# Patient Record
Sex: MALE | Race: WHITE | NOT HISPANIC OR LATINO | Employment: OTHER | URBAN - METROPOLITAN AREA
[De-identification: names, ages, dates, MRNs, and addresses within clinical notes are randomized per-mention and may not be internally consistent; named-entity substitution may affect disease eponyms.]

---

## 2017-05-08 DIAGNOSIS — M25.562 LEFT KNEE PAIN, UNSPECIFIED CHRONICITY: ICD-10-CM

## 2017-05-08 RX ORDER — GABAPENTIN 100 MG/1
100 CAPSULE ORAL DAILY PRN
Qty: 30 CAPSULE | Refills: 3 | Status: SHIPPED | OUTPATIENT
Start: 2017-05-08 | End: 2017-08-31

## 2017-05-22 DIAGNOSIS — E89.0 HYPOTHYROIDISM, POSTABLATIVE: ICD-10-CM

## 2017-05-23 RX ORDER — LEVOTHYROXINE SODIUM 75 UG/1
75 TABLET ORAL DAILY
Qty: 90 TABLET | Refills: 0 | Status: SHIPPED | OUTPATIENT
Start: 2017-05-23 | End: 2017-08-21

## 2017-05-30 DIAGNOSIS — N40.0 HYPERTROPHY OF PROSTATE WITHOUT URINARY OBSTRUCTION: ICD-10-CM

## 2017-05-30 RX ORDER — TERAZOSIN 10 MG/1
10 CAPSULE ORAL AT BEDTIME
Qty: 90 CAPSULE | Refills: 3 | Status: SHIPPED | OUTPATIENT
Start: 2017-05-30 | End: 2018-05-23

## 2017-06-13 DIAGNOSIS — I10 ESSENTIAL HYPERTENSION WITH GOAL BLOOD PRESSURE LESS THAN 140/90: ICD-10-CM

## 2017-06-13 RX ORDER — ATENOLOL 25 MG/1
25 TABLET ORAL DAILY
Qty: 90 TABLET | Refills: 3 | Status: SHIPPED | OUTPATIENT
Start: 2017-06-13 | End: 2018-06-15

## 2017-06-15 ENCOUNTER — OFFICE VISIT (OUTPATIENT)
Dept: FAMILY MEDICINE | Facility: CLINIC | Age: 82
End: 2017-06-15

## 2017-06-15 VITALS
SYSTOLIC BLOOD PRESSURE: 160 MMHG | HEIGHT: 68 IN | WEIGHT: 168 LBS | OXYGEN SATURATION: 97 % | TEMPERATURE: 97.4 F | RESPIRATION RATE: 16 BRPM | HEART RATE: 58 BPM | BODY MASS INDEX: 25.46 KG/M2 | DIASTOLIC BLOOD PRESSURE: 76 MMHG

## 2017-06-15 DIAGNOSIS — I10 ESSENTIAL HYPERTENSION: Primary | ICD-10-CM

## 2017-06-15 DIAGNOSIS — E89.0 HYPOTHYROIDISM, POSTABLATIVE: ICD-10-CM

## 2017-06-15 LAB
ANION GAP SERPL CALCULATED.3IONS-SCNC: 9 MMOL/L (ref 5–18)
BUN SERPL-MCNC: 21 MG/DL (ref 8–28)
CALCIUM SERPL-MCNC: 9.4 MG/DL (ref 8.5–10.5)
CHLORIDE SERPL-SCNC: 101 MMOL/L (ref 98–107)
CO2 SERPL-SCNC: 27 MMOL/L (ref 22–31)
CREAT SERPL-MCNC: 1.15 MG/DL (ref 0.7–1.3)
GLUCOSE SERPL-MCNC: 97 MG/DL (ref 70–125)
POTASSIUM SERPL-SCNC: 4.3 MMOL/L (ref 3.5–5)
SODIUM SERPL-SCNC: 137 MMOL/L (ref 136–145)
TSH SERPL DL<=0.05 MIU/L-ACNC: 4.63 UIU/ML (ref 0.3–5)

## 2017-06-15 NOTE — PROGRESS NOTES
HPI:       Dilip Long is a 82 year old  male with a significant past medical history of HTN, hypothyroidism, OA, BPH who presents for follow up concern of HTN, hypothyroid.  Walks a mile or two ever other day. This is a decrease which has helped his knee pain.   NO chest pain, or SOB with this.     About three years ago had lightheadedness, had infection. No other lightheaded issues.    Three grand kids trying to help get them to activities.   Retired .   Wife fairly healthy, Overweight. They take care of each other.    Still taking levothyroxine 100 mcg daily      BP at home was 130/70s this AM.  No palpitations.       No diarrhea, constipation. No dysuria.   Up at night about 5 times. Still seeing Dr Hernandez.    Not sleeping well. Gabapentin used to be more effective. Taking just 100 mg a night. Sleeps best in early morning hours.    Quit smoking more than 40 years ago.        PMHX:     Patient Active Problem List   Diagnosis     Health Care Home     Hypertrophy of prostate without urinary obstruction     Cerebral infarction due to occlusion or stenosis of carotid artery     Diverticulosis of large intestine     Grave's disease - post ablation     Hydrocele     Hyperlipidemia     Hypothyroidism, postablative     Pain in joint, pelvic region and thigh     Thoracic or lumbosacral neuritis or radiculitis, unspecified     Lumbosacral spondylosis without myelopathy     Elevated prostate specific antigen (PSA)     Essential hypertension     Primary osteoarthritis of left knee     Claudication of left lower extremity (H)     Urethral stricture       Current Outpatient Prescriptions   Medication Sig Dispense Refill     atenolol (TENORMIN) 25 MG tablet Take 1 tablet (25 mg) by mouth daily 90 tablet 3     terazosin (HYTRIN) 10 MG capsule Take 1 capsule (10 mg) by mouth At Bedtime Take 1 tablet (10 mg) daily 90 capsule 3     levothyroxine (SYNTHROID/LEVOTHROID) 75 MCG tablet Take 1 tablet (75 mcg) by  "mouth daily 90 tablet 0     gabapentin (NEURONTIN) 100 MG capsule Take 1 capsule (100 mg) by mouth daily as needed PRN nerve pain 30 capsule 3     simvastatin (ZOCOR) 10 MG tablet Take 1 tablet (10 mg) by mouth At Bedtime 90 tablet 3     order for DME Equipment being ordered: Home automatic blood pressure cuff. Check once daily for one week, and then 3 times weekly thereafter. 1 Device 0     aspirin 325 MG tablet Take 1 tablet by mouth daily.       Calcium Carbonate-Vitamin D (OSCAL 500/200 D-3 PO) Take 1 tablet by mouth daily.       Glucosamine-Chondroit-Vit C-Mn (GLUCOSAMINE 1500 COMPLEX) CAPS Take 1 capsule by mouth daily.            Allergies   Allergen Reactions     Nkda [No Known Drug Allergies]               Review of Systems:   As above          Physical Exam:     Vitals:    06/15/17 0847   BP: 160/76   Pulse: 58   Resp: 16   Temp: 97.4  F (36.3  C)   TempSrc: Oral   SpO2: 97%   Weight: 168 lb (76.2 kg)   Height: 5' 8\" (172.7 cm)     Body mass index is 25.54 kg/(m^2).    Vitals reviewed and normal except BP slightly elevated.  GENERAL APPEARANCE: healthy, alert and no distress  EYES: Eyes grossly normal to inspection, conjunctivae and sclerae normal  HENT:  oropharynx clear and oral mucous membranes moist  NECK: no adenopathy, no asymmetry, masses, or scars and thyroid normal to palpation  RESP: lungs clear to auscultation - no rales, rhonchi or wheezes  CV: regular rates and rhythm, normal S1 S2, no S3 or S4, no murmur, click or rub, no peripheral edema and peripheral pulses 2+  ABDOMEN: soft, nontender, no hepatosplenomegaly, no masses and bowel sounds normal  MS: no musculoskeletal defects are noted and gait is age appropriate without ataxia  SKIN: no suspicious lesions or rashes  NEURO: Normal strength and tone  PSYCH: mentation appears normal and affect normal/bright       Office Visit on 11/17/2016   Component Date Value Ref Range Status     TSH 11/17/2016 0.36  0.30 - 5.00 uIU/mL Final     Cholesterol " 11/17/2016 148.0  <200.0 mg/dL Final     Triglycerides 11/17/2016 130.0  <150.0 mg/dL Final     HDL Cholesterol 11/17/2016 64.0  >40.0 mg/dL Final    If diabetic or CVD then reference range <100     VLDL-Cholesterol 11/17/2016 26.0  7.0 - 32.0 mg/dL Final     LDL Cholesterol Direct 11/17/2016 58.0  0.0 - 99.0 mg/dL Final     Cholesterol/HDL Ratio 11/17/2016 2.3  <5.0 RATIO Final       Assessment and Plan     (I10) Essential hypertension  (primary encounter diagnosis)- Continues on atenolol, still prefers this agent to others, Not well controlled today in office but at goal at home.  Plan: Basic Metabolic Profile ([facility])  Continue atenolol    (E89.0) Hypothyroidism, postablative- did not change dose as previously recommended. Will recheck TSH today and adjust as needed.   Plan: [facility]  [facility])          Options for treatment and follow-up care were reviewed with the patient and/or guardian. Dilip Long and/or guardian engaged in the decision making process and verbalized understanding of the options discussed and agreed with the final plan.    Rochelle Combs MD      Precepted today with: Kya Garcia MD

## 2017-06-15 NOTE — MR AVS SNAPSHOT
"              After Visit Summary   6/15/2017    Dilip Long    MRN: [id number]           Patient Information     Date Of Birth          1935        Visit Information        Provider Department      6/15/2017 9:00 AM Rochelle Combs MD [facility]        Today's Diagnoses     Essential hypertension    -  1    Hypothyroidism, postablative           Follow-ups after your visit        Who to contact     Please call your clinic at [phone] to:    Ask questions about your health    Make or cancel appointments    Discuss your medicines    Learn about your test results    Speak to your doctor   If you have compliments or concerns about an experience at your clinic, or if you wish to file a complaint, please contact [facility] Patient Relations at [phone] or email us at [email or web address]         Additional Information About Your Visit        Care EveryWhere ID     This is your Care EveryWhere ID. This could be used by other organizations to access your [place] medical records  [id number]        Your Vitals Were     Pulse Temperature Respirations Height Pulse Oximetry BMI (Body Mass Index)    58 97.4  F (36.3  C) (Oral) 16 5' 8\" (172.7 cm) 97% 25.54 kg/m2       Blood Pressure from Last 3 Encounters:   06/15/17 160/76   12/08/16 135/74   11/17/16 124/64    Weight from Last 3 Encounters:   06/15/17 168 lb (76.2 kg)   12/08/16 169 lb (76.7 kg)   11/17/16 169 lb 6 oz (76.8 kg)              We Performed the Following     Basic Metabolic Profile ([facility])     TSH  Sensitive ([facility])        Primary Care Provider Office Phone # Fax #    Rochelle Combs [phone]       [facility] [address] [place] [address]        Equal Access to Services     ESSENCE LAM AH: Hadii robinson Ramos, waaxda luqadaha, qaybta kaalmada adeegyada, kvng sanderson. So wa " [phone].    ATENCIÓN: Si kamran will, tiene a leal disposición servicios gratuitos de asistencia lingüística. Jenny vela [phone].    We comply with applicable federal civil rights laws and [place] laws. We do not discriminate on the basis of race, color, national origin, age, disability sex, sexual orientation or gender identity.            Thank you!     Thank you for choosing [facility]  for your care. Our goal is always to provide you with excellent care. Hearing back from our patients is one way we can continue to improve our services. Please take a few minutes to complete the written survey that you may receive in the mail after your visit with us. Thank you!             Your Updated Medication List - Protect others around you: Learn how to safely use, store and throw away your medicines at www.disposemymeds.org.          This list is accurate as of: 6/15/17 11:59 PM.  Always use your most recent med list.                   Brand Name Dispense Instructions for use Diagnosis    aspirin 325 MG tablet      Take 1 tablet by mouth daily.        atenolol 25 MG tablet    TENORMIN    90 tablet    Take 1 tablet (25 mg) by mouth daily    Essential hypertension with goal blood pressure less than 140/90       gabapentin 100 MG capsule    NEURONTIN    30 capsule    Take 1 capsule (100 mg) by mouth daily as needed PRN nerve pain    Left knee pain, unspecified chronicity       GLUCOSAMINE 1500 COMPLEX Caps      Take 1 capsule by mouth daily.        levothyroxine 75 MCG tablet    SYNTHROID/LEVOTHROID    90 tablet    Take 1 tablet (75 mcg) by mouth daily    Hypothyroidism, postablative       order for DME     1 Device    Equipment being ordered: Home automatic blood pressure cuff. Check once daily for one week, and then 3 times weekly thereafter.    Essential hypertension       OSCAL 500/200 D-3 PO      Take 1 tablet by mouth daily.        simvastatin 10 MG tablet    ZOCOR    90 tablet    Take 1 tablet (10  mg) by mouth At Bedtime    Hyperlipidemia LDL goal <130       terazosin 10 MG capsule    HYTRIN    90 capsule    Take 1 capsule (10 mg) by mouth At Bedtime Take 1 tablet (10 mg) daily    Hypertrophy of prostate without urinary obstruction

## 2017-06-22 NOTE — PROGRESS NOTES
Preceptor Attestation:  Patient's case reviewed and discussed with Rochelle Combs MD resident and I evaluated the patient. I agree with written assessment and plan of care.  Supervising Physician:  Kya Garcia   [facility]

## 2017-08-21 DIAGNOSIS — E89.0 HYPOTHYROIDISM, POSTABLATIVE: ICD-10-CM

## 2017-08-22 RX ORDER — LEVOTHYROXINE SODIUM 75 UG/1
75 TABLET ORAL DAILY
Qty: 90 TABLET | Refills: 1 | Status: SHIPPED | OUTPATIENT
Start: 2017-08-22 | End: 2018-02-14

## 2017-08-31 DIAGNOSIS — M25.562 LEFT KNEE PAIN, UNSPECIFIED CHRONICITY: ICD-10-CM

## 2017-08-31 RX ORDER — GABAPENTIN 100 MG/1
100 CAPSULE ORAL DAILY PRN
Qty: 30 CAPSULE | Refills: 3 | Status: SHIPPED | OUTPATIENT
Start: 2017-08-31 | End: 2018-01-02

## 2017-09-12 ENCOUNTER — TRANSFERRED RECORDS (OUTPATIENT)
Dept: HEALTH INFORMATION MANAGEMENT | Facility: CLINIC | Age: 82
End: 2017-09-12

## 2017-10-30 DIAGNOSIS — E78.5 HYPERLIPIDEMIA LDL GOAL <130: ICD-10-CM

## 2017-10-30 RX ORDER — SIMVASTATIN 10 MG
10 TABLET ORAL AT BEDTIME
Qty: 90 TABLET | Refills: 3 | Status: SHIPPED | OUTPATIENT
Start: 2017-10-30 | End: 2018-10-22

## 2017-10-30 NOTE — TELEPHONE ENCOUNTER
[facility] phone call message- patient requesting a refill:    Full Medication Name: Simvastatin    Dose: 10mg     Pharmacy confirmed as   [id number] [[address] [address]  Phone: [phone] Fax: [phone]  : Yes    Additional Comments:       OK to leave a message on voice mail? Yes    Primary language: English      needed? No    Call taken on October 30, 2017 at 12:52 PM by Sharita Fu

## 2017-11-07 ENCOUNTER — TELEPHONE (OUTPATIENT)
Dept: FAMILY MEDICINE | Facility: CLINIC | Age: 82
End: 2017-11-07

## 2017-11-07 ENCOUNTER — OFFICE VISIT (OUTPATIENT)
Dept: FAMILY MEDICINE | Facility: CLINIC | Age: 82
End: 2017-11-07

## 2017-11-07 VITALS
HEIGHT: 68 IN | TEMPERATURE: 97.4 F | HEART RATE: 65 BPM | OXYGEN SATURATION: 97 % | BODY MASS INDEX: 25.82 KG/M2 | WEIGHT: 170.4 LBS | SYSTOLIC BLOOD PRESSURE: 162 MMHG | RESPIRATION RATE: 16 BRPM | DIASTOLIC BLOOD PRESSURE: 73 MMHG

## 2017-11-07 DIAGNOSIS — J06.9 VIRAL UPPER RESPIRATORY TRACT INFECTION: Primary | ICD-10-CM

## 2017-11-07 NOTE — MR AVS SNAPSHOT
"              After Visit Summary   11/7/2017    Dilip Long    MRN: [id number]           Patient Information     Date Of Birth          1935        Visit Information        Provider Department      11/7/2017 3:00 PM Lonnie Shearer MD [facility]        Today's Diagnoses     Gastroesophageal reflux disease with esophagitis    -  1      Care Instructions    - Take Robitussin before bed for help sleeping.  - Come back to the clinic if you develop a fever, or headache persists after cold symptoms resolve.  - Come back in if symptoms do not resolve in two weeks  - Stay well hydrated with lots of fluid, take tylenol or ibuprofen as needed for discomfort.          Follow-ups after your visit        Who to contact     Please call your clinic at [phone] to:    Ask questions about your health    Make or cancel appointments    Discuss your medicines    Learn about your test results    Speak to your doctor   If you have compliments or concerns about an experience at your clinic, or if you wish to file a complaint, please contact [facility] Patient Relations at [phone] or email us at [email or web address]         Additional Information About Your Visit        Care EveryWhere ID     This is your Care EveryWhere ID. This could be used by other organizations to access your [place] medical records  [id number]        Your Vitals Were     Pulse Temperature Respirations Height Pulse Oximetry BMI (Body Mass Index)    65 97.4  F (36.3  C) (Oral) 16 5' 8\" (172.7 cm) 97% 25.91 kg/m2       Blood Pressure from Last 3 Encounters:   11/07/17 162/73   06/15/17 160/76   12/08/16 135/74    Weight from Last 3 Encounters:   11/07/17 170 lb 6.4 oz (77.3 kg)   06/15/17 168 lb (76.2 kg)   12/08/16 169 lb (76.7 kg)              Today, you had the following     No orders found for display       Primary Care Provider Office Phone # Fax #    Leonora Vaenssa [phone] " [phone]       [facility] [address] [place] [address]        Equal Access to Services     ESSENCE LAM : Hadii aad ku hadagustinrenetta Thanhali, warobertoda luqadaha, qaybta kaalmada wilfredcarolina, kvng rui deliciafrederic hardinben kenbrianaany sanderson. So [facility] [phone].    ATENCIÓN: Si habla español, tiene a leal disposición servicios gratuitos de asistencia lingüística. Llame al [phone].    We comply with applicable federal civil rights laws and [place] laws. We do not discriminate on the basis of race, color, national origin, age, disability, sex, sexual orientation, or gender identity.            Thank you!     Thank you for choosing [facility]  for your care. Our goal is always to provide you with excellent care. Hearing back from our patients is one way we can continue to improve our services. Please take a few minutes to complete the written survey that you may receive in the mail after your visit with us. Thank you!             Your Updated Medication List - Protect others around you: Learn how to safely use, store and throw away your medicines at www.disposemymeds.org.          This list is accurate as of: 11/7/17  3:39 PM.  Always use your most recent med list.                   Brand Name Dispense Instructions for use Diagnosis    aspirin 325 MG tablet      Take 1 tablet by mouth daily.        atenolol 25 MG tablet    TENORMIN    90 tablet    Take 1 tablet (25 mg) by mouth daily    Essential hypertension with goal blood pressure less than 140/90       gabapentin 100 MG capsule    NEURONTIN    30 capsule    Take 1 capsule (100 mg) by mouth daily as needed PRN nerve pain    Left knee pain, unspecified chronicity       GLUCOSAMINE 1500 COMPLEX Caps      Take 1 capsule by mouth daily.        levothyroxine 75 MCG tablet    SYNTHROID/LEVOTHROID    90 tablet    Take 1 tablet (75 mcg) by mouth daily    Hypothyroidism, postablative       order for DME     1 Device    Equipment being ordered: Home  automatic blood pressure cuff. Check once daily for one week, and then 3 times weekly thereafter.    Essential hypertension       OSCAL 500/200 D-3 PO      Take 1 tablet by mouth daily.        simvastatin 10 MG tablet    ZOCOR    90 tablet    Take 1 tablet (10 mg) by mouth At Bedtime    Hyperlipidemia LDL goal <130       terazosin 10 MG capsule    HYTRIN    90 capsule    Take 1 capsule (10 mg) by mouth At Bedtime Take 1 tablet (10 mg) daily    Hypertrophy of prostate without urinary obstruction

## 2017-11-07 NOTE — PROGRESS NOTES
"       HPI:       Dilip Long is a 82 year old  male with a significant past medical history of hypothyroid, CVA with carotid artery involvment and hypertension who presents for the new concern(s) of    Sore throat, headache, and nonproductive cough  Mr. Long has had a cough, sore throat and headache for the past 5 days.  He has tried tylenol and \"cough medicine\" with no improvement of symptoms. Endorse intermittent rhinorrhea. His wife was sick about 3 weeks ago with similar symptoms that resolved after about a week. He notes no fevers, confusion, SOB, cyanosis, nausea, vomiting, stomach pain, dysuria, or urinary urgency, diarrhea, changes in vision or hearing. Has not had the influenza vaccine and does not want it despite discussion with his pharmacist.         PMHX:     Patient Active Problem List   Diagnosis     Health Care Home     Hypertrophy of prostate without urinary obstruction     Cerebral infarction due to occlusion or stenosis of carotid artery     Diverticulosis of large intestine     Grave's disease - post ablation     Hydrocele     Hyperlipidemia     Hypothyroidism, postablative     Pain in joint, pelvic region and thigh     Thoracic or lumbosacral neuritis or radiculitis, unspecified     Lumbosacral spondylosis without myelopathy     Elevated prostate specific antigen (PSA)     Essential hypertension     Primary osteoarthritis of left knee     Claudication of left lower extremity (H)     Urethral stricture       Current Outpatient Prescriptions   Medication Sig Dispense Refill     simvastatin (ZOCOR) 10 MG tablet Take 1 tablet (10 mg) by mouth At Bedtime 90 tablet 3     gabapentin (NEURONTIN) 100 MG capsule Take 1 capsule (100 mg) by mouth daily as needed PRN nerve pain 30 capsule 3     levothyroxine (SYNTHROID/LEVOTHROID) 75 MCG tablet Take 1 tablet (75 mcg) by mouth daily 90 tablet 1     atenolol (TENORMIN) 25 MG tablet Take 1 tablet (25 mg) by mouth daily 90 tablet 3     terazosin (HYTRIN) " "10 MG capsule Take 1 capsule (10 mg) by mouth At Bedtime Take 1 tablet (10 mg) daily 90 capsule 3     aspirin 325 MG tablet Take 1 tablet by mouth daily.       Glucosamine-Chondroit-Vit C-Mn (GLUCOSAMINE 1500 COMPLEX) CAPS Take 1 capsule by mouth daily.       Calcium Carbonate-Vitamin D (OSCAL 500/200 D-3 PO) Take 1 tablet by mouth daily.       order for DME Equipment being ordered: Home automatic blood pressure cuff. Check once daily for one week, and then 3 times weekly thereafter. 1 Device 0       Social History     Social History     Marital status:      Spouse name: N/A     Number of children: N/A     Years of education: N/A     Occupational History     Retired      Social History Main Topics     Smoking status: Former Smoker     Types: Cigarettes     Smokeless tobacco: Never Used      Comment: quit at age 51 years     Alcohol use No     Drug use: No     Sexual activity: Not on file     Other Topics Concern     Not on file     Social History Narrative          Allergies   Allergen Reactions     Nkda [No Known Drug Allergies]        No results found for this or any previous visit (from the past 24 hour(s)).         Review of Systems:   See HPI.          Physical Exam:     Vitals:    11/07/17 1450   BP: 162/73   BP Location: Right arm   Pulse: 65   Resp: 16   Temp: 97.4  F (36.3  C)   TempSrc: Oral   SpO2: 97%   Weight: 170 lb 6.4 oz (77.3 kg)   Height: 5' 8\" (172.7 cm)     BP at home: 140/90    Body mass index is 25.91 kg/(m^2).    GENERAL APPEARANCE: alert, no distress, cooperative and non-toxic appearing  EYES: Eyes grossly normal to inspection,  PERRL  HENT: ear canals and TM's normal and nose and mouth without ulcers or lesions, thick posterior pharynx mucus  NECK: no adenopathy, no asymmetry, masses, or scars and thyroid normal to palpation  RESP: lungs clear to auscultation - no rales, rhonchi or wheezes  CV: regular rate and rhythm,  and no murmur, click,  rub or gallop  ABDOMEN: soft, nontender, " without hepatosplenomegaly or masses  MS: extremities normal- no gross deformities noted  PSYCH: mood and affect normal/bright    Assessment and Plan     URI: Dilip's symptoms are consistent with a viral URI. Less likely pneumonia given no issues with deep breathing, no fevers or purulent sputum.  Less likely etiology with plans for symptomatic treatment which patient is agreeable to.  - Supportive treatment including good hydration, ibuprofen and Tylenol as needed for discomfort, over-the-counter Robitussin as needed  - Return to clinic if developing fevers, difficulties breathing, chest pain or new concerning symptoms,  This note was scribed by Eyad Tim, MS3, on behalf of Dr. Lonnie Shearer. The medical student acted as a scribe and the encounter documented above was performed completely by me and the documentation accurately reflects the work I have performed today.      Options for treatment and follow-up care were reviewed with the patient and/or guardian. Dilip Long and/or guardian engaged in the decision making process and verbalized understanding of the options discussed and agreed with the final plan.      Lonnie Shearer MD  [facility]  Pager: [phone]    Precepted today with: Dr. Kasie Parisi

## 2017-11-07 NOTE — PATIENT INSTRUCTIONS
- Take Robitussin before bed for help sleeping.  - Come back to the clinic if you develop a fever, or headache persists after cold symptoms resolve.  - Come back in if symptoms do not resolve in two weeks  - Stay well hydrated with lots of fluid, take tylenol or ibuprofen as needed for discomfort.

## 2017-11-07 NOTE — TELEPHONE ENCOUNTER
[facility] phone call message- medication clarification/question:    Full Medication Name: atenolol (TENORMIN) 25 MG tablet    Question: Patient called to let Dr. Parisi know that he is taking this prescription 25mg, x1 a day in the morning.     Pharmacy confirmed as [id number] [place] [address] [[address] No    OK to leave a message on voice mail? Yes    Primary language: English      needed? No    Call taken on November 7, 2017 at 4:06 PM by Genevieve Pickering

## 2017-11-08 NOTE — PROGRESS NOTES
Preceptor Attestation:  Patient's case reviewed and discussed with  Patient seen and discussed with the resident.  I agree with written assessment and plan of care.  Supervising Physician:  Kasie Parisi MD  [facility]

## 2017-11-13 ENCOUNTER — TELEPHONE (OUTPATIENT)
Dept: FAMILY MEDICINE | Facility: CLINIC | Age: 82
End: 2017-11-13

## 2017-11-13 NOTE — TELEPHONE ENCOUNTER
Patient was seen on 11-7-17 for a cough, headache and congestion, stopped to see me on his way out and was concerned that it may be more than a virus.  In the note of 11-7, Dr. Shearer did recommend to return if headaches did not resolve after the URI symptoms got better. Today I called patient for a phone update and he tells me things are much better, headache is improved and he will follow up if not totally resoved soon.  Raomna Ray

## 2018-01-02 DIAGNOSIS — M25.562 LEFT KNEE PAIN, UNSPECIFIED CHRONICITY: ICD-10-CM

## 2018-01-03 RX ORDER — GABAPENTIN 100 MG/1
100 CAPSULE ORAL DAILY PRN
Qty: 30 CAPSULE | Refills: 3 | Status: SHIPPED | OUTPATIENT
Start: 2018-01-03 | End: 2018-03-08

## 2018-02-14 DIAGNOSIS — E89.0 HYPOTHYROIDISM, POSTABLATIVE: ICD-10-CM

## 2018-02-14 RX ORDER — LEVOTHYROXINE SODIUM 75 UG/1
75 TABLET ORAL DAILY
Qty: 90 TABLET | Refills: 3 | Status: SHIPPED | OUTPATIENT
Start: 2018-02-14 | End: 2019-02-08

## 2018-03-01 ENCOUNTER — ALLIED HEALTH/NURSE VISIT (OUTPATIENT)
Dept: FAMILY MEDICINE | Facility: CLINIC | Age: 83
End: 2018-03-01
Payer: COMMERCIAL

## 2018-03-01 VITALS
TEMPERATURE: 97.5 F | WEIGHT: 166.5 LBS | OXYGEN SATURATION: 98 % | DIASTOLIC BLOOD PRESSURE: 62 MMHG | SYSTOLIC BLOOD PRESSURE: 135 MMHG | BODY MASS INDEX: 24.66 KG/M2 | HEIGHT: 69 IN | HEART RATE: 60 BPM

## 2018-03-01 DIAGNOSIS — I10 ESSENTIAL HYPERTENSION: Primary | ICD-10-CM

## 2018-03-01 NOTE — MR AVS SNAPSHOT
"              After Visit Summary   3/1/2018    Dilip Long    MRN: [id number]           Patient Information     Date Of Birth          1935        Visit Information        Provider Department      3/1/2018 10:00 AM Nurse, Shae Massey [facility]        Today's Diagnoses     Essential hypertension    -  1       Follow-ups after your visit        Who to contact     Please call your clinic at [phone] to:    Ask questions about your health    Make or cancel appointments    Discuss your medicines    Learn about your test results    Speak to your doctor            Additional Information About Your Visit        MyChart Information     [employer] is an electronic gateway that provides easy, online access to your medical records. With [employer], you can request a clinic appointment, read your test results, renew a prescription or communicate with your care team.     To sign up for [employer] visit the website at [email or web address]   You will be asked to enter the access code listed below, as well as some personal information. Please follow the directions to create your username and password.     Your access code is: [email or web address]  Expires: 2018 10:43 AM     Your access code will  in 90 days. If you need help or a new code, please contact your [facility] or call [phone] for assistance.        Care EveryWhere ID     This is your Care EveryWhere ID. This could be used by other organizations to access your [place] medical records  [id number]        Your Vitals Were     Pulse Temperature Height Pulse Oximetry BMI (Body Mass Index)       60 97.5  F (36.4  C) (Oral) 5' 8.5\" (174 cm) 98% 24.95 kg/m2        Blood Pressure from Last 3 Encounters:   18 135/62   17 162/73   06/15/17 160/76    Weight from Last 3 Encounters:   18 166 lb 8 oz (75.5 kg)   17 170 lb 6.4 oz (77.3 kg)   06/15/17 168 lb (76.2 kg)              Today, you had the " following     No orders found for display       Primary Care Provider Office Phone # Fax #    Leonora Azalia Vanessa [phone]       [facility] [address] [place] [address]        Equal Access to Services     ESSENCE LAM : Hadvika aviles arasho Soomaali, waaxda luqadaha, qaybta kaalmada adeegyada, kvng elijahin hayaan wilfredben griffith senait sanderson. So [facility] [phone].    ATENCIÓN: Si habla español, tiene a leal disposición servicios gratuitos de asistencia lingüística. Llame al [phone].    We comply with applicable federal civil rights laws and [place] laws. We do not discriminate on the basis of race, color, national origin, age, disability, sex, sexual orientation, or gender identity.            Thank you!     Thank you for choosing [facility]  for your care. Our goal is always to provide you with excellent care. Hearing back from our patients is one way we can continue to improve our services. Please take a few minutes to complete the written survey that you may receive in the mail after your visit with us. Thank you!             Your Updated Medication List - Protect others around you: Learn how to safely use, store and throw away your medicines at www.disposemymeds.org.          This list is accurate as of 3/1/18 10:43 AM.  Always use your most recent med list.                   Brand Name Dispense Instructions for use Diagnosis    aspirin 325 MG tablet      Take 1 tablet by mouth daily.        atenolol 25 MG tablet    TENORMIN    90 tablet    Take 1 tablet (25 mg) by mouth daily    Essential hypertension with goal blood pressure less than 140/90       gabapentin 100 MG capsule    NEURONTIN    30 capsule    Take 1 capsule (100 mg) by mouth daily as needed PRN nerve pain    Left knee pain, unspecified chronicity       GLUCOSAMINE 1500 COMPLEX Caps      Take 1 capsule by mouth daily.        levothyroxine 75 MCG tablet    SYNTHROID/LEVOTHROID    90 tablet    Take 1  tablet (75 mcg) by mouth daily    Hypothyroidism, postablative       order for DME     1 Device    Equipment being ordered: Home automatic blood pressure cuff. Check once daily for one week, and then 3 times weekly thereafter.    Essential hypertension       OSCAL 500/200 D-3 PO      Take 1 tablet by mouth daily.        simvastatin 10 MG tablet    ZOCOR    90 tablet    Take 1 tablet (10 mg) by mouth At Bedtime    Hyperlipidemia LDL goal <130       terazosin 10 MG capsule    HYTRIN    90 capsule    Take 1 capsule (10 mg) by mouth At Bedtime Take 1 tablet (10 mg) daily    Hypertrophy of prostate without urinary obstruction

## 2018-03-01 NOTE — NURSING NOTE
Blood pressure by Kofi Rodriguez MA    Name of preceptor on site during the time of service is DR Yeager

## 2018-03-08 ENCOUNTER — AMBULATORY - HEALTHEAST (OUTPATIENT)
Dept: ADMINISTRATIVE | Facility: REHABILITATION | Age: 83
End: 2018-03-08

## 2018-03-08 ENCOUNTER — OFFICE VISIT (OUTPATIENT)
Dept: FAMILY MEDICINE | Facility: CLINIC | Age: 83
End: 2018-03-08
Payer: COMMERCIAL

## 2018-03-08 VITALS
OXYGEN SATURATION: 99 % | RESPIRATION RATE: 18 BRPM | HEART RATE: 55 BPM | HEIGHT: 69 IN | SYSTOLIC BLOOD PRESSURE: 130 MMHG | TEMPERATURE: 97.5 F | BODY MASS INDEX: 24.85 KG/M2 | WEIGHT: 167.8 LBS | DIASTOLIC BLOOD PRESSURE: 72 MMHG

## 2018-03-08 DIAGNOSIS — M54.41 CHRONIC RIGHT-SIDED LOW BACK PAIN WITH RIGHT-SIDED SCIATICA: Primary | ICD-10-CM

## 2018-03-08 DIAGNOSIS — G89.29 CHRONIC RIGHT-SIDED LOW BACK PAIN WITH SCIATICA: ICD-10-CM

## 2018-03-08 DIAGNOSIS — G89.29 CHRONIC RIGHT-SIDED LOW BACK PAIN WITH RIGHT-SIDED SCIATICA: Primary | ICD-10-CM

## 2018-03-08 DIAGNOSIS — M54.40 CHRONIC RIGHT-SIDED LOW BACK PAIN WITH SCIATICA: ICD-10-CM

## 2018-03-08 RX ORDER — GABAPENTIN 100 MG/1
100 CAPSULE ORAL 3 TIMES DAILY
Qty: 30 CAPSULE | Refills: 3 | Status: SHIPPED | OUTPATIENT
Start: 2018-03-08 | End: 2018-03-09

## 2018-03-08 NOTE — PROGRESS NOTES
"       Subjective       Dilip Long is a 82 year old  male who presents for    1. Back Pain  - Chronic issue, but has been acutely worsening over 4 weeks  - No injury  - Back exercises BID and daily gabapentin (100mg)- no help  - Pain radiates down lateral right leg to knee, no leg weakness  - no numbness in groin  - no new bowel incontinence    Pertinent ROS: 5-Point Review of Systems Negative -- Except as noted above.         Objective     Vitals:    03/08/18 0922   BP: 130/72   Pulse: 55   Resp: 18   Temp: 97.5  F (36.4  C)   TempSrc: Oral   SpO2: 99%   Weight: 167 lb 12.8 oz (76.1 kg)   Height: 5' 8.75\" (174.6 cm)     Body mass index is 24.96 kg/(m^2).    GENERAL APPEARANCE: healthy, alert and no distress  RESP: lungs clear to auscultation - no rales, rhonchi or wheezes  CV: regular rates and rhythm, normal S1 S2, no S3 or S4, no murmur, click or rub, no peripheral edema and peripheral pulses strong  BACK: Tender, spastic paraspinal muscles, slightly tender gith PSIS and sciatic notch, back extension reproduces pain  LEGS: ORTIZ positive on right, negative on left, other SI joint tests negative (sacral thrust, distraction, compression)  GAIT: normal, not antalgic  NEURO: Normal strength and tone, sensory exam grossly normal, mentation intact and speech normal         Assessment/Plan       (M54.41,  G89.29) Chronic right-sided low back pain with right-sided sciatica  (primary encounter diagnosis)  Comment:   Plan: PHYSICAL THERAPY REFERRAL        Will increase gabapentin to 100mg TID. Patient already doing some back exercises twice a day at home. Gave him more to do. Highly motivated, agreeable to PT. RTC in 4-6 weeks for follow up. No red flag signs.    Options for treatment and follow-up care were reviewed with the patient and/or guardian. Dilip Long and/or guardian engaged in the decision making process and verbalized understanding of the options discussed and agreed with the final plan.    Aiden" Daquan Hennessy MD      Precepted today with: Dr. Paige Steel    This note was created using Dragon Dictation software. Any grammatical errors or word substitutions are unintentional, despite proofreading.

## 2018-03-08 NOTE — MR AVS SNAPSHOT
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  Yoly          Follow-ups after your visit        Additional Services     PHYSICAL THERAPY REFERRAL       PT/OT REFERRAL  Dilip Long  1935  Phone #: [phone] (home)    needed: No  Language: English    PT/OT  Facility:   [facility], P: [phone], F: [phone]    History: Chronic low back pain, currently worse, no specific injury. Some right leg radicular pain, treating with gabapentin    Precautions/Contraindications: None    Imaging Studies: none    Surgical Procedure/Test Results: None    Treatment Goals:   Increase: Strength  Decrease: Pain and Spasm    Prognosis: excellent    Visits: Up to 10    Evaluate: Evaluate and treat    Plan: Manual Therapy, Flexibility Exercise and Strength Exercise                  Follow-up notes from your care team     Return in about 4 weeks (around 4/5/2018).      Who to contact     Please call your clinic at [phone] to:    Ask questions about your health    Make or cancel appointments    Discuss your medicines    Learn about your test results    Speak to your doctor            Additional Information About Your Visit        MyChart Information     [employer] is an electronic gateway that provides easy, online access to your medical records. With [employer], you can request a clinic appointment, read your test results, renew a prescription or communicate with your care team.     To sign up for [employer] visit the website at  "[email or web address]   You will be asked to enter the access code listed below, as well as some personal information. Please follow the directions to create your username and password.     Your access code is: [email or web address]  Expires: 2018 11:43 AM     Your access code will  in 90 days. If you need help or a new code, please contact your [facility] or call [phone] for assistance.        Care EveryWhere ID     This is your Care EveryWhere ID. This could be used by other organizations to access your [place] medical records  [id number]        Your Vitals Were     Pulse Temperature Respirations Height Pulse Oximetry BMI (Body Mass Index)    55 97.5  F (36.4  C) (Oral) 18 5' 8.75\" (174.6 cm) 99% 24.96 kg/m2       Blood Pressure from Last 3 Encounters:   18 130/72   18 135/62   17 162/73    Weight from Last 3 Encounters:   18 167 lb 12.8 oz (76.1 kg)   18 166 lb 8 oz (75.5 kg)   17 170 lb 6.4 oz (77.3 kg)                 Today's Medication Changes          These changes are accurate as of 3/8/18 11:59 PM.  If you have any questions, ask your nurse or doctor.               These medicines have changed or have updated prescriptions.        Dose/Directions    gabapentin 100 MG capsule   Commonly known as:  NEURONTIN   This may have changed:    - when to take this  - reasons to take this   Changed by:  Aiden Hennessy MD        Dose:  100 mg   Take 1 capsule (100 mg) by mouth 3 times daily PRN nerve pain   Quantity:  30 capsule   Refills:  3            Where to get your medicines      These medications were sent to [id number] - [place] [[place], [place] - [address] [address]     Phone:  [phone]     gabapentin 100 MG capsule                Primary Care Provider Office Phone # Fax #    Leonora Vanessa [phone]       [facility] [address] [place] [address]        Equal Access to Services     ESSENCE LAM : Hadii aad ku hadagustinrenetta Ramos, waaxda esa, qaybta kvng herzog. So [facility] [phone].    ATENCIÓN: Si habla español, tiene a leal disposición servicios gratuitos de asistencia lingüística. Llame al [phone].    We comply with applicable federal civil rights laws and [place] laws. We do not discriminate on the basis of race, color, national origin, age, disability, sex, sexual orientation, or gender identity.            Thank you!     Thank you for choosing [facility]  for your care. Our goal is always to provide you with excellent care. Hearing back from our patients is one way we can continue to improve our services. Please take a few minutes to complete the written survey that you may receive in the mail after your visit with us. Thank you!             Your Updated Medication List - Protect others around you: Learn how to safely use, store and throw away your medicines at www.disposemymeds.org.          This list is accurate as of 3/8/18 11:59 PM.  Always use your most recent med list.                   Brand Name Dispense Instructions for use Diagnosis    aspirin 325 MG tablet      Take 1 tablet by mouth daily.        atenolol 25 MG tablet    TENORMIN    90 tablet    Take 1 tablet (25 mg) by mouth daily    Essential hypertension with goal blood pressure less than 140/90       gabapentin 100 MG capsule    NEURONTIN    30 capsule    Take 1 capsule (100 mg) by mouth 3 times daily PRN nerve pain        GLUCOSAMINE 1500 COMPLEX Caps      Take 1 capsule by mouth daily.        levothyroxine 75 MCG tablet    SYNTHROID/LEVOTHROID    90 tablet    Take 1 tablet (75 mcg) by mouth daily    Hypothyroidism, postablative       order for DME     1 Device    Equipment being ordered: Home automatic blood pressure cuff. Check once daily for one week, and then 3 times weekly  thereafter.    Essential hypertension       OSCAL 500/200 D-3 PO      Take 1 tablet by mouth daily.        simvastatin 10 MG tablet    ZOCOR    90 tablet    Take 1 tablet (10 mg) by mouth At Bedtime    Hyperlipidemia LDL goal <130       terazosin 10 MG capsule    HYTRIN    90 capsule    Take 1 capsule (10 mg) by mouth At Bedtime Take 1 tablet (10 mg) daily    Hypertrophy of prostate without urinary obstruction

## 2018-03-09 ENCOUNTER — OFFICE VISIT - HEALTHEAST (OUTPATIENT)
Dept: PHYSICAL THERAPY | Facility: REHABILITATION | Age: 83
End: 2018-03-09

## 2018-03-09 DIAGNOSIS — G89.29 CHRONIC RIGHT-SIDED LOW BACK PAIN WITH RIGHT-SIDED SCIATICA: Primary | ICD-10-CM

## 2018-03-09 DIAGNOSIS — M54.16 RIGHT LUMBAR RADICULOPATHY: ICD-10-CM

## 2018-03-09 DIAGNOSIS — M21.70 LEG LENGTH DISCREPANCY: ICD-10-CM

## 2018-03-09 DIAGNOSIS — M54.41 CHRONIC RIGHT-SIDED LOW BACK PAIN WITH RIGHT-SIDED SCIATICA: Primary | ICD-10-CM

## 2018-03-11 PROBLEM — M54.41 CHRONIC RIGHT-SIDED LOW BACK PAIN WITH RIGHT-SIDED SCIATICA: Status: ACTIVE | Noted: 2018-03-11

## 2018-03-11 PROBLEM — G89.29 CHRONIC RIGHT-SIDED LOW BACK PAIN WITH RIGHT-SIDED SCIATICA: Status: ACTIVE | Noted: 2018-03-11

## 2018-03-11 RX ORDER — GABAPENTIN 100 MG/1
100 CAPSULE ORAL 3 TIMES DAILY
Qty: 90 CAPSULE | Refills: 3 | Status: SHIPPED | OUTPATIENT
Start: 2018-03-11 | End: 2018-03-14

## 2018-03-14 ENCOUNTER — TELEPHONE (OUTPATIENT)
Dept: FAMILY MEDICINE | Facility: CLINIC | Age: 83
End: 2018-03-14

## 2018-03-14 ENCOUNTER — OFFICE VISIT - HEALTHEAST (OUTPATIENT)
Dept: PHYSICAL THERAPY | Facility: REHABILITATION | Age: 83
End: 2018-03-14

## 2018-03-14 DIAGNOSIS — M54.16 RIGHT LUMBAR RADICULOPATHY: ICD-10-CM

## 2018-03-14 DIAGNOSIS — M21.70 LEG LENGTH DISCREPANCY: ICD-10-CM

## 2018-03-14 DIAGNOSIS — G89.29 CHRONIC RIGHT-SIDED LOW BACK PAIN WITH RIGHT-SIDED SCIATICA: ICD-10-CM

## 2018-03-14 DIAGNOSIS — M54.41 CHRONIC RIGHT-SIDED LOW BACK PAIN WITH RIGHT-SIDED SCIATICA: ICD-10-CM

## 2018-03-14 RX ORDER — GABAPENTIN 100 MG/1
200 CAPSULE ORAL 3 TIMES DAILY
Qty: 90 CAPSULE | Refills: 3 | Status: SHIPPED | OUTPATIENT
Start: 2018-03-14 | End: 2019-02-11

## 2018-03-14 RX ORDER — CYCLOBENZAPRINE HCL 10 MG
5 TABLET ORAL 3 TIMES DAILY PRN
Qty: 42 TABLET | Refills: 0 | Status: SHIPPED | OUTPATIENT
Start: 2018-03-14 | End: 2019-02-11

## 2018-03-14 NOTE — TELEPHONE ENCOUNTER
I called Dilip at 5:21 PM 03/14/18. He states his low back spasms are continuing, and they are now keeping him up at night. He is taking tylenol and gabapentin (100mg TID). Went to therapy, continuing home exercises. Denies incontinence or saddle anesthesia.    We discussed a short course of flexeril, which Dilip agrees to. Also stated he can increase his gabapentin dose. He is going to start walking more.    Aiden Hennessy MD  Family Medicine Resident, R3

## 2018-03-16 ENCOUNTER — OFFICE VISIT - HEALTHEAST (OUTPATIENT)
Dept: PHYSICAL THERAPY | Facility: REHABILITATION | Age: 83
End: 2018-03-16

## 2018-03-16 DIAGNOSIS — M21.70 LEG LENGTH DISCREPANCY: ICD-10-CM

## 2018-03-16 DIAGNOSIS — M54.16 RIGHT LUMBAR RADICULOPATHY: ICD-10-CM

## 2018-03-23 ENCOUNTER — OFFICE VISIT - HEALTHEAST (OUTPATIENT)
Dept: PHYSICAL THERAPY | Facility: REHABILITATION | Age: 83
End: 2018-03-23

## 2018-03-23 DIAGNOSIS — M54.16 RIGHT LUMBAR RADICULOPATHY: ICD-10-CM

## 2018-03-23 DIAGNOSIS — M21.70 LEG LENGTH DISCREPANCY: ICD-10-CM

## 2018-03-30 ENCOUNTER — OFFICE VISIT - HEALTHEAST (OUTPATIENT)
Dept: PHYSICAL THERAPY | Facility: REHABILITATION | Age: 83
End: 2018-03-30

## 2018-03-30 DIAGNOSIS — M54.16 RIGHT LUMBAR RADICULOPATHY: ICD-10-CM

## 2018-03-30 DIAGNOSIS — M21.70 LEG LENGTH DISCREPANCY: ICD-10-CM

## 2018-04-16 ENCOUNTER — TELEPHONE (OUTPATIENT)
Dept: FAMILY MEDICINE | Facility: CLINIC | Age: 83
End: 2018-04-16

## 2018-04-16 NOTE — TELEPHONE ENCOUNTER
[facility] phone call message- general phone call:    Reason for call: Calling to update Dr. Hennessy that his unbearable pain went away first visit to the physical therapy. So he doesn't need to come see him for follow up with him til he needs to.     Return call needed: No    OK to leave a message on voice mail?     Primary language: English      needed? No    Call taken on April 16, 2018 at 8:35 AM by Tommie Saavedra

## 2018-05-23 DIAGNOSIS — N40.0 HYPERTROPHY OF PROSTATE WITHOUT URINARY OBSTRUCTION: ICD-10-CM

## 2018-05-23 RX ORDER — TERAZOSIN 10 MG/1
10 CAPSULE ORAL AT BEDTIME
Qty: 90 CAPSULE | Refills: 3 | Status: ON HOLD | OUTPATIENT
Start: 2018-05-23 | End: 2022-11-10

## 2018-05-23 NOTE — TELEPHONE ENCOUNTER
Date of last visit: 3/8/2018     Date of next visit if scheduled: Visit date not found       Last Basic Metabolic Panel:  Lab Results   Component Value Date     06/15/2017      Lab Results   Component Value Date    POTASSIUM 4.3 06/15/2017     Lab Results   Component Value Date    CHLORIDE 101 06/15/2017     Lab Results   Component Value Date    KRISTEN 9.4 06/15/2017     Lab Results   Component Value Date    CO2 32.0 02/05/2016     Lab Results   Component Value Date    BUN 21 06/15/2017     Lab Results   Component Value Date    CR 1.15 06/15/2017     Lab Results   Component Value Date    GLC 97 06/15/2017       BP Readings from Last 3 Encounters:   03/08/18 130/72   03/01/18 135/62   11/07/17 162/73       No results found for: A1C             Please complete refill and CLOSE ENCOUNTER.  Closing the encounter signifies the refill is complete.

## 2018-06-15 DIAGNOSIS — I10 ESSENTIAL HYPERTENSION WITH GOAL BLOOD PRESSURE LESS THAN 140/90: ICD-10-CM

## 2018-06-15 RX ORDER — ATENOLOL 25 MG/1
25 TABLET ORAL DAILY
Qty: 90 TABLET | Refills: 1 | Status: SHIPPED | OUTPATIENT
Start: 2018-06-15 | End: 2018-12-11

## 2018-07-27 ENCOUNTER — SURGERY - HEALTHEAST (OUTPATIENT)
Dept: SURGERY | Facility: HOSPITAL | Age: 83
End: 2018-07-27

## 2018-07-27 ENCOUNTER — TELEPHONE (OUTPATIENT)
Dept: FAMILY MEDICINE | Facility: CLINIC | Age: 83
End: 2018-07-27

## 2018-07-27 ENCOUNTER — OFFICE VISIT - HEALTHEAST (OUTPATIENT)
Dept: FAMILY MEDICINE | Facility: CLINIC | Age: 83
End: 2018-07-27

## 2018-07-27 ENCOUNTER — ANESTHESIA - HEALTHEAST (OUTPATIENT)
Dept: SURGERY | Facility: HOSPITAL | Age: 83
End: 2018-07-27

## 2018-07-27 DIAGNOSIS — R31.0 GROSS HEMATURIA: ICD-10-CM

## 2018-07-27 LAB
ANION GAP SERPL CALCULATED.3IONS-SCNC: 11 MMOL/L (ref 5–18)
BACTERIA #/AREA URNS HPF: ABNORMAL HPF
BASOPHILS # BLD AUTO: 0 THOU/UL (ref 0–0.2)
BASOPHILS NFR BLD AUTO: 1 % (ref 0–2)
BUN SERPL-MCNC: 18 MG/DL (ref 8–28)
CHLORIDE BLD-SCNC: 96 MMOL/L (ref 98–107)
CO2 SERPL-SCNC: 27 MMOL/L (ref 22–31)
CREAT SERPL-MCNC: 1.2 MG/DL (ref 0.8–1.5)
EOSINOPHIL # BLD AUTO: 0.1 THOU/UL (ref 0–0.4)
EOSINOPHIL NFR BLD AUTO: 2 % (ref 0–6)
ERYTHROCYTE [DISTWIDTH] IN BLOOD BY AUTOMATED COUNT: 9.9 % (ref 11–14.5)
GLUCOSE BLD-MCNC: 98 MG/DL (ref 70–125)
HCT VFR BLD AUTO: 31.1 % (ref 40–54)
HGB BLD-MCNC: 10.7 G/DL (ref 14–18)
LYMPHOCYTES # BLD AUTO: 0.9 THOU/UL (ref 0.8–4.4)
LYMPHOCYTES NFR BLD AUTO: 18 % (ref 20–40)
MCH RBC QN AUTO: 33.9 PG (ref 27–34)
MCHC RBC AUTO-ENTMCNC: 34.2 G/DL (ref 32–36)
MCV RBC AUTO: 99 FL (ref 80–100)
MONOCYTES # BLD AUTO: 0.5 THOU/UL (ref 0–0.9)
MONOCYTES NFR BLD AUTO: 11 % (ref 2–10)
NEUTROPHILS # BLD AUTO: 3.5 THOU/UL (ref 2–7.7)
NEUTROPHILS NFR BLD AUTO: 69 % (ref 50–70)
PLATELET # BLD AUTO: 324 THOU/UL (ref 140–440)
PMV BLD AUTO: 7 FL (ref 7–10)
POTASSIUM BLD-SCNC: 4.5 MMOL/L (ref 3.5–5.5)
RBC # BLD AUTO: 3.14 MILL/UL (ref 4.4–6.2)
RBC #/AREA URNS AUTO: >100 HPF
SODIUM SERPL-SCNC: 134 MMOL/L (ref 136–145)
SQUAMOUS #/AREA URNS AUTO: ABNORMAL LPF
WBC #/AREA URNS AUTO: ABNORMAL HPF
WBC CLUMPS #/AREA URNS HPF: PRESENT /[HPF]
WBC: 5.1 THOU/UL (ref 4–11)

## 2018-07-27 ASSESSMENT — MIFFLIN-ST. JEOR
SCORE: 1439.23
SCORE: 1466.9

## 2018-07-27 NOTE — TELEPHONE ENCOUNTER
[facility] phone call message- general phone call:    Reason for call: Per patient called to get an apt today but we dont have any more openings left for patients for the rest of the day unless there is a cancel. Patient is urinating blood for a week now. Patient was triaged to Lorie JIMENES.     Return call needed: Yes    OK to leave a message on voice mail? Yes    Primary language: English      needed? No    Call taken on July 27, 2018 at 10:34 AM by Genevieve Pickering

## 2018-07-27 NOTE — TELEPHONE ENCOUNTER
Spoke to Dilip, c/o hematuria x 1 week, now more bright red blood in urine and dysuria. Denies low abdominal discomfort or new back pain symptoms (history of back problems) or fever.  [facility], has left a message for provider. Advised Dilip will need to be seen, declines going into ED- too costly. Recommend Dilip go to Urgent Care for further assessment and evaluation. Agreed by Dilip, will plan on going to Urgent Care. Isabela JIMENES

## 2018-07-31 ENCOUNTER — TRANSFERRED RECORDS (OUTPATIENT)
Dept: HEALTH INFORMATION MANAGEMENT | Facility: CLINIC | Age: 83
End: 2018-07-31

## 2018-10-01 ENCOUNTER — TRANSFERRED RECORDS (OUTPATIENT)
Dept: HEALTH INFORMATION MANAGEMENT | Facility: CLINIC | Age: 83
End: 2018-10-01

## 2018-10-22 DIAGNOSIS — E78.5 HYPERLIPIDEMIA LDL GOAL <130: ICD-10-CM

## 2018-10-24 RX ORDER — SIMVASTATIN 10 MG
10 TABLET ORAL AT BEDTIME
Qty: 90 TABLET | Refills: 3 | Status: SHIPPED | OUTPATIENT
Start: 2018-10-24 | End: 2023-04-14

## 2018-12-11 DIAGNOSIS — I10 ESSENTIAL HYPERTENSION WITH GOAL BLOOD PRESSURE LESS THAN 140/90: ICD-10-CM

## 2018-12-11 RX ORDER — ATENOLOL 25 MG/1
25 TABLET ORAL DAILY
Qty: 90 TABLET | Refills: 1 | Status: SHIPPED | OUTPATIENT
Start: 2018-12-11 | End: 2019-02-26

## 2018-12-11 NOTE — TELEPHONE ENCOUNTER
Message to physician: n/a    Date of last visit: 3/8/2018     Date of next visit if scheduled: Visit date not found      Last Comprehensive Metabolic Panel:  Sodium   Date Value Ref Range Status   06/15/2017 137 136 - 145 mmol/L Final     Potassium   Date Value Ref Range Status   06/15/2017 4.3 3.5 - 5.0 mmol/L Final     Chloride   Date Value Ref Range Status   06/15/2017 101 98 - 107 mmol/L Final     Carbon Dioxide   Date Value Ref Range Status   02/05/2016 32.0 20.0 - 32.0 mmol/L Final     Glucose   Date Value Ref Range Status   06/15/2017 97 70 - 125 mg/dL Final     Urea Nitrogen   Date Value Ref Range Status   06/15/2017 21 8 - 28 mg/dL Final     Creatinine   Date Value Ref Range Status   06/15/2017 1.15 0.70 - 1.30 mg/dL Final     GFR Estimate   Date Value Ref Range Status   06/15/2017 >60 >60 mL/min/1.73m2 Final     Calcium   Date Value Ref Range Status   06/15/2017 9.4 8.5 - 10.5 mg/dL Final       BP Readings from Last 3 Encounters:   03/08/18 130/72   03/01/18 135/62   11/07/17 162/73       No results found for: A1C             Please complete refill and CLOSE ENCOUNTER.  Closing the encounter signifies the refill is complete.

## 2019-01-11 ENCOUNTER — TRANSFERRED RECORDS (OUTPATIENT)
Dept: HEALTH INFORMATION MANAGEMENT | Facility: CLINIC | Age: 84
End: 2019-01-11

## 2019-02-08 DIAGNOSIS — E89.0 HYPOTHYROIDISM, POSTABLATIVE: ICD-10-CM

## 2019-02-08 RX ORDER — LEVOTHYROXINE SODIUM 75 UG/1
75 TABLET ORAL DAILY
Qty: 90 TABLET | Refills: 0 | Status: SHIPPED | OUTPATIENT
Start: 2019-02-08 | End: 2019-02-11

## 2019-02-11 ENCOUNTER — OFFICE VISIT (OUTPATIENT)
Dept: FAMILY MEDICINE | Facility: CLINIC | Age: 84
End: 2019-02-11
Payer: COMMERCIAL

## 2019-02-11 VITALS
HEIGHT: 68 IN | SYSTOLIC BLOOD PRESSURE: 161 MMHG | DIASTOLIC BLOOD PRESSURE: 76 MMHG | RESPIRATION RATE: 18 BRPM | BODY MASS INDEX: 24.95 KG/M2 | WEIGHT: 164.6 LBS | TEMPERATURE: 97.6 F | HEART RATE: 56 BPM | OXYGEN SATURATION: 96 %

## 2019-02-11 DIAGNOSIS — E89.0 HYPOTHYROIDISM, POSTABLATIVE: Primary | ICD-10-CM

## 2019-02-11 DIAGNOSIS — E78.00 PURE HYPERCHOLESTEROLEMIA: ICD-10-CM

## 2019-02-11 DIAGNOSIS — M54.41 CHRONIC RIGHT-SIDED LOW BACK PAIN WITH RIGHT-SIDED SCIATICA: ICD-10-CM

## 2019-02-11 DIAGNOSIS — E05.00 GRAVES' DISEASE: ICD-10-CM

## 2019-02-11 DIAGNOSIS — G89.29 CHRONIC RIGHT-SIDED LOW BACK PAIN WITH RIGHT-SIDED SCIATICA: ICD-10-CM

## 2019-02-11 DIAGNOSIS — I10 ESSENTIAL HYPERTENSION: ICD-10-CM

## 2019-02-11 LAB
BUN SERPL-MCNC: 20 MG/DL (ref 7–30)
CALCIUM SERPL-MCNC: 9.6 MG/DL (ref 8.5–10.4)
CHLORIDE SERPLBLD-SCNC: 102 MMOL/L (ref 94–109)
CHOLEST SERPL-MCNC: 153 MG/DL
CO2 SERPL-SCNC: 29 MMOL/L (ref 20–32)
CREAT SERPL-MCNC: 1.2 MG/DL (ref 0.8–1.5)
EGFR CALCULATED (BLACK REFERENCE): 74.5 ML/MIN
EGFR CALCULATED (NON BLACK REFERENCE): 61.6 ML/MIN
FASTING?: NORMAL
GLUCOSE SERPL-MCNC: 102 MG/DL (ref 60–109)
HDLC SERPL-MCNC: 63 MG/DL
LDLC SERPL CALC-MCNC: 62 MG/DL
POTASSIUM SERPL-SCNC: 3.8 MMOL/L (ref 3.4–5.3)
SODIUM SERPL-SCNC: 144 MMOL/L (ref 133–144)
T4 FREE SERPL-MCNC: 1.1 NG/DL (ref 0.7–1.8)
TRIGL SERPL-MCNC: 138 MG/DL
TSH SERPL DL<=0.05 MIU/L-ACNC: 6.02 UIU/ML (ref 0.3–5)

## 2019-02-11 RX ORDER — SODIUM PHOSPHATE,MONO-DIBASIC 19G-7G/118
500 ENEMA (ML) RECTAL DAILY
COMMUNITY
End: 2022-10-21

## 2019-02-11 RX ORDER — LEVOTHYROXINE SODIUM 100 UG/1
100 TABLET ORAL DAILY
COMMUNITY
Start: 2014-08-30 | End: 2019-05-06

## 2019-02-11 RX ORDER — GABAPENTIN 100 MG/1
100 CAPSULE ORAL AT BEDTIME
Qty: 90 CAPSULE | Refills: 3 | COMMUNITY
Start: 2019-02-11 | End: 2022-10-21 | Stop reason: DRUGHIGH

## 2019-02-11 ASSESSMENT — MIFFLIN-ST. JEOR: SCORE: 1413.5

## 2019-02-11 NOTE — PROGRESS NOTES
Preceptor Attestation:   Patient seen, evaluated and discussed with the resident. I have verified the content of the note, which accurately reflects my assessment of the patient and the plan of care.  Supervising Physician:Emeka Arango MD  [facility]

## 2019-02-11 NOTE — LETTER
February 13, 2019      Dilip Long  [address] [place] [place] [address]        Dear Dilip,    The test results from your last visit are back. They look great. Your kidney function and electrolytes are normal. Your thyroid function is at goal and I would not recommend changing your medication dose. Your cholesterol is low and the low dose of pravastatin that you are taking is likely not having much of an impact, so I think it is reasonable to discontinue this, but we can discuss more at your next visit.     Please see below for your test results.    Resulted Orders   Thyroid [place] ([employer])   Result Value Ref Range    TSH 6.02 (H) 0.30 - 5.00 uIU/mL    Narrative    Test performed by:  [facility] [address] [place] [address]   Basic Metabolic Panel ([facility]) - Results < 1 hr   Result Value Ref Range    Glucose 102.0 60.0 - 109.0 mg/dL    Urea Nitrogen 20.0 7.0 - 30.0 mg/dL    Creatinine 1.2 0.8 - 1.5 mg/dL    Sodium 144.0 133.0 - 144.0 mmol/L    Potassium 3.8 3.4 - 5.3 mmol/L    Chloride 102.0 94.0 - 109.0 mmol/L    Carbon Dioxide 29.0 20.0 - 32.0 mmol/L    Calcium 9.6 8.5 - 10.4 mg/dL    eGFR Calculated (Non Black Reference) 61.6 >60.0 mL/min    eGFR Calculated (Black Reference) 74.5 >60.0 mL/min   Lipid [place] ([employer]) - Results > 1 hr   Result Value Ref Range    Cholesterol 153 <=199 mg/dL    Triglycerides 138 <=149 mg/dL    HDL Cholesterol 63 >=40 mg/dL    LDL Cholesterol Calculated 62 <=129 mg/dL    Fasting? Unknown     Narrative    Test performed by:  [facility]  [address] [place] [address]   T4  Free ([employer])   Result Value Ref Range    T4 Free 1.1 0.7 - 1.8 ng/dL    Narrative    Test performed by:  [facility] [address] [place] [address]       If you have any questions, please call the clinic to make an appointment.    Sincerely,    Leonora Vanessa MD

## 2019-02-11 NOTE — PATIENT INSTRUCTIONS
Keep taking your meds as normal for now.   Keep up the good work with daily walking in the mall.   See you in 2 weeks to discuss lab results and medications.   We should consider stopping simvastatin unless your cholesterol is high.   If your blood pressure is still high, we should change or add a blood pressure medication.   Only use the gabapentin if it is helping your symptoms (nerve pain).

## 2019-02-11 NOTE — PROGRESS NOTES
"History   Dilip Long is a 83 year old male presenting for:  RECHECK (f/u thyroid, no concern) and Medication Reconciliation (incomplete)    HTN  - compliance: perfect; took meds this morning  - denies orthostasis, headaches, blurry vision, LE edema  - home monitoring: none    Walking about 3000 steps at mall every morning.     Thyroid: no increased fatigue or palpitations.     Urology: follows with Dr. Hernandez. Persistent retention but doing okay. No gross hematuria anymore. Hospitalized in July.     Meds: same med list for decades. Not interested in changes.     The patient speaks English, so no  was used for this visit.   Medical and social history and medications reviewed with patient.   Exam   /76   Pulse 56   Temp 97.6  F (36.4  C) (Oral)   Resp 18   Ht 1.715 m (5' 7.52\")   Wt 74.7 kg (164 lb 9.6 oz)   SpO2 96%   BMI 25.38 kg/m    Gen: NAD  Card: RRR, no murmurs  Resp: clear, no wheezing or crackles  Ext: no LE edema  Medical Decision-Making     1. Hypothyroidism, postablative  2. Grave's disease - post ablation  No symptoms of hypo or hyperthyroidism.   - Thyroid [place] ([facility])    3. Essential hypertension  Elevated on 3 checks today. Recheck in 2 wks. Asymptomatic. Discussed med changes next visit.   - Basic Metabolic Panel ([facility]) - Results < 1 hr    4. Chronic right-sided low back pain with right-sided sciatica  Discussed only using prn  Gabapentin at night. On this for years and feels that it helps.     5. Pure hypercholesterolemia  Cholesterol 11/2016 was well controlled. zocor 10 mg very low dose. Discussed discontinuing. He would like to see lipids today and decide next visit. Discussed inc risk side effects with age.   - Lipid Panel ([facility]) - Results < 1 hr    Follow up: 2 wks    Leonora Vanessa MD, MPH  [facility] Family Medicine Resident     Precepted patient with Emeka Arango MD    Options for treatment and follow-up care were reviewed with the patient and/or " guardian. Dilip Long and/or guardian engaged in the decision making process and verbalized understanding of the options discussed and agreed with the final plan.

## 2019-02-26 ENCOUNTER — OFFICE VISIT (OUTPATIENT)
Dept: FAMILY MEDICINE | Facility: CLINIC | Age: 84
End: 2019-02-26
Payer: COMMERCIAL

## 2019-02-26 VITALS
TEMPERATURE: 97.6 F | DIASTOLIC BLOOD PRESSURE: 75 MMHG | HEART RATE: 61 BPM | BODY MASS INDEX: 25.54 KG/M2 | SYSTOLIC BLOOD PRESSURE: 167 MMHG | WEIGHT: 165.6 LBS | RESPIRATION RATE: 20 BRPM | OXYGEN SATURATION: 99 %

## 2019-02-26 DIAGNOSIS — I10 ESSENTIAL HYPERTENSION: Primary | ICD-10-CM

## 2019-02-26 RX ORDER — LISINOPRIL/HYDROCHLOROTHIAZIDE 10-12.5 MG
1 TABLET ORAL DAILY
Qty: 90 TABLET | Refills: 3 | Status: SHIPPED | OUTPATIENT
Start: 2019-02-26 | End: 2022-10-21

## 2019-02-26 NOTE — PROGRESS NOTES
History   Dilip Long is a 83 year old male presenting for:  Hypertension (follow up) and Medication Reconciliation (complete)    HTN  - compliance: good; took meds this morning  - denies orthostasis, headaches, blurry vision, LE edema  - home monitoring: 160-170/70s  - exercise: walks 30 min in mall every other day. Not limited by energy or sob. Sometimes by knee pain; used to receive injections but limited benefit and declined surgery.   - hesitant but willing to change meds that he has been on for 35 years.     The patient speaks English, so no  was used for this visit.   Medical and social history and medications reviewed with patient.   Exam   /75   Pulse 61   Temp 97.6  F (36.4  C) (Oral)   Resp 20   Wt 75.1 kg (165 lb 9.6 oz)   SpO2 99%   BMI 25.54 kg/m    Gen: NAD  Card: RRR, no murmurs. Rate around 60  Resp: clear, no wheezing or crackles  Ext: no LE edema  Medical Decision-Making     1. Essential hypertension  Has been elevated with BP around 160-165 for most visits over past few years. Goal SBP closer to 150.  Last potassium low normal. Cr 1.2. Will need to monitor on new regimen. No h/o heart disease to justify keeping beta blocker. If doesn't tolerate ace/thiazide, would try calcium channel blocker prior to going back to beta blocker.   - lisinopril-hydrochlorothiazide (PRINZIDE/ZESTORETIC) 10-12.5 MG tablet; Take 1 tablet by mouth daily  Dispense: 90 tablet; Refill: 3  - stop atenolol    Follow up: 2 wk with RON Vanessa MD, MPH  [facility] Family Medicine Resident     Precepted patient with Paige Steel DO    Options for treatment and follow-up care were reviewed with the patient and/or guardian. Dilip Long and/or guardian engaged in the decision making process and verbalized understanding of the options discussed and agreed with the final plan.

## 2019-02-26 NOTE — PATIENT INSTRUCTIONS
Stop the atenolol.   Start taking lisinopril - hydrochlorothiazide once daily.   We will recheck your blood pressure and blood test in 2 weeks.

## 2019-02-28 NOTE — PROGRESS NOTES
Preceptor Attestation:   Patient seen, evaluated and discussed with the resident. I have verified the content of the note, which accurately reflects my assessment of the patient and the plan of care.  Supervising Physician:Paige Steel DO [facility]

## 2019-03-13 ENCOUNTER — OFFICE VISIT (OUTPATIENT)
Dept: FAMILY MEDICINE | Facility: CLINIC | Age: 84
End: 2019-03-13
Payer: COMMERCIAL

## 2019-03-13 VITALS
BODY MASS INDEX: 25.2 KG/M2 | OXYGEN SATURATION: 96 % | RESPIRATION RATE: 16 BRPM | SYSTOLIC BLOOD PRESSURE: 167 MMHG | DIASTOLIC BLOOD PRESSURE: 76 MMHG | WEIGHT: 163.4 LBS | HEART RATE: 70 BPM | TEMPERATURE: 97.9 F

## 2019-03-13 DIAGNOSIS — I10 ESSENTIAL HYPERTENSION: Primary | ICD-10-CM

## 2019-03-13 DIAGNOSIS — N40.0 HYPERTROPHY OF PROSTATE WITHOUT URINARY OBSTRUCTION: ICD-10-CM

## 2019-03-13 LAB
BUN SERPL-MCNC: 21 MG/DL (ref 7–30)
CALCIUM SERPL-MCNC: 9.5 MG/DL (ref 8.5–10.4)
CHLORIDE SERPLBLD-SCNC: 102 MMOL/L (ref 94–109)
CO2 SERPL-SCNC: 30 MMOL/L (ref 20–32)
CREAT SERPL-MCNC: 1.3 MG/DL (ref 0.8–1.5)
EGFR CALCULATED (BLACK REFERENCE): 68 ML/MIN
EGFR CALCULATED (NON BLACK REFERENCE): 56.2 ML/MIN
GLUCOSE SERPL-MCNC: 117 MG/DL (ref 60–109)
POTASSIUM SERPL-SCNC: 4.1 MMOL/L (ref 3.4–5.3)
SODIUM SERPL-SCNC: 141 MMOL/L (ref 133–144)

## 2019-03-13 NOTE — PROGRESS NOTES
History   Dilip Long is a 83 year old male presenting for:  RECHECK (F/U: BP) and Medication Reconciliation (Completed - needs attention on some)    HTN  - started ace-hydrochlorothiazide combo last visit. Plan was to replace atenolol with this.   - he took new med for 3 days. Was unable to urinate. Had abdominal bloating. Felt terrible. Plans to see urologist next week to discuss this. Not sure what happened. Stopped ace-hydrochlorothiazide. He went back to prior regimen.     The patient speaks English, so no  was used for this visit.   Medical and social history and medications reviewed with patient.   Exam   /76   Pulse 70   Temp 97.9  F (36.6  C) (Oral)   Resp 16   Wt 74.1 kg (163 lb 6.4 oz)   SpO2 96%   BMI 25.20 kg/m    Gen: NAD  Card: RRR, no murmurs  Resp: clear, no wheezing or crackles  ABd: no abdominal bloating.   Ext: no LE edema  Medical Decision-Making     1. Essential hypertension  Above goal SBP <150 consistently. Not on new med yet. BMP will serve as new baseline.   Thorough med rec revealed that he had actually stopped terazosin instead of stopping atenolol. Discussed role of terazosin on urinary retention. He is willing to try again on BP med and will stop atenolol now.   - start ace-hydrochlorothiazide prescribed last visit. Stop atenolol (no CAD; started on this decades ago for BP). Restart terazosin.  - Basic Metabolic Panel ([facility]) - Results < 1 hr    2. Hypertrophy of prostate without urinary obstruction  Restart terazosin    Follow up: 2 wk w [facility]    Leonora Vanessa MD, MPH  [facility] Family Medicine Resident     Precepted patient with Shyam Hemphill MD    Options for treatment and follow-up care were reviewed with the patient and/or guardian. Dilip Long and/or guardian engaged in the decision making process and verbalized understanding of the options discussed and agreed with the final plan.

## 2019-03-13 NOTE — PROGRESS NOTES
I have personally reviewed the history and examination as documented by Dr. Vanessa.  I was present during key portions of the visit and agree with the assessment and plan as documented for 83 yr old male with HTN, BPH here for follow-up.  Med reconciliation done w/ clarification to restart his terazosin/ specific BP meds  Precautions given. Anticipatory guidance given.     Shyam Hemphill MD  March 13, 2019  2:01 PM

## 2019-05-06 DIAGNOSIS — E89.0 HYPOTHYROIDISM, POSTABLATIVE: Primary | ICD-10-CM

## 2019-05-06 DIAGNOSIS — E78.5 HYPERLIPIDEMIA LDL GOAL <130: ICD-10-CM

## 2019-05-07 RX ORDER — LEVOTHYROXINE SODIUM 100 UG/1
100 TABLET ORAL DAILY
Qty: 90 TABLET | Refills: 3 | Status: SHIPPED | OUTPATIENT
Start: 2019-05-07 | End: 2020-02-05

## 2019-06-11 ENCOUNTER — RECORDS - HEALTHEAST (OUTPATIENT)
Dept: ADMINISTRATIVE | Facility: OTHER | Age: 84
End: 2019-06-11

## 2019-07-16 ENCOUNTER — AMBULATORY - HEALTHEAST (OUTPATIENT)
Dept: OTHER | Facility: CLINIC | Age: 84
End: 2019-07-16

## 2019-07-21 ENCOUNTER — HOME CARE/HOSPICE - HEALTHEAST (OUTPATIENT)
Dept: HOME HEALTH SERVICES | Facility: HOME HEALTH | Age: 84
End: 2019-07-21

## 2019-07-22 ENCOUNTER — COMMUNICATION - HEALTHEAST (OUTPATIENT)
Dept: NEUROSURGERY | Facility: CLINIC | Age: 84
End: 2019-07-22

## 2019-07-22 DIAGNOSIS — S32.010A COMPRESSION FRACTURE OF L1 LUMBAR VERTEBRA (H): ICD-10-CM

## 2019-07-22 DIAGNOSIS — S22.080A T12 COMPRESSION FRACTURE (H): ICD-10-CM

## 2019-07-23 ENCOUNTER — COMMUNICATION - HEALTHEAST (OUTPATIENT)
Dept: NEUROSURGERY | Facility: CLINIC | Age: 84
End: 2019-07-23

## 2019-07-23 ENCOUNTER — HOME CARE/HOSPICE - HEALTHEAST (OUTPATIENT)
Dept: HOME HEALTH SERVICES | Facility: HOME HEALTH | Age: 84
End: 2019-07-23

## 2019-07-25 ENCOUNTER — HOME CARE/HOSPICE - HEALTHEAST (OUTPATIENT)
Dept: HOME HEALTH SERVICES | Facility: HOME HEALTH | Age: 84
End: 2019-07-25

## 2019-07-25 ENCOUNTER — RECORDS - HEALTHEAST (OUTPATIENT)
Dept: ADMINISTRATIVE | Facility: OTHER | Age: 84
End: 2019-07-25

## 2019-07-29 ENCOUNTER — HOSPITAL ENCOUNTER (OUTPATIENT)
Dept: RADIOLOGY | Facility: HOSPITAL | Age: 84
Discharge: HOME | End: 2019-07-29
Attending: NEUROLOGICAL SURGERY

## 2019-07-29 ENCOUNTER — HOME CARE/HOSPICE - HEALTHEAST (OUTPATIENT)
Dept: HOME HEALTH SERVICES | Facility: HOME HEALTH | Age: 84
End: 2019-07-29

## 2019-07-29 ENCOUNTER — AMBULATORY - HEALTHEAST (OUTPATIENT)
Dept: NEUROSURGERY | Facility: CLINIC | Age: 84
End: 2019-07-29

## 2019-07-29 DIAGNOSIS — S22.080A T12 COMPRESSION FRACTURE (H): ICD-10-CM

## 2019-07-29 DIAGNOSIS — S32.010A COMPRESSION FRACTURE OF L1 LUMBAR VERTEBRA (H): ICD-10-CM

## 2019-07-29 DIAGNOSIS — S06.5XAA SDH (SUBDURAL HEMATOMA) (H): ICD-10-CM

## 2019-07-30 ENCOUNTER — HOME CARE/HOSPICE - HEALTHEAST (OUTPATIENT)
Dept: HOME HEALTH SERVICES | Facility: HOME HEALTH | Age: 84
End: 2019-07-30

## 2019-07-31 ENCOUNTER — HOSPITAL ENCOUNTER (OUTPATIENT)
Dept: CT IMAGING | Facility: HOSPITAL | Age: 84
Discharge: HOME | End: 2019-07-31
Attending: NEUROLOGICAL SURGERY

## 2019-07-31 DIAGNOSIS — S06.5XAA SDH (SUBDURAL HEMATOMA) (H): ICD-10-CM

## 2019-08-01 ENCOUNTER — TRANSFERRED RECORDS (OUTPATIENT)
Dept: HEALTH INFORMATION MANAGEMENT | Facility: CLINIC | Age: 84
End: 2019-08-01

## 2019-08-01 ENCOUNTER — OFFICE VISIT - HEALTHEAST (OUTPATIENT)
Dept: NEUROSURGERY | Facility: CLINIC | Age: 84
End: 2019-08-01

## 2019-08-01 DIAGNOSIS — S22.000D CLOSED COMPRESSION FRACTURE OF THORACIC VERTEBRA WITH ROUTINE HEALING, SUBSEQUENT ENCOUNTER: ICD-10-CM

## 2019-08-01 DIAGNOSIS — S32.011D CLOSED STABLE BURST FRACTURE OF FIRST LUMBAR VERTEBRA WITH ROUTINE HEALING, SUBSEQUENT ENCOUNTER: ICD-10-CM

## 2019-08-01 ASSESSMENT — MIFFLIN-ST. JEOR: SCORE: 1396.6

## 2019-08-02 ENCOUNTER — RECORDS - HEALTHEAST (OUTPATIENT)
Dept: ADMINISTRATIVE | Facility: OTHER | Age: 84
End: 2019-08-02

## 2019-08-02 ENCOUNTER — HOME CARE/HOSPICE - HEALTHEAST (OUTPATIENT)
Dept: HOME HEALTH SERVICES | Facility: HOME HEALTH | Age: 84
End: 2019-08-02

## 2019-08-05 ENCOUNTER — HOME CARE/HOSPICE - HEALTHEAST (OUTPATIENT)
Dept: HOME HEALTH SERVICES | Facility: HOME HEALTH | Age: 84
End: 2019-08-05

## 2019-08-06 ENCOUNTER — HOME CARE/HOSPICE - HEALTHEAST (OUTPATIENT)
Dept: HOME HEALTH SERVICES | Facility: HOME HEALTH | Age: 84
End: 2019-08-06

## 2019-08-07 ENCOUNTER — RECORDS - HEALTHEAST (OUTPATIENT)
Dept: ADMINISTRATIVE | Facility: OTHER | Age: 84
End: 2019-08-07

## 2019-08-07 ENCOUNTER — HOME CARE/HOSPICE - HEALTHEAST (OUTPATIENT)
Dept: HOME HEALTH SERVICES | Facility: HOME HEALTH | Age: 84
End: 2019-08-07

## 2019-08-09 ENCOUNTER — HOME CARE/HOSPICE - HEALTHEAST (OUTPATIENT)
Dept: HOME HEALTH SERVICES | Facility: HOME HEALTH | Age: 84
End: 2019-08-09

## 2019-08-13 ENCOUNTER — HOME CARE/HOSPICE - HEALTHEAST (OUTPATIENT)
Dept: HOME HEALTH SERVICES | Facility: HOME HEALTH | Age: 84
End: 2019-08-13

## 2019-08-14 ENCOUNTER — HOME CARE/HOSPICE - HEALTHEAST (OUTPATIENT)
Dept: HOME HEALTH SERVICES | Facility: HOME HEALTH | Age: 84
End: 2019-08-14

## 2019-08-16 ENCOUNTER — HOME CARE/HOSPICE - HEALTHEAST (OUTPATIENT)
Dept: HOME HEALTH SERVICES | Facility: HOME HEALTH | Age: 84
End: 2019-08-16

## 2019-09-03 ENCOUNTER — HOSPITAL ENCOUNTER (OUTPATIENT)
Dept: RADIOLOGY | Facility: HOSPITAL | Age: 84
Discharge: HOME | End: 2019-09-03
Attending: NEUROLOGICAL SURGERY

## 2019-09-03 ENCOUNTER — COMMUNICATION - HEALTHEAST (OUTPATIENT)
Dept: TELEHEALTH | Facility: CLINIC | Age: 84
End: 2019-09-03

## 2019-09-03 DIAGNOSIS — S32.011D CLOSED STABLE BURST FRACTURE OF FIRST LUMBAR VERTEBRA WITH ROUTINE HEALING, SUBSEQUENT ENCOUNTER: ICD-10-CM

## 2019-09-03 DIAGNOSIS — S22.000D CLOSED COMPRESSION FRACTURE OF THORACIC VERTEBRA WITH ROUTINE HEALING, SUBSEQUENT ENCOUNTER: ICD-10-CM

## 2019-09-05 ENCOUNTER — OFFICE VISIT - HEALTHEAST (OUTPATIENT)
Dept: NEUROSURGERY | Facility: CLINIC | Age: 84
End: 2019-09-05

## 2019-09-05 ENCOUNTER — TRANSFERRED RECORDS (OUTPATIENT)
Dept: HEALTH INFORMATION MANAGEMENT | Facility: CLINIC | Age: 84
End: 2019-09-05

## 2019-09-05 DIAGNOSIS — S22.000D CLOSED COMPRESSION FRACTURE OF THORACIC VERTEBRA WITH ROUTINE HEALING, SUBSEQUENT ENCOUNTER: ICD-10-CM

## 2019-09-05 DIAGNOSIS — S32.011D CLOSED STABLE BURST FRACTURE OF FIRST LUMBAR VERTEBRA WITH ROUTINE HEALING, SUBSEQUENT ENCOUNTER: ICD-10-CM

## 2019-09-05 ASSESSMENT — MIFFLIN-ST. JEOR: SCORE: 1324.03

## 2019-09-30 ENCOUNTER — HOSPITAL ENCOUNTER (OUTPATIENT)
Dept: RADIOLOGY | Facility: HOSPITAL | Age: 84
Discharge: HOME | End: 2019-09-30
Attending: NEUROLOGICAL SURGERY

## 2019-09-30 DIAGNOSIS — S32.011D CLOSED STABLE BURST FRACTURE OF FIRST LUMBAR VERTEBRA WITH ROUTINE HEALING, SUBSEQUENT ENCOUNTER: ICD-10-CM

## 2019-09-30 DIAGNOSIS — S22.000D CLOSED COMPRESSION FRACTURE OF THORACIC VERTEBRA WITH ROUTINE HEALING, SUBSEQUENT ENCOUNTER: ICD-10-CM

## 2019-10-03 ENCOUNTER — OFFICE VISIT - HEALTHEAST (OUTPATIENT)
Dept: NEUROSURGERY | Facility: CLINIC | Age: 84
End: 2019-10-03

## 2019-10-03 DIAGNOSIS — S32.011D CLOSED STABLE BURST FRACTURE OF FIRST LUMBAR VERTEBRA WITH ROUTINE HEALING, SUBSEQUENT ENCOUNTER: ICD-10-CM

## 2019-10-03 DIAGNOSIS — S22.080D COMPRESSION FRACTURE OF T12 VERTEBRA WITH ROUTINE HEALING, SUBSEQUENT ENCOUNTER: ICD-10-CM

## 2019-10-03 ASSESSMENT — MIFFLIN-ST. JEOR: SCORE: 1326.29

## 2019-10-14 ENCOUNTER — COMMUNICATION - HEALTHEAST (OUTPATIENT)
Dept: NEUROSURGERY | Facility: CLINIC | Age: 84
End: 2019-10-14

## 2020-02-03 DIAGNOSIS — E89.0 HYPOTHYROIDISM, POSTABLATIVE: ICD-10-CM

## 2020-02-03 NOTE — TELEPHONE ENCOUNTER
Message to physician:     Date of last visit: 3/13/2019     Date of next visit if scheduled: Visit date not found       Last Comprehensive Metabolic Panel:  Sodium   Date Value Ref Range Status   03/13/2019 141.0 133.0 - 144.0 mmol/L Final     Potassium   Date Value Ref Range Status   03/13/2019 4.1 3.4 - 5.3 mmol/L Final     Chloride   Date Value Ref Range Status   03/13/2019 102.0 94.0 - 109.0 mmol/L Final     Carbon Dioxide   Date Value Ref Range Status   03/13/2019 30.0 20.0 - 32.0 mmol/L Final     Glucose   Date Value Ref Range Status   03/13/2019 117.0 (H) 60.0 - 109.0 mg/dL Final     Urea Nitrogen   Date Value Ref Range Status   03/13/2019 21.0 7.0 - 30.0 mg/dL Final     Creatinine   Date Value Ref Range Status   03/13/2019 1.3 0.8 - 1.5 mg/dL Final     GFR Estimate   Date Value Ref Range Status   06/15/2017 >60 >60 mL/min/1.73m2 Final     Calcium   Date Value Ref Range Status   03/13/2019 9.5 8.5 - 10.4 mg/dL Final       BP Readings from Last 3 Encounters:   03/13/19 167/76   02/26/19 167/75   02/11/19 161/76       No results found for: A1C             Please complete refill and CLOSE ENCOUNTER.  Closing the encounter signifies the refill is complete.

## 2020-02-05 RX ORDER — LEVOTHYROXINE SODIUM 100 UG/1
100 TABLET ORAL DAILY
Qty: 30 TABLET | Refills: 0 | Status: SHIPPED | OUTPATIENT
Start: 2020-02-05 | End: 2020-06-02

## 2020-06-01 DIAGNOSIS — E89.0 HYPOTHYROIDISM, POSTABLATIVE: ICD-10-CM

## 2020-06-02 ENCOUNTER — DOCUMENTATION ONLY (OUTPATIENT)
Dept: FAMILY MEDICINE | Facility: CLINIC | Age: 85
End: 2020-06-02

## 2020-06-02 RX ORDER — LEVOTHYROXINE SODIUM 100 UG/1
100 TABLET ORAL DAILY
Qty: 30 TABLET | Refills: 0 | Status: SHIPPED | OUTPATIENT
Start: 2020-06-02 | End: 2022-10-21

## 2020-06-02 NOTE — PROGRESS NOTES
Received order to refill PO levothyroxine 100 mcg. Since inheriting this patient, I have not had an encounter with him. I refilled the medication.    Informed colored team to schedule:   (1) lab only visit for CBC/BMP/TSH/ and free T4  (2) Hypothyroidism visit via video/telephone visit     called patient who states he is now going to [facility] care.    Plan:  - stated they will be removing Dilip from clinic    Falguni Holcomb MD, MPH (PGY 2)  [facility] Family Medicine Resident  Pager: [phone]

## 2021-04-15 NOTE — PATIENT INSTRUCTIONS
Referral for physical therapy faxed to  [facility] and pt will be contacted to schedule appointment.  Yoly  
Consent: The patient's consent was obtained including but not limited to risks of crusting, scabbing, blistering, scarring, darker or lighter pigmentary change, recurrence, incomplete removal and infection.
Detail Level: Detailed
Anesthesia Volume In Cc: 0.5
Price (Use Numbers Only, No Special Characters Or $): 46
Post-Care Instructions: I reviewed with the patient in detail post-care instructions. Patient is to wear sunprotection, and avoid picking at any of the treated lesions. Pt may apply Vaseline to crusted or scabbing areas.

## 2021-05-26 ENCOUNTER — RECORDS - HEALTHEAST (OUTPATIENT)
Dept: ADMINISTRATIVE | Facility: CLINIC | Age: 86
End: 2021-05-26

## 2021-05-27 ENCOUNTER — RECORDS - HEALTHEAST (OUTPATIENT)
Dept: ADMINISTRATIVE | Facility: CLINIC | Age: 86
End: 2021-05-27

## 2021-05-28 ENCOUNTER — RECORDS - HEALTHEAST (OUTPATIENT)
Dept: ADMINISTRATIVE | Facility: CLINIC | Age: 86
End: 2021-05-28

## 2021-05-29 ENCOUNTER — RECORDS - HEALTHEAST (OUTPATIENT)
Dept: ADMINISTRATIVE | Facility: CLINIC | Age: 86
End: 2021-05-29
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
## 2021-05-31 ENCOUNTER — RECORDS - HEALTHEAST (OUTPATIENT)
Dept: ADMINISTRATIVE | Facility: CLINIC | Age: 86
End: 2021-05-31
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
## 2021-06-01 VITALS — BODY MASS INDEX: 23.68 KG/M2 | WEIGHT: 165.4 LBS | HEIGHT: 70 IN

## 2021-06-01 VITALS — BODY MASS INDEX: 25.44 KG/M2 | WEIGHT: 171 LBS
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
## 2021-06-02 NOTE — TELEPHONE ENCOUNTER
Patient's granddaughter is calling requesting that someone from Dr. Rodriguez's team call her. She did not specify on her question. Please call Shruthi malhotra @ [phone].

## 2021-06-02 NOTE — TELEPHONE ENCOUNTER
Called Dilip. I did not call Oanh because we do not have a release to speak with her, but I did speak with pt. He wanted to know if he couldl do cobra stretch. It helped him for years with his back and would like to start it again. Per Dr. Rodriguez okay to do it, but should ease into it, and be careful and if it hurts than he needs to stop. Pt also asked about swelling from the brace. Per Dr. Rodriguez it should go down watch for it spreading or getting worse, heat. Pt states it is going down and getting better he just wanted to make sure it would take time. I told him it would.   Zari,CMA

## 2021-06-03 VITALS
HEART RATE: 72 BPM | DIASTOLIC BLOOD PRESSURE: 50 MMHG | WEIGHT: 140 LBS | HEIGHT: 70 IN | OXYGEN SATURATION: 98 % | BODY MASS INDEX: 20.04 KG/M2 | SYSTOLIC BLOOD PRESSURE: 121 MMHG

## 2021-06-03 VITALS
BODY MASS INDEX: 23.54 KG/M2 | DIASTOLIC BLOOD PRESSURE: 61 MMHG | HEIGHT: 67 IN | SYSTOLIC BLOOD PRESSURE: 111 MMHG | HEART RATE: 78 BPM | OXYGEN SATURATION: 98 % | WEIGHT: 150 LBS

## 2021-06-03 VITALS — WEIGHT: 166 LBS | HEIGHT: 67 IN | BODY MASS INDEX: 26.06 KG/M2

## 2021-06-16 NOTE — PROGRESS NOTES
"[facility] Daily Progress     Patient Name: Dilip Long  Date: 3/23/2018  Visit #: 4  PTA visit #:  -  Referral Diagnosis: Chronic R-sided LBP with sciatica  Referring provider: Aiden Hennessy  Visit Diagnosis:     ICD-10-CM    1. Right lumbar radiculopathy M54.16    2. Leg length discrepancy M21.70          Assessment:   Patient is doing well and isn't having pain anymore. He is compliant with his HEP, use of his heel lift, and walking program.    HEP/POC compliance is  good .  Patient demonstrates understanding/independence with home program.  Patient is benefitting from skilled physical therapy and is making steady progress toward functional goals.  Patient is appropriate to continue with skilled physical therapy intervention, as indicated by initial plan of care.    Goal Status:  Pt. will demonstrate/verbalize independence in self-management of condition in : 6 weeks  Pt. will be independent with home exercise program in : 6 weeks  Pt. will have improved quality of sleep: with less pain;waking less times/night;getting 75-90% of required amount;in 6 weeks  Patient will decrease : JOYA score;for improved quality of function;by _ points;in 6 weeks  by ___ points: 5  Pt will: no longer have radicular symptoms into R LE within 8 weeks in order to improve QOL.    Plan / Patient Education:     Continue with initial plan of care.  Progress with home program as tolerated.     Exercises:  Exercise #1: seated lumbar flexion   Comment #1: x3 with 20-30\" holds  Exercise #2: supine and seated nerve glides  Comment #2: x20 B  Exercise #3: supine glut and piriformis stretches  Comment #3: 30\" holds, 2-3 reps each side  Exercise #4: abd set+single leg march  Comment #4: x10 with 5\" holds  Exercise #5: glut set  Comment #5: x10 with 5\" holds  Exercise #6: standing thoraco-lumbar stretch  Comment #6: 30\" holds, 3x/day each side  Exercise #7: clamshell  Comment #7: hold for 5\" holds, do until fatigue     Plan for " next visit: MT as needed, review HEP. See if patient needs more therapy or if he wants to hold his chart open for 30 days. Condense HEP as able.     Subjective:     Pain Ratin    His HEP seems to be working; isn't having any pain anymore. Using heel lift. Walking about 3/4 of a mile; limited by knee pain. Isn't taking much for medications.      Objective:       No increase in pain with new exs    Treatment Today     TREATMENT MINUTES COMMENTS   Evaluation     Self-care/ Home management     Manual therapy     Neuromuscular Re-education     Therapeutic Activity     Therapeutic Exercises 24 Nu' Step L6 5'. Discussed progress. Progressed HEP- see flow sheet.    Gait training     Modality__________________                Total 24    Blank areas are intentional and mean the treatment did not include these items.       Carol Hawkins, PT, DPT  3/23/2018

## 2021-06-16 NOTE — PROGRESS NOTES
"[facility] Daily Progress     Patient Name: Dilip Long  Date: 3/16/2018  Visit #: 3  PTA visit #:  -  Referral Diagnosis: Chronic R-sided LBP with sciatica  Referring provider: Aiden Hennessy  Visit Diagnosis:     ICD-10-CM    1. Right lumbar radiculopathy M54.16    2. Leg length discrepancy M21.70          Assessment:   Patient seems to be doing better today. He is doing a better job following his pictures given for his HEP. Patient responded well to MT, with pain rating 3-4/10 by the end of today's treatment session. He prefers therapy 1x/week so he can focus on walking the other days of the week    HEP/POC compliance is  good .  Patient demonstrates understanding/independence with home program.  Patient is benefitting from skilled physical therapy and is making steady progress toward functional goals.  Patient is appropriate to continue with skilled physical therapy intervention, as indicated by initial plan of care.    Goal Status:  Pt. will demonstrate/verbalize independence in self-management of condition in : 6 weeks  Pt. will be independent with home exercise program in : 6 weeks  Pt. will have improved quality of sleep: with less pain;waking less times/night;getting 75-90% of required amount;in 6 weeks  Patient will decrease : JOYA score;for improved quality of function;by _ points;in 6 weeks  by ___ points: 5  Pt will: no longer have radicular symptoms into R LE within 8 weeks in order to improve QOL.    Plan / Patient Education:     Continue with initial plan of care.  Progress with home program as tolerated.     Exercises:  Exercise #1: seated lumbar flexion   Comment #1: x3 with 20-30\" holds  Exercise #2: supine and seated nerve glides  Comment #2: x20 B  Exercise #3: supine glut and piriformis stretches  Comment #3: 30\" holds, 2-3 reps each side  Exercise #4: abd set  Comment #4: x10 with 5\" holds  Exercise #5: glut set  Comment #5: x10 with 5\" holds     Plan for next visit: continue " "with MT, add march to abd set, add clamshell, thoraco-lumbar stretch as needed.     Subjective:     Pain Ratin-9    His doctor gave him a muscle relaxer yesterday and told him to start walking again. The heel lift seems to be helping when he walks.       Objective:     Pain after MT: 3-4, \"that really helped loosen my back up!\"  Response to abd set: \"that's doing something for my back\"  No increase in pain with new exs    Treatment Today     TREATMENT MINUTES COMMENTS   Evaluation     Self-care/ Home management     Manual therapy 8 In L S/L: Gr I-II rotational mobilization and PA's to lumbar spinous process and transverse processes.   Neuromuscular Re-education     Therapeutic Activity     Therapeutic Exercises 16 Nu' Step L5 5'. Discussed progress. Reviewed previous exercises briefly- no cueing needed. Progressed HEP- see flow sheet.    Gait training     Modality__________________                Total 26    Blank areas are intentional and mean the treatment did not include these items.       Carol Hawkins, PT, DPT  3/16/2018    "

## 2021-06-16 NOTE — PROGRESS NOTES
"[facility] Daily Progress     Patient Name: Dilip Long  Date: 3/14/2018  Visit #: 2  PTA visit #:  -  Referral Diagnosis: Chronic R-sided LBP with sciatica  Referring provider: Aiden Hennessy  Visit Diagnosis:     ICD-10-CM    1. Right lumbar radiculopathy M54.16    2. Leg length discrepancy M21.70          Assessment:   Patient isn't sleeping well. He put his shoe lift in the wrong shoe and isn't icing. I helped him get his shoe lift in the correct shoe today and encouraged him to ice. He is wondering why he hasn't had an x-ray at this point- we discussed that an x-ray isn't needed at this time, which is why his MD referred him to therapy. He needed MAX verbal cueing and demonstration, plus pictures to correctly perform his HEP given last session. He seems to have some difficulty with memory     Patient demonstrates understanding/independence with home program.  Patient is benefitting from skilled physical therapy and is making steady progress toward functional goals.  Patient is appropriate to continue with skilled physical therapy intervention, as indicated by initial plan of care.    Goal Status:  Pt. will demonstrate/verbalize independence in self-management of condition in : 6 weeks  Pt. will be independent with home exercise program in : 6 weeks  Pt. will have improved quality of sleep: with less pain;waking less times/night;getting 75-90% of required amount;in 6 weeks  Patient will decrease : JOYA score;for improved quality of function;by _ points;in 6 weeks  by ___ points: 5  Pt will: no longer have radicular symptoms into R LE within 8 weeks in order to improve QOL.    Plan / Patient Education:     Continue with initial plan of care.  Progress with home program as tolerated.     Exercises:  Exercise #1: seated lumbar flexion   Comment #1: x3 with 20-30\" holds  Exercise #2: supine and seated nerve glides  Comment #2: x20 B  Exercise #3: supine glut and piriformis stretches  Comment #3: 30\" " holds, 2-3 reps each side     Plan for next visit:  review all of his HEP, thoraco-lumbar stretch, begin core strengthening, MT as appropriate    Subjective:     Pain Rating: 10    Bought a shoe lift and has it in his left shoe. Doing his HEP consistently. He is using his heat but no ice. Isn't sleeping well.      Objective:     Max VC and demonstration for all exercises in HEP  No increase in pain with previous exs once correct technique was used  No increase in pain with new exs    Treatment Today     TREATMENT MINUTES COMMENTS   Evaluation     Self-care/ Home management     Manual therapy     Neuromuscular Re-education     Therapeutic Activity     Therapeutic Exercises 23 Nu' Step L4 5'. Answered patient question regarding why he hasn't had an x-ray yet. Educated patient that his heel lift should be in his R shoe. Reviewed his HEP and added 2 new stretch.    Gait training     Modality__________________                Total 23    Blank areas are intentional and mean the treatment did not include these items.       Carol Hawkins, PT, DPT  3/14/2018

## 2021-06-17 NOTE — PROGRESS NOTES
"[facility] Daily Progress     Patient Name: Dilip Long  Date: 3/30/2018  Visit #: 5  PTA visit #:  -  Referral Diagnosis: Chronic R-sided LBP with sciatica  Referring provider: Aiden Hennessy  Visit Diagnosis:     ICD-10-CM    1. Right lumbar radiculopathy M54.16    2. Leg length discrepancy M21.70          Assessment:   Patient is doing very well, has been working on his exercises and is restarting his walking activities at this time, he will get an injection in the knee this week. He has met his goals for therapy and at this time he ready to discharge with I HEP. Chart will held x 30 days.     HEP/POC compliance is  good .  Patient demonstrates understanding/independence with home program.  Patient is benefitting from skilled physical therapy and is making steady progress toward functional goals.    Goal Status:  Pt. will demonstrate/verbalize independence in self-management of condition in : 6 weeks  Pt. will be independent with home exercise program in : 6 weeks  Pt. will have improved quality of sleep: with less pain;waking less times/night;getting 75-90% of required amount;in 6 weeks  Patient will decrease : JOYA score;for improved quality of function;by _ points;in 6 weeks  by ___ points: 5  Pt will: no longer have radicular symptoms into R LE within 8 weeks in order to improve QOL.    Plan / Patient Education:     Continue with initial plan of care.  Progress with home program as tolerated.     Exercises:  Exercise #1: seated lumbar flexion   Comment #1: x3 with 20-30\" holds  Exercise #2: supine and seated nerve glides  Comment #2: x20 B  Exercise #3: supine glut and piriformis stretches  Comment #3: 30\" holds, 2-3 reps each side  Exercise #4: abd set+single leg march  Comment #4: x10 with 5\" holds  Exercise #5: bridges  Comment #5: x10 with 5\" holds  Exercise #6: standing thoraco-lumbar stretch  Comment #6: 30\" holds, 3x/day each side  Exercise #7: clamshell  Comment #7: hold for 5\" holds, " do until fatigue     Plan for next visit: hold chart x 30 days and consider discharge at that time     Subjective:     Pain Ratin  Patient reports he has been feeling good since the first session and has been going through the progression. Patient reports he feels he is ready to be discharge today with his HEP.    Objective:   Modified Oswestry Low Back Pain Disablity Questionnaire  in %: 20 (initial score 46)    Lumbar ROM:             Date:  initial eval  3/30/2018      *Indicate scale AROM AROM AROM   Lumbar Flexion Mid tibia, pain-free To ankle no pain      Lumbar Extension Increases pain, mod limited  min dec, no pain       Right Left Right Left Right Left   Lumbar Sidebending Mod limited, increase in R sided LBP Mod limited, pain-free  min dec, no pain Min dec, no pain        Lumbar Rotation Mod limited, min increase in pain Mod limited, min increase in pain  min-mod dec no pain Min - mod dec, no pain            No increase in pain with new exs    Treatment Today     TREATMENT MINUTES COMMENTS   Evaluation     Self-care/ Home management     Manual therapy     Neuromuscular Re-education     Therapeutic Activity     Therapeutic Exercises 28 Nu' Step L6 5'. Discussed progress. Progressed HEP- see flow sheet.   Objective and Subjective measures    Gait training     Modality__________________                Total 28    Blank areas are intentional and mean the treatment did not include these items.     Shelli Moser, PT, DPT  3/30/2018

## 2021-06-18 NOTE — PROGRESS NOTES
[facility] Discharge Summary  Patient Name: Dilip Long  Date: 5/29/2018  Referral Diagnosis: chronic low back pain  Referring provider: Aiden Hennessy  Visit Diagnosis:   1. Right lumbar radiculopathy     2. Leg length discrepancy         Goals (MET):  Pt. will demonstrate/verbalize independence in self-management of condition in : 6 weeks  Pt. will be independent with home exercise program in : 6 weeks  Pt. will have improved quality of sleep: with less pain;waking less times/night;getting 75-90% of required amount;in 6 weeks  Patient will decrease : JOYA score;for improved quality of function;by _ points;in 6 weeks  by ___ points: 5  Pt will: no longer have radicular symptoms into R LE within 8 weeks in order to improve QOL.    Patient was seen for 5 visits.  The patient met goals and has demonstrated understanding of and independence in the home program for self-care, and progression to next steps.  He will initiate contact if questions or concerns arise.  Patient was issued and instructed on a HEP.    Therapy will be discontinued at this time.  The patient will need a new referral to resume.    Thank you for your referral.  Carol Hawkins, PT, DPT  5/29/2018  3:46 PM

## 2021-06-19 NOTE — ANESTHESIA PREPROCEDURE EVALUATION
Anesthesia Evaluation      Patient summary reviewed   No history of anesthetic complications     Airway   Mallampati: II  Neck ROM: full   Pulmonary - negative ROS and normal exam   (-) not a smoker (Quit smoking 40 years ago.)                         Cardiovascular   (+) hypertension, , hypercholesterolemia, PVD (Claudication of left lower extremity.)  Received beta blockers in last 24 hours prior to incision.  ,  ECG reviewed (Sinus Bradycardia with 1st Degree AV Block)     PE comment: Bradycardia,     Neuro/Psych    (+) chronic pain (Chronic right-sided low back pain with right-sided sciatica.)  (-) no CVA    Comments: Back pain    Endo/Other    (+) hypothyroidism (Post-ablation for Grave's Disease.),   (-) no diabetes     Comments: Hx Grave's Disease    GI/Hepatic/Renal    (-) GERD, impaired hepatic function, renal disease    Comments: Hematuria  UTI    Diverticulosis     Other findings:     Anemia - Hgb 10.7    K 4.5, Cr 1.20, Plts 324K          Dental      Comment: Upper partial                       Anesthesia Plan  Planned anesthetic: general endotracheal  Glidescope intubation  Zofran/Decadron  Sugammadex reversal  ASA 3 - emergent   Induction: intravenous   Anesthetic plan and risks discussed with: patient  Anesthesia plan special considerations: video-assisted, antiemetics,   Post-op plan: routine recovery

## 2021-06-19 NOTE — PROGRESS NOTES
Assessment and Plan     Dilip was seen today for dysuria.    Diagnoses and all orders for this visit:    Gross hematuria  -     Cancel: Urinalysis-UC if Indicated  -     ISTAT CHEM 7 ([facility] ONLY)  -     HM1(CBC and Differential)  -     HM1 (CBC with Diff)  -     Urine,Microscopic  -     CT Chest Abdomen Pelvis Without Oral With and Without IV Contrast         HPI     Chief Complaint   Patient presents with     Dysuria     and blood in urine x1-2 weeks, getting more red.        Dilip Long is a 83 y.o. male seen today for intermittent hematuria for about 1 week, progressively darkening. Today his urine was bright red. Noted some accompanying dysuria today.  No increased urinary frequency or urgency.  Denies fever, chills, abdominal pain, back pain. Former smoker (quit 35 years ago). Not anticoagulated.         Current Outpatient Prescriptions:      aspirin 325 MG tablet, Take 325 mg by mouth bedtime. , Disp: , Rfl:      atenolol (TENORMIN) 25 MG tablet, , Disp: , Rfl: 1     atenolol (TENORMIN) 50 MG tablet, Take 50 mg by mouth daily., Disp: , Rfl:      calcium-vitamin D (CALCIUM-VITAMIN D) 500 mg(1,250mg) -200 unit per tablet, Take 1 tablet by mouth Daily at 8:00 am.., Disp: , Rfl:      gabapentin (NEURONTIN) 100 MG capsule, Take 100 mg by mouth as needed (5-6 tabs per month per patient)., Disp: , Rfl:      glucosamine sulfate 500 mg cap, Take 2,000 mg by mouth daily., Disp: , Rfl:      ketorolac (ACULAR LS) 0.4 % Drop, Administer 1 drop to both eyes 2 times a day at 6:00 am and 4:00 pm., Disp: , Rfl:      levothyroxine (SYNTHROID, LEVOTHROID) 100 MCG tablet, Take 100 mcg by mouth Daily at 6:00 am. , Disp: , Rfl:      levothyroxine (SYNTHROID, LEVOTHROID) 75 MCG tablet, , Disp: , Rfl: 3     simvastatin (ZOCOR) 10 MG tablet, Take 10 mg by mouth bedtime. , Disp: , Rfl:      terazosin (HYTRIN) 10 MG capsule, Take 10 mg by mouth bedtime. , Disp: , Rfl:      Reviewed and updated: medical history, medications  and allergies.     Review of Systems     General: Denies fever, chills, fatigue.  Respiratory: Denies dyspnea, cough, wheezing.  GI: Denies nausea, vomiting, diarrhea, constipation.  : Increasing hematuria over the last 7-10 days, bright red blood in the urine today.  Some mild dysuria today.  No change in frequency or urgency.     Objective     Vitals:    07/27/18 1120   BP: 138/70   Patient Site: Right Arm   Patient Position: Sitting   Cuff Size: Adult Regular   Pulse: 66   Temp: 97.8  F (36.6  C)   TempSrc: Oral   SpO2: 96%   Weight: 171 lb (77.6 kg)        Reviewed vital signs.  General: Appears calm, comfortable. Answers questions quickly and appropriately with clear speech. No apparent distress.  Skin: Pink, warm, dry.  HENT: Normocephalic, atraumatic.  Neck: Supple.  Cardiovascular: Strong, regular radial pulse.  Respiratory: Normal respiratory effort.  Abdomen: Soft, flat, non-tender.  No suprapubic tenderness.  Neuro: Memory and cognition appear normal. Normal gait.  Psych: Mood and affect appear normal.     Imaging:   Ct Chest Abdomen Pelvis Without Oral With And Without Iv Contrast    Result Date: 7/27/2018  EXAM DATE:         07/27/2018 [facility] CT CHEST, ABDOMEN, PELVIS WITHOUT and WITH CONTRAST 7/27/2018 2:15 PM INDICATION: Gross hematuria TECHNIQUE: CT chest, abdomen, and pelvis. Dose reduction techniques were used. IV CONTRAST: 100 ml Isovue 370 was administered via iv from a single use vial. SPR istat CR=  1.1 mg/dl, eGFR=64 . COMPARISON: None FINDINGS: CHEST: 5 mm benign calcified granuloma right lower lobe and probable 5 mm granuloma in the right middle lobe is well. The should be of no significance. Mild emphysema. Lungs otherwise clear. ABDOMEN: Both kidneys are negative with no renal stones and no hydronephrosis. Normal ureters. Prostatic hypertrophy with thickened bladder several bladder diverticulum. In the left posterior lateral bladder just above the ureteral trigone  there is a 3.5 cm polypoid mass favor transitional cell tumor over blood clot. Normal liver, spleen, pancreas, and adrenal glands. No retroperitoneal or pelvic lymphadenopathy. PELVIS: Stents of diverticulosis sigmoid colon. Otherwise negative. MUSCULOSKELETAL: No concerning bone lesion. Moderate degenerative disc disease. CONCLUSION: 1.  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 2.  Nothing concerning for metastatic disease in the chest, abdomen, or pelvis. 3.  A couple tiny benign right lung nodules. 4.  Prostatic hypertrophy with changes in the bladder consistent with bladder outlet obstruction. Mild thickening. Multiple bladder diverticula. 5.  Normal upper tracts and kidneys. NOTE: ABNORMAL REPORT THE DICTATION ABOVE DESCRIBES AN ABNORMALITY FOR WHICH FOLLOW-UP IS NEEDED.       Labs:  Recent Results (from the past 24 hour(s))   Urine,Microscopic   Result Value Ref Range    Bacteria, UA Moderate (!) None Seen hpf    RBC, UA >100 (!) None Seen, 0-2 hpf    WBC, UA 5-10 (!) None Seen, 0-5 hpf    Squam Epithel, UA 0-5 None Seen, 0-5 lpf    WBC Clumps Present (!) None Seen   ISTAT CHEM 7 ([facility] ONLY)   Result Value Ref Range    Sodium 134 (L) 136 - 145 mmol/L    Potassium 4.5 3.5 - 5.5 mmol/L    CO2 27 22 - 31 mmol/L    Chloride 96 (L) 98 - 107 mmol/L    Anion Gap, Calculation 11 5 - 18 mmol/L    Glucose 98 70 - 125 mg/dL    BUN 18 8 - 28 mg/dL    Creatinine 1.20 0.80 - 1.50 mg/dL   HM1 (CBC with Diff)   Result Value Ref Range    WBC 5.1 4.0 - 11.0 thou/uL    RBC 3.14 (L) 4.40 - 6.20 mill/uL    Hemoglobin 10.7 (L) 14.0 - 18.0 g/dL    Hematocrit 31.1 (L) 40.0 - 54.0 %    MCV 99 80 - 100 fL    MCH 33.9 27.0 - 34.0 pg    MCHC 34.2 32.0 - 36.0 g/dL    RDW 9.9 (L) 11.0 - 14.5 %    Platelets 324 140 - 440 thou/uL    MPV 7.0 7.0 - 10.0 fL    Neutrophils % 69 50 - 70 %    Lymphocytes % 18 (L) 20 - 40 %    Monocytes % 11 (H) 2 - 10 %    Eosinophils % 2 0 - 6  %    Basophils % 1 0 - 2 %    Neutrophils Absolute 3.5 2.0 - 7.7 thou/uL    Lymphocytes Absolute 0.9 0.8 - 4.4 thou/uL    Monocytes Absolute 0.5 0.0 - 0.9 thou/uL    Eosinophils Absolute 0.1 0.0 - 0.4 thou/uL    Basophils Absolute 0.0 0.0 - 0.2 thou/uL        Medical Decision-Making     Dilip is a generally well-appearing 83-year-old male who presents with 7-10 days of painless hematuria.  No urinary symptoms until today, when he began to experience some mild dysuria and increased frequency.  The hematuria seemed to be getting worse over the last week.  Appearance is nontoxic.  He is not tachycardic, tachypnea, or febrile.  Physical exam is unremarkable.  Initial evaluation was focused on determining if the blood is from kidneys or the bladder.  Urine microscopy did not show any casts.  They were unable to dip for protein due to the large amount of blood present.  This combined with the normal creatinine was reassuring that the bleeding is likely from the lower urinary tract.  After consultation with the radiologist from [facility], I ordered a CT abdomen and pelvis with the hematuria protocol, which showed a 3.5 cm x 3.5 cm mass concerning for transitional cell tumor, although the radiologist acknowledges a clot is also a possibility.  I spoke to Dr. Arias, the on-call urologist requested that I send him directly to the ED for placement of a large-bore catheter and continuous bladder irrigation.  I discussed this plan with the patient.  He was in agreement and stated he would drive directly to the ED in his personal vehicle.  I provided a verbal report to Dr. Grace in [facility] ER.    Harshad Gage PA-C

## 2021-06-19 NOTE — ANESTHESIA CARE TRANSFER NOTE
Last vitals:   Vitals:    07/27/18 2040   BP: 177/81   Pulse: 72   Resp: 16   Temp: 36.9  C (98.4  F)   SpO2: 96%     Patient's level of consciousness is awake  Spontaneous respirations: yes  Maintains airway independently: yes  Dentition unchanged: yes  Oropharynx: oropharynx clear of all foreign objects    QCDR Measures:  ASA# 20 - Surgical Safety Checklist: WHO surgical safety checklist completed prior to induction  PQRS# 430 - Adult PONV Prevention: 4558F - Pt received => 2 anti-emetic agents (different classes) preop & intraop  ASA# 8 - Peds PONV Prevention: NA - Not pediatric patient, not GA or 2 or more risk factors NOT present  PQRS# 424 - Zahra-op Temp Management: 4559F - At least one body temp DOCUMENTED => 35.5C or 95.9F within required timeframe  PQRS# 426 - PACU Transfer Protocol: - Transfer of care checklist used  ASA# 14 - Acute Post-op Pain: ASA14B - Patient did NOT experience pain >= 7 out of 10. To pacu,awake, denies pain or nausea,vss,report given

## 2021-06-19 NOTE — ANESTHESIA POSTPROCEDURE EVALUATION
Patient: Dilip Long  CYSTOSCOPY, CLOT EVACUATION ,URETHRAL DILATATION, DAUGHERTY CATHETER PLACEMENT  Anesthesia type: general    Patient location: PACU  Last vitals:   Vitals:    07/27/18 2204   BP: 176/80   Pulse: (!) 58   Resp: 18   Temp: 36.4  C (97.5  F)   SpO2: 97%     Post vital signs: stable  Level of consciousness: awake and responds to simple questions  Post-anesthesia pain: pain controlled  Post-anesthesia nausea and vomiting: no  Pulmonary: unassisted, return to baseline  Cardiovascular: stable and blood pressure at baseline  Hydration: adequate  Anesthetic events: no    QCDR Measures:  ASA# 11 - Zahra-op Cardiac Arrest: ASA11B - Patient did NOT experience unanticipated cardiac arrest  ASA# 12 - Zahra-op Mortality Rate: ASA12B - Patient did NOT die  ASA# 13 - PACU Re-Intubation Rate: ASA13B - Patient did NOT require a new airway mgmt  ASA# 10 - Composite Anes Safety: ASA10A - No serious adverse event    Additional Notes:

## 2022-01-11 ENCOUNTER — HOSPITAL ENCOUNTER (EMERGENCY)
Facility: HOSPITAL | Age: 87
Discharge: HOME | End: 2022-01-11
Admitting: EMERGENCY MEDICINE
Payer: MEDICARE

## 2022-01-11 VITALS
WEIGHT: 154 LBS | TEMPERATURE: 97.6 F | SYSTOLIC BLOOD PRESSURE: 133 MMHG | HEART RATE: 78 BPM | DIASTOLIC BLOOD PRESSURE: 66 MMHG | OXYGEN SATURATION: 97 % | BODY MASS INDEX: 22.05 KG/M2 | RESPIRATION RATE: 14 BRPM | HEIGHT: 70 IN

## 2022-01-11 DIAGNOSIS — R51.9 NONINTRACTABLE HEADACHE, UNSPECIFIED CHRONICITY PATTERN, UNSPECIFIED HEADACHE TYPE: ICD-10-CM

## 2022-01-11 PROCEDURE — 99283 EMERGENCY DEPT VISIT LOW MDM: CPT

## 2022-01-11 RX ORDER — METOCLOPRAMIDE 10 MG/1
10 TABLET ORAL 2 TIMES DAILY PRN
Qty: 6 TABLET | Refills: 0 | Status: SHIPPED | OUTPATIENT
Start: 2022-01-11 | End: 2022-10-21

## 2022-01-11 RX ORDER — DIPHENHYDRAMINE HCL 25 MG
25 CAPSULE ORAL EVERY 6 HOURS PRN
Qty: 6 CAPSULE | Refills: 0 | Status: ON HOLD | OUTPATIENT
Start: 2022-01-11 | End: 2022-11-10

## 2022-01-11 ASSESSMENT — ENCOUNTER SYMPTOMS
FEVER: 0
CHILLS: 0
ACTIVITY CHANGE: 0
HEADACHES: 1
NUMBNESS: 0
APPETITE CHANGE: 0
DIZZINESS: 0

## 2022-01-11 ASSESSMENT — MIFFLIN-ST. JEOR: SCORE: 1389.79

## 2022-01-11 NOTE — ED TRIAGE NOTES
Pt reports needs med refill for headache medicine. Has had headaches for 5 years, dull, steady pain.

## 2022-01-11 NOTE — ED PROVIDER NOTES
EMERGENCY DEPARTMENT ENCOUNTER      NAME: Dilip Long  AGE: 86 year old male  YOB: 1935  MRN: [id number]  EVALUATION DATE & TIME: No admission date for patient encounter.    PCP: No Ref-Primary, Physician    ED PROVIDER: Haylee Loaiza MD      Chief Complaint   Patient presents with     Medication Refill         FINAL IMPRESSION:  1. Nonintractable headache, unspecified chronicity pattern, unspecified headache type          ED COURSE & MEDICAL DECISION MAKING:    Pertinent Labs & Imaging studies reviewed. (See chart for details)  86 year old male presents to the Emergency Department for evaluation of chronic headaches.  Patient was previously diphenhydramine and metoclopramide was recently transition to a new medication by her primary care provider.  He reports that the diphenhydramine and metoclopramide have worked for his headaches well.  However, he now has had a headache since Friday.  It is not a significant headache, but is a persistent dull, aching.  He denies any new trauma.  He denies any fever, chills, sweats.  I discussed with him that his primary care provider wants to transition him to a new medication and that I will take 1 to 2 weeks to work. I discussed with him that we would give him a small supply of his usual medications for headache to help him with pain that he cannot manage at home, but encouraged him to get the MRI scan as prescribed by his PMD and continue on the headache plan as outlined by his PMD. I encouraged him that his current regimen is not a long-term solution and refills should come from his PMD and not from the ED.     At the conclusion of the encounter I discussed the results of all of the tests and the disposition. The questions were answered. The patient or family acknowledged understanding and was agreeable with the care plan.       MEDICATIONS GIVEN IN THE EMERGENCY:  Medications - No data to  display    =================================================================    HPI    Patient information was obtained from: patient        Dilip Long is a 86 year old male with a pertinent history of headaches presents with a headache that has been present since Friday. It is a dull ache on the top of the head that is constant. Currently, it is rated a 6 out of 10. The patient reports similar headaches over the last 3 - 5 years. He has been taking diphenhydramine 25 mg and metoclopramide 10 mg as needed for headaches. This usually takes care of his headaches.     REVIEW OF SYSTEMS   Review of Systems   Constitutional: Negative for activity change, appetite change, chills and fever.   HENT: Negative for congestion.    Cardiovascular: Negative for chest pain.   Neurological: Positive for headaches. Negative for dizziness and numbness.   All other systems reviewed and are negative.      PAST MEDICAL HISTORY:  Past Medical History:   Diagnosis Date     Acute prostatitis      Asymptomatic bacteriuria 10/23/2013    Noted at 10/2/13 office visit at [facility]. No treatment recommended at that time.      Hypertension        PAST SURGICAL HISTORY:  Past Surgical History:   Procedure Laterality Date     BLADDER SURGERY      Bladder absces removal     Blepharoplasty Upper Lid W/ Excessive Skin[  1/29/2007     CATARACT EXTRACTION       EYE SURGERY      both eyes 2015     IR LUMBAR EPIDURAL STEROID INJECTION  4/27/2004     IR LUMBAR EPIDURAL STEROID INJECTION  5/11/2004     IR LUMBAR EPIDURAL STEROID INJECTION  11/24/2004     IR LUMBAR EPIDURAL STEROID INJECTION  11/28/2007     [place] CYSTOURETHROSCOPY W/IRRIG & EVAC CLOTS N/A 7/27/2018    Procedure: CYSTOSCOPY, CLOT EVACUATION ,URETHRAL DILATATION, DAUGHERTY CATHETER PLACEMENT;  Surgeon: Lowell Arias MD;  Location: [facility] OR;  Service: Urology           CURRENT MEDICATIONS:    diphenhydrAMINE (BENADRYL) 25 MG capsule  metoclopramide (REGLAN) 10 MG  tablet  aspirin 325 MG tablet  Calcium Carbonate-Vitamin D (OSCAL 500/200 D-3 PO)  gabapentin (NEURONTIN) 100 MG capsule  glucosamine 500 MG CAPS capsule  levothyroxine (SYNTHROID/LEVOTHROID) 100 MCG tablet  lisinopril-hydrochlorothiazide (PRINZIDE/ZESTORETIC) 10-12.5 MG tablet  order for DME  simvastatin (ZOCOR) 10 MG tablet  terazosin (HYTRIN) 10 MG capsule        ALLERGIES:  Allergies   Allergen Reactions     Nkda [No Known Drug Allergies]        FAMILY HISTORY:  Family History   Problem Relation Age of Onset     Diabetes No family hx of      Hypertension No family hx of      Other Cancer No family hx of      Coronary Artery Disease No family hx of      Hyperlipidemia No family hx of      Cerebrovascular Disease No family hx of      Breast Cancer No family hx of      Colon Cancer No family hx of      Prostate Cancer No family hx of      Depression No family hx of      Anxiety Disorder No family hx of      Mental Illness No family hx of      Substance Abuse No family hx of      Anesthesia Reaction No family hx of      Asthma No family hx of      Osteoporosis No family hx of      Genetic Disorder No family hx of      Thyroid Disease No family hx of      Obesity No family hx of      No Known Problems Mother      No Known Problems Father        SOCIAL HISTORY:   Social History     Socioeconomic History     Marital status:      Spouse name: Not on file     Number of children: Not on file     Years of education: Not on file     Highest education level: Not on file   Occupational History     Occupation: Retired   Tobacco Use     Smoking status: Former Smoker     Types: Cigarettes     Smokeless tobacco: Never Used     Tobacco comment: Pt quit 40 years ago   Substance and Sexual Activity     Alcohol use: Yes     Alcohol/week: 0.0 standard drinks     Comment: Alcoholic Drinks/day: 1-2 beers per week.     Drug use: No     Sexual activity: Not on file   Other Topics Concern     Parent/sibling w/ CABG, MI or  "angioplasty before 65F 55M? Not Asked   Social History Narrative     Not on file     Social Determinants of Health     Financial Resource Strain: Not on file   Food Insecurity: Not on file   Transportation Needs: Not on file   Physical Activity: Not on file   Stress: Not on file   Social Connections: Not on file   Intimate Partner Violence: Not on file   Housing Stability: Not on file       VITALS:  /66   Pulse 78   Temp 97.6  F (36.4  C) (Temporal)   Resp 14   Ht 1.778 m (5' 10\")   Wt 69.9 kg (154 lb)   SpO2 97%   BMI 22.10 kg/m      PHYSICAL EXAM    Gen:  Alert, awake, NAD  HENT:  Head atraumatic, PERRL, EOMI, no meningismus  Respiratory:  CTA, normal respiratory rate  Cardiovascular:  Regular rate and rhythm, no murmur  Abdomen:  Soft, nontender, normoactive bowel sounds  Musculoskeletal:  FROM in all extremities with no tenderness  Integument:  No rash, warm, dry  Neuro:  GCS 15, A & O x 3, CN II - XII intact, no dysdiadochokinesia, no pronator drift, no nystagmus, normal gait, +5/5 strength in all extremities       LAB:  All pertinent labs reviewed and interpreted.       RADIOLOGY:  Reviewed all pertinent imaging. Please see official radiology report.  No orders to display       Haylee Loaiza MD  Emergency Medicine  [facility] EMERGENCY DEPARTMENT  [facility] [facility] [id number] [phone]       Haylee Loaiza MD  01/11/22 1420    "

## 2022-10-21 ENCOUNTER — APPOINTMENT (OUTPATIENT)
Dept: RADIOLOGY | Facility: HOSPITAL | Age: 87
DRG: 280 | End: 2022-10-21
Payer: MEDICARE

## 2022-10-21 ENCOUNTER — APPOINTMENT (OUTPATIENT)
Dept: CT IMAGING | Facility: HOSPITAL | Age: 87
DRG: 280 | End: 2022-10-21
Payer: MEDICARE

## 2022-10-21 ENCOUNTER — HOSPITAL ENCOUNTER (INPATIENT)
Facility: HOSPITAL | Age: 87
LOS: 3 days | Discharge: HOME HEALTH | DRG: 280 | End: 2022-10-24
Attending: EMERGENCY MEDICINE | Admitting: INTERNAL MEDICINE
Payer: MEDICARE

## 2022-10-21 DIAGNOSIS — R55 SYNCOPE, UNSPECIFIED SYNCOPE TYPE: Primary | ICD-10-CM

## 2022-10-21 DIAGNOSIS — U07.1 INFECTION DUE TO 2019 NOVEL CORONAVIRUS: ICD-10-CM

## 2022-10-21 DIAGNOSIS — I31.39 PERICARDIAL EFFUSION: ICD-10-CM

## 2022-10-21 DIAGNOSIS — R79.89 ELEVATED TROPONIN: ICD-10-CM

## 2022-10-21 DIAGNOSIS — R94.31 ACUTE ELECTROCARDIOGRAM CHANGES: ICD-10-CM

## 2022-10-21 DIAGNOSIS — R55 SYNCOPE: ICD-10-CM

## 2022-10-21 DIAGNOSIS — E89.0 POSTABLATIVE HYPOTHYROIDISM: ICD-10-CM

## 2022-10-21 DIAGNOSIS — N17.9 ACUTE KIDNEY INJURY (H): ICD-10-CM

## 2022-10-21 DIAGNOSIS — D64.9 ANEMIA: ICD-10-CM

## 2022-10-21 PROBLEM — E03.9 HYPOTHYROIDISM: Status: ACTIVE | Noted: 2019-05-06

## 2022-10-21 PROBLEM — S06.5XAA SDH (SUBDURAL HEMATOMA) (H): Status: ACTIVE | Noted: 2019-07-18

## 2022-10-21 LAB
ABO/RH(D): NORMAL
ALBUMIN SERPL BCG-MCNC: 3 G/DL (ref 3.5–5.2)
ALBUMIN SERPL BCG-MCNC: 3.3 G/DL (ref 3.5–5.2)
ALBUMIN UR-MCNC: 30 MG/DL
ALP SERPL-CCNC: 36 U/L (ref 40–129)
ALP SERPL-CCNC: 40 U/L (ref 40–129)
ALT SERPL W P-5'-P-CCNC: 15 U/L (ref 10–50)
ALT SERPL W P-5'-P-CCNC: 16 U/L (ref 10–50)
ANION GAP SERPL CALCULATED.3IONS-SCNC: 12 MMOL/L (ref 7–15)
ANION GAP SERPL CALCULATED.3IONS-SCNC: 12 MMOL/L (ref 7–15)
ANTIBODY SCREEN: NEGATIVE
APPEARANCE UR: ABNORMAL
APTT PPP: 26 SECONDS (ref 22–38)
APTT PPP: 32 SECONDS (ref 22–38)
AST SERPL W P-5'-P-CCNC: 40 U/L (ref 10–50)
AST SERPL W P-5'-P-CCNC: 43 U/L (ref 10–50)
BACTERIA #/AREA URNS HPF: ABNORMAL /HPF
BASOPHILS # BLD AUTO: 0 10E3/UL (ref 0–0.2)
BASOPHILS # BLD AUTO: 0 10E3/UL (ref 0–0.2)
BASOPHILS NFR BLD AUTO: 0 %
BASOPHILS NFR BLD AUTO: 0 %
BILIRUB SERPL-MCNC: 0.4 MG/DL
BILIRUB SERPL-MCNC: 0.4 MG/DL
BILIRUB UR QL STRIP: NEGATIVE
BUN SERPL-MCNC: 24.1 MG/DL (ref 8–23)
BUN SERPL-MCNC: 25.3 MG/DL (ref 8–23)
CALCIUM SERPL-MCNC: 7.5 MG/DL (ref 8.8–10.2)
CALCIUM SERPL-MCNC: 7.6 MG/DL (ref 8.8–10.2)
CHLORIDE SERPL-SCNC: 105 MMOL/L (ref 98–107)
CHLORIDE SERPL-SCNC: 107 MMOL/L (ref 98–107)
COLOR UR AUTO: YELLOW
CREAT SERPL-MCNC: 1.8 MG/DL (ref 0.67–1.17)
CREAT SERPL-MCNC: 1.83 MG/DL (ref 0.67–1.17)
CRP SERPL-MCNC: 18.3 MG/L
D DIMER PPP FEU-MCNC: 1.34 UG/ML FEU (ref 0–0.5)
D DIMER PPP FEU-MCNC: 1.5 UG/ML FEU (ref 0–0.5)
DEPRECATED HCO3 PLAS-SCNC: 22 MMOL/L (ref 22–29)
DEPRECATED HCO3 PLAS-SCNC: 22 MMOL/L (ref 22–29)
EOSINOPHIL # BLD AUTO: 0 10E3/UL (ref 0–0.7)
EOSINOPHIL # BLD AUTO: 0 10E3/UL (ref 0–0.7)
EOSINOPHIL NFR BLD AUTO: 0 %
EOSINOPHIL NFR BLD AUTO: 0 %
ERYTHROCYTE [DISTWIDTH] IN BLOOD BY AUTOMATED COUNT: 12.8 % (ref 10–15)
ERYTHROCYTE [DISTWIDTH] IN BLOOD BY AUTOMATED COUNT: 13.1 % (ref 10–15)
GFR SERPL CREATININE-BSD FRML MDRD: 35 ML/MIN/1.73M2
GFR SERPL CREATININE-BSD FRML MDRD: 36 ML/MIN/1.73M2
GLUCOSE SERPL-MCNC: 102 MG/DL (ref 70–99)
GLUCOSE SERPL-MCNC: 88 MG/DL (ref 70–99)
GLUCOSE UR STRIP-MCNC: NEGATIVE MG/DL
HCT VFR BLD AUTO: 24.7 % (ref 40–53)
HCT VFR BLD AUTO: 26.1 % (ref 40–53)
HEMOCCULT STL QL: NEGATIVE
HGB BLD-MCNC: 7.9 G/DL (ref 13.3–17.7)
HGB BLD-MCNC: 8.6 G/DL (ref 13.3–17.7)
HGB UR QL STRIP: NEGATIVE
IMM GRANULOCYTES # BLD: 0 10E3/UL
IMM GRANULOCYTES # BLD: 0 10E3/UL
IMM GRANULOCYTES NFR BLD: 1 %
IMM GRANULOCYTES NFR BLD: 1 %
INR PPP: 1.08 (ref 0.85–1.15)
INR PPP: 1.22 (ref 0.85–1.15)
KETONES UR STRIP-MCNC: NEGATIVE MG/DL
LEUKOCYTE ESTERASE UR QL STRIP: ABNORMAL
LYMPHOCYTES # BLD AUTO: 0.5 10E3/UL (ref 0.8–5.3)
LYMPHOCYTES # BLD AUTO: 0.7 10E3/UL (ref 0.8–5.3)
LYMPHOCYTES NFR BLD AUTO: 14 %
LYMPHOCYTES NFR BLD AUTO: 23 %
MAGNESIUM SERPL-MCNC: 1.7 MG/DL (ref 1.7–2.3)
MCH RBC QN AUTO: 33.6 PG (ref 26.5–33)
MCH RBC QN AUTO: 34.1 PG (ref 26.5–33)
MCHC RBC AUTO-ENTMCNC: 32 G/DL (ref 31.5–36.5)
MCHC RBC AUTO-ENTMCNC: 33 G/DL (ref 31.5–36.5)
MCV RBC AUTO: 104 FL (ref 78–100)
MCV RBC AUTO: 105 FL (ref 78–100)
MONOCYTES # BLD AUTO: 0.4 10E3/UL (ref 0–1.3)
MONOCYTES # BLD AUTO: 0.5 10E3/UL (ref 0–1.3)
MONOCYTES NFR BLD AUTO: 13 %
MONOCYTES NFR BLD AUTO: 14 %
MUCOUS THREADS #/AREA URNS LPF: PRESENT /LPF
NEUTROPHILS # BLD AUTO: 1.9 10E3/UL (ref 1.6–8.3)
NEUTROPHILS # BLD AUTO: 2.4 10E3/UL (ref 1.6–8.3)
NEUTROPHILS NFR BLD AUTO: 63 %
NEUTROPHILS NFR BLD AUTO: 71 %
NITRATE UR QL: NEGATIVE
NRBC # BLD AUTO: 0 10E3/UL
NRBC # BLD AUTO: 0 10E3/UL
NRBC BLD AUTO-RTO: 0 /100
NRBC BLD AUTO-RTO: 0 /100
PH UR STRIP: 5.5 [PH] (ref 5–7)
PLATELET # BLD AUTO: 151 10E3/UL (ref 150–450)
PLATELET # BLD AUTO: 160 10E3/UL (ref 150–450)
POTASSIUM SERPL-SCNC: 3.7 MMOL/L (ref 3.4–5.3)
POTASSIUM SERPL-SCNC: 3.9 MMOL/L (ref 3.4–5.3)
PROT SERPL-MCNC: 4.6 G/DL (ref 6.4–8.3)
PROT SERPL-MCNC: 4.9 G/DL (ref 6.4–8.3)
RBC # BLD AUTO: 2.35 10E6/UL (ref 4.4–5.9)
RBC # BLD AUTO: 2.52 10E6/UL (ref 4.4–5.9)
RBC URINE: 0 /HPF
SARS-COV-2 RNA RESP QL NAA+PROBE: POSITIVE
SODIUM SERPL-SCNC: 139 MMOL/L (ref 136–145)
SODIUM SERPL-SCNC: 141 MMOL/L (ref 136–145)
SP GR UR STRIP: 1.02 (ref 1–1.03)
SPECIMEN EXPIRATION DATE: NORMAL
SQUAMOUS EPITHELIAL: 19 /HPF
T4 FREE SERPL-MCNC: <0.1 NG/DL (ref 0.9–1.7)
TROPONIN T SERPL HS-MCNC: 88 NG/L
TROPONIN T SERPL HS-MCNC: 89 NG/L
TSH SERPL DL<=0.005 MIU/L-ACNC: 100.1 UIU/ML (ref 0.3–4.2)
UROBILINOGEN UR STRIP-MCNC: <2 MG/DL
WBC # BLD AUTO: 3 10E3/UL (ref 4–11)
WBC # BLD AUTO: 3.3 10E3/UL (ref 4–11)
WBC URINE: 39 /HPF

## 2022-10-21 PROCEDURE — 258N000003 HC RX IP 258 OP 636: Performed by: INTERNAL MEDICINE

## 2022-10-21 PROCEDURE — 210N000001 HC R&B IMCU HEART CARE

## 2022-10-21 PROCEDURE — C9803 HOPD COVID-19 SPEC COLLECT: HCPCS

## 2022-10-21 PROCEDURE — 84439 ASSAY OF FREE THYROXINE: CPT | Performed by: PHYSICIAN ASSISTANT

## 2022-10-21 PROCEDURE — 84450 TRANSFERASE (AST) (SGOT): CPT | Performed by: INTERNAL MEDICINE

## 2022-10-21 PROCEDURE — 81001 URINALYSIS AUTO W/SCOPE: CPT | Performed by: PHYSICIAN ASSISTANT

## 2022-10-21 PROCEDURE — 258N000003 HC RX IP 258 OP 636: Performed by: PHYSICIAN ASSISTANT

## 2022-10-21 PROCEDURE — 80053 COMPREHEN METABOLIC PANEL: CPT | Performed by: PHYSICIAN ASSISTANT

## 2022-10-21 PROCEDURE — 84484 ASSAY OF TROPONIN QUANT: CPT | Performed by: PHYSICIAN ASSISTANT

## 2022-10-21 PROCEDURE — 93005 ELECTROCARDIOGRAM TRACING: CPT | Performed by: EMERGENCY MEDICINE

## 2022-10-21 PROCEDURE — 99223 1ST HOSP IP/OBS HIGH 75: CPT | Mod: AI | Performed by: INTERNAL MEDICINE

## 2022-10-21 PROCEDURE — G1010 CDSM STANSON: HCPCS

## 2022-10-21 PROCEDURE — 99222 1ST HOSP IP/OBS MODERATE 55: CPT | Performed by: INTERNAL MEDICINE

## 2022-10-21 PROCEDURE — 85379 FIBRIN DEGRADATION QUANT: CPT | Performed by: INTERNAL MEDICINE

## 2022-10-21 PROCEDURE — 250N000011 HC RX IP 250 OP 636: Performed by: INTERNAL MEDICINE

## 2022-10-21 PROCEDURE — 96361 HYDRATE IV INFUSION ADD-ON: CPT

## 2022-10-21 PROCEDURE — 99285 EMERGENCY DEPT VISIT HI MDM: CPT | Mod: CS,25

## 2022-10-21 PROCEDURE — 84443 ASSAY THYROID STIM HORMONE: CPT | Performed by: PHYSICIAN ASSISTANT

## 2022-10-21 PROCEDURE — 250N000013 HC RX MED GY IP 250 OP 250 PS 637: Performed by: INTERNAL MEDICINE

## 2022-10-21 PROCEDURE — 96360 HYDRATION IV INFUSION INIT: CPT

## 2022-10-21 PROCEDURE — 83735 ASSAY OF MAGNESIUM: CPT | Performed by: PHYSICIAN ASSISTANT

## 2022-10-21 PROCEDURE — 85025 COMPLETE CBC W/AUTO DIFF WBC: CPT | Performed by: INTERNAL MEDICINE

## 2022-10-21 PROCEDURE — 86901 BLOOD TYPING SEROLOGIC RH(D): CPT | Performed by: PHYSICIAN ASSISTANT

## 2022-10-21 PROCEDURE — 71046 X-RAY EXAM CHEST 2 VIEWS: CPT

## 2022-10-21 PROCEDURE — 87086 URINE CULTURE/COLONY COUNT: CPT | Performed by: PHYSICIAN ASSISTANT

## 2022-10-21 PROCEDURE — 82272 OCCULT BLD FECES 1-3 TESTS: CPT | Performed by: PHYSICIAN ASSISTANT

## 2022-10-21 PROCEDURE — 85025 COMPLETE CBC W/AUTO DIFF WBC: CPT | Performed by: PHYSICIAN ASSISTANT

## 2022-10-21 PROCEDURE — 85610 PROTHROMBIN TIME: CPT | Performed by: INTERNAL MEDICINE

## 2022-10-21 PROCEDURE — U0005 INFEC AGEN DETEC AMPLI PROBE: HCPCS | Performed by: PHYSICIAN ASSISTANT

## 2022-10-21 PROCEDURE — 85730 THROMBOPLASTIN TIME PARTIAL: CPT | Performed by: PHYSICIAN ASSISTANT

## 2022-10-21 PROCEDURE — 85610 PROTHROMBIN TIME: CPT | Performed by: PHYSICIAN ASSISTANT

## 2022-10-21 PROCEDURE — 36415 COLL VENOUS BLD VENIPUNCTURE: CPT | Performed by: PHYSICIAN ASSISTANT

## 2022-10-21 PROCEDURE — 86850 RBC ANTIBODY SCREEN: CPT | Performed by: PHYSICIAN ASSISTANT

## 2022-10-21 PROCEDURE — 93005 ELECTROCARDIOGRAM TRACING: CPT | Performed by: STUDENT IN AN ORGANIZED HEALTH CARE EDUCATION/TRAINING PROGRAM

## 2022-10-21 PROCEDURE — 36415 COLL VENOUS BLD VENIPUNCTURE: CPT | Performed by: INTERNAL MEDICINE

## 2022-10-21 PROCEDURE — 86140 C-REACTIVE PROTEIN: CPT | Performed by: INTERNAL MEDICINE

## 2022-10-21 RX ORDER — LIDOCAINE 40 MG/G
CREAM TOPICAL
Status: DISCONTINUED | OUTPATIENT
Start: 2022-10-21 | End: 2022-10-24 | Stop reason: HOSPADM

## 2022-10-21 RX ORDER — ATENOLOL 25 MG/1
25 TABLET ORAL DAILY
Status: DISCONTINUED | OUTPATIENT
Start: 2022-10-22 | End: 2022-10-22

## 2022-10-21 RX ORDER — GABAPENTIN 300 MG/1
300 CAPSULE ORAL EVERY EVENING
Status: ON HOLD | COMMUNITY
End: 2022-11-10

## 2022-10-21 RX ORDER — SIMVASTATIN 10 MG
10 TABLET ORAL AT BEDTIME
Status: DISCONTINUED | OUTPATIENT
Start: 2022-10-21 | End: 2022-10-24 | Stop reason: HOSPADM

## 2022-10-21 RX ORDER — AMOXICILLIN 250 MG
2 CAPSULE ORAL 2 TIMES DAILY PRN
Status: DISCONTINUED | OUTPATIENT
Start: 2022-10-21 | End: 2022-10-24 | Stop reason: HOSPADM

## 2022-10-21 RX ORDER — ACETAMINOPHEN 650 MG/1
650 SUPPOSITORY RECTAL EVERY 6 HOURS PRN
Status: DISCONTINUED | OUTPATIENT
Start: 2022-10-21 | End: 2022-10-24 | Stop reason: HOSPADM

## 2022-10-21 RX ORDER — ACETAMINOPHEN 325 MG/1
650 TABLET ORAL EVERY 6 HOURS PRN
Status: DISCONTINUED | OUTPATIENT
Start: 2022-10-21 | End: 2022-10-24 | Stop reason: HOSPADM

## 2022-10-21 RX ORDER — GABAPENTIN 300 MG/1
300 CAPSULE ORAL EVERY EVENING
Status: DISCONTINUED | OUTPATIENT
Start: 2022-10-21 | End: 2022-10-24 | Stop reason: HOSPADM

## 2022-10-21 RX ORDER — ALBUTEROL SULFATE 90 UG/1
2 AEROSOL, METERED RESPIRATORY (INHALATION) EVERY 4 HOURS PRN
Status: DISCONTINUED | OUTPATIENT
Start: 2022-10-21 | End: 2022-10-24 | Stop reason: HOSPADM

## 2022-10-21 RX ORDER — ASPIRIN 81 MG/1
81 TABLET, CHEWABLE ORAL DAILY
Status: DISCONTINUED | OUTPATIENT
Start: 2022-10-22 | End: 2022-10-24 | Stop reason: HOSPADM

## 2022-10-21 RX ORDER — ATENOLOL 25 MG/1
25 TABLET ORAL DAILY
Status: ON HOLD | COMMUNITY
End: 2022-11-10

## 2022-10-21 RX ORDER — HEPARIN SODIUM 5000 [USP'U]/.5ML
5000 INJECTION, SOLUTION INTRAVENOUS; SUBCUTANEOUS EVERY 8 HOURS SCHEDULED
Status: DISCONTINUED | OUTPATIENT
Start: 2022-10-21 | End: 2022-10-24 | Stop reason: HOSPADM

## 2022-10-21 RX ORDER — TERAZOSIN 5 MG/1
10 CAPSULE ORAL AT BEDTIME
Status: DISCONTINUED | OUTPATIENT
Start: 2022-10-21 | End: 2022-10-24 | Stop reason: HOSPADM

## 2022-10-21 RX ORDER — AMOXICILLIN 250 MG
1 CAPSULE ORAL 2 TIMES DAILY PRN
Status: DISCONTINUED | OUTPATIENT
Start: 2022-10-21 | End: 2022-10-24 | Stop reason: HOSPADM

## 2022-10-21 RX ADMIN — TERAZOSIN HYDROCHLORIDE 10 MG: 5 CAPSULE ORAL at 22:52

## 2022-10-21 RX ADMIN — GABAPENTIN 300 MG: 300 CAPSULE ORAL at 22:52

## 2022-10-21 RX ADMIN — SODIUM CHLORIDE 500 ML: 9 INJECTION, SOLUTION INTRAVENOUS at 21:00

## 2022-10-21 RX ADMIN — SIMVASTATIN 10 MG: 10 TABLET, FILM COATED ORAL at 22:52

## 2022-10-21 RX ADMIN — HEPARIN SODIUM 5000 UNITS: 10000 INJECTION, SOLUTION INTRAVENOUS; SUBCUTANEOUS at 22:52

## 2022-10-21 RX ADMIN — SODIUM CHLORIDE 500 ML: 9 INJECTION, SOLUTION INTRAVENOUS at 13:28

## 2022-10-21 ASSESSMENT — ACTIVITIES OF DAILY LIVING (ADL)
ADLS_ACUITY_SCORE: 35
ADLS_ACUITY_SCORE: 35
DEPENDENT_IADLS:: INDEPENDENT
ADLS_ACUITY_SCORE: 35

## 2022-10-21 NOTE — CONSULTS
CARDIOLOGY CONSULT NOTE         Assessment:   1.  Syncope-uncertain etiology, no postictal state, could be neurocardiogenic although there was no inciting event.  Could be arrhythmogenic as well.  Agree with telemetry and event monitor.  Agree with echocardiogram.  Given low blood pressure noted by EMS quite possibly this was due to hypotension.  2.  Troponin elevation- 1 from 88 up to 89, essentially unchanged.  Doubt coronary ischemia.  Doubt MI.  Would arrange for outpatient stress test.  3.  Junctional rhythm- telemetry actually shows sinus rhythm, doubt true junctional rhythm.  Would recommend telemetry as above.  4.  COVID positive-defer to primary care team.     Plan:   1.  Telemetry.  2.  Echocardiogram.  3.  No pacemaker.  4.  No angiogram.  5.  Outpatient stress test.  6.  Can follow with me as primary cardiologist.     Current History:   [facility] has been requested by the hospitalist to evaluate Dilip Long  who is a 87 year old year old white male for junctional rhythm.  Patient was in his usual state of health significant in table having breakfast this morning.  He apparently fell asleep and woke up with side of the table.  He was out for no more than 5 minutes.  There is no seizure activity, nor was any bladder or bowel incontinence, nor was any aura or postictal state.  His wife was concerned and summoned paramedics, apparently his blood pressure was 65/45 and cardiology consultation was requested.  Patient currently feels well, denies any prior syncopal spells, denies any chest pains, palpitations, PND, orthopnea, or peripheral edema.      Past Medical History:     Past Medical History:   Diagnosis Date     Acute prostatitis      Asymptomatic bacteriuria 10/23/2013    Noted at 10/2/13 office visit at [facility]. No treatment recommended at that time.      Hypertension      Past history is negative for cancer, tuberculosis, diabetes mellitus, myocardial infarction,  rheumatic  fever,  cerebrovascular accident, chronic kidney disease, peptic ulcer disease, chronic obstructive pulmonary disease, or thyroid disorder  and no lipid disorder.    Past Surgical History:     Past Surgical History:   Procedure Laterality Date     BLADDER SURGERY      Bladder absces removal     Blepharoplasty Upper Lid W/ Excessive Skin[  1/29/2007     CATARACT EXTRACTION       EYE SURGERY      both eyes 2015     IR LUMBAR EPIDURAL STEROID INJECTION  4/27/2004     IR LUMBAR EPIDURAL STEROID INJECTION  5/11/2004     IR LUMBAR EPIDURAL STEROID INJECTION  11/24/2004     IR LUMBAR EPIDURAL STEROID INJECTION  11/28/2007     [place] CYSTOURETHROSCOPY W/IRRIG & EVAC CLOTS N/A 7/27/2018    Procedure: CYSTOSCOPY, CLOT EVACUATION ,URETHRAL DILATATION, DAUGHERTY CATHETER PLACEMENT;  Surgeon: Lowell Arias MD;  [place] [facility] [place];  Service: Urology      Family History:   Reviewed, and negative for coronary artery disease.    Social History:   Reviewed, and he  reports that he has quit smoking. His smoking use included cigarettes.  He smoked up to a pack a day, for about 40 years, quit about 20 years ago.  He has never used smokeless tobacco. He reports current alcohol use. He reports that he does not use drugs.  He is retired  as well as driving delivering diamonds, lives independently with his wife.    Meds:       Allergies:   Nkda [no known drug allergies]    Review of Systems:   A 12 point comprehensive review of systems was  as follows:   General: Positive for fatigue, negative weight loss or weight gain  Constitutional: negative for anorexia, chills, fevers, malaise, night sweats   Eyes: negative for cataracts, color blindness, contacts/glasses, glaucoma, icterus, irritation, redness and visual disturbance  Ears, nose, mouth, throat, and face: negative for ear drainage, earaches, epistaxis, facial trauma, hearing loss, hoarseness, nasal congestion, snoring, sore mouth, sore throat, tinnitus and  "voice change  Respiratory: negative for cough, dyspnea on exertion,  hemoptysis, sputum production, pleurisy, stridor, wheezing, PND, orthopnea  Cardiovascular: negative for chest pain, chest pressure/discomfort, claudication, dyspnea, exertional chest pressure/discomfort, fatigue, irregular heart beat, lower extremity edema, near-syncope,  palpitations,  syncope   Gastrointestinal: negative for abdominal pain, change in bowel haibits, constipation, diarrhea, dyspepsia, dysphagia, jaundice, melena, nausea, odynophagia, reflux symptoms and vomiting  Genitourinary: negative for decreased stream  Hematologic/lymphatic: negative for bleeding  Musculoskeletal: negative for arthralgias, back pain, bone pain, muscle weakness, myalgias, neck pain and stiff joints  Neurological: Positive for syncope, negative presyncope, coordination problems, dizziness, gait problems, headaches, memory problems, paresthesia, seizures, speech problems, tremors, vertigo and weakness    Objective:     Physical Exam:  /60   Pulse 61   Temp 97.7  F (36.5  C) (Oral)   Resp 22   Ht 1.753 m (5' 9\")   Wt 72.6 kg (160 lb)   SpO2 95%   BMI 23.63 kg/m       Head:    Normocephalic, without obvious abnormality, atraumatic   Eyes:    PERRL, conjunctiva/corneas clear, EOM's intact,           Nose:   Nares normal, septum midline, mucosa normal, no drainage  or sinus tenderness   Throat:   Lips, mucosa, and tongue normal; teeth and gums normal   Neck:   Supple, symmetrical, trachea midline, no adenopathy;        thyroid:  No enlargement/tenderness/nodules; no carotid    bruit or JVD   Back:     Symmetric, no curvature, ROM normal, no CVA tenderness   Lungs:     Clear to auscultation bilaterally, respirations unlabored   Chest wall:    No tenderness or deformity   Heart:    Regular rate and rhythm, S1 and S2 normal, no murmur, rub   or gallop   Abdomen:     Soft, non-tender, bowel sounds active all four quadrants,     no masses, no organomegaly "   Extremities:   Extremities normal, atraumatic, no cyanosis or edema   Pulses:   2+ and symmetric all extremities   Skin:   Skin color, texture, turgor normal, no rashes or lesions   Neurologic:   CNII-XII intact. Normal strength, sensation and reflexes       throughout       Cardiographics and Imaging  Radiology Results: Chest x-ray shows   Small lung volumes. No focal airspace consolidation. No pleural effusion or pneumothorax.  Cardiomediastinal silhouette is normal. Atherosclerotic calcifications of the thoracic aorta.  Lucency beneath right hemidiaphragm, compatible with colonic interposition.    EKG Results: personally reviewed sinus bradycardia, low voltage QRS, no acute changes.    Lab Review   Pertinent Labs  Lab Results: personally reviewed       No results found for: CKTOTAL, CKMB, TROPONINI    Lab Results   Component Value Date    BUN 24.1 (H) 10/21/2022     10/21/2022    CO2 22 10/21/2022       Lab Results   Component Value Date    WBC 3.3 (L) 10/21/2022    HGB 7.9 (L) 10/21/2022    HCT 24.7 (L) 10/21/2022     (H) 10/21/2022     10/21/2022       No results found for: BNP     Clinically Significant Risk Factors Present on Admission             # Hypoalbuminemia: Lowest albumin = 3 g/dL (Ref range: 3.5-5.2) at 10/21/2022 11:10 AM, will monitor as appropriate   # Coagulation Defect: INR = 1.22 (Ref range: 0.85 - 1.15) and/or PTT = 26 Seconds (Ref range: 22 - 38 Seconds), will monitor for bleeding      Cardiovascular: Cardiac Arrhythmia: Bradycardia, unspecified    Neurology: Encephalopathy: Other encephalopathy    COVID positiivie

## 2022-10-21 NOTE — ED NOTES
Patient in bed resting. Patient wanted heat for lower back pain. Placed heat pack on lower back for comfort.   Patient stated no SOB, no dizziness. Lung sounds clear all lobes bilaterally. Patient stated no need for anything at this time - wants to know when can go home.

## 2022-10-21 NOTE — ED PROVIDER NOTES
"Emergency Department Midlevel Supervisory Note     I personally saw the patient and performed a substantive portion of the visit including all aspects of the medical decision making.    ED Course:  11:37 AM Rachel Kilgore PA-C staffed patient with me. I agree with their assessment and plan of management, and I will see the patient.  12:14 PM I met with the patient to introduce myself, gather additional history, perform my initial exam, and discuss the plan.     Brief HPI:     Dilip Long is a 87 year old male who presents for evaluation of syncope. Patient was eating breakfast around 7AM when he woke up with his head on the kitchen table. No chest pain, tightness, diarrhea, nausea, leg swelling or shortness of breath before or during the episode. No known cardiac history.    I, Karissa Howe, am serving as a scribe to document services personally performed by Contreras Tan MD based on my observations and the provider's statements to me.   I, Contreras Tan MD attest that Karissa Howe was acting in a scribe capacity, has observed my performance of the services and has documented them in accordance with my direction.    Brief Physical Exam: /55   Pulse 58   Temp 97.7  F (36.5  C) (Oral)   Resp 13   Ht 1.753 m (5' 9\")   Wt 72.6 kg (160 lb)   SpO2 99%   BMI 23.63 kg/m    Constitutional:  Alert, in no acute distress  EYES: Conjunctivae clear  HENT:  Atraumatic, normocephalic  Respiratory:  Respirations even, unlabored, in no acute respiratory distress  Cardiovascular:  Regular rate and rhythm, good peripheral perfusion  GI: Soft, nondistended, nontender, no palpable masses, no rebound, no guarding   Musculoskeletal:  No edema. No cyanosis. Range of motion major extremities intact.    Integument: Warm, Dry, No erythema, No rash.   Neurologic:  Alert & oriented, no focal deficits noted  Psych: Normal mood and affect       Labs and Imaging:  Results for orders placed or performed during the hospital " encounter of 10/21/22   CBC with platelets and differential   Result Value Ref Range    WBC Count 3.3 (L) 4.0 - 11.0 10e3/uL    RBC Count 2.35 (L) 4.40 - 5.90 10e6/uL    Hemoglobin 7.9 (L) 13.3 - 17.7 g/dL    Hematocrit 24.7 (L) 40.0 - 53.0 %     (H) 78 - 100 fL    MCH 33.6 (H) 26.5 - 33.0 pg    MCHC 32.0 31.5 - 36.5 g/dL    RDW 13.1 10.0 - 15.0 %    Platelet Count 151 150 - 450 10e3/uL    % Neutrophils 71 %    % Lymphocytes 14 %    % Monocytes 14 %    % Eosinophils 0 %    % Basophils 0 %    % Immature Granulocytes 1 %    NRBCs per 100 WBC 0 <1 /100    Absolute Neutrophils 2.4 1.6 - 8.3 10e3/uL    Absolute Lymphocytes 0.5 (L) 0.8 - 5.3 10e3/uL    Absolute Monocytes 0.5 0.0 - 1.3 10e3/uL    Absolute Eosinophils 0.0 0.0 - 0.7 10e3/uL    Absolute Basophils 0.0 0.0 - 0.2 10e3/uL    Absolute Immature Granulocytes 0.0 <=0.4 10e3/uL    Absolute NRBCs 0.0 10e3/uL     I have reviewed the relevant laboratory and radiology studies      Contreras Tan MD  [facility] EMERGENCY DEPARTMENT  [address] [place] [id number] [phone]     Contreras Tan MD  10/21/22 2764

## 2022-10-21 NOTE — ED NOTES
Contacted patient's wife per patient request - updated on policy for no guests for covid patients.

## 2022-10-21 NOTE — ED PROVIDER NOTES
ED PROVIDER NOTE    EMERGENCY DEPARTMENT ENCOUNTER      NAME: Dilip Long  AGE: 87 year old male  YOB: 1935  MRN: [id number]  EVALUATION DATE & TIME: 10/21/2022 10:40 AM    PCP: No Ref-Primary, Physician    ED PROVIDER: Rachel Kilgore PA-C      Chief Complaint   Patient presents with     Syncope         FINAL IMPRESSION:  1. Syncope    2. Acute electrocardiogram changes    3. Acute kidney injury (H)    4. Anemia    5. Infection due to 2019 novel coronavirus          MEDICAL DECISION MAKING:    Pertinent Labs & Imaging studies reviewed. (See chart for details)  87 year old male with a h/o prior CVA, HTN, Graves dz (post ablation) presents to the Emergency Department for evaluation of syncope.      87-year-old male with a history of hypertension presenting after a syncopal episode at home. Was hypotensive when EMS arrived but verbally responsive.  Patient was sitting at the kitchen table when wife looked over and he was slumped over and passed out.  She does not think he fell or hit his head.  Patient is unable to recall any presyncopal symptoms.  He denies any recent illness or changes to his overall health.  No history of similar.  He is currently feeling back to normal.    Here he is slightly bradycardic but otherwise vital stable.  He is neurologically intact.  No focal findings on exam.      Differential includes arrhythmia, orthostatic hypotension, vasovagal, electrolyte imbalance, dehydration, anemia.  Obtained labs, EKG, chest x-ray, CT head and spine for concerns of possible trauma.    EKG reveals bradycardia. Discussed with cardiology - does not feel this truly represents junctional rhythm.  Lab work reveals acute kidney injury with a creatinine up to 1.83 (baseline around 0.8).  He also has findings of anemia with a hemoglobin of 7.9.  Rectal exam completed and negative for any gross blood and again he denied any black or bloody stools at home.  Guaiac obtained and pending.  He has  significantly elevated TSH of 100. Free T4 pending.  UA was significantly contaminated - no UTI symptoms - no fever.  Culture sent.    With his syncope, new EKG changes, anemia and acute kidney injury I do think patient warrants admission for cardiac telemetry and further management and monitoring.    Discussed admission with the hospitalist. Recommended discussion with cardiology.  Cardiology in agreement with repeat trop. No other acute interventions at this time.    Prior to admission, covid-19 testing came back POSITIVE. Patient placed on precautions and patient updated.        At the conclusion of the encounter I discussed the results of all of the tests and the disposition. The questions were answered. The patient or family acknowledged understanding and was agreeable with the care plan.         ED COURSE  11:00 AM Met and evaluated patient. Discussed ED plan.   12:30 PM Rechecked and updated patient. Talked about transfer. Patient open to transfer to [facility]. Also discussed this with charge nurse.  1:26 PM Spoke with Dr. Kay, hospitalist, who would like provider to speak with cardiology.  2:06 PM Received a call from lab; patient is positive for COVID-19.  2:14 PM Spoke with Dr. Marinelli, [facility] and updated him on patient's COVID positive status. No treatment will be given at this time due to patient's LEONIDAS. May consider ID input    MEDICATIONS GIVEN IN THE EMERGENCY:  Medications   0.9% sodium chloride BOLUS (500 mLs Intravenous New Bag 10/21/22 1328)       NEW PRESCRIPTIONS STARTED AT TODAY'S ER VISIT  New Prescriptions    No medications on file          =================================================================    HPI    Patient information was obtained from: patient, patient's wife    Use of : none        Dilip Long is a 87 year old male with a pertinent past medical history of cerebral infarction, HTN, and former tobacco use who presents via EMS escorted by wife and  "granddaughter for evaluation of syncope. While eating breakfast this morning, patient reports he \"bonked\" his had on the kitchen table and spilled his milk, but he does not know how it happened. Per patient's wife who was in the other room, she happened to glance over and saw him slumped over. She does not know if he hit his head.     When EMS arrived, patient was awake and alert. Per triage note, his initial BP was 65/45, and his blood sugar was 160.    Besides some chronic back pain, patient denies any other pain-related complaints. He denies fever, chills, vomiting, or chest pain. He denies a history of syncopal episodes.    He has not started on any new medications. Patient's wife says he does not eat and drink very well.    Patient lives at home with his wife and uses a cane to ambulate.    REVIEW OF SYSTEMS   Constitutional: Negative for fevers, chills.  Vision: Negative for vision changes  HENT:  Negative for congestion, sore throat  Pulmonary: Negative for shortness of breath  Cardiac: Negative for chest pain  GI:Negative for nausea, vomiting, diarrhea, abdominal pain  : Negative for urinary symptoms  Musculoskeletal: Negative for extremity pain. Positive for back pain (chronic).  Skin: Negative for skin changes  Neuro: Negative for weakness, numbness. Positive for syncope.    All other systems reviewed and are negative      PAST MEDICAL HISTORY:  Past Medical History:   Diagnosis Date     Acute prostatitis      Asymptomatic bacteriuria 10/23/2013    Noted at 10/2/13 office visit at [facility], [facility]. No treatment recommended at that time.      Hypertension        PAST SURGICAL HISTORY:  Past Surgical History:   Procedure Laterality Date     BLADDER SURGERY      Bladder absces removal     Blepharoplasty Upper Lid W/ Excessive Skin[  1/29/2007     CATARACT EXTRACTION       EYE SURGERY      both eyes 2015     IR LUMBAR EPIDURAL STEROID INJECTION  4/27/2004     IR LUMBAR EPIDURAL STEROID INJECTION  5/11/2004     " IR LUMBAR EPIDURAL STEROID INJECTION  11/24/2004     IR LUMBAR EPIDURAL STEROID INJECTION  11/28/2007     SC CYSTOURETHROSCOPY W/IRRIG & EVAC CLOTS N/A 7/27/2018    Procedure: CYSTOSCOPY, CLOT EVACUATION ,URETHRAL DILATATION, DAUGHERTY CATHETER PLACEMENT;  Surgeon: Lowell Arias MD;  Location: [facility];  Service: Urology           CURRENT MEDICATIONS:    No current facility-administered medications for this encounter.    Current Outpatient Medications:      atenolol (TENORMIN) 25 MG tablet, Take 25 mg by mouth daily, Disp: , Rfl:      Calcium Carbonate-Vitamin D (OSCAL 500/200 D-3 PO), Take 1 tablet by mouth daily., Disp: , Rfl:      diphenhydrAMINE (BENADRYL) 25 MG capsule, Take 1 capsule (25 mg) by mouth every 6 hours as needed for itching or allergies, Disp: 6 capsule, Rfl: 0     gabapentin (NEURONTIN) 300 MG capsule, Take 300 mg by mouth every evening, Disp: , Rfl:      simvastatin (ZOCOR) 10 MG tablet, Take 1 tablet (10 mg) by mouth At Bedtime, Disp: 90 tablet, Rfl: 3     terazosin (HYTRIN) 10 MG capsule, Take 1 capsule (10 mg) by mouth At Bedtime Take 1 tablet (10 mg) daily, Disp: 90 capsule, Rfl: 3     order for DME, Equipment being ordered: Home automatic blood pressure cuff. Check once daily for one week, and then 3 times weekly thereafter., Disp: 1 Device, Rfl: 0    ALLERGIES:  Allergies   Allergen Reactions     Nkda [No Known Drug Allergies]        FAMILY HISTORY:  Family History   Problem Relation Age of Onset     Diabetes No family hx of      Hypertension No family hx of      Other Cancer No family hx of      Coronary Artery Disease No family hx of      Hyperlipidemia No family hx of      Cerebrovascular Disease No family hx of      Breast Cancer No family hx of      Colon Cancer No family hx of      Prostate Cancer No family hx of      Depression No family hx of      Anxiety Disorder No family hx of      Mental Illness No family hx of      Substance Abuse No family hx of      Anesthesia  Reaction No family hx of      Asthma No family hx of      Osteoporosis No family hx of      Genetic Disorder No family hx of      Thyroid Disease No family hx of      Obesity No family hx of      No Known Problems Mother      No Known Problems Father        VITALS:  Vitals:    10/21/22 1115 10/21/22 1130 10/21/22 1346 10/21/22 1347   BP: 100/56 106/55 110/53    Pulse: 58 58 60 60   Resp: 20 13 25 18   Temp:       TempSrc:       SpO2: 99%  90% 94%   Weight:       Height:           PHYSICAL EXAM    General Appearance:  Alert, cooperative, no distress, appears stated age but overall appears pales  HENT: Normocephalic without obvious deformity, atraumatic. Mucous membranes moist   Eyes: Conjunctiva clear, Lids normal. No discharge.   Respiratory: No distress. Lungs clear to ausculation bilaterally. No wheezes, rhonchi or stridor  Cardiovascular: Regular rate and rhythm, no murmur. Normal cap refill. No peripheral edema  GI: Abdomen soft, nontender, normal bowel sounds  : No CVA tenderness  Musculoskeletal: Moving all extremities. No gross deformities  Integument: Warm, dry, no rashes or lesions  Neurologic: Alert and orientated x3. No focal deficits.  Psych: Normal mood and affect        LAB:  Labs Ordered and Resulted from Time of ED Arrival to Time of ED Departure   COMPREHENSIVE METABOLIC PANEL - Abnormal       Result Value    Sodium 141      Potassium 3.7      Chloride 107      Carbon Dioxide (CO2) 22      Anion Gap 12      Urea Nitrogen 24.1 (*)     Creatinine 1.83 (*)     Calcium 7.5 (*)     Glucose 102 (*)     Alkaline Phosphatase 36 (*)     AST 40      ALT 15      Protein Total 4.6 (*)     Albumin 3.0 (*)     Bilirubin Total 0.4      GFR Estimate 35 (*)    INR - Abnormal    INR 1.22 (*)    TSH WITH FREE T4 REFLEX - Abnormal    .10 (*)    TROPONIN T, HIGH SENSITIVITY - Abnormal    Troponin T, High Sensitivity 88 (*)    CBC WITH PLATELETS AND DIFFERENTIAL - Abnormal    WBC Count 3.3 (*)     RBC Count 2.35  (*)     Hemoglobin 7.9 (*)     Hematocrit 24.7 (*)      (*)     MCH 33.6 (*)     MCHC 32.0      RDW 13.1      Platelet Count 151      % Neutrophils 71      % Lymphocytes 14      % Monocytes 14      % Eosinophils 0      % Basophils 0      % Immature Granulocytes 1      NRBCs per 100 WBC 0      Absolute Neutrophils 2.4      Absolute Lymphocytes 0.5 (*)     Absolute Monocytes 0.5      Absolute Eosinophils 0.0      Absolute Basophils 0.0      Absolute Immature Granulocytes 0.0      Absolute NRBCs 0.0     COVID-19 VIRUS (CORONAVIRUS) BY PCR - Abnormal    SARS CoV2 PCR Positive (*)    TROPONIN T, HIGH SENSITIVITY - Abnormal    Troponin T, High Sensitivity 89 (*)    PARTIAL THROMBOPLASTIN TIME - Normal    aPTT 32     MAGNESIUM - Normal    Magnesium 1.7     PARTIAL THROMBOPLASTIN TIME - Normal    aPTT 26     OCCULT BLOOD STOOL - Normal    Occult Blood Negative     ROUTINE UA WITH MICROSCOPIC REFLEX TO CULTURE   T4 FREE   ABO/RH TYPE AND SCREEN       RADIOLOGY:  Chest XR,  PA & LAT   Final Result   IMPRESSION: Small lung volumes. No focal airspace consolidation. No pleural effusion or pneumothorax.      Cardiomediastinal silhouette is normal. Atherosclerotic calcifications of the thoracic aorta.      Lucency beneath right hemidiaphragm, compatible with colonic interposition.      Cervical spine CT w/o contrast   Final Result   IMPRESSION:   1.  No fracture or posttraumatic subluxation.   2.  No high-grade spinal canal stenosis. Moderate to severe neural foraminal stenosis bilaterally at C3-C4.      Head CT w/o contrast   Final Result   IMPRESSION:   1.  No CT evidence for acute intracranial process.   2.  No calvarial or skull base fracture.   3.  Brain atrophy and presumed chronic microvascular ischemic changes as above.          EKG:    Performed at:  10:45 on 10/21/22  Impression: Junctional rhythm (appears new when compared to July 2019 at 16:18). 58 BPM, no STEMI.  Dr. Tan and I have independently reviewed and  interpreted the EKG(s) documented above.      I, Silvia Nielson, am serving as a scribe to document services personally performed by Rachel Kilgore PA-C based on my observation and the provider's statements to me. I, Rachel Kilgore PA-C attest that Silvia Nielson is acting in a scribe capacity, has observed my performance of the services and has documented them in accordance with my direction.    Rachel Kilgore PA-C   Emergency Medicine           Rachel Kilgore PA-C  10/21/22 1800

## 2022-10-21 NOTE — CONSULTS
"Care Management Initial Consult    General Information  Assessment completed with: Spouse or significant other, Laina wife via phone  Type of CM/SW Visit: Initial Assessment    Primary Care Provider verified and updated as needed: Yes   Readmission within the last 30 days: no previous admission in last 30 days      Reason for Consult: discharge planning  Advance Care Planning: Advance Care Planning Reviewed: no concerns identified          Communication Assessment  Patient's communication style: spoken language (English or Bilingual)                            Living Environment:   People in home: spouse     Current living Arrangements: house (\"There is an upstairs and a basement. We don't have to use them and normally just stay on the main level. A few steps to get inside\".)      Able to return to prior arrangements: other (see comments) (unknown at this time)       Family/Social Support:  Care provided by: self  Provides care for: no one  Marital Status:   Wife, Children  Laina       Description of Support System: Supportive, Involved    Support Assessment: Adequate family and caregiver support, Adequate social supports, Patient communicates needs well met    Current Resources:   Patient receiving home care services: No     Community Resources: None  Equipment currently used at home: cane, straight  Supplies currently used at home: Other (\"glasses\")    Employment/Financial:  Employment Status: retired     Employment/ Comments: \"no benefits\"  Financial Concerns:     Referral to Financial Worker: No       Lifestyle & Psychosocial Needs:  Social Determinants of Health     Tobacco Use: Medium Risk     Smoking Tobacco Use: Former     Smokeless Tobacco Use: Never     Passive Exposure: Not on file   Alcohol Use: Not on file   Financial Resource Strain: Not on file   Food Insecurity: Not on file   Transportation Needs: Not on file   Physical Activity: Not on file   Stress: Not on file   Social " "Connections: Not on file   Intimate Partner Violence: Not on file   Depression: Not on file   Housing Stability: Not on file       Functional Status:  Prior to admission patient needed assistance:   Dependent ADLs:: Ambulation-cane, Independent  Dependent IADLs:: Independent       Mental Health Status:          Chemical Dependency Status:                Values/Beliefs:  Spiritual, Cultural Beliefs, Mandaeism Practices, Values that affect care:                 Additional Information:  Dilip was found to be COVID +. His wife states, \"I also don't feel good, so I probably have COVID also.\"    He lives in a house with his wife. \"There is an upstairs and a basement. We don't have to use them and normally and just stay on the main level. A few steps to get inside\".    He uses a cane for mobility.    He is independent with ADLs at baseline and he and his wife still drive.    Unknown discharge needs at this time, but likely able to return home. May need Home Care help.    Family to transport at discharge.    Pauline Singh RN      "

## 2022-10-21 NOTE — H&P
[facility]    History and Physical - Hospitalist Service       Date of Admission:  10/21/2022    Assessment & Plan   Dilip Long is a 87 year old male with history of BPH, prior CVA, diverticulosis, Graves' disease status post ablation, hyperlipidemia, hypothyroidism, hypertension, and ureteral stricture admitted on 10/21/2022 for syncope, COVID-19 infection and LEONIDAS.    Syncope  Possibly neurocardiogenic given absence of premonitory symptoms or postictal state versus arrhythmogenic versus consequence of medication induced orthostatic hypotension     Follow-up echocardiogram.   Received IV fluid earlier  Hold PTA atenolol for now  Telemetry  Check orthostatic vital signs when able  Cardiology [id number] assistance    NSTEMI  Initial troponin was 88 with a repeat of 89  No clinically acute coronary syndrome as per cardiologist  Continue PTA simvastatin  Aspirin 81 mg daily  Hold PTA atenolol due to syncope and concern for possible orthostatic hypotension  Outpatient stress test per cardiology team    LEONIDAS  Creatinine worse 1.83 from 0.82 on 7/18/2019.  Possibly prerenal from volume depletion in the setting of COVID-19 infection.   Received IV fluid en route  500 mL of normal saline over 4 hours  Check FENa  Monitor BMP  Avoid nephrotoxin  Consider nephrology consult if creatinine continues to worsen      COVID-19 infection  Tested positive on 10/21/2022. He is vaccinated for COVID-19.  He is currently asymptomatic and not requiring supplemental oxygen    Will consider starting remdesivir for 3 days given high risk for decompensation.  Follow-up ID consult -given high risk for decompensation in the setting of low GFR  Heparin given LEONIDAS      BPH  Continue PTA terazosin    Prior CVA  Aspirin 81 mg daily  Continue PTA simvastatin  PT/OT          Diet: 2 Gram Sodium Diet  DVT Prophylaxis: Heparin SQ  Lozano Catheter: Not present  Central Lines: None  Cardiac Monitoring: ACTIVE order.  Indication: Syncope- high cardiac risk (48 hours)  Code Status: Full Code    Clinically Significant Risk Factors Present on Admission              # Hypoalbuminemia: Lowest albumin = 3 g/dL (Ref range: 3.5-5.2) at 10/21/2022 11:10 AM, will monitor as appropriate     # Hypertension: home medication list includes antihypertensive(s)             Disposition Plan      Expected Discharge Date: 10/23/2022                The patient's care was discussed with the Patient.    Rebeka Marinelli MD  Hospitalist Service  [facility]  Securely message with the Vocera Web Console (learn more here)  Text page via [facility] Paging/Directory         ______________________________________________________________________    Chief Complaint   Syncope    History is obtained from the patient    History of Present Illness   Dilip Long is a 87 year old male with history of BPH, prior CVA, diverticulosis, Graves' disease status post ablation, hyperlipidemia, hypothyroidism, hypertension, and ureteral stricture who presents to the ER due to syncope.    He was in his usual state of health until today when it is until he suddenly passed out while sitting on a chair.  There was no preceding symptoms such as dizziness, chest pain, palpitation, diaphoresis or shortness of breath.  He received 0.5 to 1 L of normal saline en route.  Upon arrival in the ER patient was alert and responsive.  Her appetite has been poor at baseline as per wife.    Upon arrival in the ER EKG showed junctional rhythm.  Hemoglobin was found to be low at 7.9, however, he denies any history of melena or hematochezia.    Initial blood pressure was 106/55, pulse 58, respiratory rate 13, temperature 97.7  F and oxygen saturation of 99% on room air.  Notable laboratory findings include creatinine of 1.83 from 0.82 on 7/18/2019, calcium 7.5, albumin 3.0, high-sensitivity troponin 88, , WBC 3.3, hemoglobin 7.9, with MCV of 105 and INR of 1.22.   Chest x-ray showed no acute cardiopulmonary process.  Neck CT showed moderate to severe neural foraminal stenosis bilaterally at C3-C4 and no fracture or posttraumatic subluxation or high-grade spinal canal stenosis.  Head CT showed no evidence of acute intracranial process.    He received normal saline 0.5 l bolus in the ER.      Review of Systems    The 10 point Review of Systems is negative other than noted in the HPI or here.     Past Medical History    I have reviewed this patient's medical history and updated it with pertinent information if needed.   Past Medical History:   Diagnosis Date     Acute prostatitis      Asymptomatic bacteriuria 10/23/2013    Noted at 10/2/13 office visit at [facility]. No treatment recommended at that time.      Hypertension        Past Surgical History   I have reviewed this patient's surgical history and updated it with pertinent information if needed.  Past Surgical History:   Procedure Laterality Date     BLADDER SURGERY      Bladder absces removal     Blepharoplasty Upper Lid W/ Excessive Skin[  1/29/2007     CATARACT EXTRACTION       EYE SURGERY      both eyes 2015     IR LUMBAR EPIDURAL STEROID INJECTION  4/27/2004     IR LUMBAR EPIDURAL STEROID INJECTION  5/11/2004     IR LUMBAR EPIDURAL STEROID INJECTION  11/24/2004     IR LUMBAR EPIDURAL STEROID INJECTION  11/28/2007     [place] CYSTOURETHROSCOPY W/IRRIG & EVAC CLOTS N/A 7/27/2018    Procedure: CYSTOSCOPY, CLOT EVACUATION ,URETHRAL DILATATION, DAUGHERTY CATHETER PLACEMENT;  Surgeon: Lowell Arias MD;  Location: [facility] [place];  Service: Urology       Social History   I have reviewed this patient's social history and updated it with pertinent information if needed.  Social History     Tobacco Use     Smoking status: Former     Types: Cigarettes     Smokeless tobacco: Never     Tobacco comments:     Pt quit 40 years ago   Substance Use Topics     Alcohol use: Yes     Alcohol/week: 0.0 standard drinks     Comment:  Alcoholic Drinks/day: 1-2 beers per week.     Drug use: No       Family History   I have reviewed this patient's family history and updated it with pertinent information if needed.  Family History   Problem Relation Age of Onset     Diabetes No family hx of      Hypertension No family hx of      Other Cancer No family hx of      Coronary Artery Disease No family hx of      Hyperlipidemia No family hx of      Cerebrovascular Disease No family hx of      Breast Cancer No family hx of      Colon Cancer No family hx of      Prostate Cancer No family hx of      Depression No family hx of      Anxiety Disorder No family hx of      Mental Illness No family hx of      Substance Abuse No family hx of      Anesthesia Reaction No family hx of      Asthma No family hx of      Osteoporosis No family hx of      Genetic Disorder No family hx of      Thyroid Disease No family hx of      Obesity No family hx of      No Known Problems Mother      No Known Problems Father        Prior to Admission Medications   Prior to Admission Medications   Prescriptions Last Dose Informant Patient Reported? Taking?   Calcium Carbonate-Vitamin D (OSCAL 500/200 D-3 PO) 10/21/2022  Yes Yes   Sig: Take 1 tablet by mouth daily.   atenolol (TENORMIN) 25 MG tablet 10/21/2022 at am  Yes Yes   Sig: Take 25 mg by mouth daily   diphenhydrAMINE (BENADRYL) 25 MG capsule Unknown  No Yes   Sig: Take 1 capsule (25 mg) by mouth every 6 hours as needed for itching or allergies   gabapentin (NEURONTIN) 300 MG capsule 10/20/2022 at pm  Yes Yes   Sig: Take 300 mg by mouth every evening   order for DME   No No   Sig: Equipment being ordered: Home automatic blood pressure cuff. Check once daily for one week, and then 3 times weekly thereafter.   simvastatin (ZOCOR) 10 MG tablet 10/20/2022 at pm  No Yes   Sig: Take 1 tablet (10 mg) by mouth At Bedtime   terazosin (HYTRIN) 10 MG capsule 10/20/2022 at pm  No Yes   Sig: Take 1 capsule (10 mg) by mouth At Bedtime Take 1  tablet (10 mg) daily      Facility-Administered Medications: None     Allergies   Allergies   Allergen Reactions     Nkda [No Known Drug Allergies]        Physical Exam   Vital Signs: Temp: 97.7  F (36.5  C) Temp src: Oral BP: 118/68 Pulse: 65   Resp: 20 SpO2: 94 % O2 Device: None (Room air)    Weight: 160 lbs 0 oz    General appearance: Elderly man with hearing impairment, dehydrated +, awake, Alert, Cooperative, not in any obvious distress and appears stated age   HEENT: Normocephalic, atraumatic, conjunctiva clear without icterus and ears without discharge  Lungs: Diminished air entry bilaterally.  Cardiovascular: Regular Rate and Rythm, normal apical impulse, normal S1 and S2, no lower extremity edema bilaterally  Abdomen: Soft, non-tender and Non-distended, active bowel sounds  Skin: Skin color, texture normal and bruising or bleeding. No rashes or lesions over face, neck, arms and legs, turgor normal.  Musculoskeletal: No bony deformities or joint tenderness. Normal ROM upon flexion & extension.   Neurologic: Alert & Oriented X 3, Facial symmetry preserved and upper & lower extremities moving well with symmetry  Psychiatric: Calm, normal eye contact and normal affect      Data   Data reviewed today: I reviewed all medications, new labs and imaging results over the last 24 hours. I personally reviewed the chest x-ray image(s) showing No acute cardiopulmonary process.    Recent Results (from the past 24 hour(s))   Head CT w/o contrast    Narrative    EXAM: CT HEAD W/O CONTRAST  LOCATION: [facility]  DATE/TIME: 10/21/2022 11:48 AM    INDICATION: Syncope. Hit head. Bilateral hand numbness  COMPARISON: 01/14/2022. 07/31/2019  TECHNIQUE: Routine CT Head without IV contrast. Multiplanar reformats. Dose reduction techniques were used.    FINDINGS:  INTRACRANIAL CONTENTS: No intracranial hemorrhage, extraaxial collection, or mass effect.  No CT evidence of acute infarct. Mild to moderate  presumed chronic small vessel ischemic changes. Mild generalized volume loss. No hydrocephalus.     VISUALIZED ORBITS/SINUSES/MASTOIDS: No intraorbital abnormality. No paranasal sinus mucosal disease. No middle ear or mastoid effusion.    BONES/SOFT TISSUES: No scalp hematoma. No sufficient change in the left frontal scalp lipoma measuring 22 mm CC x 19 mm oblique AP x 6 mm in thickness. No skull fracture.      Impression    IMPRESSION:  1.  No CT evidence for acute intracranial process.  2.  No calvarial or skull base fracture.  3.  Brain atrophy and presumed chronic microvascular ischemic changes as above.   Cervical spine CT w/o contrast    Narrative    EXAM: CT CERVICAL SPINE W/O CONTRAST  LOCATION: [facility]  DATE/TIME: 10/21/2022 11:50 AM    INDICATION: fall syncope, tingling in hands  COMPARISON: 07/29/2022. 07/15/2019.  TECHNIQUE: Routine CT Cervical Spine without IV contrast. Multiplanar reformats. Dose reduction techniques were used.    FINDINGS:  VERTEBRA: No acute fracture or posttraumatic subluxation. Mild chronic superior endplate compression deformities of T1 and T2. Remaining vertebral body heights are maintained. No aggressive osseous abnormality. Advanced degenerative disc disease at   C4-C5, C5-C6, C6-C7. Moderate degenerative disc disease at C3-C4. Multilevel facet arthropathy. C3-C4 anterolisthesis measures 2 mm. C7-T1 anterolisthesis.    CANAL/FORAMINA: No high-grade spinal canal stenosis. Moderate to severe neural foraminal stenosis bilaterally at C3-C4. No additional high-grade neural foraminal stenosis.    PARASPINAL: No acute extraspinal abnormality.      Impression    IMPRESSION:  1.  No fracture or posttraumatic subluxation.  2.  No high-grade spinal canal stenosis. Moderate to severe neural foraminal stenosis bilaterally at C3-C4.   Chest XR,  PA & LAT    Narrative    EXAM: XR CHEST 2 VIEWS  LOCATION: [facility]  DATE/TIME: 10/21/2022  12:03 PM    INDICATION: Syncope.  COMPARISON: 07/18/2019.      Impression    IMPRESSION: Small lung volumes. No focal airspace consolidation. No pleural effusion or pneumothorax.    Cardiomediastinal silhouette is normal. Atherosclerotic calcifications of the thoracic aorta.    Lucency beneath right hemidiaphragm, compatible with colonic interposition.

## 2022-10-21 NOTE — ED NOTES
Bed: JN-  Expected date: 10/21/22  Expected time: 10:21 AM  Means of arrival: Ambulance  Comments:  Wili - 88yo m syncopal at breakfast, 65/45, .  IV fluids running

## 2022-10-21 NOTE — PHARMACY-ADMISSION MEDICATION HISTORY
Pharmacy Note - Admission Medication History     ______________________________________________________________________    Prior To Admission (PTA) med list completed and updated in EMR.       PTA Med List   Medication Sig Last Dose     atenolol (TENORMIN) 25 MG tablet Take 25 mg by mouth daily 10/21/2022 at am     Calcium Carbonate-Vitamin D (OSCAL 500/200 D-3 PO) Take 1 tablet by mouth daily. 10/21/2022     diphenhydrAMINE (BENADRYL) 25 MG capsule Take 1 capsule (25 mg) by mouth every 6 hours as needed for itching or allergies Unknown     gabapentin (NEURONTIN) 300 MG capsule Take 300 mg by mouth every evening 10/20/2022 at pm     simvastatin (ZOCOR) 10 MG tablet Take 1 tablet (10 mg) by mouth At Bedtime 10/20/2022 at pm     terazosin (HYTRIN) 10 MG capsule Take 1 capsule (10 mg) by mouth At Bedtime Take 1 tablet (10 mg) daily 10/20/2022 at pm       Information source(s): Family member and CareEverywhere/SureScripts  Method of interview communication: phone    Summary of Changes to PTA Med List  New: atenolol  Discontinued: aspirin, levothyroxine, lisinopril-hydrochlorothiazide, glucosamine  Changed: gabapentin (incr)    Patient was asked about OTC/herbal products specifically.  PTA med list reflects this.    In the past week, patient estimated taking medication this percent of the time:  greater than 90%.    Allergies were reviewed, assessed, and updated with the patient.      Patient does not use any multi-dose medications prior to admission.    The information provided in this note is only as accurate as the sources available at the time of the update(s).    Thank you for the opportunity to participate in the care of this patient.    Jazlyn Singh RPH  10/21/2022 1:47 PM

## 2022-10-21 NOTE — ED TRIAGE NOTES
Pt Brought in by EMS. Pt had syncopal episode while eating breakfast and wife found him slumped on the chair unresponsive. By the time ems got there pt was alert and oriented. Initial EMS BP was 65/45, . EMS stared aliter of fluid. Pt denies any c/o at this time.     Triage Assessment       Row Name 10/21/22 1045       Triage Assessment (Adult)    Airway WDL WDL       Respiratory WDL    Respiratory WDL WDL       Cardiac WDL    Cardiac WDL WDL       Cognitive/Neuro/Behavioral WDL    Cognitive/Neuro/Behavioral WDL WDL

## 2022-10-22 ENCOUNTER — APPOINTMENT (OUTPATIENT)
Dept: OCCUPATIONAL THERAPY | Facility: HOSPITAL | Age: 87
DRG: 280 | End: 2022-10-22
Attending: INTERNAL MEDICINE
Payer: MEDICARE

## 2022-10-22 ENCOUNTER — APPOINTMENT (OUTPATIENT)
Dept: CARDIOLOGY | Facility: HOSPITAL | Age: 87
DRG: 280 | End: 2022-10-22
Attending: INTERNAL MEDICINE
Payer: MEDICARE

## 2022-10-22 ENCOUNTER — APPOINTMENT (OUTPATIENT)
Dept: PHYSICAL THERAPY | Facility: HOSPITAL | Age: 87
DRG: 280 | End: 2022-10-22
Attending: INTERNAL MEDICINE
Payer: MEDICARE

## 2022-10-22 LAB
ANION GAP SERPL CALCULATED.3IONS-SCNC: 10 MMOL/L (ref 7–15)
BUN SERPL-MCNC: 26.9 MG/DL (ref 8–23)
CALCIUM SERPL-MCNC: 7.9 MG/DL (ref 8.8–10.2)
CHLORIDE SERPL-SCNC: 105 MMOL/L (ref 98–107)
CREAT SERPL-MCNC: 1.67 MG/DL (ref 0.67–1.17)
CREAT UR-MCNC: 254 MG/DL
CRP SERPL-MCNC: 17.8 MG/L
D DIMER PPP FEU-MCNC: 1.32 UG/ML FEU (ref 0–0.5)
DEPRECATED HCO3 PLAS-SCNC: 24 MMOL/L (ref 22–29)
ERYTHROCYTE [DISTWIDTH] IN BLOOD BY AUTOMATED COUNT: 12.8 % (ref 10–15)
FRACT EXCRET NA UR+SERPL-RTO: 0.5 %
GFR SERPL CREATININE-BSD FRML MDRD: 40 ML/MIN/1.73M2
GLUCOSE SERPL-MCNC: 76 MG/DL (ref 70–99)
HCT VFR BLD AUTO: 26 % (ref 40–53)
HGB BLD-MCNC: 8.6 G/DL (ref 13.3–17.7)
LVEF ECHO: NORMAL
MCH RBC QN AUTO: 34.1 PG (ref 26.5–33)
MCHC RBC AUTO-ENTMCNC: 33.1 G/DL (ref 31.5–36.5)
MCV RBC AUTO: 103 FL (ref 78–100)
PLATELET # BLD AUTO: 163 10E3/UL (ref 150–450)
POTASSIUM SERPL-SCNC: 3.8 MMOL/L (ref 3.4–5.3)
RBC # BLD AUTO: 2.52 10E6/UL (ref 4.4–5.9)
SODIUM SERPL-SCNC: 139 MMOL/L (ref 136–145)
SODIUM UR-SCNC: 94 MMOL/L
WBC # BLD AUTO: 3.1 10E3/UL (ref 4–11)

## 2022-10-22 PROCEDURE — 84300 ASSAY OF URINE SODIUM: CPT | Performed by: INTERNAL MEDICINE

## 2022-10-22 PROCEDURE — 36415 COLL VENOUS BLD VENIPUNCTURE: CPT | Performed by: INTERNAL MEDICINE

## 2022-10-22 PROCEDURE — 999N000127 HC STATISTIC PERIPHERAL IV START W US GUIDANCE

## 2022-10-22 PROCEDURE — 93306 TTE W/DOPPLER COMPLETE: CPT | Mod: 26 | Performed by: INTERNAL MEDICINE

## 2022-10-22 PROCEDURE — 97116 GAIT TRAINING THERAPY: CPT | Mod: GP

## 2022-10-22 PROCEDURE — 97530 THERAPEUTIC ACTIVITIES: CPT | Mod: GP

## 2022-10-22 PROCEDURE — 255N000002 HC RX 255 OP 636: Performed by: INTERNAL MEDICINE

## 2022-10-22 PROCEDURE — 99232 SBSQ HOSP IP/OBS MODERATE 35: CPT | Performed by: INTERNAL MEDICINE

## 2022-10-22 PROCEDURE — 99232 SBSQ HOSP IP/OBS MODERATE 35: CPT | Mod: CS | Performed by: INTERNAL MEDICINE

## 2022-10-22 PROCEDURE — 85379 FIBRIN DEGRADATION QUANT: CPT | Performed by: INTERNAL MEDICINE

## 2022-10-22 PROCEDURE — 93005 ELECTROCARDIOGRAM TRACING: CPT | Performed by: INTERNAL MEDICINE

## 2022-10-22 PROCEDURE — 97166 OT EVAL MOD COMPLEX 45 MIN: CPT | Mod: GO

## 2022-10-22 PROCEDURE — 250N000013 HC RX MED GY IP 250 OP 250 PS 637: Performed by: INTERNAL MEDICINE

## 2022-10-22 PROCEDURE — 82746 ASSAY OF FOLIC ACID SERUM: CPT | Performed by: INTERNAL MEDICINE

## 2022-10-22 PROCEDURE — 210N000001 HC R&B IMCU HEART CARE

## 2022-10-22 PROCEDURE — 999N000208 ECHOCARDIOGRAM COMPLETE

## 2022-10-22 PROCEDURE — 250N000011 HC RX IP 250 OP 636: Performed by: INTERNAL MEDICINE

## 2022-10-22 PROCEDURE — 97162 PT EVAL MOD COMPLEX 30 MIN: CPT | Mod: GP

## 2022-10-22 PROCEDURE — 85014 HEMATOCRIT: CPT | Performed by: INTERNAL MEDICINE

## 2022-10-22 PROCEDURE — 86140 C-REACTIVE PROTEIN: CPT | Performed by: INTERNAL MEDICINE

## 2022-10-22 PROCEDURE — 99221 1ST HOSP IP/OBS SF/LOW 40: CPT | Performed by: INTERNAL MEDICINE

## 2022-10-22 PROCEDURE — 97535 SELF CARE MNGMENT TRAINING: CPT | Mod: GO

## 2022-10-22 PROCEDURE — 0T9B70Z DRAINAGE OF BLADDER WITH DRAINAGE DEVICE, VIA NATURAL OR ARTIFICIAL OPENING: ICD-10-PCS | Performed by: PHYSICIAN ASSISTANT

## 2022-10-22 PROCEDURE — 80048 BASIC METABOLIC PNL TOTAL CA: CPT | Performed by: INTERNAL MEDICINE

## 2022-10-22 RX ORDER — LEVOTHYROXINE SODIUM 25 UG/1
25 TABLET ORAL
Status: DISCONTINUED | OUTPATIENT
Start: 2022-10-23 | End: 2022-10-22

## 2022-10-22 RX ORDER — LEVOTHYROXINE SODIUM 25 UG/1
25 TABLET ORAL
Status: DISCONTINUED | OUTPATIENT
Start: 2022-10-22 | End: 2022-10-24 | Stop reason: HOSPADM

## 2022-10-22 RX ORDER — LEVOTHYROXINE SODIUM 112 UG/1
112 TABLET ORAL
Status: DISCONTINUED | OUTPATIENT
Start: 2022-10-22 | End: 2022-10-22

## 2022-10-22 RX ADMIN — ASPIRIN 81 MG CHEWABLE TABLET 81 MG: 81 TABLET CHEWABLE at 08:47

## 2022-10-22 RX ADMIN — SIMVASTATIN 10 MG: 10 TABLET, FILM COATED ORAL at 21:23

## 2022-10-22 RX ADMIN — HEPARIN SODIUM 5000 UNITS: 10000 INJECTION, SOLUTION INTRAVENOUS; SUBCUTANEOUS at 13:06

## 2022-10-22 RX ADMIN — LEVOTHYROXINE SODIUM 25 MCG: 0.03 TABLET ORAL at 13:06

## 2022-10-22 RX ADMIN — TERAZOSIN HYDROCHLORIDE 10 MG: 5 CAPSULE ORAL at 21:22

## 2022-10-22 RX ADMIN — PERFLUTREN 2.5 ML: 6.52 INJECTION, SUSPENSION INTRAVENOUS at 08:30

## 2022-10-22 RX ADMIN — HEPARIN SODIUM 5000 UNITS: 10000 INJECTION, SOLUTION INTRAVENOUS; SUBCUTANEOUS at 04:48

## 2022-10-22 RX ADMIN — GABAPENTIN 300 MG: 300 CAPSULE ORAL at 21:23

## 2022-10-22 RX ADMIN — HEPARIN SODIUM 5000 UNITS: 10000 INJECTION, SOLUTION INTRAVENOUS; SUBCUTANEOUS at 21:24

## 2022-10-22 RX ADMIN — Medication 1 TABLET: at 08:47

## 2022-10-22 ASSESSMENT — ACTIVITIES OF DAILY LIVING (ADL)
ADLS_ACUITY_SCORE: 28
FALL_HISTORY_WITHIN_LAST_SIX_MONTHS: YES
CONCENTRATING,_REMEMBERING_OR_MAKING_DECISIONS_DIFFICULTY: NO
ADLS_ACUITY_SCORE: 26
DESCRIBE_HEARING_LOSS: BILATERAL HEARING LOSS
ADLS_ACUITY_SCORE: 32
DIFFICULTY_EATING/SWALLOWING: NO
ADLS_ACUITY_SCORE: 32
NUMBER_OF_TIMES_PATIENT_HAS_FALLEN_WITHIN_LAST_SIX_MONTHS: 1
TOILETING_ISSUES: NO
HEARING_DIFFICULTY_OR_DEAF: YES
PATIENT'S_PREFERRED_MEANS_OF_COMMUNICATION: ENGLISH SPEAKER WITH HEARING LOSS, NO SPEECH PROBLEMS.
ADLS_ACUITY_SCORE: 24
EQUIPMENT_CURRENTLY_USED_AT_HOME: WALKER, ROLLING
DRESSING/BATHING_DIFFICULTY: NO
ADLS_ACUITY_SCORE: 28
DIFFICULTY_COMMUNICATING: NO
WEAR_GLASSES_OR_BLIND: YES
ADLS_ACUITY_SCORE: 35
ADLS_ACUITY_SCORE: 26
ADLS_ACUITY_SCORE: 28
ADLS_ACUITY_SCORE: 28
WALKING_OR_CLIMBING_STAIRS_DIFFICULTY: NO
CHANGE_IN_FUNCTIONAL_STATUS_SINCE_ONSET_OF_CURRENT_ILLNESS/INJURY: NO
ADLS_ACUITY_SCORE: 32
DOING_ERRANDS_INDEPENDENTLY_DIFFICULTY: YES
ADLS_ACUITY_SCORE: 26
WERE_AUXILIARY_AIDS_OFFERED?: NO

## 2022-10-22 NOTE — CONSULTS
Infectious Disease Consultation:  Requesting MD:  Reason for consult:      HISTORY:Older man slumped at the table at home, brought to ER with syncope episode, dehydration.  covid test +.  Room air, x ray ok.  Only response to me was he wants to go home.  Too dry on admit to get 3d rem treatment.           Pertinent past history, past infectious disease history:  Past Medical History       I have reviewed this patient's medical history and updated it with pertinent information if needed.        Past Medical History:   Diagnosis Date     Acute prostatitis       Asymptomatic bacteriuria 10/23/2013     Noted at 10/2/13 office visit at [facility]. No treatment recommended at that time.      Hypertension                 Past Surgical History      I have reviewed this patient's surgical history and updated it with pertinent information if needed.        Past Surgical History:   Procedure Laterality Date     BLADDER SURGERY         Bladder absces removal     Blepharoplasty Upper Lid W/ Excessive Skin[   1/29/2007     CATARACT EXTRACTION         EYE SURGERY         both eyes 2015     IR LUMBAR EPIDURAL STEROID INJECTION   4/27/2004     IR LUMBAR EPIDURAL STEROID INJECTION   5/11/2004     IR LUMBAR EPIDURAL STEROID INJECTION   11/24/2004     IR LUMBAR EPIDURAL STEROID INJECTION   11/28/2007     [place] CYSTOURETHROSCOPY W/IRRIG & EVAC CLOTS N/A 7/27/2018     Procedure: CYSTOSCOPY, CLOT EVACUATION ,URETHRAL DILATATION, DAUGHERTY CATHETER PLACEMENT;  Surgeon: Lowell Arias MD;  Location: [facility];  Service: Urology               Social History      I have reviewed this patient's social history and updated it with pertinent information if needed.  Social History            Tobacco Use     Smoking status: Former       Types: Cigarettes     Smokeless tobacco: Never     Tobacco comments:       Pt quit 40 years ago   Substance Use Topics     Alcohol use: Yes       Alcohol/week: 0.0 standard drinks       Comment:  "Alcoholic Drinks/day: 1-2 beers per week.     Drug use: No               Family History      I have reviewed this patient's family history and updated it with pertinent information if needed.        Family History         Medications:  Reviewed prior to admission meds as applicable in chart review.  Current meds are reviewed in the EMR listed MAR.     ANTIBIOTICS:    Current:   Prior:   Allergy to:    SH/FH and  travel history(if applicable to consult):above    REVIEW OF SYSTEMS:  All other systems negative    EXAMINATION:  /74   Pulse 63   Temp 98.2  F (36.8  C)   Resp 18   Ht 1.753 m (5' 9\")   Wt 72.6 kg (160 lb)   SpO2 90%   BMI 23.63 kg/m    Alert, awake  Vitals tabulated above, reviewed  HEENT:sunken eyes, looks dry, pale  Neck supple without lymphadenopathy  Sclera clear  CARDIOVASCULAR regular rate and rhythm, no murmur  Lungs CLEAR TO AUSCULTATION   Abdomen soft, NT/ND, absent HEPATOSPLENOMEGALY  Skin normal  Joints normal  Neurologic exam non focal  Wound:  NA        CLINICAL DATABASE FOR---LAB/MICRO/CULTURES/IMAGING STUDIES:  Lab Results   Component Value Date    WBC 3.1 10/22/2022     Lab Results   Component Value Date    RBC 2.52 10/22/2022     Lab Results   Component Value Date    HGB 8.6 10/22/2022    HGB 12.2 08/18/2015     Lab Results   Component Value Date    HCT 26.0 10/22/2022     No components found for: MCT  Lab Results   Component Value Date     10/22/2022     Lab Results   Component Value Date    MCH 34.1 10/22/2022     Lab Results   Component Value Date    MCHC 33.1 10/22/2022     Lab Results   Component Value Date    RDW 12.8 10/22/2022     Lab Results   Component Value Date     10/22/2022       Last Comprehensive Metabolic Panel:  Sodium   Date Value Ref Range Status   10/22/2022 139 136 - 145 mmol/L Final   03/13/2019 141.0 133.0 - 144.0 mmol/L Final     Potassium   Date Value Ref Range Status   10/22/2022 3.8 3.4 - 5.3 mmol/L Final   07/18/2019 3.5 3.5 - 5.0 " mmol/L Final   03/13/2019 4.1 3.4 - 5.3 mmol/L Final     Chloride   Date Value Ref Range Status   10/22/2022 105 98 - 107 mmol/L Final   07/18/2019 102 98 - 107 mmol/L Final   03/13/2019 102.0 94.0 - 109.0 mmol/L Final     Carbon Dioxide   Date Value Ref Range Status   03/13/2019 30.0 20.0 - 32.0 mmol/L Final     Carbon Dioxide (CO2)   Date Value Ref Range Status   10/22/2022 24 22 - 29 mmol/L Final   07/18/2019 24 22 - 31 mmol/L Final     Anion Gap   Date Value Ref Range Status   10/22/2022 10 7 - 15 mmol/L Final   07/18/2019 11 5 - 18 mmol/L Final     Glucose   Date Value Ref Range Status   10/22/2022 76 70 - 99 mg/dL Final   07/18/2019 137 (H) 70 - 125 mg/dL Final   03/13/2019 117.0 (H) 60.0 - 109.0 mg/dL Final     Urea Nitrogen   Date Value Ref Range Status   10/22/2022 26.9 (H) 8.0 - 23.0 mg/dL Final   07/18/2019 18 8 - 28 mg/dL Final   03/13/2019 21.0 7.0 - 30.0 mg/dL Final     Creatinine   Date Value Ref Range Status   10/22/2022 1.67 (H) 0.67 - 1.17 mg/dL Final   03/13/2019 1.3 0.8 - 1.5 mg/dL Final     GFR Estimate   Date Value Ref Range Status   10/22/2022 40 (L) >60 mL/min/1.73m2 Final     Comment:     Effective December 21, 2021 eGFRcr in adults is calculated using the 2021 CKD-EPI creatinine equation which includes age and gender (Andre et al., NEJM, DOI: 10.1056/GKCOsa9112699)   07/18/2019 >60 >60 mL/min/1.73m2 Final   06/15/2017 >60 >60 mL/min/1.73m2 Final     Calcium   Date Value Ref Range Status   10/22/2022 7.9 (L) 8.8 - 10.2 mg/dL Final   03/13/2019 9.5 8.5 - 10.4 mg/dL Final       Liver Function Studies -   Recent Labs   Lab Test 10/21/22  1334   PROTTOTAL 4.9*   ALBUMIN 3.3*   BILITOTAL 0.4   ALKPHOS 40   AST 43   ALT 16       No results found for: SED    CRP Inflammation   Date Value Ref Range Status   10/22/2022 17.80 (H) <5.00 mg/L Final         CXR nl  Urine 39 wbc        IMPRESSION:  Pyuria  Dehydration  Asymptomatic covid    PLAN:  No covid rx  Might have UTI--hospitalist to manage  Call  if needed        AUDRA LUGO MD  [facility]  Office [phone]

## 2022-10-22 NOTE — PROVIDER NOTIFICATION
Obtained order for intermittant st cath pt PRN. Unsuccessfully attempted to st cath pt x2 r/t BPH. Obtained new order for Urology consult.

## 2022-10-22 NOTE — PLAN OF CARE
Intervention: Minimize Safety Risk  Recent Flowsheet Documentation  Taken 10/22/2022 0100 by Wendy Crews  Enhanced Safety Measures: bed alarm set     Intervention: Identify and Manage Fall Risk  Recent Flowsheet Documentation  Taken 10/22/2022 0100 by Wendy Crews  Safety Promotion/Fall Prevention:   assistive device/personal items within reach   bed alarm on   Goal Outcome Evaluation:      Pt remained safe and free of falls throughout shift. Bed alarm activated, personal items within reach, and reoriented pt often. Pt able to make needs known and able to use call bell but sets bed alarm off occasionally by sitting on the side of bed. Educated pt on how to use call bell for pt's safety. Will cont to monitor closely.

## 2022-10-22 NOTE — PROGRESS NOTES
10/22/22 7988   Appointment Info   Signing Clinician's Name / Credentials (OT) Belgica De La Cruz OTR/L   Living Environment   People in Home spouse   Current Living Arrangements house  (one level)   Home Accessibility stairs to enter home;stairs within home   Number of Stairs, Main Entrance 4   Stair Railings, Main Entrance railing on right side (ascending)   Number of Stairs, Within Home, Primary none   Transportation Anticipated family or friend will provide   Self-Care   Usual Activity Tolerance good   Current Activity Tolerance good   Regular Exercise No   Equipment Currently Used at Home cane, straight;grab bar, tub/shower;grab bar, toilet   Fall history within last six months yes   Number of times patient has fallen within last six months 1  (tripped over feet in the living room 1 month ago; broke rib - L side)   Activity/Exercise/Self-Care Comment Ind with BADL, laundry. Recently neighbor has been bringing mail to door.   Instrumental Activities of Daily Living (IADL)   Previous Responsibilities laundry;finances;shopping   IADL Comments wife preps meals, sets up meds; shares shopping   General Information   Onset of Illness/Injury or Date of Surgery 10/21/22   Referring Physician Rebeka Marinelli MD   Patient/Family Therapy Goal Statement (OT) Get home soon   Additional Occupational Profile Info/Pertinent History of Current Problem Dilip Long is a 87 year old male with history of BPH, prior CVA, diverticulosis, Graves' disease status post ablation, hyperlipidemia, hypothyroidism, hypertension, and ureteral stricture admitted on 10/21/2022 for syncope, COVID-19 infection and LEONIDAS.   Performance Patterns (Routines, Roles, Habits) Sedentary   Existing Precautions/Restrictions fall   Limitations/Impairments safety/cognitive   Cognitive Status Examination   Orientation Status person;place   Affect/Mental Status (Cognitive) WNL   Safety Deficit minimal deficit   Memory Deficit moderate deficit   Cognitive  Status Comments Further testing may be indicated   Visual Perception   Visual Impairment/Limitations WFL;corrective lenses full-time   Sensory   Sensory Quick Adds sensation intact   Posture   Posture not impaired   Posture Comments c/o low back pain   Range of Motion Comprehensive   General Range of Motion no range of motion deficits identified   Strength Comprehensive (MMT)   General Manual Muscle Testing (MMT) Assessment no strength deficits identified   Coordination   Upper Extremity Coordination No deficits were identified   Bed Mobility   Bed Mobility supine-sit   Supine-Sit [place] (Bed Mobility) supervision   Assistive Device (Bed Mobility) bed rails   Comment (Bed Mobility) extra effort   Transfers   Transfers sit-stand transfer   Transfer Comments bedside   Sit-Stand Transfer   Sit-Stand [place] (Transfers) contact guard   Assistive Device (Sit-Stand Transfers) walker, front-wheeled   Balance   Balance Assessment standing balance: dynamic   Balance Comments CGA with dynamic tasks; Cues for hand placement; lower standing aniceto d/t back pain, lower act aniceto   Activities of Daily Living   BADL Assessment/Intervention lower body dressing   Lower Body Dressing Assessment/Training   Position (Lower Body Dressing) edge of bed sitting   Comment, (Lower Body Dressing) socks   [place] Level (Lower Body Dressing) supervision   Clinical Impression   Criteria for Skilled Therapeutic Interventions Met (OT) Yes, treatment indicated   OT Diagnosis decreased functional mobility and activity tolerance for BADL   OT Problem List-Impairments impacting ADL problems related to;activity tolerance impaired;balance;cognition;hearing;mobility;pain   Assessment of Occupational Performance 3-5 Performance Deficits   Identified Performance Deficits toileting, grm/hyg, bathing, functional mobility, laundry tasks   Planned Therapy Interventions (OT) ADL retraining;progressive activity/exercise;transfer training   Clinical  Decision Making Complexity (OT) moderate complexity   Anticipated Equipment Needs Upon Discharge (OT) toileting equipment   Risk & Benefits of therapy have been explained evaluation/treatment results reviewed;patient;care plan/treatment goals reviewed   OT Total Evaluation Time   OT Eval, Moderate Complexity Minutes (61269) 20   OT Goals   Therapy Frequency (OT) Daily   OT Predicted Duration/Target Date for Goal Attainment 10/28/22   OT Goals Hygiene/Grooming;Toilet Transfer/Toileting;Cognition   OT: Hygiene/Grooming modified independent   OT: Toilet Transfer/Toileting Modified independent   OT: Cognitive Patient/caregiver will verbalize understanding of cognitive assessment results/recommendations as needed for safe discharge planning   Interventions   Interventions Quick Adds Self-Care/Home Management   Self-Care/Home Management   Self-Care/Home Mgmt/ADL, Compensatory, Meal Prep Minutes (73901) 30   Symptoms Noted During/After Treatment (Meal Preparation/Planning Training) increased pain;fatigue   Treatment Detail/Skilled Intervention Toileting - cues for transfer safety and functional mob to bathroom with FWW. Pt typically uses cane at home. G/h at sink - 2 tasks. C/o back pain with 4-5 min stand. Cue for hand placement for support/bal for standing tasks and CGA. FWW bathroom to chair with CGA and cues for FWW. O2 decreased to 79-85 on RA. May be d/t sensor problems. RN informed.   [place] Level (Toilet Training) contact guard   Assistance (Toilet Training) verbal cues;1 person assist   Toilet Training Assistance - Assistive Device wall grab bar   OT Discharge Planning   OT Plan g/h - stand as aniceto, seated PRN - e. conserv, monitor cog, toileting   OT Discharge Recommendation (DC Rec) home with assist;home with home care occupational therapy   OT Rationale for DC Rec Pt would be safe to return home with 24 hour stand-by assist for most mobility, some self cares due to decreased activity tolerance. Home OT  need for assessing bath transfer safety as pt uses tub for baths.   OT Brief overview of current status CGA for transfers, toileting, grooming/hygiene. Lower act aniceto.   Total Session Time   Timed Code Treatment Minutes 30   Total Session Time (sum of timed and untimed services) 50

## 2022-10-22 NOTE — PLAN OF CARE
"  Problem: Acute Kidney Injury/Impairment  Goal: Effective Renal Function  Outcome: Progressing     Creatinine 1.67  GFR 40     Problem: UTI (Urinary Tract Infection)  Goal: Improved Infection Symptoms  Outcome: Progressing   Goal Outcome Evaluation:      Patient was seen by Urology today. MD placed 18 fr coudé catheter for urinary retention. See MD note for more detailed info.     Patient has short term memory loss. Asks the same questions over and over but does not seem to retain this new information, but does remember that he asked the question before. States \" you told me that 5 times already didn't you?\" And apologizes for asking again. He is A & O x 4 with routine questioning.  He is also hard of hearing.                  "

## 2022-10-22 NOTE — CONSULTS
[facility] CONSULTATION    Type of Consult: inpatient  Place of Service: [facility]   Reason for consult: Urinary retention, difficult catheter placement  Request for consult by: Dr. Herrera    History of present illness:   Dilip Long is a 87 year old male that was admitted for syncope, NSTEMI, LEONIDAS. Urology is being consulted for urinary retention and assistance placing a jorge catheter. History obtained through patient and chart review.     Patient was admitted overnight on 10/21/2022 for syncope, COVID-19 infection, and LEONIDAS. Patient notes difficulties with urination for the last week, but had acute urinary retention upon admission to the hospital overnight. Nursing staff attempted straight cath x 2 for elevated PVRs  (>340cc), but were unsuccessful. Urology was consulted this morning for assistance with jorge catheter placement.    Patient has seen Dr. Laird in the past for urinary retention, BPH, urethral stricture. He has not seen Dr. Laird for a couple of years but did request an appointment 6/2022 for difficulties voiding. He was unable to make his appointment and therefore did not follow up. Patient does take terazosin for retention. He denies any history of prostatic procedures. He has a past history of prostatitis. Prior to 1 week ago, patient denies issues with voiding.     At this time, patient has suprapubic discomfort. No flank pain. He is unable to void despite multiple attempts.     Past Medical History:  Past Medical History:   Diagnosis Date     Acute prostatitis      Asymptomatic bacteriuria 10/23/2013    Noted at 10/2/13 office visit at [facility]. No treatment recommended at that time.      Hypertension        Past Surgical History:  Past Surgical History:   Procedure Laterality Date     BLADDER SURGERY      Bladder absces removal     Blepharoplasty Upper Lid W/ Excessive Skin[  1/29/2007     CATARACT EXTRACTION       EYE SURGERY      both eyes 2015     IR LUMBAR  EPIDURAL STEROID INJECTION  4/27/2004     IR LUMBAR EPIDURAL STEROID INJECTION  5/11/2004     IR LUMBAR EPIDURAL STEROID INJECTION  11/24/2004     IR LUMBAR EPIDURAL STEROID INJECTION  11/28/2007     MT CYSTOURETHROSCOPY W/IRRIG & EVAC CLOTS N/A 7/27/2018    Procedure: CYSTOSCOPY, CLOT EVACUATION ,URETHRAL DILATATION, DAUGHERTY CATHETER PLACEMENT;  Surgeon: Lowell Arias MD;  Location: [facility];  Service: Urology       Social History:  Social History     Socioeconomic History     Marital status:      Spouse name: Not on file     Number of children: Not on file     Years of education: Not on file     Highest education level: Not on file   Occupational History     Occupation: Retired   Tobacco Use     Smoking status: Former     Types: Cigarettes     Smokeless tobacco: Never     Tobacco comments:     Pt quit 40 years ago   Substance and Sexual Activity     Alcohol use: Yes     Alcohol/week: 0.0 standard drinks     Comment: Alcoholic Drinks/day: 1-2 beers per week.     Drug use: No     Sexual activity: Not on file   Other Topics Concern     Parent/sibling w/ CABG, MI or angioplasty before 65F 55M? Not Asked   Social History Narrative     Not on file     Social Determinants of Health     Financial Resource Strain: Not on file   Food Insecurity: Not on file   Transportation Needs: Not on file   Physical Activity: Not on file   Stress: Not on file   Social Connections: Not on file   Intimate Partner Violence: Not on file   Housing Stability: Not on file       Medications:  Current Facility-Administered Medications   Medication     acetaminophen (TYLENOL) tablet 650 mg    Or     acetaminophen (TYLENOL) Suppository 650 mg     albuterol (PROVENTIL HFA/VENTOLIN HFA) inhaler     aspirin (ASA) chewable tablet 81 mg     [Held by provider] atenolol (TENORMIN) tablet 25 mg     calcium carbonate-vitamin D (OS-KRISTEN with D) per tablet 1 tablet     gabapentin (NEURONTIN) capsule 300 mg     heparin ANTICOAGULANT  injection 5,000 Units     [START ON 10/23/2022] levothyroxine (SYNTHROID/LEVOTHROID) tablet 25 mcg     lidocaine (LMX4) cream     lidocaine 1 % 0.1-1 mL     Medication instructions: Do NOT use nebulized medications     senna-docusate (SENOKOT-S/PERICOLACE) 8.6-50 MG per tablet 1 tablet    Or     senna-docusate (SENOKOT-S/PERICOLACE) 8.6-50 MG per tablet 2 tablet     simvastatin (ZOCOR) tablet 10 mg     sodium chloride (PF) 0.9% PF flush 3 mL     sodium chloride (PF) 0.9% PF flush 3 mL     terazosin (HYTRIN) capsule 10 mg       Allergies:   Allergies   Allergen Reactions     Nkda [No Known Drug Allergies]        Review of Systems:   A full 12 point review of systems was taken and is negative aside from what is noted above in the HPI    Physical Exam:  Temp:  [97.7  F (36.5  C)-98.2  F (36.8  C)] 98.2  F (36.8  C)  Pulse:  [58-73] 63  Resp:  [12-25] 18  BP: (100-131)/(53-75) 131/74  SpO2:  [90 %-99 %] 90 %  General: NAD, alert, cooperative  Head: normocephalic, without abnormality / atraumatic  Abdomen: soft, non tender, non distended. Some suprapubic fullness/tenderness. No CVA tenderness noted  Geniturinary: Normal uncircumcised penis without lesions or redness. Normal urethral meatus without lesions or signs of trauma. No swelling.  Skin: no rashes or lesions  Musculoskeletal: moves all extremities equally; no calf edema or tenderness  Psychological: alert and oriented, answers questions appropriately      Labs:   Creatinine   Date Value Ref Range Status   10/22/2022 1.67 (H) 0.67 - 1.17 mg/dL Final   03/13/2019 1.3 0.8 - 1.5 mg/dL Final       Lab Results   Component Value Date    WBC 3.1 10/22/2022     Lab Results   Component Value Date    HGB 8.6 10/22/2022    HGB 12.2 08/18/2015     Lab Results   Component Value Date     10/22/2022       UA RESULTS:  Recent Labs   Lab Test 10/21/22  1544 10/02/19  0056   COLOR Yellow Yellow   APPEARANCE Cloudy* Turbid*   URINEGLC Negative Negative   URINEBILI Negative  Negative   URINEKETONE Negative Negative   SG 1.024 1.014   UBLD Negative Small*   URINEPH 5.5 7.0   PROTEIN 30* 100 mg/dL*   UROBILINOGEN  --  <2.0 E.U./dL   NITRITE Negative Negative   LEUKEST 250 [place]/uL* Large*   RBCU 0 5-10*   WBCU 39* >100*         Urine culture: pending    Lab Results: personally reviewed       Assessment / Plan : Dilip Long is being seen by [facility] for urinary retention, assistance with jorge catheter placement.    - 87-year-old male admitted for syncope, COVID-19, LEONIDAS. Creat 1.8 on admission, now 1.67. Continue to trend.  - Patient has a history of urinary retention due to BPH, possible urethral stricture who follows with Dr. Laird (unable to see our notes). Acute urinary retention developed overnight. RN unable to straight cath due to resistance.  - I was able to place an 18-fr coude jorge catheter at the bedside. Jorge placement confirmed and is draining well. Please see procedure note below for detail.  - Continue PTA terazosin.   - UC pending, appreciate primary team to follow and treat any infection.  - Retention likely multifactorial, could be due to medications, UTI, known BPH/possible urethral stricture, immobility, constipation.   - Discontinue any medication that could be contributing.   - Encourage ambulation.  - Maintain jorge at discharge. Will arrange for follow up with Dr. Laird in 1-2 weeks for voiding trial.   - Urology will sign off at this time. Please reach out with any further questions.     Thank you for consulting [facility] regarding this patient's care. Please contact us with questions or concerns.     Macey Dvaidson PA-C  [facility]   [phone]      [facility] - CATHETER PLACEMENT PROCEDURE NOTE    Procedure:  The patient's urethra was prepped with Betadine solution. A 18 Fr coude jorge catheter was liberally lubricated and inserted with moderate difficulty due to enlarged prostate. The catheter successfully reached the  bladder and the catheter balloon was inflated with 10 cc of sterile saline. Clear yellow colored urine returned. The patient appeared to tolerate the procedure minimal discomfort.  The catheter was connected to a gravity drainage bag and secured to the Right thigh.     Volume of urine drained following placement: >600 cc    Plan for Catheter: Maintain at discharge. Will arrange for outpatient voiding trial in our office in 1-2 weeks.      Please contact [facility] with any questions or concerns [phone].      Macey Davidson PA-C  [facility]   [phone]

## 2022-10-22 NOTE — ED NOTES
"[facility] ED Handoff Report    ED Chief Complaint: Syncopal episode    ED Diagnosis:  (R55) Syncope  Comment: .  Plan: .    (R94.31) Acute electrocardiogram changes  Comment: .  Plan: .    (N17.9) Acute kidney injury (H)  Comment: .  Plan: .    (D64.9) Anemia  Comment: .  Plan: .    (U07.1) Infection due to 2019 novel coronavirus  Comment: .  Plan: .       PMH:    Past Medical History:   Diagnosis Date    Acute prostatitis     Asymptomatic bacteriuria 10/23/2013    Noted at 10/2/13 office visit at [facility]. No treatment recommended at that time.     Hypertension         Code Status:  Full Code     Falls Risk: Yes Band: Applied    Current Living Situation/Residence: lives with a significant other     Elimination Status: Continent: Yes     Activity Level: SBA w/ walker    Patients Preferred Language:  English     Needed: No    Vital Signs:  /73   Pulse 62   Temp 97.7  F (36.5  C) (Oral)   Resp 16   Ht 1.753 m (5' 9\")   Wt 72.6 kg (160 lb)   SpO2 96%   BMI 23.63 kg/m       Cardiac Rhythm: Junctional    Pain Score: 0/10    Is the Patient Confused:  No    Last Food or Drink: 10/22/22    Focused Assessment:  AAOx4, RA, mild cough, continent, no c/o pain, no SOB    Tests Performed: Done: Labs and Imaging    Treatments Provided:  .    Family Dynamics/Concerns: No    Family Updated On Visitor Policy: Yes    Plan of Care Communicated to Family: Yes    Who Was Updated about Plan of Care: Wife    Belongings Checklist Done and Signed by Patient: No    Medications sent with patient:none    Covid: symptomatic, positive    Additional Information: .    RN: Gia Olvera RN  . 10/22/2022 12:03 AM      "

## 2022-10-22 NOTE — PROVIDER NOTIFICATION
Pt arrived to unit approximately 0100 in stable condition. Orthostatic BP taken as ordered. See VS. Dr. Herrera notified of abnormal orthostatics. No new orders given.  Pt A+O, ambulates with walker but has had multiple falls in the past. Bed alarm activated for pt's safety. Pt able to make needs known. Call bell in reach. Will cont to monitor.

## 2022-10-22 NOTE — PROGRESS NOTES
CARDIOLOGY PROGRESS NOTE      Assessment/Plan:  1.  Syncope-uncertain etiology, no postictal state, could be neurocardiogenic although there was no inciting event.  Could be arrhythmogenic as well.  No abnormalities seen on telemetry.  Agree with echocardiogram.  Given low blood pressure noted by EMS quite possibly this was due to hypotension.  Would arrange for outpatient event monitor.  2.  Troponin elevation- from 88 up to 89, essentially unchanged.  Doubt coronary ischemia.  Doubt MI. Would arrange for outpatient stress test.  3.  Junctional rhythm- telemetry actually shows sinus rhythm, doubt true junctional rhythm.  No arrhythmias seen on telemetry.    4.  COVID positive-defer to primary care team.  5.  Anemia-hemoglobin 8.6 and so noted and defer to hospitalist.  6.  Acute kidney injury (H)-creatinine increased to 1.67.  Down from admission of 1.83.  Uncertain reason.  7.  Pure hypercholesterolemia- most recent total cholesterol 153 with an LDL of 62.  This is from 2019 but yet acceptable.  8.  Benign essential hypertension- atenolol hold and blood pressure still looking good.  Getting terazosin to help with prostate though.  9.  Urinary retention- has Lozano catheter in place now, also on terazosin.  Possibly urinary retention causing creatinine increased.  10.  Pericardial effusion-moderate sized with some mild right ventricular collapse.  Holding antihypertensives, continue to monitor, will discuss with interventional team about possible elective pericardiocentesis on Monday.    Plan:  1.  Consider elective pericardiocentesis.  2.  We will discontinue atenolol given question of bradycardia.    Discharge Plannin.  No real cardiac barrier to discharge, if echo looks stable could discharge patient home.  2.  Would arrange for outpatient event monitor.  3.  Would also arrange for outpatient pharmacological stress nuclear.     LOS: 1 day     Subjective:  Interval History:    87-year-old white gentleman  "being seen on second day of hospitalization.  He did not sleep well, tells me he has not slept well since he has been in the hospital.  Denies any recurrent syncope or dizziness, chest pains, palpitations, shortness of breath, PND, orthopnea or peripheral edema.    Medications    aspirin  81 mg Oral Daily     [Held by provider] atenolol  25 mg Oral Daily     calcium carbonate-vitamin D  1 tablet Oral Daily     gabapentin  300 mg Oral QPM     heparin ANTICOAGULANT  5,000 Units Subcutaneous Q8H WALLY     levothyroxine  25 mcg Oral QAM AC     simvastatin  10 mg Oral At Bedtime     sodium chloride (PF)  3 mL Intracatheter Q8H     terazosin  10 mg Oral At Bedtime     Objective:   Vital signs in last 24 hours:  Temp:  [98.2  F (36.8  C)] 98.2  F (36.8  C)  Pulse:  [58-73] 63  Resp:  [12-25] 18  BP: (100-131)/(53-75) 131/74  SpO2:  [90 %-99 %] 90 %    Physical Exam:  /74   Pulse 63   Temp 98.2  F (36.8  C)   Resp 18   Ht 1.753 m (5' 9\")   Wt 72.6 kg (160 lb)   SpO2 90%   BMI 23.63 kg/m      General Appearance:    Alert, cooperative, no distress, appears stated age   Head:    Normocephalic, without obvious abnormality, atraumatic   Throat:   Lips, mucosa, and tongue normal; teeth and gums normal   Neck:   Supple, symmetrical, trachea midline, no adenopathy;        thyroid:  No enlargement/tenderness/nodules; no carotid    bruit or JVD   Back:     Symmetric, no curvature, ROM normal, no CVA tenderness   Lungs:     Clear to auscultation bilaterally, respirations unlabored   Chest wall:    No tenderness or deformity   Heart:    Regular rate and rhythm, S1 and S2 normal, no murmur, rub   or gallop   Abdomen:     Soft, non-tender, bowel sounds active all four quadrants,     no masses, no organomegaly   Extremities:   Normal, atraumatic, no cyanosis or edema   Pulses:   2+ and symmetric all extremities   Skin:   Skin color, texture, turgor normal, no rashes or lesions     Cardiographics:      ECG: Personally reviewed " by myself shows sinus bradycardia, low voltage, no acute changes.    Echocardiogram:   The left ventricle is normal in size.  Left ventricular function is normal.The ejection fraction is 60-65%.  Normal right ventricle size and systolic function.  The ascending aorta is Mildly dilated.  Moderate pericardial effusion. There is evidence of mild right ventricular diastolic collapse.    Lab Results:   Recent Labs   Lab 10/22/22  0441   WBC 3.1*   HGB 8.6*   HCT 26.0*        Recent Labs   Lab 10/22/22  0441      CO2 24   BUN 26.9*   .       No results found for: CKTOTAL, CKMB, TROPONINI

## 2022-10-22 NOTE — PROGRESS NOTES
10/22/22 1300   Appointment Info   Signing Clinician's Name / Credentials (PT) Xiomara Steel,MADDI   Living Environment   People in Home spouse   Current Living Arrangements house  (one level home)   Home Accessibility stairs to enter home;stairs within home   Number of Stairs, Main Entrance 4   Stair Railings, Main Entrance railing on right side (ascending)   Number of Stairs, Within Home, Primary none   Transportation Anticipated family or friend will provide   Self-Care   Usual Activity Tolerance good   Current Activity Tolerance good   Equipment Currently Used at Home walker, rolling;cane, straight  (sounds like he may use both. Pt has 4WW.)   Fall history within last six months yes   Number of times patient has fallen within last six months 1   Activity/Exercise/Self-Care Comment indep with SEC and indep with mobility  (Assisted with driving and home ADLs and pt does get dressed indep)   General Information   Onset of Illness/Injury or Date of Surgery 10/21/22   Referring Physician Dr Carney   Patient/Family Therapy Goals Statement (PT) Pt wants to go home   Pertinent History of Current Problem (include personal factors and/or comorbidities that impact the POC) Dilip Long is a 87 year old male with history of BPH, prior CVA, diverticulosis, Graves' disease status post ablation, hyperlipidemia, hypothyroidism, hypertension, and ureteral stricture admitted on 10/21/2022 for syncope, COVID-19 infection and LEONIDAS.   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   Cognition   Orientation Status (Cognition) oriented to;person;place;time   Pain Assessment   Patient Currently in Pain No   Range of Motion (ROM)   Range of Motion ROM is WNL  (LEs)   Strength (Manual Muscle Testing)   Strength (Manual Muscle  Testing) strength is WNL  (LEs)   Bed Mobility   Comment, (Bed Mobility) Supine<>sit with SBA with rail.   Transfers   Comment, (Transfers) CGA with FWW x 2 reps with cues for hand placement.   Gait/Stairs (Locomotion)   [place] Level (Gait) contact guard;verbal cues   Assistive Device (Gait) walker, front-wheeled   Distance in Feet (Required for LE Total Joints) 15   Distance in Feet (Gait) 45   Pattern (Gait) step-through;swing-through   Deviations/Abnormal Patterns (Gait) gait speed decreased   Balance   Balance Comments CGA with FWW. No LOB.  PT does recommend the pt use the walker at home.   Sensory Examination   Sensory Perception WNL   Clinical Impression   Criteria for Skilled Therapeutic Intervention Yes, treatment indicated   PT Diagnosis (PT) impaired mobility   Influenced by the following impairments dec bal, pt is below his PLOF, dec endurance.   Functional limitations due to impairments bed mobility, transfers, gait,steps.   Clinical Presentation (PT Evaluation Complexity) Stable/Uncomplicated   Clinical Presentation Rationale Pt presents diagnosed.   Clinical Decision Making (Complexity) moderate complexity   Planned Therapy Interventions (PT) balance training;bed mobility training;gait training;transfer training   Anticipated Equipment Needs at Discharge (PT) cane, straight;walker, rolling  (Pt has a SEC and a 4WW,  per the pt.)   Risk & Benefits of therapy have been explained evaluation/treatment results reviewed;care plan/treatment goals reviewed;risks/benefits reviewed;patient;participants voiced agreement with care plan   PT Total Evaluation Time   PT Eval, Moderate Complexity Minutes (10217) 15   Physical Therapy Goals   PT Frequency Daily   PT Predicted Duration/Target Date for Goal Attainment 10/29/22   PT Goals Bed Mobility;Transfers;Gait;Stairs;PT Goal 1   PT: Bed Mobility Independent;Supine to/from sit;Rolling   PT: Transfers Modified independent;Sit to/from stand;Bed to/from  chair;Assistive device  (with a SEC or 4WW)   PT: Gait Supervision/stand-by assist;Rolling walker;100 feet   PT: Stairs 4 stairs;Rail on right;Minimal assist   PT: Goal 1 Pt to aniceto bilat LE ex x 20 reps to increase strength for mobility   Interventions   Interventions Quick Adds Gait Training;Neuromuscular Re-ed;Therapeutic Activity   Therapeutic Activity   Therapeutic Activities: dynamic activities to improve functional performance Minutes (33495) 8   Treatment Detail/Skilled Intervention supine<>sit with SBA with cues for technque.  (sit<>stand with fWW with CGA x 1 with cues for technique x 2 reps.  Pt did sit at the EOB for a few minutes before walking.)   Gait Training   Gait Training Minutes (53143) 10   Symptoms Noted During/After Treatment (Gait Training) fatigue   Treatment Detail/Skilled Intervention Pt walked slowly with FWW.  Some cues for direction and safety.  O2 SATs 89% and did increase to 94 quickly   [place] Level (Gait Training) contact guard   Physical Assistance Level (Gait Training) verbal cues;1 person assist   Weight Bearing (Gait Training) weight-bearing as tolerated   Assistive Device (Gait Training) rolling walker   Pattern Analysis (Gait Training) swing-through gait   Gait Analysis Deviations decreased demetrice;decreased step length   Impairments (Gait Analysis/Training) balance impaired   PT Discharge Planning   PT Discharge Recommendation (DC Rec) home with home care physical therapy   PT Rationale for DC Rec Pt should be able to dc to home with 24 hour supervision.  He walked with the FWW today about 60' with CGA without LOB. Some increased cues for safety.   PT Brief overview of current status Pt needed SBA with bed mobility, and CGA with transfers with FWW.  Ambulate with FWW 60' with cga.   Total Session Time   Timed Code Treatment Minutes 18   Total Session Time (sum of timed and untimed services) 33

## 2022-10-22 NOTE — PROGRESS NOTES
Care Management Follow Up    Length of Stay (days): 1    Expected Discharge Date: 10/25/2022     Concerns to be Addressed:       Patient plan of care discussed at interdisciplinary rounds: Yes    Anticipated Discharge Disposition:  Home     Anticipated Discharge Services:  Home care PT/OT  Anticipated Discharge DME:      Education Provided on the Discharge Plan:  Yes  Patient/Family in Agreement with the Plan:  Yes    Referrals Placed by CM/GE:    Private pay costs discussed: Not applicable    Additional Information:  Patient lives with his wife and is independent at baseline. Therapy is recommending home care. SW called patient in his room and spoke to wife on the phone and both agree to home care. SW sent referrals for home PT/OT.      Allie Mckay

## 2022-10-22 NOTE — PROGRESS NOTES
"Hospitalist Progress Note    Assessment/Plan  Dilip Long is a 87 year old male with history of BPH, prior CVA, diverticulosis, Graves' disease status post ablation, hyperlipidemia, hypothyroidism, hypertension, and ureteral stricture admitted on 10/21/2022 for syncope, COVID-19 infection and LEONIDAS.     Syncope  -No episodes since admission.  Etiology still not clear.  -Cardiology notes reviewed, appreciate input: Pending echocardiogram.  Outpatient event monitor to be arranged per Cardiology.  -Continue telemetry monitor.   -PT/OT eval/Rx  -Continue telemetry monitor     NSTEMI  -Continue aspirin/statin therapy.    -Outpatient stress testing per cardiology.  Per notes \"doubt coronary ischemia.  Doubt MI.\"     LEONIDAS, POA.  -Creatinine 1.83--> 1.67.   -Suspected prerenal etiology from volume depletion in the setting of COVID-19 infection.   -Continue to monitor progress.  -Avoid nephrotoxin/hypotension  - will consider nephrology consult if creatinine worsens.      COVID-19 infection  -Documented as completed vaccination.  However tested positive on 10/21/2022.  -currently asymptomatic and not requiring supplemental oxygen.  -Remdesivir therapy comments given high risk for decompensation.  Day 2 today.  -Pending ID input.  -Close monitor    Hypothyroidism  -History of Graves' disease status post ablation  -Documented hypothyroidism by history: However patient not on any thyroid replacement therapy  -TSH elevated at 100, free T4 less than 0.1  -We will commence levothyroxine at 1.6 mcg/kg/day.  Notes to pharmacy to dose.     Macrocytic anemia  -Hemoglobin noted at 8.6 g/DL.  This is stable in the last 24 to 48 hours.  -Suspect 2/2 chronic disease.  -Check B12/folate     BPH  Urinary retention, with difficulty placing catheter  -Nephrology notes reviewed, appreciate input: Lozano catheter 18 [place] placed by urology at bedside.  To maintain Lozano catheter at discharge.  Outpatient follow-up with Dr. Laird in 1 to 2 " weeks for voiding trial.  -Continue PTA terazosin     Prior CVA  -Continue aspirin/statin therapy.    -PT/OT eval/Rx      Diet: 2 Gram Sodium Diet  DVT Prophylaxis: Heparin SQ  Lozano Catheter: Not present  Central Lines: None  Cardiac Monitoring: ACTIVE order. Indication: Syncope- high cardiac risk (48 hours)  Code Status: Full Code     Disposition Plan   Expected discharge in 3 days to prior living arrangement once medically stable.  Watch renal function.      Subjective  Denies any new complaints.  No acute events overnight.    Objective    Vital signs in last 24 hours  Temp:  [98.2  F (36.8  C)] 98.2  F (36.8  C)  Pulse:  [59-73] 63  Resp:  [12-25] 18  BP: (103-131)/(53-75) 131/74  SpO2:  [90 %-97 %] 90 % @LASTSAO2(12)@ O2 Device: None (Room air)    Weight:   Wt Readings from Last 3 Encounters:   10/21/22 72.6 kg (160 lb)   10/03/19 63.5 kg (140 lb)   09/05/19 68 kg (150 lb)      Weight change:     Intake/Output last 3 shifts  No intake/output data recorded.  Body mass index is 23.63 kg/m .    Physical Exam    General Appearance:    Alert, cooperative, no distress, appears stated age   Lungs:     Clear bilaterally    Cardiovascular:    Regular rate ands rhythm.  Normal S1, S2.  No murmur, rub or gallop.  No edema   Abdomen:     Soft, non-tender, bowel sounds active all four quadrants,     no masses, no organomegaly   Neurologic:   Awake, alert, oriented x 3.  Grossly nonfocal      Pertinent Labs   Lab Results: personally reviewed.   Recent Labs   Lab 10/22/22  0441 10/21/22  1334 10/21/22  1110    139 141   CO2 24 22 22   BUN 26.9* 25.3* 24.1*   ALBUMIN  --  3.3* 3.0*   ALKPHOS  --  40 36*   ALT  --  16 15   AST  --  43 40     Recent Labs   Lab 10/22/22  0441 10/21/22  2127 10/21/22  1110   WBC 3.1* 3.0* 3.3*   HGB 8.6* 8.6* 7.9*   HCT 26.0* 26.1* 24.7*    160 151     No results for input(s): CKTOTAL, TROPONINI in the last 168 hours.    Invalid input(s): TROPONINT, CKMBINDEX  Invalid input(s):  POCGLUFGR    Medications  Current Facility-Administered Medications   Medication     acetaminophen (TYLENOL) tablet 650 mg    Or     acetaminophen (TYLENOL) Suppository 650 mg     albuterol (PROVENTIL HFA/VENTOLIN HFA) inhaler     aspirin (ASA) chewable tablet 81 mg     calcium carbonate-vitamin D (OS-KRISTEN with D) per tablet 1 tablet     gabapentin (NEURONTIN) capsule 300 mg     heparin ANTICOAGULANT injection 5,000 Units     levothyroxine (SYNTHROID/LEVOTHROID) tablet 25 mcg     lidocaine (LMX4) cream     lidocaine 1 % 0.1-1 mL     Medication instructions: Do NOT use nebulized medications     senna-docusate (SENOKOT-S/PERICOLACE) 8.6-50 MG per tablet 1 tablet    Or     senna-docusate (SENOKOT-S/PERICOLACE) 8.6-50 MG per tablet 2 tablet     simvastatin (ZOCOR) tablet 10 mg     sodium chloride (PF) 0.9% PF flush 3 mL     sodium chloride (PF) 0.9% PF flush 3 mL     terazosin (HYTRIN) capsule 10 mg       Pertinent Radiology   Radiology Results: Personally reviewed   Results for orders placed or performed during the hospital encounter of 10/21/22   Head CT w/o contrast    Impression    IMPRESSION:  1.  No CT evidence for acute intracranial process.  2.  No calvarial or skull base fracture.  3.  Brain atrophy and presumed chronic microvascular ischemic changes as above.   Cervical spine CT w/o contrast    Impression    IMPRESSION:  1.  No fracture or posttraumatic subluxation.  2.  No high-grade spinal canal stenosis. Moderate to severe neural foraminal stenosis bilaterally at C3-C4.   Chest XR,  PA & LAT    Impression    IMPRESSION: Small lung volumes. No focal airspace consolidation. No pleural effusion or pneumothorax.    Cardiomediastinal silhouette is normal. Atherosclerotic calcifications of the thoracic aorta.    Lucency beneath right hemidiaphragm, compatible with colonic interposition.             Salome Burgos DO  Internal Medicine Hospitalist  10/22/2022

## 2022-10-23 ENCOUNTER — APPOINTMENT (OUTPATIENT)
Dept: CT IMAGING | Facility: HOSPITAL | Age: 87
DRG: 280 | End: 2022-10-23
Attending: INTERNAL MEDICINE
Payer: MEDICARE

## 2022-10-23 LAB
ANION GAP SERPL CALCULATED.3IONS-SCNC: 12 MMOL/L (ref 7–15)
ANION GAP SERPL CALCULATED.3IONS-SCNC: 9 MMOL/L (ref 7–15)
BACTERIA UR CULT: NORMAL
BUN SERPL-MCNC: 23.3 MG/DL (ref 8–23)
BUN SERPL-MCNC: 25.8 MG/DL (ref 8–23)
CALCIUM SERPL-MCNC: 7.8 MG/DL (ref 8.8–10.2)
CALCIUM SERPL-MCNC: 8 MG/DL (ref 8.8–10.2)
CHLORIDE SERPL-SCNC: 102 MMOL/L (ref 98–107)
CHLORIDE SERPL-SCNC: 104 MMOL/L (ref 98–107)
CHOLEST SERPL-MCNC: 153 MG/DL
CREAT SERPL-MCNC: 1.37 MG/DL (ref 0.67–1.17)
CREAT SERPL-MCNC: 1.58 MG/DL (ref 0.67–1.17)
CRP SERPL-MCNC: 27.2 MG/L
D DIMER PPP FEU-MCNC: 2.09 UG/ML FEU (ref 0–0.5)
DEPRECATED HCO3 PLAS-SCNC: 23 MMOL/L (ref 22–29)
DEPRECATED HCO3 PLAS-SCNC: 24 MMOL/L (ref 22–29)
ERYTHROCYTE [DISTWIDTH] IN BLOOD BY AUTOMATED COUNT: 12.6 % (ref 10–15)
FOLATE SERPL-MCNC: 9.5 NG/ML (ref 4.6–34.8)
GFR SERPL CREATININE-BSD FRML MDRD: 42 ML/MIN/1.73M2
GFR SERPL CREATININE-BSD FRML MDRD: 50 ML/MIN/1.73M2
GLUCOSE SERPL-MCNC: 73 MG/DL (ref 70–99)
GLUCOSE SERPL-MCNC: 85 MG/DL (ref 70–99)
HCT VFR BLD AUTO: 26.2 % (ref 40–53)
HDLC SERPL-MCNC: 57 MG/DL
HGB BLD-MCNC: 8.9 G/DL (ref 13.3–17.7)
HOLD SPECIMEN: NORMAL
LDLC SERPL CALC-MCNC: 84 MG/DL
MAGNESIUM SERPL-MCNC: 1.8 MG/DL (ref 1.7–2.3)
MCH RBC QN AUTO: 34.5 PG (ref 26.5–33)
MCHC RBC AUTO-ENTMCNC: 34 G/DL (ref 31.5–36.5)
MCV RBC AUTO: 102 FL (ref 78–100)
NONHDLC SERPL-MCNC: 96 MG/DL
PLATELET # BLD AUTO: 165 10E3/UL (ref 150–450)
POTASSIUM SERPL-SCNC: 3.4 MMOL/L (ref 3.4–5.3)
POTASSIUM SERPL-SCNC: 3.6 MMOL/L (ref 3.4–5.3)
RBC # BLD AUTO: 2.58 10E6/UL (ref 4.4–5.9)
SODIUM SERPL-SCNC: 137 MMOL/L (ref 136–145)
SODIUM SERPL-SCNC: 137 MMOL/L (ref 136–145)
TRIGL SERPL-MCNC: 60 MG/DL
VIT B12 SERPL-MCNC: 410 PG/ML (ref 232–1245)
WBC # BLD AUTO: 3.2 10E3/UL (ref 4–11)

## 2022-10-23 PROCEDURE — 93005 ELECTROCARDIOGRAM TRACING: CPT | Performed by: INTERNAL MEDICINE

## 2022-10-23 PROCEDURE — 250N000011 HC RX IP 250 OP 636: Performed by: INTERNAL MEDICINE

## 2022-10-23 PROCEDURE — 36415 COLL VENOUS BLD VENIPUNCTURE: CPT | Performed by: INTERNAL MEDICINE

## 2022-10-23 PROCEDURE — 99232 SBSQ HOSP IP/OBS MODERATE 35: CPT | Performed by: INTERNAL MEDICINE

## 2022-10-23 PROCEDURE — 86140 C-REACTIVE PROTEIN: CPT | Performed by: INTERNAL MEDICINE

## 2022-10-23 PROCEDURE — 83735 ASSAY OF MAGNESIUM: CPT | Performed by: INTERNAL MEDICINE

## 2022-10-23 PROCEDURE — 93010 ELECTROCARDIOGRAM REPORT: CPT | Performed by: INTERNAL MEDICINE

## 2022-10-23 PROCEDURE — 80061 LIPID PANEL: CPT | Performed by: INTERNAL MEDICINE

## 2022-10-23 PROCEDURE — 210N000001 HC R&B IMCU HEART CARE

## 2022-10-23 PROCEDURE — 250N000013 HC RX MED GY IP 250 OP 250 PS 637: Performed by: INTERNAL MEDICINE

## 2022-10-23 PROCEDURE — 258N000003 HC RX IP 258 OP 636: Performed by: INTERNAL MEDICINE

## 2022-10-23 PROCEDURE — 82310 ASSAY OF CALCIUM: CPT | Performed by: INTERNAL MEDICINE

## 2022-10-23 PROCEDURE — G1010 CDSM STANSON: HCPCS

## 2022-10-23 PROCEDURE — 85027 COMPLETE CBC AUTOMATED: CPT | Performed by: INTERNAL MEDICINE

## 2022-10-23 PROCEDURE — 82607 VITAMIN B-12: CPT | Performed by: INTERNAL MEDICINE

## 2022-10-23 PROCEDURE — 85379 FIBRIN DEGRADATION QUANT: CPT | Performed by: INTERNAL MEDICINE

## 2022-10-23 PROCEDURE — 80048 BASIC METABOLIC PNL TOTAL CA: CPT | Performed by: INTERNAL MEDICINE

## 2022-10-23 PROCEDURE — 93005 ELECTROCARDIOGRAM TRACING: CPT

## 2022-10-23 RX ORDER — CEFTRIAXONE 1 G/1
1 INJECTION, POWDER, FOR SOLUTION INTRAMUSCULAR; INTRAVENOUS EVERY 24 HOURS
Status: DISCONTINUED | OUTPATIENT
Start: 2022-10-23 | End: 2022-10-24 | Stop reason: HOSPADM

## 2022-10-23 RX ORDER — SODIUM CHLORIDE 9 MG/ML
INJECTION, SOLUTION INTRAVENOUS CONTINUOUS
Status: DISCONTINUED | OUTPATIENT
Start: 2022-10-23 | End: 2022-10-24 | Stop reason: HOSPADM

## 2022-10-23 RX ORDER — IOPAMIDOL 755 MG/ML
75 INJECTION, SOLUTION INTRAVASCULAR ONCE
Status: COMPLETED | OUTPATIENT
Start: 2022-10-23 | End: 2022-10-23

## 2022-10-23 RX ORDER — LIDOCAINE 40 MG/G
CREAM TOPICAL
Status: CANCELLED | OUTPATIENT
Start: 2022-10-23

## 2022-10-23 RX ADMIN — Medication 1 TABLET: at 13:01

## 2022-10-23 RX ADMIN — HEPARIN SODIUM 5000 UNITS: 10000 INJECTION, SOLUTION INTRAVENOUS; SUBCUTANEOUS at 21:11

## 2022-10-23 RX ADMIN — TERAZOSIN HYDROCHLORIDE 10 MG: 5 CAPSULE ORAL at 21:10

## 2022-10-23 RX ADMIN — LEVOTHYROXINE SODIUM 25 MCG: 0.03 TABLET ORAL at 08:18

## 2022-10-23 RX ADMIN — CEFTRIAXONE SODIUM 1 G: 1 INJECTION, POWDER, FOR SOLUTION INTRAMUSCULAR; INTRAVENOUS at 09:29

## 2022-10-23 RX ADMIN — GABAPENTIN 300 MG: 300 CAPSULE ORAL at 21:10

## 2022-10-23 RX ADMIN — ASPIRIN 81 MG CHEWABLE TABLET 81 MG: 81 TABLET CHEWABLE at 08:18

## 2022-10-23 RX ADMIN — SIMVASTATIN 10 MG: 10 TABLET, FILM COATED ORAL at 21:10

## 2022-10-23 RX ADMIN — SODIUM CHLORIDE: 9 INJECTION, SOLUTION INTRAVENOUS at 09:30

## 2022-10-23 RX ADMIN — IOPAMIDOL 75 ML: 755 INJECTION, SOLUTION INTRAVENOUS at 23:03

## 2022-10-23 RX ADMIN — HEPARIN SODIUM 5000 UNITS: 10000 INJECTION, SOLUTION INTRAVENOUS; SUBCUTANEOUS at 04:35

## 2022-10-23 RX ADMIN — HEPARIN SODIUM 5000 UNITS: 10000 INJECTION, SOLUTION INTRAVENOUS; SUBCUTANEOUS at 13:01

## 2022-10-23 ASSESSMENT — ACTIVITIES OF DAILY LIVING (ADL)
ADLS_ACUITY_SCORE: 32

## 2022-10-23 NOTE — PROGRESS NOTES
CARDIOLOGY PROGRESS NOTE      Assessment/Plan:  1.  Syncope-uncertain etiology, no postictal state, could be neurocardiogenic although there was no inciting event.  Could be arrhythmogenic as well.  No abnormalities seen on telemetry.  Possibly secondary to low volume state given pericardial effusion.  Plan is for pericardiocentesis tomorrow given low blood pressure noted by EMS.  Would arrange for outpatient event monitor.  2.  Troponin elevation- from 88 up to 89, essentially unchanged.  Doubt coronary ischemia.  Doubt MI. Would arrange for outpatient stress test.  3.  Junctional rhythm- telemetry actually shows sinus rhythm, doubt true junctional rhythm.  No arrhythmias seen on telemetry.    4.  Pericardial effusion-moderate sized with some mild right ventricular collapse.  Holding antihypertensives, given low blood pressure in the field we will arrange for pericardiocentesis tomorrow.  Discussed with him and he is willing to proceed.  5.  COVID positive-defer to primary care team.  6.  Anemia-hemoglobin 8.6 and so noted and defer to hospitalist.  7.  Acute kidney injury (H)-creatinine increased to 1.58.  Down from admission of 1.83.  Uncertain reason.  8.  Pure hypercholesterolemia- most recent total cholesterol 153 with an LDL of 62.  This is from 2019 but yet acceptable.  9.  Benign essential hypertension- atenolol hold and blood pressure still looking good.  Getting terazosin to help with prostate though.  10.  Urinary retention- has Lozano catheter in place now, also on terazosin.  Possibly urinary retention causing creatinine increased.      Plan:  1.  Pericardiocentesis Monday.  2.  We will discontinue atenolol given question of bradycardia.  3.  Given pericardial effusion, slightly widened mediastinum on chest x-ray, will arrange for CTA to rule out aortic dissection.  4.  Please connect with granddaughter Shruthi Holley on Monday, number in chart, following pericardiocentesis especially if diagnosis  "available.    Discharge Plannin.  Cardiac barrier to discharge is pericardiocentesis.  2.  Would arrange for outpatient event monitor.  3.  Would also arrange for outpatient pharmacological stress nuclear.  4.  Can follow with me as regular cardiologist.     LOS: 3 day     Subjective:  Interval History:    87-year-old white gentleman being seen on fourth day of hospitalization.  He feels well, denies any recurrent syncope or dizziness, chest pains, palpitations, shortness of breath, PND, orthopnea or peripheral edema.    Medications    aspirin  81 mg Oral Daily     calcium carbonate-vitamin D  1 tablet Oral Daily     cefTRIAXone  1 g Intravenous Q24H     gabapentin  300 mg Oral QPM     heparin ANTICOAGULANT  5,000 Units Subcutaneous Q8H WALLY     levothyroxine  25 mcg Oral QAM AC     simvastatin  10 mg Oral At Bedtime     sodium chloride (PF)  3 mL Intracatheter Q8H     terazosin  10 mg Oral At Bedtime     Objective:   Vital signs in last 24 hours:  Temp:  [98.2  F (36.8  C)-98.6  F (37  C)] 98.2  F (36.8  C)  Pulse:  [66-81] 66  Resp:  [18-20] 18  BP: (116-146)/(62-83) 139/62  SpO2:  [94 %-99 %] 96 %    Physical Exam:  /62 (BP Location: Left arm)   Pulse 66   Temp 98.2  F (36.8  C) (Oral)   Resp 18   Ht 1.753 m (5' 9\")   Wt 71.2 kg (156 lb 14.4 oz)   SpO2 96%   BMI 23.17 kg/m      General Appearance:    Alert, cooperative, no distress, appears stated age   Head:    Normocephalic, without obvious abnormality, atraumatic   Throat:   Lips, mucosa, and tongue normal; teeth and gums normal   Neck:   Supple, symmetrical, trachea midline, no adenopathy;        thyroid:  No enlargement/tenderness/nodules; no carotid    bruit or JVD   Back:     Symmetric, no curvature, ROM normal, no CVA tenderness   Lungs:     Clear to auscultation bilaterally, respirations unlabored   Chest wall:    No tenderness or deformity   Heart:    Regular rate and rhythm, S1 and S2 normal, no murmur, rub   or gallop, decreased " sounds   Abdomen:     Soft, non-tender, bowel sounds active all four quadrants,     no masses, no organomegaly   Extremities:   Normal, atraumatic, no cyanosis or edema   Pulses:   2+ and symmetric all extremities   Skin:   Skin color, texture, turgor normal, no rashes or lesions     Cardiographics:      ECG: Personally reviewed by myself shows sinus bradycardia, low voltage, no acute changes.    Echocardiogram:   The left ventricle is normal in size.  Left ventricular function is normal.The ejection fraction is 60-65%.  Normal right ventricle size and systolic function.  The ascending aorta is Mildly dilated.  Moderate pericardial effusion. There is evidence of mild right ventricular diastolic collapse.    Lab Results:   Recent Labs   Lab 10/23/22  0428   WBC 3.2*   HGB 8.9*   HCT 26.2*        Recent Labs   Lab 10/23/22  0428      CO2 24   BUN 25.8*   .       No results found for: CKTOTAL, CKMB, TROPONINI

## 2022-10-23 NOTE — PLAN OF CARE
Patient denied pain and was able to sleep for most of the night while on video monitoring for safety. Patient continues to ask the same questions over and over again. Tele NSR 1AVB. Patient asks frequently about going home.     Problem: Plan of Care - These are the overarching goals to be used throughout the patient stay.    Goal: Optimal Comfort and Wellbeing  Outcome: Progressing     Problem: Risk for Delirium  Goal: Improved Sleep  Outcome: Progressing   Goal Outcome Evaluation:

## 2022-10-23 NOTE — PROGRESS NOTES
"Hospitalist Progress Note    Assessment/Plan  Dilip Long is a 87 year old male with history of BPH, prior CVA, diverticulosis, Graves' disease status post ablation, hyperlipidemia, hypothyroidism, hypertension, and ureteral stricture admitted on 10/21/2022 for syncope, COVID-19 infection and LEONIDAS.     Syncope  -No episodes since admission.  Etiology still not clear.  -Cardiology notes reviewed, appreciate input: Pending echocardiogram.  Outpatient event monitor to be arranged per Cardiology.  -Continue telemetry monitor.   -PT/OT eval/Rx  -Continue telemetry monitor  10/23  -Echocardiogram report noted: EF of 60 to 65%, with moderate pericardial effusion.  -Per cardiology notes of 10/21: Elective pericardiocentesis  -Continue other management as stated above.  Anticipates outpatient event monitor, upon.    Moderate pericardial effusion  -Procedure as stated above by cardiology     NSTEMI  -Continue aspirin/statin therapy.    -Outpatient stress testing per cardiology.  Per notes \"doubt coronary ischemia.  Doubt MI.\"     LEONIDAS, POA.  -Creatinine 1.83--> 1.67-->1.58   -Continue to monitor progress, with gentle rehydration.  -Suspected prerenal etiology from volume depletion in the setting of COVID-19 infection.   -Avoid nephrotoxin/hypotension  - will consider nephrology consult if creatinine worsens.     UTI  -UA with pyuria, (+) LE, many bacteria, and (+) WBC  -Cultures pending.  -Start IV ceftriaxone therapy.  Plan to de-escalate with culture results.  -Of note, no relationship between Lozano catheter and UTI, as catheter placed after the fact of a UTI, to relieve urinary retention, from BPH.     COVID-19 infection  -Documented as completed vaccination.  However tested positive on 10/21/2022.  -currently asymptomatic and not requiring supplemental oxygen.  -Remdesivir therapy comments given high risk for decompensation.  Day 2 today.  -Pending ID input.  -Close monitor  10/23  -ID notes reviewed, appreciate input: " "\"No COVID rx\"   -Remdesivir therapy discontinued.    Hypothyroidism  -History of Graves' disease status post ablation  -Documented hypothyroidism by history: However patient not on any thyroid replacement therapy  -TSH elevated at 100, free T4 less than 0.1  -We will commence levothyroxine at 1.6 mcg/kg/day.  Notes to pharmacy to dose.   10/23  -Commenced on levothyroxine therapy (25 mcg daily, per pharmacy dosing)    Macrocytic anemia  -Hemoglobin noted at 8.6 g/DL.  This is stable in the last 24 to 48 hours.  -Suspect 2/2 chronic disease.  -Check B12/folate     BPH  Urinary retention, with difficulty placing catheter  -Nephrology notes reviewed, appreciate input: Lozano catheter 18 Croatian placed by urology at bedside.  To maintain Lozano catheter at discharge.  Outpatient follow-up with Dr. Laird in 1 to 2 weeks for voiding trial.  -Continue PTA terazosin     Prior CVA  -Continue aspirin/statin therapy.    -PT/OT eval/Rx      Diet: 2 Gram Sodium Diet  DVT Prophylaxis: Heparin SQ  Lozano Catheter: Not present  Central Lines: None  Cardiac Monitoring: ACTIVE order. Indication: Syncope- high cardiac risk (48 hours)  Code Status: Full Code     Disposition Plan   Expected discharge in 2 days to prior living arrangement once medically stable.  Watch renal function.      Subjective  Denies any new complaints.  No acute events overnight.    Objective    Vital signs in last 24 hours  Temp:  [98.2  F (36.8  C)-98.6  F (37  C)] 98.2  F (36.8  C)  Pulse:  [66-81] 66  Resp:  [18-20] 18  BP: (116-146)/(62-83) 139/62  SpO2:  [94 %-99 %] 96 % @LASTSAO2(12)@ O2 Device: None (Room air)    Weight:   Wt Readings from Last 3 Encounters:   10/23/22 71.2 kg (156 lb 14.4 oz)   10/03/19 63.5 kg (140 lb)   09/05/19 68 kg (150 lb)      Weight change: -1.406 kg (-3 lb 1.6 oz)    Intake/Output last 3 shifts  I/O last 3 completed shifts:  In: 600 [P.O.:600]  Out: 1150 [Urine:1150]  Body mass index is 23.17 kg/m .    Physical Exam    General " Appearance:    Alert, cooperative, no distress, appears stated age   Lungs:     Clear bilaterally    Cardiovascular:    Regular rate ands rhythm.  Normal S1, S2.  No murmur, rub or gallop.  No edema   Abdomen:     Soft, non-tender, bowel sounds active all four quadrants,     no masses, no organomegaly   Neurologic:   Awake, alert, oriented x 3.  Grossly nonfocal      Pertinent Labs   Lab Results: personally reviewed.   Recent Labs   Lab 10/23/22  0428 10/22/22  0441 10/21/22  1334 10/21/22  1110    139 139 141   CO2 24 24 22 22   BUN 25.8* 26.9* 25.3* 24.1*   ALBUMIN  --   --  3.3* 3.0*   ALKPHOS  --   --  40 36*   ALT  --   --  16 15   AST  --   --  43 40     Recent Labs   Lab 10/23/22  0428 10/22/22  0441 10/21/22  2127   WBC 3.2* 3.1* 3.0*   HGB 8.9* 8.6* 8.6*   HCT 26.2* 26.0* 26.1*    163 160     No results for input(s): CKTOTAL, TROPONINI in the last 168 hours.    Invalid input(s): TROPONINT, CKMBINDEX  Invalid input(s): POCGLUFGR    Medications  Current Facility-Administered Medications   Medication     acetaminophen (TYLENOL) tablet 650 mg    Or     acetaminophen (TYLENOL) Suppository 650 mg     albuterol (PROVENTIL HFA/VENTOLIN HFA) inhaler     aspirin (ASA) chewable tablet 81 mg     calcium carbonate-vitamin D (OS-KRISTEN with D) per tablet 1 tablet     cefTRIAXone (ROCEPHIN) 1 g vial to attach to  mL bag for ADULTS or NS 50 mL bag for PEDS     gabapentin (NEURONTIN) capsule 300 mg     heparin ANTICOAGULANT injection 5,000 Units     levothyroxine (SYNTHROID/LEVOTHROID) tablet 25 mcg     lidocaine (LMX4) cream     lidocaine 1 % 0.1-1 mL     Medication instructions: Do NOT use nebulized medications     senna-docusate (SENOKOT-S/PERICOLACE) 8.6-50 MG per tablet 1 tablet    Or     senna-docusate (SENOKOT-S/PERICOLACE) 8.6-50 MG per tablet 2 tablet     simvastatin (ZOCOR) tablet 10 mg     sodium chloride (PF) 0.9% PF flush 3 mL     sodium chloride (PF) 0.9% PF flush 3 mL     sodium chloride 0.9%  infusion     terazosin (HYTRIN) capsule 10 mg       Pertinent Radiology   Radiology Results: Personally reviewed   Results for orders placed or performed during the hospital encounter of 10/21/22   Head CT w/o contrast    Impression    IMPRESSION:  1.  No CT evidence for acute intracranial process.  2.  No calvarial or skull base fracture.  3.  Brain atrophy and presumed chronic microvascular ischemic changes as above.   Cervical spine CT w/o contrast    Impression    IMPRESSION:  1.  No fracture or posttraumatic subluxation.  2.  No high-grade spinal canal stenosis. Moderate to severe neural foraminal stenosis bilaterally at C3-C4.   Chest XR,  PA & LAT    Impression    IMPRESSION: Small lung volumes. No focal airspace consolidation. No pleural effusion or pneumothorax.    Cardiomediastinal silhouette is normal. Atherosclerotic calcifications of the thoracic aorta.    Lucency beneath right hemidiaphragm, compatible with colonic interposition.             Salome Burgos DO  Internal Medicine Hospitalist  10/23/2022

## 2022-10-23 NOTE — PLAN OF CARE
Problem: Plan of Care - These are the overarching goals to be used throughout the patient stay.    Goal: Absence of Hospital-Acquired Illness or Injury  Outcome: Progressing     Problem: Plan of Care - These are the overarching goals to be used throughout the patient stay.    Goal: Optimal Comfort and Wellbeing  Outcome: Progressing     Problem: Syncope  Goal: Absence of Syncopal Symptoms  Outcome: Progressing     Problem: Acute Kidney Injury/Impairment  Goal: Fluid and Electrolyte Balance  Outcome: Progressing     Problem: UTI (Urinary Tract Infection)  Goal: Improved Infection Symptoms  Outcome: Progressing   Goal Outcome Evaluation:       Pt forget. Can get a little confused with situation. Up with assist of 1. Lozano patent. Pt did pull out IV.

## 2022-10-24 ENCOUNTER — APPOINTMENT (OUTPATIENT)
Dept: CARDIOLOGY | Facility: HOSPITAL | Age: 87
DRG: 280 | End: 2022-10-24
Attending: INTERNAL MEDICINE
Payer: MEDICARE

## 2022-10-24 ENCOUNTER — APPOINTMENT (OUTPATIENT)
Dept: OCCUPATIONAL THERAPY | Facility: HOSPITAL | Age: 87
DRG: 280 | End: 2022-10-24
Payer: MEDICARE

## 2022-10-24 VITALS
BODY MASS INDEX: 23.52 KG/M2 | HEIGHT: 69 IN | HEART RATE: 70 BPM | OXYGEN SATURATION: 96 % | RESPIRATION RATE: 18 BRPM | WEIGHT: 158.8 LBS | TEMPERATURE: 98.7 F | DIASTOLIC BLOOD PRESSURE: 73 MMHG | SYSTOLIC BLOOD PRESSURE: 143 MMHG

## 2022-10-24 LAB
ABO/RH(D): NORMAL
ANION GAP SERPL CALCULATED.3IONS-SCNC: 11 MMOL/L (ref 7–15)
ANTIBODY SCREEN: NEGATIVE
ATRIAL RATE - MUSE: 61 BPM
BUN SERPL-MCNC: 21.8 MG/DL (ref 8–23)
CALCIUM SERPL-MCNC: 7.8 MG/DL (ref 8.8–10.2)
CHLORIDE SERPL-SCNC: 102 MMOL/L (ref 98–107)
CREAT SERPL-MCNC: 1.45 MG/DL (ref 0.67–1.17)
CRP SERPL-MCNC: 26.6 MG/L
D DIMER PPP FEU-MCNC: 1.1 UG/ML FEU (ref 0–0.5)
DEPRECATED HCO3 PLAS-SCNC: 23 MMOL/L (ref 22–29)
DIASTOLIC BLOOD PRESSURE - MUSE: NORMAL MMHG
ERYTHROCYTE [DISTWIDTH] IN BLOOD BY AUTOMATED COUNT: 12.5 % (ref 10–15)
GFR SERPL CREATININE-BSD FRML MDRD: 47 ML/MIN/1.73M2
GLUCOSE SERPL-MCNC: 73 MG/DL (ref 70–99)
HCT VFR BLD AUTO: 25 % (ref 40–53)
HGB BLD-MCNC: 8.2 G/DL (ref 13.3–17.7)
INTERPRETATION ECG - MUSE: NORMAL
LVEF ECHO: NORMAL
MCH RBC QN AUTO: 33.6 PG (ref 26.5–33)
MCHC RBC AUTO-ENTMCNC: 32.8 G/DL (ref 31.5–36.5)
MCV RBC AUTO: 103 FL (ref 78–100)
P AXIS - MUSE: NORMAL DEGREES
PLATELET # BLD AUTO: 153 10E3/UL (ref 150–450)
POTASSIUM SERPL-SCNC: 3.4 MMOL/L (ref 3.4–5.3)
PR INTERVAL - MUSE: NORMAL MS
QRS DURATION - MUSE: 78 MS
QT - MUSE: 408 MS
QTC - MUSE: 410 MS
R AXIS - MUSE: 3 DEGREES
RBC # BLD AUTO: 2.44 10E6/UL (ref 4.4–5.9)
SODIUM SERPL-SCNC: 136 MMOL/L (ref 136–145)
SPECIMEN EXPIRATION DATE: NORMAL
SYSTOLIC BLOOD PRESSURE - MUSE: NORMAL MMHG
T AXIS - MUSE: -6 DEGREES
VENTRICULAR RATE- MUSE: 61 BPM
WBC # BLD AUTO: 3 10E3/UL (ref 4–11)

## 2022-10-24 PROCEDURE — 99239 HOSP IP/OBS DSCHRG MGMT >30: CPT | Mod: CS | Performed by: INTERNAL MEDICINE

## 2022-10-24 PROCEDURE — 93321 DOPPLER ECHO F-UP/LMTD STD: CPT | Mod: 26 | Performed by: GENERAL ACUTE CARE HOSPITAL

## 2022-10-24 PROCEDURE — 36415 COLL VENOUS BLD VENIPUNCTURE: CPT | Performed by: INTERNAL MEDICINE

## 2022-10-24 PROCEDURE — 86140 C-REACTIVE PROTEIN: CPT | Performed by: INTERNAL MEDICINE

## 2022-10-24 PROCEDURE — 258N000003 HC RX IP 258 OP 636: Performed by: INTERNAL MEDICINE

## 2022-10-24 PROCEDURE — 85379 FIBRIN DEGRADATION QUANT: CPT | Performed by: INTERNAL MEDICINE

## 2022-10-24 PROCEDURE — 93325 DOPPLER ECHO COLOR FLOW MAPG: CPT | Mod: 26 | Performed by: GENERAL ACUTE CARE HOSPITAL

## 2022-10-24 PROCEDURE — 250N000013 HC RX MED GY IP 250 OP 250 PS 637: Performed by: INTERNAL MEDICINE

## 2022-10-24 PROCEDURE — 250N000011 HC RX IP 250 OP 636: Performed by: INTERNAL MEDICINE

## 2022-10-24 PROCEDURE — 93308 TTE F-UP OR LMTD: CPT | Mod: 26 | Performed by: GENERAL ACUTE CARE HOSPITAL

## 2022-10-24 PROCEDURE — 255N000002 HC RX 255 OP 636: Performed by: INTERNAL MEDICINE

## 2022-10-24 PROCEDURE — 93325 DOPPLER ECHO COLOR FLOW MAPG: CPT

## 2022-10-24 PROCEDURE — 99232 SBSQ HOSP IP/OBS MODERATE 35: CPT | Mod: 25 | Performed by: INTERNAL MEDICINE

## 2022-10-24 PROCEDURE — 85027 COMPLETE CBC AUTOMATED: CPT | Performed by: INTERNAL MEDICINE

## 2022-10-24 PROCEDURE — 97535 SELF CARE MNGMENT TRAINING: CPT | Mod: GO

## 2022-10-24 PROCEDURE — 93321 DOPPLER ECHO F-UP/LMTD STD: CPT

## 2022-10-24 PROCEDURE — 97129 THER IVNTJ 1ST 15 MIN: CPT | Mod: GO

## 2022-10-24 PROCEDURE — 86901 BLOOD TYPING SEROLOGIC RH(D): CPT | Performed by: INTERNAL MEDICINE

## 2022-10-24 PROCEDURE — 82310 ASSAY OF CALCIUM: CPT | Performed by: INTERNAL MEDICINE

## 2022-10-24 RX ORDER — LEVOTHYROXINE SODIUM 25 UG/1
25 TABLET ORAL
Qty: 30 TABLET | Refills: 1 | Status: ON HOLD | OUTPATIENT
Start: 2022-10-25 | End: 2022-11-10

## 2022-10-24 RX ORDER — ASPIRIN 81 MG/1
243 TABLET, CHEWABLE ORAL ONCE
Status: COMPLETED | OUTPATIENT
Start: 2022-10-24 | End: 2022-10-24

## 2022-10-24 RX ORDER — ASPIRIN 325 MG
325 TABLET ORAL ONCE
Status: COMPLETED | OUTPATIENT
Start: 2022-10-24 | End: 2022-10-24

## 2022-10-24 RX ORDER — SODIUM CHLORIDE 9 MG/ML
INJECTION, SOLUTION INTRAVENOUS CONTINUOUS
Status: DISCONTINUED | OUTPATIENT
Start: 2022-10-24 | End: 2022-10-24 | Stop reason: HOSPADM

## 2022-10-24 RX ORDER — ASPIRIN 81 MG/1
81 TABLET, CHEWABLE ORAL DAILY
Qty: 30 TABLET | Refills: 1 | Status: ON HOLD | OUTPATIENT
Start: 2022-10-25 | End: 2022-11-07

## 2022-10-24 RX ADMIN — SODIUM CHLORIDE: 9 INJECTION, SOLUTION INTRAVENOUS at 12:01

## 2022-10-24 RX ADMIN — CEFTRIAXONE SODIUM 1 G: 1 INJECTION, POWDER, FOR SOLUTION INTRAMUSCULAR; INTRAVENOUS at 08:03

## 2022-10-24 RX ADMIN — ASPIRIN 81 MG CHEWABLE TABLET 81 MG: 81 TABLET CHEWABLE at 08:04

## 2022-10-24 RX ADMIN — SODIUM CHLORIDE: 9 INJECTION, SOLUTION INTRAVENOUS at 06:38

## 2022-10-24 RX ADMIN — ASPIRIN 81 MG CHEWABLE TABLET 243 MG: 81 TABLET CHEWABLE at 12:02

## 2022-10-24 RX ADMIN — PERFLUTREN 3 ML: 6.52 INJECTION, SUSPENSION INTRAVENOUS at 11:45

## 2022-10-24 RX ADMIN — HEPARIN SODIUM 5000 UNITS: 10000 INJECTION, SOLUTION INTRAVENOUS; SUBCUTANEOUS at 12:02

## 2022-10-24 RX ADMIN — HEPARIN SODIUM 5000 UNITS: 10000 INJECTION, SOLUTION INTRAVENOUS; SUBCUTANEOUS at 06:38

## 2022-10-24 RX ADMIN — LEVOTHYROXINE SODIUM 25 MCG: 0.03 TABLET ORAL at 06:38

## 2022-10-24 RX ADMIN — Medication 1 TABLET: at 12:02

## 2022-10-24 ASSESSMENT — ACTIVITIES OF DAILY LIVING (ADL)
ADLS_ACUITY_SCORE: 32

## 2022-10-24 NOTE — PROGRESS NOTES
I certify that patient: Dilip Long is under my care and that I had a face-to-face encounter that meets the physician face-to-face encounter requirements with this patient on: 10/24/2022     This encounter with the patient was in whole, or in part, for the following medical condition, which is the primary reason for home health care:  Syncope unspecified     I certify that, based on my findings, the following services are medically necessary home health services: Nursing, Occupational Therapy and Physical Therapy.     My clinical findings support the need for the above services because: Nurse is needed: To assess progress after changes in medications or other medical regimen. and to teach and train about the disease and treatments for Improvement in illness, because patient risks poor compliance. Occupational Therapy Services are needed to assess , treat and address ADL  for safety. Physical Therapy Services are needed to assess and treat the following functional impairments: Gait instability.     Further, I certify that my clinical findings support that this patient is mostly homebound (i.e. absences from home require considerable and taxing effort and are for medical reasons or Islam services or infrequently or of short duration when for other reasons) because: patient requires assistance of another person or specialized equipment to access services due to prohibitive pain during ambulation. Patient also requires supervision of another for safe transfer.     Based on the above findings. I certify that this patient needs intermittent skilled nursing care, physical therapy and/or occupational therapy.  The patient has been under my care, and I have initiated the establishment of the plan of care.  This patient will be followed by his primary care physician/provider who will periodically review the plan of care.     Attending hospital physician (the Medicare certified [facility] provider): Salome Burgos  DO  Physician Signature: See electronic signature associated with these discharge orders.  Date: 10/24/2022

## 2022-10-24 NOTE — PLAN OF CARE
Problem: Plan of Care - These are the overarching goals to be used throughout the patient stay.    Goal: Absence of Hospital-Acquired Illness or Injury  Outcome: Progressing  Intervention: Identify and Manage Fall Risk  Recent Flowsheet Documentation  Taken 10/23/2022 2030 by Alysha Medina RN  Safety Promotion/Fall Prevention: bed alarm on  Taken 10/23/2022 1600 by Alysha Medina RN  Safety Promotion/Fall Prevention: bed alarm on  Intervention: Prevent Skin Injury  Recent Flowsheet Documentation  Taken 10/23/2022 2030 by Alysha Medina RN  Body Position: position changed independently  Taken 10/23/2022 1600 by Alysha Medina RN  Body Position: position changed independently     Problem: Plan of Care - These are the overarching goals to be used throughout the patient stay.    Goal: Optimal Comfort and Wellbeing  Outcome: Progressing     Problem: Risk for Delirium  Goal: Optimal Coping  Outcome: Progressing     Problem: Risk for Delirium  Goal: Improved Behavioral Control  Outcome: Progressing  Intervention: Minimize Safety Risk  Recent Flowsheet Documentation  Taken 10/23/2022 2030 by Alysha Medina RN  Enhanced Safety Measures: bed alarm set  Taken 10/23/2022 1600 by Alysha Medina RN  Enhanced Safety Measures: bed alarm set     Problem: Syncope  Goal: Absence of Syncopal Symptoms  Outcome: Progressing     Problem: UTI (Urinary Tract Infection)  Goal: Improved Infection Symptoms  Outcome: Progressing   Goal Outcome Evaluation:       Pt to have Pericardiocentesis tomorrow. Had shower and was shaved. Did not want to watch video. Pt answer questions appropriately.Pt forgetful but does remember he is going to have procedure tomorrow.

## 2022-10-24 NOTE — PLAN OF CARE
Physical Therapy Discharge Summary    Reason for therapy discharge:    Discharged to home with home therapy.    Progress towards therapy goal(s). See goals on Care Plan in [facility] electronic health record for goal details.  Goals not met.  Barriers to achieving goals:   discharge from facility.    Therapy recommendation(s):    Continued therapy is recommended.  Rationale/Recommendations:  to improve functional mobility.

## 2022-10-24 NOTE — PROGRESS NOTES
Care Management Discharge Note    Discharge Date: 10/24/2022       Discharge Disposition:  Home    Discharge Services:  Home RN PT OT ordered    Discharge DME:  FWW or cane    Discharge Transportation: family or friend will provide    Private pay costs discussed: Not applicable  Education Provided on the Discharge Plan: yes    Persons Notified of Discharge Plans: patient  Patient/Family in Agreement with the Plan:  yes    Handoff Referral Completed: Yes    Additional Information:  Chart reviewed. Patient has discharge orders completed and will discharge home.   Patent lives with his wife, mostly independent at baseline. [facility] home care has accepted patient for services.  Discharge orders sent to [facility].  Family will transport.        DOMENIC Saucedo

## 2022-10-24 NOTE — DISCHARGE SUMMARY
Pt discharged at approximately 1715. Pt transfered out in wheelchair w/assistance from aid. COVID precautions maintained.  AVS reviewed with pt and granddaughter (Shruthi) and signed. No questions or concerns.

## 2022-10-24 NOTE — PLAN OF CARE
Patient denied pain throughout the night. Tele NSR 1AVB. Kept NPO since midnight anticipating cath lab today, no time. NS at 50ml/hr continuous And Lozano patent.     Problem: Plan of Care - These are the overarching goals to be used throughout the patient stay.    Goal: Optimal Comfort and Wellbeing  Outcome: Progressing     Problem: Risk for Delirium  Goal: Improved Sleep  Outcome: Progressing     Problem: UTI (Urinary Tract Infection)  Goal: Improved Infection Symptoms  Outcome: Progressing   Goal Outcome Evaluation:

## 2022-10-24 NOTE — PROGRESS NOTES
Impression and Plan     Impression:   1. Syncope - etiology has not been fully established. May be low volume state, neurocardiogenic, or arrhythmogenic.  2. Troponin elevation - hsTropT up to 89. Agree with Dr. Quevedo that acute coronary syndrome is unlikely. Supply/demand mismatch more likely.   3. Pericardial effusion - small effusion on echo today, smaller than previous. No indication of tamponade by echo or clinically. Does not need pericardiocentesis today.  4. COVID positive  5. Anemia - no acute signs of bleeding. Defer management to hospitalist.  6. LEONIDAS - creatinine has been fluctuating. At increased risk of contrast nephropathy.   7. Hypertension  8. Urinary retention.    Plan:    No plans for inpatient pericardiocentesis with resolving pericardial effusion and no clinical signs of tamponade.    Will order outpatient event monitor    Will order outpatient pharmacologic NM stress test    Will sign off. Will arrange for follow up with Dr. Quevedo in 4-6 weeks.    Called and updated patient's granddaughter, Shruthi, per his request.      Subjective     No chest pain. Adamant that he wants to go home today.    Cardiac Diagnostics   Telemetry (personally reviewed): sinus rhythm.    Echocardiogram (results reviewed):   1. Left ventricular size and systolic function are normal. The estimated left  ventricular ejection fraction is 55-60%.  2. Right ventricular size and systolic function are normal.  3. There is a small pericardial effusion located mostly along the anterior  border of the right ventricle. There is minimal pericardial fluid along the  inferior border of the right ventricle as well as the apex, which may make  attempts at pericardiocentesis difficult. No obvious echocardiographic  evidence of pericardial tamponade.  4. Compared to the prior study dated 10/22/2022, the pericardial effusion  appears slightly smaller.        Physical Examination       BP (!) 143/73 (BP Location: Right arm,  "Patient Position: Semi-Salas's, Cuff Size: Adult Regular)   Pulse 70   Temp 98.7  F (37.1  C) (Oral)   Resp 18   Ht 1.753 m (5' 9\")   Wt 72 kg (158 lb 12.8 oz)   SpO2 96%   BMI 23.45 kg/m            Intake/Output Summary (Last 24 hours) at 10/24/2022 1308  Last data filed at 10/24/2022 0640  Gross per 24 hour   Intake 1148 ml   Output 1050 ml   Net 98 ml       General: pleasant male. No acute distress.   HENT: external ears normal. Nares patent. Mucous membranes moist.  Eyes: perrla, extraocular muscles intact. No scleral icterus.   Neck: No JVD  Lungs: clear to auscultation  COR: regular rate and rhythm, No murmurs, rubs, or gallops  Abd: nondistended, BS present  Extrem: No edema         Imaging      CT chest - IMPRESSION:   1.  Atherosclerotic normal-caliber thoracic aorta without aneurysm or dissection. Normal cardiac size. Moderate coronary artery calcifications. Small pericardial effusion.     2.  Evidence of remote granulomatous disease. Small bilateral pleural effusions have developed with associated passive atelectasis in the adjacent lungs.     3.  Thickening of the distal stomach may be due to peristalsis. Small duodenal diverticulum adjacent to the head of the pancreas.     4.  Degenerative spine and both shoulders. Interval development of compression of L1 vertebral body.     Lab Results   Lab Results   Component Value Date    CHOL 153 10/23/2022    HDL 57 10/23/2022    TRIG 60 10/23/2022    BUN 21.8 10/24/2022     10/24/2022    CO2 23 10/24/2022       Lab Results   Component Value Date    WBC 3.0 (L) 10/24/2022    HGB 8.2 (L) 10/24/2022    HCT 25.0 (L) 10/24/2022     (H) 10/24/2022     10/24/2022       Lab Results   Component Value Date    .10 (H) 10/21/2022    INR 1.08 10/21/2022               Current Inpatient Scheduled Medications   Scheduled Meds:    aspirin  81 mg Oral Daily     calcium carbonate-vitamin D  1 tablet Oral Daily     cefTRIAXone  1 g Intravenous " Q24H     gabapentin  300 mg Oral QPM     heparin ANTICOAGULANT  5,000 Units Subcutaneous Q8H WALLY     levothyroxine  25 mcg Oral QAM AC     simvastatin  10 mg Oral At Bedtime     sodium chloride (PF)  3 mL Intracatheter Q8H     terazosin  10 mg Oral At Bedtime     Continuous Infusions:    - MEDICATION INSTRUCTIONS -       sodium chloride 150 mL/hr at 10/24/22 1201     sodium chloride Stopped (10/24/22 1202)            Medications Prior to Admission   Prior to Admission medications    Medication Sig Start Date End Date Taking? Authorizing Provider   atenolol (TENORMIN) 25 MG tablet Take 25 mg by mouth daily   Yes Unknown, Entered By History   Calcium Carbonate-Vitamin D (OSCAL 500/200 D-3 PO) Take 1 tablet by mouth daily.   Yes Reported, Patient   diphenhydrAMINE (BENADRYL) 25 MG capsule Take 1 capsule (25 mg) by mouth every 6 hours as needed for itching or allergies 1/11/22  Yes Haylee Loaiza MD   gabapentin (NEURONTIN) 300 MG capsule Take 300 mg by mouth every evening   Yes Unknown, Entered By History   simvastatin (ZOCOR) 10 MG tablet Take 1 tablet (10 mg) by mouth At Bedtime 10/24/18  Yes Leonora Vanessa MD   terazosin (HYTRIN) 10 MG capsule Take 1 capsule (10 mg) by mouth At Bedtime Take 1 tablet (10 mg) daily 5/23/18  Yes Leonora Vanessa MD   order for DME Equipment being ordered: Home automatic blood pressure cuff. Check once daily for one week, and then 3 times weekly thereafter. 12/8/15   Yvonne Moreno MD

## 2022-10-24 NOTE — DISCHARGE SUMMARY
[facility] MEDICINE  DISCHARGE SUMMARY     Primary Care Physician: No Ref-Primary, Physician  Admission Date: 10/21/2022   Discharge Provider: Salome Burgos DO Discharge Date: 10/24/2022   Diet:   Active Diet and Nourishment Order   Procedures     Room Service     2 Gram Sodium Diet     Diet       Code Status: Full Code   Activity: DCACTIVITY: Activity as tolerated        Condition at Discharge: Stable     REASON FOR PRESENTATION(See Admission Note for Details)   Syncope    PRINCIPAL & ACTIVE DISCHARGE DIAGNOSES     Syncope  NSTEMI  Acute kidney injury  COVID-19 infection  Hypothyroidism  Microcytic anemia  Benign prostatic hyperplasia  Previous CVA      PENDING LABS     Unresulted Labs Ordered in the Past 30 Days of this Admission     No orders found from 9/21/2022 to 10/22/2022.            PROCEDURES ( this hospitalization only)      Procedure(s):  Left Heart Catheterization  Percutaneous Coronary Intervention  Coronary Angiogram  Pericardiocentesis    RECOMMENDATIONS TO OUTPATIENT PROVIDER FOR F/U VISIT     Follow-up Appointments     Follow-up and recommended labs and tests       Follow up with primary care provider, Physician No Ref-Primary, within 7   days for hospital follow- up and close monitoring of renal function..  The   following labs/tests are recommended: Renal panel.    Follow up with Kurt Lea (Cardiology) in the office  within 2 weeks   for hospital follow- up. No follow up labs or test are needed.                 DISPOSITION     Home with home care    SUMMARY OF HOSPITAL COURSE:      87 year old male with history of BPH, prior CVA, diverticulosis, Graves' disease status post ablation, hyperlipidemia, hypothyroidism, hypertension, and ureteral stricture who presents to the ER due to syncope. He was in his usual state of health until today when it is until he suddenly passed out while sitting on a chair.  There was no preceding symptoms such as  dizziness, chest pain, palpitation, diaphoresis or shortness of breath.  He received 0.5 to 1 L of normal saline en route.  Upon arrival in the ER patient was alert and responsive. Initial blood pressure was 106/55, pulse 58, respiratory rate 13, temperature 97.7  F and oxygen saturation of 99% on room air.  Notable laboratory findings include creatinine of 1.83 from 0.82 on 7/18/2019, from available records here in the hospital.  Seen in consult by cardiology, with initial decision for pericardiocentesis for pericardial effusion noted on echocardiogram however this was canceled as this was resolving.  Initial bump in troponin, however with no complaints of chest pain.Creatinine elevated on arrival to ER at 1.8, which showed slight improvement to 1.3-1.5 over the course of the last 48 hours.  Echocardiogram with EF of 55 to 60%.  Cardiology okay for discharge to follow-up outpatient for pharmacologic NM stress test.  Event monitor ordered for patient.  Given this patient's history of recent COVID-19 infection, he was initially commenced on remdesivir on admission however this was discontinued by infectious disease in consult.  Patient evaluated by PT/OT with recommendations for home care.  Referral made to case management. Also of note, is that this patient with a history of Graves' disease status post ablation was not on any thyroid replacement therapy on admission.  TSH noted to be elevated with free T4 decreased.  Patient commenced on oral levothyroxine therapy.  He voiced understanding the need to follow-up with PCP in 3 to 6 months to reevaluate dose of thyroid therapy.  Patient noted with evidence of UTI, however culture showed that this was contaminants, mainly Mixture of urogenital susana.  Otherwise hospital course has been progressive and uneventful.  I have seen and examined this patient today, he denies any new complaints.  Vital signs and general clinical condition stable.          Discharge Medications  with Med changes:     Current Discharge Medication List      START taking these medications    Details   aspirin (ASA) 81 MG chewable tablet Take 1 tablet (81 mg) by mouth daily  Qty: 30 tablet, Refills: 1    Associated Diagnoses: Syncope, unspecified syncope type      levothyroxine (SYNTHROID/LEVOTHROID) 25 MCG tablet Take 1 tablet (25 mcg) by mouth every morning (before breakfast)  Qty: 30 tablet, Refills: 1    Associated Diagnoses: Postablative hypothyroidism         CONTINUE these medications which have NOT CHANGED    Details   atenolol (TENORMIN) 25 MG tablet Take 25 mg by mouth daily      Calcium Carbonate-Vitamin D (OSCAL 500/200 D-3 PO) Take 1 tablet by mouth daily.      diphenhydrAMINE (BENADRYL) 25 MG capsule Take 1 capsule (25 mg) by mouth every 6 hours as needed for itching or allergies  Qty: 6 capsule, Refills: 0      gabapentin (NEURONTIN) 300 MG capsule Take 300 mg by mouth every evening      simvastatin (ZOCOR) 10 MG tablet Take 1 tablet (10 mg) by mouth At Bedtime  Qty: 90 tablet, Refills: 3    Associated Diagnoses: Hyperlipidemia LDL goal <130      terazosin (HYTRIN) 10 MG capsule Take 1 capsule (10 mg) by mouth At Bedtime Take 1 tablet (10 mg) daily  Qty: 90 capsule, Refills: 3    Associated Diagnoses: Hypertrophy of prostate without urinary obstruction      order for DME Equipment being ordered: Home automatic blood pressure cuff. Check once daily for one week, and then 3 times weekly thereafter.  Qty: 1 Device, Refills: 0    Associated Diagnoses: Essential hypertension                     Consults     CARE MANAGEMENT / SOCIAL WORK IP CONSULT  INFECTIOUS DISEASES IP CONSULT  PHYSICAL THERAPY ADULT IP CONSULT  OCCUPATIONAL THERAPY ADULT IP CONSULT  UROLOGY IP CONSULT    Immunizations given this encounter     Most Recent Immunizations   Administered Date(s) Administered     COVID-19,PF,Ania 03/31/2021     Influenza (IIV3) PF 10/28/2012     Pneumococcal 23 valent 05/21/2010     TD (ADULT, 7+)  05/21/2010             SIGNIFICANT IMAGING FINDINGS     Results for orders placed or performed during the hospital encounter of 10/21/22   Head CT w/o contrast    Impression    IMPRESSION:  1.  No CT evidence for acute intracranial process.  2.  No calvarial or skull base fracture.  3.  Brain atrophy and presumed chronic microvascular ischemic changes as above.   Cervical spine CT w/o contrast    Impression    IMPRESSION:  1.  No fracture or posttraumatic subluxation.  2.  No high-grade spinal canal stenosis. Moderate to severe neural foraminal stenosis bilaterally at C3-C4.   Chest XR,  PA & LAT    Impression    IMPRESSION: Small lung volumes. No focal airspace consolidation. No pleural effusion or pneumothorax.    Cardiomediastinal silhouette is normal. Atherosclerotic calcifications of the thoracic aorta.    Lucency beneath right hemidiaphragm, compatible with colonic interposition.   CTA Chest with Contrast    Impression    IMPRESSION:  1.  Atherosclerotic normal-caliber thoracic aorta without aneurysm or dissection. Normal cardiac size. Moderate coronary artery calcifications. Small pericardial effusion.    2.  Evidence of remote granulomatous disease. Small bilateral pleural effusions have developed with associated passive atelectasis in the adjacent lungs.    3.  Thickening of the distal stomach may be due to peristalsis. Small duodenal diverticulum adjacent to the head of the pancreas.    4.  Degenerative spine and both shoulders. Interval development of compression of L1 vertebral body.     Echocardiogram Complete   Result Value Ref Range    LVEF  60-65%    Echocardiogram Limited   Result Value Ref Range    LVEF  60-65%          Discharge Orders        Follow-Up with Cardiology      Home Care Referral      Reason for your hospital stay    Syncope     Follow-up and recommended labs and tests     Follow up with primary care provider, Physician No Ref-Primary, within 7 days for hospital follow- up and close  monitoring of renal function..  The following labs/tests are recommended: Renal panel.    Follow up with Kurt Lea (Cardiology) in the office  within 2 weeks for hospital follow- up. No follow up labs or test are needed.     Activity    Your activity upon discharge: activity as tolerated     Cardiac Event Monitor Adult Pediatric     Diet    Follow this diet upon discharge: Orders Placed This Encounter      Room Service      2 Gram Sodium Diet     NM Lexiscan stress test (nuc card)       Examination   Physical Exam   Temp:  [98.2  F (36.8  C)-98.9  F (37.2  C)] 98.7  F (37.1  C)  Pulse:  [59-79] 70  Resp:  [18-20] 18  BP: (118-143)/(63-73) 143/73  SpO2:  [92 %-96 %] 96 %  Wt Readings from Last 1 Encounters:   10/24/22 72 kg (158 lb 12.8 oz)         Please see EMR for more detailed significant labs, imaging, consultant notes etc.    General Appearance:    Alert, cooperative, no distress, appears stated age   Lungs:     Clear bilaterally    Cardiovascular:    Regular rate ands rhythm.  Normal S1, S2.  No murmur, rub or gallop.  No edema   Abdomen:     Soft, non-tender, bowel sounds active all four quadrants,     no masses, no organomegaly   Neurologic:   Awake, alert, oriented x 3.  Grossly nonfocal          I spent  40 minutes on discharge documentation, discharge counseling, discharge physical exam, discharge medication review and reconciliation.     Salome Burgos,   [facility]    CC:No Ref-Primary, Physician

## 2022-10-24 NOTE — PLAN OF CARE
Patient's Left side heart cath procedure was canceled by Dr. Valdez today. Granddaughter updated. Patient upset and wanting to leave.    Problem: Syncope  Goal: Absence of Syncopal Symptoms  Outcome: Progressing   Patient denied syncope.    Problem: Acute Kidney Injury/Impairment  Goal: Effective Renal Function  Outcome: Progressing   Creatinine 1.45  GFR 47    Problem: UTI (Urinary Tract Infection)  Goal: Improved Infection Symptoms  Outcome: Progressing   Lozano catheter remains in place.

## 2022-10-24 NOTE — PLAN OF CARE
Occupational Therapy Discharge Summary    Reason for therapy discharge:    Discharged to home with home therapy.    Progress towards therapy goal(s). See goals on Care Plan in [facility] electronic health record for goal details.  Goals not met.  Barriers to achieving goals:   discharge from facility.    Therapy recommendation(s):    Continued therapy is recommended.  Rationale/Recommendations:  Recommend home OT and family assist w/his ADLs, pt needs 24 hour supervision for his safety..

## 2022-10-25 ENCOUNTER — PATIENT OUTREACH (OUTPATIENT)
Dept: CARE COORDINATION | Facility: CLINIC | Age: 87
End: 2022-10-25

## 2022-10-25 NOTE — PROGRESS NOTES
Clinic Care Coordination Contact  [facility]: Post-Discharge Note  SITUATION                                                      Admission:    Admission Date: 10/21/22   Reason for Admission: Syncope  NSTEMI  Acute kidney injury  COVID-19 infection  Hypothyroidism  Microcytic anemia  Benign prostatic hyperplasia  Previous CVA  Discharge:   Discharge Date: 10/24/22  Discharge Diagnosis: Syncope  NSTEMI  Acute kidney injury  COVID-19 infection  Hypothyroidism  Microcytic anemia  Benign prostatic hyperplasia  Previous CVA    BACKGROUND                                                      Per hospital discharge summary and inpatient provider notes:    Dilip Long is a 87 year old male with history of BPH, prior CVA, diverticulosis, Graves' disease status post ablation, hyperlipidemia, hypothyroidism, hypertension, and ureteral stricture who presents to the ER due to syncope.     He was in his usual state of health until today when it is until he suddenly passed out while sitting on a chair.  There was no preceding symptoms such as dizziness, chest pain, palpitation, diaphoresis or shortness of breath.  He received 0.5 to 1 L of normal saline en route.  Upon arrival in the ER patient was alert and responsive.  Her appetite has been poor at baseline as per wife.     Upon arrival in the ER EKG showed junctional rhythm.  Hemoglobin was found to be low at 7.9, however, he denies any history of melena or hematochezia.     Initial blood pressure was 106/55, pulse 58, respiratory rate 13, temperature 97.7  F and oxygen saturation of 99% on room air.  Notable laboratory findings include creatinine of 1.83 from 0.82 on 7/18/2019, calcium 7.5, albumin 3.0, high-sensitivity troponin 88, , WBC 3.3, hemoglobin 7.9, with MCV of 105 and INR of 1.22.  Chest x-ray showed no acute cardiopulmonary process.  Neck CT showed moderate to severe neural foraminal stenosis bilaterally at C3-C4 and no fracture or posttraumatic  "subluxation or high-grade spinal canal stenosis.  Head CT showed no evidence of acute intracranial process.     He received normal saline 0.5 l bolus in the ER.    ASSESSMENT      Discharge Assessment  How are you doing now that you are home?: \"Doing okay\"  How are your symptoms? (Red Flag symptoms escalate to triage hotline per guidelines): Improved  Do you feel your condition is stable enough to be safe at home until your provider visit?: Yes  Does the patient have their discharge instructions? : Yes  Does the patient have questions regarding their discharge instructions? : No  Were you started on any new medications or were there changes to any of your previous medications? : Yes  Does the patient have all of their medications?: Yes  Do you have questions regarding any of your medications? : No  Do you have all of your needed medical supplies or equipment (DME)?  (i.e. oxygen tank, CPAP, cane, etc.): Yes  Discharge follow-up appointment scheduled within 14 calendar days? : No  Is patient agreeable to assistance with scheduling? : No    Post-op ([facility] CTA Only)  If the patient had a surgery or procedure, do they have any questions for a nurse?: No    PLAN                                                      Outpatient Plan:    Follow up with primary care provider, Physician No Ref-Primary, within 7   days for hospital follow- up and close monitoring of renal function..  The   following labs/tests are recommended: Renal panel.    Follow up with Kurt Lea (Cardiology) in the office  within 2 weeks   for hospital follow- up. No follow up labs or test are needed.     Future Appointments   Date Time Provider Department Center   11/7/2022 11:45 AM PETROSN NM CH 1 JNNUCM [facility]   11/7/2022  2:45 PM JN HC MON JNCVTS [facility]         For any urgent concerns, please contact our 24 hour nurse triage line: [phone] ([phone])         YOU Dooley  [phone]  [facility]    "

## 2022-11-06 ENCOUNTER — HOSPITAL ENCOUNTER (INPATIENT)
Facility: HOSPITAL | Age: 87
LOS: 4 days | Discharge: HOME HEALTH | DRG: 871 | End: 2022-11-10
Attending: EMERGENCY MEDICINE | Admitting: INTERNAL MEDICINE
Payer: MEDICARE

## 2022-11-06 ENCOUNTER — APPOINTMENT (OUTPATIENT)
Dept: RADIOLOGY | Facility: HOSPITAL | Age: 87
DRG: 871 | End: 2022-11-06
Attending: EMERGENCY MEDICINE
Payer: MEDICARE

## 2022-11-06 ENCOUNTER — APPOINTMENT (OUTPATIENT)
Dept: CT IMAGING | Facility: HOSPITAL | Age: 87
DRG: 871 | End: 2022-11-06
Attending: EMERGENCY MEDICINE
Payer: MEDICARE

## 2022-11-06 DIAGNOSIS — G47.00 INSOMNIA, UNSPECIFIED TYPE: ICD-10-CM

## 2022-11-06 DIAGNOSIS — T83.511A URINARY TRACT INFECTION ASSOCIATED WITH INDWELLING URETHRAL CATHETER, INITIAL ENCOUNTER (H): ICD-10-CM

## 2022-11-06 DIAGNOSIS — I10 ESSENTIAL HYPERTENSION: Primary | ICD-10-CM

## 2022-11-06 DIAGNOSIS — M62.81 GENERALIZED MUSCLE WEAKNESS: ICD-10-CM

## 2022-11-06 DIAGNOSIS — I48.0 PAROXYSMAL ATRIAL FIBRILLATION (H): ICD-10-CM

## 2022-11-06 DIAGNOSIS — I50.31 ACUTE HEART FAILURE WITH PRESERVED EJECTION FRACTION (HFPEF) (H): ICD-10-CM

## 2022-11-06 DIAGNOSIS — N39.0 URINARY TRACT INFECTION ASSOCIATED WITH INDWELLING URETHRAL CATHETER, INITIAL ENCOUNTER (H): ICD-10-CM

## 2022-11-06 DIAGNOSIS — E03.9 HYPOTHYROIDISM, UNSPECIFIED TYPE: ICD-10-CM

## 2022-11-06 DIAGNOSIS — E89.0 HYPOTHYROIDISM, POSTABLATIVE: ICD-10-CM

## 2022-11-06 DIAGNOSIS — I50.30 DIASTOLIC CONGESTIVE HEART FAILURE, UNSPECIFIED HF CHRONICITY (H): ICD-10-CM

## 2022-11-06 DIAGNOSIS — I49.5 SICK SINUS SYNDROME (H): ICD-10-CM

## 2022-11-06 DIAGNOSIS — R91.8 LEFT LOWER LOBE PULMONARY INFILTRATE: ICD-10-CM

## 2022-11-06 PROBLEM — N18.31 STAGE 3A CHRONIC KIDNEY DISEASE (H): Status: ACTIVE | Noted: 2022-10-31

## 2022-11-06 LAB
ALBUMIN SERPL BCG-MCNC: 3.3 G/DL (ref 3.5–5.2)
ALBUMIN UR-MCNC: 300 MG/DL
ALP SERPL-CCNC: 58 U/L (ref 40–129)
ALT SERPL W P-5'-P-CCNC: 11 U/L (ref 10–50)
ANION GAP SERPL CALCULATED.3IONS-SCNC: 12 MMOL/L (ref 7–15)
APPEARANCE UR: ABNORMAL
AST SERPL W P-5'-P-CCNC: 28 U/L (ref 10–50)
BACTERIA #/AREA URNS HPF: ABNORMAL /HPF
BASOPHILS # BLD AUTO: 0 10E3/UL (ref 0–0.2)
BASOPHILS NFR BLD AUTO: 0 %
BILIRUB SERPL-MCNC: 1.1 MG/DL
BILIRUB UR QL STRIP: NEGATIVE
BUN SERPL-MCNC: 18.3 MG/DL (ref 8–23)
CALCIUM SERPL-MCNC: 8.1 MG/DL (ref 8.8–10.2)
CHLORIDE SERPL-SCNC: 101 MMOL/L (ref 98–107)
COLOR UR AUTO: ABNORMAL
CREAT SERPL-MCNC: 1.32 MG/DL (ref 0.67–1.17)
DEPRECATED HCO3 PLAS-SCNC: 24 MMOL/L (ref 22–29)
EOSINOPHIL # BLD AUTO: 0.3 10E3/UL (ref 0–0.7)
EOSINOPHIL NFR BLD AUTO: 3 %
ERYTHROCYTE [DISTWIDTH] IN BLOOD BY AUTOMATED COUNT: 12.2 % (ref 10–15)
FLUAV RNA SPEC QL NAA+PROBE: NEGATIVE
FLUBV RNA RESP QL NAA+PROBE: NEGATIVE
GFR SERPL CREATININE-BSD FRML MDRD: 53 ML/MIN/1.73M2
GLUCOSE SERPL-MCNC: 109 MG/DL (ref 70–99)
GLUCOSE UR STRIP-MCNC: NEGATIVE MG/DL
HCT VFR BLD AUTO: 28.7 % (ref 40–53)
HGB BLD-MCNC: 9.4 G/DL (ref 13.3–17.7)
HGB UR QL STRIP: ABNORMAL
IMM GRANULOCYTES # BLD: 0.1 10E3/UL
IMM GRANULOCYTES NFR BLD: 1 %
KETONES UR STRIP-MCNC: ABNORMAL MG/DL
LACTATE SERPL-SCNC: 1.8 MMOL/L (ref 0.7–2)
LEUKOCYTE ESTERASE UR QL STRIP: ABNORMAL
LYMPHOCYTES # BLD AUTO: 0.6 10E3/UL (ref 0.8–5.3)
LYMPHOCYTES NFR BLD AUTO: 6 %
MCH RBC QN AUTO: 33.8 PG (ref 26.5–33)
MCHC RBC AUTO-ENTMCNC: 32.8 G/DL (ref 31.5–36.5)
MCV RBC AUTO: 103 FL (ref 78–100)
MONOCYTES # BLD AUTO: 1 10E3/UL (ref 0–1.3)
MONOCYTES NFR BLD AUTO: 10 %
MUCOUS THREADS #/AREA URNS LPF: PRESENT /LPF
NEUTROPHILS # BLD AUTO: 7.8 10E3/UL (ref 1.6–8.3)
NEUTROPHILS NFR BLD AUTO: 80 %
NITRATE UR QL: NEGATIVE
NRBC # BLD AUTO: 0 10E3/UL
NRBC BLD AUTO-RTO: 0 /100
NT-PROBNP SERPL-MCNC: 2747 PG/ML (ref 0–1800)
PH UR STRIP: 5.5 [PH] (ref 5–7)
PLATELET # BLD AUTO: 256 10E3/UL (ref 150–450)
POTASSIUM SERPL-SCNC: 3.6 MMOL/L (ref 3.4–5.3)
PROT SERPL-MCNC: 5.9 G/DL (ref 6.4–8.3)
RBC # BLD AUTO: 2.78 10E6/UL (ref 4.4–5.9)
RBC URINE: 35 /HPF
RSV RNA SPEC NAA+PROBE: NEGATIVE
SARS-COV-2 RNA RESP QL NAA+PROBE: POSITIVE
SODIUM SERPL-SCNC: 137 MMOL/L (ref 136–145)
SP GR UR STRIP: 1.03 (ref 1–1.03)
SQUAMOUS EPITHELIAL: 29 /HPF
T4 FREE SERPL-MCNC: <0.1 NG/DL (ref 0.9–1.7)
TROPONIN T SERPL HS-MCNC: 64 NG/L
TSH SERPL DL<=0.005 MIU/L-ACNC: 64.25 UIU/ML (ref 0.3–4.2)
UROBILINOGEN UR STRIP-MCNC: 2 MG/DL
WBC # BLD AUTO: 9.7 10E3/UL (ref 4–11)
WBC URINE: 117 /HPF

## 2022-11-06 PROCEDURE — 85025 COMPLETE CBC W/AUTO DIFF WBC: CPT | Performed by: EMERGENCY MEDICINE

## 2022-11-06 PROCEDURE — 84484 ASSAY OF TROPONIN QUANT: CPT | Performed by: EMERGENCY MEDICINE

## 2022-11-06 PROCEDURE — G1010 CDSM STANSON: HCPCS

## 2022-11-06 PROCEDURE — 83605 ASSAY OF LACTIC ACID: CPT | Performed by: EMERGENCY MEDICINE

## 2022-11-06 PROCEDURE — 250N000011 HC RX IP 250 OP 636: Performed by: EMERGENCY MEDICINE

## 2022-11-06 PROCEDURE — 80053 COMPREHEN METABOLIC PANEL: CPT | Performed by: EMERGENCY MEDICINE

## 2022-11-06 PROCEDURE — 36415 COLL VENOUS BLD VENIPUNCTURE: CPT | Performed by: EMERGENCY MEDICINE

## 2022-11-06 PROCEDURE — 99285 EMERGENCY DEPT VISIT HI MDM: CPT | Mod: 25

## 2022-11-06 PROCEDURE — 71045 X-RAY EXAM CHEST 1 VIEW: CPT

## 2022-11-06 PROCEDURE — 96375 TX/PRO/DX INJ NEW DRUG ADDON: CPT

## 2022-11-06 PROCEDURE — 83880 ASSAY OF NATRIURETIC PEPTIDE: CPT | Performed by: EMERGENCY MEDICINE

## 2022-11-06 PROCEDURE — 93005 ELECTROCARDIOGRAM TRACING: CPT | Performed by: EMERGENCY MEDICINE

## 2022-11-06 PROCEDURE — 5A09357 ASSISTANCE WITH RESPIRATORY VENTILATION, LESS THAN 24 CONSECUTIVE HOURS, CONTINUOUS POSITIVE AIRWAY PRESSURE: ICD-10-PCS | Performed by: EMERGENCY MEDICINE

## 2022-11-06 PROCEDURE — 81001 URINALYSIS AUTO W/SCOPE: CPT | Performed by: EMERGENCY MEDICINE

## 2022-11-06 PROCEDURE — 200N000001 HC R&B ICU

## 2022-11-06 PROCEDURE — 87086 URINE CULTURE/COLONY COUNT: CPT | Performed by: EMERGENCY MEDICINE

## 2022-11-06 PROCEDURE — 87637 SARSCOV2&INF A&B&RSV AMP PRB: CPT | Performed by: EMERGENCY MEDICINE

## 2022-11-06 PROCEDURE — 84439 ASSAY OF FREE THYROXINE: CPT | Performed by: EMERGENCY MEDICINE

## 2022-11-06 PROCEDURE — 93308 TTE F-UP OR LMTD: CPT

## 2022-11-06 PROCEDURE — C9803 HOPD COVID-19 SPEC COLLECT: HCPCS

## 2022-11-06 PROCEDURE — 258N000003 HC RX IP 258 OP 636: Performed by: EMERGENCY MEDICINE

## 2022-11-06 PROCEDURE — 96365 THER/PROPH/DIAG IV INF INIT: CPT | Mod: 59

## 2022-11-06 PROCEDURE — 84443 ASSAY THYROID STIM HORMONE: CPT | Performed by: EMERGENCY MEDICINE

## 2022-11-06 PROCEDURE — 96366 THER/PROPH/DIAG IV INF ADDON: CPT

## 2022-11-06 RX ORDER — DILTIAZEM HYDROCHLORIDE 5 MG/ML
5 INJECTION INTRAVENOUS ONCE
Status: COMPLETED | OUTPATIENT
Start: 2022-11-06 | End: 2022-11-06

## 2022-11-06 RX ORDER — CEFTRIAXONE 1 G/1
1 INJECTION, POWDER, FOR SOLUTION INTRAMUSCULAR; INTRAVENOUS ONCE
Status: COMPLETED | OUTPATIENT
Start: 2022-11-06 | End: 2022-11-07

## 2022-11-06 RX ORDER — IOPAMIDOL 755 MG/ML
75 INJECTION, SOLUTION INTRAVASCULAR ONCE
Status: COMPLETED | OUTPATIENT
Start: 2022-11-06 | End: 2022-11-06

## 2022-11-06 RX ADMIN — IOPAMIDOL 75 ML: 755 INJECTION, SOLUTION INTRAVENOUS at 22:40

## 2022-11-06 RX ADMIN — SODIUM CHLORIDE 500 ML: 9 INJECTION, SOLUTION INTRAVENOUS at 21:39

## 2022-11-06 RX ADMIN — CEFTRIAXONE SODIUM 1 G: 1 INJECTION, POWDER, FOR SOLUTION INTRAMUSCULAR; INTRAVENOUS at 22:04

## 2022-11-06 RX ADMIN — DILTIAZEM HYDROCHLORIDE 5 MG: 5 INJECTION INTRAVENOUS at 22:04

## 2022-11-06 ASSESSMENT — ACTIVITIES OF DAILY LIVING (ADL)
ADLS_ACUITY_SCORE: 35
ADLS_ACUITY_SCORE: 35

## 2022-11-07 ENCOUNTER — APPOINTMENT (OUTPATIENT)
Dept: CARDIOLOGY | Facility: HOSPITAL | Age: 87
DRG: 871 | End: 2022-11-07
Attending: INTERNAL MEDICINE
Payer: MEDICARE

## 2022-11-07 ENCOUNTER — APPOINTMENT (OUTPATIENT)
Dept: CT IMAGING | Facility: HOSPITAL | Age: 87
DRG: 871 | End: 2022-11-07
Attending: INTERNAL MEDICINE
Payer: MEDICARE

## 2022-11-07 ENCOUNTER — TELEPHONE (OUTPATIENT)
Dept: CARDIOLOGY | Facility: CLINIC | Age: 87
End: 2022-11-07

## 2022-11-07 DIAGNOSIS — I50.9 ACUTE DECOMPENSATED HEART FAILURE (H): Primary | ICD-10-CM

## 2022-11-07 PROBLEM — I50.30 DIASTOLIC CONGESTIVE HEART FAILURE, UNSPECIFIED HF CHRONICITY (H): Status: ACTIVE | Noted: 2022-11-07

## 2022-11-07 LAB
ANION GAP SERPL CALCULATED.3IONS-SCNC: 12 MMOL/L (ref 7–15)
BASE EXCESS BLDA CALC-SCNC: 3.7 MMOL/L
BASE EXCESS BLDV CALC-SCNC: 4.3 MMOL/L
BUN SERPL-MCNC: 19.6 MG/DL (ref 8–23)
CALCIUM SERPL-MCNC: 7.8 MG/DL (ref 8.8–10.2)
CHLORIDE SERPL-SCNC: 102 MMOL/L (ref 98–107)
COHGB MFR BLD: 97.8 % (ref 95–96)
CREAT SERPL-MCNC: 1.29 MG/DL (ref 0.67–1.17)
CRP SERPL-MCNC: 188.5 MG/L
DEPRECATED HCO3 PLAS-SCNC: 24 MMOL/L (ref 22–29)
GFR SERPL CREATININE-BSD FRML MDRD: 54 ML/MIN/1.73M2
GLUCOSE BLDC GLUCOMTR-MCNC: 97 MG/DL (ref 70–99)
GLUCOSE SERPL-MCNC: 102 MG/DL (ref 70–99)
HCO3 BLD-SCNC: 27 MMOL/L (ref 23–29)
HCO3 BLDV-SCNC: 28 MMOL/L (ref 24–30)
LVEF ECHO: NORMAL
MAGNESIUM SERPL-MCNC: 1.8 MG/DL (ref 1.7–2.3)
O2/TOTAL GAS SETTING VFR VENT: 30 %
OXYHGB MFR BLD: 96.4 % (ref 95–96)
OXYHGB MFR BLDV: 66.1 % (ref 70–75)
PCO2 BLD: 34 MM HG (ref 35–45)
PCO2 BLDV: 42 MM HG (ref 35–50)
PEEP: 5 CM H2O
PH BLD: 7.51 [PH] (ref 7.37–7.44)
PH BLDV: 7.44 [PH] (ref 7.35–7.45)
PO2 BLD: 87 MM HG (ref 75–85)
PO2 BLDV: 36 MM HG (ref 25–47)
POTASSIUM SERPL-SCNC: 3.1 MMOL/L (ref 3.4–5.3)
POTASSIUM SERPL-SCNC: 3.5 MMOL/L (ref 3.4–5.3)
PROCALCITONIN SERPL IA-MCNC: 2.82 NG/ML
PSV (LAB BLOOD GAS): 12 CM H2O
RATE: 14 RR/MIN
SAO2 % BLDV: 67.3 % (ref 70–75)
SODIUM SERPL-SCNC: 138 MMOL/L (ref 136–145)
TEMPERATURE: 37 DEGREES C
VENTILATION MODE: ABNORMAL

## 2022-11-07 PROCEDURE — 999N000287 HC ICU ADULT ROUNDING, EACH 10 MINS

## 2022-11-07 PROCEDURE — 86140 C-REACTIVE PROTEIN: CPT | Performed by: INTERNAL MEDICINE

## 2022-11-07 PROCEDURE — 99222 1ST HOSP IP/OBS MODERATE 55: CPT | Performed by: INTERNAL MEDICINE

## 2022-11-07 PROCEDURE — 258N000003 HC RX IP 258 OP 636: Performed by: INTERNAL MEDICINE

## 2022-11-07 PROCEDURE — 99291 CRITICAL CARE FIRST HOUR: CPT | Performed by: INTERNAL MEDICINE

## 2022-11-07 PROCEDURE — 99222 1ST HOSP IP/OBS MODERATE 55: CPT | Performed by: CLINICAL NURSE SPECIALIST

## 2022-11-07 PROCEDURE — 250N000011 HC RX IP 250 OP 636: Performed by: EMERGENCY MEDICINE

## 2022-11-07 PROCEDURE — 36415 COLL VENOUS BLD VENIPUNCTURE: CPT | Performed by: INTERNAL MEDICINE

## 2022-11-07 PROCEDURE — 83735 ASSAY OF MAGNESIUM: CPT | Performed by: INTERNAL MEDICINE

## 2022-11-07 PROCEDURE — 84145 PROCALCITONIN (PCT): CPT | Performed by: INTERNAL MEDICINE

## 2022-11-07 PROCEDURE — 94660 CPAP INITIATION&MGMT: CPT

## 2022-11-07 PROCEDURE — 93306 TTE W/DOPPLER COMPLETE: CPT | Mod: 26 | Performed by: INTERNAL MEDICINE

## 2022-11-07 PROCEDURE — 255N000002 HC RX 255 OP 636: Performed by: INTERNAL MEDICINE

## 2022-11-07 PROCEDURE — 250N000011 HC RX IP 250 OP 636: Performed by: INTERNAL MEDICINE

## 2022-11-07 PROCEDURE — G1010 CDSM STANSON: HCPCS

## 2022-11-07 PROCEDURE — 36600 WITHDRAWAL OF ARTERIAL BLOOD: CPT

## 2022-11-07 PROCEDURE — 80048 BASIC METABOLIC PNL TOTAL CA: CPT | Performed by: INTERNAL MEDICINE

## 2022-11-07 PROCEDURE — 82805 BLOOD GASES W/O2 SATURATION: CPT | Performed by: INTERNAL MEDICINE

## 2022-11-07 PROCEDURE — 250N000009 HC RX 250: Performed by: INTERNAL MEDICINE

## 2022-11-07 PROCEDURE — 84132 ASSAY OF SERUM POTASSIUM: CPT | Performed by: INTERNAL MEDICINE

## 2022-11-07 PROCEDURE — 200N000001 HC R&B ICU

## 2022-11-07 PROCEDURE — 999N000157 HC STATISTIC RCP TIME EA 10 MIN

## 2022-11-07 PROCEDURE — 96375 TX/PRO/DX INJ NEW DRUG ADDON: CPT

## 2022-11-07 PROCEDURE — 99223 1ST HOSP IP/OBS HIGH 75: CPT | Mod: AI | Performed by: INTERNAL MEDICINE

## 2022-11-07 RX ORDER — NOREPINEPHRINE BITARTRATE 0.02 MG/ML
INJECTION, SOLUTION INTRAVENOUS
Status: DISCONTINUED
Start: 2022-11-07 | End: 2022-11-07 | Stop reason: HOSPADM

## 2022-11-07 RX ORDER — LIDOCAINE 40 MG/G
CREAM TOPICAL
Status: DISCONTINUED | OUTPATIENT
Start: 2022-11-07 | End: 2022-11-10 | Stop reason: HOSPADM

## 2022-11-07 RX ORDER — ASPIRIN 81 MG/1
81 TABLET, CHEWABLE ORAL DAILY
Status: DISCONTINUED | OUTPATIENT
Start: 2022-11-07 | End: 2022-11-07

## 2022-11-07 RX ORDER — ASPIRIN 81 MG/1
81 TABLET, CHEWABLE ORAL DAILY
Status: ON HOLD | COMMUNITY
End: 2022-11-10

## 2022-11-07 RX ORDER — CALCIUM CARBONATE 500 MG/1
1000 TABLET, CHEWABLE ORAL 4 TIMES DAILY PRN
Status: DISCONTINUED | OUTPATIENT
Start: 2022-11-07 | End: 2022-11-10 | Stop reason: HOSPADM

## 2022-11-07 RX ORDER — CEFTRIAXONE 1 G/1
1 INJECTION, POWDER, FOR SOLUTION INTRAMUSCULAR; INTRAVENOUS EVERY 24 HOURS
Status: DISCONTINUED | OUTPATIENT
Start: 2022-11-07 | End: 2022-11-09 | Stop reason: ALTCHOICE

## 2022-11-07 RX ORDER — ACETAMINOPHEN 650 MG/1
650 SUPPOSITORY RECTAL EVERY 4 HOURS PRN
Status: DISCONTINUED | OUTPATIENT
Start: 2022-11-07 | End: 2022-11-10 | Stop reason: HOSPADM

## 2022-11-07 RX ORDER — FUROSEMIDE 10 MG/ML
20 INJECTION INTRAMUSCULAR; INTRAVENOUS EVERY 24 HOURS
Status: DISCONTINUED | OUTPATIENT
Start: 2022-11-07 | End: 2022-11-09 | Stop reason: ALTCHOICE

## 2022-11-07 RX ORDER — AMOXICILLIN 250 MG
2 CAPSULE ORAL 2 TIMES DAILY PRN
Status: DISCONTINUED | OUTPATIENT
Start: 2022-11-07 | End: 2022-11-10 | Stop reason: HOSPADM

## 2022-11-07 RX ORDER — DILTIAZEM HCL IN NACL,ISO-OSM 125 MG/125
5-15 PLASTIC BAG, INJECTION (ML) INTRAVENOUS CONTINUOUS
Status: DISCONTINUED | OUTPATIENT
Start: 2022-11-07 | End: 2022-11-08

## 2022-11-07 RX ORDER — METOPROLOL TARTRATE 25 MG/1
25 TABLET, FILM COATED ORAL 2 TIMES DAILY
Status: DISCONTINUED | OUTPATIENT
Start: 2022-11-07 | End: 2022-11-07

## 2022-11-07 RX ORDER — HEPARIN SODIUM 5000 [USP'U]/.5ML
5000 INJECTION, SOLUTION INTRAVENOUS; SUBCUTANEOUS EVERY 12 HOURS
Status: COMPLETED | OUTPATIENT
Start: 2022-11-07 | End: 2022-11-08

## 2022-11-07 RX ORDER — FUROSEMIDE 10 MG/ML
20 INJECTION INTRAMUSCULAR; INTRAVENOUS ONCE
Status: COMPLETED | OUTPATIENT
Start: 2022-11-07 | End: 2022-11-07

## 2022-11-07 RX ORDER — LEVOTHYROXINE SODIUM ANHYDROUS 100 UG/5ML
25 INJECTION, POWDER, LYOPHILIZED, FOR SOLUTION INTRAVENOUS DAILY
Status: DISCONTINUED | OUTPATIENT
Start: 2022-11-07 | End: 2022-11-08

## 2022-11-07 RX ORDER — FUROSEMIDE 10 MG/ML
20 INJECTION INTRAMUSCULAR; INTRAVENOUS ONCE
Status: DISCONTINUED | OUTPATIENT
Start: 2022-11-07 | End: 2022-11-07

## 2022-11-07 RX ORDER — POTASSIUM CHLORIDE 1.5 G/1.58G
20 POWDER, FOR SOLUTION ORAL ONCE
Status: COMPLETED | OUTPATIENT
Start: 2022-11-08 | End: 2022-11-08

## 2022-11-07 RX ORDER — METOPROLOL TARTRATE 1 MG/ML
5 INJECTION, SOLUTION INTRAVENOUS EVERY 6 HOURS PRN
Status: DISCONTINUED | OUTPATIENT
Start: 2022-11-07 | End: 2022-11-07

## 2022-11-07 RX ORDER — DOXYCYCLINE 100 MG/10ML
100 INJECTION, POWDER, LYOPHILIZED, FOR SOLUTION INTRAVENOUS EVERY 12 HOURS
Status: DISCONTINUED | OUTPATIENT
Start: 2022-11-07 | End: 2022-11-09 | Stop reason: ALTCHOICE

## 2022-11-07 RX ORDER — MAGNESIUM SULFATE HEPTAHYDRATE 40 MG/ML
2 INJECTION, SOLUTION INTRAVENOUS ONCE
Status: COMPLETED | OUTPATIENT
Start: 2022-11-07 | End: 2022-11-07

## 2022-11-07 RX ORDER — AMOXICILLIN 250 MG
1 CAPSULE ORAL 2 TIMES DAILY PRN
Status: DISCONTINUED | OUTPATIENT
Start: 2022-11-07 | End: 2022-11-10 | Stop reason: HOSPADM

## 2022-11-07 RX ORDER — SIMVASTATIN 10 MG
10 TABLET ORAL AT BEDTIME
Status: DISCONTINUED | OUTPATIENT
Start: 2022-11-07 | End: 2022-11-10 | Stop reason: HOSPADM

## 2022-11-07 RX ORDER — ACETAMINOPHEN 325 MG/1
650 TABLET ORAL EVERY 4 HOURS PRN
Status: DISCONTINUED | OUTPATIENT
Start: 2022-11-07 | End: 2022-11-10 | Stop reason: HOSPADM

## 2022-11-07 RX ORDER — POTASSIUM CHLORIDE 7.45 MG/ML
10 INJECTION INTRAVENOUS
Status: COMPLETED | OUTPATIENT
Start: 2022-11-07 | End: 2022-11-07

## 2022-11-07 RX ADMIN — POTASSIUM CHLORIDE 10 MEQ: 7.46 INJECTION, SOLUTION INTRAVENOUS at 04:57

## 2022-11-07 RX ADMIN — CEFTRIAXONE SODIUM 1 G: 1 INJECTION, POWDER, FOR SOLUTION INTRAMUSCULAR; INTRAVENOUS at 21:49

## 2022-11-07 RX ADMIN — FUROSEMIDE 20 MG: 10 INJECTION, SOLUTION INTRAMUSCULAR; INTRAVENOUS at 00:49

## 2022-11-07 RX ADMIN — Medication 5 MG/HR: at 02:40

## 2022-11-07 RX ADMIN — SODIUM CHLORIDE 250 ML: 9 INJECTION, SOLUTION INTRAVENOUS at 05:28

## 2022-11-07 RX ADMIN — DOXYCYCLINE 100 MG: 100 INJECTION, POWDER, LYOPHILIZED, FOR SOLUTION INTRAVENOUS at 16:24

## 2022-11-07 RX ADMIN — HEPARIN SODIUM 5000 UNITS: 10000 INJECTION, SOLUTION INTRAVENOUS; SUBCUTANEOUS at 05:28

## 2022-11-07 RX ADMIN — FAMOTIDINE 20 MG: 10 INJECTION, SOLUTION INTRAVENOUS at 06:03

## 2022-11-07 RX ADMIN — POTASSIUM CHLORIDE 10 MEQ: 7.46 INJECTION, SOLUTION INTRAVENOUS at 07:03

## 2022-11-07 RX ADMIN — POTASSIUM CHLORIDE 10 MEQ: 7.46 INJECTION, SOLUTION INTRAVENOUS at 07:48

## 2022-11-07 RX ADMIN — LEVOTHYROXINE SODIUM ANHYDROUS 25 MCG: 100 INJECTION, POWDER, LYOPHILIZED, FOR SOLUTION INTRAVENOUS at 08:57

## 2022-11-07 RX ADMIN — MAGNESIUM SULFATE HEPTAHYDRATE 2 G: 40 INJECTION, SOLUTION INTRAVENOUS at 05:07

## 2022-11-07 RX ADMIN — POTASSIUM CHLORIDE 10 MEQ: 7.46 INJECTION, SOLUTION INTRAVENOUS at 06:02

## 2022-11-07 RX ADMIN — HEPARIN SODIUM 5000 UNITS: 10000 INJECTION, SOLUTION INTRAVENOUS; SUBCUTANEOUS at 15:55

## 2022-11-07 RX ADMIN — PERFLUTREN 2 ML: 6.52 INJECTION, SUSPENSION INTRAVENOUS at 13:05

## 2022-11-07 RX ADMIN — DOXYCYCLINE 100 MG: 100 INJECTION, POWDER, LYOPHILIZED, FOR SOLUTION INTRAVENOUS at 05:56

## 2022-11-07 ASSESSMENT — ACTIVITIES OF DAILY LIVING (ADL)
CHANGE_IN_FUNCTIONAL_STATUS_SINCE_ONSET_OF_CURRENT_ILLNESS/INJURY: NO
NUMBER_OF_TIMES_PATIENT_HAS_FALLEN_WITHIN_LAST_SIX_MONTHS: 1
ADLS_ACUITY_SCORE: 50
FALL_HISTORY_WITHIN_LAST_SIX_MONTHS: YES
TOILETING_ISSUES: NO
DIFFICULTY_EATING/SWALLOWING: NO
DOING_ERRANDS_INDEPENDENTLY_DIFFICULTY: YES
ADLS_ACUITY_SCORE: 50
WEAR_GLASSES_OR_BLIND: YES
ADLS_ACUITY_SCORE: 45
ADLS_ACUITY_SCORE: 35
ADLS_ACUITY_SCORE: 45
ADLS_ACUITY_SCORE: 45
ADLS_ACUITY_SCORE: 34
DRESSING/BATHING_DIFFICULTY: NO
ADLS_ACUITY_SCORE: 34
ADLS_ACUITY_SCORE: 35
CONCENTRATING,_REMEMBERING_OR_MAKING_DECISIONS_DIFFICULTY: NO
ADLS_ACUITY_SCORE: 45
EQUIPMENT_CURRENTLY_USED_AT_HOME: CANE, STRAIGHT
WALKING_OR_CLIMBING_STAIRS_DIFFICULTY: NO

## 2022-11-07 NOTE — PLAN OF CARE
[facility] - ICU    RN Progress Note:            Pertinent Assessments:      Please refer to flowsheet rows for full assessment     - Came here in ICU very lethargic, AFIB with HR of 110's to 120's. Started on diltiazem drip effective with soft BP. Stopped diltiazem. M.D aware. Patients remains on afib with controlled HR. 250 of normal saline given. MAP >65.  - BIPAP, FI02 of 30%. Oxygen saturation >95%. More awake but remains drowsy.  Put him on Oxymask at 2L/min oxygen saturation >95%. M.D aware.  - Replaced jorge catheter when he came in ICU.         Mobility Level:     2         Key Events - This Shift:     - BIPAP, FI02 of 30%. Oxygen saturation >95%.  - Put him on Oxymask at 2L/min/ oxygen saturation >95%. M.D aware.  - not lethargic however however remains drowsy. Garbled speech.                  Plan:     - Maintain oxygen >90%.  - Vital signs stable and controlled Afib.

## 2022-11-07 NOTE — ED NOTES
Pt has very limited mobility and struggles to follow commands. Pt barely can wiggle his toes and lift his legs. Pt cannot lift his head and will not follow commands to do so. Pt is very hard of hearing and disoriented. Unsure what his baseline mental cognition is. MD made aware and is contacting family to gain a better picture of pt's baseline status.

## 2022-11-07 NOTE — PROGRESS NOTES
Patient seen on 2L oxymask. Bipap on standby . Patient will use Bipap 12/5 R 14, 30%. RT will continue to monitor and follow

## 2022-11-07 NOTE — CONSULTS
[facility]:  CRITICAL CARE CONSULT NOTE     Date / Time of Admission:  11/6/2022  7:34 PM    ID: Dilip Long is a 87 year old male with history of prior CVA, essential hypertension, dyslipidemia, BPH with chronic indwelling jorge catheter, history of recurrent UTIs, hypothyroidism/Grave's disease and newly initiated on levothyroxine, recent hospitalization 10/2022 due to syncope, and asymptomatic COVID19 viral infection 10/21/22 who presented to [facility] ED on 11/6/2022 due to weakness/lethargy/fever, admitted for new onset UTI and atrial fibrillation with RVR, subsequently transferred to ICU due to worsening encephalopathy and respiratory failure requiring non-invasive ventilation.          Changes for today: 1.   Transferred to the ICU for continued care.  2. O2 saturations fluctuating. ABG requested for monitoring.  3. Diltiazem gtt initiated, developed hypotension, and thus stopped.  4. Give  mL bolus x 1, monitor response. Hold off on additional lasix at this time.   5. Check CRP, procalcitonin.  Initiate doxycycline IV.   6. Jorge catheter exchanged.  7. Check Mg.  Initiate K/Mg replacement protocols (high).  8. Start heparin subcutaneous for DVT prophylaxis.         Assessment/Plan:   Neurologic: Acute encephalopathy.  Progressive encephalopathy, concerning for possible infectious etiology in the setting of his urinary tract infection. Also with fevers prior to hospitalization. Head CT here negative, initial VBG with normal pH. While the patient has hypothyroidism and undetectable free T4, does not have any other signs to suggest myxedema coma (no hypothermia, hypercapnia, or hyponatremia). History of CVA. Uncertain baseline deficits. History of syncope.  Hospitalized 10/21/22 with syncopal event, uncertain etiology. Planned for outpatient workup with Cardiology.     Follow-up ABG.     Pain control: PRN     Pulmonary: Acute respiratory failure with hypoxia.  Patient  with reported apneic events, leading to NIPPV utilization. VBG showing adequate ventilation. CTA chest PE study negative for PE. Notably basilar opacity- difficult to differentiate from infection vs atelectasis. Possible pneumonia, organism unspecified.  Noted basilar consolidation vs atelectasis on CT chest scan 11/6.     Wean supplemental O2 as tolerated; goal O2 sat > 92%.  HOB > 30 degrees to limit aspiration risk.      NIPPV: IPAP: Adjusted to goal  mL (~ 8 mL/kg IBW), EPAP 5, backup rate 14.     Check sputum culture if able to obtain.    Antibiotics as below.     Cardiovascular: Transient hypotension. Developed once placed on NIPPV, thus concerned for lower intravascular volume status. Later, BP again decreased with use of diltiazem gtt.  New onset atrial fibrillation with intermittent RVR. While NTproBNP elevated, no evidence on my exam of an acute CHF exacerbation. TTE 10/2022 with LVEF 60-65%, normal RV size/function. While patient JOHN score may warrant full anticoagulation, has recent syncopal event and workup ongoing. Thus, may be higher risk for bleeding. Small-to-moderate pericardial effusion. Small-to-moderate on CT chest 11/6. Prior TTE 10/22 with moderate effusion, decreased in size on repeat study 10/24.  History of hypertension, dyslipidemia.  On statin, atenolol at baseline.    Cardiac monitoring.  SBP >= 90 mmHg, MAP >= 65 mmHg.  EKG PRN.    Holding off on diltiazem gtt at this time due to hypotension induced.     Will consider digoxin if needed.     Holding on diuresis. Patient may be slightly intravascularly volume down.    Resume home statin, ASA. Hold atenolol.     GI/: Hypoalbuminemia with mild protein-calorie malnutrition.      Nutrition: NPO.     Last BM:  Unknown.  Meds: docusate BID.    GI prophylaxis: H2 blocker while on NIPPV.      Renal: Renal insufficiency. During admission 10/2022 noted to have elevated Cr, LEONIDAS.  Since then, Cr relatively stable, GFR ~ 53-54 mL/min/1.73m2.   Hypokalemia.  BPH with history of chronic jorge catheter. On terazosin at baseline.    Monitor I/O's.  Electrolyte repletion PRN.  Avoid/limit nephrotoxic agents.    IVFs: Saline lock.     Given concern for intravascular volume status, will hold off on diuresis at this time.     Jorge catheter replaced.     Holding home terazosin until BP stabilizes.     Heme/Onc: Macrocytic anemia.      DVT prophylaxis: Heparin subcutaneous.     Endocrine: Hypothyroidism, Grave's disease history.  .10, fT4 < 0.10 on 10/21/22, started on levothyroxine 25 mcg IV during that admission.  Repeat TSH 11/6 with some improvement, TSh 64.25. Free T4 still < 0.10 on 11/6/22. While the patient has hypothyroidism and undetectable free T4, does not have any other signs to suggest myxedema coma (no hypothermia, hypercapnia, or hyponatremia).    FSBG Q4H, Insulin not indicated.  Hypoglycemia protocol.    Restart home synthroid, initiate levothyroxine 25 mcg IV daily     Infectious diseases: Urinary tract infection. Possible pneumonia, organism unspecified.  History of COVID-19 viral infection.  Considered asymptomatic COVID 10/21/22, had been treated with Remdesivir daily x 3 days.     Follow-up urine culture. Check sputum culture if able to produce.     Check CRP, procalcitonin.    Continue ceftriaxone IV, initiate doxycycline IV for now.     Rheum/Musculoskeletal:     Activity:   Bedrest    Risk Factors Present on Admission:  Clinically Significant Risk Factors Present on Admission          # Hypocalcemia: Lowest Ca = 8.1 mg/dL (Ref range: 8.5 - 10.1 mg/dL) in last 2 days, will monitor and replace as appropriate     # Hypoalbuminemia: Lowest albumin = 3.3 g/dL (Ref range: 3.5-5.2) at 11/6/2022  8:06 PM, will monitor as appropriate     # Acute Respiratory Failure: Documented O2 saturation < 91%.  Continue supplemental oxygen as needed  # Anemia: based on hgb <11         Lines/Drains/Tubes:  Jorge catheter, replaced 11/7/22     Code  Status:  Full Code     The patient and/or the family was educated about the above plan of care and indicated understanding.  Called the patient's son, despite ringing I received a notice that the number was not available. Will call wife a little later this morning to let her know that patient was brought up to ICU for continued care.     This patient is considered critically ill and requires ICU level of care due to acute respiratory failure requiring NIPPV, sepsis with impending shock physiology.     Total Critical Care time, not including separate billable procedure time: 60 minutes.    Priscilla Loco MD, MPH  Pulmonary/Critical Care Medicine  11/07/2022   2:51 AM         ICU DAILY CHECKLIST:   ICU DAILY CHECKLIST           Can patient transfer out of MICU? no    FAST HUG:    Feeding:  no.  Patient is receiving NPO    Jorge: yes  Analgesia/Sedation: no; PRNs   Thromboembolic prophylaxis: yes; Mode:  Heparin and SCDs  HOB>30:  yes  Stress Ulcer Protocol Active: yes; Mode: H2 Antagonist  Glycemic Control: Any glucose > 180 no; Mode of Insulin Therapy: Not indicated    INTUBATED:  Can patient have daily waking:  n/a  Can patient have spontaneous breathing trial:  n/a    Restraints? no    PHYSICAL THERAPY AND MOBILITY:  Can patient have PT and mobility trial: no  Activity: Bedrest     Chief Complaint Chief Complaint   Patient presents with     Generalized Weakness     Joint Swelling             HPI:   Dilip Long is a 87 year old male with history of prior CVA, essential hypertension, dyslipidemia, chronic indwelling jorge catheter, history of recurrent UTIs, hypothyroidism/Grave's disease and newly initiated on levothyroxine, recent hospitalization 10/2022 due to syncope, and asymptomatic COVID19 viral infection 10/21/22 who presented to [facility] ED on 11/6/2022 due to weakness/lethargy/fever, admitted for new onset UTI and atrial fibrillation with RVR, subsequently transferred to ICU due to worsening  encephalopathy and respiratory failure requiring non-invasive ventilation.  History is provided by Dr. Mairnelli, review of medical records.    Patient was in his normal state of health, living at home with wife, when he developed generalized weakness and fever.  Patient unable to get up to the back of his room using his walker, which she is able to do at baseline.  Wife had also reported lower extremity edema, which is increased from his baseline.  Brought to ED by EMS.  In the ED, patient interactive, oriented to person, Lozano catheter in place.  Presented with atrial fibrillation with intermittent RVR.  Labs with K3.6, CO2 24, BUN 18.3, CR 1.32, NT proBNP 2747, TSH 64.25, free T4 <0.01, troponin T 64.  Urinalysis concerning for UTI.  CTA chest negative for PE, showed small to moderate pericardial effusion, increasing basilar consolidation - ? if atelectasis versus infiltrate.  There was concern that he had volume overload, given furosemide and started on scheduled furosemide.  Diltiazem 5 mg IV x 1 and diltiazem gtt ordered for afib control.     Patient admitted, boarding in the ED.  Around 2:15 am, RRT called to bedside due to worsening mental status, appeared to be apneic. Placed on NIPPV. He then had challenges with blood pressures, and BPs down to 60s/40s.  He was sent from the ED to the ICU for continued care.  Upon ICU arrival, SBPs initially 80 mmHg, then increased to 100s mmHg. Diltiazem gtt started, became hypotensive and thus medication held.     Of note, the patient was hospitalized  10/21-10/24 in the setting of syncopal event.  He was found to have a moderate pericardial effusion and also found to have significant hypothyroidism, .10, free T4 <0.10.  Initially, there were plans to drain the pericardial effusion but repeat limited echo showed overall improvement.  He was started on levothyroxine 25 mcg daily.  He was also concomitantly treated for possible UTI in the setting of elevated CRP,  although urine cultures only grew mixed urogenital susana.        Review of Systems:   Review of Systems:  Review of systems is limited by patient factors - mental status        Medical/Surgical History:     Past Medical History:   Diagnosis Date     Acute prostatitis      Asymptomatic bacteriuria 10/23/2013    Noted at 10/2/13 office visit at [facility]. No treatment recommended at that time.      Hypertension       Past Surgical History:   Procedure Laterality Date     BLADDER SURGERY      Bladder absces removal     Blepharoplasty Upper Lid W/ Excessive Skin[  1/29/2007     CATARACT EXTRACTION       EYE SURGERY      both eyes 2015     IR LUMBAR EPIDURAL STEROID INJECTION  4/27/2004     IR LUMBAR EPIDURAL STEROID INJECTION  5/11/2004     IR LUMBAR EPIDURAL STEROID INJECTION  11/24/2004     IR LUMBAR EPIDURAL STEROID INJECTION  11/28/2007     WI CYSTOURETHROSCOPY W/IRRIG & EVAC CLOTS N/A 7/27/2018    Procedure: CYSTOSCOPY, CLOT EVACUATION ,URETHRAL DILATATION, DAUGHERTY CATHETER PLACEMENT;  Surgeon: Lowell Arias MD;  Location: [facility];  Service: Urology            Allergies/Medications:   Allergies:   No Known Allergies    OUTPATIENT Medications:  Medications Prior to Admission   Medication Sig Dispense Refill Last Dose     aspirin (ASA) 81 MG chewable tablet Take 1 tablet (81 mg) by mouth daily 30 tablet 1      atenolol (TENORMIN) 25 MG tablet Take 25 mg by mouth daily        Calcium Carbonate-Vitamin D (OSCAL 500/200 D-3 PO) Take 1 tablet by mouth daily.        diphenhydrAMINE (BENADRYL) 25 MG capsule Take 1 capsule (25 mg) by mouth every 6 hours as needed for itching or allergies 6 capsule 0      gabapentin (NEURONTIN) 300 MG capsule Take 300 mg by mouth every evening        levothyroxine (SYNTHROID/LEVOTHROID) 25 MCG tablet Take 1 tablet (25 mcg) by mouth every morning (before breakfast) 30 tablet 1      order for DME Equipment being ordered: Home automatic blood pressure cuff. Check once daily  for one week, and then 3 times weekly thereafter. 1 Device 0      simvastatin (ZOCOR) 10 MG tablet Take 1 tablet (10 mg) by mouth At Bedtime 90 tablet 3      terazosin (HYTRIN) 10 MG capsule Take 1 capsule (10 mg) by mouth At Bedtime Take 1 tablet (10 mg) daily 90 capsule 3        INPATIENT Medications: Continuous Infusions:    dilTIAZem 5 mg/hr (11/07/22 0240)     INPATIENT Medications: Scheduled Medications:    cefTRIAXone  1 g Intravenous Q24H     furosemide  20 mg Intravenous Q24H     furosemide  20 mg Intravenous Once     norepinephrine         sodium chloride (PF)  3 mL Intracatheter Q8H         Family History:     Social History:   Reviewed:  Family History   Problem Relation Age of Onset     Diabetes No family hx of      Hypertension No family hx of      Other Cancer No family hx of      Coronary Artery Disease No family hx of      Hyperlipidemia No family hx of      Cerebrovascular Disease No family hx of      Breast Cancer No family hx of      Colon Cancer No family hx of      Prostate Cancer No family hx of      Depression No family hx of      Anxiety Disorder No family hx of      Mental Illness No family hx of      Substance Abuse No family hx of      Anesthesia Reaction No family hx of      Asthma No family hx of      Osteoporosis No family hx of      Genetic Disorder No family hx of      Thyroid Disease No family hx of      Obesity No family hx of      No Known Problems Mother      No Known Problems Father     Reviewed:  Social History     Socioeconomic History     Marital status:      Spouse name: Not on file     Number of children: Not on file     Years of education: Not on file     Highest education level: Not on file   Occupational History     Occupation: Retired   Tobacco Use     Smoking status: Former     Types: Cigarettes     Smokeless tobacco: Never     Tobacco comments:     Pt quit 40 years ago   Substance and Sexual Activity     Alcohol use: Yes     Alcohol/week: 0.0 standard drinks      Comment: Alcoholic Drinks/day: 1-2 beers per week.     Drug use: No     Sexual activity: Not on file   Other Topics Concern     Parent/sibling w/ CABG, MI or angioplasty before 65F 55M? Not Asked   Social History Narrative     Not on file     Social Determinants of Health     Financial Resource Strain: Not on file   Food Insecurity: Not on file   Transportation Needs: Not on file   Physical Activity: Not on file   Stress: Not on file   Social Connections: Not on file   Intimate Partner Violence: Not on file   Housing Stability: Not on file              Objective:   Vitals:  BP 94/52   Pulse 110   Temp 98.4  F (36.9  C) (Oral)   Resp 20   Wt 70.5 kg (155 lb 8 oz)   SpO2 97%   BMI 22.96 kg/m    Vent: Resp: 20    GEN: Groggy, hard-of-hearing, responds to loud voice. Minimally follows commands.  HEENT: Normocephalic, atraumatic.  Extraoccular eye movements intact, anicteric sclera. Moist mucous membranes. Wearing full-face mask.  NECK: Supple.    PULM: Non-labored breathing.  No use of accessory muscles.  Clear to ausculation bilaterally.   CVS: Irregularly irregular rhythm.  Normal S1, S2.  No rubs, murmurs, or gallops.    ABDOMEN: Normoactive bowel sounds.  Non-tender to palpation.  Non-distended.   Lozano catheter in place.   EXTREMITES:  No clubbing, cyanosis, or edema.    NEURO:  Groggy, does open eyes and responds to loud voice.  Need to be recurrently stimulated. Cranial nerves 2-12 grossly intact.  Moving all extremities.      Intake/Output: I/O last 3 completed shifts:  In: 500 [IV Piggyback:500]  Out: -         Pertinent Studies:   All laboratory data reviewed  Serum Glucose range:   Recent Labs   Lab 11/07/22 0228 11/06/22 2006   GLC 97 109*     ABG: No lab results found in last 7 days.  CBC:   Recent Labs   Lab 11/06/22 2006   WBC 9.7   HGB 9.4*   HCT 28.7*   *      NEUTROPHIL 80   LYMPH 6   MONOCYTE 10   EOSINOPHIL 3     Chemistry:   Recent Labs   Lab 11/06/22 2006       POTASSIUM 3.6   CHLORIDE 101   CO2 24   BUN 18.3   CR 1.32*   GFRESTIMATED 53*   KRISTEN 8.1*   PROTTOTAL 5.9*   ALBUMIN 3.3*   AST 28   ALT 11   ALKPHOS 58   BILITOTAL 1.1     Coags:  No results for input(s): INR, PROTIME, PTT in the last 168 hours.    Invalid input(s): APTT  Cardiac Markers:  No results for input(s): CKTOTAL, TROPONINI in the last 168 hours.     Microbiology:  Urine culture, 11/6: Pending  COVID19 PCR, 11/6/22: Positive  COVID19 PCR, 10/21/22: Positive        Cardiology/Radiology:   Cardiac: All cardiac studies reviewed by me.    EKG:  Reviewed    TTE Complete, 10/22/22:  Summary:  The left ventricle is normal in size. Left ventricular function is normal.The ejection fraction is 60-65%. Normal right ventricle size and systolic function. The ascending aorta is Mildly dilated. Moderate pericardial effusion. There is evidence of mild right ventricular diastolic collapse.    TTE Limited, 10/24/22:  Summary:  1. Left ventricular size and systolic function are normal. The estimated left ventricular ejection fraction is 55-60%. 2. Right ventricular size and systolic function are normal. 3. There is a small pericardial effusion located mostly along the anterior border of the right ventricle. There is minimal pericardial fluid along the inferior border of the right ventricle as well as the apex, which may make attempts at pericardiocentesis difficult. No obvious echocardiographic evidence of pericardial tamponade. 4. Compared to the prior study dated 10/22/2022, the pericardial effusion appears slightly smaller.    Radiology:  All imaging studies reviewed by me.    Chest X-Ray, 11/6/22:  Minimal hazy opacity in the left lower lung, which may represent atelectasis versus infiltrate.  No focal airspace disease in the right lung. No pleural effusion or pneumothorax.  The cardiomediastinal silhouette appears unremarkable. Aortic calcifications.    CTA Chest PE study, 11/6/22:  FINDINGS: ANGIOGRAM CHEST:  Pulmonary arteries are normal in caliber. No evidence of central or segmental pulmonary embolus. Motion artifact and poor contrast bolus limits evaluation of subsegmental thrombus. No evidence of thoracic aortic dissection. Contour deformity in the aortic arch is favored to represent a small penetrating ulcer (axial series 5 image 114), unchanged. No CT evidence of right heart strain. LUNGS AND PLEURA: Moderate bibasilar consolidation, increased. Small pleural effusions, likely unchanged. MEDIASTINUM/AXILLAE: Small to moderate pericardial effusion, unchanged. CORONARY ARTERY CALCIFICATION: Moderate. UPPER ABDOMEN: Normal. MUSCULOSKELETAL: No acute bony abnormalities. Thoracolumbar junction compression deformities, unchanged. Old sternal fracture. Multiple old left rib fractures. IMPRESSION: 1.  No evidence of pulmonary embolus given limitations. 2.  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 3.  Small to moderate pericardial effusion, small bilateral pleural effusions, unchanged. Increasing basilar consolidation, atelectasis versus infiltrate.     CT head without contrast, 11/7/22:  FINDINGS:INTRACRANIAL CONTENTS: No intracranial hemorrhage, extraaxial collection, or mass effect.  No CT evidence of acute infarct. Mild presumed chronic small vessel ischemic changes. Mild generalized volume loss. No hydrocephalus.  VISUALIZED ORBITS/SINUSES/MASTOIDS: No intraorbital abnormality. No paranasal sinus mucosal disease. No middle ear or mastoid effusion. BONES/SOFT TISSUES: Left frontal subgaleal lipoma again demonstrated. IMPRESSION: 1.  No acute intracranial pathology identified. 2.  Mild age-related changes.

## 2022-11-07 NOTE — PROGRESS NOTES
RESPIRATORY CARE NOTE     Pt placed on Bipap for decreased air movement, settings IPAP 12, EPAP 5, RR 16, 40%O2. Pt moving more air and pt able to open eyes. Pt was transported to ICU on Bipap without incident.     BP 94/52   Pulse 110   Temp 98.4  F (36.9  C) (Oral)   Resp 20   Wt 70.5 kg (155 lb 8 oz)   SpO2 97%   BMI 22.96 kg/m        Bryanna Pelaez, RT

## 2022-11-07 NOTE — PROGRESS NOTES
[facility] was introduced to the patient, and wife today including our role in the home management of their heart failure.   Much of the interaction was limited to the wife, as the patient is sleepy and arouses intermittently without contributing to the conversation.  Heart Failure Education Materials were shared today with the patients wife.   Introduction to the expectations including daily weight tracking using the Daily Weight Log. Stressed the importance of recording his weight trend.  Home B/P monitoring as appropriate( to bring in home monitoring cuff to the clinic appointment for verification)  Low Sodium diet of 2000mg or less daily, review of label reading, aim for fresh and avoid processed items. Wife is aware of the dietary low sodium recommendations, does not add salt to food, but may use some in cooking on occasion.   Daily Fluid restriction of 2000ml / 60 oz per day discussed. She did not feel he exceeded this amount.   Exercise importance as able explained  Monitor and report s/s of heart failure. How to report these changes.  Follow up appointments and testing expectations.  They are in the process of determining if they are able to care for him at home with the assistance of a granddaughter that lives nearby, and possibly Home Care services.  They want to see if they can keep him at home if possible.  Social Work is working to provide them with options.    Ally WARREN RN BSN, CHFN, PCCN-K      [facility]   [phone] Nurse [facility]   [facility]   [address] [place] [address]

## 2022-11-07 NOTE — ED PROVIDER NOTES
EMERGENCY DEPARTMENT NOTE     Name: Dilip Long    Age/Sex: 87 year old male   MRN: [id number]   Evaluation Date & Time:  11/6/2022  7:34 PM    PCP:    Ciara Ref-Primary, Physician   ED Provider: Gamaliel Luis D.O.       CHIEF COMPLAINT    Generalized Weakness and Joint Swelling       DIAGNOSIS & DISPOSITION     1. Generalized muscle weakness    2. Urinary tract infection associated with indwelling urethral catheter, initial encounter (H)    3. Hypothyroidism, unspecified type    4. Paroxysmal atrial fibrillation (H)    5. Diastolic congestive heart failure, unspecified HF chronicity (H)      DISPOSITION: Admit to cardiac telemetry/[facility] [place]    At the conclusion of the encounter I discussed the results of all of the tests and the disposition. The questions were answered. The patient or family acknowledged understanding and was agreeable with the care plan.    TOTAL CRITICAL CARE TIME (EXCLUDING PROCEDURES): Not applicable    PROCEDURES:     PROCEDURE:    Emergency Department Limited Bedside Screening Cardiac Ultrasound   INDICATIONS: abnormal EKG   PROCEDURE PROVIDER: Gamaliel Luis    WINDOW AND FINDINGS:    SUB-XYPHOID     :      Cardiac activity: Hyperkinetic  Pericardial effusion: Small  Signs of Tamponade physiology: Absent   PARASTERNAL    :  Cardiac activity: Hyperkinetic  Pericardial effusion: Small pericardial effusion  Signs of Tamponade physiology: Absent     IMAGES SAVED AND STORED FOR ARCHIVE AND REVIEW: No         EMERGENCY DEPARTMENT COURSE/MEDICAL DECISION MAKING   7:50 PM I met with the patient to gather history and to perform my initial exam.  We discussed treatment options and the plan for care while in the Emergency Department.  9:48 PM Discussed with Dr. Wilson, cardiology.  11:24 PM Rechecked and reevaluated the patient. Updated on results and plan for admission.    Dilip Long is a 87 year old male with relevant past history of BPH, ureteral stricture, prior CVA, diverticulosis, Graves'  disease status post ablation, HLD, hypothyroidism, HTN, who presents to the emergency department for evaluation of generalized weakness and fever.  Triage note reviewed:Pt arrived via EMS for home due to generalized weakness. Pt's wife reported that yesterday he was able to walk with walker, but today he cannot. He is weak and his ankles are more swollen than usual. Pt has jorge catheter. Vital are stable except increased BP.      Differential  diagnosis  considered included but not limited to sepsis from multiple sources including urinary tract or pulmonary, ACS, hypothyroidism, electrolyte derangement, symptomatic anemia  Vital signs:/57   Pulse 109   Temp 98.5  F (36.9  C)   Resp 26   SpO2 91%   Pertinent physical exam findings:  General: Alert oriented to person place and dates  HEENT: Head atraumatic, neck supple, oropharynx with dry mucous membranes  Cardiac: Regular rate and rhythm S1-S2 without murmur rub  Pulmonary: Lungs are clear to ascultation bilaterally with good breath sounds  Abdomen: Soft nontender, positive bowel sounds.  No organomegaly or mass or pulsatile mass  : Uncircumcised, Jorge catheter in place draining yellow urine, no scrotal swelling or erythema  Musculoskeletal: Ankle edema    Diagnostic studies:  Imaging:  CT Chest Pulmonary Embolism w Contrast   Final Result   IMPRESSION:   1.  No evidence of pulmonary embolus given limitations.   2.  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   3.  Small to moderate pericardial effusion, small bilateral pleural effusions, unchanged. Increasing basilar consolidation, atelectasis versus infiltrate.      XR Chest Port 1 View   Final Result   IMPRESSION:       Minimal hazy opacity in the left lower lung, which may represent atelectasis versus infiltrate.      No focal airspace disease in the right lung. No pleural effusion or pneumothorax.      The cardiomediastinal silhouette appears  unremarkable. Aortic calcifications.        Lab:  Labs Ordered and Resulted from Time of ED Arrival to Time of ED Departure   COMPREHENSIVE METABOLIC PANEL - Abnormal       Result Value    Sodium 137      Potassium 3.6      Chloride 101      Carbon Dioxide (CO2) 24      Anion Gap 12      Urea Nitrogen 18.3      Creatinine 1.32 (*)     Calcium 8.1 (*)     Glucose 109 (*)     Alkaline Phosphatase 58      AST 28      ALT 11      Protein Total 5.9 (*)     Albumin 3.3 (*)     Bilirubin Total 1.1      GFR Estimate 53 (*)    ROUTINE UA WITH MICROSCOPIC REFLEX TO CULTURE - Abnormal    Color Urine Orange (*)     Appearance Urine Cloudy (*)     Glucose Urine Negative      Bilirubin Urine Negative      Ketones Urine Trace (*)     Specific Gravity Urine 1.034 (*)     Blood Urine 1.0 mg/dL (*)     pH Urine 5.5      Protein Albumin Urine 300 (*)     Urobilinogen Urine 2.0 (*)     Nitrite Urine Negative      Leukocyte Esterase Urine 250 Deion/uL (*)     Bacteria Urine Moderate (*)     Mucus Urine Present (*)     RBC Urine 35 (*)     WBC Urine 117 (*)     Squamous Epithelials Urine 29 (*)    INFLUENZA A/B & SARS-COV2 PCR MULTIPLEX - Abnormal    Influenza A PCR Negative      Influenza B PCR Negative      RSV PCR Negative      SARS CoV2 PCR Positive (*)    CBC WITH PLATELETS AND DIFFERENTIAL - Abnormal    WBC Count 9.7      RBC Count 2.78 (*)     Hemoglobin 9.4 (*)     Hematocrit 28.7 (*)      (*)     MCH 33.8 (*)     MCHC 32.8      RDW 12.2      Platelet Count 256      % Neutrophils 80      % Lymphocytes 6      % Monocytes 10      % Eosinophils 3      % Basophils 0      % Immature Granulocytes 1      NRBCs per 100 WBC 0      Absolute Neutrophils 7.8      Absolute Lymphocytes 0.6 (*)     Absolute Monocytes 1.0      Absolute Eosinophils 0.3      Absolute Basophils 0.0      Absolute Immature Granulocytes 0.1      Absolute NRBCs 0.0     TSH WITH FREE T4 REFLEX - Abnormal    TSH 64.25 (*)    T4 FREE - Abnormal    Free T4 <0.10 (*)   "  TROPONIN T, HIGH SENSITIVITY - Abnormal    Troponin T, High Sensitivity 64 (*)    N TERMINAL PRO BNP OUTPATIENT - Abnormal    N Terminal Pro BNP Outpatient 2,747 (*)    LACTIC ACID WHOLE BLOOD - Normal    Lactic Acid 1.8     URINE CULTURE   EKG Interpretation: Atrial fibrillation with RVR. Low voltage QRS.   Compared to most recent ECG from: 10/23/22 atrial fibrillation has replaced sinus rhythm.  Interventions: IV ceftriaxone, diltiazem, furosemide  Medical decision making: Urinalysis has pyuria and bacteria and could represent urinary tract infection.  Patient on chest x-ray has basilar consolidation which could represent CHF with elevated BNP but cannot exclude pneumonia.  For both he was given ceftriaxone.  Patient had episode of paroxysmal atrial fibrillation while in the emergency department.  Have resolved with single low dose of diltiazem.  CTA of the chest was checked with recent hospitalization no evidence of pulmonary embolism.  Patient had prior pericardial effusion at last admission currently appears to be small without evidence of tamponade physiology.  Patient is afebrile, WBC not elevated and lactic acid not elevated and does not appear to have sepsis.  TSH remains high although improved from recent hospitalization but hypothyroidism could be contributing to generalized weakness.  Patient will be admitted to cardiac telemetry, continue gentle  diuresis, continue antibiotics for pneumonia/urinary tract infection.  2:04 AM I was asked to reevaluate the patient who is remains in the emergency department for concern for respiratory distress.  Patient is alert, when queried he states that he feels \"okay\" .  Patient on the monitor has been recurrent A. fib and current heart rate is 120.  Will place the patient on BiPAP for respiratory support, add diltiazem drip at low-dose for management of heart rate and give second dose of Lasix.  After discussion with the hospitalist he has confirmed with the " patient's wife that he is full code and with the patient on BiPAP and a diltiazem drip will admit out to the ICU.    Medical Decision Making    Supplemental history from: EMS from wife report    External Record(s) Reviewed: Inpatient Record, Outpatient Record and Outside ED Record    Differential Diagnosis: See MDM charting for differential considered.     I performed an independent interpretation of the: EKG: See my documentation.    Discussed with radiology regarding test interpretation: N/A    Discussion of management with another provider: See ED Course, Cardiology and Hospitalist    The following testing was considered but ultimately not selected: None    I considered prescription management with: N/A    The patient's care impacted: None and Other: Age    Consideration of Admission/Observation: N/A - Patient admitted    Care significantly affected by Social Determinants of Health including: N/A  ED INTERVENTIONS     Medications   furosemide (LASIX) injection 20 mg (has no administration in time range)   0.9% sodium chloride BOLUS (0 mLs Intravenous Stopped 11/6/22 2215)   diltiazem (CARDIZEM) injection 5 mg (5 mg Intravenous Given 11/6/22 2204)   cefTRIAXone (ROCEPHIN) 1 g vial to attach to  mL bag for ADULTS or NS 50 mL bag for PEDS (1 g Intravenous New Bag 11/6/22 2204)   iopamidol (ISOVUE-370) solution 75 mL (75 mLs Intravenous Given 11/6/22 2240)       DISCHARGE MEDICATIONS        Review of your medicines      UNREVIEWED medicines. Ask your doctor about these medicines      Dose / Directions   aspirin 81 MG chewable tablet  Commonly known as: ASA  Used for: Syncope, unspecified syncope type      Dose: 81 mg  Take 1 tablet (81 mg) by mouth daily  Quantity: 30 tablet  Refills: 1     atenolol 25 MG tablet  Commonly known as: TENORMIN      Dose: 25 mg  Take 25 mg by mouth daily  Refills: 0     diphenhydrAMINE 25 MG capsule  Commonly known as: BENADRYL      Dose: 25 mg  Take 1 capsule (25 mg) by mouth  every 6 hours as needed for itching or allergies  Quantity: 6 capsule  Refills: 0     gabapentin 300 MG capsule  Commonly known as: NEURONTIN      Dose: 300 mg  Take 300 mg by mouth every evening  Refills: 0     levothyroxine 25 MCG tablet  Commonly known as: SYNTHROID/LEVOTHROID  Used for: Postablative hypothyroidism      Dose: 25 mcg  Take 1 tablet (25 mcg) by mouth every morning (before breakfast)  Quantity: 30 tablet  Refills: 1     OSCAL 500/200 D-3 PO      Dose: 1 tablet  Take 1 tablet by mouth daily.  Refills: 0     simvastatin 10 MG tablet  Commonly known as: ZOCOR  Used for: Hyperlipidemia LDL goal <130      Dose: 10 mg  Take 1 tablet (10 mg) by mouth At Bedtime  Quantity: 90 tablet  Refills: 3     terazosin 10 MG capsule  Commonly known as: HYTRIN  Used for: Hypertrophy of prostate without urinary obstruction      Dose: 10 mg  Take 1 capsule (10 mg) by mouth At Bedtime Take 1 tablet (10 mg) daily  Quantity: 90 capsule  Refills: 3        CONTINUE these medicines which have NOT CHANGED      Dose / Directions   order for DME  Used for: Essential hypertension      Equipment being ordered: Home automatic blood pressure cuff. Check once daily for one week, and then 3 times weekly thereafter.  Quantity: 1 Device  Refills: 0            INFORMATION SOURCE AND LIMITATIONS    History/Exam limitations: None  Patient information was obtained from: patient, EMS  Use of : N/A    HISTORY OF PRESENT ILLNESS   Dilip Long is a 87 year old male with a relevant past history of BPH, ureteral stricture, prior CVA, diverticulosis, Graves' disease status post ablation, HLD, hypothyroidism, HTN, who presents to this ED by EMS for evaluation of generalized weakness and a fever. Patient arrives by EMS from home where he lives with his wife. Wife reported that patient is normally able to get up and use the bathroom with his walker, but today felt to generally weak to do so. Wife had also reported lower extremity  swelling that was worsened from baseline. Patient did have Covid a few weeks ago, tested positive on 10/21/22. Patient denies any abdominal pain or any other complaints.    Per chart review, patient was admitted to [facility] on 10/21/22 after presenting to the ED after a syncopal episode while sitting on a chair. No preceding symptoms including no dizziness, chest pain, palpitations, sweats or shortness of breath. Lab workup notable for creatinine of 1..83, up from 0.82 on 7/18/2019 per available records. Seen in consult by cardiology with initial decision for pericardiocentesis for pericardial effusion noted on echocardiogram, however this was canceled as this was resolving. Initial bump in troponin, however with no complaints or chest pain. Creatinine showed slight improvement to 1.3-1.5 over course of hospital stay. Echocardiogram with EF of 55 to 60%. Cardiology okay with discharge to follow up outpatient for pharmacologic NM stress test. Given recent Covid infection, he was initially commenced on remdesivir on admission, however this was discontinued by ID in consult. Evaluated by PT/OT with recommendations for home care. He was discharged home in stable condition on 10/24/22.    REVIEW OF SYSTEMS:   Constitutional: Positive for fever.  HENT: Negative for URI symptoms or sore throat.    Eyes: Negative for visual disturbance.   Cardiac: Negative for chest pain, palpitations, near syncope or syncope. Positive for lower extremity swelling.  Respiratory: Negative for cough and shortness of breath.    Gastrointestinal: Negative for abdominal pain, nausea, vomiting, constipation, diarrhea, rectal bleeding or melena.  Genitourinary: Negative for dysuria, flank pain and hematuria.   Musculoskeletal: Negative for back pain.   Skin: Negative for rash  Neurological: Negative for dizziness, headache, syncope, speech difficulty, unilateral weakness or imbalance with walking. Positive for generalized  weakness.  Hematological: Negative for adenopathy. Does not bruise/bleed easily.   Psychiatric/Behavioral: Negative for confusion.     PATIENT HISTORY     Past Medical History:   Diagnosis Date     Acute prostatitis      Asymptomatic bacteriuria 10/23/2013     Hypertension      Patient Active Problem List   Diagnosis     Hypertrophy of prostate without urinary obstruction     Cerebral infarction due to occlusion or stenosis of carotid artery     Diverticulosis of large intestine     Grave's disease - post ablation     Hydrocele     Hyperlipidemia     Hypothyroidism, postablative     Thoracic or lumbosacral neuritis or radiculitis, unspecified     Lumbosacral spondylosis without myelopathy     Elevated prostate specific antigen (PSA)     Essential hypertension     Claudication of left lower extremity (H)     Urethral stricture     Chronic right-sided low back pain with right-sided sciatica     SDH (subdural hematoma)     Hypothyroidism     PAD (peripheral artery disease) (H)     Syncope     Anemia     Acute kidney injury (H)     Acute electrocardiogram changes     Stage 3a chronic kidney disease (H)     Paroxysmal atrial fibrillation (H)     Generalized muscle weakness     Hypothyroidism, unspecified type     Urinary tract infection associated with indwelling urethral catheter, initial encounter (H)     Past Surgical History:   Procedure Laterality Date     BLADDER SURGERY      Bladder absces removal     Blepharoplasty Upper Lid W/ Excessive Skin[  1/29/2007     CATARACT EXTRACTION       EYE SURGERY      both eyes 2015     IR LUMBAR EPIDURAL STEROID INJECTION  4/27/2004     IR LUMBAR EPIDURAL STEROID INJECTION  5/11/2004     IR LUMBAR EPIDURAL STEROID INJECTION  11/24/2004     IR LUMBAR EPIDURAL STEROID INJECTION  11/28/2007     IA CYSTOURETHROSCOPY W/IRRIG & EVAC CLOTS N/A 7/27/2018    Procedure: CYSTOSCOPY, CLOT EVACUATION ,URETHRAL DILATATION, DAUGHERTY CATHETER PLACEMENT;  Surgeon: Lowell Arias MD;   Location: [facility] OR;  Service: Urology     Social Histrory  Smoking:  Alcohol Use:  Allergies   Allergen Reactions     Nkda [No Known Drug Allergies]        OUTPATIENT MEDICATIONS     New Prescriptions    No medications on file      Vitals:    11/06/22 2315 11/06/22 2336 11/06/22 2351 11/07/22 0006   BP:  113/70 132/71 109/57   Pulse: 116 109 110 109   Resp: (!) 31 25 26 26   Temp:       SpO2: 91% 91% 92% 91%       Physical Exam   Constitutional: Oriented to person, place, and time. Appears well-developed and well-nourished.   HEENT:    Head: Atraumatic.   Neck: Normal range of motion. Neck supple.   Cardiovascular: Normal rate, regular rhythm and normal heart sounds.    Pulmonary/Chest: Normal effort  and breath sounds normal.   Abdominal: Soft. Bowel sounds are normal.  : Uncircumcised. Lozano catheter in place.  Musculoskeletal: Normal range of motion.   Neurological: Alert and oriented to person, place, and time. Normal strength. No sensory deficit. No cranial nerve deficit.  Skin: Skin is warm and dry.   Psychiatric: Normal mood and affect. Behavior is normal. Thought content normal.      DIAGNOSTICS    LABORATORY FINDINGS (REVIEWED AND INTERPRETED):  Labs Ordered and Resulted from Time of ED Arrival to Time of ED Departure   COMPREHENSIVE METABOLIC PANEL - Abnormal       Result Value    Sodium 137      Potassium 3.6      Chloride 101      Carbon Dioxide (CO2) 24      Anion Gap 12      Urea Nitrogen 18.3      Creatinine 1.32 (*)     Calcium 8.1 (*)     Glucose 109 (*)     Alkaline Phosphatase 58      AST 28      ALT 11      Protein Total 5.9 (*)     Albumin 3.3 (*)     Bilirubin Total 1.1      GFR Estimate 53 (*)    ROUTINE UA WITH MICROSCOPIC REFLEX TO CULTURE - Abnormal    Color Urine Orange (*)     Appearance Urine Cloudy (*)     Glucose Urine Negative      Bilirubin Urine Negative      Ketones Urine Trace (*)     Specific Gravity Urine 1.034 (*)     Blood Urine 1.0 mg/dL (*)     pH Urine 5.5       Protein Albumin Urine 300 (*)     Urobilinogen Urine 2.0 (*)     Nitrite Urine Negative      Leukocyte Esterase Urine 250 Deion/uL (*)     Bacteria Urine Moderate (*)     Mucus Urine Present (*)     RBC Urine 35 (*)     WBC Urine 117 (*)     Squamous Epithelials Urine 29 (*)    INFLUENZA A/B & SARS-COV2 PCR MULTIPLEX - Abnormal    Influenza A PCR Negative      Influenza B PCR Negative      RSV PCR Negative      SARS CoV2 PCR Positive (*)    CBC WITH PLATELETS AND DIFFERENTIAL - Abnormal    WBC Count 9.7      RBC Count 2.78 (*)     Hemoglobin 9.4 (*)     Hematocrit 28.7 (*)      (*)     MCH 33.8 (*)     MCHC 32.8      RDW 12.2      Platelet Count 256      % Neutrophils 80      % Lymphocytes 6      % Monocytes 10      % Eosinophils 3      % Basophils 0      % Immature Granulocytes 1      NRBCs per 100 WBC 0      Absolute Neutrophils 7.8      Absolute Lymphocytes 0.6 (*)     Absolute Monocytes 1.0      Absolute Eosinophils 0.3      Absolute Basophils 0.0      Absolute Immature Granulocytes 0.1      Absolute NRBCs 0.0     TSH WITH FREE T4 REFLEX - Abnormal    TSH 64.25 (*)    T4 FREE - Abnormal    Free T4 <0.10 (*)    TROPONIN T, HIGH SENSITIVITY - Abnormal    Troponin T, High Sensitivity 64 (*)    N TERMINAL PRO BNP OUTPATIENT - Abnormal    N Terminal Pro BNP Outpatient 2,747 (*)    LACTIC ACID WHOLE BLOOD - Normal    Lactic Acid 1.8     URINE CULTURE       IMAGING (REVIEWED AND INTERPRETED):  CT Chest Pulmonary Embolism w Contrast   Final Result   IMPRESSION:   1.  No evidence of pulmonary embolus given limitations.   2.  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   3.  Small to moderate pericardial effusion, small bilateral pleural effusions, unchanged. Increasing basilar consolidation, atelectasis versus infiltrate.      XR Chest Port 1 View   Final Result   IMPRESSION:       Minimal hazy opacity in the left lower lung, which may represent atelectasis  versus infiltrate.      No focal airspace disease in the right lung. No pleural effusion or pneumothorax.      The cardiomediastinal silhouette appears unremarkable. Aortic calcifications.          ECG (REVIEWED AND INTERPRETED):   ECG:   Performed at: 20:59  HR:  116 bpm  Rhythm: atrial fibrillation   Axis: *, 22, 232  QRS duration: 68  QTC: 389  ST changes: No ST segment elevation or depression, no T wave inversion,No Q wave  Interpretation: Atrial fibrillation with RVR. Low voltage QRS.   Compared to most recent ECG from: 10/23/22 atrial fibrillation has replaced sinus rhythm.    I have reviewed the patient's ECG, with comments made as listed above. Please see scanned image for full interpretation.       I, Thuan Sandoval, am serving as a scribe to document services personally performed by Gamaliel Luis D.O., based on my observation and the provider s statements to me.    I, Gamaliel Luis D.O., attest that Thuan Sandoval is acting in a scribe capacity, has observed my performance of the services and has documented them in accordance with my direction.    Gmaaliel Luis D.O.  EMERGENCY MEDICINE   11/06/22  [facility] EMERGENCY DEPARTMENT  [address] [place] [id number]  [phone]  Dept: [phone]     Gamaliel Luis,   11/07/22 0049       Gamaliel Luis, DO  11/07/22 0206       Gamaliel Luis, DO  11/07/22 0214

## 2022-11-07 NOTE — CONSULTS
Care Management Initial Consult    General Information  Assessment completed with: Family, Caregiver, Shruthi  Type of CM/SW Visit: Initial Assessment    Primary Care Provider verified and updated as needed: Yes   Readmission within the last 30 days:           Advance Care Planning:            Communication Assessment  Patient's communication style: spoken language (English or Bilingual)             Cognitive  Cognitive/Neuro/Behavioral: .WDL except  Level of Consciousness: lethargic  Arousal Level: arouses to voice  Orientation: disoriented to, time, situation  Mood/Behavior: calm, cooperative     Speech: garbled, rambling    Living Environment:   People in home: spouse     Current living Arrangements: house      Able to return to prior arrangements: other (see comments) (Unknown/ depending plan)       Family/Social Support:  Care provided by: self, spouse/significant other, other (see comments) (Granddaughter)  Provides care for: no one, unable/limited ability to care for self  Marital Status:   Wife, Other (specify) (Granddaughter)          Description of Support System: Supportive, Involved    Support Assessment: Lacks necessary supervision and assistance, Lacks adequate physical care, Caregiver difficulty providing physical care/safety, Limited social contact and support    Current Resources:   Patient receiving home care services: No     Community Resources: None  Equipment currently used at home: cane, straight, grab bar, tub/shower, grab bar, toilet  Supplies currently used at home:      Employment/Financial:  Employment Status: retired        Financial Concerns: No concerns identified           Lifestyle & Psychosocial Needs:  Social Determinants of Health     Tobacco Use: Medium Risk     Smoking Tobacco Use: Former     Smokeless Tobacco Use: Never     Passive Exposure: Not on file   Alcohol Use: Not on file   Financial Resource Strain: Not on file   Food Insecurity: Not on file   Transportation  "Needs: Not on file   Physical Activity: Not on file   Stress: Not on file   Social Connections: Not on file   Intimate Partner Violence: Not on file   Depression: Not on file   Housing Stability: Not on file       Functional Status:  Prior to admission patient needed assistance:   Dependent ADLs:: Ambulation-cane, Independent, Bathing, Dressing, Grooming, Incontinence  Dependent IADLs:: Cleaning, Laundry, Shopping, Transportation, Medication Management, Meal Preparation, Incontinence, Cooking       Mental Health Status:          Chemical Dependency Status:                Values/Beliefs:  Spiritual, Cultural Beliefs, Restorationism Practices, Values that affect care:                 Additional Information:  RNCM placed call to patient's wife. No answer left voicemail.    Returned call from Shruthi, patient's granddaughter, she stated that she is primary contact for both patient and his wife and has [facility] paperwork (no on file), RNCM requested to bring in paperwork to add to patients chart. Initial assessment completed.     Patient is independent with ambulation at home with a cane, lives in house with wife. Patient granddaughter lives 5 minutes away, states that she is there everyday to aide in their cares/ needs.  Stated patients cognition level has been decreasing lately and \"getting worse\". At this time no additional services, but states that they do need some to aide with bathing an other ADL needs.     Stated PCP retired, but still sees other physicians at the [facility]. Updated in Chart.     Shruthi asked questions about homecare, this benefit was explained, Shruthi also asked about hospice, this was briefly explained as well and differences of homecare vs. Hospice. Discussed Palliative care team and their role, Shruthi agreeable with consult and further discussion regarding hospice, stated that patient and family have a wish for patient not to return to hospital and patient's wish of " passing at home when the time comes.      RNCM updated MD via [facility] page for Palliative consult.    Discharge needs unknown, CM will continue to follow care progression and aide in discharge planning as needed.     Nica Calixto RN

## 2022-11-07 NOTE — PHARMACY-ADMISSION MEDICATION HISTORY
Pharmacy Note - Admission Medication History    Pertinent Provider Information: Pt's wife at home confirmed list below by looking at Rx bottles. She notes a levothyroxine 25mcg bottle but is unsure if pt has started taking it yet.   ______________________________________________________________________    Prior To Admission (PTA) med list completed and updated in EMR.       PTA Med List   Medication Sig Note Last Dose     aspirin (ASA) 81 MG chewable tablet Take 81 mg by mouth daily  11/6/2022     atenolol (TENORMIN) 25 MG tablet Take 25 mg by mouth daily  11/6/2022     Calcium Carbonate-Vitamin D (OSCAL 500/200 D-3 PO) Take 1 tablet by mouth daily.  11/6/2022     diphenhydrAMINE (BENADRYL) 25 MG capsule Take 1 capsule (25 mg) by mouth every 6 hours as needed for itching or allergies  Unknown     gabapentin (NEURONTIN) 300 MG capsule Take 300 mg by mouth every evening  Past Week     levothyroxine (SYNTHROID/LEVOTHROID) 25 MCG tablet Take 1 tablet (25 mcg) by mouth every morning (before breakfast) 11/7/2022: Wife is unsure if pt has started this medication Unknown     simvastatin (ZOCOR) 10 MG tablet Take 1 tablet (10 mg) by mouth At Bedtime  Past Week     terazosin (HYTRIN) 10 MG capsule Take 1 capsule (10 mg) by mouth At Bedtime Take 1 tablet (10 mg) daily  Past Week       Information source(s): Family member and CareEverywhere/[facility]  Method of interview communication: phone    Summary of Changes to PTA Med List  New: asa  Discontinued: none  Changed: none    Patient was asked about OTC/herbal products specifically.  PTA med list reflects this.    In the past week, patient estimated taking medication this percent of the time:  Unable to assess    Allergies were reviewed, assessed, and updated with the patient.      Patient does not use any multi-dose medications prior to admission.    The information provided in this note is only as accurate as the sources available at the time of the update(s).    Thank you  for the opportunity to participate in the care of this patient.    Sanjana Nguyen, PharmD  11/7/2022 8:46 AM

## 2022-11-07 NOTE — ED NOTES
Rapid response was called after pt had a significant decrease in cognition. Pt's breathing became very apneic despite pt being awake as possible. Rapid was called, respiratory initiated bipap and pt became more comfortable and was moving air significantly better. As the pt settled into the bipap pt's blood pressure began to drop rapidly with them being in the 90's/60' and falling to the 60's/40's as the ICU nurses and respiratory therapy quickly began to transport pt to ICU.

## 2022-11-07 NOTE — PROGRESS NOTES
Pt remains on BiPAP 12/5 14 30%   sats 96% HR 92 RR 14 B/P 96/52. Tidal volumes 160-1400 PiP 12.  Breath sounds diminished clear.  ABG drawn on above settings 7.51 34 87 27 97.8%.  Rt continue to monitor.
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
## 2022-11-07 NOTE — ED NOTES
Called lab to make sure the Trop and BNP were added on to previous samples. Lab agreed they will be able to be added on.

## 2022-11-07 NOTE — CONSULTS
[facility] Heart Care team is asked to see Dilip Long in consultation to evaluate heart failuer.      Assessment:     1. Atrial fibrillation with rapid ventricular response, slowing with diltiazem administration.  Converted to sinus rhythm, going in and out of atrial fibrillation overnight with controlled ventricular response.  No pauses noted.  Needs anticoagulation, rate control, treatment of hypothyroidism.  2. 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  3. Pericardial effusion mild to moderate by CT. likely secondary to the profound hypothyroidism.  We will recheck echocardiogram to make sure it is not contributing to heart failure symptoms.  4. Hypothyroidism, started on treatment last month  5. 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  Plan conservative management given advanced age.     Plan:     1. Recheck echo.  2. Eliquis 5 mg twice daily  3. Aggressively supplement hypothyroid state per hospitalist team  4. IV diltiazem as needed for rate control.  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  5. Suggest IV fluids bolus for hypotension.     Current History:     Dilip Long is an 87-year-old gentleman With a history of hypertension and prostatitis, admitted last month because of a syncopal episode.  He was noted to have intermittent sinus rhythm versus junctional rhythm, elevated troponins, but no pauses on monitoring.  A moderate sized pericardial effusion was demonstrated as well as markedly elevated TSH at 100.  He was initiated on thyrotropin supplement and discharged with plans for outpatient stress testing.    He reports that his weakness worsened with activity and that swelling persisted leading him to come to the emergency room yesterday.  CT chest showed continued pericardial effusion, bilateral pleural effusions unchanged versus 2 weeks prior.  Noted to have atrial fibrillation  with a rapid ventricular response and admitted.  With possible mental status changes was transferred to ICU for observation.    Today, he is slow to respond, but notes no chest pain or shortness of breath.  He reports being comfortable supine.  Intermittent apneic breathing noted.  Moves all extremities.  Oriented to [facility], able to name his address and his living situation.    Past Medical History:     Past Medical History:   Diagnosis Date     Acute prostatitis      Asymptomatic bacteriuria 10/23/2013    Noted at 10/2/13 office visit at [facility]. No treatment recommended at that time.      Hypertension      Sleep history:  Past Surgical History:     Past Surgical History:   Procedure Laterality Date     BLADDER SURGERY      Bladder absces removal     Blepharoplasty Upper Lid W/ Excessive Skin[  1/29/2007     CATARACT EXTRACTION       EYE SURGERY      both eyes 2015     IR LUMBAR EPIDURAL STEROID INJECTION  4/27/2004     IR LUMBAR EPIDURAL STEROID INJECTION  5/11/2004     IR LUMBAR EPIDURAL STEROID INJECTION  11/24/2004     IR LUMBAR EPIDURAL STEROID INJECTION  11/28/2007     AL CYSTOURETHROSCOPY W/IRRIG & EVAC CLOTS N/A 7/27/2018    Procedure: CYSTOSCOPY, CLOT EVACUATION ,URETHRAL DILATATION, DAUGHERTY CATHETER PLACEMENT;  Surgeon: Lowell Arias MD;  Location: [facility] OR;  Service: Urology       Family History:     Family History   Problem Relation Age of Onset     Diabetes No family hx of      Hypertension No family hx of      Other Cancer No family hx of      Coronary Artery Disease No family hx of      Hyperlipidemia No family hx of      Cerebrovascular Disease No family hx of      Breast Cancer No family hx of      Colon Cancer No family hx of      Prostate Cancer No family hx of      Depression No family hx of      Anxiety Disorder No family hx of      Mental Illness No family hx of      Substance Abuse No family hx of      Anesthesia Reaction No family hx of      Asthma No  family hx of      Osteoporosis No family hx of      Genetic Disorder No family hx of      Thyroid Disease No family hx of      Obesity No family hx of      No Known Problems Mother      No Known Problems Father      Family history reviewed and is not pertinent to the chief complaint or presenting problem    Social History:    reports that he has quit smoking. His smoking use included cigarettes. He has never used smokeless tobacco. He reports current alcohol use. He reports that he does not use drugs.   Drinks 1/2 can of Budweiser a week  Reports that he resides in his own home with his wife and has 3 sons helping  Exercise history:       Meds:     No current outpatient medications on file.       Allergies:   Patient has no known allergies.    Review of Systems:   A 12 point comprehensive review of systems was negative except as noted in the current history.    Objective:      Physical Exam  Wt Readings from Last 3 Encounters:   11/07/22 70.5 kg (155 lb 8 oz)   10/24/22 72 kg (158 lb 12.8 oz)   10/03/19 63.5 kg (140 lb)     Body mass index is 22.96 kg/m .  /57   Pulse 87   Temp 98.2  F (36.8  C) (Axillary)   Resp 16   Wt 70.5 kg (155 lb 8 oz)   SpO2 100%   BMI 22.96 kg/m      General Appearance:  No apparent distress.  Bradykinetic  HEENT: No scleral icterus; the mucous membranes were pink and moist.  Conjunctiva not injected  Neck:  No cervical bruits, jugular venous distention, or thyromegaly.  Chest:The spine was straight. The chest was symmetric.  Lungs:  Respirations unlabored; the lungs are clear to auscultation.  Cardiovascular:   Nonpalpable point of maximal impulse. Auscultation reveals slightly irregular, distant first and second heart sounds with no murmurs, rubs, or gallops. Carotid, radial, and dorsalis pedal pulses are intact and symmetric.  Abdomen:  No organomegaly, masses, bruits, or tenderness. Bowels sounds are present  Extremities: 2+ pedal and lower pretibial edema  Skin:  No  xanthelasma.  Neurologic: Moves all extremities.  Speech is  slow, at times difficult to understand      EKG (personally reviewed):    11/6/2022: Atrial fibrillation at 116 bpm.  Low voltage QRS    Imaging   CT chest PE run 11/6:CORONARY ARTERY CALCIFICATION: Moderate.  1.  No evidence of pulmonary embolus given limitations.  2.  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  3.  Small to moderate pericardial effusion, small bilateral pleural effusions, unchanged. Increasing basilar consolidation, atelectasis versus infiltrate.      Cardiac diagnostics    Echocardiogram 10/24/2022:  1. Left ventricular size and systolic function are normal. The estimated left  ventricular ejection fraction is 55-60%.  2. Right ventricular size and systolic function are normal.  3. There is a small pericardial effusion located mostly along the anterior  border of the right ventricle. There is minimal pericardial fluid along the  inferior border of the right ventricle as well as the apex, which may make  attempts at pericardiocentesis difficult. No obvious echocardiographic  evidence of pericardial tamponade.  4. Compared to the prior study dated 10/22/2022, the pericardial effusion  appears slightly smaller.        Lab Review     Lab Results   Component Value Date    HGB 9.4 11/06/2022    HGB 12.2 08/18/2015     Lab Results   Component Value Date     11/06/2022     Lab Results   Component Value Date    POTASSIUM 3.1 11/07/2022    POTASSIUM 3.5 07/18/2019    POTASSIUM 4.1 03/13/2019     Lab Results   Component Value Date    BUN 19.6 11/07/2022    BUN 18 07/18/2019    BUN 21.0 03/13/2019     Lab Results   Component Value Date    CR 1.29 11/07/2022    CR 1.3 03/13/2019     Lab Results   Component Value Date    CHOL 153 10/23/2022    CHOL 153 02/11/2019    HDL 57 10/23/2022    HDL 63 02/11/2019    LDL 84 10/23/2022    LDL 62 02/11/2019    TRIG 60 10/23/2022    TRIG 130.0  11/17/2016    CHOLHDLRATIO 2.3 11/17/2016           Beny Santiago MD  [facility]  11/7/2022    Note created using Dragon voice recognition software.  Sound alike errors may have escaped editing.    Clinically Significant Risk Factors Present on Admission        # Hypokalemia: Lowest K = 3.1 mmol/L (Ref range: 3.4-5.3) in last 2 days, will replace as needed   # Hypocalcemia: Lowest Ca = 7.8 mg/dL (Ref range: 8.5 - 10.1 mg/dL) in last 2 days, will monitor and replace as appropriate     # Hypoalbuminemia: Lowest albumin = 3.3 g/dL (Ref range: 3.5-5.2) at 11/6/2022  8:06 PM, will monitor as appropriate

## 2022-11-07 NOTE — ED TRIAGE NOTES
Pt arrived via EMS for home due to generalized weakness. Pt's wife reported that yesterday he was able to walk with walker, but today he cannot. He is weak and his ankles are more swollen than usual. Pt has jorge catheter. Vital are stable except increased BP.

## 2022-11-07 NOTE — ED NOTES
Bed: JNED-18  Expected date: 11/6/22  Expected time: 7:25 PM  Means of arrival:   Comments:  87 M Fever

## 2022-11-07 NOTE — PROGRESS NOTES
Pulmonary/Critical Care Medicine update  Updated patient's wife about hospitalization and transfer to the ICU.   We confirmed that he is at [facility] ICU.  Informed her of visitation time.   No specific questions at this time. She will speak with the daytime team at bedside for additional information/updates.    Otherwise, pharmacy team is working on medication reconciliation.    Priscilla Loco MD, MPH  Pulmonary & Critical Care Medicine  11/07/2022  5:58 AM

## 2022-11-07 NOTE — PROGRESS NOTES
CLINICAL NUTRITION SERVICES - EDUCATION NOTE    Received diet education consult for 2 gram sodium     Discussed pt in ICU rounds.  Patient alert to self and is drowsy.  Diet RX:  NPO except Ice chips.  ?    INTERVENTIONS:  RD will see patient for diet ed when patient can participate in diet education.  Diet education not appropriate currently.

## 2022-11-07 NOTE — PLAN OF CARE
Problem: Plan of Care - These are the overarching goals to be used throughout the patient stay.    Goal: Plan of Care Review  Description: The Plan of Care Review/Shift note should be completed every shift.  The Outcome Evaluation is a brief statement about your assessment that the patient is improving, declining, or no change.  This information will be displayed automatically on your shift note.  Outcome: Progressing  Flowsheets (Taken 11/7/2022 1424)  Plan of Care Reviewed With:   patient   spouse  Goal: Absence of Hospital-Acquired Illness or Injury  Outcome: Progressing  Intervention: Identify and Manage Fall Risk  Recent Flowsheet Documentation  Taken 11/7/2022 1200 by Nyasia Lind RN  Safety Promotion/Fall Prevention:   activity supervised   bed alarm on  Taken 11/7/2022 0800 by Nyasia Lind RN  Safety Promotion/Fall Prevention:   activity supervised   bed alarm on  Intervention: Prevent Skin Injury  Recent Flowsheet Documentation  Taken 11/7/2022 1200 by Nyasia Lind RN  Body Position:   turned   left   supine, head elevated  Taken 11/7/2022 0800 by Nyasia Lind RN  Body Position:   supine, head elevated   weight shifting  Intervention: Prevent and Manage VTE (Venous Thromboembolism) Risk  Recent Flowsheet Documentation  Taken 11/7/2022 1200 by Nyasia Lind RN  VTE Prevention/Management: SCDs (sequential compression devices) on  Taken 11/7/2022 0800 by Nyasia Lind RN  VTE Prevention/Management: SCDs (sequential compression devices) on  Goal: Optimal Comfort and Wellbeing  Outcome: Progressing     Problem: Risk for Delirium  Goal: Improved Attention and Thought Clarity  Outcome: Progressing  Intervention: Maximize Cognitive Function  Recent Flowsheet Documentation  Taken 11/7/2022 1200 by Nyasia Lind RN  Sensory Stimulation Regulation: care clustered  Reorientation Measures:   calendar in view   clock in view   familiar social contact encouraged   reorientation provided  Taken 11/7/2022 0800  by Diogo, Nyasia J, RN  Sensory Stimulation Regulation: care clustered  Reorientation Measures:   calendar in view   clock in view   familiar social contact encouraged   reorientation provided     Problem: Risk for Delirium  Goal: Improved Behavioral Control  Intervention: Minimize Safety Risk  Recent Flowsheet Documentation  Taken 11/7/2022 1200 by Nyasia Lind RN  Enhanced Safety Measures: bed alarm set  Taken 11/7/2022 0800 by Nyasia Lind RN  Enhanced Safety Measures: bed alarm set   Goal Outcome Evaluation:      Plan of Care Reviewed With: patient, spouse  [facility] - ICU    RN Progress Note:            Pertinent Assessments:      Please refer to flowsheet rows for full assessment           Mobility Level:     Bed rest changed to advance as tolerated,PT here has attempted a couple of times hes more wakeful today then falls asleep. Remains hemodynamically stable falls asleep easily on and off alert follows commands ,knows where he is and a little about what happed         Key Events - This Shift:                                  Meeting with palliative care this afternoon               Plan:     Communicate with him as to his wishes ,and continue current plan of care         Point of Contact Update

## 2022-11-07 NOTE — H&P
[facility]    History and Physical - Hospitalist Service       Date of Admission:  11/6/2022    Assessment & Plan      Dilip Long is 87 year old male with history of BPH, prior CVA, diverticulosis, Graves' disease status post ablation, hyperlipidemia, hypothyroidism, hypertension, and ureteral stricture admitted on 11/6/2022 due to generalized body weakness and poor oral intake.  Urinalysis was suggestive of UTI for which he was placed on ceftriaxone.  Work-up also revealed atrial fibrillation with RVR and possible acute congestive heart failure.    Addendum  RRT was activated due to apneic episode and intermittent tachypnea with heart rate into 120s to 130s.  He received extra dose of IV furosemide and was subsequently placed on diltiazem drip and BiPAP.  VBG ordered. Will monitor in the ICU overnight. Patient is full code as per wife. Discussed with intesivist, Dr. Loco who plans to evaluate patient.     Suspected UTI  BPH  Urethral stricture  Chronic indwelling Lozano catheter was placed during last hospital stay for urinary retention.  Urinalysis is suggestive of UTI  Lozano catheter changed in the ER as per nurse.  Continue IV ceftriaxone  Follow-up urine culture      Atrial fibrillation with RVR  Tachycardic with heart rates in the 110s to 120s  Hold PTA atenolol 25 mg daily  Continue diltiazem drip  Will likely need anticoagulation given [id number] of 6 (Age, CVA, hypertension and CHF)  Telemetry  Monitor electrolytes and replace as needed    Suspected acute congestive heart failure  Echocardiogram 10/21/2022 showed normal left ventricular systolic function with LVEF of 55 to 60%, normal RV systolic function, and small pericardial effusion.   Desaturates to high 80s in the ER and requiring 2 L of intranasal oxygen.  NT proBNP 2747    He received IV Lasix 20 mg in the ER  Continue IV furosemide 20 mg daily for now  Monitor BMP  Intake and output monitoring  Monitor daily  weight  Low-sodium diet  Follow cardiology consult  Consider limited echocardiogram    Generalized body weakness  Possibly secondary to deconditioning from comorbidities including prior CVA,  hypothyroidism in the setting of new UTI and atrial fibrillation with RVR.  PT/OT  Management of UTI and atrial fibrillation with RVR as elevated above    Hyperlipidemia  Resume PTA simvastatin after medication reconciliation by the pharmacist    Prior CVA  Resume PTA aspirin and simvastatin after medication reconciliation by the pharmacist.    PT/OT  Follow-up repeat brain CT    Elevated troponin  Troponin is 64 compared to 89 on 10/21/2022  Resume PTA simvastatin after medication reconciliation by the pharmacist      COVID-19 infection  Tested positive on 10/21/2022. He is vaccinated for COVID-19. ID recommended no COVID focused therapy during last hospital stay.  Considered COVID recovered.       BPH  Resume PTA terazosin after medication reconciliation by the pharmacist  Lozano catheter care     Graves' disease  TSH was elevated at 100.1 during hospital stay and was discharged home on levothyroxine 25 mcg daily.  Repeat TSH level today 64.25  Resume PTA simvastatin after medication reconciliation by the pharmacist  Monitor TFTs    Diet: Combination Diet Low Saturated Fat Na <2400mg Diet, No Caffeine Diet  DVT Prophylaxis: Pneumatic Compression Devices  Lozano Catheter: Not present  Central Lines: None  Cardiac Monitoring: ACTIVE order. Indication: Acute decompensated heart failure (48 hours)  Code Status: Full Code    Clinically Significant Risk Factors Present on Admission          # Hypocalcemia: Lowest Ca = 8.1 mg/dL (Ref range: 8.5 - 10.1 mg/dL) in last 2 days, will monitor and replace as appropriate     # Hypoalbuminemia: Lowest albumin = 3.3 g/dL (Ref range: 3.5-5.2) at 11/6/2022  8:06 PM, will monitor as appropriate     # Hypertension: home medication list includes antihypertensive(s)   # Acute Respiratory Failure:  Documented O2 saturation < 91%.  Continue supplemental oxygen as needed            Disposition Plan      Expected Discharge Date: 11/08/2022                The patient's care was discussed with the Patient and wife, Laina over the phone.     Rebeka Marinelli MD  Hospitalist Service  [facility]  Securely message with the Vocera Web Console (learn more here)  Text page via [facility] Paging/Directory         ______________________________________________________________________    Chief Complaint   Generalized body weakness and poor oral intake    History is obtained from the patient and wife (over the phone).  Limited history was obtained from the patient due to cognitive impairment.    History of Present Illness   Dilip Long is 87 year old male with history of BPH, prior CVA, diverticulosis, Graves' disease status post ablation, hyperlipidemia, hypothyroidism, hypertension, and ureteral stricture  who presents to the ER due to generalized body weakness and poor oral intake.     He was in his usual state of health until 3 to 4 days ago when he developed generalized body weakness.  As per wife, patient has been unable to walk around or hold a glass of water since 3 to 4 days ago.  He has stopped eating since 4 days ago and only able to take few sips of water.  Wife is worried patient might have a stroke due to inability to ambulate.  There was no history of fever, chills, nausea, vomiting, however, he has history of chronic urinary retention from BPH for which chronic indwelling Lozano catheter was placed during last hospital stay.  Wife denies shortness of breath, palpitation, chest pain, or dizziness.    He was recently hospitalized 10/21/2022 - 10/24/2022 for syncope, COVID-19 infection, pericardial effusion (res),  and LEONIDAS.  Levothyroxine was started at that time due to markedly elevated TSH level of 100.1.  Repeat TSH level today 64.25.  Cardiologist recommended outpatient  pharmacologic stress test and event monitor.    Telemetry/EKG in the ER revealed atrial fibrillation with RVR with heart rate in the 110s -130s.  He received 5 mg of IV diltiazem.  Bedside ultrasound performed by ER attending showed small pericardial effusion, consistent with CT finding.  Echocardiogram 10/21/2022 showed normal left ventricular systolic function with LVEF of 55 to 60%, normal RV systolic function, and small pericardial effusion.     Initial blood pressure was 155/67, pulse 99, temperature 98.5  F, respiratory 26 and oxygen saturation of 92% on room air.  Notable laboratory findings include creatinine 1.32 compared to 1.45 on 10/24/2022, calcium 8.1, albumin 3.3, NT proBNP 2747, free T4 <0.10, high-sensitivity troponin 64, TSH 64.25 compared to 100.1 on 10/21/2022, hemoglobin 9.4 with MCV of 103 compared to 8.2 with MCV of 103 on 10/24/2022, and urinalysis suggestive of UTI.  Urine culture is pending.  Rapid COVID-19 test was positive.  Chest x-ray showed minimal hazy opacity in the left lower lung.  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     He received normal saline 500 mL bolus x1, ceftriaxone 1 g, diltiazem 5 mg IV, in the ER.  Patient is full code as per wife    Review of Systems    The 10 point Review of Systems is negative other than noted in the HPI    Past Medical History    I have reviewed this patient's medical history and updated it with pertinent information if needed.   Past Medical History:   Diagnosis Date     Acute prostatitis      Asymptomatic bacteriuria 10/23/2013    Noted at 10/2/13 office visit at [facility]. No treatment recommended at that time.      Hypertension        Past Surgical  History   I have reviewed this patient's surgical history and updated it with pertinent information if needed.  Past Surgical History:   Procedure Laterality Date     BLADDER SURGERY      Bladder absces removal     Blepharoplasty Upper Lid W/ Excessive Skin[  1/29/2007     CATARACT EXTRACTION       EYE SURGERY      both eyes 2015     IR LUMBAR EPIDURAL STEROID INJECTION  4/27/2004     IR LUMBAR EPIDURAL STEROID INJECTION  5/11/2004     IR LUMBAR EPIDURAL STEROID INJECTION  11/24/2004     IR LUMBAR EPIDURAL STEROID INJECTION  11/28/2007     MN CYSTOURETHROSCOPY W/IRRIG & EVAC CLOTS N/A 7/27/2018    Procedure: CYSTOSCOPY, CLOT EVACUATION ,URETHRAL DILATATION, DAUGHERTY CATHETER PLACEMENT;  Surgeon: Lowell Arias MD;  [place] [facility] [place]       Social History   I have reviewed this patient's social history and updated it with pertinent information if needed.  Social History     Tobacco Use     Smoking status: Former     Types: Cigarettes     Smokeless tobacco: Never     Tobacco comments:     Pt quit 40 years ago   Substance Use Topics     Alcohol use: Yes     Alcohol/week: 0.0 standard drinks     Comment: Alcoholic Drinks/day: 1-2 beers per week.     Drug use: No       Family History   I have reviewed this patient's family history and updated it with pertinent information if needed.  Family History   Problem Relation Age of Onset     Diabetes No family hx of      Hypertension No family hx of      Other Cancer No family hx of      Coronary Artery Disease No family hx of      Hyperlipidemia No family hx of      Cerebrovascular Disease No family hx of      Breast Cancer No family hx of      Colon Cancer No family hx of      Prostate Cancer No family hx of      Depression No family hx of      Anxiety Disorder No family hx of      Mental Illness No family hx of      Substance Abuse No family hx of      Anesthesia Reaction No family hx of      Asthma No family hx of      Osteoporosis No family  hx of      Genetic Disorder No family hx of      Thyroid Disease No family hx of      Obesity No family hx of      No Known Problems Mother      No Known Problems Father        Prior to Admission Medications   Prior to Admission Medications   Prescriptions Last Dose Informant Patient Reported? Taking?   Calcium Carbonate-Vitamin D (OSCAL 500/200 D-3 PO)   Yes No   Sig: Take 1 tablet by mouth daily.   aspirin (ASA) 81 MG chewable tablet   No No   Sig: Take 1 tablet (81 mg) by mouth daily   atenolol (TENORMIN) 25 MG tablet   Yes No   Sig: Take 25 mg by mouth daily   diphenhydrAMINE (BENADRYL) 25 MG capsule   No No   Sig: Take 1 capsule (25 mg) by mouth every 6 hours as needed for itching or allergies   gabapentin (NEURONTIN) 300 MG capsule   Yes No   Sig: Take 300 mg by mouth every evening   levothyroxine (SYNTHROID/LEVOTHROID) 25 MCG tablet   No No   Sig: Take 1 tablet (25 mcg) by mouth every morning (before breakfast)   order for DME   No No   Sig: Equipment being ordered: Home automatic blood pressure cuff. Check once daily for one week, and then 3 times weekly thereafter.   simvastatin (ZOCOR) 10 MG tablet   No No   Sig: Take 1 tablet (10 mg) by mouth At Bedtime   terazosin (HYTRIN) 10 MG capsule   No No   Sig: Take 1 capsule (10 mg) by mouth At Bedtime Take 1 tablet (10 mg) daily      Facility-Administered Medications: None     Allergies   No Known Allergies    Physical Exam   Vital Signs: Temp: 98.4  F (36.9  C) Temp src: Oral BP: 130/61 Pulse: (!) 121   Resp: (!) 34 SpO2: 97 % O2 Device: None (Room air)    Weight: 0 lbs 0 oz    General appearance: Awake, Alert, Cooperative, not in any obvious distress and appears stated age, mouth breathing  HEENT: Normocephalic, atraumatic, conjunctiva clear without icterus and ears without discharge.  Dry oral mucosa-possibly due to mouth breathing  Lungs: Mild bibasilar crackles with diminished air entry bilaterally.  Cardiovascular: Irregularly irregular rhythm, normal S1  and S2, no lower extremity edema bilaterally  Abdomen: Soft, non-tender and Non-distended, active bowel sounds  Skin: Skin color, texture normal and bruising or bleeding. No rashes or lesions over face, neck, arms and legs, turgor normal.  Musculoskeletal: Left knee splint in place.  Neurologic: Awake and alert.  Follows simple command., Facial symmetry preserved and upper & lower extremities moving well with symmetry  Psychiatric: Calm, normal eye contact and normal affect      Data   Data reviewed today: I reviewed all medications, new labs and imaging results over the last 24 hours. I personally reviewed the head CT image(s) showing No acute intracranial pathology identified..    Recent Results (from the past 24 hour(s))   XR Chest Port 1 View    Narrative    EXAM: XR CHEST PORT 1 VIEW  LOCATION: [facility]  DATE/TIME: 11/6/2022 8:33 PM    INDICATION: Fever.  COMPARISON: 10/21/2022      Impression    IMPRESSION:     Minimal hazy opacity in the left lower lung, which may represent atelectasis versus infiltrate.    No focal airspace disease in the right lung. No pleural effusion or pneumothorax.    The cardiomediastinal silhouette appears unremarkable. Aortic calcifications.   CT Chest Pulmonary Embolism w Contrast    Narrative    EXAM: CT CHEST PULMONARY EMBOLISM W CONTRAST  LOCATION: [facility]  DATE/TIME: 11/6/2022 10:39 PM    INDICATION: dyspnea tachycardia hypoxemia  COMPARISON: 10/23/2022  TECHNIQUE: CT chest pulmonary angiogram during arterial phase injection of IV contrast. Multiplanar reformats and MIP reconstructions were performed. Dose reduction techniques were used. Exam limited by contrast bolus, streak artifact, and motion   artifact.  CONTRAST: 75ml isovue 370    FINDINGS:  ANGIOGRAM CHEST: Pulmonary arteries are normal in caliber. No evidence of central or segmental pulmonary embolus. Motion artifact and poor contrast bolus limits evaluation of  subsegmental thrombus. No evidence of thoracic aortic dissection. Contour   deformity in the aortic arch is favored to represent a small penetrating ulcer (axial series 5 image 114), unchanged. No CT evidence of right heart strain.    LUNGS AND PLEURA: Moderate bibasilar consolidation, increased. Small pleural effusions, likely unchanged.    MEDIASTINUM/AXILLAE: Small to moderate pericardial effusion, unchanged.    CORONARY ARTERY CALCIFICATION: Moderate.    UPPER ABDOMEN: Normal.    MUSCULOSKELETAL: No acute bony abnormalities. Thoracolumbar junction compression deformities, unchanged. Old sternal fracture. Multiple old left rib fractures.      Impression    IMPRESSION:  1.  No evidence of pulmonary embolus given limitations.  2.  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  3.  Small to moderate pericardial effusion, small bilateral pleural effusions, unchanged. Increasing basilar consolidation, atelectasis versus infiltrate.   CT Head w/o Contrast    Narrative    EXAM: CT HEAD W/O CONTRAST  LOCATION: [facility]  DATE/TIME: 11/7/2022 1:46 AM    INDICATION: Limb weakness; CVA  COMPARISON: 10/21/2022  TECHNIQUE: Routine CT Head without IV contrast. Multiplanar reformats. Dose reduction techniques were used.    FINDINGS:  INTRACRANIAL CONTENTS: No intracranial hemorrhage, extraaxial collection, or mass effect.  No CT evidence of acute infarct. Mild presumed chronic small vessel ischemic changes. Mild generalized volume loss. No hydrocephalus.     VISUALIZED ORBITS/SINUSES/MASTOIDS: No intraorbital abnormality. No paranasal sinus mucosal disease. No middle ear or mastoid effusion.    BONES/SOFT TISSUES: Left frontal subgaleal lipoma again demonstrated.      Impression    IMPRESSION:  1.  No acute intracranial pathology identified.  2.  Mild age-related changes.

## 2022-11-08 ENCOUNTER — TRANSCRIBE ORDERS (OUTPATIENT)
Dept: ADMINISTRATIVE | Facility: CLINIC | Age: 87
End: 2022-11-08

## 2022-11-08 ENCOUNTER — APPOINTMENT (OUTPATIENT)
Dept: PHYSICAL THERAPY | Facility: HOSPITAL | Age: 87
DRG: 871 | End: 2022-11-08
Attending: INTERNAL MEDICINE
Payer: MEDICARE

## 2022-11-08 ENCOUNTER — APPOINTMENT (OUTPATIENT)
Dept: OCCUPATIONAL THERAPY | Facility: HOSPITAL | Age: 87
DRG: 871 | End: 2022-11-08
Attending: INTERNAL MEDICINE
Payer: MEDICARE

## 2022-11-08 DIAGNOSIS — Z95.0 CARDIAC PACEMAKER IN SITU: Primary | ICD-10-CM

## 2022-11-08 LAB
BACTERIA UR CULT: NORMAL
HOLD SPECIMEN: NORMAL
MAGNESIUM SERPL-MCNC: 2.2 MG/DL (ref 1.7–2.3)
POTASSIUM SERPL-SCNC: 3.6 MMOL/L (ref 3.4–5.3)

## 2022-11-08 PROCEDURE — 99232 SBSQ HOSP IP/OBS MODERATE 35: CPT | Performed by: INTERNAL MEDICINE

## 2022-11-08 PROCEDURE — 97530 THERAPEUTIC ACTIVITIES: CPT | Mod: GP

## 2022-11-08 PROCEDURE — 999N000157 HC STATISTIC RCP TIME EA 10 MIN

## 2022-11-08 PROCEDURE — 83735 ASSAY OF MAGNESIUM: CPT | Performed by: INTERNAL MEDICINE

## 2022-11-08 PROCEDURE — 999N000128 HC STATISTIC PERIPHERAL IV START W/O US GUIDANCE

## 2022-11-08 PROCEDURE — 210N000001 HC R&B IMCU HEART CARE

## 2022-11-08 PROCEDURE — 99233 SBSQ HOSP IP/OBS HIGH 50: CPT | Performed by: INTERNAL MEDICINE

## 2022-11-08 PROCEDURE — 250N000011 HC RX IP 250 OP 636: Performed by: INTERNAL MEDICINE

## 2022-11-08 PROCEDURE — 36415 COLL VENOUS BLD VENIPUNCTURE: CPT | Performed by: INTERNAL MEDICINE

## 2022-11-08 PROCEDURE — G0008 ADMIN INFLUENZA VIRUS VAC: HCPCS | Performed by: EMERGENCY MEDICINE

## 2022-11-08 PROCEDURE — 999N000287 HC ICU ADULT ROUNDING, EACH 10 MINS

## 2022-11-08 PROCEDURE — 90662 IIV NO PRSV INCREASED AG IM: CPT | Performed by: EMERGENCY MEDICINE

## 2022-11-08 PROCEDURE — 84132 ASSAY OF SERUM POTASSIUM: CPT | Performed by: INTERNAL MEDICINE

## 2022-11-08 PROCEDURE — 94660 CPAP INITIATION&MGMT: CPT

## 2022-11-08 PROCEDURE — 99233 SBSQ HOSP IP/OBS HIGH 50: CPT | Performed by: CLINICAL NURSE SPECIALIST

## 2022-11-08 PROCEDURE — 97162 PT EVAL MOD COMPLEX 30 MIN: CPT | Mod: GP

## 2022-11-08 PROCEDURE — 250N000013 HC RX MED GY IP 250 OP 250 PS 637: Performed by: INTERNAL MEDICINE

## 2022-11-08 PROCEDURE — 250N000011 HC RX IP 250 OP 636: Performed by: EMERGENCY MEDICINE

## 2022-11-08 PROCEDURE — 97535 SELF CARE MNGMENT TRAINING: CPT | Mod: GO

## 2022-11-08 PROCEDURE — 97166 OT EVAL MOD COMPLEX 45 MIN: CPT | Mod: GO

## 2022-11-08 RX ORDER — LEVOTHYROXINE SODIUM 25 UG/1
75 TABLET ORAL
Status: DISCONTINUED | OUTPATIENT
Start: 2022-11-08 | End: 2022-11-08

## 2022-11-08 RX ORDER — LEVOTHYROXINE SODIUM 25 UG/1
50 TABLET ORAL
Status: DISCONTINUED | OUTPATIENT
Start: 2022-11-09 | End: 2022-11-10 | Stop reason: HOSPADM

## 2022-11-08 RX ORDER — POTASSIUM CHLORIDE 1.5 G/1.58G
20 POWDER, FOR SOLUTION ORAL ONCE
Status: COMPLETED | OUTPATIENT
Start: 2022-11-08 | End: 2022-11-08

## 2022-11-08 RX ORDER — LEVOTHYROXINE SODIUM 25 UG/1
25 TABLET ORAL
Status: DISCONTINUED | OUTPATIENT
Start: 2022-11-08 | End: 2022-11-08

## 2022-11-08 RX ADMIN — HEPARIN SODIUM 5000 UNITS: 10000 INJECTION, SOLUTION INTRAVENOUS; SUBCUTANEOUS at 04:31

## 2022-11-08 RX ADMIN — DOXYCYCLINE 100 MG: 100 INJECTION, POWDER, LYOPHILIZED, FOR SOLUTION INTRAVENOUS at 16:30

## 2022-11-08 RX ADMIN — CEFTRIAXONE SODIUM 1 G: 1 INJECTION, POWDER, FOR SOLUTION INTRAMUSCULAR; INTRAVENOUS at 21:10

## 2022-11-08 RX ADMIN — POTASSIUM CHLORIDE 20 MEQ: 1.5 POWDER, FOR SOLUTION ORAL at 00:28

## 2022-11-08 RX ADMIN — INFLUENZA A VIRUS A/VICTORIA/2570/2019 IVR-215 (H1N1) ANTIGEN (FORMALDEHYDE INACTIVATED), INFLUENZA A VIRUS A/DARWIN/9/2021 SAN-010 (H3N2) ANTIGEN (FORMALDEHYDE INACTIVATED), INFLUENZA B VIRUS B/PHUKET/3073/2013 ANTIGEN (FORMALDEHYDE INACTIVATED), AND INFLUENZA B VIRUS B/MICHIGAN/01/2021 ANTIGEN (FORMALDEHYDE INACTIVATED) 0.7 ML: 60; 60; 60; 60 INJECTION, SUSPENSION INTRAMUSCULAR at 09:01

## 2022-11-08 RX ADMIN — DOXYCYCLINE 100 MG: 100 INJECTION, POWDER, LYOPHILIZED, FOR SOLUTION INTRAVENOUS at 04:31

## 2022-11-08 RX ADMIN — Medication 1 MG: at 20:54

## 2022-11-08 RX ADMIN — FAMOTIDINE 20 MG: 10 INJECTION, SOLUTION INTRAVENOUS at 05:57

## 2022-11-08 RX ADMIN — HEPARIN SODIUM 5000 UNITS: 10000 INJECTION, SOLUTION INTRAVENOUS; SUBCUTANEOUS at 16:30

## 2022-11-08 RX ADMIN — POTASSIUM CHLORIDE 20 MEQ: 1.5 POWDER, FOR SOLUTION ORAL at 05:57

## 2022-11-08 ASSESSMENT — ACTIVITIES OF DAILY LIVING (ADL)
ADLS_ACUITY_SCORE: 34
ADLS_ACUITY_SCORE: 38
ADLS_ACUITY_SCORE: 34
ADLS_ACUITY_SCORE: 38
ADLS_ACUITY_SCORE: 34
ADLS_ACUITY_SCORE: 38
ADLS_ACUITY_SCORE: 38
ADLS_ACUITY_SCORE: 34
ADLS_ACUITY_SCORE: 38

## 2022-11-08 NOTE — PROGRESS NOTES
Critical Care Progress Note      11/08/2022    Name: Dilip Long MRN#: [id number]   Age: 87 year old YOB: 1935     Hsptl Day# 2  ICU DAY #    MV DAY #             Problem List:   Principal Problem:    Paroxysmal atrial fibrillation (H)  Active Problems:    Generalized muscle weakness    Hypothyroidism, unspecified type    Urinary tract infection associated with indwelling urethral catheter, initial encounter (H)    Diastolic congestive heart failure, unspecified HF chronicity (H)    Clinically Significant Risk Factors Present on Admission                                  Summary/Hospital Course:     Dilip Long is a 87 year old male with history of prior CVA, essential hypertension, dyslipidemia, BPH with chronic indwelling jorge catheter, history of recurrent UTIs, hypothyroidism/Grave's disease and newly initiated on levothyroxine, recent hospitalization 10/2022 due to syncope, and asymptomatic COVID19 viral infection 10/21/22 who presented to [facility] ED on 11/6/2022 due to weakness/lethargy/fever, admitted for new onset UTI and atrial fibrillation with RVR, subsequently transferred to ICU due to worsening encephalopathy and respiratory failure requiring non-invasive ventilation.       Assessment and plan :       I have personally reviewed the daily labs, imaging studies, cultures and discussed the case with referring physician and consulting physicians.     My assessment and plan by system for this patient is as follows:    Neurology/Psychiatry:   Awake,  Eating breakfast       Cardiovascular:   A. Fib, rate controlled.  Started on Eliquis  Monitor for possible tachybrady syndrome, cardiology following  Chronic heart failure with reduced EF    Pulmonary/Ventilator Management:   Admitted to the ICU with episodes of apnea.  Required noninvasive ventilation.  Currently on room air.  Appears to be having obstructive sleep apnea.  Sleep study as an outpatient    Left lower lobe  infiltrate.  Continue antibiotics      GI and Nutrition :   Eating breakfast.  Tolerating well      Renal/Fluids/Electrolytes:     - monitor function and electrolytes as needed with replacement per ICU protocols.  - generally avoid nephrotoxic agents such as NSAID, IV contrast unless specifically required  - adjust medications as needed for renal clearance  - follow I/O's as appropriate.    Infectious Disease:   Community-acquired pneumonia.  Continue ceftriaxone and doxycycline      Endocrine:   Hypothyroidism secondary to Graves' disease, status post ablation.  TSH improving.  He was started on levothyroxine during his last hospitalization which was at the end of October of this year.  TSH needs to be rechecked 6 weeks after that so approximately in mid December and after that his levothyroxine dose can be adjusted.    Convert to enteral levothyroxine           Key Medications:       apixaban ANTICOAGULANT  5 mg Oral BID     cefTRIAXone  1 g Intravenous Q24H     doxycycline  100 mg Intravenous Q12H     famotidine  20 mg Intravenous Q24H     [Held by provider] furosemide  20 mg Intravenous Q24H     heparin ANTICOAGULANT  5,000 Units Subcutaneous Q12H     levothyroxine  25 mcg Intravenous Daily     [Held by provider] simvastatin  10 mg Oral At Bedtime     sodium chloride (PF)  3 mL Intracatheter Q8H       dilTIAZem Stopped (11/07/22 0324)               Physical Examination:   Temp:  [97.6  F (36.4  C)-98.3  F (36.8  C)] 97.9  F (36.6  C)  Pulse:  [46-80] 75  Resp:  [10-27] 25  BP: ()/(53-98) 153/71  SpO2:  [86 %-100 %] 100 %    Intake/Output Summary (Last 24 hours) at 11/8/2022 1045  Last data filed at 11/8/2022 0800  Gross per 24 hour   Intake 460 ml   Output 325 ml   Net 135 ml     Wt Readings from Last 4 Encounters:   11/07/22 70.5 kg (155 lb 8 oz)   10/24/22 72 kg (158 lb 12.8 oz)   10/03/19 63.5 kg (140 lb)   09/05/19 68 kg (150 lb)     BP - Mean:  [] 102  FiO2 (%): 30 %  Resp: 25    Recent Labs    Lab 22   PH 7.51*   PCO2 34*   PO2 87*   HCO3 27   O2PER 30       GEN: no acute distress   HEENT: head ncat, sclera anicteric, OP patent, trachea midline   PULM: clear anteriorly    CV/COR: Irregularly irregular  ABD: soft nontender, hypoactive bowel sounds, no mass  EXT: No edema  NEURO: Sitting in chair eating breakfast.  SKIN: no obvious rash  LINES: clean, dry intact         Data:   All data and imaging reviewed     ROUTINE ICU LABS (Last four results)  CMP  Recent Labs   Lab 22  0351 22  1152 229 22   NA  --   --  138  --  137   POTASSIUM 3.6 3.5 3.1*  --  3.6   CHLORIDE  --   --  102  --  101   CO2  --   --  24  --  24   ANIONGAP  --   --  12  --  12   GLC  --   --  102* 97 109*   BUN  --   --  19.6  --  18.3   CR  --   --  1.29*  --  1.32*   GFRESTIMATED  --   --  54*  --  53*   KRISTEN  --   --  7.8*  --  8.1*   MAG 2.2  --  1.8  --   --    PROTTOTAL  --   --   --   --  5.9*   ALBUMIN  --   --   --   --  3.3*   BILITOTAL  --   --   --   --  1.1   ALKPHOS  --   --   --   --  58   AST  --   --   --   --  28   ALT  --   --   --   --  11     CBC  Recent Labs   Lab 22   WBC 9.7   RBC 2.78*   HGB 9.4*   HCT 28.7*   *   MCH 33.8*   MCHC 32.8   RDW 12.2        INRNo lab results found in last 7 days.  Arterial Blood Gas  Recent Labs   Lab 22   PH 7.51*   PCO2 34*   PO2 87*   HCO3 27   O2PER 30       All cultures:  No results for input(s): CULT in the last 168 hours.  Recent Results (from the past 24 hour(s))   Echocardiogram Complete   Result Value    LVEF  45-50%    Narrative    [id number]  [id number]  [id number]  [id number]^MAYRA^BHUMI^[place] [address]     Name: YURIY MCKEON  MRN: [id number]  : 1935  Study Date: 2022 12:32 PM  Age: 87 yrs  Gender: Male  Patient Location: [facility]  Reason For Study: Atrial Fibrillation  Ordering Physician: BHUMI NUNEZ  M  Performed By: Jes Mathew     HR: 55  ______________________________________________________________________________  Procedure  Complete Echo Adult. Definity (NDC #26735-605) given intravenously.  ______________________________________________________________________________  Interpretation Summary     Technically challenging study. Definity contrast utilized. Underlying rhythm  is suggestive of atrial fibrillation with frequent ectopy.  Normal left ventricular size. Left ventricular systolic function appears  mildly reduced. Left ventricular ejection fraction estimated 45 to 50%. Not  all walls are optimally visualized.  Normal right ventricular size. Mild decrease in right ventricular systolic  function.  No obvious significant valve abnormality.  Mild enlargement of the aortic root  Small circumferential pericardial effusion  Compared to October 24, 2022 the size of the pericardial effusion appears  similar.  ______________________________________________________________________________  Left Ventricle  The left ventricle is normal in size. There is normal left ventricular wall  thickness. Diastolic Doppler findings (E/E' ratio and/or other parameters)  suggest left ventricular filling pressures are normal. The visual ejection  fraction is 45-50%. Septal motion is consistent with conduction abnormality.  There is mild global hypokinesia of the left ventricle.     Right Ventricle  The right ventricle is normal size. Mildly decreased right ventricular  systolic function. TAPSE is abnormal, which is consistent with abnormal right  ventricular systolic function.     Atria  The left atrium is not well visualized. Normal left atrial size. Right atrial  size is normal.     Mitral Valve  Mitral valve leaflets appear normal. There is trace mitral regurgitation.     Tricuspid Valve  The tricuspid valve is not well visualized. There is trace to mild tricuspid  regurgitation.     Aortic Valve  The aortic valve is  trileaflet with aortic valve sclerosis. No hemodynamically  significant valvular aortic stenosis.     Pulmonic Valve  The pulmonic valve is not well visualized. There is trace pulmonic valvular  regurgitation.     Vessels  The aortic root is mildly enlarged. IVC diameter and respiratory changes fall  into an intermediate range suggesting an RA pressure of 8 mmHg.     Pericardium  Small pericardial effusion.     Rhythm  Irregular rhythm.     ______________________________________________________________________________  MMode/2D Measurements & Calculations  IVSd: 0.98 cm  LVIDd: 4.5 cm  LVIDs: 3.5 cm  LVPWd: 0.77 cm  FS: 24.0 %     LV mass(C)d: 129.7 grams  Ao root diam: 3.8 cm  LA dimension: 3.8 cm  asc Aorta Diam: 3.6 cm  LA/Ao: 0.99  LVOT diam: 2.1 cm  LVOT area: 3.3 cm2  RWT: 0.34     Doppler Measurements & Calculations  MV E max cody: 59.5 cm/sec  MV dec slope: 302.2 cm/sec2  MV dec time: 0.26 sec  Ao V2 max: 77.3 cm/sec  Ao max P.0 mmHg  Ao V2 mean: 55.6 cm/sec  Ao mean P.4 mmHg  Ao V2 VTI: 17.4 cm  SHANKAR(I,D): 3.4 cm2  SHANKAR(V,D): 2.8 cm2  LV V1 max P.6 mmHg  LV V1 max: 64.1 cm/sec  LV V1 VTI: 17.5 cm  SV(LVOT): 58.4 ml  PA acc time: 0.14 sec  AV Cody Ratio (DI): 0.83  E/E': 6.3  E/E' av.4     Lateral E/e': 8.5  Medial E/e': 6.4  Peak E' Cody: 9.4 cm/sec     ______________________________________________________________________________  Report approved by: Karina Garcia 2022 03:05 PM             30 minutes spent with the patient

## 2022-11-08 NOTE — PROGRESS NOTES
Cardiology Progress Note    Assessment/Plan:    1. Atrial fibrillation with rapid ventricular response, status post spontaneous conversion to sinus rhythm.  Will be started on Eliquis for anticoagulation.  We will plan pacemaker placement for sick sinus syndrome in a.m.  2. Heart failure with preserved ejection fraction secondary to hypothyroidism with rapid atrial fibrillation.  3. Hypothyroidism with recent initiation of treatment per hospitalist team  4. Hypertension, labile, with systolics ranging from 100-156 in the last 24 hours.  Would uptitrate medications for persistent hypertension once thyroid supplementation adequate.    Principal Problem:    Paroxysmal atrial fibrillation (H)  Active Problems:    Generalized muscle weakness    Hypothyroidism, unspecified type    Urinary tract infection associated with indwelling urethral catheter, initial encounter (H)    Diastolic congestive heart failure, unspecified HF chronicity (H)     LOS: 2 days     Subjective:  Alert, oriented.  Reports he is chronically sleepy at home.  No chest pain.      Objective:   Vital signs in last 24 hours:  Vitals:    11/08/22 0700 11/08/22 0800 11/08/22 0900 11/08/22 1000   BP: (!) 153/71  (!) 152/74 137/68   BP Location:       Pulse: 72 75 72 82   Resp: 14 25 14 21   Temp:  97.9  F (36.6  C)     TempSrc:       SpO2: 100% 100% 100% 98%   Weight:         Weight:   Wt Readings from Last 3 Encounters:   11/07/22 70.5 kg (155 lb 8 oz)   10/24/22 72 kg (158 lb 12.8 oz)   10/03/19 63.5 kg (140 lb)           PHYSICAL EXAM    Snores loudly in recliner.  No apnea observed.  Respiratory:  Normal breath sounds, No respiratory distress, No wheezing, No chest tenderness.   Cardiovascular:   Normal heart rate, Normal rhythm, No murmurs, No rubs, No gallops.   GI:  Bowel sounds normal, Soft, No tenderness, No masses  Extremities: no edema       Cardiographics:   Telemetry sinus rhythm, 33 to 85 bpm.  No pauses.      Imaging   CT chest PE run  11/6:CORONARY ARTERY CALCIFICATION: Moderate.  1.  No evidence of pulmonary embolus given limitations.  2.  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  3.  Small to moderate pericardial effusion, small bilateral pleural effusions, unchanged. Increasing basilar consolidation, atelectasis versus infiltrate.        Cardiac diagnostics    Echocardiogram 11/7/2022:  Technically challenging study. Definity contrast utilized. Underlying rhythm  is suggestive of atrial fibrillation with frequent ectopy.  Normal left ventricular size. Left ventricular systolic function appears  mildly reduced. Left ventricular ejection fraction estimated 45 to 50%. Not  all walls are optimally visualized.  Normal right ventricular size. Mild decrease in right ventricular systolic  function.  No obvious significant valve abnormality.  Mild enlargement of the aortic root  Small circumferential pericardial effusion  Compared to October 24, 2022 the size of the pericardial effusion appears  similar.    Echocardiogram 10/24/2022:  1. Left ventricular size and systolic function are normal. The estimated left  ventricular ejection fraction is 55-60%.  2. Right ventricular size and systolic function are normal.  3. There is a small pericardial effusion located mostly along the anterior  border of the right ventricle. There is minimal pericardial fluid along the  inferior border of the right ventricle as well as the apex, which may make  attempts at pericardiocentesis difficult. No obvious echocardiographic  evidence of pericardial tamponade.  4. Compared to the prior study dated 10/22/2022, the pericardial effusion  appears slightly smaller.      Lab Results:   Lab Results   Component Value Date    WBC 9.7 11/06/2022    HGB 9.4 (L) 11/06/2022    HCT 28.7 (L) 11/06/2022     11/06/2022    CHOL 153 10/23/2022    TRIG 60 10/23/2022    HDL 57 10/23/2022    ALT 11 11/06/2022    AST 28  11/06/2022     11/07/2022    BUN 19.6 11/07/2022    CO2 24 11/07/2022    TSH 64.25 (H) 11/06/2022    INR 1.08 10/21/2022     No results found for: CKTOTAL, CKMB, TROPONINI      Beny Santiago MD [facility]  11/8/2022

## 2022-11-08 NOTE — PROGRESS NOTES
[facility]  Palliative Care Daily Progress Note       Recommendations & Counseling     GOALS OF CARE:   Goals are restorative at this time. Hoping to avoid TCU, and to discharge home with family and home care after this hospital stay.      ADVANCE CARE PLANNING:   Advance directive: Health care directive completed but not signed. Will assist with notarization tomorrow.   POLST: None on file. Recommend completing prior to discharge.   Surrogate decision maker: Granddaughter Shruthi.   Code status: Change code status to DNR/DNI per patient request     SYMPTOM MANAGEMENT:   Generalized weakness and fatigue 2/2 multiple medical conditions.   -Agree with PT/OT  -Continue to monitor.     Left knee pain 2/2 osteoarthritis  -Consider steroid injection (last injection was about 1 year ago per patient)  -Patient declines acetaminophen as not helpful in the past.     Discussed with: Nyasia JIMENES and Dr. Tony Valero          Dilip Long is a 87 year old male with PMH of BPH with chronic indwelling jorge catheter, prior CVA, diverticulosis, Grave's disease, hyperlipidemia, hypothyroidism, hypertension, uretheral stricture, hypothyroidism recently started on treatment last month, and recent COVID 19 infection on 10/21/22 who presented to the ED on 11/6/22 with generalized weakness and poor oral intake.  UA was suggestive of UTI.  Was also found to have a-fib with RVR.  Rapid response was called due to apneic episode and tachycardia.  Was placed on diltiazem drip and BiPAP and admitted to the ICU for further monitoring.    Cardiology and pulmonary/critical care have also been consulted this admission.         Today, the patient was seen for:  Goals of care, advance care planning    Prognosis, Goals, or Advance Care Planning was addressed today with: Yes.  Mood, coping, and/or meaning in the context of serious illness were addressed today: Yes.              Interval History:     Chart  review/discussion with unit or clinical team members:   No significant overnight events    Per patient or family/caregivers today:  Met in room with patient and granddaughter Shruthi.  Reviewed his completed advance directive and attempted to have virtual notarization, however patient was having difficulty hearing and will re-attempt notarization tomorrow.    Discussed code status and patient clearly states he would not want CPR or intubation.  Granddaughter states this is consistent with patient's previously expressed wishes as well.     Key Palliative Symptoms:  # Pain severity the last 12 hours: low  # Dyspnea severity the last 12 hours: none  # Nausea severity the last 12 hours: none  # Anxiety severity the last 12 hours: none  Left knee pain--mild at rest, worse with movement.  States normally tylenol not helpful, last cortisone injection was about 1 year ago.            Review of Systems:     Besides above, an additional 4 point system ROS was reviewed and is unremarkable          Medications:     I have reviewed this patient's medication profile and medications during this hospitalization.           Physical Exam:   Vital Signs: Blood pressure 128/75, pulse 92, temperature 98  F (36.7  C), temperature source Oral, resp. rate 28, weight 70.5 kg (155 lb 8 oz), SpO2 97 %.   GENERAL: Alert, sitting up in chair    SKIN: Warm and dry   HEENT: Normocephalic, anicteric sclera, moist mucous membranes  LUNGS: breathing non-labored   : jorge in place  MUSKL: no gross joint deformities   EXTREMITIES: No edema  NEUROLOGIC: Alert and oriented X 3.  Forgetful             Data Reviewed:         Results for orders placed or performed during the hospital encounter of 11/06/22   XR Chest Port 1 View    Impression    IMPRESSION:     Minimal hazy opacity in the left lower lung, which may represent atelectasis versus infiltrate.    No focal airspace disease in the right lung. No pleural effusion or pneumothorax.    The  cardiomediastinal silhouette appears unremarkable. Aortic calcifications.   CT Chest Pulmonary Embolism w Contrast    Impression    IMPRESSION:  1.  No evidence of pulmonary embolus given limitations.  2.  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  3.  Small to moderate pericardial effusion, small bilateral pleural effusions, unchanged. Increasing basilar consolidation, atelectasis versus infiltrate.   CT Head w/o Contrast    Impression    IMPRESSION:  1.  No acute intracranial pathology identified.  2.  Mild age-related changes.   Echocardiogram Complete   Result Value Ref Range    LVEF  45-50%        Recent Labs   Lab 11/08/22  0351 11/07/22  1152 11/07/22  0229 11/07/22  0228 11/06/22 2006   WBC  --   --   --   --  9.7   HGB  --   --   --   --  9.4*   MCV  --   --   --   --  103*   PLT  --   --   --   --  256   NA  --   --  138  --  137   POTASSIUM 3.6 3.5 3.1*  --  3.6   CHLORIDE  --   --  102  --  101   CO2  --   --  24  --  24   BUN  --   --  19.6  --  18.3   CR  --   --  1.29*  --  1.32*   ANIONGAP  --   --  12  --  12   KRISTEN  --   --  7.8*  --  8.1*   GLC  --   --  102* 97 109*   ALBUMIN  --   --   --   --  3.3*   PROTTOTAL  --   --   --   --  5.9*   BILITOTAL  --   --   --   --  1.1   ALKPHOS  --   --   --   --  58   ALT  --   --   --   --  11   AST  --   --   --   --  28      Lab Results   Component Value Date    WBC 9.7 11/06/2022    HGB 9.4 (L) 11/06/2022    HCT 28.7 (L) 11/06/2022     11/06/2022     11/07/2022    POTASSIUM 3.6 11/08/2022    CHLORIDE 102 11/07/2022    CO2 24 11/07/2022    BUN 19.6 11/07/2022    CR 1.29 (H) 11/07/2022     (H) 11/07/2022    DD 1.10 (H) 10/24/2022    NTBNP 2,747 (H) 11/06/2022    AST 28 11/06/2022    ALT 11 11/06/2022    ALKPHOS 58 11/06/2022    BILITOTAL 1.1 11/06/2022    INR 1.08 10/21/2022      Lab Results   Component Value Date    ALBUMIN 3.3 11/06/2022    ALBUMIN 2.5 07/18/2019           Recent Labs   Lab 11/07/22  0310   PH 7.51*   PCO2 34*   PO2 87*   HCO3 27   O2PER 30                 ====================================================  TT: I have personally spent a total of 35 minutes on the unit in review of medical record, consultation with the medical providers and assessment of patient today, with more than 50% of this time spent in counseling, coordination of care, and discussion with patient and family re: diagnostic results, prognosis, symptom management, risks and benefits of management options, and development of plan of care as noted above.      ====================================================    CAROL Benavides, MANUELA-BC  Clinical Nurse Specialist  [facility]  [phone]

## 2022-11-08 NOTE — PROGRESS NOTES
"   11/08/22 1600   Appointment Info   Signing Clinician's Name / Credentials (PT) Anna Vaughn, PT, DPT   Living Environment   People in Home spouse   Current Living Arrangements house   Home Accessibility stairs to enter home;stairs within home   Number of Stairs, Main Entrance 4   Stair Railings, Main Entrance railing on right side (ascending)   Number of Stairs, Within Home, Primary none   Transportation Anticipated family or friend will provide   Self-Care   Usual Activity Tolerance moderate   Current Activity Tolerance fair   Equipment Currently Used at Home cane, straight;walker, rolling   Fall history within last six months yes   Number of times patient has fallen within last six months   (\"I don't count them\")   General Information   Onset of Illness/Injury or Date of Surgery 11/06/22   Referring Physician Ervin Jimenez MD   Patient/Family Therapy Goals Statement (PT) None stated   Pertinent History of Current Problem (include personal factors and/or comorbidities that impact the POC) Dilip Long is 87 year old male with history of BPH, prior CVA, diverticulosis, Graves' disease status post ablation, hyperlipidemia, hypothyroidism, hypertension, and ureteral stricture admitted on 11/6/2022 due to generalized body weakness and poor oral intake.  Urinalysis was suggestive of UTI for which he was placed on ceftriaxone.  Work-up also revealed atrial fibrillation with RVR and possible acute congestive heart failure.   Existing Precautions/Restrictions fall   Cognition   Affect/Mental Status (Cognition) confused   Orientation Status (Cognition) unable/difficult to assess   Follows Commands (Cognition) follows one-step commands;50-74% accuracy;repetition of directions required   Range of Motion (ROM)   Range of Motion ROM is WFL   Strength (Manual Muscle Testing)   Strength (Manual Muscle Testing) strength is WFL   Strength Comments Per mobility observation   Bed Mobility   Bed Mobility supine-sit "   Supine-Sit [place] (Bed Mobility) contact guard;1 person to manage equipment   Bed Mobility Limitations decreased ability to use arms for pushing/pulling;decreased ability to use legs for bridging/pushing   Impairments Contributing to Impaired Bed Mobility pain;decreased strength   Assistive Device (Bed Mobility) bed rails   Transfers   Transfers sit-stand transfer   Transfer Safety Concerns Noted losing balance backward   Impairments Contributing to Impaired Transfers decreased strength;impaired balance   Sit-Stand Transfer   Sit-Stand [place] (Transfers) maximum assist (25% patient effort);1 person to manage equipment   Assistive Device (Sit-Stand Transfers) other (see comments)  (Hand held assist)   Clinical Impression   Criteria for Skilled Therapeutic Intervention Yes, treatment indicated   PT Diagnosis (PT) Impaired functional mobility   Influenced by the following impairments Weakness   Functional limitations due to impairments Bed mobility, transfers, gait, stairs   Clinical Presentation (PT Evaluation Complexity) Evolving/Changing   Clinical Presentation Rationale Patient presents as medically diagnosed   Clinical Decision Making (Complexity) moderate complexity   Planned Therapy Interventions (PT) balance training;bed mobility training;gait training;home exercise program;patient/family education;stair training;strengthening;transfer training   Anticipated Equipment Needs at Discharge (PT) walker, rolling;cane, straight   Risk & Benefits of therapy have been explained evaluation/treatment results reviewed;care plan/treatment goals reviewed;participants voiced agreement with care plan;participants included;patient   PT Total Evaluation Time   PT Eval, Moderate Complexity Minutes (47196) 8   Physical Therapy Goals   PT Frequency Daily   PT Predicted Duration/Target Date for Goal Attainment 11/15/22   PT Goals Bed Mobility;Transfers;Gait;Stairs;PT Goal 1   PT: Bed Mobility Modified  independent;Supine to/from sit   PT: Transfers Sit to/from stand;Bed to/from chair;Assistive device;Moderate assist   PT: Gait Minimal assist;Assistive device;25 feet   PT: Stairs 4 stairs;Supervision/stand-by assist   Therapeutic Activity   Therapeutic Activities: dynamic activities to improve functional performance Minutes (12858) 8   Treatment Detail/Skilled Intervention Patient performed 1 additional sit to stand transfer with max A x 1 and hand held assist. Demonstrated forward flexed posture with bilateral bent knees. Unable to acheive upright standing despite verbal cueing. Sit to supine with min Ax  1, assistance with BLE. Supine scooting towards HOB with max  A x 2. Patient supine in bed with call light within reach at end of session .   Symptoms Noted During/After Treatment Fatigue   PT Discharge Planning   PT Plan Bed mobility, transfers, gait as able, LE exercises   PT Discharge Recommendation (DC Rec) Transitional Care Facility;home with assist;home with home care physical therapy   PT Rationale for DC Rec Patient sally requires assist of 1 for bed mobility and transfers. He lives in a house with his wife, unable to determine if his wife is able to provide physical assistance. He has 4 stairs to enter his home, at this time he has not attempted stairs. He has access to a cane and walker at home and uses them at baseline. If patient were to discharge home at this time he would require assist of 1-2 for mobility, use of a walker, 24 hr assist and benefit from home PT. If this level of assistance cannot be provided at home, recommend patient discharges to TCU in order to improve strength and independence with mobility.   PT Brief overview of current status Assist of 1 with bed mobility and transfers.   Total Session Time   Timed Code Treatment Minutes 8   Total Session Time (sum of timed and untimed services) 16     Anna Vaughn DPT

## 2022-11-08 NOTE — PROGRESS NOTES
Care Management Follow Up    Length of Stay (days): 2    Expected Discharge Date: 11/09/2022     Concerns to be Addressed:     ICU level cares, Cardiology following, IV abx and medications, PT/OT  Patient plan of care discussed at interdisciplinary rounds: Yes    Anticipated Discharge Disposition:  OT recommending TCU     Anticipated Discharge Services:   To be determined  Anticipated Discharge DME:      Patient/family educated on Medicare website which has current facility and service quality ratings:    Education Provided on the Discharge Plan: Per Care Team   Patient/Family in Agreement with the Plan:      Referrals Placed by CM/SW:   None at this time  Private pay costs discussed: Not applicable    Additional Information:  Chart reviewed and care progression reviewed during ICU morning rounds.    Patient had recent admission 10/21/2022-10/24/2022 for syncope and was also found to be COVID-19 positive.  Discharge plan was home with [facility] (RN, PT, OT).  Writer sent notification to agency to confirm services- awaiting response.    OT evaluation done and recommendation for TCU noted.  Awaiting PT recommendation.    CM will discuss discharge recommendations with granddaughter, Shruthi.    Palliative Care consult done yesterday for goals of care which are restorative at this time.    CM will continue to follow care progression, review recommendations and assist with discharge planning.      Kiara Montgomery RN

## 2022-11-08 NOTE — PROGRESS NOTES
11/08/22 0909   Appointment Info   Signing Clinician's Name / Credentials (OT) Jewels Kuo OT   Living Environment   People in Home spouse   Current Living Arrangements house   Home Accessibility stairs to enter home;stairs within home   Number of Stairs, Main Entrance 4   Stair Railings, Main Entrance railing on right side (ascending)   Number of Stairs, Within Home, Primary none   Transportation Anticipated family or friend will provide   Living Environment Comments Was independent w/his ADLs and mobility   Self-Care   Usual Activity Tolerance moderate   Current Activity Tolerance fair   Equipment Currently Used at Home cane, straight   Fall history within last six months yes   Number of times patient has fallen within last six months 1   Instrumental Activities of Daily Living (IADL)   Previous Responsibilities laundry;finances;shopping  (wife does all other home ADLs)   General Information   Onset of Illness/Injury or Date of Surgery 11/06/22   Referring Physician Dr Jimenez   Patient/Family Therapy Goal Statement (OT) go home   Existing Precautions/Restrictions fall   Limitations/Impairments safety/cognitive   Left Upper Extremity (Weight-bearing Status) full weight-bearing (FWB)   Right Upper Extremity (Weight-bearing Status) full weight-bearing (FWB)   Left Lower Extremity (Weight-bearing Status) full weight-bearing (FWB)   Right Lower Extremity (Weight-bearing Status) full weight-bearing (FWB)   Cognitive Status Examination   Orientation Status person;place   Behavioral Issues other (see comments)  (none)   Affect/Mental Status (Cognitive) confused   Follows Commands follows multi-step commands;50-74% accuracy   Safety Deficit minimal deficit   Visual Perception   Visual Impairment/Limitations corrective lenses full-time   Sensory   Sensory Quick Adds sensation intact   Pain Assessment   Patient Currently in Pain Yes, see Vital Sign flowsheet  (L LE)   Posture   Posture forward head position    Range of Motion Comprehensive   General Range of Motion no range of motion deficits identified   Strength Comprehensive (MMT)   General Manual Muscle Testing (MMT) Assessment no strength deficits identified   Muscle Tone Assessment   Muscle Tone Quick Adds No deficits were identified   Coordination   Upper Extremity Coordination No deficits were identified   Bed Mobility   Comment (Bed Mobility) max A of 1   Transfers   Transfer Comments Max of 2   Sit-Stand Transfer   Sit-Stand [place] (Transfers) maximum assist (25% patient effort);2 person assist   Balance   Balance Assessment standing balance: dynamic   Activities of Daily Living   BADL Assessment/Intervention lower body dressing   Lower Body Dressing Assessment/Training   [facility] Level (Lower Body Dressing) dependent (less than 25% patient effort)   Clinical Impression   Criteria for Skilled Therapeutic Interventions Met (OT) Yes, treatment indicated   OT Diagnosis A fib   Influenced by the following impairments weakness, fatigue, decreased ADLs   OT Problem List-Impairments impacting ADL problems related to;activity tolerance impaired;balance;cognition;hearing;mobility;pain   Assessment of Occupational Performance 3-5 Performance Deficits   Identified Performance Deficits weakness, fatigue, decreased ADLs   Planned Therapy Interventions (OT) ADL retraining;progressive activity/exercise;transfer training   Clinical Decision Making Complexity (OT) moderate complexity   Anticipated Equipment Needs Upon Discharge (OT) toileting equipment   Risk & Benefits of therapy have been explained evaluation/treatment results reviewed;patient;care plan/treatment goals reviewed   OT Total Evaluation Time   OT Eval, Moderate Complexity Minutes (65961) 15   OT Goals   Therapy Frequency (OT) 3 times/wk   OT Predicted Duration/Target Date for Goal Attainment 11/14/22   OT Goals Lower Body Dressing   OT: Hygiene/Grooming supervision/stand-by assist   OT: Lower Body  Dressing Supervision/stand-by assist   OT: Toilet Transfer/Toileting Supervision/stand-by assist   OT: Cognitive Patient/caregiver will verbalize understanding of cognitive assessment results/recommendations as needed for safe discharge planning   Self-Care/Home Management   Self-Care/Home Mgmt/ADL, Compensatory, Meal Prep Minutes (77371) 13   Symptoms Noted During/After Treatment (Meal Preparation/Planning Training) fatigue   Treatment Detail/Skilled Intervention transfers, G/H, LB dress, toileting   [place] Level (Grooming Training) minimum assist (75% patient effort)   Assistance (Grooming Training) 1 person assist;set-up required;supervision;verbal cues   Assistive Device (Grooming Training) other (see comments)  (none)   Lower Body Dressing Training Assistance dependent (less than 25% patient effort)   Lower Body Dressing Training Assistance 2 persons;set-up required;supervision;verbal cues   Lower Body Dressing Training Assistance - Assistive Device other (see comments)  (none)   [place] Level (Toilet Training) maximum assist (25% patient effort)   Assistance (Toilet Training) 2 person assist;set-up required;supervision;verbal cues   Toilet Training Assistance - Assistive Device commode overlay   OT Discharge Planning   OT Plan transfers Ax2, G/H, LB dress, toileting   OT Discharge Recommendation (DC Rec) Transitional Care Facility   OT Rationale for DC Rec pt currently needs Ax2 for all transfers and ADLs   OT Brief overview of current status Max of 2 for pivot trransfers and dependent w/his ADLs   Total Session Time   Timed Code Treatment Minutes 13   Total Session Time (sum of timed and untimed services) 28

## 2022-11-08 NOTE — PROGRESS NOTES
[facility]    After midnight - Hospitalist Service    Assessment and Plan    Principal Problem:    Paroxysmal atrial fibrillation (H)  Active Problems:    Generalized muscle weakness    Hypothyroidism, unspecified type    Urinary tract infection associated with indwelling urethral catheter, initial encounter (H)    Diastolic congestive heart failure, unspecified HF chronicity (H)    Dilip Long is a 87 year old old male with h/o BPH, prior CVA, diverticulosis, chronic indwelling jorge history of recurrent UTIs, hypothyroidism/Grave's disease and newly initiated on levothyroxine, recent hospitalization 10/2022 due to syncope, and asymptomatic COVID19 viral infection 10/21/22 who presented to [facility] ED on 11/6/2022 due to weakness/lethargy/fever, admitted for new onset UTI and atrial fibrillation with RVR, subsequently transferred to ICU due to worsening encephalopathy and respiratory failure requiring non-invasive ventilation     intensivist is down grading today from ICU status and HMS staking over. Intensivist has already rounded on patient today      Suspected UTI/BPH/Urethral stricture  - continue chronic indwelling Jorge catheter was placed during last hospital stay for urinary retention.  - Urinalysis is suggestive of UTI  - Jorge catheter changed in the ER as per nurse.  - Follow-up urine culture     Atrial fibrillation with RVR spontaneous converted   - [id number] of 6 (Age, CVA, hypertension and CHF) started on eliquis but holding for PM placement in am   - cardiology following   - Telemetry  - Monitor electrolytes and replace as needed  - treating hyperthyroid      Hyperthyroidism  - was started on levothyroxine on last admission and low dose 25mcg daily increase to 50mcg daily   - likely contributing to above     Suspected acute congestive heart failure  Echocardiogram 10/21/2022 showed normal left ventricular systolic function with LVEF of 55 to 60%, normal RV  systolic function, and small pericardial effusion.   -  received IV Lasix 20 mg in the ER,IV lasix currently on hold  - further diuretics TBD by cards     Hyperlipidemia  -  simvastatin on hold for now    Prior CVA  - PT/OT  - statin and asa on hold for now      VTE prophylaxis:  On hold   DIET: Orders Placed This Encounter      Regular Diet Adult    Drains/Lines: none  Weight bearing status: WBAT  Disposition/Barriers to discharge: pending PM placement and specialist further recs  Code Status:Full Code    Subjective:  Discussed with Kia downgraded now.     B/P: 137/68, T: 97.9, P: 82, R: 21     PERTINENT LABS  Recent Labs   Lab 11/08/22  0351 11/07/22  1152 11/07/22  0229 11/07/22  0228 11/06/22 2006   WBC  --   --   --   --  9.7   HGB  --   --   --   --  9.4*   MCV  --   --   --   --  103*   PLT  --   --   --   --  256   NA  --   --  138  --  137   POTASSIUM 3.6 3.5 3.1*  --  3.6   CHLORIDE  --   --  102  --  101   CO2  --   --  24  --  24   BUN  --   --  19.6  --  18.3   CR  --   --  1.29*  --  1.32*   ANIONGAP  --   --  12  --  12   KRISTEN  --   --  7.8*  --  8.1*   GLC  --   --  102* 97 109*   ALBUMIN  --   --   --   --  3.3*   PROTTOTAL  --   --   --   --  5.9*   BILITOTAL  --   --   --   --  1.1   ALKPHOS  --   --   --   --  58   ALT  --   --   --   --  11   AST  --   --   --   --  28     No results found for this or any previous visit (from the past 24 hour(s)).    Kristine Ann MD  [facility] Medicine Service  [phone]

## 2022-11-08 NOTE — PLAN OF CARE
[facility] - ICU    RN Progress Note:            Pertinent Assessments:      Please refer to flowsheet rows for full assessment     Pt is somewhat confused on time and sometimes situation but easily reoriented.  Pt complains of pain in L knee whenever it is touched or repositioned but declines any intervention.Remains in rate controlled Afib.          Mobility Level:     3         Key Events - This Shift:     Low UO - MD notified and no new orders.Encouraged pt to drink fluids throughout shift as he has poor oral intake.              Plan:     Palliative to see pt today and discuss goals of care.        Stephanie Farrar, RN

## 2022-11-09 ENCOUNTER — APPOINTMENT (OUTPATIENT)
Dept: OCCUPATIONAL THERAPY | Facility: HOSPITAL | Age: 87
DRG: 871 | End: 2022-11-09
Payer: MEDICARE

## 2022-11-09 PROBLEM — R91.8 LEFT LOWER LOBE PULMONARY INFILTRATE: Status: ACTIVE | Noted: 2022-11-09

## 2022-11-09 LAB
MAGNESIUM SERPL-MCNC: 2 MG/DL (ref 1.7–2.3)
POTASSIUM SERPL-SCNC: 3.3 MMOL/L (ref 3.4–5.3)
POTASSIUM SERPL-SCNC: 3.4 MMOL/L (ref 3.4–5.3)
POTASSIUM SERPL-SCNC: 3.9 MMOL/L (ref 3.4–5.3)

## 2022-11-09 PROCEDURE — 97535 SELF CARE MNGMENT TRAINING: CPT | Mod: GO

## 2022-11-09 PROCEDURE — 250N000013 HC RX MED GY IP 250 OP 250 PS 637: Performed by: INTERNAL MEDICINE

## 2022-11-09 PROCEDURE — 83735 ASSAY OF MAGNESIUM: CPT | Performed by: INTERNAL MEDICINE

## 2022-11-09 PROCEDURE — 99233 SBSQ HOSP IP/OBS HIGH 50: CPT | Performed by: CLINICAL NURSE SPECIALIST

## 2022-11-09 PROCEDURE — 36415 COLL VENOUS BLD VENIPUNCTURE: CPT | Performed by: INTERNAL MEDICINE

## 2022-11-09 PROCEDURE — 250N000011 HC RX IP 250 OP 636: Performed by: INTERNAL MEDICINE

## 2022-11-09 PROCEDURE — 99232 SBSQ HOSP IP/OBS MODERATE 35: CPT | Performed by: INTERNAL MEDICINE

## 2022-11-09 PROCEDURE — 84132 ASSAY OF SERUM POTASSIUM: CPT | Performed by: INTERNAL MEDICINE

## 2022-11-09 PROCEDURE — 210N000001 HC R&B IMCU HEART CARE

## 2022-11-09 RX ORDER — LANOLIN ALCOHOL/MO/W.PET/CERES
3 CREAM (GRAM) TOPICAL EVERY EVENING
Status: DISCONTINUED | OUTPATIENT
Start: 2022-11-09 | End: 2022-11-10 | Stop reason: HOSPADM

## 2022-11-09 RX ORDER — CEFDINIR 300 MG/1
300 CAPSULE ORAL EVERY 12 HOURS SCHEDULED
Status: DISCONTINUED | OUTPATIENT
Start: 2022-11-09 | End: 2022-11-10 | Stop reason: HOSPADM

## 2022-11-09 RX ORDER — MAGNESIUM OXIDE 400 MG/1
400 TABLET ORAL EVERY 4 HOURS
Status: COMPLETED | OUTPATIENT
Start: 2022-11-09 | End: 2022-11-09

## 2022-11-09 RX ORDER — POTASSIUM CHLORIDE 1500 MG/1
40 TABLET, EXTENDED RELEASE ORAL ONCE
Status: COMPLETED | OUTPATIENT
Start: 2022-11-09 | End: 2022-11-09

## 2022-11-09 RX ORDER — DOXYCYCLINE 100 MG/1
100 CAPSULE ORAL EVERY 12 HOURS SCHEDULED
Status: DISCONTINUED | OUTPATIENT
Start: 2022-11-09 | End: 2022-11-10 | Stop reason: HOSPADM

## 2022-11-09 RX ADMIN — MAGNESIUM OXIDE TAB 400 MG (241.3 MG ELEMENTAL MG) 400 MG: 400 (241.3 MG) TAB at 13:02

## 2022-11-09 RX ADMIN — DOXYCYCLINE 100 MG: 100 INJECTION, POWDER, LYOPHILIZED, FOR SOLUTION INTRAVENOUS at 06:03

## 2022-11-09 RX ADMIN — DOXYCYCLINE 100 MG: 100 CAPSULE ORAL at 20:12

## 2022-11-09 RX ADMIN — POTASSIUM CHLORIDE 40 MEQ: 20 TABLET, EXTENDED RELEASE ORAL at 16:07

## 2022-11-09 RX ADMIN — MAGNESIUM OXIDE TAB 400 MG (241.3 MG ELEMENTAL MG) 400 MG: 400 (241.3 MG) TAB at 08:57

## 2022-11-09 RX ADMIN — FAMOTIDINE 20 MG: 10 INJECTION, SOLUTION INTRAVENOUS at 06:04

## 2022-11-09 RX ADMIN — SIMVASTATIN 10 MG: 10 TABLET, FILM COATED ORAL at 20:12

## 2022-11-09 RX ADMIN — LEVOTHYROXINE SODIUM 50 MCG: 0.03 TABLET ORAL at 06:33

## 2022-11-09 RX ADMIN — POTASSIUM CHLORIDE 40 MEQ: 20 TABLET, EXTENDED RELEASE ORAL at 08:57

## 2022-11-09 RX ADMIN — CEFDINIR 300 MG: 300 CAPSULE ORAL at 20:12

## 2022-11-09 RX ADMIN — ACETAMINOPHEN 650 MG: 325 TABLET, FILM COATED ORAL at 20:12

## 2022-11-09 RX ADMIN — Medication 3 MG: at 20:12

## 2022-11-09 RX ADMIN — APIXABAN 5 MG: 5 TABLET, FILM COATED ORAL at 20:12

## 2022-11-09 ASSESSMENT — ACTIVITIES OF DAILY LIVING (ADL)
ADLS_ACUITY_SCORE: 40
ADLS_ACUITY_SCORE: 38
ADLS_ACUITY_SCORE: 40
ADLS_ACUITY_SCORE: 38
ADLS_ACUITY_SCORE: 40
ADLS_ACUITY_SCORE: 38
ADLS_ACUITY_SCORE: 40
ADLS_ACUITY_SCORE: 38
ADLS_ACUITY_SCORE: 40
ADLS_ACUITY_SCORE: 38

## 2022-11-09 NOTE — PROGRESS NOTES
Patients family was in to visit this morning, brought food for patient. Informed family that he needs to be NPO for pacemaker today. Granddaughter and spouse at bedside. Granddaughter became upset and stated that she did not know he was having procedure. Requested to talk to MD to understand why he needs procedure before agreeing to this. Dr. Santiago was paged. Hospitalist was paged. [facility]SC was called to inform he did eat a little breakfast at 0930. Was told to have patient stay NPO in case procedure will happen today. Awaiting response from Dr. Santiago. Family had to leave but will return later this afternoon. Okay to for doctor to call granddaughter with plan.

## 2022-11-09 NOTE — PROGRESS NOTES
Cardiology Progress Note    Assessment/Plan:    1. Atrial fibrillation with rapid ventricular response, status post spontaneous conversion to sinus rhythm.  Starting Eliquis for anticoagulation.  With improved heart rate following thyroid supplementation, he does not require pacemaker placement at this time.  Discussed this with his power of  granddaughter.  2. Heart failure with preserved ejection fraction secondary to hypothyroidism with rapid atrial fibrillation.  Now resolved.  3. Hypothyroidism with recent initiation of treatment per hospitalist team  4. Hypertension, labile, with systolics ranging from 100-156 in the last 24 hours.  Would uptitrate medications for persistent hypertension once thyroid supplementation adequate.    Stable from cardiac standpoint on present medical regimen.  Please call if we can be of further assistance.    Principal Problem:    Paroxysmal atrial fibrillation (H)  Active Problems:    Grave's disease - post ablation    Hypothyroidism, postablative    Elevated prostate specific antigen (PSA)    Essential hypertension    Stage 3a chronic kidney disease (H)    Generalized muscle weakness    Diastolic congestive heart failure, unspecified HF chronicity (H)    Left lower lobe pulmonary infiltrate     LOS: 2 days     Subjective:  Speech still slow, oriented.  Feels better.  Smiling!  No chest pain.  Denies shortness of breath.      Objective:   Vital signs in last 24 hours:  Vitals:    11/08/22 2302 11/09/22 0337 11/09/22 0805 11/09/22 1124   BP:  121/60 (!) 146/80 (!) 154/79   BP Location:  Left arm Right arm Right arm   Pulse: 78 83 75 79   Resp: 16 18 18 18   Temp: 98.4  F (36.9  C) 98.1  F (36.7  C) 98.3  F (36.8  C) 98.1  F (36.7  C)   TempSrc: Oral Oral Oral Oral   SpO2: 97% 95% 97% 97%   Weight:  72.1 kg (158 lb 14.4 oz)       Weight:   Wt Readings from Last 3 Encounters:   11/09/22 72.1 kg (158 lb 14.4 oz)   10/24/22 72 kg (158 lb 12.8 oz)   10/03/19 63.5 kg (140 lb)            PHYSICAL EXAM    Alert, conversant with slow speech.  Respiratory:  Normal breath sounds, No respiratory distress, No wheezing, No chest tenderness.   Cardiovascular:   Normal heart rate, Normal rhythm, No murmurs, No rubs, No gallops.   GI:  Bowel sounds normal, Soft, No tenderness, No masses  Extremities: no edema       Cardiographics:   Telemetry sinus rhythm, 70-85 beats minute.  No pauses or excessive bradycardia.      Imaging   CT chest PE run 11/6:CORONARY ARTERY CALCIFICATION: Moderate.  1.  No evidence of pulmonary embolus given limitations.  2.  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  3.  Small to moderate pericardial effusion, small bilateral pleural effusions, unchanged. Increasing basilar consolidation, atelectasis versus infiltrate.        Cardiac diagnostics    Echocardiogram 11/7/2022:  Technically challenging study. Definity contrast utilized. Underlying rhythm  is suggestive of atrial fibrillation with frequent ectopy.  Normal left ventricular size. Left ventricular systolic function appears  mildly reduced. Left ventricular ejection fraction estimated 45 to 50%. Not  all walls are optimally visualized.  Normal right ventricular size. Mild decrease in right ventricular systolic  function.  No obvious significant valve abnormality.  Mild enlargement of the aortic root  Small circumferential pericardial effusion  Compared to October 24, 2022 the size of the pericardial effusion appears  similar.    Echocardiogram 10/24/2022:  1. Left ventricular size and systolic function are normal. The estimated left  ventricular ejection fraction is 55-60%.  2. Right ventricular size and systolic function are normal.  3. There is a small pericardial effusion located mostly along the anterior  border of the right ventricle. There is minimal pericardial fluid along the  inferior border of the right ventricle as well as the apex, which may  make  attempts at pericardiocentesis difficult. No obvious echocardiographic  evidence of pericardial tamponade.  4. Compared to the prior study dated 10/22/2022, the pericardial effusion  appears slightly smaller.      Lab Results:   Lab Results   Component Value Date    WBC 9.7 11/06/2022    HGB 9.4 (L) 11/06/2022    HCT 28.7 (L) 11/06/2022     11/06/2022    CHOL 153 10/23/2022    TRIG 60 10/23/2022    HDL 57 10/23/2022    ALT 11 11/06/2022    AST 28 11/06/2022     11/07/2022    BUN 19.6 11/07/2022    CO2 24 11/07/2022    TSH 64.25 (H) 11/06/2022    INR 1.08 10/21/2022     No results found for: CKTOTAL, CKMB, TROPONINI      Beny Santiago MD [facility]  11/8/2022

## 2022-11-09 NOTE — PLAN OF CARE
Problem: Plan of Care - These are the overarching goals to be used throughout the patient stay.    Goal: Plan of Care Review  Description: The Plan of Care Review/Shift note should be completed every shift.  The Outcome Evaluation is a brief statement about your assessment that the patient is improving, declining, or no change.  This information will be displayed automatically on your shift note.  Outcome: Progressing  Goal: Optimal Comfort and Wellbeing  Outcome: Progressing  Intervention: Provide Person-Centered Care  Recent Flowsheet Documentation  Taken 11/9/2022 1322 by Annia Oh RN  Trust Relationship/Rapport: care explained  Taken 11/9/2022 0900 by Annia Oh RN  Trust Relationship/Rapport: care explained     Problem: Risk for Delirium  Goal: Optimal Coping  Outcome: Progressing     Problem: Heart Failure Comorbidity  Goal: Maintenance of Heart Failure Symptom Control  Outcome: Progressing  Intervention: Maintain Heart Failure Management  Recent Flowsheet Documentation  Taken 11/9/2022 1322 by Annia Oh RN  Medication Review/Management: medications reviewed  Taken 11/9/2022 0900 by Annia Oh RN  Medication Review/Management: medications reviewed     Problem: Hypertension Comorbidity  Goal: Blood Pressure in Desired Range  Outcome: Progressing  Intervention: Maintain Blood Pressure Management  Recent Flowsheet Documentation  Taken 11/9/2022 1322 by Annia Oh RN  Medication Review/Management: medications reviewed  Taken 11/9/2022 0900 by nAnia Oh RN  Medication Review/Management: medications reviewed     Problem: Plan of Care - These are the overarching goals to be used throughout the patient stay.    Goal: Absence of Hospital-Acquired Illness or Injury  Intervention: Identify and Manage Fall Risk  Recent Flowsheet Documentation  Taken 11/9/2022 1322 by Annia Oh RN  Safety Promotion/Fall Prevention: activity supervised  Taken  11/9/2022 0900 by Annia Oh RN  Safety Promotion/Fall Prevention: activity supervised     Problem: Risk for Delirium  Goal: Improved Behavioral Control  Intervention: Prevent and Manage Agitation  Recent Flowsheet Documentation  Taken 11/9/2022 1322 by Annia Oh RN  Environment Familiarity/Consistency: daily routine followed  Taken 11/9/2022 0900 by Annia Oh RN  Environment Familiarity/Consistency: daily routine followed  Intervention: Minimize Safety Risk  Recent Flowsheet Documentation  Taken 11/9/2022 1322 by Annia Oh RN  Enhanced Safety Measures: bed alarm set  Trust Relationship/Rapport: care explained  Taken 11/9/2022 0900 by Annia Oh RN  Enhanced Safety Measures: bed alarm set  Trust Relationship/Rapport: care explained  Goal: Improved Attention and Thought Clarity  Intervention: Maximize Cognitive Function  Recent Flowsheet Documentation  Taken 11/9/2022 1322 by Annia Oh RN  Reorientation Measures: calendar in view  Taken 11/9/2022 0900 by Annia Oh RN  Reorientation Measures: calendar in view   Goal Outcome Evaluation:       Patient is alert to self but disoriented to place, time and situation. Afib on the monitor rate controlled. Lozano in place. Cardiology seen, pacemaker not needed. Granddaughter updated of this. Assist of two with transfers, trying to get out of bed. Redirected and reoriented. Need to remind him he has a catheter in place. Granddaughter at bedside, helps with restlessness. Melatonin ordered. Will continue to monitor.

## 2022-11-09 NOTE — PROGRESS NOTES
[facility]  Palliative Care Daily Progress Note       Recommendations & Counseling     GOALS OF CARE:   Goals are restorative at this time. Hoping to avoid TCU, and to discharge home with family and home care after this hospital stay.      ADVANCE CARE PLANNING:   Advance directive: Health care directive completed but not signed. Patient's granddaughter plans to bring paulina to hospital to have document notarized, due to difficulty with virtual notarization.    POLST: None on file. Recommend completing prior to discharge.   Surrogate decision maker: Granddaughter Shruthi.   Code status: DNR/DNI per patient request     SYMPTOM MANAGEMENT:   Generalized weakness and fatigue 2/2 multiple medical conditions.   -Agree with PT/OT  -Continue to monitor.     Left knee pain 2/2 osteoarthritis  -Consider steroid injection (last injection was about 1 year ago per patient)  -Patient declines acetaminophen as not helpful in the past.     Discussed with: Dr. Santiago and Annia JIMENES      Anjum Long is a 87 year old male with PMH of BPH with chronic indwelling jorge catheter, prior CVA, diverticulosis, Grave's disease, hyperlipidemia, hypothyroidism, hypertension, uretheral stricture, hypothyroidism recently started on treatment last month, and recent COVID 19 infection on 10/21/22 who presented to the ED on 11/6/22 with generalized weakness and poor oral intake.  UA was suggestive of UTI.  Was also found to have a-fib with RVR.  Rapid response was called due to apneic episode and tachycardia.  Was placed on diltiazem drip and BiPAP and admitted to the ICU for further monitoring.    Cardiology and pulmonary/critical care have also been consulted this admission.         Today, the patient was seen for:  Goals of care, advance care planning    Prognosis, Goals, or Advance Care Planning was addressed today with: Yes.  Mood, coping, and/or meaning in the context of serious illness were  addressed today: Yes.              Interval History:     Chart review/discussion with unit or clinical team members:   No significant overnight events. Scheduled for pacemaker placement but ultimately was determined by cardiology that not necessary at this time.      Per patient or family/caregivers today:  Patient fatigued, comfortable per his report.   Slightly confused.   Spoke with granddaughter Shruthi and she expressed frustration regarding patient being scheduled for pacemaker placement and she was not aware.  Ultimately was determined Dilip does not need pacemaker at this time and cardiology spoke with Shruthi on the phone.   Due to patient's hearing loss, forgetfulness, and difficulty with technology, granddaughter will bring a notary to the hospital to for signing health care directive, as opposed to hospital virtual notary.      Key Palliative Symptoms:  # Pain severity the last 12 hours: low  # Dyspnea severity the last 12 hours: none  # Nausea severity the last 12 hours: none  # Anxiety severity the last 12 hours: none  Left knee pain--mild at rest, worse with movement.  States normally tylenol not helpful, last cortisone injection was about 1 year ago.            Review of Systems:     Besides above, an additional 4 point system ROS was reviewed and is unremarkable          Medications:     I have reviewed this patient's medication profile and medications during this hospitalization.           Physical Exam:   Vital Signs: Blood pressure (!) 154/79, pulse 79, temperature 98.1  F (36.7  C), temperature source Oral, resp. rate 18, weight 72.1 kg (158 lb 14.4 oz), SpO2 97 %.   GENERAL: Alert, sitting up in chair    SKIN: Warm and dry   HEENT: Normocephalic, anicteric sclera, moist mucous membranes  LUNGS: breathing non-labored   : jorge in place  MUSKL: no gross joint deformities   EXTREMITIES: No edema  NEUROLOGIC: Alert and oriented X 3.  Forgetful             Data Reviewed:         Results for  orders placed or performed during the hospital encounter of 11/06/22   XR Chest Port 1 View    Impression    IMPRESSION:     Minimal hazy opacity in the left lower lung, which may represent atelectasis versus infiltrate.    No focal airspace disease in the right lung. No pleural effusion or pneumothorax.    The cardiomediastinal silhouette appears unremarkable. Aortic calcifications.   CT Chest Pulmonary Embolism w Contrast    Impression    IMPRESSION:  1.  No evidence of pulmonary embolus given limitations.  2.  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  3.  Small to moderate pericardial effusion, small bilateral pleural effusions, unchanged. Increasing basilar consolidation, atelectasis versus infiltrate.   CT Head w/o Contrast    Impression    IMPRESSION:  1.  No acute intracranial pathology identified.  2.  Mild age-related changes.   Echocardiogram Complete   Result Value Ref Range    LVEF  45-50%        Recent Labs   Lab 11/09/22  1151 11/09/22  0438 11/08/22  0351 11/07/22  1152 11/07/22  0229 11/07/22  0228 11/06/22 2006   WBC  --   --   --   --   --   --  9.7   HGB  --   --   --   --   --   --  9.4*   MCV  --   --   --   --   --   --  103*   PLT  --   --   --   --   --   --  256   NA  --   --   --   --  138  --  137   POTASSIUM 3.4 3.3* 3.6   < > 3.1*  --  3.6   CHLORIDE  --   --   --   --  102  --  101   CO2  --   --   --   --  24  --  24   BUN  --   --   --   --  19.6  --  18.3   CR  --   --   --   --  1.29*  --  1.32*   ANIONGAP  --   --   --   --  12  --  12   KRISTEN  --   --   --   --  7.8*  --  8.1*   GLC  --   --   --   --  102* 97 109*   ALBUMIN  --   --   --   --   --   --  3.3*   PROTTOTAL  --   --   --   --   --   --  5.9*   BILITOTAL  --   --   --   --   --   --  1.1   ALKPHOS  --   --   --   --   --   --  58   ALT  --   --   --   --   --   --  11   AST  --   --   --   --   --   --  28    < > = values in this interval not displayed.       Lab Results   Component Value Date    WBC 9.7 11/06/2022    HGB 9.4 (L) 11/06/2022    HCT 28.7 (L) 11/06/2022     11/06/2022     11/07/2022    POTASSIUM 3.4 11/09/2022    CHLORIDE 102 11/07/2022    CO2 24 11/07/2022    BUN 19.6 11/07/2022    CR 1.29 (H) 11/07/2022     (H) 11/07/2022    DD 1.10 (H) 10/24/2022    NTBNP 2,747 (H) 11/06/2022    AST 28 11/06/2022    ALT 11 11/06/2022    ALKPHOS 58 11/06/2022    BILITOTAL 1.1 11/06/2022    INR 1.08 10/21/2022      Lab Results   Component Value Date    ALBUMIN 3.3 11/06/2022    ALBUMIN 2.5 07/18/2019          Recent Labs   Lab 11/07/22  0310   PH 7.51*   PCO2 34*   PO2 87*   HCO3 27   O2PER 30                 ====================================================  TT: I have personally spent a total of 35 minutes on the unit in review of medical record, consultation with the medical providers and assessment of patient today, with more than 50% of this time spent in counseling, coordination of care, and discussion with patient and family re: diagnostic results, prognosis, symptom management, risks and benefits of management options, and development of plan of care as noted above.      ====================================================    CAROL Benavides, MANUELA-BC  Clinical Nurse Specialist  [facility]  [phone]

## 2022-11-09 NOTE — PROGRESS NOTES
[facility]    PROGRESS NOTE - Hospitalist Service    Assessment and Plan    Principal Problem:    Paroxysmal atrial fibrillation (H)  Active Problems:    Grave's disease - post ablation    Hypothyroidism, postablative    Elevated prostate specific antigen (PSA)    Essential hypertension    Stage 3a chronic kidney disease (H)    Generalized muscle weakness    Diastolic congestive heart failure, unspecified HF chronicity (H)    Left lower lobe pulmonary infiltrate    Dilip Long is a 87 year old male with h/o BPH, prior CVA, diverticulosis, chronic indwelling jorge history of recurrent UTIs, hypothyroidism/Grave's disease and newly initiated on levothyroxine, recent hospitalization 10/2022 due to syncope, and asymptomatic COVID19 viral infection 10/21/22 who presented to [facility] ED on 11/6/2022 due to weakness/lethargy/fever, admitted for new onset UTI and atrial fibrillation with RVR, subsequently transferred to ICU due to worsening encephalopathy and respiratory failure requiring non-invasive ventilation      Suspected UTI/BPH/Urethral stricture  - continue chronic indwelling Jorge catheter was placed during last hospital stay for urinary retention.  - Jorge catheter changed in the ER as per nurse.  - Urine culture mixed urogenital organisms would stop ceftriaxone but treating for LLL infiltrate  - no UTI based on cultures and continue jorge cares for now     Atrial fibrillation with RVR spontaneous converted   - [id number] of 6 (Age, CVA, hypertension and CHF) started on eliquis but holding for PM placement per cardiology note.   - cardiology following awaiting further plan if PM needed, family would like cardiology to contact for information   - Telemetry  - Monitor electrolytes and replace as needed  - treating hyperthyroid      Hyperthyroidism  - was started on levothyroxine on last admission and low dose 25mcg daily increase to 50mcg daily   - likely contributing to  above      Suspected acute congestive heart failure  Echocardiogram 10/21/2022 showed normal left ventricular systolic function with LVEF of 55 to 60%, normal RV systolic function, and small pericardial effusion.   -  received IV Lasix 20 mg currently on hold  - further diuretics TBD by cards       Hyperlipidemia  -  simvastatin on hold for now     Prior CVA  - PT/OT  - statin and asa on hold for now      LLL infiltrate   - placed on doxycycline and ceftriaxone for treatment in ICU   - will change doxy to PO  - change ceftriaxone to cefdinir 300mg BID  - treat for 7 days total   COVID-19 PCR Results    COVID-19 PCR Results 10/21/22 11/6/22   SARS CoV2 PCR Positive (A) Positive (A)   (A) Abnormal value       Comments are available for some flowsheets but are not being displayed.         COVID-19 Antibody Results, Testing for Immunity    COVID-19 Antibody Results, Testing for Immunity   No data to display.            VTE prophylaxis:  DOAC on hold  DIET: Orders Placed This Encounter      Regular Diet Adult    Drains/Lines: none  Weight bearing status: WBAt  Disposition/Barriers to discharge: pending further cards recommendations,    Code Status: No CPR- Do NOT Intubate    Subjective:  Per nursing staff family unaware of patient getting PM today. Awaiting cardiology to discuss with family and determine if to pursue since he is not able to consent.    PHYSICAL EXAM  Temp:  [98  F (36.7  C)-98.4  F (36.9  C)] 98.1  F (36.7  C)  Pulse:  [74-95] 79  Resp:  [12-36] 18  BP: (121-161)/(60-80) 154/79  Cuff Mean (mmHg):  [98] 98  SpO2:  [93 %-97 %] 97 %  Wt Readings from Last 1 Encounters:   11/09/22 72.1 kg (158 lb 14.4 oz)       Intake/Output Summary (Last 24 hours) at 11/9/2022 0840  Last data filed at 11/9/2022 0342  Gross per 24 hour   Intake 840 ml   Output 680 ml   Net 160 ml      Body mass index is 23.47 kg/m .    Physical Exam  Constitutional:       Comments: Elderly male resting comfortably. NAD   HENT:      Head:  Normocephalic.   Cardiovascular:      Rate and Rhythm: Normal rate. Rhythm irregular.      Pulses: Normal pulses.   Pulmonary:      Effort: Pulmonary effort is normal.      Comments: Trace crackles at bases R>L, unlabored breathing   Abdominal:      General: Bowel sounds are normal.      Palpations: Abdomen is soft.   Musculoskeletal:         General: Normal range of motion.      Comments: Ankle edema   Skin:     General: Skin is warm and dry.      Capillary Refill: Capillary refill takes less than 2 seconds.   Neurological:      General: No focal deficit present.      Mental Status: He is alert. He is disoriented.       PERTINENT LABS/IMAGING:  Results for orders placed or performed during the hospital encounter of 11/06/22   XR Chest Port 1 View    Impression    IMPRESSION:     Minimal hazy opacity in the left lower lung, which may represent atelectasis versus infiltrate.    No focal airspace disease in the right lung. No pleural effusion or pneumothorax.    The cardiomediastinal silhouette appears unremarkable. Aortic calcifications.   CT Chest Pulmonary Embolism w Contrast    Impression    IMPRESSION:  1.  No evidence of pulmonary embolus given limitations.  2.  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  3.  Small to moderate pericardial effusion, small bilateral pleural effusions, unchanged. Increasing basilar consolidation, atelectasis versus infiltrate.   CT Head w/o Contrast    Impression    IMPRESSION:  1.  No acute intracranial pathology identified.  2.  Mild age-related changes.   Echocardiogram Complete   Result Value Ref Range    LVEF  45-50%        Recent Labs   Lab 11/09/22  1151 11/09/22  0438 11/08/22  0351 11/07/22  1152 11/07/22  0229 11/07/22  0228 11/06/22 2006   WBC  --   --   --   --   --   --  9.7   HGB  --   --   --   --   --   --  9.4*   MCV  --   --   --   --   --   --  103*   PLT  --   --   --   --   --   --  256   NA  --   --    --   --  138  --  137   POTASSIUM 3.4 3.3* 3.6   < > 3.1*  --  3.6   CHLORIDE  --   --   --   --  102  --  101   CO2  --   --   --   --  24  --  24   BUN  --   --   --   --  19.6  --  18.3   CR  --   --   --   --  1.29*  --  1.32*   ANIONGAP  --   --   --   --  12  --  12   KRISTEN  --   --   --   --  7.8*  --  8.1*   GLC  --   --   --   --  102* 97 109*   ALBUMIN  --   --   --   --   --   --  3.3*   PROTTOTAL  --   --   --   --   --   --  5.9*   BILITOTAL  --   --   --   --   --   --  1.1   ALKPHOS  --   --   --   --   --   --  58   ALT  --   --   --   --   --   --  11   AST  --   --   --   --   --   --  28    < > = values in this interval not displayed.     Recent Labs   Lab Test 10/23/22  0428 02/11/19  1020 11/17/16  0902   CHOL 153   < > 148.0   HDL 57   < > 64.0   LDL 84   < > 58.0   TRIG 60  --  130.0   CHOLHDLRATIO  --   --  2.3    < > = values in this interval not displayed.     Recent Labs   Lab Test 10/23/22  0428 02/11/19  1020 11/17/16  0902   LDL 84 62 58.0     Recent Labs   Lab Test 11/09/22 0438 11/07/22  1152 11/07/22  0229   NA  --   --  138   POTASSIUM 3.3*   < > 3.1*   CHLORIDE  --   --  102   CO2  --   --  24   GLC  --   --  102*   BUN  --   --  19.6   CR  --   --  1.29*   GFRESTIMATED  --   --  54*   KRISTEN  --   --  7.8*    < > = values in this interval not displayed.     No results for input(s): A1C in the last 66429 hours.  Recent Labs   Lab Test 11/06/22  2006 10/24/22  0426 10/23/22  0428   HGB 9.4* 8.2* 8.9*     No results for input(s): TROPONINI in the last 67475 hours.  Recent Labs   Lab Test 11/06/22 2006   NTBNP 2,747*     Recent Labs   Lab Test 11/06/22 2006   TSH 64.25*     Recent Labs   Lab Test 10/21/22  1255 10/21/22  1110 07/18/19  1421   INR 1.08 1.22* 1.12*       Kristine Ann MD  [facility] Medicine Service  [phone]

## 2022-11-09 NOTE — PLAN OF CARE
Goal Outcome Evaluation:      Plan of Care Reviewed With: patient    Overall Patient Progress: no changeOverall Patient Progress: no change         Vitals:    11/08/22 2211 11/08/22 2246 11/08/22 2302 11/09/22 0337   BP:  (!) 145/68  121/60   BP Location:  Left arm  Left arm   Pulse: 74 77 78 83   Resp: 12 16 16 18   Temp:  98.4  F (36.9  C) 98.4  F (36.9  C) 98.1  F (36.7  C)   TempSrc:  Oral Oral Oral   SpO2: 94% 97% 97% 95%   Weight:  72.1 kg (159 lb)  72.1 kg (158 lb 14.4 oz)    Remained in bed. Has left knee pain mainly with movement. Has a brace on. SCD on. Lozano cath. IV antibiotics for UTI. Assist of 2. Plan is to get a pacemaker at 2pm today. A-fib. Rate controled. He wants to go home with family. Therapy reccomends TCU. Bryanna Guerrero RN

## 2022-11-09 NOTE — PLAN OF CARE
Problem: Risk for Delirium  Goal: Optimal Coping  Outcome: Not Progressing   Goal Outcome Evaluation:       Pt remains confused to time and situation but easily redirectable. Beside handoff report given to receiving RN and pt transferred to room 304 as ordered. Pt's grand daughter named Oanh notified via phone about pt's room change.

## 2022-11-10 ENCOUNTER — APPOINTMENT (OUTPATIENT)
Dept: PHYSICAL THERAPY | Facility: HOSPITAL | Age: 87
DRG: 871 | End: 2022-11-10
Payer: MEDICARE

## 2022-11-10 VITALS
RESPIRATION RATE: 20 BRPM | BODY MASS INDEX: 23.13 KG/M2 | SYSTOLIC BLOOD PRESSURE: 175 MMHG | OXYGEN SATURATION: 98 % | TEMPERATURE: 97.8 F | WEIGHT: 156.6 LBS | HEART RATE: 85 BPM | DIASTOLIC BLOOD PRESSURE: 92 MMHG

## 2022-11-10 PROBLEM — I50.31 ACUTE HEART FAILURE WITH PRESERVED EJECTION FRACTION (HFPEF) (H): Status: ACTIVE | Noted: 2022-11-10

## 2022-11-10 PROBLEM — J18.9 SEPSIS DUE TO PNEUMONIA (H): Status: ACTIVE | Noted: 2022-11-10

## 2022-11-10 PROBLEM — A41.9 SEPSIS DUE TO PNEUMONIA (H): Status: ACTIVE | Noted: 2022-11-10

## 2022-11-10 LAB
ANION GAP SERPL CALCULATED.3IONS-SCNC: 9 MMOL/L (ref 7–15)
BUN SERPL-MCNC: 21.3 MG/DL (ref 8–23)
CALCIUM SERPL-MCNC: 7.9 MG/DL (ref 8.8–10.2)
CHLORIDE SERPL-SCNC: 108 MMOL/L (ref 98–107)
CREAT SERPL-MCNC: 1.13 MG/DL (ref 0.67–1.17)
DEPRECATED HCO3 PLAS-SCNC: 24 MMOL/L (ref 22–29)
ERYTHROCYTE [DISTWIDTH] IN BLOOD BY AUTOMATED COUNT: 12.2 % (ref 10–15)
GFR SERPL CREATININE-BSD FRML MDRD: 63 ML/MIN/1.73M2
GLUCOSE SERPL-MCNC: 78 MG/DL (ref 70–99)
HCT VFR BLD AUTO: 24.2 % (ref 40–53)
HGB BLD-MCNC: 8.1 G/DL (ref 13.3–17.7)
MAGNESIUM SERPL-MCNC: 2.2 MG/DL (ref 1.7–2.3)
MCH RBC QN AUTO: 34.2 PG (ref 26.5–33)
MCHC RBC AUTO-ENTMCNC: 33.5 G/DL (ref 31.5–36.5)
MCV RBC AUTO: 102 FL (ref 78–100)
PLATELET # BLD AUTO: 276 10E3/UL (ref 150–450)
POTASSIUM SERPL-SCNC: 3.8 MMOL/L (ref 3.4–5.3)
RBC # BLD AUTO: 2.37 10E6/UL (ref 4.4–5.9)
SODIUM SERPL-SCNC: 141 MMOL/L (ref 136–145)
WBC # BLD AUTO: 4.6 10E3/UL (ref 4–11)

## 2022-11-10 PROCEDURE — 97530 THERAPEUTIC ACTIVITIES: CPT | Mod: GP

## 2022-11-10 PROCEDURE — 250N000013 HC RX MED GY IP 250 OP 250 PS 637: Performed by: INTERNAL MEDICINE

## 2022-11-10 PROCEDURE — 97116 GAIT TRAINING THERAPY: CPT | Mod: GP

## 2022-11-10 PROCEDURE — 83735 ASSAY OF MAGNESIUM: CPT | Performed by: INTERNAL MEDICINE

## 2022-11-10 PROCEDURE — 85027 COMPLETE CBC AUTOMATED: CPT | Performed by: INTERNAL MEDICINE

## 2022-11-10 PROCEDURE — 99233 SBSQ HOSP IP/OBS HIGH 50: CPT | Performed by: CLINICAL NURSE SPECIALIST

## 2022-11-10 PROCEDURE — 250N000011 HC RX IP 250 OP 636: Performed by: INTERNAL MEDICINE

## 2022-11-10 PROCEDURE — 80048 BASIC METABOLIC PNL TOTAL CA: CPT | Performed by: INTERNAL MEDICINE

## 2022-11-10 PROCEDURE — 36415 COLL VENOUS BLD VENIPUNCTURE: CPT | Performed by: INTERNAL MEDICINE

## 2022-11-10 PROCEDURE — 99239 HOSP IP/OBS DSCHRG MGMT >30: CPT | Performed by: INTERNAL MEDICINE

## 2022-11-10 RX ORDER — POTASSIUM CHLORIDE 1500 MG/1
20 TABLET, EXTENDED RELEASE ORAL ONCE
Status: COMPLETED | OUTPATIENT
Start: 2022-11-10 | End: 2022-11-10

## 2022-11-10 RX ORDER — LEVOTHYROXINE SODIUM 50 UG/1
50 TABLET ORAL
Qty: 30 TABLET | Refills: 0 | Status: SHIPPED | OUTPATIENT
Start: 2022-11-11 | End: 2024-02-04

## 2022-11-10 RX ORDER — DOXYCYCLINE 100 MG/1
100 CAPSULE ORAL EVERY 12 HOURS
Qty: 6 CAPSULE | Refills: 0 | Status: SHIPPED | OUTPATIENT
Start: 2022-11-10 | End: 2022-11-29

## 2022-11-10 RX ORDER — LANOLIN ALCOHOL/MO/W.PET/CERES
3 CREAM (GRAM) TOPICAL
COMMUNITY
Start: 2022-11-10 | End: 2022-11-29

## 2022-11-10 RX ORDER — FUROSEMIDE 20 MG
20 TABLET ORAL DAILY
Status: DISCONTINUED | OUTPATIENT
Start: 2022-11-10 | End: 2022-11-10 | Stop reason: HOSPADM

## 2022-11-10 RX ORDER — FUROSEMIDE 20 MG
20 TABLET ORAL DAILY
Qty: 30 TABLET | Refills: 0 | Status: SHIPPED | OUTPATIENT
Start: 2022-11-10 | End: 2022-11-16

## 2022-11-10 RX ORDER — CEFDINIR 300 MG/1
300 CAPSULE ORAL EVERY 12 HOURS
Qty: 6 CAPSULE | Refills: 0 | Status: SHIPPED | OUTPATIENT
Start: 2022-11-10 | End: 2022-11-29

## 2022-11-10 RX ADMIN — FAMOTIDINE 20 MG: 10 INJECTION, SOLUTION INTRAVENOUS at 06:59

## 2022-11-10 RX ADMIN — APIXABAN 5 MG: 5 TABLET, FILM COATED ORAL at 08:49

## 2022-11-10 RX ADMIN — DOXYCYCLINE 100 MG: 100 CAPSULE ORAL at 08:51

## 2022-11-10 RX ADMIN — LEVOTHYROXINE SODIUM 50 MCG: 0.03 TABLET ORAL at 06:59

## 2022-11-10 RX ADMIN — POTASSIUM CHLORIDE 20 MEQ: 20 TABLET, EXTENDED RELEASE ORAL at 08:50

## 2022-11-10 RX ADMIN — CEFDINIR 300 MG: 300 CAPSULE ORAL at 08:51

## 2022-11-10 ASSESSMENT — ACTIVITIES OF DAILY LIVING (ADL)
ADLS_ACUITY_SCORE: 40
ADLS_ACUITY_SCORE: 40
ADLS_ACUITY_SCORE: 38
ADLS_ACUITY_SCORE: 40
ADLS_ACUITY_SCORE: 38
ADLS_ACUITY_SCORE: 40
ADLS_ACUITY_SCORE: 38

## 2022-11-10 NOTE — PROGRESS NOTES
[facility]  Palliative Care Daily Progress Note       Recommendations & Counseling     GOALS OF CARE:   Goals are restorative at this time. Plan is to discharge to home today with home care services and support from family.      ADVANCE CARE PLANNING:   Advance directive: Health care directive completed but not signed. Patient's granddaughter plans to assist with getting notarized after discharge, due to difficulty with virtual notarization.    Surrogate decision maker: Granddaughter Shruthi.   Code status: DNR/DNI per patient request     SYMPTOM MANAGEMENT:   Generalized weakness and fatigue 2/2 multiple medical conditions.   -Agree with PT/OT  -Continue to monitor.     Left knee pain 2/2 osteoarthritis  -Consider steroid injection (last injection was about 1 year ago per patient)  -Patient declines acetaminophen as not helpful in the past.     Discussed with: Dr. Santiago and Annia JIMENES      Assessments          Dilip Long is a 87 year old male with PMH of BPH with chronic indwelling jorge catheter, prior CVA, diverticulosis, Grave's disease, hyperlipidemia, hypothyroidism, hypertension, uretheral stricture, hypothyroidism recently started on treatment last month, and recent COVID 19 infection on 10/21/22 who presented to the ED on 11/6/22 with generalized weakness and poor oral intake.  UA was suggestive of UTI.  Was also found to have a-fib with RVR.  Rapid response was called due to apneic episode and tachycardia.  Was placed on diltiazem drip and BiPAP and admitted to the ICU for further monitoring.    Cardiology and pulmonary/critical care have also been consulted this admission.         Today, the patient was seen for:  Goals of care, advance care planning    Prognosis, Goals, or Advance Care Planning was addressed today with: Yes.  Mood, coping, and/or meaning in the context of serious illness were addressed today: Yes.              Interval History:     Chart review/discussion  with unit or clinical team members:   No significant overnight events.    Per patient or family/caregivers today:  Per granddaughter, patient is being discharged today.  She will plan to assist with notarizing health care directive after discharge.  I let her know the completed form is in his folder in his hospital room.      Key Palliative Symptoms:  # Pain severity the last 12 hours: low  # Dyspnea severity the last 12 hours: none  # Nausea severity the last 12 hours: none  # Anxiety severity the last 12 hours: none  Left knee pain--mild at rest, worse with movement.  States normally tylenol not helpful, last cortisone injection was about 1 year ago.            Review of Systems:     Besides above, an additional 4 point system ROS was reviewed and is unremarkable          Medications:     I have reviewed this patient's medication profile and medications during this hospitalization.           Physical Exam:   Vital Signs: Blood pressure (!) 157/77, pulse 78, temperature 97.9  F (36.6  C), temperature source Oral, resp. rate 18, weight 71 kg (156 lb 9.6 oz), SpO2 97 %.   GENERAL: Alert, sitting up in chair    SKIN: Warm and dry   HEENT: Normocephalic, anicteric sclera, moist mucous membranes  LUNGS: breathing non-labored   : jorge in place  MUSKL: no gross joint deformities   EXTREMITIES: No edema  NEUROLOGIC: Alert and oriented X 3.  Forgetful             Data Reviewed:         Results for orders placed or performed during the hospital encounter of 11/06/22   XR Chest Port 1 View    Impression    IMPRESSION:     Minimal hazy opacity in the left lower lung, which may represent atelectasis versus infiltrate.    No focal airspace disease in the right lung. No pleural effusion or pneumothorax.    The cardiomediastinal silhouette appears unremarkable. Aortic calcifications.   CT Chest Pulmonary Embolism w Contrast    Impression    IMPRESSION:  1.  No evidence of pulmonary embolus given limitations.  2.  Small  penetrating ulcer in the descending thoracic aorta without evidence of dissection, unchanged.  3.  Small to moderate pericardial effusion, small bilateral pleural effusions, unchanged. Increasing basilar consolidation, atelectasis versus infiltrate.   CT Head w/o Contrast    Impression    IMPRESSION:  1.  No acute intracranial pathology identified.  2.  Mild age-related changes.   Echocardiogram Complete   Result Value Ref Range    LVEF  45-50%        Recent Labs   Lab 11/10/22  0508 11/09/22  1910 11/09/22  1151 11/07/22  1152 11/07/22  0229 11/07/22  0228 11/06/22 2006   WBC 4.6  --   --   --   --   --  9.7   HGB 8.1*  --   --   --   --   --  9.4*   *  --   --   --   --   --  103*     --   --   --   --   --  256     --   --   --  138  --  137   POTASSIUM 3.8 3.9 3.4   < > 3.1*  --  3.6   CHLORIDE 108*  --   --   --  102  --  101   CO2 24  --   --   --  24  --  24   BUN 21.3  --   --   --  19.6  --  18.3   CR 1.13  --   --   --  1.29*  --  1.32*   ANIONGAP 9  --   --   --  12  --  12   KRISTEN 7.9*  --   --   --  7.8*  --  8.1*   GLC 78  --   --   --  102* 97 109*   ALBUMIN  --   --   --   --   --   --  3.3*   PROTTOTAL  --   --   --   --   --   --  5.9*   BILITOTAL  --   --   --   --   --   --  1.1   ALKPHOS  --   --   --   --   --   --  58   ALT  --   --   --   --   --   --  11   AST  --   --   --   --   --   --  28    < > = values in this interval not displayed.      Lab Results   Component Value Date    WBC 4.6 11/10/2022    HGB 8.1 (L) 11/10/2022    HCT 24.2 (L) 11/10/2022     11/10/2022     11/10/2022    POTASSIUM 3.8 11/10/2022    CHLORIDE 108 (H) 11/10/2022    CO2 24 11/10/2022    BUN 21.3 11/10/2022    CR 1.13 11/10/2022    GLC 78 11/10/2022    DD 1.10 (H) 10/24/2022    NTBNP 2,747 (H) 11/06/2022    AST 28 11/06/2022    ALT 11 11/06/2022    ALKPHOS 58 11/06/2022    BILITOTAL 1.1 11/06/2022    INR 1.08 10/21/2022      Lab Results   Component Value Date    ALBUMIN 3.3 11/06/2022     ALBUMIN 2.5 07/18/2019          Recent Labs   Lab 11/07/22  0310   PH 7.51*   PCO2 34*   PO2 87*   HCO3 27   O2PER 30                 ====================================================  TT: I have personally spent a total of 35 minutes on the unit in review of medical record, consultation with the medical providers and assessment of patient today, with more than 50% of this time spent in counseling, coordination of care, and discussion with patient and family re: diagnostic results, prognosis, symptom management, risks and benefits of management options, and development of plan of care as noted above.      ====================================================    CAROL Benavides, NS-BC  Clinical Nurse Specialist  [facility]  [phone]

## 2022-11-10 NOTE — PROGRESS NOTES
"Care Management Follow Up    Length of Stay (days): 4    Expected Discharge Date: 11/10/2022     Concerns to be Addressed:     Discharge planning  Patient plan of care discussed at interdisciplinary rounds: Yes    Anticipated Discharge Disposition:  Home with family     Anticipated Discharge Services:  Home RN PT OT  Anticipated Discharge DME:  Walker wheelchair    Patient/family educated on Medicare website which has current facility and service quality ratings:    Education Provided on the Discharge Plan:    Patient/Family in Agreement with the Plan:      Referrals Placed by CM/SW:    Private pay costs discussed: Not applicable    Additional Information:  Chart reviewed. Met with patient in his room, his goal is to discharge home.   SW spoke to Granddaughter Shruthi on the phone, she reports plan to take patient home and \"family will step up\" and provide care for him. Informed patient that nursing notes indicate patient is assist of 2, she is aware.  She reports there are 4 steps to enter home and family can assist and they can utilize a ramp.  SW spoke to [employer] and per granddaughter- they were never able to open patient following last hospitalization.  New referral sent to [facility].  Declined by [facility].    Addendum: [facility] accepted referral as of 12pm on 11/10/2022.  Wheelchair ride scheduled at 5:30pm via "[employer] [facility] wheelchair transport, granddaughter And RN updated.      DOMENIC Saucedo        "

## 2022-11-10 NOTE — PLAN OF CARE
"  Problem: Plan of Care - These are the overarching goals to be used throughout the patient stay.    Goal: Patient-Specific Goal (Individualized)  Description: You can add care plan individualizations to a care plan. Examples of Individualization might be:  \"Parent requests to be called daily at 9am for status\", \"I have a hard time hearing out of my right ear\", or \"Do not touch me to wake me up as it startles me\".  Outcome: Adequate for Care Transition  Goal: Absence of Hospital-Acquired Illness or Injury  Outcome: Adequate for Care Transition  Goal: Optimal Comfort and Wellbeing  Outcome: Adequate for Care Transition  Goal: Readiness for Transition of Care  Outcome: Adequate for Care Transition     Problem: Risk for Delirium  Goal: Optimal Coping  Outcome: Adequate for Care Transition  Goal: Improved Behavioral Control  Outcome: Adequate for Care Transition  Goal: Improved Attention and Thought Clarity  Outcome: Adequate for Care Transition  Goal: Improved Sleep  Outcome: Adequate for Care Transition     Problem: Heart Failure Comorbidity  Goal: Maintenance of Heart Failure Symptom Control  Outcome: Adequate for Care Transition     Problem: Hypertension Comorbidity  Goal: Blood Pressure in Desired Range  Outcome: Adequate for Care Transition     Problem: Violence Risk or Actual  Goal: Anger and Impulse Control  Outcome: Adequate for Care Transition   Goal Outcome Evaluation:    Went over discharge instructions with family.  Pt discharged at 1730.                 "

## 2022-11-10 NOTE — PLAN OF CARE
Goal Outcome Evaluation:      Plan of Care Reviewed With: patient    Overall Patient Progress: no changeOverall Patient Progress: no change    Outcome Evaluation: Pt oriented only to self, pleasant. Assist of 2 with repositioning. Slept through the night. Could discharge to home with home cares today.

## 2022-11-10 NOTE — DISCHARGE SUMMARY
[facility]  Hospitalist Discharge Summary      Date of Admission:  11/6/2022  Date of Discharge:  11/10/2022  Discharging Provider: Kristine Ann MD  Discharge Service: Hospitalist Service    Discharge Diagnoses   Principal Problem:    Paroxysmal atrial fibrillation (H)  Active Problems:    Grave's disease - post ablation    Hypothyroidism, postablative    Elevated prostate specific antigen (PSA)    Essential hypertension    Stage 3a chronic kidney disease (H)    Generalized muscle weakness    Left lower lobe pulmonary infiltrate    Acute heart failure with preserved ejection fraction (HFpEF) (H)    Sepsis due to pneumonia (H)        Follow-ups Needed After Discharge   Follow-up Appointments     Follow-up and recommended labs and tests       Follow up with primary care provider, [facility],   within 3-5days, to evaluate medication change and for hospital follow- up.   The following labs/tests are recommended: CBC and BMP. Needs close monitor   of hypothyroidism   Cardiology 2-3 weeks.             Unresulted Labs Ordered in the Past 30 Days of this Admission     No orders found from 10/7/2022 to 11/7/2022.          Discharge Disposition   Discharged to Home with HC, family declines TCU  Condition at discharge: [facility]      Hospital Course   Dilip Long is a 87 year old male with h/o BPH, prior CVA, diverticulosis, chronic indwelling jorge history of recurrent UTIs, hypothyroidism/Grave's disease and newly initiated on levothyroxine, recent hospitalization 10/2022 due to syncope, and asymptomatic COVID19 viral infection 10/21/22 who presented to [facility] ED on 11/6/2022 due to weakness/lethargy/fever, admitted for new onset UTI and atrial fibrillation with RVR, subsequently transferred to ICU due to worsening encephalopathy and respiratory failure requiring non-invasive ventilation      Suspected UTI/BPH/Urethral stricture  - continue chronic indwelling  Jorge catheter was placed during last hospital stay for urinary retention.  - Jorge catheter changed in the ER as per nurse.  - Urine culture mixed urogenital organisms would stop ceftriaxone but treating for LLL infiltrate  - no UTI based on cultures and continue jorge cares for now     Atrial fibrillation with RVR spontaneous converted   - [id number] of 6 (Age, CVA, hypertension and CHF) started on eliquis but holding for PM placement per cardiology note.   - cardiology following awaiting further plan if PM needed, family would like cardiology to contact for information   - treating hyperthyroid as instructed by cardiology      Hyperthyroidism  - was started on levothyroxine on last admission and low dose 25mcg daily increase to 50mcg daily   - likely contributing to above      Suspected acute congestive heart failure  Echocardiogram 10/21/2022 showed normal left ventricular systolic function with LVEF of 55 to 60%, normal RV systolic function, and small pericardial effusion.   -  received IV Lasix 20 mg sent out on 20mg daily and reassess at cards follow-up      Hyperlipidemia  -  simvastatin resume      Prior CVA  - PT/OT  - statin   - on eliquis so stop asa for now      LLL infiltrate   - placed on doxycycline and ceftriaxone for treatment in ICU   - will change doxy to PO  - change ceftriaxone to cefdinir 300mg BID  - treat for 7 days total   - discussed with intensivist who agrees     Consultations This Hospital Stay   CORE CLINIC EVALUATION IP CONSULT  OCCUPATIONAL THERAPY ADULT IP CONSULT  NUTRITION SERVICES ADULT IP CONSULT  CARE MANAGEMENT / SOCIAL WORK IP CONSULT  CARDIOLOGY IP CONSULT  INTENSIVIST IP CONSULT  PALLIATIVE CARE ADULT IP CONSULT  ADVANCE DIRECTIVE IP CONSULT  PHYSICAL THERAPY ADULT IP CONSULT    Code Status   No CPR- Do NOT Intubate    Time Spent on this Encounter   IKristine MD, personally saw the patient today and spent greater than 30 minutes discharging this patient.        Kristine Ann MD  [facility]  [address] [place] [id number]  Phone: [phone]  Fax: [phone]  ______________________________________________________________________    Physical Exam   Vital Signs: Temp: 97.8  F (36.6  C) Temp src: Oral BP: (!) 175/92 Pulse: 85   Resp: 20 SpO2: 98 % O2 Device: None (Room air)    Weight: 156 lbs 9.6 oz  Physical Exam  Constitutional:       General: He is not in acute distress.  Cardiovascular:      Rate and Rhythm: Normal rate. Rhythm irregular.      Pulses: Normal pulses.   Pulmonary:      Effort: Pulmonary effort is normal.      Breath sounds: Normal breath sounds.   Abdominal:      General: Bowel sounds are normal.      Palpations: Abdomen is soft.   Musculoskeletal:         General: Normal range of motion.   Skin:     General: Skin is warm and dry.      Capillary Refill: Capillary refill takes less than 2 seconds.   Neurological:      General: No focal deficit present.      Mental Status: He is alert. Mental status is at baseline.              Primary Care Physician   [facility]    Discharge Orders      Home Care Referral      Reason for your hospital stay    Principal Problem:    Paroxysmal atrial fibrillation (H)  Active Problems:    Grave's disease - post ablation    Hypothyroidism, postablative    Elevated prostate specific antigen (PSA)    Essential hypertension    Stage 3a chronic kidney disease (H)    Generalized muscle weakness    Diastolic congestive heart failure, unspecified HF chronicity (H)    Left lower lobe pulmonary infiltrate     Follow-up and recommended labs and tests     Follow up with primary care provider, [facility], within 3-5days, to evaluate medication change and for hospital follow- up. The following labs/tests are recommended: CBC and BMP. Needs close monitor of hypothyroidism   Cardiology 2-3 weeks.     Activity    Your activity upon discharge:  activity as tolerated     Discharge Instructions    No NSAIDs while on anticoagulation     Diet    Follow this diet upon discharge: Orders Placed This Encounter      Regular Diet Adult       Significant Results and Procedures   Most Recent 3 CBC's:  Recent Labs   Lab Test 11/10/22  0508 11/06/22  2006 10/24/22  0426   WBC 4.6 9.7 3.0*   HGB 8.1* 9.4* 8.2*   * 103* 103*    256 153     Most Recent 3 BMP's:  Recent Labs   Lab Test 11/10/22  0508 11/09/22  1910 11/09/22  1151 11/07/22  1152 11/07/22 0229 11/07/22 0228 11/06/22 2006     --   --   --  138  --  137   POTASSIUM 3.8 3.9 3.4   < > 3.1*  --  3.6   CHLORIDE 108*  --   --   --  102  --  101   CO2 24  --   --   --  24  --  24   BUN 21.3  --   --   --  19.6  --  18.3   CR 1.13  --   --   --  1.29*  --  1.32*   ANIONGAP 9  --   --   --  12  --  12   KRISTEN 7.9*  --   --   --  7.8*  --  8.1*   GLC 78  --   --   --  102* 97 109*    < > = values in this interval not displayed.     Most Recent 2 LFT's:  Recent Labs   Lab Test 11/06/22  2006 10/21/22  1334   AST 28 43   ALT 11 16   ALKPHOS 58 40   BILITOTAL 1.1 0.4     Most Recent 3 Troponin's:No lab results found.  Most Recent 3 BNP's:  Recent Labs   Lab Test 11/06/22 2006   NTBNP 2,747*     7-Day Micro Results     Collected Updated Procedure Result Status      11/06/2022 2121 11/08/2022 0726 Urine Culture [[id number]]    Urine, Lozano Catheter    Final result Component Value   Culture >100,000 CFU/mL Mixture of urogenital susana               11/06/2022 2024 11/06/2022 2118 Symptomatic; Unknown Influenza A/B & SARS-CoV2 (COVID-19) Virus PCR Multiplex Nose [[id number]]    (Abnormal)   Swab from Nose    Final result Component Value   Influenza A PCR Negative   Influenza B PCR Negative   RSV PCR Negative   SARS CoV2 PCR Positive   POSITIVE: SARS-CoV-2 (COVID-19) RNA detected, presumed positive.                Most Recent TSH and T4:  Recent Labs   Lab Test 11/06/22 2006   TSH 64.25*   T4 <0.10*      Most Recent 6 glucoses:  Recent Labs   Lab Test 11/10/22  0508 11/07/22  0229 11/07/22  0228 11/06/22  2006 10/24/22  0426 10/23/22  1523   GLC 78 102* 97 109* 73 85     Most Recent Urinalysis:  Recent Labs   Lab Test 11/06/22  2121 10/21/22  1544 10/02/19  0056   COLOR Orange*   < > Yellow   APPEARANCE Cloudy*   < > Turbid*   URINEGLC Negative   < > Negative   URINEBILI Negative   < > Negative   URINEKETONE Trace*   < > Negative   SG 1.034*   < > 1.014   UBLD 1.0 mg/dL*   < > Small*   URINEPH 5.5   < > 7.0   PROTEIN 300*   < > 100 mg/dL*   UROBILINOGEN  --   --  <2.0 E.U./dL   NITRITE Negative   < > Negative   LEUKEST 250 Deion/uL*   < > Large*   RBCU 35*   < > 5-10*   WBCU 117*   < > >100*    < > = values in this interval not displayed.   ,   Results for orders placed or performed during the hospital encounter of 11/06/22   XR Chest Port 1 View    Narrative    EXAM: XR CHEST PORT 1 VIEW  LOCATION: [facility]  DATE/TIME: 11/6/2022 8:33 PM    INDICATION: Fever.  COMPARISON: 10/21/2022      Impression    IMPRESSION:     Minimal hazy opacity in the left lower lung, which may represent atelectasis versus infiltrate.    No focal airspace disease in the right lung. No pleural effusion or pneumothorax.    The cardiomediastinal silhouette appears unremarkable. Aortic calcifications.   CT Chest Pulmonary Embolism w Contrast    Narrative    EXAM: CT CHEST PULMONARY EMBOLISM W CONTRAST  LOCATION: [facility]  DATE/TIME: 11/6/2022 10:39 PM    INDICATION: dyspnea tachycardia hypoxemia  COMPARISON: 10/23/2022  TECHNIQUE: CT chest pulmonary angiogram during arterial phase injection of IV contrast. Multiplanar reformats and MIP reconstructions were performed. Dose reduction techniques were used. Exam limited by contrast bolus, streak artifact, and motion   artifact.  CONTRAST: 75ml isovue 370    FINDINGS:  ANGIOGRAM CHEST: Pulmonary arteries are normal in caliber. No evidence of  central or segmental pulmonary embolus. Motion artifact and poor contrast bolus limits evaluation of subsegmental thrombus. No evidence of thoracic aortic dissection. Contour   deformity in the aortic arch is favored to represent a small penetrating ulcer (axial series 5 image 114), unchanged. No CT evidence of right heart strain.    LUNGS AND PLEURA: Moderate bibasilar consolidation, increased. Small pleural effusions, likely unchanged.    MEDIASTINUM/AXILLAE: Small to moderate pericardial effusion, unchanged.    CORONARY ARTERY CALCIFICATION: Moderate.    UPPER ABDOMEN: Normal.    MUSCULOSKELETAL: No acute bony abnormalities. Thoracolumbar junction compression deformities, unchanged. Old sternal fracture. Multiple old left rib fractures.      Impression    IMPRESSION:  1.  No evidence of pulmonary embolus given limitations.  2.  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  3.  Small to moderate pericardial effusion, small bilateral pleural effusions, unchanged. Increasing basilar consolidation, atelectasis versus infiltrate.   CT Head w/o Contrast    Narrative    EXAM: CT HEAD W/O CONTRAST  LOCATION: [facility]  DATE/TIME: 11/7/2022 1:46 AM    INDICATION: Limb weakness; CVA  COMPARISON: 10/21/2022  TECHNIQUE: Routine CT Head without IV contrast. Multiplanar reformats. Dose reduction techniques were used.    FINDINGS:  INTRACRANIAL CONTENTS: No intracranial hemorrhage, extraaxial collection, or mass effect.  No CT evidence of acute infarct. Mild presumed chronic small vessel ischemic changes. Mild generalized volume loss. No hydrocephalus.     VISUALIZED ORBITS/SINUSES/MASTOIDS: No intraorbital abnormality. No paranasal sinus mucosal disease. No middle ear or mastoid effusion.    BONES/SOFT TISSUES: Left frontal subgaleal lipoma again demonstrated.      Impression    IMPRESSION:  1.  No acute intracranial pathology identified.  2.   Mild age-related changes.   Echocardiogram Complete     Value    LVEF  45-50%    Narrative    [id number]  [id number]  [id number]  [id number]^MAYRA^BHUMI^[place] [address]     Name: YURIY MCKEON  MRN: [id number]  : 1935  Study Date: 2022 12:32 PM  Age: 87 yrs  Gender: Male  Patient Location: [facility]  Reason For Study: Atrial Fibrillation  Ordering Physician: BHUMI NUNEZ  Performed By: Jes Mathew     HR: 55  ______________________________________________________________________________  Procedure  Complete Echo Adult. Definity (NDC #17992-663) given intravenously.  ______________________________________________________________________________  Interpretation Summary     Technically challenging study. Definity contrast utilized. Underlying rhythm  is suggestive of atrial fibrillation with frequent ectopy.  Normal left ventricular size. Left ventricular systolic function appears  mildly reduced. Left ventricular ejection fraction estimated 45 to 50%. Not  all walls are optimally visualized.  Normal right ventricular size. Mild decrease in right ventricular systolic  function.  No obvious significant valve abnormality.  Mild enlargement of the aortic root  Small circumferential pericardial effusion  Compared to 2022 the size of the pericardial effusion appears  similar.  ______________________________________________________________________________  Left Ventricle  The left ventricle is normal in size. There is normal left ventricular wall  thickness. Diastolic Doppler findings (E/E' ratio and/or other parameters)  suggest left ventricular filling pressures are normal. The visual ejection  fraction is 45-50%. Septal motion is consistent with conduction abnormality.  There is mild global hypokinesia of the left ventricle.     Right Ventricle  The right ventricle is normal size. Mildly decreased right ventricular  systolic function. TAPSE is  abnormal, which is consistent with abnormal right  ventricular systolic function.     Atria  The left atrium is not well visualized. Normal left atrial size. Right atrial  size is normal.     Mitral Valve  Mitral valve leaflets appear normal. There is trace mitral regurgitation.     Tricuspid Valve  The tricuspid valve is not well visualized. There is trace to mild tricuspid  regurgitation.     Aortic Valve  The aortic valve is trileaflet with aortic valve sclerosis. No hemodynamically  significant valvular aortic stenosis.     Pulmonic Valve  The pulmonic valve is not well visualized. There is trace pulmonic valvular  regurgitation.     Vessels  The aortic root is mildly enlarged. IVC diameter and respiratory changes fall  into an intermediate range suggesting an RA pressure of 8 mmHg.     Pericardium  Small pericardial effusion.     Rhythm  Irregular rhythm.     ______________________________________________________________________________  MMode/2D Measurements & Calculations  IVSd: 0.98 cm  LVIDd: 4.5 cm  LVIDs: 3.5 cm  LVPWd: 0.77 cm  FS: 24.0 %     LV mass(C)d: 129.7 grams  Ao root diam: 3.8 cm  LA dimension: 3.8 cm  asc Aorta Diam: 3.6 cm  LA/Ao: 0.99  LVOT diam: 2.1 cm  LVOT area: 3.3 cm2  RWT: 0.34     Doppler Measurements & Calculations  MV E max cody: 59.5 cm/sec  MV dec slope: 302.2 cm/sec2  MV dec time: 0.26 sec  Ao V2 max: 77.3 cm/sec  Ao max P.0 mmHg  Ao V2 mean: 55.6 cm/sec  Ao mean P.4 mmHg  Ao V2 VTI: 17.4 cm  SHANKAR(I,D): 3.4 cm2  SHANKAR(V,D): 2.8 cm2  LV V1 max P.6 mmHg  LV V1 max: 64.1 cm/sec  LV V1 VTI: 17.5 cm  SV(LVOT): 58.4 ml  PA acc time: 0.14 sec  AV Cody Ratio (DI): 0.83  E/E': 6.3  E/E' av.4     Lateral E/e': 8.5  Medial E/e': 6.4  Peak E' Cody: 9.4 cm/sec     ______________________________________________________________________________  Report approved by: Karina Garcia 2022 03:05 PM               Discharge Medications   Current Discharge Medication List      START  taking these medications    Details   apixaban ANTICOAGULANT (ELIQUIS) 5 MG tablet Take 1 tablet (5 mg) by mouth 2 times daily for 30 days  Qty: 60 tablet, Refills: 0    Associated Diagnoses: Paroxysmal atrial fibrillation (H)      cefdinir (OMNICEF) 300 MG capsule Take 1 capsule (300 mg) by mouth every 12 hours  Qty: 6 capsule, Refills: 0    Associated Diagnoses: Left lower lobe pulmonary infiltrate      doxycycline monohydrate (MONODOX) 100 MG capsule Take 1 capsule (100 mg) by mouth every 12 hours  Qty: 6 capsule, Refills: 0    Associated Diagnoses: Left lower lobe pulmonary infiltrate      furosemide (LASIX) 20 MG tablet Take 1 tablet (20 mg) by mouth daily for 30 days  Qty: 30 tablet, Refills: 0    Associated Diagnoses: Acute heart failure with preserved ejection fraction (HFpEF) (H)      melatonin 3 MG tablet Take 1 tablet (3 mg) by mouth nightly as needed for sleep    Associated Diagnoses: Insomnia, unspecified type         CONTINUE these medications which have CHANGED    Details   levothyroxine (SYNTHROID/LEVOTHROID) 50 MCG tablet Take 1 tablet (50 mcg) by mouth every morning (before breakfast)  Qty: 30 tablet, Refills: 0    Associated Diagnoses: Hypothyroidism, postablative         CONTINUE these medications which have NOT CHANGED    Details   Calcium Carbonate-Vitamin D (OSCAL 500/200 D-3 PO) Take 1 tablet by mouth daily.      simvastatin (ZOCOR) 10 MG tablet Take 1 tablet (10 mg) by mouth At Bedtime  Qty: 90 tablet, Refills: 3    Associated Diagnoses: Hyperlipidemia LDL goal <130      order for DME Equipment being ordered: Home automatic blood pressure cuff. Check once daily for one week, and then 3 times weekly thereafter.  Qty: 1 Device, Refills: 0    Associated Diagnoses: Essential hypertension         STOP taking these medications       aspirin (ASA) 81 MG chewable tablet Comments:   Reason for Stopping:         atenolol (TENORMIN) 25 MG tablet Comments:   Reason for Stopping:         diphenhydrAMINE  (BENADRYL) 25 MG capsule Comments:   Reason for Stopping:         gabapentin (NEURONTIN) 300 MG capsule Comments:   Reason for Stopping:         terazosin (HYTRIN) 10 MG capsule Comments:   Reason for Stopping:             Allergies   No Known Allergies

## 2022-11-10 NOTE — CONSULTS
NUTRITION EDUCATION      REASON FOR ASSESSMENT:  To educate on 2 gram Na diet (Met with pt's granddaughter, Shruthi)    CURRENT DIET:  Regular    NUTRITION DIAGNOSIS:  Food- and nutrition-related knowledge deficit R/t CHF as evidenced by consulted for diet education    INTERVENTIONS:    Nutrition Prescription:  2 gram Na    Implementation:      *  Nutrition Education (Content):   A)  Provided handout Low sodium nutrition therapy, sodium free seasonings, low sodium shopping guidelines   B)  Discussed label reading, foods high and low in sodium, appropriate sodium free seasonings, low sodium shopping guidelines      *  Nutrition Education (Application):   A)  Discussed current eating habits and recommended alternative food choices      *  Anticipate good compliance      *  Diet Education - refer to Education Flowsheet    Goals:      *  Patient's family will verbalized understanding of diet ed-met      *  All of the above goals met during the education session    Follow Up/Monitoring:      *  Provided RD contact information for future questions      *  Recommended Out-Patient Nutrition Referral, if further diet instructions are needed

## 2022-11-11 ENCOUNTER — PATIENT OUTREACH (OUTPATIENT)
Dept: CARE COORDINATION | Facility: CLINIC | Age: 87
End: 2022-11-11

## 2022-11-11 NOTE — PROGRESS NOTES
Clinic Care Coordination Contact  [facility]: Post-Discharge Note  SITUATION                                                      Admission:    Admission Date: 11/06/22   Reason for Admission: A-Fib with RVR, Generalized weakness, fevers, suspected UTI  Discharge:   Discharge Date: 11/10/22  Discharge Diagnosis: Paroxysmal atrial fibrillation    Grave's disease - post ablation    Hypothyroidism, postablative    Elevated prostate specific antigen (PSA)    Essential hypertension    Stage 3a chronic kidney disease (H)    Generalized muscle weakness    Left lower lobe pulmonary infiltrate    Acute heart failure with preserved ejection fraction (HFpEF) (H)    Sepsis due to pneumonia (H)    BACKGROUND                                                      Per hospital discharge summary and inpatient provider notes:    Hospital Course     Dilip Long is a 87 year old male with h/o BPH, prior CVA, diverticulosis, chronic indwelling jorge history of recurrent UTIs, hypothyroidism/Grave's disease and newly initiated on levothyroxine, recent hospitalization 10/2022 due to syncope, and asymptomatic COVID19 viral infection 10/21/22 who presented to [facility] ED on 11/6/2022 due to weakness/lethargy/fever, admitted for new onset UTI and atrial fibrillation with RVR, subsequently transferred to ICU due to worsening encephalopathy and respiratory failure requiring non-invasive ventilation      Suspected UTI/BPH/Urethral stricture  - continue chronic indwelling Jorge catheter was placed during last hospital stay for urinary retention.  - Jorge catheter changed in the ER as per nurse.  - Urine culture mixed urogenital organisms would stop ceftriaxone but treating for LLL infiltrate  - no UTI based on cultures and continue jorge cares for now     Atrial fibrillation with RVR spontaneous converted   - [id number] of 6 (Age, CVA, hypertension and CHF) started on eliquis but holding for PM placement per cardiology  "note.   - cardiology following awaiting further plan if PM needed, family would like cardiology to contact for information   - treating hyperthyroid as instructed by cardiology      Hyperthyroidism  - was started on levothyroxine on last admission and low dose 25mcg daily increase to 50mcg daily   - likely contributing to above      Suspected acute congestive heart failure  Echocardiogram 10/21/2022 showed normal left ventricular systolic function with LVEF of 55 to 60%, normal RV systolic function, and small pericardial effusion.   -  received IV Lasix 20 mg sent out on 20mg daily and reassess at cards follow-up      Hyperlipidemia  -  simvastatin resume      Prior CVA  - PT/OT  - statin   - on eliquis so stop asa for now      LLL infiltrate   - placed on doxycycline and ceftriaxone for treatment in ICU   - will change doxy to PO  - change ceftriaxone to cefdinir 300mg BID  - treat for 7 days total   - discussed with intensivist who agrees     ASSESSMENT           Discharge Assessment  How are you doing now that you are home?: Pt states feeling \"better\" and asked RN to talk with his granddaughter, Shruthi.  Granddaughter reports he's up walking around using his walker and \"doing pretty good.\"  Granddaughter states she has set up her grandfather's medications per AVS, but did not receive the new levothyroxine dose of 50 mcg and has been giving him the 25 mcg dose.  RN reviewed meds from [facility] and granddaughter will give pt 2 of jossy 25 mcg levothyroxine tabs they at home and follow up with pharmacy for new rx.  Home care is scheduled for SOC visit on Saturday, 11/12/22.  How are your symptoms? (Red Flag symptoms escalate to triage hotline per guidelines): Improved  Do you feel your condition is stable enough to be safe at home until your provider visit?: Yes  Does the patient have their discharge instructions? : Yes  Does the patient have questions regarding their discharge instructions? : No  Were you started on any " "new medications or were there changes to any of your previous medications? : Yes (RN reviewed meds from [facility])  Does the patient have all of their medications?: Yes  Do you have questions regarding any of your medications? : No  Do you have all of your needed medical supplies or equipment (DME)?  (i.e. oxygen tank, CPAP, cane, etc.): Yes (walker)  Discharge follow-up appointment scheduled within 14 calendar days? : Yes  Discharge Follow Up Appointment Date: 11/16/22  Discharge Follow Up Appointment Scheduled with?: Specialty Care Provider ([facility] Clinic.  RN reminded to schedule PCP follow up within 3-5 days of d/c)  Is patient agreeable to assistance with scheduling? : No         Post-op (Clinicians Only)  Did the patient have surgery or a procedure: No (s/p jorge catheter exchange 11/6/22)  Fever: No  Chills: No  Eating & Drinking: eating and drinking without complaints/concerns  PO Intake: regular diet  Additional Symptoms:  (Denies)  Bowel Function: loose stools (\"small amounts of diarrhea\")  Date of last BM: 11/11/22  Urinary Status: indwelling urinary catheter (catheter with good UO)      PLAN                                                      Outpatient Plan:      Follow-up and recommended labs and tests      Follow up with primary care provider, [facility], within 3-5days, to evaluate medication change and for hospital follow- up. The following labs/tests are recommended: CBC and BMP. Needs close monitor of hypothyroidism   Cardiology 2-3 weeks.         Future Appointments   Date Time Provider Department Center   11/16/2022  2:10 PM Gemini Amin, APRN CNP [facility]   11/16/2022  2:50 PM N [facility] HEART FAILURE RN [facility]   11/18/2022  2:30 PM WW [facility] DEVICE NURSE 1 [facility]   12/20/2022 11:15 AM WW [facility] DEVICE NURSE 1 [facility]         For any urgent concerns, please contact our 24 hour nurse triage line: [phone] ([phone])         Thalia" Krishna, RN

## 2022-11-11 NOTE — PLAN OF CARE
Occupational Therapy Discharge Summary    Reason for therapy discharge:    Discharged to home.    Progress towards therapy goal(s). See goals on Care Plan in [facility] electronic health record for goal details.  Goals partially met.  Barriers to achieving goals:   discharge from facility.    Therapy recommendation(s):    No further therapy is recommended.    Goal Outcome Evaluation:

## 2022-11-16 ENCOUNTER — APPOINTMENT (OUTPATIENT)
Dept: CARDIOLOGY | Facility: CLINIC | Age: 87
End: 2022-11-16
Attending: INTERNAL MEDICINE
Payer: MEDICARE

## 2022-11-16 VITALS
DIASTOLIC BLOOD PRESSURE: 70 MMHG | WEIGHT: 150 LBS | RESPIRATION RATE: 16 BRPM | HEIGHT: 70 IN | BODY MASS INDEX: 21.47 KG/M2 | SYSTOLIC BLOOD PRESSURE: 136 MMHG | HEART RATE: 85 BPM

## 2022-11-16 DIAGNOSIS — I50.31 ACUTE HEART FAILURE WITH PRESERVED EJECTION FRACTION (HFPEF) (H): Primary | ICD-10-CM

## 2022-11-16 DIAGNOSIS — I10 ESSENTIAL HYPERTENSION: ICD-10-CM

## 2022-11-16 DIAGNOSIS — I48.0 PAROXYSMAL ATRIAL FIBRILLATION (H): ICD-10-CM

## 2022-11-16 LAB
ANION GAP SERPL CALCULATED.3IONS-SCNC: 14 MMOL/L (ref 7–15)
BUN SERPL-MCNC: 21.5 MG/DL (ref 8–23)
CALCIUM SERPL-MCNC: 9.6 MG/DL (ref 8.8–10.2)
CHLORIDE SERPL-SCNC: 99 MMOL/L (ref 98–107)
CREAT SERPL-MCNC: 1.58 MG/DL (ref 0.67–1.17)
DEPRECATED HCO3 PLAS-SCNC: 28 MMOL/L (ref 22–29)
ERYTHROCYTE [DISTWIDTH] IN BLOOD BY AUTOMATED COUNT: 12.4 % (ref 10–15)
GFR SERPL CREATININE-BSD FRML MDRD: 42 ML/MIN/1.73M2
GLUCOSE SERPL-MCNC: 94 MG/DL (ref 70–99)
HCT VFR BLD AUTO: 28.3 % (ref 40–53)
HGB BLD-MCNC: 9.4 G/DL (ref 13.3–17.7)
MCH RBC QN AUTO: 33.5 PG (ref 26.5–33)
MCHC RBC AUTO-ENTMCNC: 33.2 G/DL (ref 31.5–36.5)
MCV RBC AUTO: 101 FL (ref 78–100)
PLATELET # BLD AUTO: 429 10E3/UL (ref 150–450)
POTASSIUM SERPL-SCNC: 3.2 MMOL/L (ref 3.4–5.3)
RBC # BLD AUTO: 2.81 10E6/UL (ref 4.4–5.9)
SODIUM SERPL-SCNC: 141 MMOL/L (ref 136–145)
WBC # BLD AUTO: 5.9 10E3/UL (ref 4–11)

## 2022-11-16 PROCEDURE — 80048 BASIC METABOLIC PNL TOTAL CA: CPT | Performed by: NURSE PRACTITIONER

## 2022-11-16 PROCEDURE — 85027 COMPLETE CBC AUTOMATED: CPT | Performed by: NURSE PRACTITIONER

## 2022-11-16 PROCEDURE — 36415 COLL VENOUS BLD VENIPUNCTURE: CPT | Performed by: NURSE PRACTITIONER

## 2022-11-16 PROCEDURE — 99214 OFFICE O/P EST MOD 30 MIN: CPT | Performed by: NURSE PRACTITIONER

## 2022-11-16 RX ORDER — POTASSIUM CHLORIDE 1500 MG/1
20 TABLET, EXTENDED RELEASE ORAL DAILY
Qty: 90 TABLET | Refills: 1 | Status: ON HOLD | OUTPATIENT
Start: 2022-11-16 | End: 2023-04-17

## 2022-11-16 RX ORDER — FUROSEMIDE 20 MG
20 TABLET ORAL DAILY
Qty: 90 TABLET | Refills: 3 | Status: SHIPPED | OUTPATIENT
Start: 2022-11-16 | End: 2022-11-29

## 2022-11-16 NOTE — PROGRESS NOTES
Assessment/Recommendations   Assessment:    1.  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  He states he is feeling well since hospitalization.  He has mild fatigue and dyspnea on exertion.  We discussed monitoring daily weights, following a low-sodium diet and monitoring symptoms.  2.  Paroxysmal atrial fibrillation: Rate controlled.  He continues Eliquis for anticoagulation  3.  Hypertension: Controlled.  Blood pressure 136/70    Plan:  1.  Schedule lexiscan and event monitor  2.  CBC and BMP pending  3.  Continue current medications  4.  Start monitoring daily weights and continue low-sodium diet    Dilip Long will follow up with Dr Quevedo in 6 weeks.     History of Present Illness/Subjective    Mr. Dilip Long is a 87 year old male seen at [facility] heart failure clinic today for continued follow-up.  His granddaughter accompanies him today.  He follows up for heart failure with mildly reduced ejection fraction.  He was hospitalized November 6 to November 10 with atrial fibrillation with rapid ventricular response.  He was having symptoms of weakness, lethargy and fever.  He also had a UTI.  He was transferred to ICU due to worsening encephalopathy and respiratory failure requiring BiPAP.  He was started on Eliquis for anticoagulation. He converted spontaneously.  He had an echocardiogram which showed ejection fraction 45 to 50%.  He has a past medical history significant for CVA, Graves' disease, recurrent UTIs, paroxysmal atrial fibrillation, hypothyroidism, hyperlipidemia.  He was also hospitalized in October due to syncope.  It was recommended he have an event monitor and Lexiscan outpatient.  Testing needs to be scheduled still.    Today, he states he is doing well.  He has mild fatigue and dyspnea on exertion.  He denies lightheadedness, orthopnea, PND, palpitations, chest pain, abdominal fullness/bloating and lower  "extremity edema.      He is not monitoring home weights. He is trying to follow a low salt diet    ECHOCARDIOGRAM: 11/7/2022  Interpretation Summary     Technically challenging study. Definity contrast utilized. Underlying rhythm  is suggestive of atrial fibrillation with frequent ectopy.  Normal left ventricular size. Left ventricular systolic function appears  mildly reduced. Left ventricular ejection fraction estimated 45 to 50%. Not  all walls are optimally visualized.  Normal right ventricular size. Mild decrease in right ventricular systolic  function.  No obvious significant valve abnormality.  Mild enlargement of the aortic root  Small circumferential pericardial effusion  Compared to October 24, 2022 the size of the pericardial effusion appears  similar.  _____________________________________________________________________________     Physical Examination Review of Systems   /70 (BP Location: Right arm, Patient Position: Sitting, Cuff Size: Adult Regular)   Pulse 85   Resp 16   Ht 1.778 m (5' 10\")   Wt 68 kg (150 lb)   BMI 21.52 kg/m    Body mass index is 21.52 kg/m .  Wt Readings from Last 3 Encounters:   11/16/22 68 kg (150 lb)   11/10/22 71 kg (156 lb 9.6 oz)   10/24/22 72 kg (158 lb 12.8 oz)       General Appearance:   no acute distress   ENT/Mouth: Wearing mask   EYES:  no scleral icterus, normal conjunctivae   Neck: no thyromegaly   Chest/Lungs:   lungs are clear to auscultation, no rales or wheezing, equal chest wall expansion    Cardiovascular:    Irregularly irregular. Normal first and second heart sounds with no murmurs, rubs, or gallops, no edema bilaterally    Abdomen:  bowel sounds are present   Extremities: no cyanosis or clubbing   Skin: no xanthelasma, warm.    Neurologic: normal  bilateral, no tremors     Psychiatric: alert and oriented x3                                              Medical History  Surgical History Family History Social History   Past Medical History: "   Diagnosis Date     Acute heart failure with preserved ejection fraction (HFpEF) (H) 11/10/2022     Acute prostatitis      Asymptomatic bacteriuria 10/23/2013    Noted at 10/2/13 office visit at [facility]. No treatment recommended at that time.      Atrial fibrillation (H)      Congestive heart failure (H)      Essential hypertension 8/18/2015     Hypertension      Paroxysmal atrial fibrillation (H) 11/6/2022     Syncope      Thyroid disease     Past Surgical History:   Procedure Laterality Date     BLADDER SURGERY      Bladder absces removal     Blepharoplasty Upper Lid W/ Excessive Skin[  1/29/2007     CATARACT EXTRACTION       EYE SURGERY      both eyes 2015     IR LUMBAR EPIDURAL STEROID INJECTION  4/27/2004     IR LUMBAR EPIDURAL STEROID INJECTION  5/11/2004     IR LUMBAR EPIDURAL STEROID INJECTION  11/24/2004     IR LUMBAR EPIDURAL STEROID INJECTION  11/28/2007     KS CYSTOURETHROSCOPY W/IRRIG & EVAC CLOTS N/A 7/27/2018    Procedure: CYSTOSCOPY, CLOT EVACUATION ,URETHRAL DILATATION, DAUGHERTY CATHETER PLACEMENT;  Surgeon: Lowell Arias MD;  Location: [facility] [place];  Service: Urology    Family History   Problem Relation Age of Onset     Diabetes No family hx of      Hypertension No family hx of      Other Cancer No family hx of      Coronary Artery Disease No family hx of      Hyperlipidemia No family hx of      Cerebrovascular Disease No family hx of      Breast Cancer No family hx of      Colon Cancer No family hx of      Prostate Cancer No family hx of      Depression No family hx of      Anxiety Disorder No family hx of      Mental Illness No family hx of      Substance Abuse No family hx of      Anesthesia Reaction No family hx of      Asthma No family hx of      Osteoporosis No family hx of      Genetic Disorder No family hx of      Thyroid Disease No family hx of      Obesity No family hx of      No Known Problems Mother      No Known Problems Father     Social History     Socioeconomic  History     Marital status:      Spouse name: Not on file     Number of children: Not on file     Years of education: Not on file     Highest education level: Not on file   Occupational History     Occupation: Retired   Tobacco Use     Smoking status: Former     Types: Cigarettes     Smokeless tobacco: Never     Tobacco comments:     Pt quit 40 years ago   Substance and Sexual Activity     Alcohol use: Yes     Alcohol/week: 0.0 standard drinks     Comment: Alcoholic Drinks/day: 1-2 beers per week.     Drug use: No     Sexual activity: Not on file   Other Topics Concern     Parent/sibling w/ CABG, MI or angioplasty before 65F 55M? Not Asked   Social History Narrative     Not on file     Social Determinants of Health     Financial Resource Strain: Not on file   Food Insecurity: Not on file   Transportation Needs: Not on file   Physical Activity: Not on file   Stress: Not on file   Social Connections: Not on file   Intimate Partner Violence: Not on file   Housing Stability: Not on file          Medications  Allergies   Current Outpatient Medications   Medication Sig Dispense Refill     apixaban ANTICOAGULANT (ELIQUIS) 5 MG tablet Take 1 tablet (5 mg) by mouth 2 times daily 60 tablet 11     Calcium Carbonate-Vitamin D (OSCAL 500/200 D-3 PO) Take 1 tablet by mouth daily.       cefdinir (OMNICEF) 300 MG capsule Take 1 capsule (300 mg) by mouth every 12 hours 6 capsule 0     doxycycline monohydrate (MONODOX) 100 MG capsule Take 1 capsule (100 mg) by mouth every 12 hours 6 capsule 0     furosemide (LASIX) 20 MG tablet Take 1 tablet (20 mg) by mouth daily 90 tablet 3     levothyroxine (SYNTHROID/LEVOTHROID) 50 MCG tablet Take 1 tablet (50 mcg) by mouth every morning (before breakfast) 30 tablet 0     melatonin 3 MG tablet Take 1 tablet (3 mg) by mouth nightly as needed for sleep       order for DME Equipment being ordered: Home automatic blood pressure cuff. Check once daily for one week, and then 3 times weekly  thereafter. 1 Device 0     simvastatin (ZOCOR) 10 MG tablet Take 1 tablet (10 mg) by mouth At Bedtime 90 tablet 3    No Known Allergies      Lab Results    Chemistry/lipid CBC Cardiac Enzymes/BNP/TSH/INR   Lab Results   Component Value Date    CHOL 153 10/23/2022    HDL 57 10/23/2022    TRIG 60 10/23/2022    BUN 21.3 11/10/2022     11/10/2022    CO2 24 11/10/2022    Lab Results   Component Value Date    WBC 4.6 11/10/2022    HGB 8.1 (L) 11/10/2022    HCT 24.2 (L) 11/10/2022     (H) 11/10/2022     11/10/2022    Lab Results   Component Value Date    TSH 64.25 (H) 11/06/2022    INR 1.08 10/21/2022             This note has been dictated using voice recognition software. Any grammatical, typographical, or context distortions are unintentional and inherent to the software    30 minutes spent on the date of encounter doing chart review, review of outside records, review of test results, interpretation with above tests, patient visit, documentation and discussion with family.

## 2022-11-16 NOTE — PATIENT INSTRUCTIONS
Dilip Long,    It was a pleasure to see you today at [facility] HEART [facility].     My recommendations after this visit include:  - Please follow up with Dr Quevedo in 6 weeks   - Please schedule lexiscan and event monitor  - I have checked labs and will contact you with results  - Continue current medications    Gemini Amin, CNP

## 2022-11-16 NOTE — LETTER
11/16/2022    [facility]  [address]  [facility] [address]    RE: Dilip Long       Dear Colleague,     I had the pleasure of seeing Dilip Long in the [facility].        Assessment/Recommendations   Assessment:    1.  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  He states he is feeling well since hospitalization.  He has mild fatigue and dyspnea on exertion.  We discussed monitoring daily weights, following a low-sodium diet and monitoring symptoms.  2.  Paroxysmal atrial fibrillation: Rate controlled.  He continues Eliquis for anticoagulation  3.  Hypertension: Controlled.  Blood pressure 136/70    Plan:  1.  Schedule lexiscan and event monitor  2.  CBC and BMP pending  3.  Continue current medications  4.  Start monitoring daily weights and continue low-sodium diet    Dilip Long will follow up with Dr Quevedo in 6 weeks.     History of Present Illness/Subjective    Mr. Dilip Long is a 87 year old male seen at [facility] heart failure clinic today for continued follow-up.  His granddaughter accompanies him today.  He follows up for heart failure with mildly reduced ejection fraction.  He was hospitalized November 6 to November 10 with atrial fibrillation with rapid ventricular response.  He was having symptoms of weakness, lethargy and fever.  He also had a UTI.  He was transferred to ICU due to worsening encephalopathy and respiratory failure requiring BiPAP.  He was started on Eliquis for anticoagulation. He converted spontaneously.  He had an echocardiogram which showed ejection fraction 45 to 50%.  He has a past medical history significant for CVA, Graves' disease, recurrent UTIs, paroxysmal atrial fibrillation, hypothyroidism, hyperlipidemia.  He was also hospitalized in October due to syncope.  It was recommended he have an event monitor and Lexiscan outpatient.  Testing  "needs to be scheduled still.    Today, he states he is doing well.  He has mild fatigue and dyspnea on exertion.  He denies lightheadedness, orthopnea, PND, palpitations, chest pain, abdominal fullness/bloating and lower extremity edema.      He is not monitoring home weights. He is trying to follow a low salt diet    ECHOCARDIOGRAM: 11/7/2022  Interpretation Summary     Technically challenging study. Definity contrast utilized. Underlying rhythm  is suggestive of atrial fibrillation with frequent ectopy.  Normal left ventricular size. Left ventricular systolic function appears  mildly reduced. Left ventricular ejection fraction estimated 45 to 50%. Not  all walls are optimally visualized.  Normal right ventricular size. Mild decrease in right ventricular systolic  function.  No obvious significant valve abnormality.  Mild enlargement of the aortic root  Small circumferential pericardial effusion  Compared to October 24, 2022 the size of the pericardial effusion appears  similar.  _____________________________________________________________________________     Physical Examination Review of Systems   /70 (BP Location: Right arm, Patient Position: Sitting, Cuff Size: Adult Regular)   Pulse 85   Resp 16   Ht 1.778 m (5' 10\")   Wt 68 kg (150 lb)   BMI 21.52 kg/m    Body mass index is 21.52 kg/m .  Wt Readings from Last 3 Encounters:   11/16/22 68 kg (150 lb)   11/10/22 71 kg (156 lb 9.6 oz)   10/24/22 72 kg (158 lb 12.8 oz)       General Appearance:   no acute distress   ENT/Mouth: Wearing mask   EYES:  no scleral icterus, normal conjunctivae   Neck: no thyromegaly   Chest/Lungs:   lungs are clear to auscultation, no rales or wheezing, equal chest wall expansion    Cardiovascular:    Irregularly irregular. Normal first and second heart sounds with no murmurs, rubs, or gallops, no edema bilaterally    Abdomen:  bowel sounds are present   Extremities: no cyanosis or clubbing   Skin: no xanthelasma, warm.  "   Neurologic: normal  bilateral, no tremors     Psychiatric: alert and oriented x3                                              Medical History  Surgical History Family History Social History   Past Medical History:   Diagnosis Date     Acute heart failure with preserved ejection fraction (HFpEF) (H) 11/10/2022     Acute prostatitis      Asymptomatic bacteriuria 10/23/2013    Noted at 10/2/13 office visit at [facility]. No treatment recommended at that time.      Atrial fibrillation (H)      Congestive heart failure (H)      Essential hypertension 8/18/2015     Hypertension      Paroxysmal atrial fibrillation (H) 11/6/2022     Syncope      Thyroid disease     Past Surgical History:   Procedure Laterality Date     BLADDER SURGERY      Bladder absces removal     Blepharoplasty Upper Lid W/ Excessive Skin[  1/29/2007     CATARACT EXTRACTION       EYE SURGERY      both eyes 2015     IR LUMBAR EPIDURAL STEROID INJECTION  4/27/2004     IR LUMBAR EPIDURAL STEROID INJECTION  5/11/2004     IR LUMBAR EPIDURAL STEROID INJECTION  11/24/2004     IR LUMBAR EPIDURAL STEROID INJECTION  11/28/2007     [place] CYSTOURETHROSCOPY W/IRRIG & EVAC CLOTS N/A 7/27/2018    Procedure: CYSTOSCOPY, CLOT EVACUATION ,URETHRAL DILATATION, DAUGHERTY CATHETER PLACEMENT;  Surgeon: Lowell Arias MD;  Location: [facility];  Service: Urology    Family History   Problem Relation Age of Onset     Diabetes No family hx of      Hypertension No family hx of      Other Cancer No family hx of      Coronary Artery Disease No family hx of      Hyperlipidemia No family hx of      Cerebrovascular Disease No family hx of      Breast Cancer No family hx of      Colon Cancer No family hx of      Prostate Cancer No family hx of      Depression No family hx of      Anxiety Disorder No family hx of      Mental Illness No family hx of      Substance Abuse No family hx of      Anesthesia Reaction No family hx of      Asthma No family hx of      Osteoporosis  No family hx of      Genetic Disorder No family hx of      Thyroid Disease No family hx of      Obesity No family hx of      No Known Problems Mother      No Known Problems Father     Social History     Socioeconomic History     Marital status:      Spouse name: Not on file     Number of children: Not on file     Years of education: Not on file     Highest education level: Not on file   Occupational History     Occupation: Retired   Tobacco Use     Smoking status: Former     Types: Cigarettes     Smokeless tobacco: Never     Tobacco comments:     Pt quit 40 years ago   Substance and Sexual Activity     Alcohol use: Yes     Alcohol/week: 0.0 standard drinks     Comment: Alcoholic Drinks/day: 1-2 beers per week.     Drug use: No     Sexual activity: Not on file   Other Topics Concern     Parent/sibling w/ CABG, MI or angioplasty before 65F 55M? Not Asked   Social History Narrative     Not on file     Social Determinants of Health     Financial Resource Strain: Not on file   Food Insecurity: Not on file   Transportation Needs: Not on file   Physical Activity: Not on file   Stress: Not on file   Social Connections: Not on file   Intimate Partner Violence: Not on file   Housing Stability: Not on file          Medications  Allergies   Current Outpatient Medications   Medication Sig Dispense Refill     apixaban ANTICOAGULANT (ELIQUIS) 5 MG tablet Take 1 tablet (5 mg) by mouth 2 times daily 60 tablet 11     Calcium Carbonate-Vitamin D (OSCAL 500/200 D-3 PO) Take 1 tablet by mouth daily.       cefdinir (OMNICEF) 300 MG capsule Take 1 capsule (300 mg) by mouth every 12 hours 6 capsule 0     doxycycline monohydrate (MONODOX) 100 MG capsule Take 1 capsule (100 mg) by mouth every 12 hours 6 capsule 0     furosemide (LASIX) 20 MG tablet Take 1 tablet (20 mg) by mouth daily 90 tablet 3     levothyroxine (SYNTHROID/LEVOTHROID) 50 MCG tablet Take 1 tablet (50 mcg) by mouth every morning (before breakfast) 30 tablet 0      melatonin 3 MG tablet Take 1 tablet (3 mg) by mouth nightly as needed for sleep       order for DME Equipment being ordered: Home automatic blood pressure cuff. Check once daily for one week, and then 3 times weekly thereafter. 1 Device 0     simvastatin (ZOCOR) 10 MG tablet Take 1 tablet (10 mg) by mouth At Bedtime 90 tablet 3    No Known Allergies      Lab Results    Chemistry/lipid CBC Cardiac Enzymes/BNP/TSH/INR   Lab Results   Component Value Date    CHOL 153 10/23/2022    HDL 57 10/23/2022    TRIG 60 10/23/2022    BUN 21.3 11/10/2022     11/10/2022    CO2 24 11/10/2022    Lab Results   Component Value Date    WBC 4.6 11/10/2022    HGB 8.1 (L) 11/10/2022    HCT 24.2 (L) 11/10/2022     (H) 11/10/2022     11/10/2022    Lab Results   Component Value Date    TSH 64.25 (H) 11/06/2022    INR 1.08 10/21/2022             This note has been dictated using voice recognition software. Any grammatical, typographical, or context distortions are unintentional and inherent to the software    30 minutes spent on the date of encounter doing chart review, review of outside records, review of test results, interpretation with above tests, patient visit, documentation and discussion with family.                Thank you for allowing me to participate in the care of your patient.      Sincerely,     CAROL [facility]  cc:   Sly Grajeda MD  [address]  [place] [address]

## 2022-11-23 ENCOUNTER — LAB (OUTPATIENT)
Dept: LAB | Facility: HOSPITAL | Age: 87
End: 2022-11-23
Payer: MEDICARE

## 2022-11-23 DIAGNOSIS — I50.31 ACUTE HEART FAILURE WITH PRESERVED EJECTION FRACTION (HFPEF) (H): ICD-10-CM

## 2022-11-23 LAB
ANION GAP SERPL CALCULATED.3IONS-SCNC: 10 MMOL/L (ref 7–15)
BUN SERPL-MCNC: 26.5 MG/DL (ref 8–23)
CALCIUM SERPL-MCNC: 11.1 MG/DL (ref 8.8–10.2)
CHLORIDE SERPL-SCNC: 100 MMOL/L (ref 98–107)
CREAT SERPL-MCNC: 1.98 MG/DL (ref 0.67–1.17)
DEPRECATED HCO3 PLAS-SCNC: 32 MMOL/L (ref 22–29)
GFR SERPL CREATININE-BSD FRML MDRD: 32 ML/MIN/1.73M2
GLUCOSE SERPL-MCNC: 98 MG/DL (ref 70–99)
POTASSIUM SERPL-SCNC: 4.2 MMOL/L (ref 3.4–5.3)
SODIUM SERPL-SCNC: 142 MMOL/L (ref 136–145)

## 2022-11-23 PROCEDURE — 36415 COLL VENOUS BLD VENIPUNCTURE: CPT

## 2022-11-23 PROCEDURE — 82947 ASSAY GLUCOSE BLOOD QUANT: CPT

## 2022-11-29 ENCOUNTER — HOSPITAL ENCOUNTER (OUTPATIENT)
Facility: HOSPITAL | Age: 87
Setting detail: OBSERVATION
Discharge: HOME | End: 2022-12-01
Attending: STUDENT IN AN ORGANIZED HEALTH CARE EDUCATION/TRAINING PROGRAM
Payer: MEDICARE

## 2022-11-29 ENCOUNTER — APPOINTMENT (OUTPATIENT)
Dept: CT IMAGING | Facility: HOSPITAL | Age: 87
End: 2022-11-29
Attending: STUDENT IN AN ORGANIZED HEALTH CARE EDUCATION/TRAINING PROGRAM
Payer: MEDICARE

## 2022-11-29 DIAGNOSIS — R31.9 HEMATURIA, UNSPECIFIED TYPE: ICD-10-CM

## 2022-11-29 DIAGNOSIS — Z97.8 CHRONIC INDWELLING FOLEY CATHETER: ICD-10-CM
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
## 2022-11-29 DIAGNOSIS — M62.81 GENERALIZED MUSCLE WEAKNESS: Primary | ICD-10-CM

## 2022-11-29 DIAGNOSIS — Z79.01 ANTICOAGULATED: ICD-10-CM

## 2022-11-29 PROBLEM — D53.9 MACROCYTIC ANEMIA: Status: ACTIVE | Noted: 2022-10-21

## 2022-11-29 PROBLEM — H91.90 HOH (HARD OF HEARING): Status: ACTIVE | Noted: 2022-11-29

## 2022-11-29 PROBLEM — N39.0 URINARY TRACT INFECTION: Status: ACTIVE | Noted: 2022-11-29

## 2022-11-29 LAB
ALBUMIN SERPL BCG-MCNC: 4 G/DL (ref 3.5–5.2)
ALBUMIN UR-MCNC: 200 MG/DL
ALP SERPL-CCNC: 68 U/L (ref 40–129)
ALT SERPL W P-5'-P-CCNC: 11 U/L (ref 10–50)
ANION GAP SERPL CALCULATED.3IONS-SCNC: 12 MMOL/L (ref 7–15)
APPEARANCE UR: ABNORMAL
APTT PPP: 34 SECONDS (ref 22–38)
AST SERPL W P-5'-P-CCNC: 24 U/L (ref 10–50)
BACTERIA #/AREA URNS HPF: ABNORMAL /HPF
BASOPHILS # BLD AUTO: 0 10E3/UL (ref 0–0.2)
BASOPHILS NFR BLD AUTO: 1 %
BILIRUB SERPL-MCNC: 1 MG/DL
BILIRUB UR QL STRIP: NEGATIVE
BUN SERPL-MCNC: 42.3 MG/DL (ref 8–23)
CALCIUM SERPL-MCNC: 12 MG/DL (ref 8.8–10.2)
CHLORIDE SERPL-SCNC: 100 MMOL/L (ref 98–107)
COLOR UR AUTO: ABNORMAL
CREAT SERPL-MCNC: 1.83 MG/DL (ref 0.67–1.17)
DEPRECATED HCO3 PLAS-SCNC: 29 MMOL/L (ref 22–29)
EOSINOPHIL # BLD AUTO: 0.1 10E3/UL (ref 0–0.7)
EOSINOPHIL NFR BLD AUTO: 1 %
ERYTHROCYTE [DISTWIDTH] IN BLOOD BY AUTOMATED COUNT: 12.9 % (ref 10–15)
GFR SERPL CREATININE-BSD FRML MDRD: 35 ML/MIN/1.73M2
GLUCOSE SERPL-MCNC: 94 MG/DL (ref 70–99)
GLUCOSE UR STRIP-MCNC: NEGATIVE MG/DL
HCT VFR BLD AUTO: 32.9 % (ref 40–53)
HGB BLD-MCNC: 10.5 G/DL (ref 13.3–17.7)
HGB UR QL STRIP: ABNORMAL
IMM GRANULOCYTES # BLD: 0 10E3/UL
IMM GRANULOCYTES NFR BLD: 0 %
INR PPP: 1.85 (ref 0.85–1.15)
IRON BINDING CAPACITY (ROCHE): 257 UG/DL (ref 240–430)
IRON SATN MFR SERPL: 16 % (ref 15–46)
IRON SERPL-MCNC: 42 UG/DL (ref 61–157)
KETONES UR STRIP-MCNC: NEGATIVE MG/DL
LEUKOCYTE ESTERASE UR QL STRIP: ABNORMAL
LYMPHOCYTES # BLD AUTO: 0.8 10E3/UL (ref 0.8–5.3)
LYMPHOCYTES NFR BLD AUTO: 10 %
MCH RBC QN AUTO: 33.7 PG (ref 26.5–33)
MCHC RBC AUTO-ENTMCNC: 31.9 G/DL (ref 31.5–36.5)
MCV RBC AUTO: 105 FL (ref 78–100)
MONOCYTES # BLD AUTO: 0.9 10E3/UL (ref 0–1.3)
MONOCYTES NFR BLD AUTO: 12 %
MUCOUS THREADS #/AREA URNS LPF: PRESENT /LPF
NEUTROPHILS # BLD AUTO: 6 10E3/UL (ref 1.6–8.3)
NEUTROPHILS NFR BLD AUTO: 76 %
NITRATE UR QL: NEGATIVE
NRBC # BLD AUTO: 0 10E3/UL
NRBC BLD AUTO-RTO: 0 /100
NT-PROBNP SERPL-MCNC: 1112 PG/ML (ref 0–1800)
PH UR STRIP: 6 [PH] (ref 5–7)
PLATELET # BLD AUTO: 280 10E3/UL (ref 150–450)
POTASSIUM SERPL-SCNC: 4.1 MMOL/L (ref 3.4–5.3)
PROT SERPL-MCNC: 7.1 G/DL (ref 6.4–8.3)
RBC # BLD AUTO: 3.12 10E6/UL (ref 4.4–5.9)
RBC URINE: >182 /HPF
SODIUM SERPL-SCNC: 141 MMOL/L (ref 136–145)
SP GR UR STRIP: 1.02 (ref 1–1.03)
SQUAMOUS EPITHELIAL: 6 /HPF
TRANSITIONAL EPI: 1 /HPF
TROPONIN T SERPL HS-MCNC: 60 NG/L
UROBILINOGEN UR STRIP-MCNC: <2 MG/DL
WBC # BLD AUTO: 7.8 10E3/UL (ref 4–11)
WBC CLUMPS #/AREA URNS HPF: PRESENT /HPF
WBC URINE: >182 /HPF

## 2022-11-29 PROCEDURE — 85730 THROMBOPLASTIN TIME PARTIAL: CPT | Performed by: STUDENT IN AN ORGANIZED HEALTH CARE EDUCATION/TRAINING PROGRAM

## 2022-11-29 PROCEDURE — 250N000011 HC RX IP 250 OP 636: Performed by: INTERNAL MEDICINE

## 2022-11-29 PROCEDURE — G1010 CDSM STANSON: HCPCS

## 2022-11-29 PROCEDURE — 99285 EMERGENCY DEPT VISIT HI MDM: CPT | Mod: 25

## 2022-11-29 PROCEDURE — 85025 COMPLETE CBC W/AUTO DIFF WBC: CPT | Performed by: STUDENT IN AN ORGANIZED HEALTH CARE EDUCATION/TRAINING PROGRAM

## 2022-11-29 PROCEDURE — 81001 URINALYSIS AUTO W/SCOPE: CPT | Performed by: STUDENT IN AN ORGANIZED HEALTH CARE EDUCATION/TRAINING PROGRAM

## 2022-11-29 PROCEDURE — G0378 HOSPITAL OBSERVATION PER HR: HCPCS

## 2022-11-29 PROCEDURE — 99220 PR INITIAL OBSERVATION CARE,LEVEL III: CPT | Performed by: INTERNAL MEDICINE

## 2022-11-29 PROCEDURE — 83880 ASSAY OF NATRIURETIC PEPTIDE: CPT | Performed by: STUDENT IN AN ORGANIZED HEALTH CARE EDUCATION/TRAINING PROGRAM

## 2022-11-29 PROCEDURE — 87086 URINE CULTURE/COLONY COUNT: CPT | Performed by: STUDENT IN AN ORGANIZED HEALTH CARE EDUCATION/TRAINING PROGRAM

## 2022-11-29 PROCEDURE — 83550 IRON BINDING TEST: CPT | Performed by: INTERNAL MEDICINE

## 2022-11-29 PROCEDURE — 51700 IRRIGATION OF BLADDER: CPT

## 2022-11-29 PROCEDURE — 85610 PROTHROMBIN TIME: CPT | Performed by: STUDENT IN AN ORGANIZED HEALTH CARE EDUCATION/TRAINING PROGRAM

## 2022-11-29 PROCEDURE — 84484 ASSAY OF TROPONIN QUANT: CPT | Performed by: STUDENT IN AN ORGANIZED HEALTH CARE EDUCATION/TRAINING PROGRAM

## 2022-11-29 PROCEDURE — 96366 THER/PROPH/DIAG IV INF ADDON: CPT | Mod: 59

## 2022-11-29 PROCEDURE — 36415 COLL VENOUS BLD VENIPUNCTURE: CPT | Performed by: STUDENT IN AN ORGANIZED HEALTH CARE EDUCATION/TRAINING PROGRAM

## 2022-11-29 PROCEDURE — 250N000013 HC RX MED GY IP 250 OP 250 PS 637: Performed by: INTERNAL MEDICINE

## 2022-11-29 PROCEDURE — 80053 COMPREHEN METABOLIC PANEL: CPT | Performed by: STUDENT IN AN ORGANIZED HEALTH CARE EDUCATION/TRAINING PROGRAM

## 2022-11-29 PROCEDURE — 96365 THER/PROPH/DIAG IV INF INIT: CPT | Mod: 59

## 2022-11-29 PROCEDURE — 72131 CT LUMBAR SPINE W/O DYE: CPT | Mod: ME

## 2022-11-29 RX ORDER — BISACODYL 10 MG
10 SUPPOSITORY, RECTAL RECTAL DAILY PRN
Status: DISCONTINUED | OUTPATIENT
Start: 2022-11-29 | End: 2022-12-01 | Stop reason: HOSPADM

## 2022-11-29 RX ORDER — ATENOLOL 25 MG/1
25 TABLET ORAL EVERY EVENING
Status: ON HOLD | COMMUNITY
End: 2024-03-04

## 2022-11-29 RX ORDER — CEFTRIAXONE 1 G/1
1 INJECTION, POWDER, FOR SOLUTION INTRAMUSCULAR; INTRAVENOUS EVERY 24 HOURS
Status: DISCONTINUED | OUTPATIENT
Start: 2022-11-29 | End: 2022-12-01 | Stop reason: HOSPADM

## 2022-11-29 RX ORDER — AMOXICILLIN 250 MG
2 CAPSULE ORAL 2 TIMES DAILY PRN
Status: DISCONTINUED | OUTPATIENT
Start: 2022-11-29 | End: 2022-12-01 | Stop reason: HOSPADM

## 2022-11-29 RX ORDER — ATENOLOL 25 MG/1
25 TABLET ORAL DAILY
Status: DISCONTINUED | OUTPATIENT
Start: 2022-11-30 | End: 2022-12-01 | Stop reason: HOSPADM

## 2022-11-29 RX ORDER — HYDROXYZINE HYDROCHLORIDE 10 MG/1
20 TABLET, FILM COATED ORAL AT BEDTIME
COMMUNITY
End: 2024-02-04

## 2022-11-29 RX ORDER — SIMVASTATIN 10 MG
10 TABLET ORAL AT BEDTIME
Status: DISCONTINUED | OUTPATIENT
Start: 2022-11-29 | End: 2022-12-01 | Stop reason: HOSPADM

## 2022-11-29 RX ORDER — LEVOTHYROXINE SODIUM 25 UG/1
50 TABLET ORAL
Status: DISCONTINUED | OUTPATIENT
Start: 2022-11-30 | End: 2022-12-01 | Stop reason: HOSPADM

## 2022-11-29 RX ORDER — ONDANSETRON 4 MG/1
4 TABLET, ORALLY DISINTEGRATING ORAL EVERY 6 HOURS PRN
Status: DISCONTINUED | OUTPATIENT
Start: 2022-11-29 | End: 2022-12-01 | Stop reason: HOSPADM

## 2022-11-29 RX ORDER — ONDANSETRON 2 MG/ML
4 INJECTION INTRAMUSCULAR; INTRAVENOUS EVERY 6 HOURS PRN
Status: DISCONTINUED | OUTPATIENT
Start: 2022-11-29 | End: 2022-12-01 | Stop reason: HOSPADM

## 2022-11-29 RX ORDER — AMOXICILLIN 250 MG
1 CAPSULE ORAL 2 TIMES DAILY PRN
Status: DISCONTINUED | OUTPATIENT
Start: 2022-11-29 | End: 2022-12-01 | Stop reason: HOSPADM

## 2022-11-29 RX ORDER — ACETAMINOPHEN 325 MG/1
650 TABLET ORAL EVERY 6 HOURS PRN
Status: DISCONTINUED | OUTPATIENT
Start: 2022-11-29 | End: 2022-12-01 | Stop reason: HOSPADM

## 2022-11-29 RX ORDER — POLYETHYLENE GLYCOL 3350 17 G/17G
17 POWDER, FOR SOLUTION ORAL DAILY PRN
Status: DISCONTINUED | OUTPATIENT
Start: 2022-11-29 | End: 2022-12-01 | Stop reason: HOSPADM

## 2022-11-29 RX ORDER — ACETAMINOPHEN 650 MG/1
650 SUPPOSITORY RECTAL EVERY 6 HOURS PRN
Status: DISCONTINUED | OUTPATIENT
Start: 2022-11-29 | End: 2022-12-01 | Stop reason: HOSPADM

## 2022-11-29 RX ORDER — LIDOCAINE 40 MG/G
CREAM TOPICAL
Status: DISCONTINUED | OUTPATIENT
Start: 2022-11-29 | End: 2022-12-01 | Stop reason: HOSPADM

## 2022-11-29 RX ORDER — HYDROXYZINE HYDROCHLORIDE 10 MG/1
20 TABLET, FILM COATED ORAL AT BEDTIME
Status: DISCONTINUED | OUTPATIENT
Start: 2022-11-29 | End: 2022-12-01 | Stop reason: HOSPADM

## 2022-11-29 RX ADMIN — CEFTRIAXONE SODIUM 1 G: 1 INJECTION, POWDER, FOR SOLUTION INTRAMUSCULAR; INTRAVENOUS at 21:43

## 2022-11-29 RX ADMIN — SIMVASTATIN 10 MG: 10 TABLET, FILM COATED ORAL at 21:44

## 2022-11-29 RX ADMIN — HYDROXYZINE HYDROCHLORIDE 20 MG: 10 TABLET ORAL at 21:44

## 2022-11-29 RX ADMIN — Medication 1 MG: at 22:15

## 2022-11-29 ASSESSMENT — ACTIVITIES OF DAILY LIVING (ADL)
ADLS_ACUITY_SCORE: 35

## 2022-11-29 NOTE — ED TRIAGE NOTES
Pt from home--lives with his wife--granddaughter stays with them at times--she called 911--Pt had two falls at 0330 and 0930 this am.  On thinners--did not hit his head, kimberly injury form falls.  Medics report pt has had frequent falls-they are there at his house multiple times--Does have a jorge--appears to have some blood in his urine.

## 2022-11-29 NOTE — ED PROVIDER NOTES
Emergency Department Encounter         FINAL IMPRESSION:  Hematuria, fall        ED COURSE AND MEDICAL DECISION MAKING       ED Course as of 11/29/22 1140   nery Nov 29, 2022   1136 87-year-old with multiple medical problems including chronic indwelling Lozano on blood thinners, here with generalized weakness and multiple falls over the past 24 hours.  Patient also endorsing hematuria in his indwelling Lozano.  On arrival here trauma alert was called because of the fall history of the patient states he did not hit his head.  Complaining of mid spine pain.  No neurologic complaints.  States he has increasing hematuria.  No abdominal pain.  No chest pain.  Trouble breathing.  Vitals otherwise stable on arrival.  Examination revealing midline thoracic spine pain to palpation.  No midline cervical pain.  No obvious facial trauma or head injury.  Heart and lungs otherwise normal.  Plan for CT imaging of his spine and head, basic labs and most likely admission.        10:45 AM I met with the patient, obtained history, performed an initial exam, and discussed options and plan for diagnostics and treatment here in the ED.      Medical Decision Making    History:    Supplemental history from: N/A    External Record(s) reviewed: Documented in HPI, if applicable.    Work Up:    Chart documentation includes differential considered and any EKGs or imaging interpreted by provider.    In additional to work up documented, I considered the following work up: See chart documentation, if applicable.    External consultation:    Discussion of management with another provider: See chart documentation, if applicable    Complicating factors:    Care impacted by chronic illness: Dementia    Care affected by social determinants of health: N/A    Disposition considerations: Admit.          EKG      At the conclusion of the encounter I discussed the results of all the tests and the disposition. The questions were answered. The patient or  family acknowledged understanding and was agreeable with the care plan.          MEDICATIONS GIVEN IN THE EMERGENCY DEPARTMENT:  Medications - No data to display    NEW PRESCRIPTIONS STARTED AT TODAY'S ED VISIT:  New Prescriptions    No medications on file       HPI     Patient information obtained from: Patient    Use of Interpretor: N/A     Dilip Long is a 87 year old male with a pertinent history of syncope, Grave's diseases, and hydrocele,urethral stricture who presents to this ED for evaluation of generalized weakness and hematuria    The patient reports that he has had two falls starting last night and his granddaughter had called 911. He endorses feeling generally weak, and he has also been having increasing hematuria present in his jorge catheter.    He denies any neck pain or head pain. Patient is currently on Eliquis. No other complaints at this time.    REVIEW OF SYSTEMS:  Review of Systems   Constitutional: Negative for fever, malaise. Positive for generalized weakness.  HEENT: Negative runny nose, sore throat, ear pain, neck pain  Respiratory: Negative for shortness of breath, cough, congestion  Cardiovascular: Negative for chest pain, leg edema  Gastrointestinal: Negative for abdominal distention, abdominal pain, constipation, vomiting, nausea, diarrhea  Genitourinary: Negative for dysuria. Positive for penile pain and hematuria.  Integument: Negative for rash, skin breakdown.   Neurological: Negative for paresthesias, weakness, headache.  Musculoskeletal: Negative for joint pain, joint swelling.       All other systems reviewed and are negative.          MEDICAL HISTORY     Past Medical History:   Diagnosis Date     Acute heart failure with preserved ejection fraction (HFpEF) (H) 11/10/2022     Acute prostatitis      Asymptomatic bacteriuria 10/23/2013     Atrial fibrillation (H)      Congestive heart failure (H)      Essential hypertension 8/18/2015     Hypertension      Paroxysmal atrial  "fibrillation (H) 11/6/2022     Syncope      Thyroid disease        Past Surgical History:   Procedure Laterality Date     BLADDER SURGERY      Bladder absces removal     Blepharoplasty Upper Lid W/ Excessive Skin[  1/29/2007     CATARACT EXTRACTION       EYE SURGERY      both eyes 2015     IR LUMBAR EPIDURAL STEROID INJECTION  4/27/2004     IR LUMBAR EPIDURAL STEROID INJECTION  5/11/2004     IR LUMBAR EPIDURAL STEROID INJECTION  11/24/2004     IR LUMBAR EPIDURAL STEROID INJECTION  11/28/2007     [place] CYSTOURETHROSCOPY W/IRRIG & EVAC CLOTS N/A 7/27/2018    Procedure: CYSTOSCOPY, CLOT EVACUATION ,URETHRAL DILATATION, DAUGHERTY CATHETER PLACEMENT;  Surgeon: Lowell Arias MD;  [facility];  Service: Urology       Social History     Tobacco Use     Smoking status: Former     Types: Cigarettes     Smokeless tobacco: Never     Tobacco comments:     Pt quit 40 years ago   Substance Use Topics     Alcohol use: Yes     Alcohol/week: 0.0 standard drinks     Comment: Alcoholic Drinks/day: 1-2 beers per week.     Drug use: No       apixaban ANTICOAGULANT (ELIQUIS) 5 MG tablet  Calcium Carbonate-Vitamin D (OSCAL 500/200 D-3 PO)  cefdinir (OMNICEF) 300 MG capsule  doxycycline monohydrate (MONODOX) 100 MG capsule  furosemide (LASIX) 20 MG tablet  levothyroxine (SYNTHROID/LEVOTHROID) 50 MCG tablet  melatonin 3 MG tablet  order for DME  potassium chloride ER (KLOR-CON M) 20 MEQ CR tablet  simvastatin (ZOCOR) 10 MG tablet            PHYSICAL EXAM     /76   Pulse 65   Temp 97.6  F (36.4  C) (Oral)   Resp 16   Ht 1.753 m (5' 9\")   Wt 68 kg (150 lb)   SpO2 93%   BMI 22.15 kg/m        PHYSICAL EXAM:     General: Patient appears well, nontoxic, comfortable  HEENT: Moist mucous membranes,  No head trauma.  No midline neck pain.  Cardiovascular: Normal rate, normal rhythm, no extremity edema.  No appreciable murmur.  Respiratory: No signs of respiratory distress, lungs are clear to auscultation bilaterally " with no wheezes rhonchi or rales.  Abdominal: Soft, nontender, nondistended, no palpable masses, no guarding, no rebound  : bloody urine in jorge bag  Musculoskeletal: Full range of motion of joints, no deformities appreciated.  Neurological: Alert and oriented, grossly neurologically intact.  Psychological: Normal affect and mood.  Integument: No rashes appreciated          RESULTS       Labs Ordered and Resulted from Time of ED Arrival to Time of ED Departure - No data to display    Lumbar spine CT w/o contrast   Final Result   IMPRESSION:   Thoracic spine CT:   1.  Subtle superior endplate T5 compression fracture demonstrated slightly more height loss on prior CT thoracic spine July 15, 2019. MR could be used to evaluate for acuity.    2.  Subtle chronic superior endplate T1 and T2 compression fractures, unchanged.    3.  Chronic T12 superior plate compression fracture, unchanged.   4.  No other evidence of acute fracture or subluxation of the thoracic spine by CT imaging.      Lumbar spine CT:   1.  No evidence of acute fracture or subluxation of the lumbar spine by CT imaging.   2.  Chronic superior endplate L1 compression fracture, unchanged.   3.  Degenerative lumbar spondylosis with level by level analysis as described above.         CT Thoracic Spine w/o Contrast   Final Result   IMPRESSION:   Thoracic spine CT:   1.  Subtle superior endplate T5 compression fracture demonstrated slightly more height loss on prior CT thoracic spine July 15, 2019. MR could be used to evaluate for acuity.    2.  Subtle chronic superior endplate T1 and T2 compression fractures, unchanged.    3.  Chronic T12 superior plate compression fracture, unchanged.   4.  No other evidence of acute fracture or subluxation of the thoracic spine by CT imaging.      Lumbar spine CT:   1.  No evidence of acute fracture or subluxation of the lumbar spine by CT imaging.   2.  Chronic superior endplate L1 compression fracture, unchanged.   3.   Degenerative lumbar spondylosis with level by level analysis as described above.         Cervical spine CT w/o contrast   Final Result   IMPRESSION:   1.  No evidence of acute fracture or subluxation of the cervical spine by CT imaging.   2.  Degenerative cervical spondylosis as described above.      Head CT w/o contrast   Final Result   IMPRESSION:      1.  No acute intracranial abnormality.   2.  Stable mild chronic small vessel ischemic change.                        PROCEDURES:  Procedures:  Procedures       I, Anahi Acevedo  am serving as a scribe to document services personally performed by Timothy Garcia DO, based on my observations and the provider's statements to me.  I, Timothy Garcia DO, attest that Anahi Acevedo  is acting in a scribe capacity, has observed my performance of the services and has documented them in accordance with my direction.    Timothy Garcia DO  Emergency Medicine  [facility] EMERGENCY DEPARTMENT     Timothy Garcia DO  11/29/22 [id number]

## 2022-11-29 NOTE — ED NOTES
"The patient is resting calmly in his bed. He denies pain. He states that he does remember the fall. He occasionally says things that are not appropriate to the situation ( stated \"I like that one too\" while his bp cuff was being placed.). Cassie states that he has intermittent confusion. This is normal for him but he has never been diagnosed with a cognitive disorder.   "

## 2022-11-29 NOTE — ED NOTES
2:14 PM - Patient signed out to me by Dr. Garcia at routine shift change. 87 year old male with chronic Lozano, Eliquis use presenting with blood in Lozano. Recent fall with no pains; CTs negative. Plan is needs ED room for Lozano irrigation (in waiting room currently). Patient does not want to stay in the hospital. Please see Dr. Garcia's note for details.    7:42 PM - Lozano unable to be flushed with simple irrigation; fluid with go in but not come out. UA with no clear UTI; similar to prior UAs which have all had negative cultures---thus no antibiotics given at this time. CBI ordered and I consulted hospitalist for admission; they agreed. Patient & family understood and agreed with the plan; no further questions at the time of admission.    IMPRESSION:    ICD-10-CM    1. Hematuria, unspecified type  R31.9       2. Anticoagulated  Z79.01     Eliquis      3. Fall from ground level  W18.30XA       4. Chronic indwelling Lozano catheter  Z97.8               Jacoby Woody MD  11/29/22  Emergency Medicine  [facility] EMERGENCY DEPARTMENT  [facility] [facility] [address]  [phone]  Dept: [phone]     Jacoby Woody MD  11/29/22 1943

## 2022-11-30 ENCOUNTER — APPOINTMENT (OUTPATIENT)
Dept: PHYSICAL THERAPY | Facility: HOSPITAL | Age: 87
End: 2022-11-30
Attending: INTERNAL MEDICINE
Payer: MEDICARE

## 2022-11-30 LAB
ANION GAP SERPL CALCULATED.3IONS-SCNC: 10 MMOL/L (ref 7–15)
BUN SERPL-MCNC: 49.2 MG/DL (ref 8–23)
CALCIUM SERPL-MCNC: 10.5 MG/DL (ref 8.8–10.2)
CHLORIDE SERPL-SCNC: 103 MMOL/L (ref 98–107)
CREAT SERPL-MCNC: 1.85 MG/DL (ref 0.67–1.17)
DEPRECATED HCO3 PLAS-SCNC: 29 MMOL/L (ref 22–29)
ERYTHROCYTE [DISTWIDTH] IN BLOOD BY AUTOMATED COUNT: 12.9 % (ref 10–15)
GFR SERPL CREATININE-BSD FRML MDRD: 35 ML/MIN/1.73M2
GLUCOSE SERPL-MCNC: 85 MG/DL (ref 70–99)
HCT VFR BLD AUTO: 28 % (ref 40–53)
HGB BLD-MCNC: 9 G/DL (ref 13.3–17.7)
MCH RBC QN AUTO: 33.5 PG (ref 26.5–33)
MCHC RBC AUTO-ENTMCNC: 32.1 G/DL (ref 31.5–36.5)
MCV RBC AUTO: 104 FL (ref 78–100)
PLATELET # BLD AUTO: 246 10E3/UL (ref 150–450)
POTASSIUM SERPL-SCNC: 3.6 MMOL/L (ref 3.4–5.3)
RBC # BLD AUTO: 2.69 10E6/UL (ref 4.4–5.9)
SODIUM SERPL-SCNC: 142 MMOL/L (ref 136–145)
WBC # BLD AUTO: 5.8 10E3/UL (ref 4–11)

## 2022-11-30 PROCEDURE — 96366 THER/PROPH/DIAG IV INF ADDON: CPT

## 2022-11-30 PROCEDURE — 250N000013 HC RX MED GY IP 250 OP 250 PS 637: Performed by: INTERNAL MEDICINE

## 2022-11-30 PROCEDURE — 99226 PR SUBSEQUENT OBSERVATION CARE,LEVEL III: CPT | Performed by: INTERNAL MEDICINE

## 2022-11-30 PROCEDURE — 80048 BASIC METABOLIC PNL TOTAL CA: CPT | Performed by: INTERNAL MEDICINE

## 2022-11-30 PROCEDURE — G0378 HOSPITAL OBSERVATION PER HR: HCPCS

## 2022-11-30 PROCEDURE — 96361 HYDRATE IV INFUSION ADD-ON: CPT

## 2022-11-30 PROCEDURE — 258N000003 HC RX IP 258 OP 636: Performed by: INTERNAL MEDICINE

## 2022-11-30 PROCEDURE — 36415 COLL VENOUS BLD VENIPUNCTURE: CPT | Performed by: INTERNAL MEDICINE

## 2022-11-30 PROCEDURE — 85027 COMPLETE CBC AUTOMATED: CPT | Performed by: INTERNAL MEDICINE

## 2022-11-30 PROCEDURE — 250N000011 HC RX IP 250 OP 636: Performed by: INTERNAL MEDICINE

## 2022-11-30 PROCEDURE — 97162 PT EVAL MOD COMPLEX 30 MIN: CPT | Mod: GP

## 2022-11-30 PROCEDURE — 97112 NEUROMUSCULAR REEDUCATION: CPT | Mod: GP

## 2022-11-30 RX ORDER — HYDROXYZINE HYDROCHLORIDE 25 MG/1
25 TABLET, FILM COATED ORAL EVERY 6 HOURS PRN
Status: DISCONTINUED | OUTPATIENT
Start: 2022-11-30 | End: 2022-12-01 | Stop reason: HOSPADM

## 2022-11-30 RX ORDER — SODIUM CHLORIDE 9 MG/ML
INJECTION, SOLUTION INTRAVENOUS CONTINUOUS
Status: ACTIVE | OUTPATIENT
Start: 2022-11-30 | End: 2022-12-01

## 2022-11-30 RX ADMIN — SODIUM CHLORIDE: 9 INJECTION, SOLUTION INTRAVENOUS at 16:30

## 2022-11-30 RX ADMIN — LEVOTHYROXINE SODIUM 50 MCG: 0.03 TABLET ORAL at 07:34

## 2022-11-30 RX ADMIN — HYDROXYZINE HYDROCHLORIDE 20 MG: 10 TABLET ORAL at 21:58

## 2022-11-30 RX ADMIN — SODIUM CHLORIDE: 9 INJECTION, SOLUTION INTRAVENOUS at 21:58

## 2022-11-30 RX ADMIN — CEFTRIAXONE SODIUM 1 G: 1 INJECTION, POWDER, FOR SOLUTION INTRAMUSCULAR; INTRAVENOUS at 21:58

## 2022-11-30 RX ADMIN — SIMVASTATIN 10 MG: 10 TABLET, FILM COATED ORAL at 21:58

## 2022-11-30 ASSESSMENT — ACTIVITIES OF DAILY LIVING (ADL)
ADLS_ACUITY_SCORE: 43
ADLS_ACUITY_SCORE: 37
ADLS_ACUITY_SCORE: 43
ADLS_ACUITY_SCORE: 37
DEPENDENT_IADLS:: CLEANING;COOKING;LAUNDRY;SHOPPING;MEAL PREPARATION;MEDICATION MANAGEMENT;MONEY MANAGEMENT;TRANSPORTATION
ADLS_ACUITY_SCORE: 37
ADLS_ACUITY_SCORE: 35
ADLS_ACUITY_SCORE: 35
ADLS_ACUITY_SCORE: 37
ADLS_ACUITY_SCORE: 35

## 2022-11-30 NOTE — PROGRESS NOTES
Pt pleasant and cooperative. Chair alarm placed under pt on bed. Room door open. Trying to encourage PO intake, pt not wanting to eat/drink today. IV fluids started.     YOEL JOHANSEN RN

## 2022-11-30 NOTE — PROGRESS NOTES
Patient 1:1 for safety.  Lozano draining pale yellow urine.  Some sediment noted.  Slept through most of night

## 2022-11-30 NOTE — H&P
[facility]    History and Physical  Hospitalist       Date of Admission:  11/29/2022    Assessment & Plan   87 year old male with past medical hx of chronic jorge, who presents with hematuria, weakness and falls.     Summary:    Principal Problem:    Hematuria    -- initially required bladder irrigation, but now cleared, will irrigate PRN   -- UA with >182 WBC, will check UC and start Ceftriaxone 1 gm daily   -- Urology consul      Chronic indwelling Jorge catheter -- Urethral Stricture      Possible Urinary tract infection      Weakness with recurrent falls   -- PT eval, may need TCU      Possible Slight T5 compression fracture   -- pain meds prn    Active Problems:    Macrocytic anemia   -- normal B12 and folate on 10/23/22   -- will check IRON studies      LEONIDAS (acute kidney injury)    -- BMP in AM       [place] (hard of hearing)       Plan:as above    DVT Prophylaxis: Pneumatic Compression Devices  Code Status: DNR / DNI    Disposition: Expected discharge in 2 days, may need TCU if unable to ambulate safely    Prem Coburn MD  Pager: [phone]  Cell Phone:  [phone]     Primary Care Physician   [facility]    Chief Complaint   Hematuria, weakness, falls    History is obtained from Patient    History of Present Illness   87 year old male with a pertinent history of syncope, Grave's diseases, and hydrocele,urethral stricture with chronic jorge --  who presents to this ED for evaluation of generalized weakness and hematuria.  The patient reports that he has had two falls starting last night and his granddaughter had called 911. He endorses feeling generally weak, and he has also been having increasing hematuria present in his jorge catheter.  Does have some low back pain.  Patient is currently on Eliquis. No other complaints at this time.     In ER, /75, WBC 7.9, Hgb 10.5 with , Creat 1.83, trop 60, INR 1.85 (but on Eliquis), UA with >182 WBC's,  and grossly bloody, spine CT with slight T5 compression and and chronic T12 compression.     PAST MEDICAL HISTORY    Past Medical History:   Diagnosis Date     Acute heart failure with preserved ejection fraction (HFpEF) (H) 11/10/2022     Acute prostatitis      Asymptomatic bacteriuria 10/23/2013    Noted at 10/2/13 office visit at [facility]. No treatment recommended at that time.      Congestive heart failure (H)      Essential hypertension 08/18/2015     Hypertension      Paroxysmal atrial fib -- on Eliquis 11/06/2022     Syncope      Thyroid disease         PAST SURGICAL HISTORY    Past Surgical History:   Procedure Laterality Date     BLADDER SURGERY      Bladder absces removal     Blepharoplasty Upper Lid W/ Excessive Skin[  1/29/2007     CATARACT EXTRACTION       EYE SURGERY      both eyes 2015     IR LUMBAR EPIDURAL STEROID INJECTION  4/27/2004     IR LUMBAR EPIDURAL STEROID INJECTION  5/11/2004     IR LUMBAR EPIDURAL STEROID INJECTION  11/24/2004     IR LUMBAR EPIDURAL STEROID INJECTION  11/28/2007     MI CYSTOURETHROSCOPY W/IRRIG & EVAC CLOTS N/A 7/27/2018    Procedure: CYSTOSCOPY, CLOT EVACUATION ,URETHRAL DILATATION, DAUGHERTY CATHETER PLACEMENT;  Surgeon: Lowell Arias MD;  Location: [facility];  Service: Urology        Prior to Admission Medications   Prior to Admission Medications   Prescriptions Last Dose Informant Patient Reported? Taking?   Calcium Carbonate-Vitamin D (OSCAL 500/200 D-3 PO) 11/29/2022 at am  Yes Yes   Sig: Take 1 tablet by mouth daily.   GLUCOSAMINE-CHONDROITIN-VIT D3 PO 11/29/2022 at am  Yes Yes   Sig: Take 1 tablet by mouth daily   apixaban ANTICOAGULANT (ELIQUIS) 5 MG tablet 11/29/2022 at am  No Yes   Sig: Take 1 tablet (5 mg) by mouth 2 times daily   atenolol (TENORMIN) 25 MG tablet 11/29/2022 at am  Yes Yes   Sig: Take 25 mg by mouth daily   hydrOXYzine (ATARAX) 10 MG tablet 11/28/2022 at pm  Yes Yes   Sig: Take 20 mg by mouth At Bedtime   levothyroxine  (SYNTHROID/LEVOTHROID) 50 MCG tablet 11/29/2022 at am  No Yes   Sig: Take 1 tablet (50 mcg) by mouth every morning (before breakfast)   order for DME   No No   Sig: Equipment being ordered: Home automatic blood pressure cuff. Check once daily for one week, and then 3 times weekly thereafter.   potassium chloride ER (KLOR-CON M) 20 MEQ CR tablet 11/29/2022 at am  No Yes   Sig: Take 1 tablet (20 mEq) by mouth daily   simvastatin (ZOCOR) 10 MG tablet 11/28/2022 at pm  No Yes   Sig: Take 1 tablet (10 mg) by mouth At Bedtime      Facility-Administered Medications: None     Allergies   No Known Allergies    SOCIAL HISTORY    Social History     Social History Narrative    , 4 children, lives with his wife, and is a retired .  Desires DNR/DNI.  (last updated 11/29/2022)       Social History     Tobacco Use     Smoking status: Former     Types: Cigarettes     Smokeless tobacco: Never     Tobacco comments:     Pt quit 40 years ago   Substance Use Topics     Alcohol use: Yes     Comment: < 1 drink a week     Drug use: No        FAMILY HISTORY    Family History   Problem Relation Age of Onset     Diabetes No family hx of      Hypertension No family hx of      Other Cancer No family hx of      Coronary Artery Disease No family hx of      Hyperlipidemia No family hx of      Cerebrovascular Disease No family hx of      Breast Cancer No family hx of      Colon Cancer No family hx of      Prostate Cancer No family hx of      Depression No family hx of      Anxiety Disorder No family hx of      Mental Illness No family hx of      Substance Abuse No family hx of      Anesthesia Reaction No family hx of      Asthma No family hx of      Osteoporosis No family hx of      Genetic Disorder No family hx of      Thyroid Disease No family hx of      Obesity No family hx of      No Known Problems Mother      No Known Problems Father         Review of Systems   Review of systems not obtained due to patient factors - very  "[facility]      PHYSICAL EXAM     Temp: 97.8  F (36.6  C) Temp src: Oral BP: (!) 168/82 Pulse: 64   Resp: 20 SpO2: 95 % O2 Device: None (Room air)    Vital Signs with Ranges  Temp:  [97.5  F (36.4  C)-98  F (36.7  C)] 97.8  F (36.6  C)  Pulse:  [64-74] 64  Resp:  [16-20] 20  BP: (131-181)/(70-88) 168/82  SpO2:  [93 %-97 %] 95 %  150 lbs 0 oz    Constitutional: Awake, alert, cooperative, no apparent distress.  Eyes: Conjunctiva and pupils examined and normal.  HEENT: Moist mucous membranes, normal dentition.  Respiratory: Clear to auscultation bilaterally, no crackles or wheezing.  Cardiovascular: Regular rate and rhythm, normal S1 and S2, and no murmur noted, no carotid bruits.  Trace bilateral ankle edema.   GI: Soft, non-distended, non-tender, normal bowel sounds.  Lymph/Hematologic: No anterior cervical, supraclavicular or axillary adenopathy.  Skin: No rashes, no cyanosis.   Musculoskeletal: No joint swelling, erythema or tenderness.  Neurologic: Alert, Ox1.5 (date \"20th\" -- but does not say month or year, and thought I was trying to \"trick him\" when asked again) , Cranial nerves 2-12 intact, no focal weakness or numbness but generalized weakness  Psychiatric:  No obvious anxiety or depression.    Data   Data reviewed today:  I personally reviewed no images or EKG's today.  Recent Labs   Lab 11/29/22  1224 11/29/22  1159 11/23/22  1450   WBC  --  7.8  --    HGB  --  10.5*  --    MCV  --  105*  --    PLT  --  280  --    INR 1.85*  --   --    NA  --  141 142   POTASSIUM  --  4.1 4.2   CHLORIDE  --  100 100   CO2  --  29 32*   BUN  --  42.3* 26.5*   CR  --  1.83* 1.98*   ANIONGAP  --  12 10   KRISTEN  --  12.0* 11.1*   GLC  --  94 98   ALBUMIN  --  4.0  --    PROTTOTAL  --  7.1  --    BILITOTAL  --  1.0  --    ALKPHOS  --  68  --    ALT  --  11  --    AST  --  24  --        Imaging:  Recent Results (from the past 24 hour(s))   Head CT w/o contrast    Narrative    EXAM: CT HEAD W/O CONTRAST, CT CERVICAL SPINE W/O " CONTRAST  LOCATION: [facility]  DATE/TIME: 11/29/2022 11:28 AM    INDICATION: fall  COMPARISON: Multiple prior exams, including the most recent head CT 11/07/2022.  TECHNIQUE: Routine CT Head without IV contrast. Multiplanar reformats. Dose reduction techniques were used.    FINDINGS:    There is no evidence of acute intracranial hemorrhage, acute large territorial infarction, or mass lesion. Mild scattered nonspecific hypodensity in the cerebral white matter appears stable from the prior study.    The ventricles, sulci, and cisterns are within normal limits for age. There is no evidence of hydrocephalus.    Note is made of bilateral lens implants. Otherwise, the globes and orbits are within normal limits. There is mild mucosal thickening in the left maxillary sinus. There are mucous retention cysts in the left maxillary sinus and right ethmoid air cells.   There is trace opacification of the left sphenoid sinus. The mastoid air cells are clear bilaterally. Again seen is a left frontal scalp lipoma. There is no evidence of calvarial or maxillofacial fracture. There is mild left parietal scalp soft tissue   swelling.      Impression    IMPRESSION:    1.  No acute intracranial abnormality.  2.  Stable mild chronic small vessel ischemic change.   Cervical spine CT w/o contrast    Narrative    EXAM: CT CERVICAL SPINE W/O CONTRAST  LOCATION: [facility]  DATE/TIME: 11/29/2022 11:29 AM    INDICATION: fall  COMPARISON: Cervical spine CT October 21, 2022.  TECHNIQUE: Routine CT Cervical Spine without IV contrast. Multiplanar reformats. Dose reduction techniques were used.    FINDINGS:  No evidence of acute fracture or subluxation of the cervical spine by CT imaging. Anterolisthesis of C3 on C4 measuring 2 mm and C7 on T1 measuring 2 mm. Chronic superior endplate T1 and T2 compression fractures. Anterior marginal osteophytes at   C3/C4-C6/C7. The vertebral bodies of the  cervical spine otherwise have normal stature and alignment. Multilevel degenerative disc disease of the cervical spine with disc height loss, most pronounced at C4/C5-C6/C7. The partially imaged intracranial   contents are unremarkable. No prevertebral soft tissue swelling. The partially imaged lung apices are unremarkable.     Craniovertebral junction and C1-C2: The odontoid process is well approximated with the anterior body of C1 and well aligned between the lateral masses of C1.    C2-C3: No posterior disc bulge or spinal canal narrowing. No neural foraminal narrowing.     C3-C4: No posterior disc bulge or spinal canal narrowing. Uncovertebral joint disease and facet arthropathy with severe bilateral neural foraminal narrowing.     C4-C5: Broad bar disc osteophyte complex with mild spinal canal narrowing. Uncovertebral joint disease and facet arthropathy with severe bilateral neural foraminal narrowing.     C5-C6: No posterior disc bulge or spinal canal narrowing. Uncovertebral joint disease and facet arthropathy with severe right and mild left neural foraminal narrowing.     C6-C7: No posterior disc bulge or spinal canal narrowing. Uncovertebral joint disease and facet arthropathy with moderate bilateral neural foraminal narrowing.     C7-T1: No posterior disc bulge or spinal canal narrowing. Uncovertebral joint disease and facet arthropathy with mild bilateral neural foraminal narrowing.       Impression    IMPRESSION:  1.  No evidence of acute fracture or subluxation of the cervical spine by CT imaging.  2.  Degenerative cervical spondylosis as described above.   CT Thoracic Spine w/o Contrast    Narrative    EXAM: CT THORACIC SPINE W/O CONTRAST, CT LUMBAR SPINE W/O CONTRAST  LOCATION: [facility]  DATE/TIME: 11/29/2022 11:30 AM    INDICATION: fall, pain  COMPARISON: Chest CT October 23, 2022. November 30, 2019.  TECHNIQUE:   1.  Routine CT Thoracic Spine without IV contrast.  Multiplanar reformats. Dose reduction techniques were used.   2.  Routine CT Lumbar Spine without IV contrast. Multiplanar reformats. Dose reduction techniques were used.     FINDINGS:  Thoracic spine CT:  Subtle chronic superior endplate T1 and T2 compression fractures, unchanged. Subtle superior endplate T5 compression fracture demonstrated slightly more height loss on prior CT thoracic spine July 15, 2019. MR could be used to evaluate for acuity.   Chronic T12 superior plate compression fracture, unchanged. No other evidence of acute fracture or subluxation of the thoracic spine by CT imaging. The vertebral bodies of the thoracic spine otherwise have normal stature and alignment. Decreased bone   mineralization. Multilevel degenerative disease of the thoracic spine with disc height loss, most pronounced at T3/T4-T10/T11.    The partially imaged lungs are unremarkable.    No significant posterior disc bulge, spinal canal, or neural foraminal narrowing at any level within the thoracic spine.    Lumbar spine CT:  Chronic superior endplate L1 compression fracture, unchanged. No evidence of acute fracture or subluxation of the lumbar spine by CT imaging. Anterolisthesis of L5 on S1 measuring 4 mm. Anterior marginal osteophytes at T12/L1-L5/S1. Multilevel   degenerative disc disease of the lumbar spine with disc height loss, most pronounced at L2/L3-L5/S1. The partially imaged intra-abdominal contents are unremarkable.    T12/L1:  Symmetric disc bulge without spinal canal narrowing. Mild bilateral neural foraminal narrowing.    L1/L2:  Symmetric disc bulge and mild facet arthropathy without spinal canal narrowing. Severe bilateral neural foraminal narrowing.    L2/L3:  Symmetric disc bulge and moderate facet arthropathy with moderate spinal canal narrowing. Severe bilateral neural foraminal narrowing.    L3/L4:  Symmetric disc bulge, facet arthropathy and ligamentum flavum thickening with severe spinal canal narrowing.  Severe bilateral neural foraminal narrowing.      L4/L5:  Symmetric disc bulge and moderate facet arthropathy with moderate spinal canal narrowing. Severe bilateral neural foraminal narrowing.     L5/S1: Symmetric disc bulge and moderate facet arthropathy without significant spinal canal narrowing. Severe bilateral neural foraminal narrowing.       Impression    IMPRESSION:  Thoracic spine CT:  1.  Subtle superior endplate T5 compression fracture demonstrated slightly more height loss on prior CT thoracic spine July 15, 2019. MR could be used to evaluate for acuity.   2.  Subtle chronic superior endplate T1 and T2 compression fractures, unchanged.   3.  Chronic T12 superior plate compression fracture, unchanged.  4.  No other evidence of acute fracture or subluxation of the thoracic spine by CT imaging.    Lumbar spine CT:  1.  No evidence of acute fracture or subluxation of the lumbar spine by CT imaging.  2.  Chronic superior endplate L1 compression fracture, unchanged.  3.  Degenerative lumbar spondylosis with level by level analysis as described above.     Lumbar spine CT w/o contrast    Narrative    EXAM: CT THORACIC SPINE W/O CONTRAST, CT LUMBAR SPINE W/O CONTRAST  LOCATION: [facility]  DATE/TIME: 11/29/2022 11:30 AM    INDICATION: fall, pain  COMPARISON: Chest CT October 23, 2022. November 30, 2019.  TECHNIQUE:   1.  Routine CT Thoracic Spine without IV contrast. Multiplanar reformats. Dose reduction techniques were used.   2.  Routine CT Lumbar Spine without IV contrast. Multiplanar reformats. Dose reduction techniques were used.     FINDINGS:  Thoracic spine CT:  Subtle chronic superior endplate T1 and T2 compression fractures, unchanged. Subtle superior endplate T5 compression fracture demonstrated slightly more height loss on prior CT thoracic spine July 15, 2019. MR could be used to evaluate for acuity.   Chronic T12 superior plate compression fracture, unchanged. No other  evidence of acute fracture or subluxation of the thoracic spine by CT imaging. The vertebral bodies of the thoracic spine otherwise have normal stature and alignment. Decreased bone   mineralization. Multilevel degenerative disease of the thoracic spine with disc height loss, most pronounced at T3/T4-T10/T11.    The partially imaged lungs are unremarkable.    No significant posterior disc bulge, spinal canal, or neural foraminal narrowing at any level within the thoracic spine.    Lumbar spine CT:  Chronic superior endplate L1 compression fracture, unchanged. No evidence of acute fracture or subluxation of the lumbar spine by CT imaging. Anterolisthesis of L5 on S1 measuring 4 mm. Anterior marginal osteophytes at T12/L1-L5/S1. Multilevel   degenerative disc disease of the lumbar spine with disc height loss, most pronounced at L2/L3-L5/S1. The partially imaged intra-abdominal contents are unremarkable.    T12/L1:  Symmetric disc bulge without spinal canal narrowing. Mild bilateral neural foraminal narrowing.    L1/L2:  Symmetric disc bulge and mild facet arthropathy without spinal canal narrowing. Severe bilateral neural foraminal narrowing.    L2/L3:  Symmetric disc bulge and moderate facet arthropathy with moderate spinal canal narrowing. Severe bilateral neural foraminal narrowing.    L3/L4:  Symmetric disc bulge, facet arthropathy and ligamentum flavum thickening with severe spinal canal narrowing. Severe bilateral neural foraminal narrowing.      L4/L5:  Symmetric disc bulge and moderate facet arthropathy with moderate spinal canal narrowing. Severe bilateral neural foraminal narrowing.     L5/S1: Symmetric disc bulge and moderate facet arthropathy without significant spinal canal narrowing. Severe bilateral neural foraminal narrowing.       Impression    IMPRESSION:  Thoracic spine CT:  1.  Subtle superior endplate T5 compression fracture demonstrated slightly more height loss on prior CT thoracic spine July  15, 2019. MR could be used to evaluate for acuity.   2.  Subtle chronic superior endplate T1 and T2 compression fractures, unchanged.   3.  Chronic T12 superior plate compression fracture, unchanged.  4.  No other evidence of acute fracture or subluxation of the thoracic spine by CT imaging.    Lumbar spine CT:  1.  No evidence of acute fracture or subluxation of the lumbar spine by CT imaging.  2.  Chronic superior endplate L1 compression fracture, unchanged.  3.  Degenerative lumbar spondylosis with level by level analysis as described above.

## 2022-11-30 NOTE — PLAN OF CARE
2000-2300    Problem: Fall Injury Risk  Goal: Absence of Fall and Fall-Related Injury  Outcome: Not Progressing     Problem: UTI (Urinary Tract Infection)  Goal: Improved Infection Symptoms  Outcome: Progressing   Goal Outcome Evaluation:      Plan of Care Reviewed With: patient        Pt alert to self and place. However his orientation fluctuates. VS taken and documented. BP slightly up but pt was moving around while BP was taken. Pt started getting restless and tried getting out of bed multiple times. Redirected and reoriented but was unsuccessful. He was found standing up at the edge of the bed. Pt was moving around lot and he was trying to pull jorge. Discussed with charge nurse and staff placed for 1:1 for his safety. Bladder scan done at 2225 and got 27 ml. Urine was little more pink since he was tugging jorge. Writer did mannual irrigation. There was no clots. Urine little more clear now. Gave prn melatonin and scheduled atarax to help with behavior and sleep. Pt received IV abx for possible UTI.

## 2022-11-30 NOTE — PROGRESS NOTES
Physical Therapy        11/30/22 1024   Appointment Info   Signing Clinician's Name / Credentials (PT) Josephine Turcios DPT   Rehab Comments (PT) granddaughter  reports pt hasn't been sleeping, getting weaker, fell. Has been 24 hr supervision since getting home from previous hospital stay 2 weeks ago   Quick Adds   Quick Adds Certification   Living Environment   People in Home spouse   Current Living Arrangements house   Home Accessibility stairs to enter home;stairs within home   Number of Stairs, Main Entrance 4   Stair Railings, Main Entrance railing on right side (ascending)   Number of Stairs, Within Home, Primary none   Transportation Anticipated family or friend will provide   Living Environment Comments now getting assistance with ADLs, toileting independently   Self-Care   Usual Activity Tolerance moderate   Current Activity Tolerance poor   Activity/Exercise/Self-Care Comment has been using 4WW since previous hospitalization   General Information   Onset of Illness/Injury or Date of Surgery 11/29/22   Referring Physician Prem Zaragoza MD   Patient/Family Therapy Goals Statement (PT) None stated   Pertinent History of Current Problem (include personal factors and/or comorbidities that impact the POC) hematuria   Existing Precautions/Restrictions fall   Cognition   Affect/Mental Status (Cognition) low arousal/lethargic   Orientation Status (Cognition) disoriented to;place;situation;oriented to;person;time   Follows Commands (Cognition) follows one-step commands   Pain Assessment   Patient Currently in Pain No   Integumentary/Edema   Integumentary/Edema no deficits were identifed   Posture    Posture Forward head position   Range of Motion (ROM)   Range of Motion ROM is WFL   Strength (Manual Muscle Testing)   Strength (Manual Muscle Testing) Deficits observed during functional mobility   Bed Mobility   Comment, (Bed Mobility) modAx1 supine to sit. maxAx2 sit to supine   Transfers   Comment,  (Transfers) Nirmal sit<>stand with FWW   Gait/Stairs (Locomotion)   Comment, (Gait/Stairs) 2 sidesteps right with FWW, modA; limited by lightheadedness   Balance   Balance Comments impaired sitting and standing balance   Sensory Examination   Sensory Perception patient reports no sensory changes   Coordination   Coordination no deficits were identified   Muscle Tone   Muscle Tone Comments myoclonic jerks while sitting EOB   Clinical Impression   Criteria for Skilled Therapeutic Intervention Yes, treatment indicated   PT Diagnosis (PT) transfer deficits   Influenced by the following impairments poor balance   Functional limitations due to impairments fall risk   Clinical Presentation (PT Evaluation Complexity) Evolving/Changing   Clinical Presentation Rationale admitted for hematuria, currently limited by lightheadedness and lethargy   Clinical Decision Making (Complexity) moderate complexity   Planned Therapy Interventions (PT) balance training;bed mobility training;gait training;home exercise program;patient/family education;stair training;strengthening;transfer training   Anticipated Equipment Needs at Discharge (PT) walker, rolling;cane, straight   Risk & Benefits of therapy have been explained evaluation/treatment results reviewed;care plan/treatment goals reviewed;participants voiced agreement with care plan;participants included;patient   PT Total Evaluation Time   PT Eval, Moderate Complexity Minutes (94700) 12   Therapy Certification   Start of care date 11/30/22   Certification date from 11/30/22   Certification date to 12/07/22   Medical Diagnosis hematuria   Physical Therapy Goals   PT Frequency Daily   PT Predicted Duration/Target Date for Goal Attainment 12/07/22   PT: Bed Mobility Supervision/stand-by assist;Supine to/from sit   PT: Transfers Supervision/stand-by assist;Sit to/from stand   PT: Gait Supervision/stand-by assist;25 feet;Rolling walker   Interventions   Interventions Quick Adds Neuromuscular  Re-ed   Neuromuscular Re-education   Neuromuscular Re-Education Minutes (80412) 10   Symptoms Noted During/After Treatment dizziness;fatigue   Treatment Detail/Skilled Intervention 1 min static standing, posterior lean despite cues to shift weight forward. pt unable to determine which way he would fall if PT let go. 9 min static sitting at EOB, persistent posterior lean and LOB to left. Repeated cues to lean toward right knee, required modA to achieve stable sitting and Nirmal to maintain   PT Discharge Planning   PT Plan sitting/standing balance, transfers, work toward ambulation   PT Discharge Recommendation (DC Rec) Transitional Care Facility;home with assist;home with home care physical therapy   PT Rationale for DC Rec Assist 1 for all bed mobility, transfers, toileting, ambulation using 4ww. Family supportive and able to assist with all cares. Rec. use of transfer belt and Home PT as pt. is not at his baseline for mobility and strength.   Total Session Time   Timed Code Treatment Minutes 10   Total Session Time (sum of timed and untimed services) 22       Pt was educated on the role of physical therapy in their care, and indicated understanding.                                                                                  [facility]      OUTPATIENT PHYSICAL THERAPY EVALUATION  PLAN OF TREATMENT FOR OUTPATIENT REHABILITATION  (COMPLETE FOR INITIAL CLAIMS ONLY)  Patient's Last Name, First Name, M.I.  YOB: 1935  Dilip Long                        Provider's Name  [facility] Medical Record No.  [id number]                               Onset Date:  11/29/22   Start of Care Date:  11/30/22      Type:     _X_PT   ___OT   ___SLP Medical Diagnosis:  hematuria                        PT Diagnosis:  transfer deficits   Visits from SOC:  1   _________________________________________________________________________________  Plan of  Treatment/Functional Goals    Planned Interventions: balance training, bed mobility training, gait training, home exercise program, patient/family education, stair training, strengthening, transfer training     Goals: See Physical Therapy Goals on Care Plan in [facility] electronic health record.    Therapy Frequency: Daily  Predicted Duration of Therapy Intervention: 12/07/22  _________________________________________________________________________________    I CERTIFY THE NEED FOR THESE SERVICES FURNISHED UNDER        THIS PLAN OF TREATMENT AND WHILE UNDER MY CARE     (Physician co-signature of this document indicates review and certification of the therapy plan).              Certification date from: 11/30/22, Certification date to: 12/07/22    Referring Physician: Prem Zaragoza MD            Initial Assessment        See Physical Therapy evaluation dated 11/30/22 in Epic electronic health record.    Josephine Turcios DPT 11/30/2022

## 2022-11-30 NOTE — ED NOTES
Bladder irrigation completed on the patient. Sterile solution went in easily but was inconsistent in drawing back. No clots noted when it did draw back. His jorge bag has been drained to monitor output.

## 2022-11-30 NOTE — PROGRESS NOTES
"Pt urine yellow and concentrated a small amount of sediment noted. No signs of hematuria. Pt alert and oriented, now remembering that he is at [facility]. When writer asked pt how he felt pt stated, \"dying.\" Pt then became tearful and stating he needs to go home so he can see his son and wife. Reassuring and offering comfort to patient.     YOEL JOHANSEN RN  "

## 2022-11-30 NOTE — CONSULTS
Care Management Initial Consult    General Information  Assessment completed with: Family, granddaughter-Shruthi  Type of CM/SW Visit: Initial Assessment    Primary Care Provider verified and updated as needed:     Readmission within the last 30 days: current reason for admission unrelated to previous admission   Return Category: New Diagnosis     Advance Care Planning: Advance Care Planning Reviewed:  (working on completing)          Communication Assessment  Patient's communication style: spoken language (English or Bilingual)             Cognitive  Cognitive/Neuro/Behavioral: orientation  Level of Consciousness: confused  Arousal Level: opens eyes spontaneously  Orientation: disoriented to, place, time, situation  Mood/Behavior: restless (tried getting out of bed multiple times)  Best Language: 0 - No aphasia  Speech: clear    Living Environment:   People in home: spouse     Current living Arrangements: house      Able to return to prior arrangements: yes       Family/Social Support:  Care provided by: spouse/significant other, child(tami), other (see comments)  Provides care for: no one, unable/limited ability to care for self  Marital Status:   Wife, Children, Sibling(s)          Description of Support System: Supportive, Involved         Current Resources:   Patient receiving home care services: Yes  Skilled Home Care Services: Skilled Nursing, Physicial Therapy, Occupational Therapy  Community Resources: None  Equipment currently used at home: cane, straight, walker, rolling  Supplies currently used at home:  (jorge catheter supplies)    Employment/Financial:  Employment Status:          Financial Concerns:             Lifestyle & Psychosocial Needs:  Social Determinants of Health     Tobacco Use: Medium Risk     Smoking Tobacco Use: Former     Smokeless Tobacco Use: Never     Passive Exposure: Not on file   Alcohol Use: Not on file   Financial Resource Strain: Not on file   Food Insecurity: Not on  file   Transportation Needs: Not on file   Physical Activity: Not on file   Stress: Not on file   Social Connections: Not on file   Intimate Partner Violence: Not on file   Depression: Not on file   Housing Stability: Not on file       Functional Status:  Prior to admission patient needed assistance:   Dependent ADLs:: Ambulation-walker, Bathing, Dressing, Toileting  Dependent IADLs:: Cleaning, Cooking, Laundry, Shopping, Meal Preparation, Medication Management, Money Management, Transportation         Additional Information:    Assessment completed with patient's granddaughter Shruthi as patient is A&Ox1. Patient lives in his house with spouse. He has 24 hour care from family. He was recently in hospital and discharged with jorge catheter and [facility] RN/PT/OT. Patient has been independent with most ADLs and requires assistance with IADLs. He ambulates with a walker. Shruthi states goal would be for patient to return home at discharge.  Family can transport.    Planning return home with support from family and resumption of home care RN/PT/OT with [facility].  Shannon from [facility] notified.     Debbie Gaitan RN

## 2022-11-30 NOTE — PROGRESS NOTES
PRIMARY DIAGNOSIS: fall  OUTPATIENT/OBSERVATION GOALS TO BE MET BEFORE DISCHARGE:  1. ADLs back to baseline: No    2. Activity and level of assistance: Up with standby assistance.    3. Pain status: Pain free.    4. Return to near baseline physical activity: No     Discharge Planner Nurse   Safe discharge environment identified: No  Barriers to discharge: Yes       Entered by: Raymond Yadav RN 11/29/2022 11:05 PM     Please review provider order for any additional goals.   Nurse to notify provider when observation goals have been met and patient is ready for discharge.

## 2022-11-30 NOTE — PROGRESS NOTES
PRIMARY DIAGNOSIS: GENERALIZED WEAKNESS    OUTPATIENT/OBSERVATION GOALS TO BE MET BEFORE DISCHARGE  1. Orthostatic performed: No    2. Tolerating PO medications: Yes    3. Return to near baseline physical activity: No    4. Cleared for discharge by consultants (if involved): No    Discharge Planner Nurse   Safe discharge environment identified: No  Barriers to discharge: Yes       Entered by: Rashaun Swanson RN 11/30/2022 6:56 AM     Please review provider order for any additional goals.   Nurse to notify provider when observation goals have been met and patient is ready for discharge.

## 2022-11-30 NOTE — ED NOTES
Pt only take couple bites of mashed potatoes. Pt likes Coffee.  Encourage pt to eat more but no success. Pt is lying in bed resting.

## 2022-11-30 NOTE — PROGRESS NOTES
PRIMARY DIAGNOSIS: GENERALIZED WEAKNESS    OUTPATIENT/OBSERVATION GOALS TO BE MET BEFORE DISCHARGE  1. Orthostatic performed: No    2. Tolerating PO medications: Yes    3. Return to near baseline physical activity: No    4. Cleared for discharge by consultants (if involved): No    Discharge Planner Nurse   Safe discharge environment identified: No  Barriers to discharge: Yes       Entered by: Rashaun Swanson RN 11/30/2022 6:57 AM

## 2022-11-30 NOTE — PROGRESS NOTES
Writer received this pt at 2000 from ED nurse. Hospitalist Dr. Zaragoza came at bedside and assessed pt. Pt's urine clear light pink color. MD told writer to hold on CBI at this time since urine is coming more clear. He told writer to do mannual irrigation if needed.

## 2022-12-01 VITALS
OXYGEN SATURATION: 97 % | SYSTOLIC BLOOD PRESSURE: 140 MMHG | TEMPERATURE: 98.1 F | BODY MASS INDEX: 22.22 KG/M2 | RESPIRATION RATE: 16 BRPM | WEIGHT: 150 LBS | HEIGHT: 69 IN | HEART RATE: 64 BPM | DIASTOLIC BLOOD PRESSURE: 65 MMHG

## 2022-12-01 LAB
ANION GAP SERPL CALCULATED.3IONS-SCNC: 8 MMOL/L (ref 7–15)
BACTERIA UR CULT: NO GROWTH
BUN SERPL-MCNC: 46.1 MG/DL (ref 8–23)
CALCIUM SERPL-MCNC: 9.2 MG/DL (ref 8.8–10.2)
CHLORIDE SERPL-SCNC: 106 MMOL/L (ref 98–107)
CREAT SERPL-MCNC: 1.67 MG/DL (ref 0.67–1.17)
DEPRECATED HCO3 PLAS-SCNC: 28 MMOL/L (ref 22–29)
GFR SERPL CREATININE-BSD FRML MDRD: 40 ML/MIN/1.73M2
GLUCOSE SERPL-MCNC: 92 MG/DL (ref 70–99)
POTASSIUM SERPL-SCNC: 3.2 MMOL/L (ref 3.4–5.3)
SODIUM SERPL-SCNC: 142 MMOL/L (ref 136–145)

## 2022-12-01 PROCEDURE — 250N000013 HC RX MED GY IP 250 OP 250 PS 637: Performed by: INTERNAL MEDICINE

## 2022-12-01 PROCEDURE — 96366 THER/PROPH/DIAG IV INF ADDON: CPT

## 2022-12-01 PROCEDURE — G0378 HOSPITAL OBSERVATION PER HR: HCPCS

## 2022-12-01 PROCEDURE — 80048 BASIC METABOLIC PNL TOTAL CA: CPT | Performed by: INTERNAL MEDICINE

## 2022-12-01 PROCEDURE — 36415 COLL VENOUS BLD VENIPUNCTURE: CPT | Performed by: INTERNAL MEDICINE

## 2022-12-01 PROCEDURE — 96361 HYDRATE IV INFUSION ADD-ON: CPT

## 2022-12-01 PROCEDURE — 99217 PR OBSERVATION CARE DISCHARGE: CPT | Performed by: INTERNAL MEDICINE

## 2022-12-01 RX ORDER — POTASSIUM CHLORIDE 1500 MG/1
20 TABLET, EXTENDED RELEASE ORAL DAILY
Status: DISCONTINUED | OUTPATIENT
Start: 2022-12-01 | End: 2022-12-01 | Stop reason: HOSPADM

## 2022-12-01 RX ADMIN — ATENOLOL 25 MG: 25 TABLET ORAL at 09:39

## 2022-12-01 RX ADMIN — LEVOTHYROXINE SODIUM 50 MCG: 0.03 TABLET ORAL at 06:58

## 2022-12-01 RX ADMIN — POTASSIUM CHLORIDE 20 MEQ: 1500 TABLET, EXTENDED RELEASE ORAL at 09:44

## 2022-12-01 ASSESSMENT — ACTIVITIES OF DAILY LIVING (ADL)
ADLS_ACUITY_SCORE: 43

## 2022-12-01 NOTE — PLAN OF CARE
"Goal Outcome Evaluation:                      PRIMARY DIAGNOSIS: \"GENERIC\" NURSING  OUTPATIENT/OBSERVATION GOALS TO BE MET BEFORE DISCHARGE:  ADLs back to baseline: Yes    Activity and level of assistance: Up with standby assistance.    Pain status: Pain free.    Return to near baseline physical activity: Yes     Discharge Planner Nurse   Safe discharge environment identified: Yes  Barriers to discharge: Yes       Entered by: Nicole Aparicio RN 12/01/2022 3:50 AM   Tolerating IVFs overnight. Good UOP. Denied pain. Granddaughter at bedside.   Please review provider order for any additional goals.   Nurse to notify provider when observation goals have been met and patient is ready for discharge.  "

## 2022-12-01 NOTE — PLAN OF CARE
"PRIMARY DIAGNOSIS: \"GENERIC\" NURSING  OUTPATIENT/OBSERVATION GOALS TO BE MET BEFORE DISCHARGE:  ADLs back to baseline: Yes    Activity and level of assistance: Up with standby assistance.    Pain status: Pain free.    Return to near baseline physical activity: Yes     Discharge Planner Nurse   Safe discharge environment identified: Yes  Barriers to discharge: Yes       Entered by: Nicole Aparicio RN 12/01/2022 3:53 AM   Awaiting labs this am. IVFs given overnight. Tolerating IV abx.   Please review provider order for any additional goals.   Nurse to notify provider when observation goals have been met and patient is ready for discharge.Goal Outcome Evaluation:                        "

## 2022-12-01 NOTE — PLAN OF CARE
Discharging to home.  Has home therapy through home care.  Grand Daughter left to get clothing for discharge.  Catheter draining clear urine (200 ml so far this shift)  Family states he has all of his belongings.  Rosalia Newton RN    Problem: Urinary Retention  Goal: Effective Urinary Elimination  Outcome: Progressing

## 2022-12-01 NOTE — PROGRESS NOTES
[facility]    Medicine Progress Note - Hospitalist Service    Date of Admission:  11/29/2022    Assessment & Plan     87 year old male with past medical hx of chronic jorge, who presents with hematuria, weakness and falls.     Gross hematuria    - Initially required bladder irrigation, but now cleared, will irrigate PRN   - UA with >182 WBC, and started Ceftriaxone 1 gm daily. Follow up urine culture      LEONIDAS (acute kidney injury): Creatinine remains elevated. Continue IVF.       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        Possible Slight T5 compression fracture: pain meds prn       Macrocytic anemia              -- normal B12 and folate on 10/23/22              -- will check IRON studies             Diet: Combination Diet Regular Diet Adult    DVT Prophylaxis: Low Risk/Ambulatory with no VTE prophylaxis indicated  Jorge Catheter: PRESENT, indication: Other (Comment) (Ureteral stricture)  Central Lines: None  Cardiac Monitoring: None  Code Status: Full Code      Disposition Plan     Expected Discharge Date: 11/30/2022                The patient's care was discussed with the Bedside Nurse, Care Coordinator/ and Patient.    Christiano Mcleod MD  Hospitalist Service  [facility]  Securely message with the [employer] Web Console (learn more here)  Text page via [employer] Paging/Directory         ______________________________________________________________________    Interval History   Patient has some confusion and does not remember the reason to come to the hospital. He reports feeling well.     Data reviewed today: I reviewed all medications, new labs and imaging results over the last 24 hours.     Physical Exam   Vital Signs: Temp: 97.7  F (36.5  C) Temp src: Oral BP: 126/56 Pulse: 58   Resp: 16 SpO2: 96 % O2 Device: None (Room air)     Weight: 150 lbs 0 oz    General appearance: not in acute distress  HEENT: PERRL, EOMI  Lungs: Clear breath sounds in bilateral lung fields  Cardiovascular: Regular rate and rhythm, normal S1-S2  Abdomen: Soft, non tender, no distension, normal bowel sound.  Lozano catheter in place, draining clear urine.  Musculoskeletal: No joint swelling.  Skin: No rash and no edema  Neurology: AAO ×3.  Cranial nerves II - XII normal.  Normal muscle strength in all four extremities.    Data   Recent Labs   Lab 11/30/22  1003 11/29/22  1224 11/29/22  1159   WBC 5.8  --  7.8   HGB 9.0*  --  10.5*   *  --  105*     --  280   INR  --  1.85*  --      --  141   POTASSIUM 3.6  --  4.1   CHLORIDE 103  --  100   CO2 29  --  29   BUN 49.2*  --  42.3*   CR 1.85*  --  1.83*   ANIONGAP 10  --  12   KRISTEN 10.5*  --  12.0*   GLC 85  --  94   ALBUMIN  --   --  4.0   PROTTOTAL  --   --  7.1   BILITOTAL  --   --  1.0   ALKPHOS  --   --  68   ALT  --   --  11   AST  --   --  24

## 2022-12-01 NOTE — PLAN OF CARE
Physical Therapy Discharge Summary    Reason for therapy discharge:    Discharged to home with home therapy.    Progress towards therapy goal(s). See goals on Care Plan in [facility] electronic health record for goal details.  Goals not met.  Barriers to achieving goals:   discharge from facility.    Therapy recommendation(s):    Continued therapy is recommended.  Rationale/Recommendations:  to improve mobility and strength. .

## 2022-12-01 NOTE — PROGRESS NOTES
Pt feeling much better this evening. Lozano starting to put out a more pale, yellow urine since IVF have started, some flecks of blood now noted. Pt up and ambulating in adam with walker, gait belt and A1. Pt stated that he felt tired, but it felt good to get up and walk. Granddaughter at bedside and updated on plan.     YOEL JOHANSEN RN

## 2022-12-01 NOTE — PLAN OF CARE
"PRIMARY DIAGNOSIS: \"GENERIC\" NURSING  OUTPATIENT/OBSERVATION GOALS TO BE MET BEFORE DISCHARGE:  ADLs back to baseline: Yes    Activity and level of assistance: Up with standby assistance.    Pain status: Pain free.    Return to near baseline physical activity: Yes     Discharge Planner Nurse   Safe discharge environment identified: Yes  Barriers to discharge: Yes       Entered by: Nicole Aparicio RN 12/01/2022 6:30 AM   Awaiting lab draw this am. Good OP in jorge overnight. No clots seen. Some sediment observed, but draining properly without requiring irrigation. Slept intermittently overnight. Calm and cooperative. No behavioral concerns observed overnight.   Please review provider order for any additional goals.   Nurse to notify provider when observation goals have been met and patient is ready for discharge.Goal Outcome Evaluation:                        "

## 2022-12-02 ENCOUNTER — PATIENT OUTREACH (OUTPATIENT)
Dept: CARE COORDINATION | Facility: CLINIC | Age: 87
End: 2022-12-02

## 2022-12-02 NOTE — PROGRESS NOTES
"Clinic Care Coordination Contact  [facility]: Post-Discharge Note  SITUATION                                                      Admission:    Admission Date: 11/29/22   Reason for Admission: Hematuria, fall  Discharge:   Discharge Date: 12/01/22  Discharge Diagnosis: Hematuria, fall    BACKGROUND                                                      Per hospital discharge summary and inpatient provider notes:    Dilip Long is a 87 year old male with a pertinent history of syncope, Grave's diseases, and hydrocele,urethral stricture who presents to this ED for evaluation of generalized weakness and hematuria     The patient reports that he has had two falls starting last night and his granddaughter had called 911. He endorses feeling generally weak, and he has also been having increasing hematuria present in his jorge catheter.     He denies any neck pain or head pain. Patient is currently on Eliquis. No other complaints at this time.    ASSESSMENT      Discharge Assessment  How are you doing now that you are home?: \"Doing well\"  How are your symptoms? (Red Flag symptoms escalate to triage hotline per guidelines): Improved  Do you feel your condition is stable enough to be safe at home until your provider visit?: Yes  Does the patient have their discharge instructions? : Yes  Does the patient have questions regarding their discharge instructions? : No  Were you started on any new medications or were there changes to any of your previous medications? : No  Does the patient have all of their medications?: Yes  Do you have questions regarding any of your medications? : No  Do you have all of your needed medical supplies or equipment (DME)?  (i.e. oxygen tank, CPAP, cane, etc.): Yes  Discharge follow-up appointment scheduled within 14 calendar days? : No  Is patient agreeable to assistance with scheduling? : No    Post-op ([facility] CTA Only)  If the patient had a surgery or procedure, do they have any questions " for a nurse?: No    PLAN                                                      Outpatient Plan:    Follow up with your healthcare provider, or as advised. This is for repeat urine testing and for catheter removal or  replacement.    Future Appointments   Date Time Provider Department Center   12/5/2022 11:45 AM SJN NM CH 1 JNNUCM [facility]   12/5/2022  3:15 PM JN HC MON JNCVTS [facility]   1/12/2023 10:50 AM [facility]         For any urgent concerns, please contact our 24 hour nurse triage line: [phone] ([phone])         YOU Dooley  [phone]  [facility]

## 2022-12-02 NOTE — DISCHARGE SUMMARY
[facility]  Hospitalist Discharge Summary      Date of Admission:  11/29/2022  Date of Discharge:  12/1/2022  Discharging Provider: Christiano Mcleod MD  Discharge Service: Hospitalist Service    Discharge Diagnoses     Principal Problem:    Hematuria   Active Problems:    Grave's disease - post ablation    Macrocytic anemia    LEONIDAS (acute kidney injury) (H)    Paroxysmal atrial fibrillation (H)    Generalized muscle weakness    Chronic indwelling Jorge catheter -- Urethral Stricture      Follow-ups Needed After Discharge   Follow-up Appointments     Follow-up and recommended labs and tests       * Follow up with primary care provider, [facility],   within 7 days for hospital follow- up.  No follow up labs or test are   needed.           Discharge Disposition   Discharged to home  Condition at discharge: Stable      Hospital Course     Dilip Long is a 87 year old male with a medical history of atrial fibrillation, Grave's disease, urethral stricture with chronic jorge, who presented to ER on 11/29/22 with gross hematuria, generalized weakness and evaluation of frequent falls. In ER,  gross hematuria resolved without intervention. His hemoglobin has been stable at his baseline level of 10. CT head shows no acute intracranial abnormalities. CT cervical , thoracic and lumbar spine reveal no acute fracture. Patient was evaluated by PT/OT and concludes that patient can be safely discharged home. His creatinine was elevated to 1.98 on admission. It improved after IVF. Urine culture had no growth. Patient was discharged home in a stable condition on 12/1/22.     Consultations This Hospital Stay   PHYSICAL THERAPY ADULT IP CONSULT    Code Status   Prior    Time Spent on this Encounter   I, Christiano Mcleod MD, personally saw the patient today and spent greater than 30 minutes discharging this patient.       Christiano Mcleod MD  [facility] EMERGENCY  DEPARTMENT  [address] [place] [id number]  Phone: [phone]  ______________________________________________________________________    Physical Exam   Vital Signs: Temp: 98.1  F (36.7  C) Temp src: Oral BP: (!) 140/65 Pulse: 64   Resp: 16 SpO2: 97 % O2 Device: None (Room air)    Weight: 150 lbs 0 oz    General appearance: not in acute distress  HEENT: PERRL, EOMI  Lungs: Clear breath sounds in bilateral lung fields  Cardiovascular: Regular rate and rhythm, normal S1-S2  Abdomen: Soft, non tender, no distension  Musculoskeletal: No joint swelling  Skin: No rash and no edema  Neurology: AAO ×3.  Cranial nerves II - XII normal.  Normal muscle strength in all four extremities.       Primary Care Physician   [facility]    Discharge Orders      Home Care Referral      Reason for your hospital stay    * Gross hematuria     Follow-up and recommended labs and tests     * Follow up with primary care provider, [facility], within 7 days for hospital follow- up.  No follow up labs or test are needed.     Activity    Your activity upon discharge: activity as tolerated     Diet    Follow this diet upon discharge: Orders Placed This Encounter      Combination Diet Regular Diet Adult       Significant Results and Procedures   Most Recent 3 CBC's:Recent Labs   Lab Test 11/30/22  1003 11/29/22  1159 11/16/22  1437   WBC 5.8 7.8 5.9   HGB 9.0* 10.5* 9.4*   * 105* 101*    280 429     Most Recent 3 BMP's:Recent Labs   Lab Test 12/01/22  0713 11/30/22  1003 11/29/22  1159    142 141   POTASSIUM 3.2* 3.6 4.1   CHLORIDE 106 103 100   CO2 28 29 29   BUN 46.1* 49.2* 42.3*   CR 1.67* 1.85* 1.83*   ANIONGAP 8 10 12   KRISTEN 9.2 10.5* 12.0*   GLC 92 85 94     EXAM: CT HEAD W/O CONTRAST, CT CERVICAL SPINE W/O CONTRAST  LOCATION: [facility]  DATE/TIME: 11/29/2022 11:28 AM     INDICATION: fall  COMPARISON: Multiple prior exams, including the most  recent head CT 11/07/2022.  TECHNIQUE: Routine CT Head without IV contrast. Multiplanar reformats. Dose reduction techniques were used.     FINDINGS:     There is no evidence of acute intracranial hemorrhage, acute large territorial infarction, or mass lesion. Mild scattered nonspecific hypodensity in the cerebral white matter appears stable from the prior study.     The ventricles, sulci, and cisterns are within normal limits for age. There is no evidence of hydrocephalus.     Note is made of bilateral lens implants. Otherwise, the globes and orbits are within normal limits. There is mild mucosal thickening in the left maxillary sinus. There are mucous retention cysts in the left maxillary sinus and right ethmoid air cells.   There is trace opacification of the left sphenoid sinus. The mastoid air cells are clear bilaterally. Again seen is a left frontal scalp lipoma. There is no evidence of calvarial or maxillofacial fracture. There is mild left parietal scalp soft tissue   swelling.                                                                    IMPRESSION:     1.  No acute intracranial abnormality.  2.  Stable mild chronic small vessel ischemic change.      EXAM: CT CERVICAL SPINE W/O CONTRAST  LOCATION: [facility]  DATE/TIME: 11/29/2022 11:29 AM     INDICATION: fall  COMPARISON: Cervical spine CT October 21, 2022.  TECHNIQUE: Routine CT Cervical Spine without IV contrast. Multiplanar reformats. Dose reduction techniques were used.     FINDINGS:  No evidence of acute fracture or subluxation of the cervical spine by CT imaging. Anterolisthesis of C3 on C4 measuring 2 mm and C7 on T1 measuring 2 mm. Chronic superior endplate T1 and T2 compression fractures. Anterior marginal osteophytes at   C3/C4-C6/C7. The vertebral bodies of the cervical spine otherwise have normal stature and alignment. Multilevel degenerative disc disease of the cervical spine with disc height loss, most pronounced  at C4/C5-C6/C7. The partially imaged intracranial   contents are unremarkable. No prevertebral soft tissue swelling. The partially imaged lung apices are unremarkable.      Craniovertebral junction and C1-C2: The odontoid process is well approximated with the anterior body of C1 and well aligned between the lateral masses of C1.     C2-C3: No posterior disc bulge or spinal canal narrowing. No neural foraminal narrowing.      C3-C4: No posterior disc bulge or spinal canal narrowing. Uncovertebral joint disease and facet arthropathy with severe bilateral neural foraminal narrowing.      C4-C5: Broad bar disc osteophyte complex with mild spinal canal narrowing. Uncovertebral joint disease and facet arthropathy with severe bilateral neural foraminal narrowing.      C5-C6: No posterior disc bulge or spinal canal narrowing. Uncovertebral joint disease and facet arthropathy with severe right and mild left neural foraminal narrowing.      C6-C7: No posterior disc bulge or spinal canal narrowing. Uncovertebral joint disease and facet arthropathy with moderate bilateral neural foraminal narrowing.      C7-T1: No posterior disc bulge or spinal canal narrowing. Uncovertebral joint disease and facet arthropathy with mild bilateral neural foraminal narrowing.                                                                       IMPRESSION:  1.  No evidence of acute fracture or subluxation of the cervical spine by CT imaging.  2.  Degenerative cervical spondylosis as described above.      EXAM: CT THORACIC SPINE W/O CONTRAST, CT LUMBAR SPINE W/O CONTRAST  LOCATION: [facility]  DATE/TIME: 11/29/2022 11:30 AM     INDICATION: fall, pain  COMPARISON: Chest CT October 23, 2022. November 30, 2019.  TECHNIQUE:   1.  Routine CT Thoracic Spine without IV contrast. Multiplanar reformats. Dose reduction techniques were used.   2.  Routine CT Lumbar Spine without IV contrast. Multiplanar reformats. Dose reduction  techniques were used.      FINDINGS:  Thoracic spine CT:  Subtle chronic superior endplate T1 and T2 compression fractures, unchanged. Subtle superior endplate T5 compression fracture demonstrated slightly more height loss on prior CT thoracic spine July 15, 2019. MR could be used to evaluate for acuity.   Chronic T12 superior plate compression fracture, unchanged. No other evidence of acute fracture or subluxation of the thoracic spine by CT imaging. The vertebral bodies of the thoracic spine otherwise have normal stature and alignment. Decreased bone   mineralization. Multilevel degenerative disease of the thoracic spine with disc height loss, most pronounced at T3/T4-T10/T11.     The partially imaged lungs are unremarkable.     No significant posterior disc bulge, spinal canal, or neural foraminal narrowing at any level within the thoracic spine.     Lumbar spine CT:  Chronic superior endplate L1 compression fracture, unchanged. No evidence of acute fracture or subluxation of the lumbar spine by CT imaging. Anterolisthesis of L5 on S1 measuring 4 mm. Anterior marginal osteophytes at T12/L1-L5/S1. Multilevel   degenerative disc disease of the lumbar spine with disc height loss, most pronounced at L2/L3-L5/S1. The partially imaged intra-abdominal contents are unremarkable.     T12/L1:  Symmetric disc bulge without spinal canal narrowing. Mild bilateral neural foraminal narrowing.     L1/L2:  Symmetric disc bulge and mild facet arthropathy without spinal canal narrowing. Severe bilateral neural foraminal narrowing.     L2/L3:  Symmetric disc bulge and moderate facet arthropathy with moderate spinal canal narrowing. Severe bilateral neural foraminal narrowing.     L3/L4:  Symmetric disc bulge, facet arthropathy and ligamentum flavum thickening with severe spinal canal narrowing. Severe bilateral neural foraminal narrowing.       L4/L5:  Symmetric disc bulge and moderate facet arthropathy with moderate spinal canal  narrowing. Severe bilateral neural foraminal narrowing.      L5/S1: Symmetric disc bulge and moderate facet arthropathy without significant spinal canal narrowing. Severe bilateral neural foraminal narrowing.                                                                       IMPRESSION:  Thoracic spine CT:  1.  Subtle superior endplate T5 compression fracture demonstrated slightly more height loss on prior CT thoracic spine July 15, 2019. MR could be used to evaluate for acuity.   2.  Subtle chronic superior endplate T1 and T2 compression fractures, unchanged.   3.  Chronic T12 superior plate compression fracture, unchanged.  4.  No other evidence of acute fracture or subluxation of the thoracic spine by CT imaging.     Lumbar spine CT:  1.  No evidence of acute fracture or subluxation of the lumbar spine by CT imaging.  2.  Chronic superior endplate L1 compression fracture, unchanged.  3.  Degenerative lumbar spondylosis with level by level analysis as described above.        Discharge Medications   Discharge Medication List as of 12/1/2022 11:04 AM      CONTINUE these medications which have NOT CHANGED    Details   apixaban ANTICOAGULANT (ELIQUIS) 5 MG tablet Take 1 tablet (5 mg) by mouth 2 times daily, Disp-60 tablet, R-11, E-PrescribePatient doesn't need refill quite yet      atenolol (TENORMIN) 25 MG tablet Take 25 mg by mouth daily, Historical      Calcium Carbonate-Vitamin D (OSCAL 500/200 D-3 PO) Take 1 tablet by mouth daily., 1 tablet, Oral, DAILY, Until Discontinued, Historical      GLUCOSAMINE-CHONDROITIN-VIT D3 PO Take 1 tablet by mouth daily, Historical      hydrOXYzine (ATARAX) 10 MG tablet Take 20 mg by mouth At Bedtime, Historical      levothyroxine (SYNTHROID/LEVOTHROID) 50 MCG tablet Take 1 tablet (50 mcg) by mouth every morning (before breakfast), Disp-30 tablet, R-0, E-Prescribe      potassium chloride ER (KLOR-CON M) 20 MEQ CR tablet Take 1 tablet (20 mEq) by mouth daily, Disp-90 tablet, R-1,  E-Prescribe      simvastatin (ZOCOR) 10 MG tablet Take 1 tablet (10 mg) by mouth At Bedtime, Disp-90 tablet, R-3, E-Prescribe      order for DME Equipment being ordered: Home automatic blood pressure cuff. Check once daily for one week, and then 3 times weekly thereafter.Disp-1 Device, R-0, Local Print           Allergies   No Known Allergies

## 2022-12-22 ENCOUNTER — APPOINTMENT (OUTPATIENT)
Dept: RADIOLOGY | Facility: HOSPITAL | Age: 87
End: 2022-12-22
Attending: EMERGENCY MEDICINE
Payer: MEDICARE

## 2022-12-22 ENCOUNTER — HOSPITAL ENCOUNTER (EMERGENCY)
Facility: HOSPITAL | Age: 87
Discharge: HOME | End: 2022-12-22
Attending: EMERGENCY MEDICINE | Admitting: EMERGENCY MEDICINE
Payer: MEDICARE

## 2022-12-22 VITALS
HEART RATE: 74 BPM | WEIGHT: 155 LBS | BODY MASS INDEX: 22.19 KG/M2 | HEIGHT: 70 IN | RESPIRATION RATE: 20 BRPM | TEMPERATURE: 98 F | OXYGEN SATURATION: 93 % | SYSTOLIC BLOOD PRESSURE: 159 MMHG | DIASTOLIC BLOOD PRESSURE: 102 MMHG

## 2022-12-22 DIAGNOSIS — U07.1 INFECTION DUE TO 2019 NOVEL CORONAVIRUS: ICD-10-CM

## 2022-12-22 DIAGNOSIS — J21.0 RSV BRONCHIOLITIS: ICD-10-CM

## 2022-12-22 DIAGNOSIS — R82.71 BACTERIURIA WITH PYURIA: ICD-10-CM

## 2022-12-22 DIAGNOSIS — R82.81 BACTERIURIA WITH PYURIA: ICD-10-CM

## 2022-12-22 DIAGNOSIS — J20.9 ACUTE BRONCHITIS, UNSPECIFIED ORGANISM: ICD-10-CM

## 2022-12-22 LAB
ALBUMIN SERPL BCG-MCNC: 3.3 G/DL (ref 3.5–5.2)
ALBUMIN UR-MCNC: 100 MG/DL
ALP SERPL-CCNC: 92 U/L (ref 40–129)
ALT SERPL W P-5'-P-CCNC: 37 U/L (ref 10–50)
ANION GAP SERPL CALCULATED.3IONS-SCNC: 13 MMOL/L (ref 7–15)
APPEARANCE UR: ABNORMAL
AST SERPL W P-5'-P-CCNC: 43 U/L (ref 10–50)
BACTERIA #/AREA URNS HPF: ABNORMAL /HPF
BASE EXCESS BLDV CALC-SCNC: 0.5 MMOL/L
BASOPHILS # BLD AUTO: 0 10E3/UL (ref 0–0.2)
BASOPHILS NFR BLD AUTO: 0 %
BILIRUB SERPL-MCNC: 0.6 MG/DL
BILIRUB UR QL STRIP: NEGATIVE
BUN SERPL-MCNC: 31 MG/DL (ref 8–23)
CALCIUM SERPL-MCNC: 9.4 MG/DL (ref 8.8–10.2)
CHLORIDE SERPL-SCNC: 102 MMOL/L (ref 98–107)
COLOR UR AUTO: YELLOW
CREAT SERPL-MCNC: 1.11 MG/DL (ref 0.67–1.17)
DEPRECATED HCO3 PLAS-SCNC: 24 MMOL/L (ref 22–29)
EOSINOPHIL # BLD AUTO: 0 10E3/UL (ref 0–0.7)
EOSINOPHIL NFR BLD AUTO: 0 %
ERYTHROCYTE [DISTWIDTH] IN BLOOD BY AUTOMATED COUNT: 12.5 % (ref 10–15)
FLUAV RNA SPEC QL NAA+PROBE: NEGATIVE
FLUBV RNA RESP QL NAA+PROBE: NEGATIVE
GFR SERPL CREATININE-BSD FRML MDRD: 65 ML/MIN/1.73M2
GLUCOSE SERPL-MCNC: 143 MG/DL (ref 70–99)
GLUCOSE UR STRIP-MCNC: NEGATIVE MG/DL
HCO3 BLDV-SCNC: 26 MMOL/L (ref 24–30)
HCT VFR BLD AUTO: 27.2 % (ref 40–53)
HGB BLD-MCNC: 8.8 G/DL (ref 13.3–17.7)
HGB UR QL STRIP: ABNORMAL
IMM GRANULOCYTES # BLD: 0.2 10E3/UL
IMM GRANULOCYTES NFR BLD: 2 %
INR PPP: 1.92 (ref 0.85–1.15)
KETONES UR STRIP-MCNC: ABNORMAL MG/DL
LEUKOCYTE ESTERASE UR QL STRIP: ABNORMAL
LYMPHOCYTES # BLD AUTO: 0.6 10E3/UL (ref 0.8–5.3)
LYMPHOCYTES NFR BLD AUTO: 6 %
MCH RBC QN AUTO: 33 PG (ref 26.5–33)
MCHC RBC AUTO-ENTMCNC: 32.4 G/DL (ref 31.5–36.5)
MCV RBC AUTO: 102 FL (ref 78–100)
MONOCYTES # BLD AUTO: 0.8 10E3/UL (ref 0–1.3)
MONOCYTES NFR BLD AUTO: 8 %
MUCOUS THREADS #/AREA URNS LPF: PRESENT /LPF
NEUTROPHILS # BLD AUTO: 8.8 10E3/UL (ref 1.6–8.3)
NEUTROPHILS NFR BLD AUTO: 84 %
NITRATE UR QL: NEGATIVE
NRBC # BLD AUTO: 0 10E3/UL
NRBC BLD AUTO-RTO: 0 /100
NT-PROBNP SERPL-MCNC: 1041 PG/ML (ref 0–1800)
OXYHGB MFR BLDV: 22.9 % (ref 70–75)
PCO2 BLDV: 45 MM HG (ref 35–50)
PH BLDV: 7.37 [PH] (ref 7.35–7.45)
PH UR STRIP: 5.5 [PH] (ref 5–7)
PLATELET # BLD AUTO: 397 10E3/UL (ref 150–450)
PO2 BLDV: 19 MM HG (ref 25–47)
POTASSIUM SERPL-SCNC: 4.3 MMOL/L (ref 3.4–5.3)
PROT SERPL-MCNC: 6.6 G/DL (ref 6.4–8.3)
RBC # BLD AUTO: 2.67 10E6/UL (ref 4.4–5.9)
RBC URINE: 78 /HPF
RSV RNA SPEC NAA+PROBE: POSITIVE
SAO2 % BLDV: 23.2 % (ref 70–75)
SARS-COV-2 RNA RESP QL NAA+PROBE: POSITIVE
SODIUM SERPL-SCNC: 139 MMOL/L (ref 136–145)
SP GR UR STRIP: 1.03 (ref 1–1.03)
SQUAMOUS EPITHELIAL: 15 /HPF
TRANSITIONAL EPI: 1 /HPF
TROPONIN T SERPL HS-MCNC: 35 NG/L
TROPONIN T SERPL HS-MCNC: 36 NG/L
UROBILINOGEN UR STRIP-MCNC: 2 MG/DL
WBC # BLD AUTO: 10.2 10E3/UL (ref 4–11)
WBC URINE: >182 /HPF

## 2022-12-22 PROCEDURE — 36415 COLL VENOUS BLD VENIPUNCTURE: CPT | Performed by: EMERGENCY MEDICINE

## 2022-12-22 PROCEDURE — 84484 ASSAY OF TROPONIN QUANT: CPT | Performed by: EMERGENCY MEDICINE

## 2022-12-22 PROCEDURE — 250N000009 HC RX 250: Performed by: EMERGENCY MEDICINE

## 2022-12-22 PROCEDURE — 85610 PROTHROMBIN TIME: CPT | Performed by: EMERGENCY MEDICINE

## 2022-12-22 PROCEDURE — 83880 ASSAY OF NATRIURETIC PEPTIDE: CPT | Performed by: EMERGENCY MEDICINE

## 2022-12-22 PROCEDURE — 82805 BLOOD GASES W/O2 SATURATION: CPT | Performed by: EMERGENCY MEDICINE

## 2022-12-22 PROCEDURE — 87637 SARSCOV2&INF A&B&RSV AMP PRB: CPT | Performed by: EMERGENCY MEDICINE

## 2022-12-22 PROCEDURE — 87086 URINE CULTURE/COLONY COUNT: CPT | Performed by: EMERGENCY MEDICINE

## 2022-12-22 PROCEDURE — 85025 COMPLETE CBC W/AUTO DIFF WBC: CPT | Performed by: EMERGENCY MEDICINE

## 2022-12-22 PROCEDURE — 80053 COMPREHEN METABOLIC PANEL: CPT | Performed by: EMERGENCY MEDICINE

## 2022-12-22 PROCEDURE — 93005 ELECTROCARDIOGRAM TRACING: CPT | Performed by: EMERGENCY MEDICINE

## 2022-12-22 PROCEDURE — C9803 HOPD COVID-19 SPEC COLLECT: HCPCS

## 2022-12-22 PROCEDURE — 81003 URINALYSIS AUTO W/O SCOPE: CPT | Performed by: EMERGENCY MEDICINE

## 2022-12-22 PROCEDURE — 94640 AIRWAY INHALATION TREATMENT: CPT

## 2022-12-22 PROCEDURE — 71046 X-RAY EXAM CHEST 2 VIEWS: CPT

## 2022-12-22 PROCEDURE — 99285 EMERGENCY DEPT VISIT HI MDM: CPT | Mod: 25

## 2022-12-22 RX ORDER — LEVOFLOXACIN 500 MG/1
500 TABLET, FILM COATED ORAL DAILY
Qty: 7 TABLET | Refills: 0 | Status: SHIPPED | OUTPATIENT
Start: 2022-12-22 | End: 2022-12-29

## 2022-12-22 RX ORDER — IPRATROPIUM BROMIDE AND ALBUTEROL SULFATE 2.5; .5 MG/3ML; MG/3ML
1 SOLUTION RESPIRATORY (INHALATION) EVERY 6 HOURS PRN
Qty: 90 ML | Refills: 0 | Status: SHIPPED | OUTPATIENT
Start: 2022-12-22

## 2022-12-22 RX ORDER — DEXAMETHASONE 6 MG/1
6 TABLET ORAL
Qty: 5 TABLET | Refills: 0 | Status: SHIPPED | OUTPATIENT
Start: 2022-12-22 | End: 2023-01-12

## 2022-12-22 RX ORDER — IPRATROPIUM BROMIDE AND ALBUTEROL SULFATE 2.5; .5 MG/3ML; MG/3ML
3 SOLUTION RESPIRATORY (INHALATION) ONCE
Status: COMPLETED | OUTPATIENT
Start: 2022-12-22 | End: 2022-12-22

## 2022-12-22 RX ORDER — NIRMATRELVIR AND RITONAVIR 150-100 MG
2 KIT ORAL 2 TIMES DAILY
Qty: 20 TABLET | Refills: 0 | Status: SHIPPED | OUTPATIENT
Start: 2022-12-22 | End: 2022-12-27

## 2022-12-22 RX ADMIN — IPRATROPIUM BROMIDE AND ALBUTEROL SULFATE 3 ML: 2.5; .5 SOLUTION RESPIRATORY (INHALATION) at 16:10

## 2022-12-22 ASSESSMENT — ENCOUNTER SYMPTOMS
CHILLS: 0
FEVER: 1
MYALGIAS: 0
RHINORRHEA: 1
COUGH: 1
SORE THROAT: 0
ABDOMINAL PAIN: 0

## 2022-12-22 ASSESSMENT — ACTIVITIES OF DAILY LIVING (ADL)
ADLS_ACUITY_SCORE: 35
ADLS_ACUITY_SCORE: 33
ADLS_ACUITY_SCORE: 35

## 2022-12-22 NOTE — ED PROVIDER NOTES
Emergency Department Encounter      NAME: Dilip Long  AGE: 87 year old male  YOB: 1935  MRN: [id number]  EVALUATION DATE & TIME: 12/22/2022  2:58 PM    PCP: [facility]    ED PROVIDER: Contreras Tan M.D.      Chief Complaint   Patient presents with     Cough         FINAL IMPRESSION:  1. Acute bronchitis, unspecified organism    2. RSV bronchiolitis    3. Infection due to 2019 novel coronavirus    4. Bacteriuria with pyuria        MEDICAL DECISION MAKING:    3:16 PM I met with the patient, obtained history, performed an initial exam, and discussed options and plan for diagnostics and treatment here in the ED.   6:04 PM I updated the patient and family member about labs and imaging results.    7:06 PM I updated the patient about lab results and that he is positive for COVID-19 and RSV.    This patient is a 87-year-old male with a history of a chronic indwelling Lozano catheter, chronic kidney disease, CHF, hypertension, hyperlipidemia and anemia who is brought to the ER by his granddaughter.  They say that his wife had similar symptoms last week and the patient over the past couple days has developed the same symptoms.  This is a cough which is productive with clear phlegm.  He has been having a low-grade temperature with the highest temp being 99.7 Fahrenheit 2 days ago.  He has rhinorrhea but no nasal congestion, headache, myalgias or loss of sense of smell or taste.  The granddaughter lives near the patient and sees him and his wife daily.  In the ER he had a frequent cough which made it difficult to get an EKG.  The first 1 had a terrible baseline and was deleted but the second 1 was able to be read and did not show any obvious signs of ischemia or MI.  His cough seemed to resolve with the DuoNeb that he was given in the ER.  His lab work was positive for RSV and COVID.  The urine specimen had been obtained from the chronic indwelling Lozano catheter and it showed many  bacteria with many WBCs but a negative nitrite.  I talked to the granddaughter who will check on the culture and I day or 2.  The chest x-ray did not show any signs of CHF, pneumothorax or pneumonia.  The patient's O2 sats varied between 91 to 95%.  He seemed to dip down when he was asleep but with a few deep breaths that has O2 sats to be in the 93 to 95%.  I spoke to the granddaughter and the patient about getting admitted for observation and supplemental oxygen but they refused admission.  They are going to  a pulse oximeter and monitor his O2 sats at home.  I did put him on a course of Decadron because it was positive for COVID and had borderline O2 sats.  He is also again received DuoNeb as it seemed to relieve his coughing.  Additionally I am going cover his bronchitis with a course of Levaquin and this may treat the possible UTI although I suspect that this is just chronic colonization of his catheter.  They are going to return to the ER  if his O2 sats drop below 90 to 92%.        Pertinent Labs & Imaging studies reviewed. (See chart for details)    The importance of close follow up was discussed. We reviewed warning signs and symptoms, and I instructed Mr. Long to return to the emergency department immediately if he develops any new or worsening symptoms. I provided additional verbal discharge instructions. Mr. Long expressed understanding and agreement with this plan of care, his questions were answered, and he was discharged in stable condition.       MEDICATIONS GIVEN IN THE EMERGENCY:  Medications   sodium chloride (PF) 0.9% PF flush 3 mL (has no administration in time range)   sodium chloride (PF) 0.9% PF flush 3 mL (3 mLs Intracatheter Given 12/22/22 1556)   ipratropium - albuterol 0.5 mg/2.5 mg/3 mL (DUONEB) neb solution 3 mL (3 mLs Nebulization Given 12/22/22 1610)       NEW PRESCRIPTIONS STARTED AT TODAY'S ER VISIT:  New Prescriptions    DEXAMETHASONE (DECADRON) 6 MG TABLET    Take 1  tablet (6 mg) by mouth daily (with breakfast) for 5 days    IPRATROPIUM - ALBUTEROL 0.5 MG/2.5 MG/3 ML (DUONEB) 0.5-2.5 (3) MG/3ML NEB SOLUTION    Take 1 vial (3 mLs) by nebulization every 6 hours as needed for shortness of breath, wheezing or cough    LEVOFLOXACIN (LEVAQUIN) 500 MG TABLET    Take 1 tablet (500 mg) by mouth daily for 7 days    NIRMATRELVIR AND RITONAVIR (PAXLOVID, 150/100,) THERAPY PACK    Take 2 tablets by mouth 2 times daily for 5 days          =================================================================    HPI    Patient information was obtained from: Granddaughter     Use of : N/A         Dilip VICK Mercedes is a 87 year old male with a past medical history of syncope, Grave's diseases, and hydrocele,urethral stricture, who presents via wheelchair in with granddaughter for evaluation of cough.     Per granddaughter, the patient has been admitted multiple times over the month for syncope and anemic. For the last couple of days the patient has a constant cough. He is coughing up white clear mucous. Had a low grade fever of 99.7 F yesterday. He endorsed a runny nose. Only known sick contact is the patients wife who is sick with influenza like symptoms. Denies sore throat, body aches, congestion, chills, abdominal pain, and any other complaints or concerns at the moment.       REVIEW OF SYSTEMS   Review of Systems   Constitutional: Positive for fever (low grade). Negative for chills.   HENT: Positive for rhinorrhea. Negative for congestion and sore throat.    Respiratory: Positive for cough (constant with white clear mucous).    Gastrointestinal: Negative for abdominal pain.   Musculoskeletal: Negative for myalgias.   All other systems reviewed and are negative.       PAST MEDICAL HISTORY:  Past Medical History:   Diagnosis Date     Acute heart failure with preserved ejection fraction (HFpEF) (H) 11/10/2022     Acute prostatitis      Asymptomatic bacteriuria 10/23/2013    Noted at  10/2/13 office visit at [facility]. No treatment recommended at that time.      Congestive heart failure (H)      Essential hypertension 08/18/2015     Hypertension      Paroxysmal atrial fib -- on Eliquis 11/06/2022     Syncope      Thyroid disease        PAST SURGICAL HISTORY:  Past Surgical History:   Procedure Laterality Date     BLADDER SURGERY      Bladder absces removal     Blepharoplasty Upper Lid W/ Excessive Skin[  1/29/2007     CATARACT EXTRACTION       EYE SURGERY      both eyes 2015     IR LUMBAR EPIDURAL STEROID INJECTION  4/27/2004     IR LUMBAR EPIDURAL STEROID INJECTION  5/11/2004     IR LUMBAR EPIDURAL STEROID INJECTION  11/24/2004     IR LUMBAR EPIDURAL STEROID INJECTION  11/28/2007     VA CYSTOURETHROSCOPY W/IRRIG & EVAC CLOTS N/A 7/27/2018    Procedure: CYSTOSCOPY, CLOT EVACUATION ,URETHRAL DILATATION, DAUGHERTY CATHETER PLACEMENT;  Surgeon: Lowell Arias MD;  Location: [facility];  Service: Urology       CURRENT MEDICATIONS:      Current Facility-Administered Medications:      sodium chloride (PF) 0.9% PF flush 3 mL, 3 mL, Intracatheter, q1 min prn, Sophie Stern MD     sodium chloride (PF) 0.9% PF flush 3 mL, 3 mL, Intracatheter, Q8H, Sophie Stern MD, 3 mL at 12/22/22 1556    Current Outpatient Medications:      dexamethasone (DECADRON) 6 MG tablet, Take 1 tablet (6 mg) by mouth daily (with breakfast) for 5 days, Disp: 5 tablet, Rfl: 0     ipratropium - albuterol 0.5 mg/2.5 mg/3 mL (DUONEB) 0.5-2.5 (3) MG/3ML neb solution, Take 1 vial (3 mLs) by nebulization every 6 hours as needed for shortness of breath, wheezing or cough, Disp: 90 mL, Rfl: 0     levofloxacin (LEVAQUIN) 500 MG tablet, Take 1 tablet (500 mg) by mouth daily for 7 days, Disp: 7 tablet, Rfl: 0     nirmatrelvir and ritonavir (PAXLOVID, 150/100,) therapy pack, Take 2 tablets by mouth 2 times daily for 5 days, Disp: 20 tablet, Rfl: 0     apixaban ANTICOAGULANT (ELIQUIS) 5 MG tablet, Take 1 tablet (5 mg) by  mouth 2 times daily, Disp: 60 tablet, Rfl: 11     atenolol (TENORMIN) 25 MG tablet, Take 25 mg by mouth daily, Disp: , Rfl:      Calcium Carbonate-Vitamin D (OSCAL 500/200 D-3 PO), Take 1 tablet by mouth daily., Disp: , Rfl:      GLUCOSAMINE-CHONDROITIN-VIT D3 PO, Take 1 tablet by mouth daily, Disp: , Rfl:      hydrOXYzine (ATARAX) 10 MG tablet, Take 20 mg by mouth At Bedtime, Disp: , Rfl:      levothyroxine (SYNTHROID/LEVOTHROID) 50 MCG tablet, Take 1 tablet (50 mcg) by mouth every morning (before breakfast), Disp: 30 tablet, Rfl: 0     order for DME, Equipment being ordered: Home automatic blood pressure cuff. Check once daily for one week, and then 3 times weekly thereafter., Disp: 1 Device, Rfl: 0     potassium chloride ER (KLOR-CON M) 20 MEQ CR tablet, Take 1 tablet (20 mEq) by mouth daily, Disp: 90 tablet, Rfl: 1     simvastatin (ZOCOR) 10 MG tablet, Take 1 tablet (10 mg) by mouth At Bedtime, Disp: 90 tablet, Rfl: 3    ALLERGIES:  No Known Allergies    FAMILY HISTORY:  Family History   Problem Relation Age of Onset     Diabetes No family hx of      Hypertension No family hx of      Other Cancer No family hx of      Coronary Artery Disease No family hx of      Hyperlipidemia No family hx of      Cerebrovascular Disease No family hx of      Breast Cancer No family hx of      Colon Cancer No family hx of      Prostate Cancer No family hx of      Depression No family hx of      Anxiety Disorder No family hx of      Mental Illness No family hx of      Substance Abuse No family hx of      Anesthesia Reaction No family hx of      Asthma No family hx of      Osteoporosis No family hx of      Genetic Disorder No family hx of      Thyroid Disease No family hx of      Obesity No family hx of      No Known Problems Mother      No Known Problems Father        SOCIAL HISTORY:   Social History     Socioeconomic History     Marital status:      Number of children: 4   Occupational History     Occupation: Retired  "  Tobacco Use     Smoking status: Former     Types: Cigarettes     Smokeless tobacco: Never     Tobacco comments:     Pt quit 40 years ago   Substance and Sexual Activity     Alcohol use: Yes     Comment: < 1 drink a week     Drug use: No   Social History Narrative    , 4 children, lives with his wife, and is a retired .  Desires DNR/DNI.  (last updated 11/29/2022)        PHYSICAL EXAM:    Vitals: BP (!) 151/70   Pulse 79   Temp 98  F (36.7  C)   Resp 21   Ht 1.778 m (5' 10\")   Wt 70.3 kg (155 lb)   SpO2 93%   BMI 22.24 kg/m     Constitutional: Elderly chronic ill appearing male who has a frequent coarse cough but does not appear to be in respiratory distress  HEAD:Normocephalic, atraumatic,   Eyes: PERRLA, EOM intact, conjunctiva clear, no discharge  ENT: mucous membranes moist, nose normal.   Neck- Supple, gross ROM intact.  No JVD.  No palpable nodes.  Pulmonary: Frequent dry cough. No respiratory distress. Clear to auscultation, no wheezing.  Chest: No chest wall tenderness  Cardiovascular: Normal heart rate, regular rhythm, no murmurs. No lower extremity edema, 2+ DP pulses.   GI: Indwelling jorge catheter. Soft, no tenderness to deep palpation in all quadrants, not distended, no masses.  No hepatosplenomegaly.  Musculoskeletal: Trace of right lower extremities edema. No calf tenderness. Moving all 4 extremities intentionally and without pain. No obvious deformity.  Back: No CVA tenderness  Skin: Warm, dry, no rash.  Neurologic: Alert & oriented x 3, speech clear, moving all extremities spontaneously   Psychiatric: Affect normal, cooperative.     LAB:  All pertinent labs reviewed and interpreted.  Labs Ordered and Resulted from Time of ED Arrival to Time of ED Departure   COMPREHENSIVE METABOLIC PANEL - Abnormal       Result Value    Sodium 139      Potassium 4.3      Chloride 102      Carbon Dioxide (CO2) 24      Anion Gap 13      Urea Nitrogen 31.0 (*)     Creatinine 1.11      " Calcium 9.4      Glucose 143 (*)     Alkaline Phosphatase 92      AST 43      ALT 37      Protein Total 6.6      Albumin 3.3 (*)     Bilirubin Total 0.6      GFR Estimate 65     BLOOD GAS VENOUS - Abnormal    pH Venous 7.37      pCO2 Venous 45      pO2 Venous 19 (*)     Bicarbonate Venous 26      Base Excess/Deficit (+/-) 0.5      Oxyhemoglobin Venous 22.9 (*)     O2 Sat, Venous 23.2 (*)    INR - Abnormal    INR 1.92 (*)    INFLUENZA A/B & SARS-COV2 PCR MULTIPLEX - Abnormal    Influenza A PCR Negative      Influenza B PCR Negative      RSV PCR Positive (*)     SARS CoV2 PCR Positive (*)    TROPONIN T, HIGH SENSITIVITY - Abnormal    Troponin T, High Sensitivity 36 (*)    CBC WITH PLATELETS AND DIFFERENTIAL - Abnormal    WBC Count 10.2      RBC Count 2.67 (*)     Hemoglobin 8.8 (*)     Hematocrit 27.2 (*)      (*)     MCH 33.0      MCHC 32.4      RDW 12.5      Platelet Count 397      % Neutrophils 84      % Lymphocytes 6      % Monocytes 8      % Eosinophils 0      % Basophils 0      % Immature Granulocytes 2      NRBCs per 100 WBC 0      Absolute Neutrophils 8.8 (*)     Absolute Lymphocytes 0.6 (*)     Absolute Monocytes 0.8      Absolute Eosinophils 0.0      Absolute Basophils 0.0      Absolute Immature Granulocytes 0.2      Absolute NRBCs 0.0     ROUTINE UA WITH MICROSCOPIC REFLEX TO CULTURE - Abnormal    Color Urine Yellow      Appearance Urine Cloudy (*)     Glucose Urine Negative      Bilirubin Urine Negative      Ketones Urine Trace (*)     Specific Gravity Urine 1.027      Blood Urine 1.0 mg/dL (*)     pH Urine 5.5      Protein Albumin Urine 100 (*)     Urobilinogen Urine 2.0 (*)     Nitrite Urine Negative      Leukocyte Esterase Urine 500 Deion/uL (*)     Bacteria Urine Many (*)     Mucus Urine Present (*)     RBC Urine 78 (*)     WBC Urine >182 (*)     Squamous Epithelials Urine 15 (*)     Transitional Epithelials Urine 1     TROPONIN T, HIGH SENSITIVITY - Abnormal    Troponin T, High Sensitivity 35 (*)     NT PROBNP INPATIENT - Normal    N terminal Pro BNP Inpatient 1,041     URINE CULTURE       RADIOLOGY:  XR Chest 2 Views   Final Result   IMPRESSION: Heart size is stable. Thoracic aorta is tortuous. Lungs are clear of CHF, lobar consolidation or pneumothorax. There is probable bibasilar atelectasis with trace effusions. Elevation of the right hemidiaphragm with prominent gas-filled loops    of bowel in the upper abdomen redemonstrated.          EKG:   Performed at: 22-DEC-2022 15:25  Impression: Atrial fibrillation with premature ventricular or aberrantly conducted complexes. Low voltage QRS.  Rate: 71  Rhythm: atrial fibrillation  QRS Interval: 74  QTc Interval: 417  Comparison: When compared with ECG of 22-DEC-2022 15:11, ST more depressed Inferior leads.   I have independently reviewed and interpreted the EKG(s) documented above.     PROCEDURES:   Procedures       I, Amita Olvera, am serving as a scribe to document services personally performed by Dr. Contreras Tan based on my observation and the provider's statements to me. I, Contreras Tan M.D. attest that Amita Olvera is acting in a scribe capacity, has observed my performance of the services and has documented them in accordance with my direction.      Contreras Tan M.D.  Emergency Medicine  [facility] EMERGENCY DEPARTMENT  [address] [address] [phone]  Dept: [phone]       Contreras Tan MD  12/22/22 0090

## 2022-12-22 NOTE — PROGRESS NOTES
RESPIRATORY CARE NOTE     Patient Name: Dilip Long  Today's Date: 12/22/2022       Pt continues to receive duo neb. BS are clear. Pt is on RA, SpO2 is 95%. Post treatment there is no change.  RT will continue to monitor and assess.

## 2022-12-22 NOTE — ED NOTES
ED Triage Provider Note  [facility]  Encounter Date: Dec 22, 2022    History:  No chief complaint on file.    Dilip Long is a 87 year old male who presents to the ED with cough x 1 week is not improving.  Lives in his own home but family providing 24 hour care.  Emptied jorge at about 1230 and I see not much urine if any new in bag.  They had clinic appt today but sats 91% (per granddaughter) so they requested he go right to the ER.     Review of Systems:  Review of Systems   cough    Exam:  There were no vitals taken for this visit.  General: No acute distress. Appears stated age.   Cardio: Regular rate, extremities well perfused  Resp: frequent somewhat weak sounding cough.  Almost constantly coughing. With some wheezing with cough, no stridor.  Increased rr.  Neuro: Alert. CN II-XII grossly intact. Grossly intact strength. Little hard of hearing      Medical Decision Making:  Patient arriving to the ED with problem as above. A medical screening exam was performed. Labs/imaging orders initiated from Triage. The patient is appropriate to wait in triage.     But should be roomed with urgency to start respiratory treatments with neb.    The patient will be seen in the ED for final evaluation and disposition.     Sophie Stern on 12/22/2022 at 2:48 PM      Lab/Imaging Results:       Interventions:  Medications - No data to display    Diagnosis:  No diagnosis found.        Sophie Stern MD  Emergency Medicine  [facility] EMERGENCY DEPARTMENT         Sophie Stern MD  12/22/22 4635

## 2022-12-22 NOTE — ED TRIAGE NOTES
Patient here with grand daughter, cough for one week, went to clinic sent to ER. Sputum white, continual cough n triage

## 2022-12-23 NOTE — DISCHARGE INSTRUCTIONS
Monitor ER oxygen saturation with a pulse oximeter at home.  Return to the ER if this number is below 92% or if you develop difficulty breathing or shortness of breath.    Do not take your apixaban or simvastatin while you are taking the Paxlovid.  You can restart these medications after you are finished with the Paxlovid.

## 2022-12-24 LAB
BACTERIA UR CULT: ABNORMAL
BACTERIA UR CULT: ABNORMAL

## 2023-01-12 ENCOUNTER — OFFICE VISIT (OUTPATIENT)
Dept: CARDIOLOGY | Facility: CLINIC | Age: 88
End: 2023-01-12
Payer: COMMERCIAL

## 2023-01-12 VITALS
SYSTOLIC BLOOD PRESSURE: 104 MMHG | WEIGHT: 128.1 LBS | RESPIRATION RATE: 16 BRPM | HEART RATE: 72 BPM | DIASTOLIC BLOOD PRESSURE: 52 MMHG | BODY MASS INDEX: 18.34 KG/M2 | HEIGHT: 70 IN

## 2023-01-12 DIAGNOSIS — N18.31 STAGE 3A CHRONIC KIDNEY DISEASE (H): ICD-10-CM

## 2023-01-12 DIAGNOSIS — R55 SYNCOPE, UNSPECIFIED SYNCOPE TYPE: ICD-10-CM

## 2023-01-12 DIAGNOSIS — R55 VASOVAGAL SYNCOPE: ICD-10-CM

## 2023-01-12 DIAGNOSIS — E78.00 PURE HYPERCHOLESTEROLEMIA: ICD-10-CM

## 2023-01-12 DIAGNOSIS — I48.0 PAROXYSMAL ATRIAL FIBRILLATION (H): Primary | ICD-10-CM

## 2023-01-12 DIAGNOSIS — N35.010 POST-TRAUMATIC MALE URETHRAL MEATAL STRICTURE: ICD-10-CM

## 2023-01-12 DIAGNOSIS — E02 SUBCLINICAL IODINE-DEFICIENCY HYPOTHYROIDISM: ICD-10-CM

## 2023-01-12 DIAGNOSIS — I10 ESSENTIAL HYPERTENSION: ICD-10-CM

## 2023-01-12 DIAGNOSIS — I31.39 PERICARDIAL EFFUSION: ICD-10-CM

## 2023-01-12 DIAGNOSIS — I50.31 ACUTE HEART FAILURE WITH PRESERVED EJECTION FRACTION (HFPEF) (H): ICD-10-CM

## 2023-01-12 PROBLEM — R94.31 ACUTE ELECTROCARDIOGRAM CHANGES: Status: RESOLVED | Noted: 2022-10-21 | Resolved: 2023-01-12

## 2023-01-12 LAB
ANION GAP SERPL CALCULATED.3IONS-SCNC: 10 MMOL/L (ref 5–18)
ATRIAL RATE - MUSE: 45 BPM
BUN SERPL-MCNC: 26 MG/DL (ref 8–28)
CALCIUM SERPL-MCNC: 8.7 MG/DL (ref 8.5–10.5)
CHLORIDE BLD-SCNC: 106 MMOL/L (ref 98–107)
CO2 SERPL-SCNC: 25 MMOL/L (ref 22–31)
CREAT SERPL-MCNC: 1.09 MG/DL (ref 0.7–1.3)
DIASTOLIC BLOOD PRESSURE - MUSE: 65 MMHG
ERYTHROCYTE [DISTWIDTH] IN BLOOD BY AUTOMATED COUNT: 14.1 % (ref 10–15)
GFR SERPL CREATININE-BSD FRML MDRD: 66 ML/MIN/1.73M2
GLUCOSE BLD-MCNC: 152 MG/DL (ref 70–125)
HCT VFR BLD AUTO: 31.3 % (ref 40–53)
HGB BLD-MCNC: 10 G/DL (ref 13.3–17.7)
INTERPRETATION ECG - MUSE: NORMAL
MCH RBC QN AUTO: 32.5 PG (ref 26.5–33)
MCHC RBC AUTO-ENTMCNC: 31.9 G/DL (ref 31.5–36.5)
MCV RBC AUTO: 102 FL (ref 78–100)
P AXIS - MUSE: NORMAL DEGREES
PLATELET # BLD AUTO: 233 10E3/UL (ref 150–450)
POTASSIUM BLD-SCNC: 4.1 MMOL/L (ref 3.5–5)
PR INTERVAL - MUSE: NORMAL MS
QRS DURATION - MUSE: 82 MS
QT - MUSE: 400 MS
QTC - MUSE: 392 MS
R AXIS - MUSE: 26 DEGREES
RBC # BLD AUTO: 3.08 10E6/UL (ref 4.4–5.9)
SODIUM SERPL-SCNC: 141 MMOL/L (ref 136–145)
SYSTOLIC BLOOD PRESSURE - MUSE: 116 MMHG
T AXIS - MUSE: 69 DEGREES
T4 FREE SERPL-MCNC: 0.71 NG/DL (ref 0.9–1.7)
TSH SERPL DL<=0.005 MIU/L-ACNC: 55.04 UIU/ML (ref 0.3–5)
VENTRICULAR RATE- MUSE: 58 BPM
WBC # BLD AUTO: 5.2 10E3/UL (ref 4–11)

## 2023-01-12 PROCEDURE — 99214 OFFICE O/P EST MOD 30 MIN: CPT | Mod: 25 | Performed by: INTERNAL MEDICINE

## 2023-01-12 PROCEDURE — 85027 COMPLETE CBC AUTOMATED: CPT | Performed by: INTERNAL MEDICINE

## 2023-01-12 PROCEDURE — 84443 ASSAY THYROID STIM HORMONE: CPT | Performed by: INTERNAL MEDICINE

## 2023-01-12 PROCEDURE — 36415 COLL VENOUS BLD VENIPUNCTURE: CPT | Performed by: INTERNAL MEDICINE

## 2023-01-12 PROCEDURE — 80048 BASIC METABOLIC PNL TOTAL CA: CPT | Performed by: INTERNAL MEDICINE

## 2023-01-12 PROCEDURE — 84439 ASSAY OF FREE THYROXINE: CPT | Performed by: INTERNAL MEDICINE

## 2023-01-12 NOTE — PROGRESS NOTES
[facility]  Heart Care Clinic Follow-up Note    Assessment & Plan        (I48.0) Paroxysmal atrial fibrillation (H)  (primary encounter diagnosis)  Comment: Presented with rapid ventricular response and spontaneously converted to sinus rhythm.  Was placed on Eliquis 5 mg by mouth twice a day but given his weight under 60 kg as well as age over 80 years we will lower this to 2.5 mg by mouth twice a day.  At this point time asymptomatic, not valvular.  Might have an element of sick sinus syndrome but at this point time no pacemaker.    (I50.31) Acute heart failure with preserved ejection fraction (HFpEF) (H)  Comment: No significant signs or symptoms currently.  On no diuretic.    (I31.39) Pericardial effusion  Comment: Moderate sized with some mild right ventricular collapse.  Holding antihypertensives, given low blood pressure in the field was prepared for pericardiocentesis but his fluid resolved.  We will recheck echo.   This could have been due to thyroid issues.    (I10) Essential hypertension  Comment: Under good control and if anything a little bit on the low side.  Most likely would consider backing off on dose of atenolol.    (R55) Vasovagal syncope  Comment: Uncertain etiology, no postictal state, could be neurocardiogenic although there was no inciting event.  Could be arrhythmogenic as well.  No abnormalities seen on telemetry.  Possibly secondary to low volume state given pericardial effusion.   Would arrange for outpatient event monitor.  Suspect this is due to profound hypothyroidism.    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    (E78.00) Pure hypercholesterolemia  Comment: Given his age and firm status not her statin is of paramount importance and told him to this let this run  out.    (E02) Subclinical iodine-deficiency hypothyroidism  Comment: TSH markedly elevated at 64.25, now on Synthroid and defer to primary.    Plan  1.  Check renal profile today as well as CBC and will also check TSH given his profound increase in TSH.  2.  Check limited echo looking for pericardial fusion, suspect this was due to the hypothyroid.  3.  Check event monitor looking for arrhythmias.  4.  Lower dose of Eliquis to 2.5 mg by mouth twice a day.  5.  Follow-up with me in about 6 months or sooner if needed.    Subjective  CC: 87-year-old white gentleman being seen post hospital discharge follow-up today.  He is here with his granddaughter.  He lives at home independently with his wife, granddaughter, and 2 sons looking on him frequently.  He is getting ready to have physical therapy come to his house.  He is not doing much physical activity other than some leg exercises in a chair.  He sleeps in a lazy boy recliner.  No major chest pains, palpitations, shortness of breath, PND, orthopnea or peripheral edema.    Medications  Current Outpatient Medications   Medication Sig Dispense Refill     apixaban ANTICOAGULANT (ELIQUIS) 5 MG tablet Take 1 tablet (5 mg) by mouth 2 times daily 60 tablet 11     atenolol (TENORMIN) 25 MG tablet Take 25 mg by mouth daily       Calcium Carbonate-Vitamin D (OSCAL 500/200 D-3 PO) Take 1 tablet by mouth daily.       cephALEXin (KEFLEX) 250 MG capsule Take 1 capsule twice a day by oral route.       GLUCOSAMINE-CHONDROITIN-VIT D3 PO Take 1 tablet by mouth daily       hydrOXYzine (ATARAX) 10 MG tablet Take 20 mg by mouth At Bedtime       ipratropium - albuterol 0.5 mg/2.5 mg/3 mL (DUONEB) 0.5-2.5 (3) MG/3ML neb solution Take 1 vial (3 mLs) by nebulization every 6 hours as needed for shortness of breath, wheezing or cough 90 mL 0     levothyroxine (SYNTHROID/LEVOTHROID) 50 MCG tablet Take 1 tablet (50 mcg) by mouth every morning (before breakfast) 30 tablet 0     order for DME  "Equipment being ordered: Home automatic blood pressure cuff. Check once daily for one week, and then 3 times weekly thereafter. 1 Device 0     potassium chloride ER (KLOR-CON M) 20 MEQ CR tablet Take 1 tablet (20 mEq) by mouth daily 90 tablet 1     simvastatin (ZOCOR) 10 MG tablet Take 1 tablet (10 mg) by mouth At Bedtime 90 tablet 3       Objective  /52 (BP Location: Right arm, Patient Position: Sitting, Cuff Size: Adult Small)   Pulse 72   Resp 16   Ht 1.778 m (5' 10\")   Wt 58.1 kg (128 lb 1.6 oz)   BMI 18.38 kg/m      General Appearance:    Alert, cooperative, no distress, appears stated age   Head:    Normocephalic, without obvious abnormality, atraumatic   Throat:   Lips, mucosa, and tongue normal; teeth and gums normal   Neck:   Supple, symmetrical, trachea midline, no adenopathy;        thyroid:  No enlargement/tenderness/nodules; no carotid    bruit or JVD   Back:     Symmetric, no curvature, ROM normal, no CVA tenderness   Lungs:     Clear to auscultation bilaterally, respirations unlabored   Chest wall:    No tenderness or deformity   Heart:    Regular rate and rhythm, S1 and S2 normal, no murmur, rub   or gallop   Abdomen:     Soft, non-tender, bowel sounds active all four quadrants,     no masses, no organomegaly   Extremities:   Normal, atraumatic, no cyanosis or edema   Pulses:   2+ and symmetric all extremities   Skin:   Skin color, texture, turgor normal, no rashes or lesions     Results    Lab Results personally reviewed   Lab Results   Component Value Date    CHOL 153 10/23/2022    CHOL 153 02/11/2019     Lab Results   Component Value Date    HDL 57 10/23/2022    HDL 63 02/11/2019     No components found for: LDLCALC  Lab Results   Component Value Date    TRIG 60 10/23/2022    TRIG 130.0 11/17/2016     Lab Results   Component Value Date    WBC 10.2 12/22/2022    HGB 8.8 (L) 12/22/2022    HCT 27.2 (L) 12/22/2022     12/22/2022     Lab Results   Component Value Date    BUN 31.0 (H) " 12/22/2022     12/22/2022    CO2 24 12/22/2022

## 2023-01-12 NOTE — LETTER
1/12/2023    [facility]  [address]  [facility] [address]    RE: Dilip Long       Dear Colleague,     I had the pleasure of seeing Dilip Long in the [facility].      [facility]  Heart Care Clinic Follow-up Note    Assessment & Plan        (I48.0) Paroxysmal atrial fibrillation (H)  (primary encounter diagnosis)  Comment: Presented with rapid ventricular response and spontaneously converted to sinus rhythm.  Was placed on Eliquis 5 mg by mouth twice a day but given his weight under 60 kg as well as age over 80 years we will lower this to 2.5 mg by mouth twice a day.  At this point time asymptomatic, not valvular.  Might have an element of sick sinus syndrome but at this point time no pacemaker.    (I50.31) Acute heart failure with preserved ejection fraction (HFpEF) (H)  Comment: No significant signs or symptoms currently.  On no diuretic.    (I31.39) Pericardial effusion  Comment: Moderate sized with some mild right ventricular collapse.  Holding antihypertensives, given low blood pressure in the field was prepared for pericardiocentesis but his fluid resolved.  We will recheck echo.   This could have been due to thyroid issues.    (I10) Essential hypertension  Comment: Under good control and if anything a little bit on the low side.  Most likely would consider backing off on dose of atenolol.    (R55) Vasovagal syncope  Comment: Uncertain etiology, no postictal state, could be neurocardiogenic although there was no inciting event.  Could be arrhythmogenic as well.  No abnormalities seen on telemetry.  Possibly secondary to low volume state given pericardial effusion.   Would arrange for outpatient event monitor.  Suspect this is due to profound hypothyroidism.    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    (E78.00) Pure hypercholesterolemia  Comment: Given his age and firm status not her statin is of paramount importance and told him to this let this run out.    (E02) Subclinical iodine-deficiency hypothyroidism  Comment: TSH markedly elevated at 64.25, now on Synthroid and defer to primary.    Plan  1.  Check renal profile today as well as CBC and will also check TSH given his profound increase in TSH.  2.  Check limited echo looking for pericardial fusion, suspect this was due to the hypothyroid.  3.  Check event monitor looking for arrhythmias.  4.  Lower dose of Eliquis to 2.5 mg by mouth twice a day.  5.  Follow-up with me in about 6 months or sooner if needed.    Subjective  CC: 87-year-old white gentleman being seen post hospital discharge follow-up today.  He is here with his granddaughter.  He lives at home independently with his wife, granddaughter, and 2 sons looking on him frequently.  He is getting ready to have physical therapy come to his house.  He is not doing much physical activity other than some leg exercises in a chair.  He sleeps in a lazy boy recliner.  No major chest pains, palpitations, shortness of breath, PND, orthopnea or peripheral edema.    Medications  Current Outpatient Medications   Medication Sig Dispense Refill     apixaban ANTICOAGULANT (ELIQUIS) 5 MG tablet Take 1 tablet (5 mg) by mouth 2 times daily 60 tablet 11     atenolol (TENORMIN) 25 MG tablet Take 25 mg by mouth daily       Calcium Carbonate-Vitamin D (OSCAL 500/200 D-3 PO) Take 1 tablet by mouth daily.       cephALEXin (KEFLEX) 250 MG capsule Take 1 capsule twice a day by oral route.       GLUCOSAMINE-CHONDROITIN-VIT D3 PO Take 1 tablet by mouth daily       hydrOXYzine (ATARAX) 10 MG tablet Take 20 mg by mouth At Bedtime       ipratropium - albuterol 0.5 mg/2.5 mg/3 mL (DUONEB) 0.5-2.5 (3) MG/3ML neb solution Take 1 vial (3 mLs) by nebulization every 6 hours as  "needed for shortness of breath, wheezing or cough 90 mL 0     levothyroxine (SYNTHROID/LEVOTHROID) 50 MCG tablet Take 1 tablet (50 mcg) by mouth every morning (before breakfast) 30 tablet 0     order for DME Equipment being ordered: Home automatic blood pressure cuff. Check once daily for one week, and then 3 times weekly thereafter. 1 Device 0     potassium chloride ER (KLOR-CON M) 20 MEQ CR tablet Take 1 tablet (20 mEq) by mouth daily 90 tablet 1     simvastatin (ZOCOR) 10 MG tablet Take 1 tablet (10 mg) by mouth At Bedtime 90 tablet 3       Objective  /52 (BP Location: Right arm, Patient Position: Sitting, Cuff Size: Adult Small)   Pulse 72   Resp 16   Ht 1.778 m (5' 10\")   Wt 58.1 kg (128 lb 1.6 oz)   BMI 18.38 kg/m      General Appearance:    Alert, cooperative, no distress, appears stated age   Head:    Normocephalic, without obvious abnormality, atraumatic   Throat:   Lips, mucosa, and tongue normal; teeth and gums normal   Neck:   Supple, symmetrical, trachea midline, no adenopathy;        thyroid:  No enlargement/tenderness/nodules; no carotid    bruit or JVD   Back:     Symmetric, no curvature, ROM normal, no CVA tenderness   Lungs:     Clear to auscultation bilaterally, respirations unlabored   Chest wall:    No tenderness or deformity   Heart:    Regular rate and rhythm, S1 and S2 normal, no murmur, rub   or gallop   Abdomen:     Soft, non-tender, bowel sounds active all four quadrants,     no masses, no organomegaly   Extremities:   Normal, atraumatic, no cyanosis or edema   Pulses:   2+ and symmetric all extremities   Skin:   Skin color, texture, turgor normal, no rashes or lesions     Results    Lab Results personally reviewed   Lab Results   Component Value Date    CHOL 153 10/23/2022    CHOL 153 02/11/2019     Lab Results   Component Value Date    HDL 57 10/23/2022    HDL 63 02/11/2019     No components found for: LDLCALC  Lab Results   Component Value Date    TRIG 60 10/23/2022    TRIG " 130.0 11/17/2016     Lab Results   Component Value Date    WBC 10.2 12/22/2022    HGB 8.8 (L) 12/22/2022    HCT 27.2 (L) 12/22/2022     12/22/2022     Lab Results   Component Value Date    BUN 31.0 (H) 12/22/2022     12/22/2022    CO2 24 12/22/2022               Thank you for allowing me to participate in the care of your patient.      Sincerely,     ALENA ROGER MD     [facility]  cc:   No referring provider defined for this encounter.

## 2023-01-12 NOTE — PATIENT INSTRUCTIONS
Mr Dilip VICK Long,  I enjoyed visiting with you again today.  I am glad to hear you are doing well.  Per our conversation I will check blood work today to make sure your kidneys are still doing well.  Given your age and your weight, a lower dose of Eliquis would be reasonable, cut your 5 mg tablets in half for 2.5 mg twice a day.  When you run out of this give me a call at [phone] for a lower dose tablet.  When you run out of the simvastatin for cholesterol do not bother refilling it.  I tend to not treat cholesterol when you get to your late 80s.  I will recheck an echocardiogram looking for effusion or fluid around the heart  I will plan on seeing you thereafter in several months.  Kurt Quevedo

## 2023-01-16 ENCOUNTER — HOSPITAL ENCOUNTER (EMERGENCY)
Facility: HOSPITAL | Age: 88
Discharge: HOME | End: 2023-01-16
Payer: COMMERCIAL

## 2023-01-16 VITALS
HEART RATE: 54 BPM | OXYGEN SATURATION: 95 % | RESPIRATION RATE: 16 BRPM | TEMPERATURE: 97.6 F | DIASTOLIC BLOOD PRESSURE: 71 MMHG | SYSTOLIC BLOOD PRESSURE: 151 MMHG

## 2023-01-16 DIAGNOSIS — R33.9 URINARY RETENTION: ICD-10-CM

## 2023-01-16 LAB
ALBUMIN UR-MCNC: 10 MG/DL
ANION GAP SERPL CALCULATED.3IONS-SCNC: 12 MMOL/L (ref 7–15)
APPEARANCE UR: CLEAR
BASOPHILS # BLD AUTO: 0 10E3/UL (ref 0–0.2)
BASOPHILS NFR BLD AUTO: 1 %
BILIRUB UR QL STRIP: NEGATIVE
BUN SERPL-MCNC: 21 MG/DL (ref 8–23)
CALCIUM SERPL-MCNC: 9.3 MG/DL (ref 8.8–10.2)
CHLORIDE SERPL-SCNC: 103 MMOL/L (ref 98–107)
COLOR UR AUTO: ABNORMAL
CREAT SERPL-MCNC: 0.76 MG/DL (ref 0.67–1.17)
DEPRECATED HCO3 PLAS-SCNC: 17 MMOL/L (ref 22–29)
EOSINOPHIL # BLD AUTO: 0.2 10E3/UL (ref 0–0.7)
EOSINOPHIL NFR BLD AUTO: 3 %
ERYTHROCYTE [DISTWIDTH] IN BLOOD BY AUTOMATED COUNT: 14.2 % (ref 10–15)
GFR SERPL CREATININE-BSD FRML MDRD: 88 ML/MIN/1.73M2
GLUCOSE SERPL-MCNC: 75 MG/DL (ref 70–99)
GLUCOSE UR STRIP-MCNC: NEGATIVE MG/DL
HCT VFR BLD AUTO: 34.3 % (ref 40–53)
HGB BLD-MCNC: 10.9 G/DL (ref 13.3–17.7)
HGB UR QL STRIP: ABNORMAL
IMM GRANULOCYTES # BLD: 0.1 10E3/UL
IMM GRANULOCYTES NFR BLD: 1 %
KETONES UR STRIP-MCNC: NEGATIVE MG/DL
LEUKOCYTE ESTERASE UR QL STRIP: ABNORMAL
LYMPHOCYTES # BLD AUTO: 1.5 10E3/UL (ref 0.8–5.3)
LYMPHOCYTES NFR BLD AUTO: 25 %
MCH RBC QN AUTO: 33.2 PG (ref 26.5–33)
MCHC RBC AUTO-ENTMCNC: 31.8 G/DL (ref 31.5–36.5)
MCV RBC AUTO: 105 FL (ref 78–100)
MONOCYTES # BLD AUTO: 0.7 10E3/UL (ref 0–1.3)
MONOCYTES NFR BLD AUTO: 12 %
MUCOUS THREADS #/AREA URNS LPF: PRESENT /LPF
NEUTROPHILS # BLD AUTO: 3.5 10E3/UL (ref 1.6–8.3)
NEUTROPHILS NFR BLD AUTO: 58 %
NITRATE UR QL: NEGATIVE
NRBC # BLD AUTO: 0 10E3/UL
NRBC BLD AUTO-RTO: 0 /100
PH UR STRIP: 6 [PH] (ref 5–7)
PLATELET # BLD AUTO: 213 10E3/UL (ref 150–450)
POTASSIUM SERPL-SCNC: 5 MMOL/L (ref 3.4–5.3)
RBC # BLD AUTO: 3.28 10E6/UL (ref 4.4–5.9)
RBC URINE: 6 /HPF
SODIUM SERPL-SCNC: 132 MMOL/L (ref 136–145)
SP GR UR STRIP: 1.01 (ref 1–1.03)
SQUAMOUS EPITHELIAL: <1 /HPF
UROBILINOGEN UR STRIP-MCNC: <2 MG/DL
WBC # BLD AUTO: 5.8 10E3/UL (ref 4–11)
WBC CLUMPS #/AREA URNS HPF: PRESENT /HPF
WBC URINE: 71 /HPF
YEAST #/AREA URNS HPF: ABNORMAL /HPF

## 2023-01-16 PROCEDURE — 85025 COMPLETE CBC W/AUTO DIFF WBC: CPT

## 2023-01-16 PROCEDURE — 96372 THER/PROPH/DIAG INJ SC/IM: CPT | Mod: 59

## 2023-01-16 PROCEDURE — 250N000011 HC RX IP 250 OP 636

## 2023-01-16 PROCEDURE — 36415 COLL VENOUS BLD VENIPUNCTURE: CPT

## 2023-01-16 PROCEDURE — 51702 INSERT TEMP BLADDER CATH: CPT

## 2023-01-16 PROCEDURE — 51798 US URINE CAPACITY MEASURE: CPT

## 2023-01-16 PROCEDURE — 81001 URINALYSIS AUTO W/SCOPE: CPT

## 2023-01-16 PROCEDURE — 82947 ASSAY GLUCOSE BLOOD QUANT: CPT

## 2023-01-16 PROCEDURE — 99284 EMERGENCY DEPT VISIT MOD MDM: CPT | Mod: 25

## 2023-01-16 PROCEDURE — 87086 URINE CULTURE/COLONY COUNT: CPT

## 2023-01-16 RX ORDER — MORPHINE SULFATE 2 MG/ML
1 INJECTION, SOLUTION INTRAMUSCULAR; INTRAVENOUS ONCE
Status: COMPLETED | OUTPATIENT
Start: 2023-01-16 | End: 2023-01-16

## 2023-01-16 RX ADMIN — MORPHINE SULFATE 1 MG: 2 INJECTION, SOLUTION INTRAMUSCULAR; INTRAVENOUS at 09:45

## 2023-01-16 ASSESSMENT — ENCOUNTER SYMPTOMS
ABDOMINAL PAIN: 1
DIARRHEA: 0
FEVER: 0
SHORTNESS OF BREATH: 0
HEMATURIA: 0
HEADACHES: 0
CHILLS: 0
NAUSEA: 0
VOMITING: 0
DIFFICULTY URINATING: 1

## 2023-01-16 ASSESSMENT — ACTIVITIES OF DAILY LIVING (ADL)
ADLS_ACUITY_SCORE: 35
ADLS_ACUITY_SCORE: 33

## 2023-01-16 NOTE — ED PROVIDER NOTES
EMERGENCY DEPARTMENT ENCOUNTER      NAME: Dilip Long  AGE: 87 year old male  YOB: 1935  MRN: [id number]  EVALUATION DATE & TIME: 1/16/2023  8:14 AM    PCP: [facility]    ED PROVIDER: Estelle Mcdonald PA-C      Chief Complaint   Patient presents with     Urinary Retention     FINAL IMPRESSION:  1. Urinary retention          ED COURSE & MEDICAL DECISION MAKING:    Pertinent Labs & Imaging studies reviewed. (See chart for details)  87 year old male presents to the Emergency Department for evaluation of urinary retention and generalized abdominal pain.  Yesterday evening patient's catheter stopped draining. He was able to get some of his urine to drain with moving.  Vital signs reviewed and unremarkable.  On exam patient is tender in all 4 quadrants.  He is resting comfortably.     Differential diagnosis includes but not limited to bladder perforation, UTI, pyelonephritis, catheter blockage. CBC and Basic was unremarkable. UA showed leukocyte, RBC and WBC. Patient's UA results were improved from 12/22/22.  Patient's caretaker reports patient's urine is always dirty.  Patient recently was treated for a UTI.  Bladder scan showed 500 cc.  Patient's Lozano was changed and immediately started to drain light yellow urine.  Patient symptoms completely resolved after Lozano catheter was placed.  Patient received morphine while in the ED.  Patient immediately request to be discharged home.  Patient and patient's caretaker feel safe to be go home.  Patient was provided strict return precautions. Patient will follow-up with [facility] to discuss his catheter problem.  He will return to the ED if new symptoms develop or symptoms worsen. All questions answered and patient agrees with plan.    ED COURSE:   8:25 AM I saw the patient.   9:27 AM I rechecked the patient. He would like to be discharged as soon as possible.  10:27 AM Bladder scan revealed 500 ml. Nurse is in the room and will  change patient's Lozano now.   11:24 AM new Lozano was placed successfully.  Actively draining light yellow urine.  Patient denies all symptoms.  He would like to be discharged home at this time.       Additional Documentation    History:    Supplemental history from: Documented in HPI, if applicable    External Record(s) reviewed: Documented in HPI, if applicable.    Work Up:    Chart documentation includes differential considered and any EKGs or imaging interpreted by provider.    In additional to work up documented, I considered the following work up: See chart documentation, if applicable.    External consultation:    Discussion of management with another provider: See chart documentation, if applicable    Complicating factors:    Care impacted by chronic illness: N/A    Care affected by social determinants of health: N/A    Disposition considerations: Discharge. No recommendations on prescription strength medication(s). N/A.      MEDICATIONS GIVEN IN THE EMERGENCY:  Medications   morphine (PF) injection 1 mg (1 mg Intramuscular Given 1/16/23 0945)       NEW PRESCRIPTIONS STARTED AT TODAY'S ER VISIT  New Prescriptions    No medications on file        =================================================================    HPI    Patient information was obtained from: Patient     Use of : N/A        Dilip NICANOR Mercedes is a 87 year old male with a pertinent history of chronic indwelling Lozano catheter, HFpEF, Afib on Eliquis, PAD, CKD stage 3, HLD, HTN, who presents to this ED via walk-in for evaluation of urinary retention.     Patient with chronic indwelling Lozano catheter reports he has had urinary retention and associated suprapubic abdominal pain since yesterday. States that these symptoms kept him awake all night last night. He notes that standing up and walking around do partially relieve his pain and help with drainage, but not as much as they typically do. Patient's friend notes that he has had issues  with UTIs, as well as other drainage issues since he had his Daugherty placed in October of last year. Friend also noticed some mucus in his urine recently. Patient did just have his Daugherty changed and was started on a course of cephalexin last week by his urologist.     Patient denies any hematuria, chest pain, shortness of breath, headache, fever, chills, diarrhea, nausea, vomiting or any other complaints.      REVIEW OF SYSTEMS   Review of Systems   Constitutional: Negative for chills and fever.   Respiratory: Negative for shortness of breath.    Cardiovascular: Negative for chest pain.   Gastrointestinal: Positive for abdominal pain (suprapubic). Negative for diarrhea, nausea and vomiting.   Genitourinary: Positive for difficulty urinating. Negative for hematuria.   Neurological: Negative for headaches.   All other systems reviewed and are negative.      PAST MEDICAL HISTORY:  Past Medical History:   Diagnosis Date     Acute heart failure with preserved ejection fraction (HFpEF) (H) 11/10/2022     Acute prostatitis      Asymptomatic bacteriuria 10/23/2013    Noted at 10/2/13 office visit at [facility]. No treatment recommended at that time.      Congestive heart failure (H)      Essential hypertension 08/18/2015     Hypertension      Paroxysmal atrial fib -- on Eliquis 11/06/2022     Syncope      Thyroid disease        PAST SURGICAL HISTORY:  Past Surgical History:   Procedure Laterality Date     BLADDER SURGERY      Bladder absces removal     Blepharoplasty Upper Lid W/ Excessive Skin[  1/29/2007     CATARACT EXTRACTION       EYE SURGERY      both eyes 2015     IR LUMBAR EPIDURAL STEROID INJECTION  4/27/2004     IR LUMBAR EPIDURAL STEROID INJECTION  5/11/2004     IR LUMBAR EPIDURAL STEROID INJECTION  11/24/2004     IR LUMBAR EPIDURAL STEROID INJECTION  11/28/2007     FL CYSTOURETHROSCOPY W/IRRIG & EVAC CLOTS N/A 7/27/2018    Procedure: CYSTOSCOPY, CLOT EVACUATION ,URETHRAL DILATATION, DAUGHERTY CATHETER PLACEMENT;   Surgeon: Lowell Arias MD;  Location: [facility];  Service: Urology       CURRENT MEDICATIONS:    apixaban ANTICOAGULANT (ELIQUIS) 5 MG tablet  atenolol (TENORMIN) 25 MG tablet  Calcium Carbonate-Vitamin D (OSCAL 500/200 D-3 PO)  cephALEXin (KEFLEX) 250 MG capsule  GLUCOSAMINE-CHONDROITIN-VIT D3 PO  hydrOXYzine (ATARAX) 10 MG tablet  ipratropium - albuterol 0.5 mg/2.5 mg/3 mL (DUONEB) 0.5-2.5 (3) MG/3ML neb solution  levothyroxine (SYNTHROID/LEVOTHROID) 50 MCG tablet  order for DME  potassium chloride ER (KLOR-CON M) 20 MEQ CR tablet  simvastatin (ZOCOR) 10 MG tablet        ALLERGIES:  No Known Allergies    FAMILY HISTORY:  Family History   Problem Relation Age of Onset     Diabetes No family hx of      Hypertension No family hx of      Other Cancer No family hx of      Coronary Artery Disease No family hx of      Hyperlipidemia No family hx of      Cerebrovascular Disease No family hx of      Breast Cancer No family hx of      Colon Cancer No family hx of      Prostate Cancer No family hx of      Depression No family hx of      Anxiety Disorder No family hx of      Mental Illness No family hx of      Substance Abuse No family hx of      Anesthesia Reaction No family hx of      Asthma No family hx of      Osteoporosis No family hx of      Genetic Disorder No family hx of      Thyroid Disease No family hx of      Obesity No family hx of      No Known Problems Mother      No Known Problems Father        SOCIAL HISTORY:   Social History     Socioeconomic History     Marital status:      Number of children: 4   Occupational History     Occupation: Retired   Tobacco Use     Smoking status: Former     Types: Cigarettes     Smokeless tobacco: Never     Tobacco comments:     Pt quit 40 years ago   Vaping Use     Vaping Use: Never used   Substance and Sexual Activity     Alcohol use: Yes     Comment: < 1 drink a week     Drug use: No   Social History Narrative    , 4 children, lives with his  wife, and is a retired .  Desires DNR/DNI.  (last updated 11/29/2022)        VITALS:  Pulse 62   Temp 97.6  F (36.4  C)   Resp 16   SpO2 90%     PHYSICAL EXAM    Physical Exam  Vitals and nursing note reviewed.   Constitutional:       General: He is not in acute distress.     Appearance: Normal appearance.   HENT:      Head: Atraumatic.      Right Ear: External ear normal.      Left Ear: External ear normal.      Nose: Nose normal.      Mouth/Throat:      Mouth: Mucous membranes are moist.   Eyes:      Conjunctiva/sclera: Conjunctivae normal.      Pupils: Pupils are equal, round, and reactive to light.   Cardiovascular:      Rate and Rhythm: Normal rate and regular rhythm.      Pulses: Normal pulses.      Heart sounds: Normal heart sounds. No murmur heard.    No friction rub. No gallop.   Pulmonary:      Effort: Pulmonary effort is normal.      Breath sounds: Normal breath sounds. No wheezing or rales.   Abdominal:      Tenderness: There is abdominal tenderness (generalized). There is no right CVA tenderness, left CVA tenderness, guarding or rebound.   Musculoskeletal:      Cervical back: Normal range of motion.   Skin:     General: Skin is dry.   Neurological:      General: No focal deficit present.      Mental Status: He is alert and oriented to person, place, and time. Mental status is at baseline.      Cranial Nerves: No cranial nerve deficit.   Psychiatric:         Mood and Affect: Mood normal.         Thought Content: Thought content normal.       Labs Ordered and Resulted from Time of ED Arrival to Time of ED Departure   ROUTINE UA WITH MICROSCOPIC REFLEX TO CULTURE - Abnormal       Result Value    Color Urine Light Yellow      Appearance Urine Clear      Glucose Urine Negative      Bilirubin Urine Negative      Ketones Urine Negative      Specific Gravity Urine 1.014      Blood Urine 0.1 mg/dL (*)     pH Urine 6.0      Protein Albumin Urine 10 (*)     Urobilinogen Urine <2.0      Nitrite  Urine Negative      Leukocyte Esterase Urine 500 Deion/uL (*)     WBC Clumps Urine Present (*)     Hyphal Yeast Urine Few (*)     Mucus Urine Present (*)     RBC Urine 6 (*)     WBC Urine 71 (*)     Squamous Epithelials Urine <1     BASIC METABOLIC PANEL - Abnormal    Sodium 132 (*)     Potassium 5.0      Chloride 103      Carbon Dioxide (CO2) 17 (*)     Anion Gap 12      Urea Nitrogen 21.0      Creatinine 0.76      Calcium 9.3      Glucose 75      GFR Estimate 88     CBC WITH PLATELETS AND DIFFERENTIAL - Abnormal    WBC Count 5.8      RBC Count 3.28 (*)     Hemoglobin 10.9 (*)     Hematocrit 34.3 (*)      (*)     MCH 33.2 (*)     MCHC 31.8      RDW 14.2      Platelet Count 213      % Neutrophils 58      % Lymphocytes 25      % Monocytes 12      % Eosinophils 3      % Basophils 1      % Immature Granulocytes 1      NRBCs per 100 WBC 0      Absolute Neutrophils 3.5      Absolute Lymphocytes 1.5      Absolute Monocytes 0.7      Absolute Eosinophils 0.2      Absolute Basophils 0.0      Absolute Immature Granulocytes 0.1      Absolute NRBCs 0.0     URINE CULTURE     I, Louie Franco, am serving as a scribe to document services personally performed by Estelle Mcdonald PA-C, based on my observation and the provider's statements to me. I, Estelle Mcdonald PA-C, attest that Louie Franco is acting in a scribe capacity, has observed my performance of the services and has documented them in accordance with my direction.    Estelle Mcdonald PA-C  [facility] EMERGENCY DEPARTMENT  [address] [place] [id number] [phone]     Estelle Mcdonald PA-C  01/16/23 1042

## 2023-01-16 NOTE — ED NOTES
ER DISCHARGE NOTE:   Dilip Long MRN: [id number]      Dilip Long is discharged from the emergency room.    (R33.9) Urinary retention  Comment:  Plan: replaced jorgekatey Long discharged via self with granddaughter .    Verbalized understanding of discharge instructions. Instructed to get in contact with clinic and be seen later this week.   Prescriptions: none.    AVS in hand.

## 2023-01-16 NOTE — ED NOTES
Pt comes in with granddaughter with complaints of abdominal pain. She reported that the jorge was replaced this last week by home care. It had been draining good but yesterday he noticed it was not draining as well. He would try and move around and then more would come out but the pain has continued to get worse. There was about 100 out last night. He was also put on abx which he is still taking. He rates his pain 10/10. Abd pis very tender to palpations and it is firm to the touch

## 2023-01-16 NOTE — DISCHARGE INSTRUCTIONS
Return to the ED if new symptoms develop or symptoms worsen.  Follow-up with [facility] to discuss your catheter problem.

## 2023-01-16 NOTE — ED TRIAGE NOTES
Pt with c/o urinary retention.  Pt states feels like his abdomen is full secondary to urine backing up.  Pt has jorge in place and has required it to be irrigated in the past.     Triage Assessment     Row Name 01/16/23 0811       Triage Assessment (Adult)    Airway WDL WDL       Respiratory WDL    Respiratory WDL WDL

## 2023-01-18 LAB — BACTERIA UR CULT: NORMAL

## 2023-01-30 ENCOUNTER — APPOINTMENT (OUTPATIENT)
Dept: CT IMAGING | Facility: HOSPITAL | Age: 88
End: 2023-01-30
Attending: EMERGENCY MEDICINE
Payer: COMMERCIAL

## 2023-01-30 ENCOUNTER — HOSPITAL ENCOUNTER (EMERGENCY)
Facility: HOSPITAL | Age: 88
Discharge: HOME | End: 2023-01-30
Attending: EMERGENCY MEDICINE | Admitting: EMERGENCY MEDICINE
Payer: COMMERCIAL

## 2023-01-30 ENCOUNTER — ANCILLARY PROCEDURE (OUTPATIENT)
Dept: ULTRASOUND IMAGING | Facility: HOSPITAL | Age: 88
End: 2023-01-30
Attending: EMERGENCY MEDICINE
Payer: COMMERCIAL

## 2023-01-30 VITALS
BODY MASS INDEX: 18.32 KG/M2 | HEIGHT: 70 IN | HEART RATE: 76 BPM | TEMPERATURE: 97.8 F | RESPIRATION RATE: 18 BRPM | SYSTOLIC BLOOD PRESSURE: 159 MMHG | DIASTOLIC BLOOD PRESSURE: 72 MMHG | OXYGEN SATURATION: 100 % | WEIGHT: 128 LBS

## 2023-01-30 DIAGNOSIS — I73.9 PAD (PERIPHERAL ARTERY DISEASE) (H): ICD-10-CM

## 2023-01-30 DIAGNOSIS — N30.91 HEMORRHAGIC CYSTITIS: ICD-10-CM

## 2023-01-30 DIAGNOSIS — Z79.01 ANTICOAGULATED: ICD-10-CM

## 2023-01-30 LAB
ALBUMIN UR-MCNC: 100 MG/DL
ANION GAP SERPL CALCULATED.3IONS-SCNC: 8 MMOL/L (ref 7–15)
APPEARANCE UR: ABNORMAL
BILIRUB UR QL STRIP: NEGATIVE
BUN SERPL-MCNC: 20.4 MG/DL (ref 8–23)
CALCIUM SERPL-MCNC: 9.6 MG/DL (ref 8.8–10.2)
CHLORIDE SERPL-SCNC: 105 MMOL/L (ref 98–107)
COLOR UR AUTO: ABNORMAL
CREAT SERPL-MCNC: 1.27 MG/DL (ref 0.67–1.17)
DEPRECATED HCO3 PLAS-SCNC: 27 MMOL/L (ref 22–29)
ERYTHROCYTE [DISTWIDTH] IN BLOOD BY AUTOMATED COUNT: 14.4 % (ref 10–15)
GFR SERPL CREATININE-BSD FRML MDRD: 55 ML/MIN/1.73M2
GLUCOSE SERPL-MCNC: 104 MG/DL (ref 70–99)
GLUCOSE UR STRIP-MCNC: NEGATIVE MG/DL
HCT VFR BLD AUTO: 32.8 % (ref 40–53)
HGB BLD-MCNC: 10.4 G/DL (ref 13.3–17.7)
HGB UR QL STRIP: ABNORMAL
KETONES UR STRIP-MCNC: NEGATIVE MG/DL
LEUKOCYTE ESTERASE UR QL STRIP: ABNORMAL
MCH RBC QN AUTO: 33 PG (ref 26.5–33)
MCHC RBC AUTO-ENTMCNC: 31.7 G/DL (ref 31.5–36.5)
MCV RBC AUTO: 104 FL (ref 78–100)
NITRATE UR QL: NEGATIVE
PH UR STRIP: 6.5 [PH] (ref 5–7)
PLATELET # BLD AUTO: 301 10E3/UL (ref 150–450)
POTASSIUM SERPL-SCNC: 4.1 MMOL/L (ref 3.4–5.3)
RBC # BLD AUTO: 3.15 10E6/UL (ref 4.4–5.9)
RBC URINE: >182 /HPF
SODIUM SERPL-SCNC: 140 MMOL/L (ref 136–145)
SP GR UR STRIP: 1.02 (ref 1–1.03)
UROBILINOGEN UR STRIP-MCNC: <2 MG/DL
WBC # BLD AUTO: 11.2 10E3/UL (ref 4–11)
WBC URINE: >182 /HPF

## 2023-01-30 PROCEDURE — 99285 EMERGENCY DEPT VISIT HI MDM: CPT | Mod: 25

## 2023-01-30 PROCEDURE — 250N000009 HC RX 250: Performed by: EMERGENCY MEDICINE

## 2023-01-30 PROCEDURE — 74177 CT ABD & PELVIS W/CONTRAST: CPT | Mod: MG

## 2023-01-30 PROCEDURE — 51798 US URINE CAPACITY MEASURE: CPT

## 2023-01-30 PROCEDURE — 36415 COLL VENOUS BLD VENIPUNCTURE: CPT | Performed by: EMERGENCY MEDICINE

## 2023-01-30 PROCEDURE — 81001 URINALYSIS AUTO W/SCOPE: CPT | Performed by: EMERGENCY MEDICINE

## 2023-01-30 PROCEDURE — 87086 URINE CULTURE/COLONY COUNT: CPT | Performed by: EMERGENCY MEDICINE

## 2023-01-30 PROCEDURE — 250N000011 HC RX IP 250 OP 636: Performed by: EMERGENCY MEDICINE

## 2023-01-30 PROCEDURE — 51702 INSERT TEMP BLADDER CATH: CPT

## 2023-01-30 PROCEDURE — 82310 ASSAY OF CALCIUM: CPT | Performed by: EMERGENCY MEDICINE

## 2023-01-30 PROCEDURE — 85027 COMPLETE CBC AUTOMATED: CPT | Performed by: EMERGENCY MEDICINE

## 2023-01-30 RX ORDER — CEFDINIR 300 MG/1
300 CAPSULE ORAL ONCE
Status: DISCONTINUED | OUTPATIENT
Start: 2023-01-30 | End: 2023-01-30 | Stop reason: ALTCHOICE

## 2023-01-30 RX ORDER — AMOXICILLIN 250 MG/1
500 CAPSULE ORAL ONCE
Status: DISCONTINUED | OUTPATIENT
Start: 2023-01-30 | End: 2023-01-30 | Stop reason: HOSPADM

## 2023-01-30 RX ORDER — LIDOCAINE HYDROCHLORIDE 20 MG/ML
10 JELLY TOPICAL ONCE
Status: COMPLETED | OUTPATIENT
Start: 2023-01-30 | End: 2023-01-30

## 2023-01-30 RX ORDER — CEFDINIR 300 MG/1
300 CAPSULE ORAL 2 TIMES DAILY
Qty: 20 CAPSULE | Refills: 0 | Status: SHIPPED | OUTPATIENT
Start: 2023-01-30 | End: 2023-01-30

## 2023-01-30 RX ORDER — AMOXICILLIN 500 MG/1
500 CAPSULE ORAL 3 TIMES DAILY
Qty: 30 CAPSULE | Refills: 0 | Status: SHIPPED | OUTPATIENT
Start: 2023-01-30 | End: 2023-02-09

## 2023-01-30 RX ORDER — IOPAMIDOL 755 MG/ML
75 INJECTION, SOLUTION INTRAVASCULAR ONCE
Status: COMPLETED | OUTPATIENT
Start: 2023-01-30 | End: 2023-01-30

## 2023-01-30 RX ADMIN — IOPAMIDOL 75 ML: 755 INJECTION, SOLUTION INTRAVENOUS at 07:28

## 2023-01-30 RX ADMIN — LIDOCAINE HYDROCHLORIDE 10 ML: 20 JELLY TOPICAL at 06:30

## 2023-01-30 ASSESSMENT — ACTIVITIES OF DAILY LIVING (ADL)
ADLS_ACUITY_SCORE: 37
ADLS_ACUITY_SCORE: 37

## 2023-01-30 ASSESSMENT — ENCOUNTER SYMPTOMS
CHILLS: 0
FEVER: 0
SHORTNESS OF BREATH: 0
ABDOMINAL PAIN: 1
VOMITING: 0
NAUSEA: 0
DIFFICULTY URINATING: 1

## 2023-01-30 NOTE — DISCHARGE INSTRUCTIONS
Take the antibiotic (cefdinir) for your urine results. You will be called if the culture shows a bacteria that this medication does not work for.    Otherwise, continue all of your previously-prescribed medications.    Drink plenty of fluids to stay hydrated.    Follow up with your Primary Care provider in 2 days for a recheck.    Return to the Emergency Department for any high fevers, persistent vomiting, new or worsening symptoms, or any other concerns.

## 2023-01-30 NOTE — ED NOTES
7:55 AM - Patient signed out to me by Dr. Sibley at routine shift change. 87 year old male with chronic Lozano & Eliquis use presenting with LLQ pain and likely clogged Lozano. CT with suggestion of cystitis; no other acute abnormality or explanatory pathology. Lozano exchanged and urine pending. Plan is follow up UA and ensure that new Lozano is draining. Please see Dr. Sibley's note for details.    8:42 AM - UA with leuk esterase & blood; sent for culture but will treat as UTI. Patient resting comfortably on recheck with normal vitals, no complaints, Lozano draining, light red urine. No concern for sepsis. No indication for hospitalization at this time. Patient & family comfortable with discharge home to continue PO amoxicillin (per prior urine culture results). Return precautions and need for PCP follow up discussed and understood. No further questions at the time of discharge.    --------------------------------------------------------------------------------   --------------------------------------------------------------------------------     IMPRESSION  1. Hemorrhagic cystitis    2. Anticoagulated    3. PAD (peripheral artery disease) (H)        PLAN  - amoxicillin 500mg TID x10 days  - close PCP follow up  - discharge to home        Jacoby Woody MD  01/30/23  Emergency Medicine  [facility] EMERGENCY DEPARTMENT  [facility] [facility] [address] [phone]  Dept: [phone]     Jacoby Woody MD  01/30/23 4603       Jacoby Woody MD  01/30/23 6831       Jacoby Woody MD  01/30/23 3523

## 2023-01-30 NOTE — ED TRIAGE NOTES
"Pt arrives to triage w/ granddaughter escorted by wheelchair from home of which he lives w/ his wife. Complaining of overnight pts catheter clogged and is back up now. Friday there were blood clots but none since then. Pain in bladder \"belly full of piss\" reports 9/10 pain.      "

## 2023-01-30 NOTE — ED PROVIDER NOTES
EMERGENCY DEPARTMENT ENCOUNTER      NAME: Dilip Long  AGE: 87 year old male  YOB: 1935  MRN: [id number]  EVALUATION DATE & TIME: 2023  5:21 AM    [facility]    ED PROVIDER: Mark Sibley MD        Chief Complaint   Patient presents with     Catheter Problem         FINAL IMPRESSION:  1. Hemorrhagic cystitis    2. Anticoagulated    3. PAD (peripheral artery disease) (H)          ED COURSE & MEDICAL DECISION MAKIN:53 AM I met with patient for initial interview and encounter. PPE worn includes exam gloves, goggles, and N95 mask.   6:00 AM Pointing care ultrasound for the patient.     Pertinent Labs & Imaging studies reviewed. (See chart for details)  87 year old male presents to the Emergency Department for evaluation of decreased urine output from Lozano catheter and lower abdominal pain    Is anticoagulated secondary to history of A. fib and history of peripheral artery disease    Lozano catheter last exchanged 2 weeks ago.  No fever.  Family member attempted flushing and got some small blood clots out but catheter would not drain therefore brought to ER    Lozano catheter was emptied just prior to ER.  In ER patient has 300 cc urine in Lozano catheter that is aracelis-colored with sediment.  POC ultrasound does not show bladder distention.    Does have some left lower quadrant tenderness therefore CT ordered to evaluate for constipation or diverticulitis or renal colic    CT shows changes suggestive of cystitis as well as severe atherosclerosis left iliac artery    Critical ischemia to left leg unlikely.  Plan for outpatient follow-up of peripheral artery disease    With elevated white blood cell count and imaging suggestive of cystitis plan for replacement of catheter with new UA    Catheter was replaced however now has bloody discharge from catheter    Plan for flushing of catheter until clear, UA    Patient sounds Dr. Woody pending patient of UA and flushing of  "catheter    If flushing does not result in clear urine may need to be hospitalized for bladder irrigation            At the conclusion of the encounter I discussed the results of all of the tests and the disposition. The questions were answered. The patient or family acknowledged understanding and was agreeable with the care plan.       Medical Decision Making    History:    Supplemental history from: Documented in chart, if applicable    External Record(s) reviewed: Documented in chart, if applicable.    Work Up:    Chart documentation includes differential considered and any EKGs or imaging independently interpreted by provider.    In additional to work up documented, I considered the following work up: Documented in chart, if applicable.    External consultation:    Discussion of management with another provider: Documented in chart, if applicable    Complicating factors:    Care impacted by chronic illness: Anticoagulated State    Care affected by social determinants of health: Access to Medical Care    Disposition considerations: Admission considered. Patient was signed out to the oncoming physician, disposition pending.          MEDICATIONS GIVEN IN THE EMERGENCY:  Medications   cefdinir (OMNICEF) capsule 300 mg (has no administration in time range)   lidocaine (XYLOCAINE) 2 % external gel 10 mL (10 mLs Urethral Given 1/30/23 0630)   iopamidol (ISOVUE-370) solution 75 mL (75 mLs Intravenous Given 1/30/23 0728)       NEW PRESCRIPTIONS STARTED AT TODAY'S ER VISIT  New Prescriptions    CEFDINIR (OMNICEF) 300 MG CAPSULE    Take 1 capsule (300 mg) by mouth 2 times daily for 10 days          =================================================================    HPI    Triage note  \"Pt arrives to triage w/ granddaughter escorted by wheelchair from home of which he lives w/ his wife. Complaining of overnight pts catheter clogged and is back up now. Friday there were blood clots but none since then. Pain in bladder " "\"belly full of piss\" reports 9/10 pain.      \"      Patient information was obtained from: Patient granddaughter     Use of : N/A         Dilip Long is a 87 year old male with a pertinent history of chronic indwelling Jorge catheter, HFpEF, Afib on Eliquis, PAD, CKD stage 3, HLD, HTN who presents to this ED via wheelchair for evaluation of catheter problem.     Per granddaughter, the patient presents with catheter problem since 4 AM. He complains catheter is clogged and is back up since overnight. He is unable to urinate. He endorses lower abdominal pain secondary to urinary retention. He reports that the abdominal pain has been building up. The granddaughter attempted to flush the catheter this morning with no success. She notes on Friday (~3 days ago) there was blood clots presents in cathter which has resolved since. The patients last jorge catheter changes was on 1/16. The granddaughter states it was very difficult to change catheter. He is anticoagulated. At present, the patient is able to urinate minimally. Denies any nausea, vomiting, fever, chills, chest pain, shortness of breath, and any other complaints or concerns at the moment.       REVIEW OF SYSTEMS   Review of Systems   Constitutional: Negative for chills and fever.   Respiratory: Negative for shortness of breath.    Cardiovascular: Negative for chest pain.   Gastrointestinal: Positive for abdominal pain (secondary to difficulty urinating). Negative for nausea and vomiting.   Genitourinary: Positive for difficulty urinating.        Positive for catheter problem   All other systems reviewed and are negative.         PAST MEDICAL HISTORY:  Past Medical History:   Diagnosis Date     Acute heart failure with preserved ejection fraction (HFpEF) (H) 11/10/2022     Acute prostatitis      Asymptomatic bacteriuria 10/23/2013    Noted at 10/2/13 office visit at [facility]. No treatment recommended at that time.      Congestive heart failure " (H)      Essential hypertension 08/18/2015     Hypertension      Paroxysmal atrial fib -- on Eliquis 11/06/2022     Syncope      Thyroid disease        PAST SURGICAL HISTORY:  Past Surgical History:   Procedure Laterality Date     BLADDER SURGERY      Bladder absces removal     Blepharoplasty Upper Lid W/ Excessive Skin[  1/29/2007     CATARACT EXTRACTION       EYE SURGERY      both eyes 2015     IR LUMBAR EPIDURAL STEROID INJECTION  4/27/2004     IR LUMBAR EPIDURAL STEROID INJECTION  5/11/2004     IR LUMBAR EPIDURAL STEROID INJECTION  11/24/2004     IR LUMBAR EPIDURAL STEROID INJECTION  11/28/2007     [place] CYSTOURETHROSCOPY W/IRRIG & EVAC CLOTS N/A 7/27/2018    Procedure: CYSTOSCOPY, CLOT EVACUATION ,URETHRAL DILATATION, DAUGHERTY CATHETER PLACEMENT;  Surgeon: Lowell Arias MD;  Location: [facility];  Service: Urology           CURRENT MEDICATIONS:    apixaban ANTICOAGULANT (ELIQUIS) 5 MG tablet  atenolol (TENORMIN) 25 MG tablet  Calcium Carbonate-Vitamin D (OSCAL 500/200 D-3 PO)  cefdinir (OMNICEF) 300 MG capsule  cephALEXin (KEFLEX) 250 MG capsule  GLUCOSAMINE-CHONDROITIN-VIT D3 PO  hydrOXYzine (ATARAX) 10 MG tablet  ipratropium - albuterol 0.5 mg/2.5 mg/3 mL (DUONEB) 0.5-2.5 (3) MG/3ML neb solution  levothyroxine (SYNTHROID/LEVOTHROID) 50 MCG tablet  order for DME  potassium chloride ER (KLOR-CON M) 20 MEQ CR tablet  simvastatin (ZOCOR) 10 MG tablet        ALLERGIES:  No Known Allergies    FAMILY HISTORY:  Family History   Problem Relation Age of Onset     Diabetes No family hx of      Hypertension No family hx of      Other Cancer No family hx of      Coronary Artery Disease No family hx of      Hyperlipidemia No family hx of      Cerebrovascular Disease No family hx of      Breast Cancer No family hx of      Colon Cancer No family hx of      Prostate Cancer No family hx of      Depression No family hx of      Anxiety Disorder No family hx of      Mental Illness No family hx of      Substance Abuse No  "family hx of      Anesthesia Reaction No family hx of      Asthma No family hx of      Osteoporosis No family hx of      Genetic Disorder No family hx of      Thyroid Disease No family hx of      Obesity No family hx of      No Known Problems Mother      No Known Problems Father        SOCIAL HISTORY:   Social History     Socioeconomic History     Marital status:      Number of children: 4   Occupational History     Occupation: Retired   Tobacco Use     Smoking status: Former     Types: Cigarettes     Smokeless tobacco: Never     Tobacco comments:     Pt quit 40 years ago   Vaping Use     Vaping Use: Never used   Substance and Sexual Activity     Alcohol use: Yes     Comment: < 1 drink a week     Drug use: No   Social History Narrative    , 4 children, lives with his wife, and is a retired .  Desires DNR/DNI.  (last updated 11/29/2022)        VITALS:  BP (!) 159/72   Pulse 76   Temp 97.8  F (36.6  C) (Oral)   Resp 18   Ht 1.778 m (5' 10\")   Wt 58.1 kg (128 lb)   SpO2 100%   BMI 18.37 kg/m      PHYSICAL EXAM      Vitals: BP (!) 159/72   Pulse 76   Temp 97.8  F (36.6  C) (Oral)   Resp 18   Ht 1.778 m (5' 10\")   Wt 58.1 kg (128 lb)   SpO2 100%   BMI 18.37 kg/m    General: Appears in no acute distress, awake, alert, interactive.   Eyes: Conjunctivae non-injected. Sclera anicteric.  HENT: Atraumatic.  Neck: Supple.  Respiratory/Chest: Respiration unlabored.  Abdomen: non distended, left lower quadrant abdominal tenderness.  Nga-colored urine and Lozano catheter with sediment  Musculoskeletal: Normal extremities. No edema or erythema.  Skin: Normal color. No rash or diaphoresis.  Neurologic: Face symmetric, moves all extremities spontaneously. Speech clear.  Psychiatric: Oriented to person, place, and time. Affect appropriate.       LAB:  All pertinent labs reviewed and interpreted.  Results for orders placed or performed during the hospital encounter of 01/30/23   CT Abdomen " Pelvis w Contrast    Impression    IMPRESSION:     1.  Moderately thickened bladder with wall hyperenhancement suggesting cystitis. Bladder diverticuli noted. Lozano catheter and balloon reside within the bladder (small amount of residual urine in the bladder).    2.  Minimal hyperdensity along the dependent right urinary bladder may be retained debris or subtle stone(s).    3.  Unremarkable kidneys. No hydronephrosis.    4.  Severe prostatomegaly. Nonspecific prostate heterogeneity.    5.  Colonic diverticulosis without diverticulitis. No obstruction.    6.  No free air or fluid. No abscess.    7.  Severe aortic atherosclerosis. Marked narrowing of the proximal left common iliac artery with possible thin thread of residual patency (severe focal thrombus in this location).        CBC (+ platelets, no diff)   Result Value Ref Range    WBC Count 11.2 (H) 4.0 - 11.0 10e3/uL    RBC Count 3.15 (L) 4.40 - 5.90 10e6/uL    Hemoglobin 10.4 (L) 13.3 - 17.7 g/dL    Hematocrit 32.8 (L) 40.0 - 53.0 %     (H) 78 - 100 fL    MCH 33.0 26.5 - 33.0 pg    MCHC 31.7 31.5 - 36.5 g/dL    RDW 14.4 10.0 - 15.0 %    Platelet Count 301 150 - 450 10e3/uL   Basic metabolic panel   Result Value Ref Range    Sodium 140 136 - 145 mmol/L    Potassium 4.1 3.4 - 5.3 mmol/L    Chloride 105 98 - 107 mmol/L    Carbon Dioxide (CO2) 27 22 - 29 mmol/L    Anion Gap 8 7 - 15 mmol/L    Urea Nitrogen 20.4 8.0 - 23.0 mg/dL    Creatinine 1.27 (H) 0.67 - 1.17 mg/dL    Calcium 9.6 8.8 - 10.2 mg/dL    Glucose 104 (H) 70 - 99 mg/dL    GFR Estimate 55 (L) >60 mL/min/1.73m2   UA with Microscopic reflex to Culture    Specimen: Urine, Catheter   Result Value Ref Range    Color Urine Brown (A) Colorless, Straw, Light Yellow, Yellow    Appearance Urine Cloudy (A) Clear    Glucose Urine Negative Negative mg/dL    Bilirubin Urine Negative Negative    Ketones Urine Negative Negative mg/dL    Specific Gravity Urine 1.023 1.001 - 1.030    Blood Urine >1.0 mg/dL (A)  Negative    pH Urine 6.5 5.0 - 7.0    Protein Albumin Urine 100 (A) Negative mg/dL    Urobilinogen Urine <2.0 <2.0 mg/dL    Nitrite Urine Negative Negative    Leukocyte Esterase Urine 500 Deion/uL (A) Negative    RBC Urine >182 (H) <=2 /HPF    WBC Urine >182 (H) <=5 /HPF       RADIOLOGY:  Reviewed all pertinent imaging. Please see official radiology report.  CT Abdomen Pelvis w Contrast   Final Result   IMPRESSION:       1.  Moderately thickened bladder with wall hyperenhancement suggesting cystitis. Bladder diverticuli noted. Lozano catheter and balloon reside within the bladder (small amount of residual urine in the bladder).      2.  Minimal hyperdensity along the dependent right urinary bladder may be retained debris or subtle stone(s).      3.  Unremarkable kidneys. No hydronephrosis.      4.  Severe prostatomegaly. Nonspecific prostate heterogeneity.      5.  Colonic diverticulosis without diverticulitis. No obstruction.      6.  No free air or fluid. No abscess.      7.  Severe aortic atherosclerosis. Marked narrowing of the proximal left common iliac artery with possible thin thread of residual patency (severe focal thrombus in this location).             POC US ABDOMEN LIMITED    (Results Pending)     POC US ABDOMEN LIMITED    Date/Time: 2/6/2023 1:39 PM  Performed by: Pablito Sibley MD  Authorized by: Pablito Sibley MD     Procedure details:     Indications: decreased urinary output    Comments:      Urinary bladder decompressed, catheter balloon in urinary bladder. Images saves and uploaded.               I, Amita Olvera, am serving as a scribe to document services personally performed by Pablito Sibley MD based on my observation and the provider's statements to me. I, Dr. Pablito Sibley, attest that Amita Olvera is acting in a scribe capacity, has observed my performance of the services and has documented them in accordance with my direction.    Pablito Sibley MD  Emergency Medicine  [facility]  [facility] EMERGENCY DEPARTMENT  [address] [place] [id number]  [phone]       Pablito Sibley MD  01/30/23 0832       Pablito Sibley MD  02/06/23 [id number]

## 2023-01-30 NOTE — ED NOTES
Very difficult to obtain pulse and oxygen via oximeter, placed on right big toe and getting reading.    Pts palpable pulse felt irregular in triage and pt has a hx of a-fib. Cath bag has contents in it and draining w/ sediment. Grand daughter insists it is not draining all the way.

## 2023-01-30 NOTE — ED NOTES
ER DISCHARGE NOTE:   Dilip Long MRN: [id number]      Dilip Long is discharged from the emergency room.    (N30.91) Hemorrhagic cystitis  Comment: replaced jorge  Plan:     (Z79.01) Anticoagulated  Comment: Eliquis  Plan:     (I73.9) PAD (peripheral artery disease) (H)  Comment:   Plan:         Dilip Long discharged via self with granddaughter .    Verbalized understanding of discharge instructions.   Prescriptions: e scribed to prefered pharmacy .    AVS in hand.

## 2023-02-01 LAB
BACTERIA UR CULT: ABNORMAL

## 2023-02-02 ENCOUNTER — TELEPHONE (OUTPATIENT)
Dept: CARDIOLOGY | Facility: CLINIC | Age: 88
End: 2023-02-02
Payer: COMMERCIAL

## 2023-02-02 DIAGNOSIS — I48.0 PAROXYSMAL ATRIAL FIBRILLATION (H): ICD-10-CM

## 2023-02-02 NOTE — TELEPHONE ENCOUNTER
1/12/2023 note:    Plan  1.  Check renal profile today as well as CBC and will also check TSH given his profound increase in TSH.  2.  Check limited echo looking for pericardial fusion, suspect this was due to the hypothyroid.  3.  Check event monitor looking for arrhythmias.  4.  Lower dose of Eliquis to 2.5 mg by mouth twice a day.  5.  Follow-up with me in about 6 months or sooner if needed.          ==noted. Dose adjustment

## 2023-02-03 NOTE — TELEPHONE ENCOUNTER
Call received from Shruthi, patient's granddaughter and patient. She requests lower dose of Eliquis to be sent in and this will be done.    They were reminded of orders by [facility] for limited echo and event monitor. She notes that Dilip has been in and out of the ER and just now getting his strength back. They will plan to schedule soon. She also notes that his PMD did get results of TSH and has adjusted his medication dosage accordingly.         Per 1/12/23 visit with F:     Instructions    Mr Dilip Long,  I enjoyed visiting with you again today.  I am glad to hear you are doing well.  Per our conversation I will check blood work today to make sure your kidneys are still doing well.  Given your age and your weight, a lower dose of Eliquis would be reasonable, cut your 5 mg tablets in half for 2.5 mg twice a day.  When you run out of this give me a call at [phone] for a lower dose tablet.  When you run out of the simvastatin for cholesterol do not bother refilling it.  I tend to not treat cholesterol when you get to your late 80s.  I will recheck an echocardiogram looking for effusion or fluid around the heart  I will plan on seeing you thereafter in several months.  Kurt Quevedo,  When you sign off, I reduced this patient's Eliquis as per your note outlines. I reminded them of the cardiac testing you ordered back in January and they will call to schedule soon.   Thanks,  Mal

## 2023-02-04 ENCOUNTER — HOSPITAL ENCOUNTER (EMERGENCY)
Facility: HOSPITAL | Age: 88
Discharge: HOME | End: 2023-02-04
Attending: EMERGENCY MEDICINE | Admitting: EMERGENCY MEDICINE
Payer: COMMERCIAL

## 2023-02-04 VITALS
RESPIRATION RATE: 16 BRPM | DIASTOLIC BLOOD PRESSURE: 72 MMHG | HEART RATE: 79 BPM | WEIGHT: 128 LBS | TEMPERATURE: 98.5 F | BODY MASS INDEX: 18.37 KG/M2 | SYSTOLIC BLOOD PRESSURE: 133 MMHG | OXYGEN SATURATION: 93 %

## 2023-02-04 DIAGNOSIS — R33.9 URINARY RETENTION: ICD-10-CM

## 2023-02-04 PROCEDURE — 250N000009 HC RX 250: Performed by: EMERGENCY MEDICINE

## 2023-02-04 PROCEDURE — 99284 EMERGENCY DEPT VISIT MOD MDM: CPT

## 2023-02-04 PROCEDURE — 51702 INSERT TEMP BLADDER CATH: CPT

## 2023-02-04 PROCEDURE — 51798 US URINE CAPACITY MEASURE: CPT

## 2023-02-04 RX ORDER — LIDOCAINE HYDROCHLORIDE 20 MG/ML
10 JELLY TOPICAL ONCE
Status: COMPLETED | OUTPATIENT
Start: 2023-02-04 | End: 2023-02-04

## 2023-02-04 RX ADMIN — LIDOCAINE HYDROCHLORIDE 10 ML: 20 JELLY TOPICAL at 08:19

## 2023-02-04 ASSESSMENT — ENCOUNTER SYMPTOMS
DYSURIA: 0
WOUND: 1
CHILLS: 0
FATIGUE: 0
COUGH: 0
HEADACHES: 0
SHORTNESS OF BREATH: 0
DIFFICULTY URINATING: 1
ABDOMINAL PAIN: 1
FEVER: 0
VOMITING: 0
WEAKNESS: 0

## 2023-02-04 NOTE — ED PROVIDER NOTES
Assessment/Plan:    Spinal stenosis  Urinary retention--1 5 L urinary retention--admitted [facility]--Hx spinal stenosis--needs MRI prior to urol visit       Diagnoses and all orders for this visit:    Urinary retention  -     MRI lumbar spine wo contrast; Future    Spinal stenosis of lumbar region with neurogenic claudication  -     MRI lumbar spine wo contrast; Future    Hypokalemia  -     potassium chloride (K-DUR,KLOR-CON) 20 mEq tablet; Take 1 tablet (20 mEq total) by mouth daily    Constipation, unspecified constipation type  -     polyethylene glycol (MIRALAX) 17 g packet; Take 17 g by mouth daily    Other orders  -     Discontinue: buPROPion (WELLBUTRIN XL) 300 mg 24 hr tablet; Take 300 mg by mouth daily  -     Discontinue: Sublocade 300 MG/1 5ML;  (Patient not taking: Reported on 3/31/2022 )  -     Discontinue: cetirizine (ZyrTEC) 10 mg tablet;  (Patient not taking: Reported on 3/31/2022 )  -     Discontinue: cyclobenzaprine (FLEXERIL) 10 mg tablet;  (Patient not taking: Reported on 3/31/2022 )  -     Narcan 4 MG/0 1ML nasal spray  -     Discontinue: nicotine (NICODERM CQ) 21 mg/24 hr TD 24 hr patch;  (Patient not taking: Reported on 3/31/2022 )  -     Discontinue: sertraline (ZOLOFT) 50 mg tablet;  (Patient not taking: Reported on 3/31/2022 )  -     GaviLAX 17 GM/SCOOP powder  -     Discontinue: ondansetron (ZOFRAN-ODT) 4 mg disintegrating tablet;  (Patient not taking: Reported on 3/31/2022 )          Subjective:   Chief Complaint   Patient presents with    Follow-up        Patient ID: Rishi Park is a 29 y o  male  F/u urinary retention--1 5 L--admitted [facility]    Difficulty Urinating   This is a recurrent problem  The current episode started more than 1 year ago  The problem occurs every urination  The problem has been gradually worsening  The quality of the pain is described as burning  The pain is at a severity of 2/10  There has been no fever  He is not sexually active   There is no EMERGENCY DEPARTMENT ENCOUNTER      NAME: Dilip Long  AGE: 87 year old male  YOB: 1935  MRN: [id number]  EVALUATION DATE & TIME: 2/4/2023  7:29 AM    PCP: [facility]    ED PROVIDER: Jamee Garcia DO      Chief Complaint   Patient presents with     Urinary Retention         FINAL IMPRESSION:  1. Urinary retention          ED COURSE & MEDICAL DECISION MAKING:    Pertinent Labs & Imaging studies reviewed. (See chart for details)  7:32 AM I met the patient and performed my initial interview and exam.  7:45 AM The bladder scan shows more than 282 mLs in the bladder  8:13 AM I rechecked and updated the patient. Catheter went in without any difficulty. Patient had under 400 mLs out. Discussed plan of discharge, patient is agreeable.       87 year old male presents to the Emergency Department for evaluation of urinary retention. He presents with his granddaughter who is his caregiver.  Patient has a history of urinary retention.  He has been into the ED several times for retention and obstruction of his catheter.  Last ED visit was on 01/30.  They were in to see urology on 2/2.  Sounds like he had a cystoscopy at that time.  His catheter was removed at that apppointment.  Decision was made to do a trial of void.  Over the past 2 nights patient has been up every hour attempting to urinate.  Only small amounts at a time.  He feels some lower abdominal pressure.  No significant abdominal pain.  No report of hematuria.  No fevers or chills.  He is nontoxic-appearing.  Bladder scan reveals >282 mL in bladder.  Normal external genitalia.  He does have some superficial pressure wounds to his bottom, no signs of surrounding infection.  No infectious symptoms otherwise.  No report of any hematuria or dysuria.  Lozano catheter is replaced.  Return of almost 400 mL of yellow urine.  Catheter draining appropriately.  A 20 Angolan was placed by nursing staff.  Again no reports of any infectious  history of pyelonephritis  Associated symptoms include flank pain, frequency, hesitancy, nausea and sweats  Pertinent negatives include no chills, discharge, hematuria, possible pregnancy, urgency or vomiting  The following portions of the patient's history were reviewed and updated as appropriate: allergies, current medications, past family history, past medical history, past social history, past surgical history and problem list     Review of Systems   Constitutional: Negative for chills, fatigue, fever and unexpected weight change  HENT: Negative for congestion, sinus pain and sore throat  Eyes: Negative for visual disturbance  Respiratory: Negative for shortness of breath and wheezing  Cardiovascular: Negative for chest pain and palpitations  Gastrointestinal: Positive for nausea  Negative for abdominal pain and vomiting  Genitourinary: Positive for dysuria, flank pain, frequency and hesitancy  Negative for hematuria and urgency  Musculoskeletal: Negative for arthralgias and myalgias  Neurological: Negative for syncope, weakness and numbness  Psychiatric/Behavioral: Negative  Negative for confusion, dysphoric mood and suicidal ideas  Objective:  Vitals:    03/31/22 1045   BP: 120/70   Pulse: (!) 120   Temp: 99 3 °F (37 4 °C)   TempSrc: Tympanic   SpO2: 100%   Weight: 69 4 kg (153 lb)   Height: 5' 10" (1 778 m)      Physical Exam  Constitutional:       Appearance: He is well-developed  HENT:      Right Ear: Ear canal normal  Tympanic membrane is not injected  Left Ear: Ear canal normal  Tympanic membrane is not injected  Nose: Nose normal    Eyes:      General:         Right eye: No discharge  Left eye: No discharge  Conjunctiva/sclera: Conjunctivae normal       Pupils: Pupils are equal, round, and reactive to light  Neck:      Thyroid: No thyromegaly  Cardiovascular:      Rate and Rhythm: Normal rate and regular rhythm        Heart sounds: Normal symptoms so a urinalysis was not obtained.  Discussed with patient and granddaughter, plan to call urology in 2 days and we discussed Jorge catheter cares.      At the conclusion of the encounter I discussed the results of all of the tests and the disposition. The questions were answered. The patient or family acknowledged understanding and was agreeable with the care plan.     Medical Decision Making    History:    Supplemental history from: Caregiver and Family Member/Significant Other    External Record(s) reviewed: Documented in chart, if applicable.    Work Up:    Chart documentation includes differential considered and any EKGs or imaging independently interpreted by provider, where specified.    In additional to work up documented, I considered the following work up: Documented in chart, if applicable.    External consultation:    Discussion of management with another provider: Documented in chart, if applicable    Complicating factors:    Care impacted by chronic illness: Anticoagulated State and Dementia    Care affected by social determinants of health: N/A    Disposition considerations: Discharge. No recommendations on prescription strength medication(s). N/A.      MEDICATIONS GIVEN IN THE EMERGENCY:  Medications   lidocaine (XYLOCAINE) 2 % external gel 10 mL (10 mLs Urethral Given 2/4/23 0819)       NEW PRESCRIPTIONS STARTED AT TODAY'S ER VISIT  New Prescriptions    No medications on file          =================================================================    HPI    Patient information was obtained from: Patient and Patient grand daughter    Use of : N/A        Dilip VICK Mercedes is a 87 year old male with a pertinent history of chronic indwelling jorge catheter, chronic kidney disease, paroxysmal atrial fibrillation, thoracic or lumbosacral neuritis or radiculitis, and hypertrophy of prostate with urinary obstruction who presents to this ED by private vehicle for evaluation of urinary  heart sounds  No murmur heard  Pulmonary:      Effort: Pulmonary effort is normal  No respiratory distress  Breath sounds: Normal breath sounds  No wheezing  Abdominal:      General: Bowel sounds are normal  There is no distension  Palpations: Abdomen is soft  Tenderness: There is no abdominal tenderness  Musculoskeletal:         General: Normal range of motion  Cervical back: Normal range of motion and neck supple  Lymphadenopathy:      Cervical: No cervical adenopathy  Skin:     General: Skin is warm and dry  Neurological:      Mental Status: He is alert and oriented to person, place, and time  He is not disoriented  Sensory: No sensory deficit  Gait: Gait normal       Deep Tendon Reflexes: Reflexes are normal and symmetric  Psychiatric:         Speech: Speech normal          Behavior: Behavior normal          Thought Content:  Thought content normal          Judgment: Judgment normal  retention and catheter issue.    Per patient chart review, the patient was last seen on 1/30 at [place] ED for the evaluation of decreased urinary output from Jorge catheter and lower abdominal pain. Patient explained that his catheter was clogged up and he was unable to urinate. Per patient grand daughter, they attempted to flush the catheter earlier that day (1/30) with no success and noted some blood clots in the catheter on 1/27, which resolved. CT Abdomen Pelvis w Contrast found suggestive of cystitis as well as severe atherosclerosis left iliac artery. Catheter was replaced, but was found with bloody discharge from catheter. Catheter was flushed until clear urine.     The patient reports that the catheter is one size too big, and states that the size 20 catheter works. Per patient grand daughter, patient had a jorge catheter removed on Thursday by urologist to see if the patient could urinate on his own, but the patient has been waking up with every hour for the past 2 nights with mild abdominal discomfort. Grand daughter states that the 2nd to last time the catheter was placed, it accidentally went into the patient's prostate. The last time the catheter was placed, on 1/30, there was some blood drainage. Patient denies any fever or chills, but endorses sores on his buttocks.        REVIEW OF SYSTEMS   Review of Systems   Constitutional: Negative for chills, fatigue and fever.   Respiratory: Negative for cough and shortness of breath.    Cardiovascular: Negative for chest pain.   Gastrointestinal: Positive for abdominal pain (mild). Negative for vomiting.   Genitourinary: Positive for difficulty urinating. Negative for dysuria.   Skin: Positive for wound (sores on buttocks).   Neurological: Negative for syncope, weakness and headaches.       PAST MEDICAL HISTORY:  Past Medical History:   Diagnosis Date     Acute heart failure with preserved ejection fraction (HFpEF) (H) 11/10/2022     Acute prostatitis       Asymptomatic bacteriuria 10/23/2013    Noted at 10/2/13 office visit at [facility] [place], operated by [facility]. No treatment recommended at that time.      Congestive heart failure (H)      Essential hypertension 08/18/2015     Hypertension      Paroxysmal atrial fib -- on Eliquis 11/06/2022     Syncope      Thyroid disease        PAST SURGICAL HISTORY:  Past Surgical History:   Procedure Laterality Date     BLADDER SURGERY      Bladder absces removal     Blepharoplasty Upper Lid W/ Excessive Skin[  1/29/2007     CATARACT EXTRACTION       EYE SURGERY      both eyes 2015     IR LUMBAR EPIDURAL STEROID INJECTION  4/27/2004     IR LUMBAR EPIDURAL STEROID INJECTION  5/11/2004     IR LUMBAR EPIDURAL STEROID INJECTION  11/24/2004     IR LUMBAR EPIDURAL STEROID INJECTION  11/28/2007     MS CYSTOURETHROSCOPY W/IRRIG & EVAC CLOTS N/A 7/27/2018    Procedure: CYSTOSCOPY, CLOT EVACUATION ,URETHRAL DILATATION, DAUGHERTY CATHETER PLACEMENT;  Surgeon: Lowell Arias MD;  Location: [facility] [place];  Service: Urology           CURRENT MEDICATIONS:    amoxicillin (AMOXIL) 500 MG capsule  apixaban ANTICOAGULANT (ELIQUIS) 2.5 MG tablet  atenolol (TENORMIN) 25 MG tablet  Calcium Carbonate-Vitamin D (OSCAL 500/200 D-3 PO)  cephALEXin (KEFLEX) 250 MG capsule  GLUCOSAMINE-CHONDROITIN-VIT D3 PO  hydrOXYzine (ATARAX) 10 MG tablet  ipratropium - albuterol 0.5 mg/2.5 mg/3 mL (DUONEB) 0.5-2.5 (3) MG/3ML neb solution  levothyroxine (SYNTHROID/LEVOTHROID) 50 MCG tablet  order for DME  potassium chloride ER (KLOR-CON M) 20 MEQ CR tablet  simvastatin (ZOCOR) 10 MG tablet         ALLERGIES:  No Known Allergies    FAMILY HISTORY:  Family History   Problem Relation Age of Onset     Diabetes No family hx of      Hypertension No family hx of      Other Cancer No family hx of      Coronary Artery Disease No family hx of      Hyperlipidemia No family hx of      Cerebrovascular Disease No family hx of      Breast Cancer No family hx of      Colon Cancer No family hx of       Prostate Cancer No family hx of      Depression No family hx of      Anxiety Disorder No family hx of      Mental Illness No family hx of      Substance Abuse No family hx of      Anesthesia Reaction No family hx of      Asthma No family hx of      Osteoporosis No family hx of      Genetic Disorder No family hx of      Thyroid Disease No family hx of      Obesity No family hx of      No Known Problems Mother      No Known Problems Father        SOCIAL HISTORY:   Social History     Socioeconomic History     Marital status:      Number of children: 4   Occupational History     Occupation: Retired   Tobacco Use     Smoking status: Former     Types: Cigarettes     Smokeless tobacco: Never     Tobacco comments:     Pt quit 40 years ago   Vaping Use     Vaping Use: Never used   Substance and Sexual Activity     Alcohol use: Yes     Comment: < 1 drink a week     Drug use: No   Social History Narrative    , 4 children, lives with his wife, and is a retired .  Desires DNR/DNI.  (last updated 11/29/2022)        VITALS:  /72   Pulse 79   Temp 98.5  F (36.9  C) (Temporal)   Resp 16   Wt 58.1 kg (128 lb)   SpO2 93%   BMI 18.37 kg/m      PHYSICAL EXAM    Physical Exam  Vitals and nursing note reviewed. Exam conducted with a chaperone present.   Constitutional:       General: He is not in acute distress.     Appearance: Normal appearance.   HENT:      Head: Normocephalic and atraumatic.   Eyes:      Pupils: Pupils are equal, round, and reactive to light.   Cardiovascular:      Rate and Rhythm: Normal rate and regular rhythm.   Pulmonary:      Effort: Pulmonary effort is normal.   Abdominal:      General: Abdomen is flat. Bowel sounds are normal.      Palpations: Abdomen is soft.      Tenderness: There is no abdominal tenderness.   Genitourinary:     Penis: Normal.       Testes: Normal.   Musculoskeletal:         General: Normal range of motion.   Skin:     General: Skin is warm and dry.       Comments: Pressure wound to sacrum, does not probe deep, no drainage or significant surrounding erythema   Neurological:      General: No focal deficit present.      Mental Status: He is alert.        I, Yaritza Yañez, am serving as a scribe to document services personally performed by Dr. Jamee Garcia based on my observation and the provider's statements to me. I, Jamee Garcia, DO attest that Yaritza Yañez is acting in a scribe capacity, has observed my performance of the services and has documented them in accordance with my direction.    Jamee Garcia DO  Emergency Medicine  [facility] EMERGENCY DEPARTMENT  [address] [place] [id number]  [phone]  Dept: [phone]       Jamee Garcia DO  02/04/23 0823

## 2023-02-04 NOTE — DISCHARGE INSTRUCTIONS
Continue catheter cares at home  Follow up closely with urology  Return if any uncontrolled pain, fever, no draining from cathter or any other concerns

## 2023-03-10 ENCOUNTER — HOSPITAL ENCOUNTER (EMERGENCY)
Facility: HOSPITAL | Age: 88
Discharge: HOME | End: 2023-03-10
Attending: EMERGENCY MEDICINE | Admitting: EMERGENCY MEDICINE
Payer: COMMERCIAL

## 2023-03-10 VITALS
TEMPERATURE: 97 F | WEIGHT: 128 LBS | HEIGHT: 70 IN | DIASTOLIC BLOOD PRESSURE: 74 MMHG | HEART RATE: 68 BPM | RESPIRATION RATE: 18 BRPM | BODY MASS INDEX: 18.32 KG/M2 | OXYGEN SATURATION: 96 % | SYSTOLIC BLOOD PRESSURE: 152 MMHG

## 2023-03-10 DIAGNOSIS — N39.0 ACUTE UTI: ICD-10-CM

## 2023-03-10 DIAGNOSIS — T83.011A MALFUNCTION OF FOLEY CATHETER, INITIAL ENCOUNTER (H): ICD-10-CM

## 2023-03-10 PROBLEM — G47.00 INSOMNIA: Status: ACTIVE | Noted: 2023-02-14

## 2023-03-10 PROBLEM — R13.19 ESOPHAGEAL DYSPHAGIA: Status: ACTIVE | Noted: 2021-05-08

## 2023-03-10 PROBLEM — S22.000A CLOSED COMPRESSION FRACTURE OF THORACIC VERTEBRA (H): Status: ACTIVE | Noted: 2019-07-15

## 2023-03-10 PROBLEM — L98.9 SKIN LESION: Status: ACTIVE | Noted: 2023-02-14

## 2023-03-10 PROBLEM — R51.9 HEADACHE: Status: ACTIVE | Noted: 2022-11-23

## 2023-03-10 PROBLEM — G44.329 CHRONIC POST-TRAUMATIC HEADACHE, NOT INTRACTABLE: Status: ACTIVE | Noted: 2021-05-09

## 2023-03-10 PROBLEM — S32.000A COMPRESSION FRACTURE OF LUMBAR VERTEBRA (H): Status: ACTIVE | Noted: 2019-07-15

## 2023-03-10 LAB
ALBUMIN UR-MCNC: 50 MG/DL
APPEARANCE UR: ABNORMAL
BACTERIA #/AREA URNS HPF: ABNORMAL /HPF
BILIRUB UR QL STRIP: NEGATIVE
COLOR UR AUTO: ABNORMAL
GLUCOSE UR STRIP-MCNC: NEGATIVE MG/DL
HGB UR QL STRIP: ABNORMAL
KETONES UR STRIP-MCNC: NEGATIVE MG/DL
LEUKOCYTE ESTERASE UR QL STRIP: ABNORMAL
NITRATE UR QL: NEGATIVE
PH UR STRIP: 5.5 [PH] (ref 5–7)
RBC URINE: >182 /HPF
SP GR UR STRIP: 1.02 (ref 1–1.03)
SQUAMOUS EPITHELIAL: 7 /HPF
UROBILINOGEN UR STRIP-MCNC: <2 MG/DL
WBC CLUMPS #/AREA URNS HPF: PRESENT /HPF
WBC URINE: >182 /HPF
YEAST #/AREA URNS HPF: ABNORMAL /HPF

## 2023-03-10 PROCEDURE — 250N000009 HC RX 250: Performed by: EMERGENCY MEDICINE

## 2023-03-10 PROCEDURE — 51798 US URINE CAPACITY MEASURE: CPT

## 2023-03-10 PROCEDURE — 81001 URINALYSIS AUTO W/SCOPE: CPT | Performed by: EMERGENCY MEDICINE

## 2023-03-10 PROCEDURE — 99284 EMERGENCY DEPT VISIT MOD MDM: CPT | Mod: 25

## 2023-03-10 PROCEDURE — 87088 URINE BACTERIA CULTURE: CPT | Performed by: EMERGENCY MEDICINE

## 2023-03-10 PROCEDURE — 250N000013 HC RX MED GY IP 250 OP 250 PS 637: Performed by: EMERGENCY MEDICINE

## 2023-03-10 PROCEDURE — 51702 INSERT TEMP BLADDER CATH: CPT

## 2023-03-10 RX ORDER — LIDOCAINE HYDROCHLORIDE 20 MG/ML
10 JELLY TOPICAL ONCE
Status: COMPLETED | OUTPATIENT
Start: 2023-03-10 | End: 2023-03-10

## 2023-03-10 RX ORDER — CEFUROXIME AXETIL 500 MG/1
500 TABLET ORAL 2 TIMES DAILY
Qty: 14 TABLET | Refills: 0 | Status: SHIPPED | OUTPATIENT
Start: 2023-03-10 | End: 2023-03-17

## 2023-03-10 RX ORDER — CEFUROXIME AXETIL 500 MG/1
500 TABLET ORAL ONCE
Status: COMPLETED | OUTPATIENT
Start: 2023-03-10 | End: 2023-03-10

## 2023-03-10 RX ADMIN — LIDOCAINE HYDROCHLORIDE 10 ML: 20 JELLY TOPICAL at 03:21

## 2023-03-10 RX ADMIN — CEFUROXIME AXETIL 500 MG: 500 TABLET ORAL at 05:13

## 2023-03-10 ASSESSMENT — ACTIVITIES OF DAILY LIVING (ADL)
ADLS_ACUITY_SCORE: 35
ADLS_ACUITY_SCORE: 35

## 2023-03-10 ASSESSMENT — ENCOUNTER SYMPTOMS
SHORTNESS OF BREATH: 0
COUGH: 0
DIFFICULTY URINATING: 1
FEVER: 0
NAUSEA: 0
DIARRHEA: 0
ABDOMINAL PAIN: 1
VOMITING: 0

## 2023-03-10 NOTE — ED NOTES
Patient reports that his jorge catheter is clogged. Patient's family tried to flush the catheter with 50mL of solution x4 without success. Said that only the amount flushed in is what came out, along with small sediments. The catheter collection bag was last emptied at approximately 1900 last night (3/9) - there is just over 600 mL in the collection bag at this moment.

## 2023-03-10 NOTE — ED TRIAGE NOTES
Patient has an indwelling jorge catheter. Catheter is plugged at this time, patient states that it was last changed about a month ago. Unable to get catheter to drain at this time. Low abdominal pressure noted.      Triage Assessment     Row Name 03/10/23 0226       Triage Assessment (Adult)    Airway WDL WDL       Respiratory WDL    Respiratory WDL WDL       Skin Circulation/Temperature WDL    Skin Circulation/Temperature WDL WDL       Cardiac WDL    Cardiac WDL WDL       Peripheral/Neurovascular WDL    Peripheral Neurovascular WDL WDL       Cognitive/Neuro/Behavioral WDL    Cognitive/Neuro/Behavioral WDL WDL

## 2023-03-10 NOTE — DISCHARGE INSTRUCTIONS
Keep your appointment to see your urologist for next week.  Take the antibiotic as directed and until gone.    We will send the urine for culture.  We will contact you in about 4 days if the culture suggests that you need a different antibiotic for this urine infection.    Return to emergency department if you develop a fever, malfunction of the Lozano catheter again, persistent bloody urine, abdominal pain, or any other concerns.    Thank you for choosing [facility] Emergency Department.  It has been my pleasure caring for you today.     ~Dr. Julius MD

## 2023-03-10 NOTE — ED PROVIDER NOTES
EMERGENCY DEPARTMENT ENCOUNTER      NAME: Dilip Long  AGE: 87 year old male  YOB: 1935  MRN: [id number]  EVALUATION DATE & TIME: 3/10/2023  2:28 AM    PCP: [facility]    ED PROVIDER: Paige Madrid M.D.        Chief Complaint   Patient presents with     Catheter Problem         FINAL IMPRESSION:    1. Acute UTI    2. Malfunction of Lozano catheter, initial encounter (H)            MEDICAL DECISION MAKINBshanna Long is a 87 year old male with history of urinary retention, hypothyroidism, hypertension, urethral stricture, paroxysmal A-fib who presents to the ER with complaints of blocked Lozano catheter.    Caregiver noticed increased sediment in the Lozano catheter and now not draining normally.  They did flush it without much success.  Urinalysis today consistent with UTI.  Urine culture pending.  Lozano catheter was changed here in the ER and is now draining normally.  Plan at this time is to start on cephalosporin antibiotic for home use.  He has an appointment to see his urologist next week we will have him keep that appointment.  We will call the patient if he needs medication adjustment based on the culture results.  He does not appear toxic or septic.  I do not think he requires any blood work at this time or radiology imaging such as CT scan or ultrasound.      ED COURSE:  2:43 AM  I met with the patient to gather history and perform my exam. ED course and treatment discussed.    4:53 AM  Catheter was chagned by nursing staff and is draining very well at this time.  Urinalysis concerning for UTI.  Urine culture has been sent.  I reviewed his recent cultures and most of his cultures are actually negative.  He did have 1 back in December with Enterococcus.  Plan at this time is to start with cefepime awaiting culture.  He does not appear toxic.  He is afebrile.  No other laboratories were drawn at this time.  I do not feel that he is septic.  Caregiver at  bedside agrees with this plan.  She feels comfortable with the plan.  Will give first dose of oral antibiotic here in the ER before discharge.  Prescription has been provided to them to fill for outpatient use.  I do not think he requires CT imaging or other imaging of the abdomen at this time.  He has an appointment to follow-up with his urologist this next week and they will keep that appointment.    I do not think that this represents ACS, PE, ruptured AAA, aortic dissection, bowel obstruction, bowel ischemia, cholecystitis, pancreatitis, appendicitis, diverticulitis, kidney stone, pyelonephritis, incarcerated or strangulated hernia, testicular torsion, viscus perforation, perforated GI ulcer, or other such etiologies at this time.    COVID-19 PPE worn during patient evaluation:  Mask: n95 and homemade masks   Eye Protection: none   Gown: none   Hair cover: yes  Face shield: none   Patient wearing a mask: yes     At the conclusion of the encounter I discussed the results of all of the tests and the disposition. Their questions were answered. The patient (and any family present) acknowledged understanding and were agreeable with the care plan.      CONSULTANTS:  none        MEDICATIONS GIVEN IN THE EMERGENCY:  Medications   cefuroxime (CEFTIN) tablet 500 mg (has no administration in time range)   lidocaine (XYLOCAINE) 2 % external gel 10 mL (10 mLs Urethral $Given 3/10/23 7162)           NEW PRESCRIPTIONS STARTED AT TODAY'S ER VISIT     Medication List      Started    cefuroxime 500 MG tablet  Commonly known as: CEFTIN  500 mg, Oral, 2 TIMES DAILY                CONDITION:  stable        DISPOSITION:  discharge home with          =================================================================  =================================================================  TRIAGE ASSESSMENT:  Patient has an indwelling jorge catheter. Catheter is plugged at this time, patient states that it was last changed about a  "month ago. Unable to get catheter to drain at this time. Low abdominal pressure noted.       ED Triage Vitals [03/10/23 0225]   Enc Vitals Group      BP (!) 142/82      Pulse 68      Resp 18      Temp 97  F (36.1  C)      Temp src Temporal      SpO2 99 %      Weight 58.1 kg (128 lb)      Height 1.778 m (5' 10\")     ================================================================  ================================================================    HPI    Patient information was obtained from: patient and     Use of Intrepreter: N/A     Dilip Long is a 87 year old male with history of urinary retention, hypothyroidism, hypertension, urethral stricture, paroxysmal A-fib who presents to the ER with complaints of blocked Lozano catheter.    Patient has a chronic Lozano catheter and his last changed about a month ago at our emergency department.  Today it stopped draining.  They have noticed increase in sediment.  They are concerned about a UTI.   did try to flush it at home with a little bit of extra return, but overall still not draining very well.    They are hoping for catheter replacement.  Patient is due to see his urologist next week.    Otherwise no fevers, cough, chest pain, shortness of breath, vomiting or diarrhea.    =======================================  CHART REVIEW:  >100,000 CFU/mL Gram positive cocci Abnormal        50,000-100,000 CFU/mL Gram positive cocci Abnormal        10,000-50,000 CFU/mL Gram positive cocci Abnormal        <10,000 CFU/mL Gram negative bacilli Abnormal               Multiple morphotypes present with no predominant organism.  Growth consistent with probable contamination during collection.  Suggest repeat specimen if clinically indicated.         Resulting Agency: [facility]        Specimen Collected: 01/30/23  8:05 AM Last Resulted: 02/01/23  5:30 AM           ==========================================  Culture <10,000 CFU/mL Urogenital susana            Resulting " Agency: [facility]           Specimen Collected: 01/16/23 10:47 AM Last Resulted: 01/18/23  7:36 AM           ===============================================          REVIEW OF SYSTEMS  Review of Systems   Constitutional: Negative for fever.   Respiratory: Negative for cough and shortness of breath.    Cardiovascular: Negative for chest pain.   Gastrointestinal: Positive for abdominal pain (suprapubic). Negative for diarrhea, nausea and vomiting.   Genitourinary: Positive for difficulty urinating.   Allergic/Immunologic: Negative for immunocompromised state.   All other systems reviewed and are negative.          PAST MEDICAL HISTORY:  Past Medical History:   Diagnosis Date     Acute heart failure with preserved ejection fraction (HFpEF) (H) 11/10/2022     Acute prostatitis      Asymptomatic bacteriuria 10/23/2013    Noted at 10/2/13 office visit at [facility]. No treatment recommended at that time.      Congestive heart failure (H)      Essential hypertension 08/18/2015     Hypertension      Paroxysmal atrial fib -- on Eliquis 11/06/2022     Syncope      Thyroid disease          PAST SURGICAL HISTORY:  Past Surgical History:   Procedure Laterality Date     BLADDER SURGERY      Bladder absces removal     Blepharoplasty Upper Lid W/ Excessive Skin[  1/29/2007     CATARACT EXTRACTION       EYE SURGERY      both eyes 2015     IR LUMBAR EPIDURAL STEROID INJECTION  4/27/2004     IR LUMBAR EPIDURAL STEROID INJECTION  5/11/2004     IR LUMBAR EPIDURAL STEROID INJECTION  11/24/2004     IR LUMBAR EPIDURAL STEROID INJECTION  11/28/2007     HI CYSTOURETHROSCOPY W/IRRIG & EVAC CLOTS N/A 7/27/2018    Procedure: CYSTOSCOPY, CLOT EVACUATION ,URETHRAL DILATATION, DAUGHERTY CATHETER PLACEMENT;  Surgeon: Lowell Arias MD;  Location: [facility] [place];  Service: Urology         CURRENT MEDICATIONS:    Prior to Admission medications    Medication Sig Start Date End Date Taking? Authorizing Provider   apixaban ANTICOAGULANT (ELIQUIS)  2.5 MG tablet Take 1 tablet (2.5 mg) by mouth 2 times daily 2/3/23   Kurt Quevedo MD   atenolol (TENORMIN) 25 MG tablet Take 25 mg by mouth daily    Unknown, Entered By History   Calcium Carbonate-Vitamin D (OSCAL 500/200 D-3 PO) Take 1 tablet by mouth daily.    Reported, Patient   cephALEXin (KEFLEX) 250 MG capsule Take 1 capsule twice a day by oral route. 1/10/23   Reported, Patient   GLUCOSAMINE-CHONDROITIN-VIT D3 PO Take 1 tablet by mouth daily    Unknown, Entered By History   hydrOXYzine (ATARAX) 10 MG tablet Take 20 mg by mouth At Bedtime    Unknown, Entered By History   ipratropium - albuterol 0.5 mg/2.5 mg/3 mL (DUONEB) 0.5-2.5 (3) MG/3ML neb solution Take 1 vial (3 mLs) by nebulization every 6 hours as needed for shortness of breath, wheezing or cough 12/22/22   Contreras Tan MD   levothyroxine (SYNTHROID/LEVOTHROID) 50 MCG tablet Take 1 tablet (50 mcg) by mouth every morning (before breakfast) 11/11/22   Kristine Ann MD   order for DME Equipment being ordered: Home automatic blood pressure cuff. Check once daily for one week, and then 3 times weekly thereafter. 12/8/15   Yvonne Moreno MD   potassium chloride ER (KLOR-CON M) 20 MEQ CR tablet Take 1 tablet (20 mEq) by mouth daily 11/16/22   Gemini Amin APRN CNP   simvastatin (ZOCOR) 10 MG tablet Take 1 tablet (10 mg) by mouth At Bedtime 10/24/18   Leonora Vanessa MD         ALLERGIES:  No Known Allergies      FAMILY HISTORY:  Family History   Problem Relation Age of Onset     Diabetes No family hx of      Hypertension No family hx of      Other Cancer No family hx of      Coronary Artery Disease No family hx of      Hyperlipidemia No family hx of      Cerebrovascular Disease No family hx of      Breast Cancer No family hx of      Colon Cancer No family hx of      Prostate Cancer No family hx of      Depression No family hx of      Anxiety Disorder No family hx of      Mental Illness No family hx of      Substance Abuse  "No family hx of      Anesthesia Reaction No family hx of      Asthma No family hx of      Osteoporosis No family hx of      Genetic Disorder No family hx of      Thyroid Disease No family hx of      Obesity No family hx of      No Known Problems Mother      No Known Problems Father          SOCIAL HISTORY:  Social History     Socioeconomic History     Marital status:      Number of children: 4   Occupational History     Occupation: Retired   Tobacco Use     Smoking status: Former     Types: Cigarettes     Smokeless tobacco: Never     Tobacco comments:     Pt quit 40 years ago   Vaping Use     Vaping Use: Never used   Substance and Sexual Activity     Alcohol use: Yes     Comment: < 1 drink a week     Drug use: No   Social History Narrative    , 4 children, lives with his wife, and is a retired .  Desires DNR/DNI.  (last updated 11/29/2022)          VITALS:  Patient Vitals for the past 24 hrs:   BP Temp Temp src Pulse Resp SpO2 Height Weight   03/10/23 0225 (!) 142/82 97  F (36.1  C) Temporal 68 18 99 % 1.778 m (5' 10\") 58.1 kg (128 lb)       Wt Readings from Last 3 Encounters:   03/10/23 58.1 kg (128 lb)   02/04/23 58.1 kg (128 lb)   01/30/23 58.1 kg (128 lb)       CrCl cannot be calculated (Patient's most recent lab result is older than the maximum 30 days allowed.).    PHYSICAL EXAM    Constitutional:  Well developed, Well nourished, NAD, GCS 15  HENT:  Normocephalic, Atraumatic, Bilateral external ears normal, Nose normal. Neck- Supple, No stridor.   Eyes:  PERRL, EOMI, Conjunctiva normal, No discharge.  Respiratory:  Normal breath sounds, No respiratory distress, No wheezing, Speaks full sentences easily. No cough.   Cardiovascular:  Normal heart rate, Regular rhythm, No rubs, No gallops.  GI:  No excessive obesity.  Bowel sounds normal, Soft, No tenderness, No masses, No flank tenderness. No rebound or guarding. Urine is aracelis with sediment.  : deferred  Musculoskeletal:  No " cyanosis, No clubbing. Good range of motion in all major joints. No major deformities noted.   Integument:  Warm, Dry, No erythema, No rash.  No petechiae.   Neurologic:  Alert & oriented x 3  Psychiatric:  Affect normal, Cooperative         LAB:  All pertinent labs reviewed and interpreted.  Recent Results (from the past 24 hour(s))   UA with Microscopic reflex to Culture    Collection Time: 03/10/23  3:50 AM    Specimen: Urine, Lozano Catheter   Result Value Ref Range    Color Urine Orange (A) Colorless, Straw, Light Yellow, Yellow    Appearance Urine Turbid (A) Clear    Glucose Urine Negative Negative mg/dL    Bilirubin Urine Negative Negative    Ketones Urine Negative Negative mg/dL    Specific Gravity Urine 1.017 1.001 - 1.030    Blood Urine >1.0 mg/dL (A) Negative    pH Urine 5.5 5.0 - 7.0    Protein Albumin Urine 50 (A) Negative mg/dL    Urobilinogen Urine <2.0 <2.0 mg/dL    Nitrite Urine Negative Negative    Leukocyte Esterase Urine 500 Deion/uL (A) Negative    Bacteria Urine Moderate (A) None Seen /HPF    WBC Clumps Urine Present (A) None Seen /HPF    Hyphal Yeast Urine Few (A) None Seen /HPF    RBC Urine >182 (H) <=2 /HPF    WBC Urine >182 (H) <=5 /HPF    Squamous Epithelials Urine 7 (H) <=1 /HPF       Lab Results   Component Value Date    ABORH O POS 10/24/2022           RADIOLOGY:  Reviewed all pertinent imaging. Please see official radiology report.    No orders to display         EKG:    none    PROCEDURES:  Lozano changed by RN    Medical Decision Making    History:    Supplemental history from: Documented in chart, if applicable and Caregiver    External Record(s) reviewed: Documented in chart, if applicable.    Work Up:    Chart documentation includes differential considered and any EKGs or imaging independently interpreted by provider, where specified.    In additional to work up documented, I considered the following work up: Documented in chart, if applicable.    External consultation:    Discussion  of management with another provider: Documented in chart, if applicable    Complicating factors:    Care impacted by chronic illness: Other: Chronic Lozano.    Care affected by social determinants of health: N/A    Disposition considerations: Discharge. I prescribed additional prescription strength medication(s) as charted. I considered admission, but ultimately discharged patient As he does not appear toxic or septic.  Catheter appears to be draining normally at this time..      Paige Madrid M.D. [facility]  Emergency Medicine and Medical Toxicology  [facility] EMERGENCY DEPARTMENT  [address] [place] [id number]  [phone]  Dept: [phone]           Paige Madrid MD  03/10/23 0455

## 2023-03-13 LAB
BACTERIA UR CULT: ABNORMAL
BACTERIA UR CULT: ABNORMAL

## 2023-04-10 ENCOUNTER — APPOINTMENT (OUTPATIENT)
Dept: CT IMAGING | Facility: CLINIC | Age: 88
End: 2023-04-10
Attending: EMERGENCY MEDICINE
Payer: COMMERCIAL

## 2023-04-10 ENCOUNTER — HOSPITAL ENCOUNTER (EMERGENCY)
Facility: CLINIC | Age: 88
Discharge: HOME | End: 2023-04-10
Attending: EMERGENCY MEDICINE | Admitting: EMERGENCY MEDICINE
Payer: COMMERCIAL

## 2023-04-10 VITALS
HEART RATE: 75 BPM | WEIGHT: 160 LBS | TEMPERATURE: 97.9 F | DIASTOLIC BLOOD PRESSURE: 67 MMHG | RESPIRATION RATE: 18 BRPM | BODY MASS INDEX: 22.9 KG/M2 | OXYGEN SATURATION: 97 % | HEIGHT: 70 IN | SYSTOLIC BLOOD PRESSURE: 119 MMHG

## 2023-04-10 DIAGNOSIS — Z97.8 CHRONIC INDWELLING FOLEY CATHETER: ICD-10-CM

## 2023-04-10 DIAGNOSIS — N39.0 COMPLICATED UTI (URINARY TRACT INFECTION): ICD-10-CM

## 2023-04-10 DIAGNOSIS — R39.89 URINE DISCOLORATION: ICD-10-CM

## 2023-04-10 LAB
ALBUMIN SERPL-MCNC: 2.9 G/DL (ref 3.5–5)
ALP SERPL-CCNC: 61 U/L (ref 45–120)
ALT SERPL W P-5'-P-CCNC: <9 U/L (ref 0–45)
ANION GAP SERPL CALCULATED.3IONS-SCNC: 9 MMOL/L (ref 5–18)
AST SERPL W P-5'-P-CCNC: 10 U/L (ref 0–40)
BASOPHILS # BLD AUTO: 0.1 10E3/UL (ref 0–0.2)
BASOPHILS NFR BLD AUTO: 1 %
BILIRUB SERPL-MCNC: 0.5 MG/DL (ref 0–1)
BUN SERPL-MCNC: 17 MG/DL (ref 8–28)
CALCIUM SERPL-MCNC: 9 MG/DL (ref 8.5–10.5)
CHLORIDE BLD-SCNC: 99 MMOL/L (ref 98–107)
CO2 SERPL-SCNC: 26 MMOL/L (ref 22–31)
CREAT SERPL-MCNC: 1.28 MG/DL (ref 0.7–1.3)
EOSINOPHIL # BLD AUTO: 0.2 10E3/UL (ref 0–0.7)
EOSINOPHIL NFR BLD AUTO: 3 %
ERYTHROCYTE [DISTWIDTH] IN BLOOD BY AUTOMATED COUNT: 12.6 % (ref 10–15)
GFR SERPL CREATININE-BSD FRML MDRD: 54 ML/MIN/1.73M2
GLUCOSE BLD-MCNC: 85 MG/DL (ref 70–125)
HCT VFR BLD AUTO: 32.8 % (ref 40–53)
HGB BLD-MCNC: 11 G/DL (ref 13.3–17.7)
IMM GRANULOCYTES # BLD: 0 10E3/UL
IMM GRANULOCYTES NFR BLD: 1 %
LYMPHOCYTES # BLD AUTO: 1.1 10E3/UL (ref 0.8–5.3)
LYMPHOCYTES NFR BLD AUTO: 18 %
MCH RBC QN AUTO: 32.7 PG (ref 26.5–33)
MCHC RBC AUTO-ENTMCNC: 33.5 G/DL (ref 31.5–36.5)
MCV RBC AUTO: 98 FL (ref 78–100)
MONOCYTES # BLD AUTO: 0.8 10E3/UL (ref 0–1.3)
MONOCYTES NFR BLD AUTO: 13 %
NEUTROPHILS # BLD AUTO: 4 10E3/UL (ref 1.6–8.3)
NEUTROPHILS NFR BLD AUTO: 64 %
NRBC # BLD AUTO: 0 10E3/UL
NRBC BLD AUTO-RTO: 0 /100
PLATELET # BLD AUTO: 277 10E3/UL (ref 150–450)
POTASSIUM BLD-SCNC: 4.1 MMOL/L (ref 3.5–5)
PROT SERPL-MCNC: 5.6 G/DL (ref 6–8)
RBC # BLD AUTO: 3.36 10E6/UL (ref 4.4–5.9)
SODIUM SERPL-SCNC: 134 MMOL/L (ref 136–145)
WBC # BLD AUTO: 6.2 10E3/UL (ref 4–11)

## 2023-04-10 PROCEDURE — 80053 COMPREHEN METABOLIC PANEL: CPT | Performed by: EMERGENCY MEDICINE

## 2023-04-10 PROCEDURE — 36415 COLL VENOUS BLD VENIPUNCTURE: CPT | Performed by: EMERGENCY MEDICINE

## 2023-04-10 PROCEDURE — 74177 CT ABD & PELVIS W/CONTRAST: CPT

## 2023-04-10 PROCEDURE — 250N000013 HC RX MED GY IP 250 OP 250 PS 637: Performed by: FAMILY MEDICINE

## 2023-04-10 PROCEDURE — 99285 EMERGENCY DEPT VISIT HI MDM: CPT | Mod: 25

## 2023-04-10 PROCEDURE — 250N000011 HC RX IP 250 OP 636: Performed by: EMERGENCY MEDICINE

## 2023-04-10 PROCEDURE — 85025 COMPLETE CBC W/AUTO DIFF WBC: CPT | Performed by: EMERGENCY MEDICINE

## 2023-04-10 PROCEDURE — 87086 URINE CULTURE/COLONY COUNT: CPT | Performed by: FAMILY MEDICINE

## 2023-04-10 PROCEDURE — 51798 US URINE CAPACITY MEASURE: CPT

## 2023-04-10 RX ORDER — IOPAMIDOL 755 MG/ML
100 INJECTION, SOLUTION INTRAVASCULAR ONCE
Status: COMPLETED | OUTPATIENT
Start: 2023-04-10 | End: 2023-04-10

## 2023-04-10 RX ADMIN — AMOXICILLIN AND CLAVULANATE POTASSIUM 1 TABLET: 875; 125 TABLET, FILM COATED ORAL at 17:04

## 2023-04-10 RX ADMIN — IOPAMIDOL 100 ML: 755 INJECTION, SOLUTION INTRAVENOUS at 15:41

## 2023-04-10 ASSESSMENT — ACTIVITIES OF DAILY LIVING (ADL)
ADLS_ACUITY_SCORE: 35
ADLS_ACUITY_SCORE: 33
ADLS_ACUITY_SCORE: 35

## 2023-04-10 NOTE — ED NOTES
Patients catheter was irrigated and flushed until clear with GINGER Torres assisting. Patient had new leg bag attached with clear yellow urine filling up the bag.

## 2023-04-10 NOTE — ED PROVIDER NOTES
EMERGENCY DEPARTMENT ENCOUNTER            IMPRESSION:  Chronic indwelling Jorge catheter      MEDICAL DECISION MAKING:  It was my pleasure to provide care for Dilip Long who presented for evaluation of discolored urine.  He has a chronic indwelling Jorge catheter.  Nursing staff at the care center noted discolored urine in the Jorge collection bag.  Patient denies all other infectious symptoms    Patient is pleasant and cooperative    On exam vital signs are normal.  There is no abdominal tenderness.  Jorge catheter has correct placement.  The Jorge collection bag appears to have feculent contents    Case discussed with radiology and urology.  Concern for rectal vesicle fistula.    CT of the abdomen with triple contrast as well as laboratory investigation and urinalysis ordered to look for evidence of fistula    Patient signed out to the evening provider          =================================================================  CHIEF COMPLAINT:  Chief Complaint   Patient presents with     Dysuria         HPI  Dilip Long is a 88 year old male with a history of hypertrophy of prostate, indwelling jorge catheter in situ, CKD 3, diverticulosis, Grave's disease, HLD, HTN, and PAD, who presents to the ED by walk-in (patient's granddaughter drove him) for evaluation of dark colored urine.    Per chart review, patient presented earlier today 4/10/23 at [facility] for dark colored urine. He was sent to the ED for workup as patient has jorge and they cannot perform proper testing.    The patient's granddaughter reports the patient's urine began to turn brownish on Friday night 4/7/23 and has been significantly darker since yesterday 4/9/23. She states the patient has had a catheter since November 2022 and that it is changed monthly. The catheter was last changed Thursday 4/6.    The patient endorses some mild dysuria, lower abdominal pain, and some back pain. Patient denies additional medical concerns  or complaints at this time.      Of note, patient had UTI 3/10/23 and was treated with ceftin.    SHx: the patient lives at home in [place].      REVIEW OF SYSTEMS   Constitutional: Does not report chills, unintentional weight loss or fatigue   Eyes: Does not report visual changes or discharge    HENT: Does not report sore throat, ear pain or neck pain  Respiratory: Does not report cough or shortness of breath    Cardiovascular: Does not report chest pain, palpitations or leg swelling  GI: Does not report nausea, vomiting, or dark, bloody stools.  Endorses abdominal pain (mild, low).  : Endorses dysuria, discolored urine (dark brown) and bilateral flank pain (mild).  Musculoskeletal: Does not report any new musculoskeletal pain or new muscle/joint pains  Skin: Does not report rash or wound  Neurologic: Does not report current headache, new weakness, focal weakness, or sensory changes        Remainder of systems reviewed, unless noted in HPI all others negative.      PAST MEDICAL HISTORY:  Past Medical History:   Diagnosis Date     Acute heart failure with preserved ejection fraction (HFpEF) (H) 11/10/2022     Acute prostatitis      Asymptomatic bacteriuria 10/23/2013    Noted at 10/2/13 office visit at [facility]. No treatment recommended at that time.      Congestive heart failure (H)      Essential hypertension 08/18/2015     Hypertension      Paroxysmal atrial fib -- on Eliquis 11/06/2022     Syncope      Thyroid disease        PAST SURGICAL HISTORY:  Past Surgical History:   Procedure Laterality Date     BLADDER SURGERY      Bladder absces removal     Blepharoplasty Upper Lid W/ Excessive Skin[  1/29/2007     CATARACT EXTRACTION       EYE SURGERY      both eyes 2015     IR LUMBAR EPIDURAL STEROID INJECTION  4/27/2004     IR LUMBAR EPIDURAL STEROID INJECTION  5/11/2004     IR LUMBAR EPIDURAL STEROID INJECTION  11/24/2004     IR LUMBAR EPIDURAL STEROID INJECTION  11/28/2007     IN CYSTOURETHROSCOPY W/IRRIG &  "EVAC CLOTS N/A 7/27/2018    Procedure: CYSTOSCOPY, CLOT EVACUATION ,URETHRAL DILATATION, DAUGHERTY CATHETER PLACEMENT;  Surgeon: Lowell Arias MD;  [facility];  Service: Urology         CURRENT MEDICATIONS:    No current outpatient medications on file.      ALLERGIES:  No Known Allergies    FAMILY HISTORY:  Family History   Problem Relation Age of Onset     Diabetes No family hx of      Hypertension No family hx of      Other Cancer No family hx of      Coronary Artery Disease No family hx of      Hyperlipidemia No family hx of      Cerebrovascular Disease No family hx of      Breast Cancer No family hx of      Colon Cancer No family hx of      Prostate Cancer No family hx of      Depression No family hx of      Anxiety Disorder No family hx of      Mental Illness No family hx of      Substance Abuse No family hx of      Anesthesia Reaction No family hx of      Asthma No family hx of      Osteoporosis No family hx of      Genetic Disorder No family hx of      Thyroid Disease No family hx of      Obesity No family hx of      No Known Problems Mother      No Known Problems Father        SOCIAL HISTORY:   Social History     Socioeconomic History     Marital status:      Number of children: 4   Occupational History     Occupation: Retired   Tobacco Use     Smoking status: Former     Types: Cigarettes     Smokeless tobacco: Never     Tobacco comments:     Pt quit 40 years ago   Vaping Use     Vaping status: Never Used   Substance and Sexual Activity     Alcohol use: Yes     Comment: < 1 drink a week     Drug use: No   Social History Narrative    , 4 children, lives with his wife, and is a retired .  Desires DNR/DNI.  (last updated 11/29/2022)        PHYSICAL EXAM:    /67   Pulse 75   Temp 97.9  F (36.6  C) (Temporal)   Resp 18   Ht 1.778 m (5' 10\")   Wt 72.6 kg (160 lb)   SpO2 97%   BMI 22.96 kg/m      Constitutional: Awake, alert, in no acute distress; " hard of hearing  Head: Normocephalic, atraumatic.  ENT: Mucous membranes moist. Posterior oropharynx appears normal.  Eyes: Pupils midrange and reactive ,no conjunctival discharge  Neck: No lymphadenopathy, no stridor, supple, no soft tissue swelling  Chest: No tenderness   Respiratory: Respirations even, unlabored. Lungs clear to ascultation bilaterally, in no acute respiratory distress.  Cardiovascular: Regular rate and rhythm.+2 radial pulses, equal bilaterally.  No murmurs.   GI: Abdomen soft, non-tender to palpation in all 4 quadrants. No guarding or rebound. Bowel sounds intact on all 4 quadrants.  Lozano catheter in place.  Discolored urine that appears feculent  Back: No CVA tenderness.    Musculoskeletal: Moves all 4 extremities equally, strength symmetrical on bilateral uppers and lowers.  No peripheral edema  Integument: Warm, dry. No rash. No bruising or petechiae.  Lymphatic: No cervical lymphadenopathy  Neurologic: Alert & oriented x 3. Normal speech. Grossly normal motor and sensory function.  Psychiatric: Normal mood and affect. Normal judgement.      ED COURSE:    1:03 PM I met the patient and performed my initial interview and exam.   1:23 PM I spoke with Dr. Lugo, Urology.        Medical Decision Making    History:    Supplemental history from: patient's granddaughter    External Record(s) reviewed: External medical records including care everywhere reviewed    Work Up:    Laboratory and imaging studies independently reviewed by the provider    Broad differential diagnosis considered for discolored urine      External consultation:    Discussion of management with another provider: Dr. Luog, Urology;     Complicating factors:    Patient has a complicated past medical history including chronic Lozano catheter    Care affected by social determinants of health: Access to primary care    Disposition considerations:       LAB:  Laboratory results were independently reviewed and interpreted  Results  for orders placed or performed during the hospital encounter of 04/10/23   CT Abdomen Pelvis w Contrast    Impression    IMPRESSION:   1.  Diffuse bladder wall thickening with surrounding edema like reflecting cystitis. No CT evidence for a colovesicular fistula.  2.  Large right hydrocele.  3.  Left lower lobe irregular 12 mm nodule, perhaps related to parenchymal scarring. As a precaution recommend short interval chest CT follow-up in 3 months.  4.  Stable findings in the lateral left hepatic lobe favored to reflect focal ductal dilatation. No discrete obstructing mass. Recommend attention on follow-up.  5.  Colonic diverticulosis without evidence of acute diverticulitis.   Comprehensive metabolic panel   Result Value Ref Range    Sodium 134 (L) 136 - 145 mmol/L    Potassium 4.1 3.5 - 5.0 mmol/L    Chloride 99 98 - 107 mmol/L    Carbon Dioxide (CO2) 26 22 - 31 mmol/L    Anion Gap 9 5 - 18 mmol/L    Urea Nitrogen 17 8 - 28 mg/dL    Creatinine 1.28 0.70 - 1.30 mg/dL    Calcium 9.0 8.5 - 10.5 mg/dL    Glucose 85 70 - 125 mg/dL    Alkaline Phosphatase 61 45 - 120 U/L    AST 10 0 - 40 U/L    ALT <9 0 - 45 U/L    Protein Total 5.6 (L) 6.0 - 8.0 g/dL    Albumin 2.9 (L) 3.5 - 5.0 g/dL    Bilirubin Total 0.5 0.0 - 1.0 mg/dL    GFR Estimate 54 (L) >60 mL/min/1.73m2   CBC with platelets and differential   Result Value Ref Range    WBC Count 6.2 4.0 - 11.0 10e3/uL    RBC Count 3.36 (L) 4.40 - 5.90 10e6/uL    Hemoglobin 11.0 (L) 13.3 - 17.7 g/dL    Hematocrit 32.8 (L) 40.0 - 53.0 %    MCV 98 78 - 100 fL    MCH 32.7 26.5 - 33.0 pg    MCHC 33.5 31.5 - 36.5 g/dL    RDW 12.6 10.0 - 15.0 %    Platelet Count 277 150 - 450 10e3/uL    % Neutrophils 64 %    % Lymphocytes 18 %    % Monocytes 13 %    % Eosinophils 3 %    % Basophils 1 %    % Immature Granulocytes 1 %    NRBCs per 100 WBC 0 <1 /100    Absolute Neutrophils 4.0 1.6 - 8.3 10e3/uL    Absolute Lymphocytes 1.1 0.8 - 5.3 10e3/uL    Absolute Monocytes 0.8 0.0 - 1.3 10e3/uL     Absolute Eosinophils 0.2 0.0 - 0.7 10e3/uL    Absolute Basophils 0.1 0.0 - 0.2 10e3/uL    Absolute Immature Granulocytes 0.0 <=0.4 10e3/uL    Absolute NRBCs 0.0 10e3/uL   Urine Culture    Specimen: Urine, Catheter   Result Value Ref Range    Culture (A)      10,000-50,000 CFU/mL Lactose fermenting gram negative bacilli    Culture >100,000 CFU/mL Gram positive cocci in clusters (A)     Culture (A)      50,000-100,000 CFU/mL Gram positive bacilli, resembling diphtheroids    Culture 10,000-50,000 CFU/mL Gram positive cocci (A)     Culture 10,000-50,000 CFU/mL Gram positive cocci in chains (A)          RADIOLOGY:  Radiology reports were independently reviewed and interpreted  CT Abdomen Pelvis w Contrast   Final Result   IMPRESSION:    1.  Diffuse bladder wall thickening with surrounding edema like reflecting cystitis. No CT evidence for a colovesicular fistula.   2.  Large right hydrocele.   3.  Left lower lobe irregular 12 mm nodule, perhaps related to parenchymal scarring. As a precaution recommend short interval chest CT follow-up in 3 months.   4.  Stable findings in the lateral left hepatic lobe favored to reflect focal ductal dilatation. No discrete obstructing mass. Recommend attention on follow-up.   5.  Colonic diverticulosis without evidence of acute diverticulitis.               MEDICATIONS GIVEN IN THE EMERGENCY:  Medications   iopamidol (ISOVUE-370) solution 100 mL (100 mLs Intravenous $Given 4/10/23 1541)   amoxicillin-clavulanate (AUGMENTIN) 875-125 MG per tablet 1 tablet (1 tablet Oral $Given 4/10/23 1703)           NEW PRESCRIPTIONS STARTED AT TODAY'S ER VISIT:  Discharge Medication List as of 4/10/2023  5:52 PM      START taking these medications    Details   amoxicillin-clavulanate (AUGMENTIN) 875-125 MG tablet Take 1 tablet by mouth 2 times daily for 10 days, Disp-20 tablet, R-0, E-Prescribe                Prior to making a final disposition on this patient the results of patient's tests and other  diagnostic studies were discussed with the patient. All questions were answered. Patient expressed understanding of the plan and was amenable to it.      FINAL DIAGNOSIS:    ICD-10-CM    1. Urine discoloration  R39.89 Adult Colorectal Surgery  Referral     CANCELED: Adult Colorectal Surgery  Referral      2. Complicated UTI (urinary tract infection)  N39.0 Adult Colorectal Surgery  Referral     CANCELED: Adult Colorectal Surgery  Referral      3. 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      Racehl ROSE, am serving as a scribe to document services personally performed by Dr. Eduardo Sharpe based on my observation and the provider's statements to me. I, Eduardo Sharpe MD attest that Rachel Delgadillo is acting in a scribe capacity, has observed my performance of the services and has documented them in accordance with my direction.    Eduardo Sharpe M.D.  Emergency Medicine  [facility] EMERGENCY ROOM  [address] [id number]  [phone]  Dept: [phone]  4/10/2023       Eduardo Sharpe MD  04/15/23 6625

## 2023-04-10 NOTE — ED NOTES
EMERGENCY DEPARTMENT SIGN OUT NOTE        ED COURSE AND MEDICAL DECISION MAKING  Patient was signed out to me by Dr Eduardo Sharpe at 3:30 PM    In brief, Dilip Long is a 88 year old male who initially presented for darkening urine with mild dysuria and lower abdominal/back pain.     4:39 PM CT scan does not demonstrate colorectal fistula, does show evidence of cystitis.  Patient reexamined, still feeling well and vitally stable.  Will change and flush catheter and change leg bag.  Patient will be started on Augmentin for possible UTI, last couple of urine cultures demonstrated Enterococcus.  Patient should follow-up with urology this week and also will be referred to colorectal surgery for evaluation, although again no definite fistula seen.    FINAL IMPRESSION    1. Urine discoloration    2. Complicated UTI (urinary tract infection)    3. 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        ED MEDS  Medications   iohexol (OMNIPAQUE) 140 MG/ML solution for oral use 50 mL (has no administration in time range)   amoxicillin-clavulanate (AUGMENTIN) 875-125 MG per tablet 1 tablet (has no administration in time range)   iopamidol (ISOVUE-370) solution 100 mL (100 mLs Intravenous $Given 4/10/23 6749)       LAB  Labs Ordered and Resulted from Time of ED Arrival to Time of ED Departure   COMPREHENSIVE METABOLIC PANEL - Abnormal       Result Value    Sodium 134 (*)     Potassium 4.1      Chloride 99      Carbon Dioxide (CO2) 26      Anion Gap 9      Urea Nitrogen 17      Creatinine 1.28      Calcium 9.0      Glucose 85      Alkaline Phosphatase 61      AST 10      ALT <9      Protein Total 5.6 (*)     Albumin 2.9 (*)     Bilirubin Total 0.5      GFR Estimate 54 (*)    CBC WITH PLATELETS AND DIFFERENTIAL - Abnormal    WBC Count 6.2      RBC Count 3.36 (*)     Hemoglobin 11.0 (*)     Hematocrit 32.8 (*)     MCV 98      MCH 32.7      MCHC 33.5      RDW 12.6      Platelet Count 277      % Neutrophils 64      %  Lymphocytes 18      % Monocytes 13      % Eosinophils 3      % Basophils 1      % Immature Granulocytes 1      NRBCs per 100 WBC 0      Absolute Neutrophils 4.0      Absolute Lymphocytes 1.1      Absolute Monocytes 0.8      Absolute Eosinophils 0.2      Absolute Basophils 0.1      Absolute Immature Granulocytes 0.0      Absolute NRBCs 0.0     URINE CULTURE     RADIOLOGY    CT Abdomen Pelvis w Contrast   Final Result   IMPRESSION:    1.  Diffuse bladder wall thickening with surrounding edema like reflecting cystitis. No CT evidence for a colovesicular fistula.   2.  Large right hydrocele.   3.  Left lower lobe irregular 12 mm nodule, perhaps related to parenchymal scarring. As a precaution recommend short interval chest CT follow-up in 3 months.   4.  Stable findings in the lateral left hepatic lobe favored to reflect focal ductal dilatation. No discrete obstructing mass. Recommend attention on follow-up.   5.  Colonic diverticulosis without evidence of acute diverticulitis.          DISCHARGE MEDS  New Prescriptions    AMOXICILLIN-CLAVULANATE (AUGMENTIN) 875-125 MG TABLET    Take 1 tablet by mouth 2 times daily for 10 days       I, Florencio Torres, am serving as a scribe to document services personally performed by Dr. Miller based on my observation and the provider's statements to me. I, Eduardo Miller MD attest that Florencio Torers is acting in a scribe capacity, has observed my performance of the services and has documented them in accordance with my direction.    Eduardo Miller MD  [facility] EMERGENCY ROOM  [address] [id number] [phone]     Eduardo Miller MD  04/10/23 7163

## 2023-04-10 NOTE — ED TRIAGE NOTES
Pt presents from clinic for concerns for darker urine. Pt has jorge and noticed a couple days ago his urine was darker. Endorses mild dysuria, lower abdominal pain and some back pain last week. Had a UTI about a month ago     Triage Assessment     Row Name 04/10/23 1148       Triage Assessment (Adult)    Airway WDL WDL       Respiratory WDL    Respiratory WDL WDL       Skin Circulation/Temperature WDL    Skin Circulation/Temperature WDL WDL       Cardiac WDL    Cardiac WDL WDL       Peripheral/Neurovascular WDL    Peripheral Neurovascular WDL WDL       Cognitive/Neuro/Behavioral WDL    Cognitive/Neuro/Behavioral WDL WDL

## 2023-04-10 NOTE — DISCHARGE INSTRUCTIONS
Colorectal surgery will call you tomorrow to schedule a follow-up appointment    Call urology for follow-up this week as well

## 2023-04-12 LAB
BACTERIA UR CULT: ABNORMAL

## 2023-04-14 ENCOUNTER — HOSPITAL ENCOUNTER (INPATIENT)
Facility: CLINIC | Age: 88
LOS: 6 days | Discharge: HOME | DRG: 669 | End: 2023-04-20
Attending: EMERGENCY MEDICINE | Admitting: HOSPITALIST
Payer: COMMERCIAL

## 2023-04-14 ENCOUNTER — APPOINTMENT (OUTPATIENT)
Dept: CT IMAGING | Facility: CLINIC | Age: 88
DRG: 669 | End: 2023-04-14
Attending: EMERGENCY MEDICINE
Payer: COMMERCIAL

## 2023-04-14 DIAGNOSIS — I48.0 PAROXYSMAL ATRIAL FIBRILLATION (H): Primary | ICD-10-CM

## 2023-04-14 DIAGNOSIS — R31.0 GROSS HEMATURIA: ICD-10-CM

## 2023-04-14 LAB
ALBUMIN SERPL-MCNC: 3 G/DL (ref 3.5–5)
ALP SERPL-CCNC: 60 U/L (ref 45–120)
ALT SERPL W P-5'-P-CCNC: <9 U/L (ref 0–45)
ANION GAP SERPL CALCULATED.3IONS-SCNC: 13 MMOL/L (ref 5–18)
AST SERPL W P-5'-P-CCNC: 12 U/L (ref 0–40)
BASOPHILS # BLD AUTO: 0.1 10E3/UL (ref 0–0.2)
BASOPHILS NFR BLD AUTO: 1 %
BILIRUB SERPL-MCNC: 0.3 MG/DL (ref 0–1)
BUN SERPL-MCNC: 17 MG/DL (ref 8–28)
CALCIUM SERPL-MCNC: 9.1 MG/DL (ref 8.5–10.5)
CHLORIDE BLD-SCNC: 96 MMOL/L (ref 98–107)
CO2 SERPL-SCNC: 22 MMOL/L (ref 22–31)
CREAT SERPL-MCNC: 1.41 MG/DL (ref 0.7–1.3)
EOSINOPHIL # BLD AUTO: 0.2 10E3/UL (ref 0–0.7)
EOSINOPHIL NFR BLD AUTO: 2 %
ERYTHROCYTE [DISTWIDTH] IN BLOOD BY AUTOMATED COUNT: 12.3 % (ref 10–15)
ERYTHROCYTE [DISTWIDTH] IN BLOOD BY AUTOMATED COUNT: 12.4 % (ref 10–15)
GFR SERPL CREATININE-BSD FRML MDRD: 48 ML/MIN/1.73M2
GLUCOSE BLD-MCNC: 111 MG/DL (ref 70–125)
HCT VFR BLD AUTO: 30.5 % (ref 40–53)
HCT VFR BLD AUTO: 30.7 % (ref 40–53)
HGB BLD-MCNC: 10.2 G/DL (ref 13.3–17.7)
HGB BLD-MCNC: 10.7 G/DL (ref 13.3–17.7)
HOLD SPECIMEN: NORMAL
IMM GRANULOCYTES # BLD: 0.1 10E3/UL
IMM GRANULOCYTES NFR BLD: 1 %
INR PPP: 1.15 (ref 0.85–1.15)
LACTATE SERPL-SCNC: 1.9 MMOL/L (ref 0.7–2)
LYMPHOCYTES # BLD AUTO: 1.2 10E3/UL (ref 0.8–5.3)
LYMPHOCYTES NFR BLD AUTO: 13 %
MCH RBC QN AUTO: 32.7 PG (ref 26.5–33)
MCH RBC QN AUTO: 33 PG (ref 26.5–33)
MCHC RBC AUTO-ENTMCNC: 33.4 G/DL (ref 31.5–36.5)
MCHC RBC AUTO-ENTMCNC: 34.9 G/DL (ref 31.5–36.5)
MCV RBC AUTO: 95 FL (ref 78–100)
MCV RBC AUTO: 98 FL (ref 78–100)
MONOCYTES # BLD AUTO: 0.9 10E3/UL (ref 0–1.3)
MONOCYTES NFR BLD AUTO: 10 %
NEUTROPHILS # BLD AUTO: 6.6 10E3/UL (ref 1.6–8.3)
NEUTROPHILS NFR BLD AUTO: 73 %
NRBC # BLD AUTO: 0 10E3/UL
NRBC BLD AUTO-RTO: 0 /100
PLATELET # BLD AUTO: 268 10E3/UL (ref 150–450)
PLATELET # BLD AUTO: 299 10E3/UL (ref 150–450)
POTASSIUM BLD-SCNC: 4.7 MMOL/L (ref 3.5–5)
PROT SERPL-MCNC: 5.6 G/DL (ref 6–8)
RBC # BLD AUTO: 3.12 10E6/UL (ref 4.4–5.9)
RBC # BLD AUTO: 3.24 10E6/UL (ref 4.4–5.9)
SODIUM SERPL-SCNC: 131 MMOL/L (ref 136–145)
WBC # BLD AUTO: 9 10E3/UL (ref 4–11)
WBC # BLD AUTO: 9.8 10E3/UL (ref 4–11)

## 2023-04-14 PROCEDURE — 96374 THER/PROPH/DIAG INJ IV PUSH: CPT | Mod: 59

## 2023-04-14 PROCEDURE — 99285 EMERGENCY DEPT VISIT HI MDM: CPT | Mod: 25

## 2023-04-14 PROCEDURE — 258N000003 HC RX IP 258 OP 636: Performed by: EMERGENCY MEDICINE

## 2023-04-14 PROCEDURE — 250N000013 HC RX MED GY IP 250 OP 250 PS 637: Performed by: HOSPITALIST

## 2023-04-14 PROCEDURE — 83605 ASSAY OF LACTIC ACID: CPT | Performed by: EMERGENCY MEDICINE

## 2023-04-14 PROCEDURE — 99222 1ST HOSP IP/OBS MODERATE 55: CPT | Performed by: HOSPITALIST

## 2023-04-14 PROCEDURE — 258N000003 HC RX IP 258 OP 636: Performed by: HOSPITALIST

## 2023-04-14 PROCEDURE — 120N000001 HC R&B MED SURG/OB

## 2023-04-14 PROCEDURE — 96361 HYDRATE IV INFUSION ADD-ON: CPT

## 2023-04-14 PROCEDURE — G1010 CDSM STANSON: HCPCS

## 2023-04-14 PROCEDURE — 85610 PROTHROMBIN TIME: CPT | Performed by: EMERGENCY MEDICINE

## 2023-04-14 PROCEDURE — 36415 COLL VENOUS BLD VENIPUNCTURE: CPT | Performed by: HOSPITALIST

## 2023-04-14 PROCEDURE — 258N000001 HC RX 258: Performed by: HOSPITALIST

## 2023-04-14 PROCEDURE — 250N000011 HC RX IP 250 OP 636: Performed by: EMERGENCY MEDICINE

## 2023-04-14 PROCEDURE — 250N000009 HC RX 250: Performed by: EMERGENCY MEDICINE

## 2023-04-14 PROCEDURE — 250N000011 HC RX IP 250 OP 636: Performed by: HOSPITALIST

## 2023-04-14 PROCEDURE — 51798 US URINE CAPACITY MEASURE: CPT

## 2023-04-14 PROCEDURE — 85025 COMPLETE CBC W/AUTO DIFF WBC: CPT | Performed by: EMERGENCY MEDICINE

## 2023-04-14 PROCEDURE — 999N000157 HC STATISTIC RCP TIME EA 10 MIN

## 2023-04-14 PROCEDURE — 80053 COMPREHEN METABOLIC PANEL: CPT | Performed by: EMERGENCY MEDICINE

## 2023-04-14 PROCEDURE — 36415 COLL VENOUS BLD VENIPUNCTURE: CPT | Performed by: EMERGENCY MEDICINE

## 2023-04-14 PROCEDURE — 258N000001 HC RX 258: Performed by: EMERGENCY MEDICINE

## 2023-04-14 PROCEDURE — 51702 INSERT TEMP BLADDER CATH: CPT

## 2023-04-14 PROCEDURE — 85027 COMPLETE CBC AUTOMATED: CPT | Performed by: HOSPITALIST

## 2023-04-14 RX ORDER — HYDROMORPHONE HCL IN WATER/PF 6 MG/30 ML
0.4 PATIENT CONTROLLED ANALGESIA SYRINGE INTRAVENOUS
Status: DISCONTINUED | OUTPATIENT
Start: 2023-04-14 | End: 2023-04-15

## 2023-04-14 RX ORDER — LANOLIN ALCOHOL/MO/W.PET/CERES
3 CREAM (GRAM) TOPICAL
Status: DISCONTINUED | OUTPATIENT
Start: 2023-04-14 | End: 2023-04-15

## 2023-04-14 RX ORDER — PROCHLORPERAZINE MALEATE 5 MG
5 TABLET ORAL EVERY 6 HOURS PRN
Status: DISCONTINUED | OUTPATIENT
Start: 2023-04-14 | End: 2023-04-15

## 2023-04-14 RX ORDER — LIDOCAINE HYDROCHLORIDE 20 MG/ML
10 JELLY TOPICAL ONCE
Status: COMPLETED | OUTPATIENT
Start: 2023-04-14 | End: 2023-04-14

## 2023-04-14 RX ORDER — ONDANSETRON 4 MG/1
4 TABLET, ORALLY DISINTEGRATING ORAL EVERY 6 HOURS PRN
Status: DISCONTINUED | OUTPATIENT
Start: 2023-04-14 | End: 2023-04-15

## 2023-04-14 RX ORDER — MORPHINE SULFATE 4 MG/ML
4 INJECTION, SOLUTION INTRAMUSCULAR; INTRAVENOUS ONCE
Status: COMPLETED | OUTPATIENT
Start: 2023-04-14 | End: 2023-04-14

## 2023-04-14 RX ORDER — ONDANSETRON 2 MG/ML
4 INJECTION INTRAMUSCULAR; INTRAVENOUS EVERY 6 HOURS PRN
Status: DISCONTINUED | OUTPATIENT
Start: 2023-04-14 | End: 2023-04-15

## 2023-04-14 RX ORDER — ACETAMINOPHEN 500 MG
1000 TABLET ORAL EVERY 6 HOURS PRN
COMMUNITY

## 2023-04-14 RX ORDER — CEFTRIAXONE 1 G/1
1 INJECTION, POWDER, FOR SOLUTION INTRAMUSCULAR; INTRAVENOUS ONCE
Status: COMPLETED | OUTPATIENT
Start: 2023-04-14 | End: 2023-04-14

## 2023-04-14 RX ORDER — ATENOLOL 25 MG/1
25 TABLET ORAL EVERY EVENING
Status: DISCONTINUED | OUTPATIENT
Start: 2023-04-15 | End: 2023-04-20 | Stop reason: HOSPADM

## 2023-04-14 RX ORDER — LIDOCAINE 40 MG/G
CREAM TOPICAL
Status: DISCONTINUED | OUTPATIENT
Start: 2023-04-14 | End: 2023-04-19

## 2023-04-14 RX ORDER — LEVOFLOXACIN 250 MG/1
250 TABLET, FILM COATED ORAL DAILY
Status: ON HOLD | COMMUNITY
End: 2023-04-15

## 2023-04-14 RX ORDER — HYDROMORPHONE HCL IN WATER/PF 6 MG/30 ML
0.2 PATIENT CONTROLLED ANALGESIA SYRINGE INTRAVENOUS
Status: DISCONTINUED | OUTPATIENT
Start: 2023-04-14 | End: 2023-04-15

## 2023-04-14 RX ORDER — PROCHLORPERAZINE 25 MG
12.5 SUPPOSITORY, RECTAL RECTAL EVERY 12 HOURS PRN
Status: DISCONTINUED | OUTPATIENT
Start: 2023-04-14 | End: 2023-04-15

## 2023-04-14 RX ORDER — IOPAMIDOL 755 MG/ML
75 INJECTION, SOLUTION INTRAVASCULAR ONCE
Status: COMPLETED | OUTPATIENT
Start: 2023-04-14 | End: 2023-04-14

## 2023-04-14 RX ORDER — LEVOTHYROXINE SODIUM 25 UG/1
25 TABLET ORAL
Status: DISCONTINUED | OUTPATIENT
Start: 2023-04-15 | End: 2023-04-20 | Stop reason: HOSPADM

## 2023-04-14 RX ADMIN — HYDROMORPHONE HYDROCHLORIDE 0.4 MG: 0.2 INJECTION, SOLUTION INTRAMUSCULAR; INTRAVENOUS; SUBCUTANEOUS at 08:15

## 2023-04-14 RX ADMIN — SODIUM CHLORIDE 500 ML: 9 INJECTION, SOLUTION INTRAVENOUS at 01:59

## 2023-04-14 RX ADMIN — SODIUM CHLORIDE 3000 ML: 900 IRRIGANT IRRIGATION at 04:20

## 2023-04-14 RX ADMIN — SODIUM CHLORIDE 3000 ML: 900 IRRIGANT IRRIGATION at 21:48

## 2023-04-14 RX ADMIN — LIDOCAINE HYDROCHLORIDE 5 ML: 20 JELLY TOPICAL at 01:56

## 2023-04-14 RX ADMIN — SODIUM CHLORIDE 3000 ML: 900 IRRIGANT IRRIGATION at 04:19

## 2023-04-14 RX ADMIN — IOPAMIDOL 75 ML: 755 INJECTION, SOLUTION INTRAVENOUS at 02:53

## 2023-04-14 RX ADMIN — SODIUM CHLORIDE 3000 ML: 900 IRRIGANT IRRIGATION at 22:09

## 2023-04-14 RX ADMIN — SODIUM CHLORIDE 3000 ML: 900 IRRIGANT IRRIGATION at 08:12

## 2023-04-14 RX ADMIN — SODIUM CHLORIDE 1000 ML: 9 INJECTION, SOLUTION INTRAVENOUS at 12:46

## 2023-04-14 RX ADMIN — MORPHINE SULFATE 4 MG: 4 INJECTION, SOLUTION INTRAMUSCULAR; INTRAVENOUS at 01:53

## 2023-04-14 RX ADMIN — SODIUM CHLORIDE 3000 ML: 900 IRRIGANT IRRIGATION at 02:54

## 2023-04-14 RX ADMIN — SODIUM CHLORIDE 3000 ML: 900 IRRIGANT IRRIGATION at 12:49

## 2023-04-14 RX ADMIN — SODIUM CHLORIDE 3000 ML: 900 IRRIGANT IRRIGATION at 08:24

## 2023-04-14 RX ADMIN — AMOXICILLIN AND CLAVULANATE POTASSIUM 1 TABLET: 875; 125 TABLET, FILM COATED ORAL at 21:50

## 2023-04-14 RX ADMIN — CEFTRIAXONE 1 G: 1 INJECTION, POWDER, FOR SOLUTION INTRAMUSCULAR; INTRAVENOUS at 04:19

## 2023-04-14 RX ADMIN — SODIUM CHLORIDE 1000 ML: 9 INJECTION, SOLUTION INTRAVENOUS at 14:31

## 2023-04-14 ASSESSMENT — ACTIVITIES OF DAILY LIVING (ADL)
ADLS_ACUITY_SCORE: 37
ADLS_ACUITY_SCORE: 35
ADLS_ACUITY_SCORE: 37
ADLS_ACUITY_SCORE: 35
ADLS_ACUITY_SCORE: 35
ADLS_ACUITY_SCORE: 37
ADLS_ACUITY_SCORE: 37

## 2023-04-14 ASSESSMENT — ENCOUNTER SYMPTOMS
DIZZINESS: 0
LIGHT-HEADEDNESS: 0
FEVER: 0
HEMATURIA: 1
ABDOMINAL PAIN: 1

## 2023-04-14 NOTE — ED TRIAGE NOTES
Pt was seen @ [facility] and dx with a UTI, Antibiotics prescribed and then changed the other day. Pt's granddaughter reports that Monday he started to have some flecks of blood in his urine and then on Tuesday there were blood clots in the urine. Now in the Triage briana the urnary bag appears to have addie blood in it, Pt also c/o discomfort in the pelvic region.

## 2023-04-14 NOTE — PHARMACY-ADMISSION MEDICATION HISTORY
Pharmacy Intern Admission Medication History    Admission medication history is complete. The information provided in this note is only as accurate as the sources available at the time of the update.    Medication reconciliation/reorder completed by provider prior to medication history? Yes    Information Source(s): Family member via iPad and phone    Pertinent Information:     Changes made to PTA medication list:    Added:     Deleted:     Changed: Apixaban once daily, Levothyroxine 25 mcg daily.       Allergies reviewed with patient and updates made in EHR: yes    Medication History Completed By: Yg Linares 4/14/2023 7:46 AM    PTA Med List   Medication Sig Note Last Dose     acetaminophen (TYLENOL) 500 MG tablet Take 1,000 mg by mouth every 6 hours as needed for mild pain  Past Week at prn     amoxicillin-clavulanate (AUGMENTIN) 875-125 MG tablet Take 1 tablet by mouth 2 times daily for 10 days 4/14/2023: Started 4/10, 7-day course  4/13/2023 at pm     apixaban ANTICOAGULANT (ELIQUIS) 2.5 MG tablet Take 1 tablet (2.5 mg) by mouth 2 times daily (Patient taking differently: Take 2.5 mg by mouth daily)  4/13/2023 at am     atenolol (TENORMIN) 25 MG tablet Take 25 mg by mouth daily  4/13/2023 at pm     Calcium Carbonate-Vitamin D (OSCAL 500/200 D-3 PO) Take 1 tablet by mouth daily.  4/13/2023 at pm     GLUCOSAMINE-CHONDROITIN-VIT D3 PO Take 1 tablet by mouth daily  4/13/2023 at am     hydrOXYzine (ATARAX) 10 MG tablet Take 20 mg by mouth At Bedtime  4/13/2023 at pm     ipratropium - albuterol 0.5 mg/2.5 mg/3 mL (DUONEB) 0.5-2.5 (3) MG/3ML neb solution Take 1 vial (3 mLs) by nebulization every 6 hours as needed for shortness of breath, wheezing or cough  Past Month at prn     levofloxacin (LEVAQUIN) 250 MG tablet Take 250 mg by mouth daily 4/14/2023: Started 4/10: 7-day course 4/13/2023 at pm     levothyroxine (SYNTHROID/LEVOTHROID) 50 MCG tablet Take 1 tablet (50 mcg) by mouth every morning (before breakfast)  (Patient taking differently: Take 25 mcg by mouth every morning (before breakfast) 1/2 tablet by mouth)  4/13/2023 at am     potassium chloride ER (KLOR-CON M) 20 MEQ CR tablet Take 1 tablet (20 mEq) by mouth daily  4/13/2023 at am

## 2023-04-14 NOTE — H&P
[facility] MEDICINE ADMISSION HISTORY AND PHYSICAL     Brief Synopsis:     Dilip Long is a 88 year old male who presented with complaints of bloody urine.    Medical history is notable for heart failure with preserved ejection fraction, prostatitis, essential hypertension, atrial fibrillation chronically anticoagulated on Eliquis.    Initial evaluation revealed normal vital signs, sodium 131, creatinine 1.4, lactic acid 1.9, white count 9.0, hemoglobin 10.7.  CT abdomen showed blood products in the bladder with Lozano in place.    Initial treatment included continuous bladder irrigation, ceftriaxone, morphine.    Assessment and Plan:  Gross hematuria  Chronic urinary retention with chronic indwelling Lozano catheter  CBI started in the emergency department  Consult urology  Hold Eliquis  Please call granddaughter with any updates if she is not present    Chronic congestive heart failure  Atrial fibrillation, on Eliquis  Essential hypertension    Clinically Significant Risk Factors Present on Admission              # Hypoalbuminemia: Lowest albumin = 3 g/dL at 4/14/2023  1:37 AM, will monitor as appropriate  # Drug Induced Coagulation Defect: home medication list includes an anticoagulant medication                   DVTP: Direct Oral Anticagulants   Code Status: Prior  Disposition: Inpatient   Diet: N.p.o. for now  Fluids: None    Disposition Plan      Expected Discharge Date: 04/16/2023               Chief Complaint  bloody urine and abdominal pain     HISTORY   Dilip Long is a 88 year old male who presented with complaints of bloody urine.    Per ED provider:  Dilip Long is a 88 year old male with a pertinent history of elevated BPH, PSA, urethral stricture, paroxysmal Atrial fibrillation (on eliquis), acute heart failure, HLD, and HTN who presents to this ED via walk-in with granddaughter for evaluation of hematuria and abdominal pain      Patient was seen at this ED on  "4/20/23 for dark colored urine in jorge bag. The patient had a CT scan abdomen pelvis that showed: \"Diffuse bladder wall thickening with surrounding edema like reflecting cystitis\". The patient had a urine culture that grew multiple morphotypes, treated with 10-day course of Augmentin. The patient's catheter was irrigated and flushed until clear. New leg bag attached with clear yellow urine filling the bag.      The patient's granddaughter the patient has had bright red blood in his jorge catheter bag since yesterday. She notes it is not worsening, but it is \"not getting better\". They note that the patient was recently seen for \"dark, thick, syrupy urine\" recently (see chart review).      They note that the patient had this catheter placed in November and that the patient is on Eliquis for Atrial fibrillation.      The patient also complains of lower abdominal pain. The patient denies fever, lightheadedness, or dizziness.    At the time of my encounter he is feeling a little bit better.  Still having some abdominal pain.  No chest pain, shortness of breath.  Past Medical History     Past Medical History:  11/10/2022: Acute heart failure with preserved ejection fraction   (HFpEF) (H)  No date: Acute prostatitis  10/23/2013: Asymptomatic bacteriuria      Comment:  Noted at 10/2/13 office visit at [facility]. No                treatment recommended at that time.   No date: Congestive heart failure (H)  08/18/2015: Essential hypertension  No date: Hypertension  11/06/2022: Paroxysmal atrial fib -- on Eliquis  No date: Syncope  No date: Thyroid disease     Surgical History     Past Surgical History:   Procedure Laterality Date     BLADDER SURGERY      Bladder absces removal     Blepharoplasty Upper Lid W/ Excessive Skin[  1/29/2007     CATARACT EXTRACTION       EYE SURGERY      both eyes 2015     IR LUMBAR EPIDURAL STEROID INJECTION  4/27/2004     IR LUMBAR EPIDURAL STEROID INJECTION  5/11/2004     IR LUMBAR EPIDURAL " STEROID INJECTION  11/24/2004     IR LUMBAR EPIDURAL STEROID INJECTION  11/28/2007     AK CYSTOURETHROSCOPY W/IRRIG & EVAC CLOTS N/A 7/27/2018    Procedure: CYSTOSCOPY, CLOT EVACUATION ,URETHRAL DILATATION, DAUGEHRTY CATHETER PLACEMENT;  Surgeon: Lowell Arias MD;  [facility];  Service: Urology     Family History      Family History   Problem Relation Age of Onset     Diabetes No family hx of      Hypertension No family hx of      Other Cancer No family hx of      Coronary Artery Disease No family hx of      Hyperlipidemia No family hx of      Cerebrovascular Disease No family hx of      Breast Cancer No family hx of      Colon Cancer No family hx of      Prostate Cancer No family hx of      Depression No family hx of      Anxiety Disorder No family hx of      Mental Illness No family hx of      Substance Abuse No family hx of      Anesthesia Reaction No family hx of      Asthma No family hx of      Osteoporosis No family hx of      Genetic Disorder No family hx of      Thyroid Disease No family hx of      Obesity No family hx of      No Known Problems Mother      No Known Problems Father       Social History      Social History     Tobacco Use     Smoking status: Former     Types: Cigarettes     Smokeless tobacco: Never     Tobacco comments:     Pt quit 40 years ago   Vaping Use     Vaping status: Never Used   Substance Use Topics     Alcohol use: Yes     Comment: < 1 drink a week     Drug use: No      Allergies   No Known Allergies  Prior to Admission Medications      Prior to Admission Medications   Prescriptions Last Dose Informant Patient Reported? Taking?   Calcium Carbonate-Vitamin D (OSCAL 500/200 D-3 PO)   Yes No   Sig: Take 1 tablet by mouth daily.   GLUCOSAMINE-CHONDROITIN-VIT D3 PO   Yes No   Sig: Take 1 tablet by mouth daily   amoxicillin-clavulanate (AUGMENTIN) 875-125 MG tablet   No No   Sig: Take 1 tablet by mouth 2 times daily for 10 days   apixaban ANTICOAGULANT (ELIQUIS) 2.5  MG tablet   No No   Sig: Take 1 tablet (2.5 mg) by mouth 2 times daily   atenolol (TENORMIN) 25 MG tablet   Yes No   Sig: Take 25 mg by mouth daily   cephALEXin (KEFLEX) 250 MG capsule   Yes No   Sig: Take 1 capsule twice a day by oral route.   hydrOXYzine (ATARAX) 10 MG tablet   Yes No   Sig: Take 20 mg by mouth At Bedtime   ipratropium - albuterol 0.5 mg/2.5 mg/3 mL (DUONEB) 0.5-2.5 (3) MG/3ML neb solution   No No   Sig: Take 1 vial (3 mLs) by nebulization every 6 hours as needed for shortness of breath, wheezing or cough   levothyroxine (SYNTHROID/LEVOTHROID) 50 MCG tablet   No No   Sig: Take 1 tablet (50 mcg) by mouth every morning (before breakfast)   order for DME   No No   Sig: Equipment being ordered: Home automatic blood pressure cuff. Check once daily for one week, and then 3 times weekly thereafter.   potassium chloride ER (KLOR-CON M) 20 MEQ CR tablet   No No   Sig: Take 1 tablet (20 mEq) by mouth daily   simvastatin (ZOCOR) 10 MG tablet   No No   Sig: Take 1 tablet (10 mg) by mouth At Bedtime      Facility-Administered Medications: None      Review of Systems     A 12 point comprehensive review of systems was negative except as noted above in HPI.    PHYSICAL EXAMINATION     Vitals      Temp:  [98.2  F (36.8  C)] 98.2  F (36.8  C)  Pulse:  [53-68] 54  Resp:  [17-22] 22  BP: (112-149)/(61-81) 129/64  SpO2:  [92 %-99 %] 98 %    Examination   Physical Exam:    Gen: no acute distress, comfortable, alert, pleasant  ENT: no scleral icterus  Pulm: Breathing comfortably in room air at rest  CV: regular rate and rhythm, no significant lower extremity pitting edema  GI: abdomen is soft, nondistended  MSK: no obvious deformities of the extremities  Derm: Seems pale  Psych: appropriate affect      Pertinent Radiology     Radiology Results:   Recent Results (from the past 24 hour(s))   CT Abdomen Pelvis w Contrast    Narrative    EXAM: CT ABDOMEN PELVIS W CONTRAST  LOCATION: [facility]  HOSPITAL  DATE/TIME: 4/14/2023 2:52 AM CDT    INDICATION: abdominal pain hematuria  COMPARISON: 04/10/2023 11/06/2022. 0832  TECHNIQUE: CT scan of the abdomen and pelvis was performed following injection of IV contrast. Multiplanar reformats were obtained. Dose reduction techniques were used.  CONTRAST: [id number] 75ml    FINDINGS:   LOWER CHEST: 4 mm subpleural nodule right middle lobe. 5 mm perifissural nodule probable lymph node right middle fissure. 6 mm nodule right lower lobe. 1 cm spiculated nodular opacity left lower lobe.    HEPATOBILIARY: Normal.    PANCREAS: Normal.    SPLEEN: Normal.    ADRENAL GLANDS: Normal.    KIDNEYS/BLADDER: Small air-contrast level with Lozano catheter in place. Radiodense debris in bladder compatible with blood products. Bladder diverticula.    BOWEL: Duodenal diverticulum. Diverticulosis of the colon. No acute inflammatory change. No obstruction. Contrast laden stools. Moderate stool gas retention.    LYMPH NODES: Normal.    VASCULATURE: Atherosclerosis.    PELVIC ORGANS: Prostatomegaly.    MUSCULOSKELETAL: Degenerative changes. Multiple compression fracture deformities appear chronic.      Impression    IMPRESSION:   1.  Blood products in bladder with Lozano catheter in place.     Tay Mullins DO  [facility]   Phone: #[phone]

## 2023-04-14 NOTE — ED PROVIDER NOTES
EMERGENCY DEPARTMENT ENCOUNTER      NAME: Dilip Long  AGE: 88 year old male  YOB: 1935  MRN: [id number]  EVALUATION DATE & TIME: 4/14/2023 12:47 AM    PCP: [facility]    ED PROVIDER: Gallo Jones MD    Chief Complaint   Patient presents with     Catheter Problem     FINAL IMPRESSION:  1. Gross hematuria      ED COURSE & MEDICAL DECISION MAKING:    Pertinent Labs & Imaging studies reviewed. (See chart for details)  88 year old male presents to the Emergency Department for evaluation of hematuria.  Patient has history BPH and urethral stricture also atrial fibrillation on Eliquis.  Patient has escalating abdominal pain and hematuria.  Currently being treated for cystitis on Augmentin.  On examination patient noted to have addie hematuria in his catheter bag.  He had tenderness palpation of his lower abdomen.  His blood pressure and heart rate were normal.  We initiated care with a bladder scan which demonstrated 115 cc of urine in the bladder.  I recommended catheter placement with three-way catheter to proceed with continuous bladder irrigation.  He already is on antibiotics.  We will obtain screening laboratory testing and also assess imaging to evaluate for possible complicating factors.  Likely patient will require admission to the hospital.    3:48 AM  Three-way catheter placed to patient receiving continuous bladder irrigation with some clearance starting to be achieved.  Hemoglobin noted to be 10.7 not significantly changed from a few days prior.  CT scan performed demonstrating appropriate catheter placement.  I did administer dose of ceftriaxone I did not send a urinalysis because the patient's initial urine sample was addie blood and he is now receiving continuous bladder irrigation but is currently on antibiotics we will continue that in the hospital.  Overall I think patient requires admission to the hospital for continuous irrigation until clearance with plan  for urology consult in a.m.  Case discussed with admitting hospitalist service.      12:53 AM I met with the patient to gather history and perform my exam. ED course and treatment discussed.     2:15 AM Nurse reports the patient's catheter has been replaced, but no blood or urine came out    2:26 AM Nurse reports the patient's catheter is now draining blood    3:45 AM I discussed the patient with Dr. Mullins from the hospitalist service who agrees to admit the patient.      At the conclusion of the encounter I discussed the results of all of the tests and the disposition. The questions were answered. The patient or family acknowledged understanding and was agreeable with the care plan.     Medical Decision Making    History:    Supplemental history from: Documented in chart, if applicable and Family Member/Significant Other    External Record(s) reviewed: Documented in chart, if applicable.    Work Up:    Chart documentation includes differential considered and any EKGs or imaging independently interpreted by provider, where specified.    In additional to work up documented, I considered the following work up: Documented in chart, if applicable.    External consultation:    Discussion of management with another provider: Documented in chart, if applicable    Complicating factors:    Care impacted by chronic illness: Anticoagulated State, Heart Disease, Hyperlipidemia and Hypertension    Care affected by social determinants of health: N/A    Disposition considerations: Admit.        MEDICATIONS GIVEN IN THE EMERGENCY:  Medications   sodium chloride 0.9% (bag) irrigation (3,000 mLs Irrigation $New Bag 4/14/23 9224)   cefTRIAXone (ROCEPHIN) 1 g vial to attach to  mL bag for ADULTS or NS 50 mL bag for PEDS (has no administration in time range)   0.9% sodium chloride BOLUS (500 mLs Intravenous $New Bag 4/14/23 9473)   lidocaine (XYLOCAINE) 2 % external gel 10 mL (5 mLs Urethral $Given 4/14/23 5836)   morphine (PF)  "injection 4 mg (4 mg Intravenous $Given 4/14/23 0153)   iopamidol (ISOVUE-370) solution 75 mL (75 mLs Intravenous $Given 4/14/23 0253)       NEW PRESCRIPTIONS STARTED AT TODAY'S ER VISIT  New Prescriptions    No medications on file          =================================================================    HPI    Patient information was obtained from: Patient's granddaughter     Use of : N/A       Dilip Long is a 88 year old male with a pertinent history of elevated BPH, PSA, urethral stricture, paroxysmal Atrial fibrillation (on eliquis), acute heart failure, HLD, and HTN who presents to this ED via walk-in with granddaughter for evaluation of hematuria and abdominal pain     Patient was seen at this ED on 4/20/23 for dark colored urine in jorge bag. The patient had a CT scan abdomen pelvis that showed: \"Diffuse bladder wall thickening with surrounding edema like reflecting cystitis\". The patient had a urine culture that grew multiple morphotypes, treated with 10-day course of Augmentin. The patient's catheter was irrigated and flushed until clear. New leg bag attached with clear yellow urine filling the bag.     The patient's granddaughter the patient has had bright red blood in his jorge catheter bag since yesterday. She notes it is not worsening, but it is \"not getting better\". They note that the patient was recently seen for \"dark, thick, syrupy urine\" recently (see chart review).     They note that the patient had this catheter placed in November and that the patient is on Eliquis for Atrial fibrillation.     The patient also complains of lower abdominal pain. The patient denies fever, lightheadedness, or dizziness.    REVIEW OF SYSTEMS   Review of Systems   Constitutional: Negative for fever.   Gastrointestinal: Positive for abdominal pain (lower).   Genitourinary: Positive for hematuria.   Neurological: Negative for dizziness and light-headedness.   All other systems reviewed and are " negative.      PAST MEDICAL HISTORY:  Past Medical History:   Diagnosis Date     Acute heart failure with preserved ejection fraction (HFpEF) (H) 11/10/2022     Acute prostatitis      Asymptomatic bacteriuria 10/23/2013    Noted at 10/2/13 office visit at [facility]. No treatment recommended at that time.      Congestive heart failure (H)      Essential hypertension 08/18/2015     Hypertension      Paroxysmal atrial fib -- on Eliquis 11/06/2022     Syncope      Thyroid disease        PAST SURGICAL HISTORY:  Past Surgical History:   Procedure Laterality Date     BLADDER SURGERY      Bladder absces removal     Blepharoplasty Upper Lid W/ Excessive Skin[  1/29/2007     CATARACT EXTRACTION       EYE SURGERY      both eyes 2015     IR LUMBAR EPIDURAL STEROID INJECTION  4/27/2004     IR LUMBAR EPIDURAL STEROID INJECTION  5/11/2004     IR LUMBAR EPIDURAL STEROID INJECTION  11/24/2004     IR LUMBAR EPIDURAL STEROID INJECTION  11/28/2007     RI CYSTOURETHROSCOPY W/IRRIG & EVAC CLOTS N/A 7/27/2018    Procedure: CYSTOSCOPY, CLOT EVACUATION ,URETHRAL DILATATION, DAUGHERTY CATHETER PLACEMENT;  Surgeon: Lowell Arias MD;  Location: [facility] [place];  Service: Urology           CURRENT MEDICATIONS:    amoxicillin-clavulanate (AUGMENTIN) 875-125 MG tablet  apixaban ANTICOAGULANT (ELIQUIS) 2.5 MG tablet  atenolol (TENORMIN) 25 MG tablet  Calcium Carbonate-Vitamin D (OSCAL 500/200 D-3 PO)  cephALEXin (KEFLEX) 250 MG capsule  GLUCOSAMINE-CHONDROITIN-VIT D3 PO  hydrOXYzine (ATARAX) 10 MG tablet  ipratropium - albuterol 0.5 mg/2.5 mg/3 mL (DUONEB) 0.5-2.5 (3) MG/3ML neb solution  levothyroxine (SYNTHROID/LEVOTHROID) 50 MCG tablet  order for DME  potassium chloride ER (KLOR-CON M) 20 MEQ CR tablet  simvastatin (ZOCOR) 10 MG tablet        ALLERGIES:  No Known Allergies    FAMILY HISTORY:  Family History   Problem Relation Age of Onset     Diabetes No family hx of      Hypertension No family hx of      Other Cancer No family hx of  "     Coronary Artery Disease No family hx of      Hyperlipidemia No family hx of      Cerebrovascular Disease No family hx of      Breast Cancer No family hx of      Colon Cancer No family hx of      Prostate Cancer No family hx of      Depression No family hx of      Anxiety Disorder No family hx of      Mental Illness No family hx of      Substance Abuse No family hx of      Anesthesia Reaction No family hx of      Asthma No family hx of      Osteoporosis No family hx of      Genetic Disorder No family hx of      Thyroid Disease No family hx of      Obesity No family hx of      No Known Problems Mother      No Known Problems Father        SOCIAL HISTORY:   Social History     Socioeconomic History     Marital status:      Number of children: 4   Occupational History     Occupation: Retired   Tobacco Use     Smoking status: Former     Types: Cigarettes     Smokeless tobacco: Never     Tobacco comments:     Pt quit 40 years ago   Vaping Use     Vaping status: Never Used   Substance and Sexual Activity     Alcohol use: Yes     Comment: < 1 drink a week     Drug use: No   Social History Narrative    , 4 children, lives with his wife, and is a retired .  Desires DNR/DNI.  (last updated 11/29/2022)        VITALS:  /66   Pulse 65   Temp 98.2  F (36.8  C) (Oral)   Resp 22   Ht 1.778 m (5' 10\")   Wt 72.6 kg (160 lb)   SpO2 95%   BMI 22.96 kg/m      PHYSICAL EXAM    PHYSICAL EXAM    Constitutional: Chronically ill-appearing elderly male  HENT: Normocephalic, Atraumatic, Bilateral external ears normal, Oropharynx normal, mucous membranes moist, Nose normal. Neck-  Normal range of motion, No tenderness, Supple, No stridor.   Eyes: PERRL, EOMI, Conjunctiva normal, No discharge.   Respiratory: Normal breath sounds, No respiratory distress, No wheezing, Speaks full sentences easily. No cough.   Cardiovascular: Normal heart rate, Regular rhythm, No murmurs Chest wall nontender.    GI: " Lower abdominal tenderness palpation  : Blood noted on the tip of penis blood in the catheter tubing and addie blood noted in the catheter bag  Musculoskeletal: 2+ DP pulses. No edema. No cyanosis. Good range of motion in all major joints. No tenderness to palpation. No tenderness of the CTLS spine.   Integument: Warm, Dry, No erythema, No rash. No petechiae.   Neurologic: Alert & oriented x 3, Normal motor function, Normal sensory function, No focal deficits noted.   Psychiatric: Affect normal, Judgment normal, Mood normal. Cooperative.        LAB:  All pertinent labs reviewed and interpreted.  Results for orders placed or performed during the hospital encounter of 04/14/23   CT Abdomen Pelvis w Contrast    Impression    IMPRESSION:   1.  Blood products in bladder with Lozano catheter in place.   Comprehensive metabolic panel   Result Value Ref Range    Sodium 131 (L) 136 - 145 mmol/L    Potassium 4.7 3.5 - 5.0 mmol/L    Chloride 96 (L) 98 - 107 mmol/L    Carbon Dioxide (CO2) 22 22 - 31 mmol/L    Anion Gap 13 5 - 18 mmol/L    Urea Nitrogen 17 8 - 28 mg/dL    Creatinine 1.41 (H) 0.70 - 1.30 mg/dL    Calcium 9.1 8.5 - 10.5 mg/dL    Glucose 111 70 - 125 mg/dL    Alkaline Phosphatase 60 45 - 120 U/L    AST 12 0 - 40 U/L    ALT <9 0 - 45 U/L    Protein Total 5.6 (L) 6.0 - 8.0 g/dL    Albumin 3.0 (L) 3.5 - 5.0 g/dL    Bilirubin Total 0.3 0.0 - 1.0 mg/dL    GFR Estimate 48 (L) >60 mL/min/1.73m2   Lactic acid whole blood   Result Value Ref Range    Lactic Acid 1.9 0.7 - 2.0 mmol/L   Result Value Ref Range    INR 1.15 0.85 - 1.15   CBC with platelets and differential   Result Value Ref Range    WBC Count 9.0 4.0 - 11.0 10e3/uL    RBC Count 3.24 (L) 4.40 - 5.90 10e6/uL    Hemoglobin 10.7 (L) 13.3 - 17.7 g/dL    Hematocrit 30.7 (L) 40.0 - 53.0 %    MCV 95 78 - 100 fL    MCH 33.0 26.5 - 33.0 pg    MCHC 34.9 31.5 - 36.5 g/dL    RDW 12.3 10.0 - 15.0 %    Platelet Count 299 150 - 450 10e3/uL    % Neutrophils 73 %    %  Lymphocytes 13 %    % Monocytes 10 %    % Eosinophils 2 %    % Basophils 1 %    % Immature Granulocytes 1 %    NRBCs per 100 WBC 0 <1 /100    Absolute Neutrophils 6.6 1.6 - 8.3 10e3/uL    Absolute Lymphocytes 1.2 0.8 - 5.3 10e3/uL    Absolute Monocytes 0.9 0.0 - 1.3 10e3/uL    Absolute Eosinophils 0.2 0.0 - 0.7 10e3/uL    Absolute Basophils 0.1 0.0 - 0.2 10e3/uL    Absolute Immature Granulocytes 0.1 <=0.4 10e3/uL    Absolute NRBCs 0.0 10e3/uL       RADIOLOGY:  Reviewed all pertinent imaging. Please see official radiology report.  CT Abdomen Pelvis w Contrast   Final Result   IMPRESSION:    1.  Blood products in bladder with Lozano catheter in place.        I, Trina Marie, am serving as a scribe to document services personally performed by Dr. Jones based on my observation and the provider's statements to me. I, Dr. Gallo Jones, attest that Trina Marie is acting in a scribe capacity, has observed my performance of the services and has documented them in accordance with my direction.    Gallo Jones MD  [facility] EMERGENCY ROOM  [address] [address] [phone]     Gallo Jones MD  04/14/23 0029

## 2023-04-14 NOTE — CONSULTS
[facility] CONSULTATION    Type of Consult: emergency room  Place of Service: [facility]   Reason for consult: Gross hematuria  Request for consult by: Dr. Mullins    History of present illness:   Dilip Long is a 88 year old male that was admitted for Gross hematuria. Urology is being consulted for gross hematuria. History obtained through patient and chart review.     Patient well known to our group. He is followed by Dr. Laird who last discussed potential bladder outlet procedure pending health status of patient over next couple months. Last seen by our group for a catheter exchange on 4/6/23. There were no complications with catheter exchange that day. Patient went into ER on 4/10/23 for discoloration of urine (brown). He was found to have a cystitis and discahrged after a catheter exchange with augmentin. He reports rather sudden development of gross hematuria 2 days ago. The gross hematuria worsened overnight which caused patient's granddaughter to bring him into the hospital for further evaluation. CT abdomen pelvis revealed blood products in bladder. ER staff exchanged catheter to a 22 fr jorge catheter and started CBI. Catheter has been draining translucent red urine with CBI on high flow. No manually irrigation had been attempted. Patient is resting comfortably in bed. He denies abdominal pain, fever, chills, night sweats, and N/V.     Past Medical History:  Past Medical History:   Diagnosis Date     Acute heart failure with preserved ejection fraction (HFpEF) (H) 11/10/2022     Acute prostatitis      Asymptomatic bacteriuria 10/23/2013    Noted at 10/2/13 office visit at [facility]. No treatment recommended at that time.      Congestive heart failure (H)      Essential hypertension 08/18/2015     Hypertension      Paroxysmal atrial fib -- on Eliquis 11/06/2022     Syncope      Thyroid disease        Past Surgical History:  Past Surgical History:   Procedure Laterality Date      BLADDER SURGERY      Bladder absces removal     Blepharoplasty Upper Lid W/ Excessive Skin[  1/29/2007     CATARACT EXTRACTION       EYE SURGERY      both eyes 2015     IR LUMBAR EPIDURAL STEROID INJECTION  4/27/2004     IR LUMBAR EPIDURAL STEROID INJECTION  5/11/2004     IR LUMBAR EPIDURAL STEROID INJECTION  11/24/2004     IR LUMBAR EPIDURAL STEROID INJECTION  11/28/2007     VT CYSTOURETHROSCOPY W/IRRIG & EVAC CLOTS N/A 7/27/2018    Procedure: CYSTOSCOPY, CLOT EVACUATION ,URETHRAL DILATATION, DAUGHERTY CATHETER PLACEMENT;  Surgeon: Lowell Arias MD;  [place] [facility]       Social History:  Social History     Socioeconomic History     Marital status:      Spouse name: Not on file     Number of children: 4     Years of education: Not on file     Highest education level: Not on file   Occupational History     Occupation: Retired   Tobacco Use     Smoking status: Former     Types: Cigarettes     Smokeless tobacco: Never     Tobacco comments:     Pt quit 40 years ago   Vaping Use     Vaping status: Never Used   Substance and Sexual Activity     Alcohol use: Yes     Comment: < 1 drink a week     Drug use: No     Sexual activity: Not on file   Other Topics Concern     Parent/sibling w/ CABG, MI or angioplasty before 65F 55M? Not Asked   Social History Narrative    , 4 children, lives with his wife, and is a retired .  Desires DNR/DNI.  (last updated 11/29/2022)      Social Determinants of Health     Financial Resource Strain: Not on file   Food Insecurity: Not on file   Transportation Needs: Not on file   Physical Activity: Not on file   Stress: Not on file   Social Connections: Not on file   Intimate Partner Violence: Not on file   Housing Stability: Not on file       Medications:  Current Facility-Administered Medications   Medication     HYDROmorphone (DILAUDID) injection 0.2 mg     HYDROmorphone (DILAUDID) injection 0.4 mg     lidocaine (LMX4) cream      lidocaine 1 % 0.1-1 mL     melatonin tablet 3 mg     ondansetron (ZOFRAN ODT) ODT tab 4 mg    Or     ondansetron (ZOFRAN) injection 4 mg     prochlorperazine (COMPAZINE) injection 5 mg    Or     prochlorperazine (COMPAZINE) tablet 5 mg    Or     prochlorperazine (COMPAZINE) suppository 12.5 mg     sodium chloride (PF) 0.9% PF flush 3 mL     sodium chloride (PF) 0.9% PF flush 3 mL     sodium chloride 0.9% (bag) irrigation     sodium chloride 0.9% (bag) irrigation     Current Outpatient Medications   Medication     acetaminophen (TYLENOL) 500 MG tablet     amoxicillin-clavulanate (AUGMENTIN) 875-125 MG tablet     apixaban ANTICOAGULANT (ELIQUIS) 2.5 MG tablet     atenolol (TENORMIN) 25 MG tablet     Calcium Carbonate-Vitamin D (OSCAL 500/200 D-3 PO)     GLUCOSAMINE-CHONDROITIN-VIT D3 PO     hydrOXYzine (ATARAX) 10 MG tablet     ipratropium - albuterol 0.5 mg/2.5 mg/3 mL (DUONEB) 0.5-2.5 (3) MG/3ML neb solution     levofloxacin (LEVAQUIN) 250 MG tablet     levothyroxine (SYNTHROID/LEVOTHROID) 50 MCG tablet     potassium chloride ER (KLOR-CON M) 20 MEQ CR tablet       Allergies:   No Known Allergies    Review of Systems:   A full 12 point review of systems was taken and is negative aside from what is noted above in the HPI    Physical Exam:  Temp:  [98.2  F (36.8  C)] 98.2  F (36.8  C)  Pulse:  [53-68] 57  Resp:  [17-22] 21  BP: (112-152)/(59-81) 118/68  SpO2:  [92 %-99 %] 97 %  General: NAD, alert, cooperative  Head: normocephalic, without abnormality / atraumatic  Abdomen: soft, non tender, non distended. No suprapubic fullness/tenderness. No CVA tenderness noted bilaterally  Geniturinary: circumcised penis. 22 fr 3-way jorge catheter in place. Urine watermelon/red in color.   Psychological: alert and oriented, answers questions appropriately      Labs:   Creatinine   Date Value Ref Range Status   04/14/2023 1.41 (H) 0.70 - 1.30 mg/dL Final   03/13/2019 1.3 0.8 - 1.5 mg/dL Final       Lab Results   Component Value  Date    WBC 9.8 04/14/2023     Lab Results   Component Value Date    HGB 10.2 04/14/2023    HGB 12.2 08/18/2015     Lab Results   Component Value Date     04/14/2023       UA RESULTS:  Recent Labs   Lab Test 03/10/23  0350 10/21/22  1544 10/02/19  0056   COLOR Orange*   < > Yellow   APPEARANCE Turbid*   < > Turbid*   URINEGLC Negative   < > Negative   URINEBILI Negative   < > Negative   URINEKETONE Negative   < > Negative   SG 1.017   < > 1.014   UBLD >1.0 mg/dL*   < > Small*   URINEPH 5.5   < > 7.0   PROTEIN 50*   < > 100 mg/dL*   UROBILINOGEN  --   --  <2.0 E.U./dL   NITRITE Negative   < > Negative   LEUKEST 500 Deion/uL*   < > Large*   RBCU >182*   < > 5-10*   WBCU >182*   < > >100*    < > = values in this interval not displayed.       Lab Results: personally reviewed     Imaging:  EXAM: CT ABDOMEN PELVIS W CONTRAST  LOCATION: [facility]  DATE/TIME: 4/14/2023 2:52 AM CDT     INDICATION: abdominal pain hematuria  COMPARISON: 04/10/2023 11/06/2022. 2322  TECHNIQUE: CT scan of the abdomen and pelvis was performed following injection of IV contrast. Multiplanar reformats were obtained. Dose reduction techniques were used.  CONTRAST: [id number] 75ml     FINDINGS:   LOWER CHEST: 4 mm subpleural nodule right middle lobe. 5 mm perifissural nodule probable lymph node right middle fissure. 6 mm nodule right lower lobe. 1 cm spiculated nodular opacity left lower lobe.     HEPATOBILIARY: Normal.     PANCREAS: Normal.     SPLEEN: Normal.     ADRENAL GLANDS: Normal.     KIDNEYS/BLADDER: Small air-contrast level with Lozano catheter in place. Radiodense debris in bladder compatible with blood products. Bladder diverticula.     BOWEL: Duodenal diverticulum. Diverticulosis of the colon. No acute inflammatory change. No obstruction. Contrast laden stools. Moderate stool gas retention.     LYMPH NODES: Normal.     VASCULATURE: Atherosclerosis.     PELVIC ORGANS: Prostatomegaly.     MUSCULOSKELETAL:  Degenerative changes. Multiple compression fracture deformities appear chronic.                                                                      IMPRESSION:   1.  Blood products in bladder with Lozano catheter in place.    I have personally reviewed the imaging reports above.     Assessment / Plan : Dilip Long is being seen by [facility] for gross hematuria    - Manually irrigated with approximately 500 ml of sterile water. Numerous blood clots drained. Minimal to no blood clots noted with final irrigations.   - Continue CI on high flow throughout the day. If urine clear this evening, recommend weaning CBI off overnight while patient resting. If urine remains watermelon or worse in color, recommend stopping CBI and manually irrigating prior to restarting CBI on high flow.   - Recommend coverage with antibiotics while in hospital. Previous urine culture with multiple organisms consistent with contamination. Imaging from previous visit consistent with cystitis.   - Ok to be on regular diet. No need for intervention at this time.   - Patient has outpatient visit already planned with Dr. Laird (5/4/23) for discussion of bladder outlet procedure.    Thank you for consulting [facility] regarding this patient's care. We will continue to follow. Please contact us with questions or concerns.     Angel Kearns PA-C  [facility]   [phone]

## 2023-04-14 NOTE — SIGNIFICANT EVENT
RRT called for hypotensive episode while patient was up to the toilet.Pt's family member assist patient out of bed to the toilet without alerting staff member.  Pt then became nauseated and stood up, turned around and kneeled on the ground to attempt to vomit in toilet.  Per patient's family.  Bathroom call light pulled by family as tubing disconnected from jorge and blood was on the floor.  Pt's family member stated that tubing did not touch the ground.  Staff arrived and subsequently called RRT for low BP, 70's/50's.  Pt alert entire time but increased weakness noted.  Pt assisted back to bed.  Second IV placed.  MD ordered NS bolus.  Bear hugger applied.  Pulse ox applied and 02 sat finally obtained and WNL.  CBI  Continues at high drip rate to maintain clear, pink urine.

## 2023-04-14 NOTE — PROGRESS NOTES
[facility]    Medicine Progress Note - Hospitalist Service    Date of Admission:  4/14/2023    Assessment & Plan   Dilip Long is a 88 year old male admitted with hematuria.  He has chronic anemia which is currently stable.  Urology is following for CBI.  Earlier today the patient most likely had a vasovagal event.  Blood pressures quickly improved and the patient was alert and conversant during exam.  1L bolus ordered.    # Hematuria  # Chronic urinary retention with chronic indwelling jorge  - CBI per urology  - trend CBC  - holding apixaban    # Afib  - holding apixaban as above    # Previous UTI  - complete 10 day course of previously prescribed augmentin (end 4/20)       Diet: Regular Diet Adult    Jorge Catheter: PRESENT, indication: Other (Comment)  Lines: None     Cardiac Monitoring: None      Clinically Significant Risk Factors Present on Admission              # Hypoalbuminemia: Lowest albumin = 3 g/dL at 4/14/2023  1:37 AM, will monitor as appropriate  # Drug Induced Coagulation Defect: home medication list includes an anticoagulant medication                 Disposition Plan      Expected Discharge Date: 04/16/2023                  Antolin Foote MD  Hospitalist Service  [facility]  Securely message with [employer] (more info)  Text page via [employer] Paging/Directory   ______________________________________________________________________    Interval History   Called to bedside for rapid response.  Patient was accompanied by family during episode.  He went to the bathroom, sat down on the toilet to have a bowel movement but did not have one.  He developed nausea but no vomiting.  He got up from the toilet and then slid down onto his knees.  He hit his hand but not his head.  He had associated dizziness but no abdominal pain.    Evaluating the patient in bed immediately after the episode, he denied chest pain/pressure or SOB.    His family reported that  the jorge connector to the bag became disconnected but that the tube tips did not touch the ground.    Physical Exam   Vital Signs: Temp: 98.2  F (36.8  C) Temp src: Oral BP: 105/51 Pulse: 57   Resp: 21 SpO2: 97 % O2 Device: None (Room air)    Weight: 160 lbs 0 oz    Gen: lying in bed in no extremis  Neuro: alert, responds to questions appropriately  CV: nl rate, regular rhythm  Pulm: no acute resp distress, CTAB anteriorly  GI:  Abdomen soft, mildly distended, NTTP, bs present  Skin:  Extremities cool    Medical Decision Making             Data   Reviewed    Na 131  K 4.7  BUN 17  Cr 1.4    WBC 9.8  Hgb 10  Plts 268

## 2023-04-15 LAB
ANION GAP SERPL CALCULATED.3IONS-SCNC: 9 MMOL/L (ref 5–18)
BUN SERPL-MCNC: 16 MG/DL (ref 8–28)
CALCIUM SERPL-MCNC: 8.3 MG/DL (ref 8.5–10.5)
CHLORIDE BLD-SCNC: 99 MMOL/L (ref 98–107)
CO2 SERPL-SCNC: 19 MMOL/L (ref 22–31)
CREAT SERPL-MCNC: 1.06 MG/DL (ref 0.7–1.3)
ERYTHROCYTE [DISTWIDTH] IN BLOOD BY AUTOMATED COUNT: 12.5 % (ref 10–15)
GFR SERPL CREATININE-BSD FRML MDRD: 68 ML/MIN/1.73M2
GLUCOSE BLD-MCNC: 99 MG/DL (ref 70–125)
HCT VFR BLD AUTO: 23.2 % (ref 40–53)
HGB BLD-MCNC: 8 G/DL (ref 13.3–17.7)
MCH RBC QN AUTO: 32.9 PG (ref 26.5–33)
MCHC RBC AUTO-ENTMCNC: 34.5 G/DL (ref 31.5–36.5)
MCV RBC AUTO: 96 FL (ref 78–100)
PLATELET # BLD AUTO: 249 10E3/UL (ref 150–450)
POTASSIUM BLD-SCNC: 4 MMOL/L (ref 3.5–5)
RBC # BLD AUTO: 2.43 10E6/UL (ref 4.4–5.9)
SODIUM SERPL-SCNC: 127 MMOL/L (ref 136–145)
WBC # BLD AUTO: 10.5 10E3/UL (ref 4–11)

## 2023-04-15 PROCEDURE — 258N000001 HC RX 258: Performed by: HOSPITALIST

## 2023-04-15 PROCEDURE — 120N000001 HC R&B MED SURG/OB

## 2023-04-15 PROCEDURE — 80048 BASIC METABOLIC PNL TOTAL CA: CPT | Performed by: HOSPITALIST

## 2023-04-15 PROCEDURE — 36415 COLL VENOUS BLD VENIPUNCTURE: CPT | Performed by: HOSPITALIST

## 2023-04-15 PROCEDURE — 85027 COMPLETE CBC AUTOMATED: CPT | Performed by: HOSPITALIST

## 2023-04-15 PROCEDURE — 250N000013 HC RX MED GY IP 250 OP 250 PS 637: Performed by: HOSPITALIST

## 2023-04-15 PROCEDURE — 99232 SBSQ HOSP IP/OBS MODERATE 35: CPT | Performed by: HOSPITALIST

## 2023-04-15 PROCEDURE — 258N000001 HC RX 258: Performed by: EMERGENCY MEDICINE

## 2023-04-15 PROCEDURE — 250N000011 HC RX IP 250 OP 636: Performed by: HOSPITALIST

## 2023-04-15 RX ORDER — ONDANSETRON 2 MG/ML
4 INJECTION INTRAMUSCULAR; INTRAVENOUS EVERY 6 HOURS PRN
Status: DISCONTINUED | OUTPATIENT
Start: 2023-04-15 | End: 2023-04-20 | Stop reason: HOSPADM

## 2023-04-15 RX ORDER — NALOXONE HYDROCHLORIDE 0.4 MG/ML
0.2 INJECTION, SOLUTION INTRAMUSCULAR; INTRAVENOUS; SUBCUTANEOUS
Status: DISCONTINUED | OUTPATIENT
Start: 2023-04-15 | End: 2023-04-20 | Stop reason: HOSPADM

## 2023-04-15 RX ORDER — OXYCODONE HYDROCHLORIDE 5 MG/1
5 TABLET ORAL EVERY 8 HOURS PRN
Status: DISCONTINUED | OUTPATIENT
Start: 2023-04-15 | End: 2023-04-20 | Stop reason: HOSPADM

## 2023-04-15 RX ORDER — ACETAMINOPHEN 325 MG/1
975 TABLET ORAL EVERY 8 HOURS PRN
Status: DISCONTINUED | OUTPATIENT
Start: 2023-04-15 | End: 2023-04-20 | Stop reason: HOSPADM

## 2023-04-15 RX ORDER — NALOXONE HYDROCHLORIDE 0.4 MG/ML
0.4 INJECTION, SOLUTION INTRAMUSCULAR; INTRAVENOUS; SUBCUTANEOUS
Status: DISCONTINUED | OUTPATIENT
Start: 2023-04-15 | End: 2023-04-20 | Stop reason: HOSPADM

## 2023-04-15 RX ADMIN — OXYCODONE HYDROCHLORIDE 5 MG: 5 TABLET ORAL at 23:19

## 2023-04-15 RX ADMIN — AMOXICILLIN AND CLAVULANATE POTASSIUM 1 TABLET: 875; 125 TABLET, FILM COATED ORAL at 09:07

## 2023-04-15 RX ADMIN — SODIUM CHLORIDE 6000 ML: 900 IRRIGANT IRRIGATION at 20:39

## 2023-04-15 RX ADMIN — SODIUM CHLORIDE 3000 ML: 900 IRRIGANT IRRIGATION at 05:19

## 2023-04-15 RX ADMIN — ACETAMINOPHEN 975 MG: 325 TABLET ORAL at 17:54

## 2023-04-15 RX ADMIN — ONDANSETRON 4 MG: 2 INJECTION INTRAMUSCULAR; INTRAVENOUS at 09:07

## 2023-04-15 RX ADMIN — LEVOTHYROXINE SODIUM 25 MCG: 0.03 TABLET ORAL at 09:07

## 2023-04-15 RX ADMIN — SODIUM CHLORIDE 3000 ML: 900 IRRIGANT IRRIGATION at 02:43

## 2023-04-15 RX ADMIN — SODIUM CHLORIDE 3000 ML: 900 IRRIGANT IRRIGATION at 00:33

## 2023-04-15 RX ADMIN — AMOXICILLIN AND CLAVULANATE POTASSIUM 1 TABLET: 875; 125 TABLET, FILM COATED ORAL at 20:34

## 2023-04-15 RX ADMIN — ATENOLOL 25 MG: 25 TABLET ORAL at 20:34

## 2023-04-15 ASSESSMENT — ACTIVITIES OF DAILY LIVING (ADL)
ADLS_ACUITY_SCORE: 37
ADLS_ACUITY_SCORE: 37
ADLS_ACUITY_SCORE: 38
ADLS_ACUITY_SCORE: 42
ADLS_ACUITY_SCORE: 38
ADLS_ACUITY_SCORE: 38
ADLS_ACUITY_SCORE: 37
ADLS_ACUITY_SCORE: 38
ADLS_ACUITY_SCORE: 37
DEPENDENT_IADLS:: CLEANING;COOKING;LAUNDRY;SHOPPING;MEAL PREPARATION;MEDICATION MANAGEMENT;MONEY MANAGEMENT;TRANSPORTATION
ADLS_ACUITY_SCORE: 38
ADLS_ACUITY_SCORE: 37
ADLS_ACUITY_SCORE: 37

## 2023-04-15 NOTE — PROGRESS NOTES
Place of Service:  [facility]     Reason for follow up: Hematuria    SUBJECTIVE:  Events: no acute events overnight    Patient reports that he feels fine.  Since the catheter was manually irrigated, he has not been passing clots.  He denies a sensation of a full bladder.    OBJECTIVE:  PHYSICAL EXAM:  Temp: 97.5  F (36.4  C) Temp src: Oral BP: 139/53 Pulse: 71   Resp: 18 SpO2: 97 % O2 Device: None (Room air)    General: NAD, alert, cooperative  Head: normocephalic, without abnormality / atraumatic  Abdomen: soft, not tender, not distended.  There is no suprapubic fullness, no suprapubic tenderness.  No CVA tenderness,   Genitourinary: Water clear drainage on medium continuous bladder irrigation  Skin: No rashes or lesions  Musculoskeletal: moves all four extremities equally; no calf edema or tenderness  Psychological: alert and oriented, answers questions appropriately    LABS:  Creatinine   Date Value Ref Range Status   04/14/2023 1.41 (H) 0.70 - 1.30 mg/dL Final   03/13/2019 1.3 0.8 - 1.5 mg/dL Final     WBC Count   Date Value Ref Range Status   04/14/2023 9.8 4.0 - 11.0 10e3/uL Final     Hemoglobin   Date Value Ref Range Status   04/14/2023 10.2 (L) 13.3 - 17.7 g/dL Final   08/18/2015 12.2 (L) 13.3 - 17.7 g/dL Final   ]  Platelet Count   Date Value Ref Range Status   04/14/2023 268 150 - 450 10e3/uL Final       UA:  UA RESULTS:  Recent Labs   Lab Test 03/10/23  0350 10/21/22  1544 10/02/19  0056   COLOR Orange*   < > Yellow   APPEARANCE Turbid*   < > Turbid*   URINEGLC Negative   < > Negative   URINEBILI Negative   < > Negative   URINEKETONE Negative   < > Negative   SG 1.017   < > 1.014   UBLD >1.0 mg/dL*   < > Small*   URINEPH 5.5   < > 7.0   PROTEIN 50*   < > 100 mg/dL*   UROBILINOGEN  --   --  <2.0 E.U./dL   NITRITE Negative   < > Negative   LEUKEST 500 Deion/uL*   < > Large*   RBCU >182*   < > 5-10*   WBCU >182*   < > >100*    < > = values in this interval not displayed.          ASSESSMENT/PLAN:  Dilip Long is being seen by [facility] for hematuria.  The hematuria is improving.  We will wean the continuous bladder irrigation to off.  Possible discharge later today with catheter.  He should be discharged home on antibiotics (same regimen as prior to admission).  He has follow-up with me in a couple of weeks.    Edgar Laird MD  [facility]   [phone]

## 2023-04-15 NOTE — PROGRESS NOTES
[facility]    Medicine Progress Note - Hospitalist Service    Date of Admission:  4/14/2023    Assessment & Plan   Diilp Long is a 88 year old male admitted with hematuria in the setting of chronic indwelling jorge catheterization.  He is currently clinically stable, hemodynamics improved since yesterday.    Lower abdominal pain on exam could represent jorge obstruction from hematuria.  Communicate finding of pain to urology team, who advised flushing the catheter.  Communicated recommendation to patient's nurse.    Counseled patient and family regarding recommendation to hold blood thinner for the next couple weeks until urology followup.  We discussed that his day to day risk of stroke is relatively small but no zero.  I advised he is currently at higher risk of bleeding complications, including clotting his jorge and blood loss, compared to his risk of stroke.    Discussed with urology team, they reported a urine cx from 4/6 grew enterococcus and staphylococcus (non aureus).  This was reportedly the culture that prompted prescription of levofloxacin.  This antibiotic would not cover either of these organisms.  Based on culture from 4/10 and 4/6, augmentin should be appropriate coverage.    Hyponatremia is likely a result of CBI, continue to monitor.    # Hematuria  # Acute blood loss anemia  # Chronic urinary retention with chronic indwelling jorge  - CBI per urology  - trend CBC  - holding apixaban    # Hyponatremia  - trend BMP     # Afib  - plan to hold apixaban until outpatient urology followup     # Previous UTI  - complete 10 day course of previously prescribed augmentin (end 4/20)       Diet: Regular Diet Adult    Jorge Catheter: PRESENT, indication: Gross Hematuria  Lines: None     Cardiac Monitoring: None  Code Status: No CPR- Do NOT Intubate      Clinically Significant Risk Factors              # Hypoalbuminemia: Lowest albumin = 3 g/dL at 4/14/2023  1:37 AM, will monitor  as appropriate                   Disposition Plan     Expected Discharge Date: 04/16/2023                  Antolin Foote MD  Hospitalist Service  [facility]  Securely message with [employer] (more info)  Text page via [employer] Paging/Directory   ______________________________________________________________________    Interval History   Denies chest pain/pressure or SOB.  Reports abdominal pressure.  Had vomiting after eating soup.  Denies nausea currently.  Was able to tolerate some solids per patient's family.    Physical Exam   Vital Signs: Temp: 97.4  F (36.3  C) Temp src: Oral BP: 134/63 Pulse: 72   Resp: 18 SpO2: 96 % O2 Device: None (Room air)    Weight: 160 lbs 0 oz    Gen: lying comfortably in bed, nad  CV: nl rate, regular rhythm  Pulm: no acute resp distress, ctab anteriorly  GI:  Abdomen non distended, mild lower abdominal/right suprapubic TTP with area a little firm  Neuro: alert, conversant    Medical Decision Making             Data   Reviewed    Na 127  K 4  BUN 16  Cr 1.1    WBC 11  Hgb 8  Plts 249

## 2023-04-15 NOTE — PLAN OF CARE
Problem: Plan of Care - These are the overarching goals to be used throughout the patient stay.    Goal: Plan of Care Review  Description: The Plan of Care Review/Shift note should be completed every shift.  The Outcome Evaluation is a brief statement about your assessment that the patient is improving, declining, or no change.  This information will be displayed automatically on your shift note.  Outcome: Progressing     Problem: Plan of Care - These are the overarching goals to be used throughout the patient stay.    Goal: Absence of Hospital-Acquired Illness or Injury  Outcome: Progressing   Goal Outcome Evaluation:       VSS on RA. Pt comfortable. CBI running moderately, clear. Reg diet. Irrigated twice. Requested pain meds from MD. Tylenol and oxy ordered.  Hgb stable. Visitor exception. Discharge pending

## 2023-04-15 NOTE — UTILIZATION REVIEW
Admission Status; Secondary Review Determination     Under the authority of the Utilization Management Committee, the utilization review process indicated a secondary review on the above patient. The review outcome is based on review of the medical records, discussions with staff, and applying clinical experience noted on the date of the review.     (x) Inpatient Status Appropriate - This patient's medical care is consistent with medical management for inpatient care and reasonable inpatient medical practice.     RATIONALE FOR DETERMINATION     Mr. Long is a 87 yo male pt with a PMH of a fib (chronic DOAC), CHF and chronic indwelling jorge cath who presents to the ED with gross hematuria.  CBI started in the ED and continues this am.  Urology consult requested and is following.  Acute blood loss anemia noted with hgb this am at 8.0 (10.2).  RRT called yesterday for hypotension and weakness; given additional NS bolus.  Today, urine is starting to clear and no clots; may be able to discharge CBI later today.  This am noted to have worsening hyponatremia which is being monitored closely.  Hasn't been up out of bed much since hypotensive episode.  Continued to have emesis overnight.       At the time of admission with the information available to the attending physician more than 2 nights Hospital complex care was anticipated, based on patient risk of adverse outcome if treated as outpatient and complex care required. Inpatient admission is appropriate based on the Medicare guidelines.     If he rapidly shows clinical improvement and is able to discharge home later today with family, then would consider changing to OBS status.       The information on this document is developed by the utilization review team in order for the business office to ensure compliance. This only denotes the appropriateness of proper admission status and does not reflect the quality of care rendered.   The definitions of Inpatient Status and  Observation Status used in making the determination above are those provided in the CMS Coverage Manual, Chapter 1 and Chapter 6, section 70.4.         Sincerely,     Twyla Real, DO  Utilization Review  Physician Advisor  [facility].

## 2023-04-15 NOTE — PLAN OF CARE
Problem: Plan of Care - These are the overarching goals to be used throughout the patient stay.    Goal: Plan of Care Review  Outcome: Progressing   Goal Outcome Evaluation:    Pt admitted to unit last night.  Visitor exception requested by granddaughter who is pt's primary caregiver 24/7 at home, which was granted by ANS for comfort and anxiety.  Pt A&Ox4, denied pain.  Rested comfortably in bed, up to bedside commode with assist from granddaughter/staff.  Had one emesis when up to BSC, N/V eventually subsided.  CBI titrated down to a moderate-slow rate d/t urine being clear/pink.

## 2023-04-16 PROBLEM — N39.0 COMPLICATED UTI (URINARY TRACT INFECTION): Status: ACTIVE | Noted: 2023-04-16

## 2023-04-16 PROBLEM — D62 ANEMIA DUE TO BLOOD LOSS, ACUTE: Status: ACTIVE | Noted: 2023-04-16

## 2023-04-16 LAB
ABO/RH(D): NORMAL
ANION GAP SERPL CALCULATED.3IONS-SCNC: 7 MMOL/L (ref 5–18)
ANTIBODY SCREEN: NEGATIVE
BUN SERPL-MCNC: 15 MG/DL (ref 8–28)
CALCIUM SERPL-MCNC: 7.8 MG/DL (ref 8.5–10.5)
CHLORIDE BLD-SCNC: 101 MMOL/L (ref 98–107)
CO2 SERPL-SCNC: 24 MMOL/L (ref 22–31)
CREAT SERPL-MCNC: 1.38 MG/DL (ref 0.7–1.3)
ERYTHROCYTE [DISTWIDTH] IN BLOOD BY AUTOMATED COUNT: 12.6 % (ref 10–15)
GFR SERPL CREATININE-BSD FRML MDRD: 49 ML/MIN/1.73M2
GLUCOSE BLD-MCNC: 91 MG/DL (ref 70–125)
HCT VFR BLD AUTO: 21.1 % (ref 40–53)
HGB BLD-MCNC: 7 G/DL (ref 13.3–17.7)
MCH RBC QN AUTO: 32.9 PG (ref 26.5–33)
MCHC RBC AUTO-ENTMCNC: 33.2 G/DL (ref 31.5–36.5)
MCV RBC AUTO: 99 FL (ref 78–100)
PLATELET # BLD AUTO: 233 10E3/UL (ref 150–450)
POTASSIUM BLD-SCNC: 3.7 MMOL/L (ref 3.5–5)
RBC # BLD AUTO: 2.13 10E6/UL (ref 4.4–5.9)
SODIUM SERPL-SCNC: 132 MMOL/L (ref 136–145)
SPECIMEN EXPIRATION DATE: NORMAL
WBC # BLD AUTO: 6.8 10E3/UL (ref 4–11)

## 2023-04-16 PROCEDURE — 250N000013 HC RX MED GY IP 250 OP 250 PS 637: Performed by: HOSPITALIST

## 2023-04-16 PROCEDURE — 86850 RBC ANTIBODY SCREEN: CPT | Performed by: HOSPITALIST

## 2023-04-16 PROCEDURE — 258N000001 HC RX 258: Performed by: HOSPITALIST

## 2023-04-16 PROCEDURE — 36415 COLL VENOUS BLD VENIPUNCTURE: CPT | Performed by: HOSPITALIST

## 2023-04-16 PROCEDURE — 99232 SBSQ HOSP IP/OBS MODERATE 35: CPT | Performed by: HOSPITALIST

## 2023-04-16 PROCEDURE — 120N000001 HC R&B MED SURG/OB

## 2023-04-16 PROCEDURE — 85027 COMPLETE CBC AUTOMATED: CPT | Performed by: HOSPITALIST

## 2023-04-16 PROCEDURE — 80048 BASIC METABOLIC PNL TOTAL CA: CPT | Performed by: HOSPITALIST

## 2023-04-16 RX ADMIN — ATENOLOL 25 MG: 25 TABLET ORAL at 19:47

## 2023-04-16 RX ADMIN — SODIUM CHLORIDE 6000 ML: 900 IRRIGANT IRRIGATION at 02:47

## 2023-04-16 RX ADMIN — AMOXICILLIN AND CLAVULANATE POTASSIUM 1 TABLET: 875; 125 TABLET, FILM COATED ORAL at 08:34

## 2023-04-16 RX ADMIN — AMOXICILLIN AND CLAVULANATE POTASSIUM 1 TABLET: 875; 125 TABLET, FILM COATED ORAL at 19:43

## 2023-04-16 RX ADMIN — SODIUM CHLORIDE 6000 ML: 900 IRRIGANT IRRIGATION at 19:49

## 2023-04-16 RX ADMIN — OXYCODONE HYDROCHLORIDE 5 MG: 5 TABLET ORAL at 10:33

## 2023-04-16 RX ADMIN — ACETAMINOPHEN 975 MG: 325 TABLET ORAL at 05:50

## 2023-04-16 RX ADMIN — ACETAMINOPHEN 975 MG: 325 TABLET ORAL at 08:33

## 2023-04-16 RX ADMIN — LEVOTHYROXINE SODIUM 25 MCG: 0.03 TABLET ORAL at 08:34

## 2023-04-16 ASSESSMENT — ACTIVITIES OF DAILY LIVING (ADL)
ADLS_ACUITY_SCORE: 42

## 2023-04-16 NOTE — PROGRESS NOTES
Place of Service:  [facility]     Reason for follow up: Hematuria    SUBJECTIVE:  Events: no acute events overnight there were a couple of episodes for which she required manual irrigation with removal of a few clots.    Most of the time, he feels fine.  He does report hiccups.    OBJECTIVE:  PHYSICAL EXAM:  Temp: 97.3  F (36.3  C) Temp src: Oral BP: 99/54 Pulse: 61   Resp: 16 SpO2: 95 % O2 Device: None (Room air)    General: NAD, alert, cooperative  Head: normocephalic, without abnormality / atraumatic  Abdomen: soft, not tender, not distended.  There is no suprapubic fullness, no suprapubic tenderness.  No CVA tenderness,   Genitourinary: Pink drainage on slow continuous bladder irrigation  Skin: No rashes or lesions  Musculoskeletal: moves all four extremities equally; no calf edema or tenderness  Psychological: alert and oriented, answers questions appropriately    LABS:  Creatinine   Date Value Ref Range Status   04/15/2023 1.06 0.70 - 1.30 mg/dL Final   03/13/2019 1.3 0.8 - 1.5 mg/dL Final     WBC Count   Date Value Ref Range Status   04/15/2023 10.5 4.0 - 11.0 10e3/uL Final     Hemoglobin   Date Value Ref Range Status   04/15/2023 8.0 (L) 13.3 - 17.7 g/dL Final   08/18/2015 12.2 (L) 13.3 - 17.7 g/dL Final   ]  Platelet Count   Date Value Ref Range Status   04/15/2023 249 150 - 450 10e3/uL Final       UA:  UA RESULTS:  Recent Labs   Lab Test 03/10/23  0350 10/21/22  1544 10/02/19  0056   COLOR Orange*   < > Yellow   APPEARANCE Turbid*   < > Turbid*   URINEGLC Negative   < > Negative   URINEBILI Negative   < > Negative   URINEKETONE Negative   < > Negative   SG 1.017   < > 1.014   UBLD >1.0 mg/dL*   < > Small*   URINEPH 5.5   < > 7.0   PROTEIN 50*   < > 100 mg/dL*   UROBILINOGEN  --   --  <2.0 E.U./dL   NITRITE Negative   < > Negative   LEUKEST 500 Deion/uL*   < > Large*   RBCU >182*   < > 5-10*   WBCU >182*   < > >100*    < > = values in this interval not displayed.     Procedure: I irrigated the  catheter with 1 L of water.  A moderate amount of old appearing clots are evacuated.  The drainage is now clear on medium continuous bladder irrigation.    ASSESSMENT/PLAN:  Dilip Long is being seen by [facility] for hematuria.  The hematuria continues to improve.  We will wean the continuous bladder irrigation to off, hopefully today.  Possible discharge tomorrow with catheter.  He should be discharged home on antibiotics (same regimen as prior to admission).  He has follow-up with me in a couple of weeks.    If he continues to have bleeding, he will need a cystoscopy under anesthesia.    Edgar Laird MD  [facility]   [phone]

## 2023-04-16 NOTE — PLAN OF CARE
Problem: UTI (Urinary Tract Infection)  Goal: Improved Infection Symptoms  4/16/2023 0352 by Camila Velazquez, RN  Outcome: Progressing  4/16/2023 0352 by Camila Velazquez, RN  Outcome: Progressing   Goal Outcome Evaluation:    A&Ox4. VSS, on RA. C/o pain in kidneys. PRN Oxycodone and Tylenol given; effective. BM on shift. Up A1 to BSC. CBI running at slow-mod rate to keep urine pink or clear. Small clots seen at times, but no need for irrigation. PIV SL. Discharge pending

## 2023-04-16 NOTE — CONSULTS
Care Management Initial Consult    General Information  Assessment completed with: Patient, Children, Pt, sons Ad and Mark       Primary Care Provider verified and updated as needed:     Readmission within the last 30 days:        Reason for Consult: discharge planning  Advance Care Planning: Advance Care Planning Reviewed:  (copy requested)          Communication Assessment  Patient's communication style: spoken language (English or Bilingual)             Cognitive  Cognitive/Neuro/Behavioral: WDL                      Living Environment:   People in home: spouse  Laina  Current living Arrangements: house      Able to return to prior arrangements: yes       Family/Social Support:  Care provided by: spouse/significant other, child(tami)  Provides care for: no one, unable/limited ability to care for self  Marital Status:   Wife, Children  Laina       Description of Support System: Supportive, Involved         Current Resources:   Patient receiving home care services: No     Community Resources: None  Equipment currently used at home:    Supplies currently used at home: Other (Lozano catheter and supplies)    Employment/Financial:  Employment Status: retired        Financial Concerns: No concerns identified           Lifestyle & Psychosocial Needs:  Social Determinants of Health     Tobacco Use: Medium Risk (4/10/2023)    Patient History      Smoking Tobacco Use: Former      Smokeless Tobacco Use: Never      Passive Exposure: Not on file   Alcohol Use: Not on file   Financial Resource Strain: Not on file   Food Insecurity: Not on file   Transportation Needs: Not on file   Physical Activity: Not on file   Stress: Not on file   Social Connections: Not on file   Intimate Partner Violence: Not on file   Depression: Not at risk (3/13/2019)    PHQ-2      PHQ-2 Score: 0   Housing Stability: Not on file       Functional Status:  Prior to admission patient needed assistance:   Dependent ADLs:: Ambulation-walker,  Bathing, Dressing, Toileting  Dependent IADLs:: Cleaning, Cooking, Laundry, Shopping, Meal Preparation, Medication Management, Money Management, Transportation       Mental Health Status:          Chemical Dependency Status:                Values/Beliefs:  Spiritual, Cultural Beliefs, Christianity Practices, Values that affect care:                 Additional Information:  RNCM met with patient, introduced self and CM role.  Sons Ad and Mark present at bedside.  Pt lives in a house with wife Laina.  Pt uses rolling walker at baseline, bathing, dressing, grooming assisted via wife and 3 adult sons.  IADL's via family assist.  Lozano catheter supplies at home.  No homecare services, expressed no interest in home care services if recommended.    Anticipate discharge to home with family transport when medically ready.      CM will continue to follow.    Raheel Scruggs RN

## 2023-04-16 NOTE — PROGRESS NOTES
[facility] BRIEF UPDATE    Called for worsening hematuria on CBI. Came and evaluated patient. Per nurse, jorge output was dark red on low flow. Had them turn CBI up 30 minutes prior to arrival. Urine turned clear quickly. I hand irrigated jorge with return of moderate amount of clot. I hand irrigated a total of 750cc. At the end, output was clear.     I instructed nurse to keep CBI on moderate flow for the next hour. If output remains clear, will turn CBI to low flow. If CBI remains translucent overnight, will clamp CBI at 6am.     Shalini Briscoe PA-C  [facility]  [phone]

## 2023-04-16 NOTE — PLAN OF CARE
Problem: Plan of Care - These are the overarching goals to be used throughout the patient stay.    Goal: Plan of Care Review  Description: The Plan of Care Review/Shift note should be completed every shift.  The Outcome Evaluation is a brief statement about your assessment that the patient is improving, declining, or no change.  This information will be displayed automatically on your shift note.  Outcome: Progressing     Problem: Plan of Care - These are the overarching goals to be used throughout the patient stay.    Goal: Absence of Hospital-Acquired Illness or Injury  Outcome: Progressing   Goal Outcome Evaluation:         Vitally stable. Urology irrigated twice and pulled some blood clots. Let CBI run modertly for 2 hours, then went down to slow flow. If no bloody urine overnight, clamp at 0600. See note. Reg diet tolerating well. Up to commode for bowel movement. Discharge pending

## 2023-04-16 NOTE — PROGRESS NOTES
[facility]    Medicine Progress Note - Hospitalist Service    Date of Admission:  4/14/2023    Assessment & Plan   Dilip Long is a 88 year old male admitted with acute blood loss anemia and hematuria in the setting of chronic indwelling jorge catheterization.  He is currently clinically stable, continues to have clots in jorge per urology.  CBI/irrigation per their service.  Continue to hold anticoagulation.      # Hematuria  # Acute blood loss anemia  # Chronic urinary retention with chronic indwelling jorge  - CBI per urology  - trend CBC  - holding apixaban     # Hyponatremia  - trend BMP     # Afib  - plan to hold apixaban until outpatient urology followup     # Previous UTI  - complete 10 day course of previously prescribed augmentin (end 4/20)       Diet: Regular Diet Adult    Jorge Catheter: PRESENT, indication: Gross Hematuria  Lines: None     Cardiac Monitoring: None  Code Status: No CPR- Do NOT Intubate      Clinically Significant Risk Factors         # Hyponatremia: Lowest Na = 127 mmol/L in last 2 days, will monitor as appropriate      # Hypoalbuminemia: Lowest albumin = 3 g/dL at 4/14/2023  1:37 AM, will monitor as appropriate                   Disposition Plan      Expected Discharge Date: 04/17/2023                  Antolin Foote MD  Hospitalist Service  [facility]  Securely message with [employer] (more info)  Text page via [employer] Paging/Directory   ______________________________________________________________________    Interval History   Denies chest pain/pressure or SOB.  Has tolerated regular food.  Reports having mild abdominal pain which is unchanged from yesterday.    Urology irrigating bladder at bedside.    Physical Exam   Vital Signs: Temp: 98.1  F (36.7  C) Temp src: Oral BP: 109/57 Pulse: 67   Resp: 18 SpO2: 91 % O2 Device: None (Room air)    Weight: 157 lbs 10.06 oz    Gen:  Lying comfortably in bed, nad  CV:  Bradycardia, regular  rhythm  Pulm: no acute resp distress, ctab anteriorly  GI:  Abdomen soft, non distended, mild suprapubic TTP  Neuro: alert, conversant    Medical Decision Making             Data

## 2023-04-17 LAB
ANION GAP SERPL CALCULATED.3IONS-SCNC: 9 MMOL/L (ref 5–18)
BUN SERPL-MCNC: 14 MG/DL (ref 8–28)
CALCIUM SERPL-MCNC: 8 MG/DL (ref 8.5–10.5)
CHLORIDE BLD-SCNC: 102 MMOL/L (ref 98–107)
CO2 SERPL-SCNC: 22 MMOL/L (ref 22–31)
CREAT SERPL-MCNC: 1.27 MG/DL (ref 0.7–1.3)
ERYTHROCYTE [DISTWIDTH] IN BLOOD BY AUTOMATED COUNT: 12.6 % (ref 10–15)
GFR SERPL CREATININE-BSD FRML MDRD: 55 ML/MIN/1.73M2
GLUCOSE BLD-MCNC: 96 MG/DL (ref 70–125)
HCT VFR BLD AUTO: 22.5 % (ref 40–53)
HGB BLD-MCNC: 7.5 G/DL (ref 13.3–17.7)
MCH RBC QN AUTO: 33 PG (ref 26.5–33)
MCHC RBC AUTO-ENTMCNC: 33.3 G/DL (ref 31.5–36.5)
MCV RBC AUTO: 99 FL (ref 78–100)
PLATELET # BLD AUTO: 248 10E3/UL (ref 150–450)
POTASSIUM BLD-SCNC: 3.8 MMOL/L (ref 3.5–5)
RBC # BLD AUTO: 2.27 10E6/UL (ref 4.4–5.9)
SODIUM SERPL-SCNC: 133 MMOL/L (ref 136–145)
WBC # BLD AUTO: 6.9 10E3/UL (ref 4–11)

## 2023-04-17 PROCEDURE — 120N000001 HC R&B MED SURG/OB

## 2023-04-17 PROCEDURE — 85027 COMPLETE CBC AUTOMATED: CPT | Performed by: HOSPITALIST

## 2023-04-17 PROCEDURE — 36415 COLL VENOUS BLD VENIPUNCTURE: CPT | Performed by: HOSPITALIST

## 2023-04-17 PROCEDURE — 258N000001 HC RX 258: Performed by: HOSPITALIST

## 2023-04-17 PROCEDURE — 80048 BASIC METABOLIC PNL TOTAL CA: CPT | Performed by: HOSPITALIST

## 2023-04-17 PROCEDURE — 250N000013 HC RX MED GY IP 250 OP 250 PS 637: Performed by: HOSPITALIST

## 2023-04-17 PROCEDURE — 99232 SBSQ HOSP IP/OBS MODERATE 35: CPT | Performed by: HOSPITALIST

## 2023-04-17 PROCEDURE — 99207 PR CDG-CUT & PASTE-POTENTIAL IMPACT ON LEVEL: CPT | Performed by: HOSPITALIST

## 2023-04-17 PROCEDURE — 99231 SBSQ HOSP IP/OBS SF/LOW 25: CPT | Performed by: HOSPITALIST

## 2023-04-17 RX ADMIN — AMOXICILLIN AND CLAVULANATE POTASSIUM 1 TABLET: 875; 125 TABLET, FILM COATED ORAL at 19:52

## 2023-04-17 RX ADMIN — SODIUM CHLORIDE 6000 ML: 900 IRRIGANT IRRIGATION at 00:29

## 2023-04-17 RX ADMIN — LEVOTHYROXINE SODIUM 25 MCG: 0.03 TABLET ORAL at 08:13

## 2023-04-17 RX ADMIN — AMOXICILLIN AND CLAVULANATE POTASSIUM 1 TABLET: 875; 125 TABLET, FILM COATED ORAL at 08:12

## 2023-04-17 ASSESSMENT — ACTIVITIES OF DAILY LIVING (ADL)
ADLS_ACUITY_SCORE: 42
ADLS_ACUITY_SCORE: 44
ADLS_ACUITY_SCORE: 43
ADLS_ACUITY_SCORE: 42
ADLS_ACUITY_SCORE: 43
ADLS_ACUITY_SCORE: 43
ADLS_ACUITY_SCORE: 44
ADLS_ACUITY_SCORE: 44
ADLS_ACUITY_SCORE: 43
ADLS_ACUITY_SCORE: 42

## 2023-04-17 NOTE — PROGRESS NOTES
[facility]    Medicine Progress Note - Hospitalist Service    Date of Admission:  4/14/2023    Assessment & Plan   Dilip Long is a 88 year old male admitted with acute blood loss anemia and hematuria in the setting of chronic indwelling jorge catheterization.  He is currently clinically stable.  CBI is currently clamped.  Continue to monitor.    # Hematuria  # Acute blood loss anemia  # Chronic urinary retention with chronic indwelling jorge  - CBI per urology  - trend CBC  - holding apixaban     # Hyponatremia, improved  - trend BMP     # Afib  - plan to hold apixaban until outpatient urology followup     # Previous UTI  - complete 10 day course of previously prescribed augmentin (end 4/20)       Diet: Regular Diet Adult      Jorge Catheter: PRESENT, indication: Gross Hematuria  Lines: None     Cardiac Monitoring: None  Code Status: No CPR- Do NOT Intubate      Clinically Significant Risk Factors         # Hyponatremia: Lowest Na = 127 mmol/L in last 2 days, will monitor as appropriate      # Hypoalbuminemia: Lowest albumin = 3 g/dL at 4/14/2023  1:37 AM, will monitor as appropriate                   Disposition Plan      Expected Discharge Date: 04/17/2023      Destination: home with family            Antolin Foote MD  Hospitalist Service  [facility]  Securely message with [employer] (more info)  Text page via [employer] Paging/Directory   ______________________________________________________________________    Interval History   Denies chest pain/pressure, SOB, or abdominal pain.    Physical Exam   Vital Signs: Temp: 98.1  F (36.7  C) Temp src: Oral BP: 114/58 Pulse: 62   Resp: 16 SpO2: 95 % O2 Device: None (Room air)    Weight: 157 lbs 10.06 oz    Gen: sitting comfortably in bed, nad  Neuro: alert, conversant  CV: nl rate, regular rhythm  Pulm: no acute resp distress, ctab anteriorly  GI: abdomen soft, NTTP    Medical Decision Making             Data

## 2023-04-17 NOTE — PROGRESS NOTES
Place of Service:  [facility]     Reason for follow up: Hematuria    SUBJECTIVE:  Events: no acute events overnight. Urine translucent watermelon color this AM. Denies abdominal and bilateral flank pain. CBI off this AM.     Family present.     OBJECTIVE:  PHYSICAL EXAM:  Temp: 98  F (36.7  C) Temp src: Oral BP: 131/61 Pulse: 63   Resp: 16 SpO2: 95 % O2 Device: None (Room air)    General: NAD, alert, cooperative  Head: normocephalic, without abnormality / atraumatic  Abdomen: soft, not tender, not distended.  There is no suprapubic fullness, no suprapubic tenderness.  No bilateral CVA tenderness.   Genitourinary: Penis and scrotum without erythema or edema. Urine translucent watermelon color with CBI off. Manually irrigated with some resistance until >100 ml saline instilled, then able to aspirate. Return of translucent light tea colored urine and no clots.   Skin: No rashes or lesions  Musculoskeletal: moves all four extremities equally.   Psychological: alert and oriented, answers questions appropriately    LABS:  Creatinine   Date Value Ref Range Status   04/17/2023 1.27 0.70 - 1.30 mg/dL Final   03/13/2019 1.3 0.8 - 1.5 mg/dL Final     WBC Count   Date Value Ref Range Status   04/17/2023 6.9 4.0 - 11.0 10e3/uL Final     Hemoglobin   Date Value Ref Range Status   04/17/2023 7.5 (L) 13.3 - 17.7 g/dL Final   08/18/2015 12.2 (L) 13.3 - 17.7 g/dL Final     Platelet Count   Date Value Ref Range Status   04/17/2023 248 150 - 450 10e3/uL Final       UA:  UA RESULTS:  UA RESULTS:  Recent Labs   Lab Test 03/10/23  0350 10/21/22  1544 10/02/19  0056   COLOR Orange*   < > Yellow   APPEARANCE Turbid*   < > Turbid*   URINEGLC Negative   < > Negative   URINEBILI Negative   < > Negative   URINEKETONE Negative   < > Negative   SG 1.017   < > 1.014   UBLD >1.0 mg/dL*   < > Small*   URINEPH 5.5   < > 7.0   PROTEIN 50*   < > 100 mg/dL*   UROBILINOGEN  --   --  <2.0 E.U./dL   NITRITE Negative   < > Negative   LEUKEST 500  Deion/uL*   < > Large*   RBCU >182*   < > 5-10*   WBCU >182*   < > >100*    < > = values in this interval not displayed.     Urine Culture 4/10: multiple morphotypes with no predominant culture, contamination suspected.      ASSESSMENT/PLAN:  Dilip Long is being seen by [facility] for gross hematuria.      - CBI off this AM, urine currently translucent watermelon to tea color. Monitor. Ok to cap irrigation port at 1000 if urine remains translucent watermelon color or lighter.   - WBC 6.9. Remains afebrile.  Previous urine culture with multiple organisms consistent with contamination. Imaging from previous visit consistent with cystitis. He should be discharged home on antibiotics (same regimen as prior to admission).   - Maintain jorge at discharge (has chronic jorge).   - Maintain follow up with Dr. Laird in a couple of weeks as previously scheduled.    Updated: Dr. Eitan Lugo, APRN, CNP  [facility]   [phone]

## 2023-04-17 NOTE — PLAN OF CARE
Problem: Plan of Care - These are the overarching goals to be used throughout the patient stay.    Goal: Optimal Comfort and Wellbeing  Intervention: Monitor Pain and Promote Comfort  Recent Flowsheet Documentation  Taken 4/16/2023 1951 by Camila Velazquez RN  Pain Management Interventions: (flow of  catheter working well to help keep pain well managed at this time)    declines    medication offered but refused    A&Ox4. C/o some pain, but did not need any PRN's overnight. Patient stated bladder feeling a lot better. CBI low flow overnight. Urine did turn a watermelon color when getting up OOB to use commode, but did turn back to clear once back in bed. No clots seen overnight. Will plan to clamp at 0600 per Urology notes. Discharge pending.

## 2023-04-17 NOTE — PLAN OF CARE
Problem: Plan of Care - These are the overarching goals to be used throughout the patient stay.    Goal: Absence of Hospital-Acquired Illness or Injury  Intervention: Prevent Skin Injury  Recent Flowsheet Documentation  Taken 4/17/2023 0819 by Keyana Holland  Body Position: supine  Intervention: Prevent and Manage VTE (Venous Thromboembolism) Risk  Recent Flowsheet Documentation  Taken 4/17/2023 0819 by Keyana Holland  VTE Prevention/Management: foot pump device on   Goal Outcome Evaluation:  Patient a & o x 4. Urine output light/medium red in color, no clots noted. Bladder irrigation discontinued and capped. Discharge pending for tomorrow. Patient in a good mood with visitors in the room throughout the day today.

## 2023-04-17 NOTE — PLAN OF CARE
Problem: Plan of Care - These are the overarching goals to be used throughout the patient stay.    Goal: Plan of Care Review  Description: The Plan of Care Review/Shift note should be completed every shift.  The Outcome Evaluation is a brief statement about your assessment that the patient is improving, declining, or no change.  This information will be displayed automatically on your shift note.  Outcome: Progressing    Goal Outcome Evaluation:    Patient is A & O x 4. Pleasant. VSS. No complaints of pain.    CBI clamped @ 0645. MD and Urology followed-up. CDI capped @ 1040, per urology orders with continued watermelon color urine. Frequent checks completed. Patient states he feels so much better than yesterday. No complaints of pelvic fullness/pain/or spasms with frequent checks.     At 1830 RN noted slightly increased redness in urine. Less light colored.  Did check patency with quick run of CDI and patency is intact .Urology paged. MD suggested running a slow CBI throughout the night to ensure that the patient does not get clots again. CDI started @ 1900 on a slow stream, as instructed. Details provided to oncoming RN.    PCD's placed on patient, per orders. IV is SL. Hgb stable @ 7.5. Regular diet. Assist of 1 with walker/GB. Alarms on for safety. Possible discharge pending ongoing clinical improvement.    Problem: UTI (Urinary Tract Infection)  Goal: Improved Infection Symptoms  Outcome: Progressing

## 2023-04-18 ENCOUNTER — ANESTHESIA EVENT (OUTPATIENT)
Dept: SURGERY | Facility: CLINIC | Age: 88
DRG: 669 | End: 2023-04-18
Payer: COMMERCIAL

## 2023-04-18 LAB
ANION GAP SERPL CALCULATED.3IONS-SCNC: 7 MMOL/L (ref 5–18)
BUN SERPL-MCNC: 12 MG/DL (ref 8–28)
CALCIUM SERPL-MCNC: 8.3 MG/DL (ref 8.5–10.5)
CHLORIDE BLD-SCNC: 102 MMOL/L (ref 98–107)
CO2 SERPL-SCNC: 25 MMOL/L (ref 22–31)
CREAT SERPL-MCNC: 1.23 MG/DL (ref 0.7–1.3)
ERYTHROCYTE [DISTWIDTH] IN BLOOD BY AUTOMATED COUNT: 12.8 % (ref 10–15)
GFR SERPL CREATININE-BSD FRML MDRD: 57 ML/MIN/1.73M2
GLUCOSE BLD-MCNC: 108 MG/DL (ref 70–125)
GLUCOSE BLDC GLUCOMTR-MCNC: 129 MG/DL (ref 70–99)
HCT VFR BLD AUTO: 23.5 % (ref 40–53)
HGB BLD-MCNC: 7.9 G/DL (ref 13.3–17.7)
MCH RBC QN AUTO: 32.6 PG (ref 26.5–33)
MCHC RBC AUTO-ENTMCNC: 33.6 G/DL (ref 31.5–36.5)
MCV RBC AUTO: 97 FL (ref 78–100)
PLATELET # BLD AUTO: 290 10E3/UL (ref 150–450)
POTASSIUM BLD-SCNC: 3.7 MMOL/L (ref 3.5–5)
RBC # BLD AUTO: 2.42 10E6/UL (ref 4.4–5.9)
SODIUM SERPL-SCNC: 134 MMOL/L (ref 136–145)
WBC # BLD AUTO: 6 10E3/UL (ref 4–11)

## 2023-04-18 PROCEDURE — 85027 COMPLETE CBC AUTOMATED: CPT | Performed by: HOSPITALIST

## 2023-04-18 PROCEDURE — 80048 BASIC METABOLIC PNL TOTAL CA: CPT | Performed by: HOSPITALIST

## 2023-04-18 PROCEDURE — 36415 COLL VENOUS BLD VENIPUNCTURE: CPT | Performed by: HOSPITALIST

## 2023-04-18 PROCEDURE — 120N000001 HC R&B MED SURG/OB

## 2023-04-18 PROCEDURE — 250N000013 HC RX MED GY IP 250 OP 250 PS 637: Performed by: HOSPITALIST

## 2023-04-18 PROCEDURE — 99231 SBSQ HOSP IP/OBS SF/LOW 25: CPT | Performed by: HOSPITALIST

## 2023-04-18 RX ORDER — SENNOSIDES 8.6 MG
8.6 TABLET ORAL 2 TIMES DAILY
Status: DISCONTINUED | OUTPATIENT
Start: 2023-04-18 | End: 2023-04-20 | Stop reason: HOSPADM

## 2023-04-18 RX ORDER — BISACODYL 10 MG
10 SUPPOSITORY, RECTAL RECTAL ONCE
Status: COMPLETED | OUTPATIENT
Start: 2023-04-18 | End: 2023-04-18

## 2023-04-18 RX ORDER — POLYETHYLENE GLYCOL 3350 17 G/17G
17 POWDER, FOR SOLUTION ORAL DAILY
Status: DISCONTINUED | OUTPATIENT
Start: 2023-04-18 | End: 2023-04-20 | Stop reason: HOSPADM

## 2023-04-18 RX ADMIN — AMOXICILLIN AND CLAVULANATE POTASSIUM 1 TABLET: 875; 125 TABLET, FILM COATED ORAL at 19:54

## 2023-04-18 RX ADMIN — ACETAMINOPHEN 975 MG: 325 TABLET ORAL at 16:02

## 2023-04-18 RX ADMIN — LEVOTHYROXINE SODIUM 25 MCG: 0.03 TABLET ORAL at 08:35

## 2023-04-18 RX ADMIN — SENNOSIDES 8.6 MG: 8.6 TABLET, FILM COATED ORAL at 08:35

## 2023-04-18 RX ADMIN — OXYCODONE HYDROCHLORIDE 5 MG: 5 TABLET ORAL at 16:02

## 2023-04-18 RX ADMIN — SENNOSIDES 8.6 MG: 8.6 TABLET, FILM COATED ORAL at 19:54

## 2023-04-18 RX ADMIN — AMOXICILLIN AND CLAVULANATE POTASSIUM 1 TABLET: 875; 125 TABLET, FILM COATED ORAL at 08:35

## 2023-04-18 RX ADMIN — BISACODYL 10 MG: 10 SUPPOSITORY RECTAL at 09:21

## 2023-04-18 RX ADMIN — ATENOLOL 25 MG: 25 TABLET ORAL at 19:54

## 2023-04-18 RX ADMIN — POLYETHYLENE GLYCOL 3350 17 G: 17 POWDER, FOR SOLUTION ORAL at 08:35

## 2023-04-18 ASSESSMENT — ACTIVITIES OF DAILY LIVING (ADL)
ADLS_ACUITY_SCORE: 40
ADLS_ACUITY_SCORE: 43
ADLS_ACUITY_SCORE: 43
ADLS_ACUITY_SCORE: 40
ADLS_ACUITY_SCORE: 43
ADLS_ACUITY_SCORE: 40
ADLS_ACUITY_SCORE: 43

## 2023-04-18 ASSESSMENT — ENCOUNTER SYMPTOMS: DYSRHYTHMIAS: 1

## 2023-04-18 NOTE — PROGRESS NOTES
Care Management Discharge Note    Discharge Date: 04/18/2023       Discharge Disposition: Home    Discharge Services:  None, family support    Discharge DME:  Indwelling jorge catheter    Discharge Transportation: family or friend will provide    Private pay costs discussed: Not applicable    Education Provided on the Discharge Plan:  Per team  Persons Notified of Discharge Plans: Family, patient, nurse  Patient/Family in Agreement with the Plan: yes    Handoff Referral Completed: No    Additional Information:  CM been following along for final discharge dispo. Patient will plan to go home with family support, with catheter in place, and out patient follow up with urology( Dr ramos). Will finish his course of Augmentin. Family to provide a ride at discharge.        Paige Oglesby RN

## 2023-04-18 NOTE — PROGRESS NOTES
Place of Service:  [facility]     Reason for follow up: Hematuria    SUBJECTIVE:  Events: CBI resumed last evening d/t slight increase in redness of urine. Granddaughter reports patient had a couple of clots in catheter tubing overnight, not obstructing catheter and urine stayed light pink/watermelon with CBI at slow rate. Urine translucent watermelon color this AM. Denies abdominal and bilateral flank pain. CBI off this AM.     Granddaughter reports plan is to hold Eliquis a couple of weeks.     OBJECTIVE:  PHYSICAL EXAM:  Temp: 98.3  F (36.8  C) Temp src: Oral BP: (!) 140/66 Pulse: 68   Resp: 18 SpO2: 98 % O2 Device: None (Room air)    General: NAD, alert, cooperative  Head: normocephalic, without abnormality / atraumatic  Abdomen: soft, not tender, not distended.  There is no suprapubic fullness, no suprapubic tenderness.  No bilateral CVA tenderness.   Genitourinary: Penis and scrotum without erythema or edema. Urine translucent light watermelon color with CBI at slow rate. Manually irrigated with some resistance until >100 ml saline instilled, then able to aspirate and no clots present. Return of translucent light watermelon colored urine and no clots.   Skin: No rashes or lesions  Musculoskeletal: moves all four extremities equally.   Psychological: alert and oriented, answers questions appropriately    LABS:  Creatinine   Date Value Ref Range Status   04/17/2023 1.27 0.70 - 1.30 mg/dL Final   03/13/2019 1.3 0.8 - 1.5 mg/dL Final     WBC Count   Date Value Ref Range Status   04/17/2023 6.9 4.0 - 11.0 10e3/uL Final     Hemoglobin   Date Value Ref Range Status   04/17/2023 7.5 (L) 13.3 - 17.7 g/dL Final   08/18/2015 12.2 (L) 13.3 - 17.7 g/dL Final     Platelet Count   Date Value Ref Range Status   04/17/2023 248 150 - 450 10e3/uL Final       UA:  UA RESULTS:  UA RESULTS:  Recent Labs   Lab Test 03/10/23  0350 10/21/22  1544 10/02/19  0056   COLOR Orange*   < > Yellow   APPEARANCE Turbid*   < > Turbid*    URINEGLC Negative   < > Negative   URINEBILI Negative   < > Negative   URINEKETONE Negative   < > Negative   SG 1.017   < > 1.014   UBLD >1.0 mg/dL*   < > Small*   URINEPH 5.5   < > 7.0   PROTEIN 50*   < > 100 mg/dL*   UROBILINOGEN  --   --  <2.0 E.U./dL   NITRITE Negative   < > Negative   LEUKEST 500 Deion/uL*   < > Large*   RBCU >182*   < > 5-10*   WBCU >182*   < > >100*    < > = values in this interval not displayed.     Urine Culture 4/10: multiple morphotypes with no predominant culture, contamination suspected.      ASSESSMENT/PLAN:  Dilip Long is being seen by [facility] for gross hematuria.      - CBI resumed last radha for mild increase hematuria and couple of clots. Urine currently translucent light watermelon, no clots with manual irrigation and CBI turned off at 0830. Monitor. Ok to cap irrigation port at 1000 if urine remains translucent watermelon color or lighter.   - WBC 6.9 on 4/17. Remains afebrile. Previous urine culture with multiple organisms consistent with contamination. Imaging from previous visit consistent with cystitis. He should be discharged home on antibiotics (same regimen as prior to admission).   - If urine remains translucent watermelon color or lighter (off CBI) this afternoon, then ok from urology standpoint for discharge whenever deemed appropriate per Medicine.   - Maintain jorge at discharge (has chronic jorge).   - Maintain follow up with Dr. Laird in a couple of weeks as previously scheduled.    Luis Lugo, CAROL, CNP  [facility]   [phone]     ADDENDUM, 9359:   Notified by RNMaren, that patient's urine is now thick cherry red. RN manually irrigated without clot return. Ordered to resume CBI at moderate rate, with prn manual irrigations. NPO at [place] for possible cystoscopy 4/19.   Updated Dr. Laird.     Luis Lugo, CAROL, CNP    ADDENDUM, 1314:   Planning for cystoscopy with possible clot evacuation, possible fulguration at approx 0920 on  4/19. Updated patient's RN, Dianne, by phone and she has update patient/family. Attempted to call patient's granddaughter, Shruthi, however she did not answer and VM is full.     Luis Lugo, APRN, CNP

## 2023-04-18 NOTE — PLAN OF CARE
Problem: UTI (Urinary Tract Infection)  Goal: Improved Infection Symptoms  Outcome: Progressing     Problem: Risk for Delirium  Goal: Improved Sleep  Outcome: Progressing     Problem: Risk for Delirium  Goal: Improved Attention and Thought Clarity  Outcome: Progressing   Goal Outcome Evaluation:       Patient is alert and oriented, denies pain this shift, CBI was turned off in the morning. Patient began having cherry like urine output, was irrigated x 1, no clots observed. Updated Urologist about the cherry like urine output later, irrigated again and some clots were observed,  order was to resume CBI at moderate rate,  Urine output looks pinkish watermelon,  Patient given PRN Oxycodone and Tylenol for discomfort  During irrigation.  Is to be NPO tonight after midnight for cystoscopy tomorrow.

## 2023-04-18 NOTE — PROGRESS NOTES
[facility]    Medicine Progress Note - Hospitalist Service    Date of Admission:  4/14/2023    Assessment & Plan   Dilip Long is a 88 year old male admitted with acute blood loss anemia and hematuria in the setting of chronic indwelling jorge catheterization.  He is currently clinically stable.  Hgb is stable as well.  CBI was restarted overnight, ongoing hematuria on exam.    # Hematuria  # Acute blood loss anemia  # Chronic urinary retention with chronic indwelling jorge  - CBI per urology  - trend CBC  - holding apixaban     # Hyponatremia, improved  - trend BMP     # Afib  - plan to hold apixaban until outpatient urology followup     # Previous UTI  - complete 10 day course of previously prescribed augmentin (end 4/20)       Diet: Regular Diet Adult    Jorge Catheter: PRESENT, indication: Gross Hematuria  Lines: None     Cardiac Monitoring: None  Code Status: No CPR- Do NOT Intubate      Clinically Significant Risk Factors              # Hypoalbuminemia: Lowest albumin = 3 g/dL at 4/14/2023  1:37 AM, will monitor as appropriate                   Disposition Plan      Expected Discharge Date: 04/18/2023      Destination: home with family  Discharge Comments: Monitor overnight          Antolin Foote MD  Hospitalist Service  [facility]  Securely message with [employer] (more info)  Text page via [employer] Paging/Directory   ______________________________________________________________________    Interval History   Patient reports he hasn't had a bowel movement in the last couple days.  Denies chest pain/pressure, SOB, or abdominal pain.    Physical Exam   Vital Signs: Temp: 98.3  F (36.8  C) Temp src: Oral BP: (!) 140/66 Pulse: 68   Resp: 18 SpO2: 98 % O2 Device: None (Room air)    Weight: 148 lbs 9.6 oz    Gen:  Lying comfortably in bed, nad  CV:  Nl rate, regular rhythm  Pulm: no acute resp distress, ctab anteriorly  GI: abdomen soft, NTTP, non distended  Neuro:  alert, conversant    Medical Decision Making             Data

## 2023-04-18 NOTE — PLAN OF CARE
Problem: UTI (Urinary Tract Infection)  Goal: Improved Infection Symptoms  Outcome: Progressing   Goal Outcome Evaluation:    Pt A&Ox4, denied pain/N/V.  CBI to slow rate all night, continues with watermelon-light pink color, 2 small clots noted.  Urine is clear while pt in bed sleeping/not moving around as much, however when pt gets up its turns watermelon pink.  PO abx given, bedtime atenolol held d/t low BPs.  Ax1 to BSC.

## 2023-04-19 ENCOUNTER — ANESTHESIA (OUTPATIENT)
Dept: SURGERY | Facility: CLINIC | Age: 88
DRG: 669 | End: 2023-04-19
Payer: COMMERCIAL

## 2023-04-19 LAB
ERYTHROCYTE [DISTWIDTH] IN BLOOD BY AUTOMATED COUNT: 13 % (ref 10–15)
GLUCOSE BLDC GLUCOMTR-MCNC: 91 MG/DL (ref 70–99)
HCT VFR BLD AUTO: 25.7 % (ref 40–53)
HGB BLD-MCNC: 8 G/DL (ref 13.3–17.7)
MCH RBC QN AUTO: 32.8 PG (ref 26.5–33)
MCHC RBC AUTO-ENTMCNC: 31.1 G/DL (ref 31.5–36.5)
MCV RBC AUTO: 105 FL (ref 78–100)
PLATELET # BLD AUTO: 250 10E3/UL (ref 150–450)
RBC # BLD AUTO: 2.44 10E6/UL (ref 4.4–5.9)
WBC # BLD AUTO: 5.8 10E3/UL (ref 4–11)

## 2023-04-19 PROCEDURE — 250N000011 HC RX IP 250 OP 636: Performed by: NURSE ANESTHETIST, CERTIFIED REGISTERED

## 2023-04-19 PROCEDURE — 710N000010 HC RECOVERY PHASE 1, LEVEL 2, PER MIN: Performed by: UROLOGY

## 2023-04-19 PROCEDURE — 0TCB8ZZ EXTIRPATION OF MATTER FROM BLADDER, VIA NATURAL OR ARTIFICIAL OPENING ENDOSCOPIC: ICD-10-PCS | Performed by: UROLOGY

## 2023-04-19 PROCEDURE — 88305 TISSUE EXAM BY PATHOLOGIST: CPT | Mod: 26 | Performed by: PATHOLOGY

## 2023-04-19 PROCEDURE — 88305 TISSUE EXAM BY PATHOLOGIST: CPT | Mod: TC | Performed by: UROLOGY

## 2023-04-19 PROCEDURE — 250N000013 HC RX MED GY IP 250 OP 250 PS 637: Performed by: HOSPITALIST

## 2023-04-19 PROCEDURE — 85027 COMPLETE CBC AUTOMATED: CPT | Performed by: HOSPITALIST

## 2023-04-19 PROCEDURE — 0TBB8ZX EXCISION OF BLADDER, VIA NATURAL OR ARTIFICIAL OPENING ENDOSCOPIC, DIAGNOSTIC: ICD-10-PCS | Performed by: UROLOGY

## 2023-04-19 PROCEDURE — 99231 SBSQ HOSP IP/OBS SF/LOW 25: CPT | Performed by: HOSPITALIST

## 2023-04-19 PROCEDURE — 370N000017 HC ANESTHESIA TECHNICAL FEE, PER MIN: Performed by: UROLOGY

## 2023-04-19 PROCEDURE — 258N000003 HC RX IP 258 OP 636: Performed by: ANESTHESIOLOGY

## 2023-04-19 PROCEDURE — 250N000009 HC RX 250: Performed by: NURSE ANESTHETIST, CERTIFIED REGISTERED

## 2023-04-19 PROCEDURE — 250N000011 HC RX IP 250 OP 636: Performed by: NURSE PRACTITIONER

## 2023-04-19 PROCEDURE — 999N000141 HC STATISTIC PRE-PROCEDURE NURSING ASSESSMENT: Performed by: UROLOGY

## 2023-04-19 PROCEDURE — 88312 SPECIAL STAINS GROUP 1: CPT | Mod: 26 | Performed by: PATHOLOGY

## 2023-04-19 PROCEDURE — 258N000003 HC RX IP 258 OP 636: Performed by: NURSE ANESTHETIST, CERTIFIED REGISTERED

## 2023-04-19 PROCEDURE — 250N000013 HC RX MED GY IP 250 OP 250 PS 637: Performed by: ANESTHESIOLOGY

## 2023-04-19 PROCEDURE — 120N000001 HC R&B MED SURG/OB

## 2023-04-19 PROCEDURE — 272N000001 HC OR GENERAL SUPPLY STERILE: Performed by: UROLOGY

## 2023-04-19 PROCEDURE — 250N000013 HC RX MED GY IP 250 OP 250 PS 637: Performed by: UROLOGY

## 2023-04-19 PROCEDURE — 258N000001 HC RX 258: Performed by: HOSPITALIST

## 2023-04-19 PROCEDURE — 0T5B8ZZ DESTRUCTION OF BLADDER, VIA NATURAL OR ARTIFICIAL OPENING ENDOSCOPIC: ICD-10-PCS | Performed by: UROLOGY

## 2023-04-19 PROCEDURE — 360N000075 HC SURGERY LEVEL 2, PER MIN: Performed by: UROLOGY

## 2023-04-19 PROCEDURE — 88312 SPECIAL STAINS GROUP 1: CPT | Mod: TC | Performed by: UROLOGY

## 2023-04-19 RX ORDER — ATROPINE SULFATE 0.4 MG/ML
AMPUL (ML) INJECTION PRN
Status: DISCONTINUED | OUTPATIENT
Start: 2023-04-19 | End: 2023-04-19

## 2023-04-19 RX ORDER — LIDOCAINE 40 MG/G
CREAM TOPICAL
Status: DISCONTINUED | OUTPATIENT
Start: 2023-04-19 | End: 2023-04-20 | Stop reason: HOSPADM

## 2023-04-19 RX ORDER — SODIUM CHLORIDE, SODIUM LACTATE, POTASSIUM CHLORIDE, CALCIUM CHLORIDE 600; 310; 30; 20 MG/100ML; MG/100ML; MG/100ML; MG/100ML
INJECTION, SOLUTION INTRAVENOUS CONTINUOUS
Status: DISCONTINUED | OUTPATIENT
Start: 2023-04-19 | End: 2023-04-19

## 2023-04-19 RX ORDER — EPHEDRINE SULFATE 50 MG/ML
INJECTION, SOLUTION INTRAMUSCULAR; INTRAVENOUS; SUBCUTANEOUS PRN
Status: DISCONTINUED | OUTPATIENT
Start: 2023-04-19 | End: 2023-04-19

## 2023-04-19 RX ORDER — LIDOCAINE HYDROCHLORIDE 10 MG/ML
INJECTION, SOLUTION INFILTRATION; PERINEURAL PRN
Status: DISCONTINUED | OUTPATIENT
Start: 2023-04-19 | End: 2023-04-19

## 2023-04-19 RX ORDER — FENTANYL CITRATE 50 UG/ML
50 INJECTION, SOLUTION INTRAMUSCULAR; INTRAVENOUS EVERY 5 MIN PRN
Status: DISCONTINUED | OUTPATIENT
Start: 2023-04-19 | End: 2023-04-19 | Stop reason: HOSPADM

## 2023-04-19 RX ORDER — ONDANSETRON 4 MG/1
4 TABLET, ORALLY DISINTEGRATING ORAL EVERY 30 MIN PRN
Status: DISCONTINUED | OUTPATIENT
Start: 2023-04-19 | End: 2023-04-19 | Stop reason: HOSPADM

## 2023-04-19 RX ORDER — ONDANSETRON 2 MG/ML
4 INJECTION INTRAMUSCULAR; INTRAVENOUS EVERY 30 MIN PRN
Status: DISCONTINUED | OUTPATIENT
Start: 2023-04-19 | End: 2023-04-19 | Stop reason: HOSPADM

## 2023-04-19 RX ORDER — PROPOFOL 10 MG/ML
INJECTION, EMULSION INTRAVENOUS PRN
Status: DISCONTINUED | OUTPATIENT
Start: 2023-04-19 | End: 2023-04-19

## 2023-04-19 RX ORDER — PROPOFOL 10 MG/ML
INJECTION, EMULSION INTRAVENOUS CONTINUOUS PRN
Status: DISCONTINUED | OUTPATIENT
Start: 2023-04-19 | End: 2023-04-19

## 2023-04-19 RX ORDER — DEXAMETHASONE SODIUM PHOSPHATE 10 MG/ML
INJECTION, SOLUTION INTRAMUSCULAR; INTRAVENOUS PRN
Status: DISCONTINUED | OUTPATIENT
Start: 2023-04-19 | End: 2023-04-19

## 2023-04-19 RX ORDER — CEFAZOLIN SODIUM/WATER 2 G/20 ML
2 SYRINGE (ML) INTRAVENOUS SEE ADMIN INSTRUCTIONS
Status: DISCONTINUED | OUTPATIENT
Start: 2023-04-19 | End: 2023-04-20 | Stop reason: HOSPADM

## 2023-04-19 RX ORDER — HYDROMORPHONE HCL IN WATER/PF 6 MG/30 ML
0.2 PATIENT CONTROLLED ANALGESIA SYRINGE INTRAVENOUS EVERY 5 MIN PRN
Status: DISCONTINUED | OUTPATIENT
Start: 2023-04-19 | End: 2023-04-19 | Stop reason: HOSPADM

## 2023-04-19 RX ORDER — ONDANSETRON 2 MG/ML
INJECTION INTRAMUSCULAR; INTRAVENOUS PRN
Status: DISCONTINUED | OUTPATIENT
Start: 2023-04-19 | End: 2023-04-19

## 2023-04-19 RX ORDER — FENTANYL CITRATE 50 UG/ML
25 INJECTION, SOLUTION INTRAMUSCULAR; INTRAVENOUS EVERY 5 MIN PRN
Status: DISCONTINUED | OUTPATIENT
Start: 2023-04-19 | End: 2023-04-19 | Stop reason: HOSPADM

## 2023-04-19 RX ORDER — CEFAZOLIN SODIUM/WATER 2 G/20 ML
2 SYRINGE (ML) INTRAVENOUS
Status: COMPLETED | OUTPATIENT
Start: 2023-04-19 | End: 2023-04-19

## 2023-04-19 RX ORDER — ACETAMINOPHEN 325 MG/1
975 TABLET ORAL ONCE
Status: COMPLETED | OUTPATIENT
Start: 2023-04-19 | End: 2023-04-19

## 2023-04-19 RX ORDER — SODIUM CHLORIDE, SODIUM LACTATE, POTASSIUM CHLORIDE, CALCIUM CHLORIDE 600; 310; 30; 20 MG/100ML; MG/100ML; MG/100ML; MG/100ML
INJECTION, SOLUTION INTRAVENOUS CONTINUOUS
Status: DISCONTINUED | OUTPATIENT
Start: 2023-04-19 | End: 2023-04-19 | Stop reason: HOSPADM

## 2023-04-19 RX ORDER — FENTANYL CITRATE 50 UG/ML
INJECTION, SOLUTION INTRAMUSCULAR; INTRAVENOUS PRN
Status: DISCONTINUED | OUTPATIENT
Start: 2023-04-19 | End: 2023-04-19

## 2023-04-19 RX ORDER — HYDROMORPHONE HCL IN WATER/PF 6 MG/30 ML
0.4 PATIENT CONTROLLED ANALGESIA SYRINGE INTRAVENOUS EVERY 5 MIN PRN
Status: DISCONTINUED | OUTPATIENT
Start: 2023-04-19 | End: 2023-04-19 | Stop reason: HOSPADM

## 2023-04-19 RX ADMIN — AMOXICILLIN AND CLAVULANATE POTASSIUM 1 TABLET: 875; 125 TABLET, FILM COATED ORAL at 11:27

## 2023-04-19 RX ADMIN — POLYETHYLENE GLYCOL 3350 17 G: 17 POWDER, FOR SOLUTION ORAL at 11:27

## 2023-04-19 RX ADMIN — FENTANYL CITRATE 50 MCG: 50 INJECTION, SOLUTION INTRAMUSCULAR; INTRAVENOUS at 08:15

## 2023-04-19 RX ADMIN — OXYCODONE HYDROCHLORIDE 5 MG: 5 TABLET ORAL at 02:44

## 2023-04-19 RX ADMIN — ACETAMINOPHEN 975 MG: 325 TABLET ORAL at 02:44

## 2023-04-19 RX ADMIN — Medication 5 MG: at 08:21

## 2023-04-19 RX ADMIN — DEXAMETHASONE SODIUM PHOSPHATE 10 MG: 10 INJECTION, SOLUTION INTRAMUSCULAR; INTRAVENOUS at 08:20

## 2023-04-19 RX ADMIN — Medication 2 G: at 08:05

## 2023-04-19 RX ADMIN — OXYCODONE HYDROCHLORIDE 5 MG: 5 TABLET ORAL at 21:59

## 2023-04-19 RX ADMIN — FENTANYL CITRATE 50 MCG: 50 INJECTION, SOLUTION INTRAMUSCULAR; INTRAVENOUS at 08:07

## 2023-04-19 RX ADMIN — ATROPINE SULFATE 0.4 MG: 0.4 INJECTION, SOLUTION INTRAMUSCULAR; INTRAVENOUS; SUBCUTANEOUS at 08:20

## 2023-04-19 RX ADMIN — AMOXICILLIN AND CLAVULANATE POTASSIUM 1 TABLET: 875; 125 TABLET, FILM COATED ORAL at 20:43

## 2023-04-19 RX ADMIN — Medication 5 MG: at 08:30

## 2023-04-19 RX ADMIN — ONDANSETRON 4 MG: 2 INJECTION INTRAMUSCULAR; INTRAVENOUS at 08:45

## 2023-04-19 RX ADMIN — ACETAMINOPHEN 975 MG: 325 TABLET ORAL at 07:20

## 2023-04-19 RX ADMIN — PROPOFOL 100 MCG/KG/MIN: 10 INJECTION, EMULSION INTRAVENOUS at 08:15

## 2023-04-19 RX ADMIN — LIDOCAINE HYDROCHLORIDE 20 MG: 10 INJECTION, SOLUTION INFILTRATION; PERINEURAL at 08:15

## 2023-04-19 RX ADMIN — Medication 5 MG: at 08:34

## 2023-04-19 RX ADMIN — SODIUM CHLORIDE 3000 ML: 900 IRRIGANT IRRIGATION at 02:46

## 2023-04-19 RX ADMIN — LEVOTHYROXINE SODIUM 25 MCG: 0.03 TABLET ORAL at 11:27

## 2023-04-19 RX ADMIN — PHENYLEPHRINE HYDROCHLORIDE 50 MCG: 10 INJECTION INTRAVENOUS at 08:39

## 2023-04-19 RX ADMIN — PROPOFOL 70 MG: 10 INJECTION, EMULSION INTRAVENOUS at 08:15

## 2023-04-19 RX ADMIN — Medication 5 MG: at 08:24

## 2023-04-19 RX ADMIN — SENNOSIDES 8.6 MG: 8.6 TABLET, FILM COATED ORAL at 11:27

## 2023-04-19 RX ADMIN — SENNOSIDES 8.6 MG: 8.6 TABLET, FILM COATED ORAL at 20:43

## 2023-04-19 RX ADMIN — Medication 5 MG: at 08:27

## 2023-04-19 RX ADMIN — ATENOLOL 25 MG: 25 TABLET ORAL at 20:43

## 2023-04-19 RX ADMIN — SODIUM CHLORIDE, POTASSIUM CHLORIDE, SODIUM LACTATE AND CALCIUM CHLORIDE: 600; 310; 30; 20 INJECTION, SOLUTION INTRAVENOUS at 07:20

## 2023-04-19 ASSESSMENT — ACTIVITIES OF DAILY LIVING (ADL)
DIFFICULTY_EATING/SWALLOWING: NO
ADLS_ACUITY_SCORE: 40
ADLS_ACUITY_SCORE: 35
ADLS_ACUITY_SCORE: 40
ADLS_ACUITY_SCORE: 35
ADLS_ACUITY_SCORE: 40
CONCENTRATING,_REMEMBERING_OR_MAKING_DECISIONS_DIFFICULTY: NO
ADLS_ACUITY_SCORE: 40
ADLS_ACUITY_SCORE: 35
ADLS_ACUITY_SCORE: 40
ADLS_ACUITY_SCORE: 40

## 2023-04-19 NOTE — ANESTHESIA PREPROCEDURE EVALUATION
Anesthesia Pre-Procedure Evaluation    Patient: Dilip Long   MRN: [id number] : 1935        Procedure : Procedure(s):  CYSTOSCOPY, WITH FULGURATION OF HEMORRHAGING BLOOD VESSEL AND THROMBUS REMOVAL          Past Medical History:   Diagnosis Date     Acute heart failure with preserved ejection fraction (HFpEF) (H) 11/10/2022     Acute prostatitis      Asymptomatic bacteriuria 10/23/2013    Noted at 10/2/13 office visit at [facility]. No treatment recommended at that time.      Congestive heart failure (H)      Essential hypertension 2015     Hypertension      Paroxysmal atrial fib -- on Eliquis 2022     Syncope      Thyroid disease       Past Surgical History:   Procedure Laterality Date     BLADDER SURGERY      Bladder absces removal     Blepharoplasty Upper Lid W/ Excessive Skin[  2007     CATARACT EXTRACTION       EYE SURGERY      both eyes      IR LUMBAR EPIDURAL STEROID INJECTION  2004     IR LUMBAR EPIDURAL STEROID INJECTION  2004     IR LUMBAR EPIDURAL STEROID INJECTION  2004     IR LUMBAR EPIDURAL STEROID INJECTION  2007     AR CYSTOURETHROSCOPY W/IRRIG & EVAC CLOTS N/A 2018    Procedure: CYSTOSCOPY, CLOT EVACUATION ,URETHRAL DILATATION, DAUGHERTY CATHETER PLACEMENT;  Surgeon: Lowell Arias MD;  Location: [facility] [place];  Service: Urology      No Known Allergies   Social History     Tobacco Use     Smoking status: Former     Types: Cigarettes     Smokeless tobacco: Never     Tobacco comments:     Pt quit 40 years ago   Vaping Use     Vaping status: Never Used   Substance Use Topics     Alcohol use: Yes     Comment: < 1 drink a week      Wt Readings from Last 1 Encounters:   23 67.4 kg (148 lb 9.6 oz)        Anesthesia Evaluation   Pt has had prior anesthetic.         ROS/MED HX  ENT/Pulmonary:  - neg pulmonary ROS     Neurologic: Comment: SDH    (+) CVA,     Cardiovascular: Comment:  TTE  Technically challenging study. Definity  "contrast utilized. Underlying rhythm  is suggestive of atrial fibrillation with frequent ectopy.  Normal left ventricular size. Left ventricular systolic function appears  mildly reduced. Left ventricular ejection fraction estimated 45 to 50%. Not  all walls are optimally visualized.  Normal right ventricular size. Mild decrease in right ventricular systolic  function.  No obvious significant valve abnormality.  Mild enlargement of the aortic root  Small circumferential pericardial effusion  Compared to October 24, 2022 the size of the pericardial effusion appears  Similar.    Might have an element of sick sinus syndrome but at this point time no pacemaker.    Small pericardial effusion which has shrunk over last 6 months    (+) hypertension-Peripheral Vascular Disease-- Carotid Stenosis. ---CHF fainting (syncope). dysrhythmias, a-fib, Previous cardiac testing  Pacemaker: he was evaluated for possible pacemaker placement in 1/23 for \"possible element of SSS\"   METS/Exercise Tolerance: >4 METS    Hematologic:  - neg hematologic  ROS   (+) anemia,     Musculoskeletal: Comment: Deconditioned  Low back pain - neg musculoskeletal ROS     GI/Hepatic:  - neg GI/hepatic ROS     Renal/Genitourinary:     (+) renal disease ([place] 3a),     Endo:     (+) thyroid problem,     Psychiatric/Substance Use:  - neg psychiatric ROS     Infectious Disease:  - neg infectious disease ROS     Malignancy:  - neg malignancy ROS     Other:  - neg other ROS          Physical Exam    Airway  airway exam normal      Mallampati: II       Respiratory Devices and Support         Dental           Cardiovascular   cardiovascular exam normal       Rhythm and rate: regular and normal     Pulmonary   pulmonary exam normal        breath sounds clear to auscultation           OUTSIDE LABS:  CBC:   Lab Results   Component Value Date    WBC 6.0 04/18/2023    WBC 6.9 04/17/2023    HGB 7.9 (L) 04/18/2023    HGB 7.5 (L) 04/17/2023    HCT 23.5 (L) 04/18/2023    " HCT 22.5 (L) 04/17/2023     04/18/2023     04/17/2023     BMP:   Lab Results   Component Value Date     (L) 04/18/2023     (L) 04/17/2023    POTASSIUM 3.7 04/18/2023    POTASSIUM 3.8 04/17/2023    CHLORIDE 102 04/18/2023    CHLORIDE 102 04/17/2023    CO2 25 04/18/2023    CO2 22 04/17/2023    BUN 12 04/18/2023    BUN 14 04/17/2023    CR 1.23 04/18/2023    CR 1.27 04/17/2023     (H) 04/18/2023     04/18/2023     COAGS:   Lab Results   Component Value Date    PTT 34 11/29/2022    INR 1.15 04/14/2023     POC: No results found for: BGM, HCG, HCGS  HEPATIC:   Lab Results   Component Value Date    ALBUMIN 3.0 (L) 04/14/2023    PROTTOTAL 5.6 (L) 04/14/2023    ALT <9 04/14/2023    AST 12 04/14/2023    ALKPHOS 60 04/14/2023    BILITOTAL 0.3 04/14/2023     OTHER:   Lab Results   Component Value Date    PH 7.51 (H) 11/07/2022    LACT 1.9 04/14/2023    KRISTEN 8.3 (L) 04/18/2023    MAG 2.2 11/10/2022    TSH 55.04 (H) 01/12/2023    T4 0.71 (L) 01/12/2023       Anesthesia Plan    ASA Status:  4      Anesthesia Type: General.     - Airway: LMA   Induction: Intravenous, Propofol.   Maintenance: TIVA.        Consents    Anesthesia Plan(s) and associated risks, benefits, and realistic alternatives discussed. Questions answered and patient/representative(s) expressed understanding.    - Discussed:     - Discussed with:  Patient      - Extended Intubation/Ventilatory Support Discussed: No.      - Patient is DNR/DNI Status: No    Use of blood products discussed: No .     Postoperative Care    Pain management: IV analgesics.   PONV prophylaxis: Ondansetron (or other 5HT-3), Dexamethasone or Solumedrol     Comments:    Other Comments: GA LMA  Gentle IV induction  Antiemetics  Check code status  Check Hgb            Chip Germain MD

## 2023-04-19 NOTE — ANESTHESIA POSTPROCEDURE EVALUATION
Patient: Dilip Long    Procedure: Procedure(s):  CYSTOSCOPY, BLADDER BIOPSY WITH FULGURATION AND CLOT EVACUATION       Anesthesia Type:  General    Note:  Disposition: Inpatient   Postop Pain Control: Uneventful            Sign Out: Well controlled pain   PONV: No   Neuro/Psych: Uneventful            Sign Out: Acceptable/Baseline neuro status   Airway/Respiratory: Uneventful            Sign Out: Acceptable/Baseline resp. status   CV/Hemodynamics: Uneventful            Sign Out: Acceptable CV status; No obvious hypovolemia; No obvious fluid overload   Other NRE:    DID A NON-ROUTINE EVENT OCCUR?            Last vitals:  Vitals Value Taken Time   /68 04/19/23 0920   Temp 36.1  C (97  F) 04/19/23 0920   Pulse 70 04/19/23 0922   Resp 19 04/19/23 0921   SpO2 95 % 04/19/23 0922   Vitals shown include unvalidated device data.    Electronically Signed By: Chip Germain MD  April 19, 2023  12:29 PM

## 2023-04-19 NOTE — PLAN OF CARE
Problem: UTI (Urinary Tract Infection)  Goal: Improved Infection Symptoms  Outcome: Progressing     Problem: Risk for Delirium  Goal: Improved Attention and Thought Clarity  Outcome: Progressing  Intervention: Maximize Cognitive Function  Recent Flowsheet Documentation  Taken 4/19/2023 0969 by Maren Ott RN  Sensory Stimulation Regulation: care clustered  Reorientation Measures: clock in view     Problem: Plan of Care - These are the overarching goals to be used throughout the patient stay.    Goal: Optimal Comfort and Wellbeing  Outcome: Progressing     Goal Outcome Evaluation:  Patient is alert and oriented, vitally stable, denies pain, nausea or vomiting. Had a cystoscopy done this morning. Patient tolerated, jorge is draining yellow clear urine. Vitally stable, tolerating regular diet.

## 2023-04-19 NOTE — ANESTHESIA CARE TRANSFER NOTE
Patient: Dilip Long    Procedure: Procedure(s):  CYSTOSCOPY, BLADDER BIOPSY WITH FULGURATION AND CLOT EVACUATION       Diagnosis: Gross hematuria [R31.0]  Diagnosis Additional Information: No value filed.    Anesthesia Type:   General     Note:    Oropharynx: spontaneously breathing and oropharynx clear of all foreign objects  Level of Consciousness: awake  Oxygen Supplementation: face mask  Level of Supplemental Oxygen (L/min / FiO2): 6  Independent Airway: airway patency satisfactory and stable  Dentition: dentition unchanged  Vital Signs Stable: post-procedure vital signs reviewed and stable  Report to RN Given: handoff report given  Patient transferred to: PACU    Handoff Report: Identifed the Patient, Identified the Reponsible Provider, Reviewed the pertinent medical history, Discussed the surgical course, Reviewed Intra-OP anesthesia mangement and issues during anesthesia, Set expectations for post-procedure period and Allowed opportunity for questions and acknowledgement of understanding      Vitals:  Vitals Value Taken Time   BP     Temp     Pulse 80 04/19/23 0858   Resp 17 04/19/23 0858   SpO2 99 % 04/19/23 0858   Vitals shown include unvalidated device data.    Electronically Signed By: CAROL Bentley CRNA  April 19, 2023  9:00 AM

## 2023-04-19 NOTE — PROGRESS NOTES
[facility]    Medicine Progress Note - Hospitalist Service    Date of Admission:  4/14/2023    Assessment & Plan   Dilip Long is a 88 year old male admitted with acute blood loss anemia and hematuria in the setting of chronic indwelling jorge catheterization.  He is currently clinically stable.  He is now s/p fulguration of the bladder.  Monitor CBC overnight and for recurrent hematuria.    # Hematuria  # Acute blood loss anemia  # Chronic urinary retention with chronic indwelling jorge  - trend CBC     # Afib  - holding apixaban  - clarify with urology prior to discharge on timing to resume anticoagulation     # Previous UTI  - complete 10 day course of previously prescribed augmentin (end 4/20)       Diet: Advance Diet as Tolerated: Clear Liquid Diet    Jorge Catheter: PRESENT, indication: Gross Hematuria, /GI/GYN Pelvic Procedure  Lines: None     Cardiac Monitoring: None  Code Status: No CPR- Do NOT Intubate      Clinically Significant Risk Factors              # Hypoalbuminemia: Lowest albumin = 3 g/dL at 4/14/2023  1:37 AM, will monitor as appropriate                   Disposition Plan      Expected Discharge Date: 04/20/2023      Destination: home with family  Discharge Comments: Monitor overnight          Antolin Foote MD  Hospitalist Service  [facility]  Securely message with [employer] (more info)  Text page via [employer] Paging/Directory   ______________________________________________________________________    Interval History   Patient denies chest pain/pressure, SOB, or abdominal pain,.    Physical Exam   Vital Signs: Temp: 97  F (36.1  C) Temp src: Temporal BP: 124/68 Pulse: 69   Resp: 10 SpO2: 96 % O2 Device: None (Room air) Oxygen Delivery: 6 LPM  Weight: 150 lbs 5.66 oz    Gen: sitting in bed in no extremis  CV:  Bradycardia, regular rhythm  Pulm: no acute resp distress, ctab anteriorly  GI:  Abdomen soft, NTTP  Neuro: alert,  conversant      Medical Decision Making             Data   Reviewed:    WBC 5.8  Hgb 8  Plts 250

## 2023-04-19 NOTE — PLAN OF CARE
Problem: UTI (Urinary Tract Infection)  Goal: Improved Infection Symptoms  Outcome: Progressing   Goal Outcome Evaluation:    Pt A&Ox4, reported pain 8/10 through the night, PRN oxycodone and tylenol given with complete relief.  Denied N/V.  Pt remained NPO since 0000 besides sip of water with pain meds previously listed at 0244.  CBI moderate rate, urine clear, very light pink, no clots noted.  Sent down to surgery this AM around 0650 for planned cystoscopy.  Pt to return to the same bed.

## 2023-04-19 NOTE — PROGRESS NOTES
Care Management Follow Up    Length of Stay (days): 5    Expected Discharge Date: 04/20/2023     Concerns to be Addressed:     Discharge needs.    Additional Information:  CM following along for final discharge dispo. Had Cystoscopy this am,  Anticipate he will go home with family, maybe 4/20 if CBC stable and no hematuria returns. Cm avail if needs arise.      Paige Oglesby RN

## 2023-04-19 NOTE — OP NOTE
Date of surgery: 4/19/2023    Location: [facility] Main OR    Operating room: 5    Surgeon: Dr. Edgar Laird.     Assistant: None    Anesthesia: General    Preoperative diagnosis: Hematuria    Postoperative diagnosis: Same plus bladder lesion.    Procedure: Cystoscopy, clot evacuation, biopsy and fulguration.    Operative indication: Dilip Long is a 88 year old male with a urinary tract infection and hematuria that is refractory to continuous bladder irrigation.  I recommended cystoscopy with clot evacuation and fulguration if needed.  Just prior to the surgery, the bleeding cleared up but I recommended proceeding with the surgery as planned to rule out a correctable cause for the hematuria.  He agreed and wished to proceed.    Operative findings: The bladder is markedly trabeculated with numerous diverticula and cellules.  Within 1 cellule, there is a small growth that appears to be inflammatory.  It measures less than 3 mm.  It is biopsied.  Fulguration is undertaken.  There is no active bleeding anywhere within the lower urinary tract.  The prostate is markedly enlarged but there is no active bleedig.    Operative procedure: The patient was brought to the operating room.  General anesthesia was administered.  The patient was placed in lithotomy position and prepared and draped in sterile fashion.  I introduced a 22 Faroese cystoscope per urethra and into the bladder.  There are no urethral strictures.  The prostate is markedly enlarged.  The bladder is thoroughly inspected.  There are some old clots.  There is no evidence for active bleeding.  Some of these clots are within small cellules and diverticula.  These are all evacuated.  Within 1 clot on the right lateral wall of the bladder, there is a tiny papillary lesion that appears more inflammatory than malignant.  I used a biopsy forceps to biopsy the lesion and I sent the specimen to pathology for analysis.  I used the Bugbee device to fulgurate the area.   There is no bleeding through several filling and emptying cycles of the bladder.  There is no active bleeding from the prostate.  There is no efflux of bloody urine from either ureteral orifice.  The scope was removed and a 22 Romanian three-way catheter was placed per urethra.  The drainage is clear.  The irrigation port is capped.  The procedure is terminated.    Drains: Catheter per urethra    Specimens: Bladder lesion and diverticulum    Estimated blood loss: 0    Complications: None    Edgar Laird MD

## 2023-04-19 NOTE — ANESTHESIA PROCEDURE NOTES
Airway       Patient location during procedure: OR       Procedure Start/Stop Times: 4/19/2023 8:15 AM and 4/19/2023 8:18 AM  Staff -        CRNA: Vladimir Olson APRN CRNA       Performed By: CRNA  Consent for Airway        Urgency: elective  Indications and Patient Condition       Indications for airway management: sunshine-procedural       Induction type:intravenous       Mask difficulty assessment: 0 - not attempted    Final Airway Details       Final airway type: supraglottic airway    Supraglottic Airway Details        Type: LMA       Brand: Ambu AuraGain       LMA size: 5    Post intubation assessment        Number of attempts at approach: 1       Secured with: silk tape       Ease of procedure: easy       Dentition: Intact and Unchanged    Medication(s) Administered   Medication Administration Time: 4/19/2023 8:15 AM

## 2023-04-20 VITALS
DIASTOLIC BLOOD PRESSURE: 64 MMHG | HEIGHT: 70 IN | SYSTOLIC BLOOD PRESSURE: 135 MMHG | HEART RATE: 60 BPM | TEMPERATURE: 97.4 F | OXYGEN SATURATION: 98 % | WEIGHT: 150.35 LBS | RESPIRATION RATE: 16 BRPM | BODY MASS INDEX: 21.53 KG/M2

## 2023-04-20 LAB
ANION GAP SERPL CALCULATED.3IONS-SCNC: 9 MMOL/L (ref 5–18)
BUN SERPL-MCNC: 15 MG/DL (ref 8–28)
CALCIUM SERPL-MCNC: 8.2 MG/DL (ref 8.5–10.5)
CHLORIDE BLD-SCNC: 103 MMOL/L (ref 98–107)
CO2 SERPL-SCNC: 24 MMOL/L (ref 22–31)
CREAT SERPL-MCNC: 1.08 MG/DL (ref 0.7–1.3)
ERYTHROCYTE [DISTWIDTH] IN BLOOD BY AUTOMATED COUNT: 13.2 % (ref 10–15)
GFR SERPL CREATININE-BSD FRML MDRD: 66 ML/MIN/1.73M2
GLUCOSE BLD-MCNC: 122 MG/DL (ref 70–125)
HCT VFR BLD AUTO: 24 % (ref 40–53)
HGB BLD-MCNC: 7.9 G/DL (ref 13.3–17.7)
MCH RBC QN AUTO: 33.5 PG (ref 26.5–33)
MCHC RBC AUTO-ENTMCNC: 32.9 G/DL (ref 31.5–36.5)
MCV RBC AUTO: 102 FL (ref 78–100)
PLATELET # BLD AUTO: 330 10E3/UL (ref 150–450)
POTASSIUM BLD-SCNC: 3.8 MMOL/L (ref 3.5–5)
RBC # BLD AUTO: 2.36 10E6/UL (ref 4.4–5.9)
SODIUM SERPL-SCNC: 136 MMOL/L (ref 136–145)
WBC # BLD AUTO: 9.4 10E3/UL (ref 4–11)

## 2023-04-20 PROCEDURE — 80048 BASIC METABOLIC PNL TOTAL CA: CPT | Performed by: UROLOGY

## 2023-04-20 PROCEDURE — 36415 COLL VENOUS BLD VENIPUNCTURE: CPT | Performed by: UROLOGY

## 2023-04-20 PROCEDURE — 85027 COMPLETE CBC AUTOMATED: CPT | Performed by: UROLOGY

## 2023-04-20 PROCEDURE — 99239 HOSP IP/OBS DSCHRG MGMT >30: CPT | Performed by: INTERNAL MEDICINE

## 2023-04-20 PROCEDURE — 250N000013 HC RX MED GY IP 250 OP 250 PS 637: Performed by: UROLOGY

## 2023-04-20 RX ADMIN — SENNOSIDES 8.6 MG: 8.6 TABLET, FILM COATED ORAL at 09:36

## 2023-04-20 RX ADMIN — AMOXICILLIN AND CLAVULANATE POTASSIUM 1 TABLET: 875; 125 TABLET, FILM COATED ORAL at 09:36

## 2023-04-20 RX ADMIN — POLYETHYLENE GLYCOL 3350 17 G: 17 POWDER, FOR SOLUTION ORAL at 09:36

## 2023-04-20 RX ADMIN — LEVOTHYROXINE SODIUM 25 MCG: 0.03 TABLET ORAL at 07:08

## 2023-04-20 ASSESSMENT — ACTIVITIES OF DAILY LIVING (ADL)
ADLS_ACUITY_SCORE: 35

## 2023-04-20 NOTE — PROGRESS NOTES
[facility]    Medicine Progress Note - Hospitalist Service    Date of Admission:  4/14/2023    Assessment & Plan   Dilip Long is a 88 year old male admitted with acute blood loss anemia and hematuria in the setting of chronic indwelling jorge catheterization.  Status post cystoscopy, clot evacuation, biopsy and fulguration on 4/19/2023.  Since urine appears to be clearing.  I's and O's were not completed overnight and may have not documented all of the urine output.  Urology following and awaiting post procedure recommendations.    Gross hematuria resolved.  Acute blood loss anemia  Chronic urinary retention with chronic indwelling Jorge  Papillary lesion of bladder wall biopsied.  Hemoglobin stable at 7.9 g/dL.  Continue Jorge catheter cares  Appreciate urology recommendations.  Pathology to be followed by urology.  Monitor I's and O's    Paroxysmal atrial fibrillation  Holding apixaban for gross hematuria  Appreciate recommendation on when appropriate to resume apixaban per urology.    History of UTI  Completed 10-day course Augmentin on 4/20/2023     Diet: Advance Diet as Tolerated: Regular Diet Adult    DVT Prophylaxis: Pneumatic Compression Devices  Jorge Catheter: PRESENT, indication: Gross Hematuria, Retention;Gross Hematuria  Lines: None     Cardiac Monitoring: None  Code Status: No CPR- Do NOT Intubate      Clinically Significant Risk Factors              # Hypoalbuminemia: Lowest albumin = 3 g/dL at 4/14/2023  1:37 AM, will monitor as appropriate                   Disposition Plan     Expected Discharge Date: 04/20/2023      Destination: home with family  Discharge Comments: Monitor overnight          Prem Anand DO  Hospitalist Service  [facility]  Securely message with [employer] (more info)  Text page via [employer] Paging/Directory   ______________________________________________________________________    Interval History   Dilip feels  comfortable.  He has no new complaints.  Urine in Lozano bag is clear yellow.  No blood clots.  Overnight urine output only documented 300 mL.    Physical Exam   Vital Signs: Temp: 97.4  F (36.3  C) Temp src: Oral BP: 135/64 Pulse: 60   Resp: 16 SpO2: 98 % O2 Device: None (Room air)    Weight: 150 lbs 5.66 oz  Gen: sitting in bed in no extremis  CV:  Bradycardia, regular rhythm  Pulm: no acute resp distress, ctab anteriorly  GI:  Abdomen soft, NTTP  Neuro: alert, conversant    Medical Decision Making   38 MINUTES SPENT BY ME on the date of service doing chart review, history, exam, documentation & further activities per the note.      Data     I have personally reviewed the following data over the past 24 hrs:    9.4  \   7.9 (L)   / 330     136 103 15 /  122   3.8 24 1.08 \       Imaging results reviewed over the past 24 hrs:   No results found for this or any previous visit (from the past 24 hour(s)).

## 2023-04-20 NOTE — DISCHARGE SUMMARY
[facility]  Hospitalist Discharge Summary      Date of Admission:  4/14/2023  Date of Discharge:  4/20/2023  Discharging Provider: Prem Anand DO  Discharge Service: Hospitalist Service    Discharge Diagnoses   Gross hematuria  Acute blood loss anemia  Chronic urinary retention with chronic indwelling Jorge  Papillary lesion of bladder wall  History of urinary tract infection  Paroxysmal atrial fibrillation    Follow-ups Needed After Discharge   Follow-up Appointments     Follow-up and recommended labs and tests       Please maintain 5/4/23 appt with Dr. Laird, 11:00 AM at [facility]   [facility]. You may call to confirm appointment as needed.   [phone]         Follow-up and recommended labs and tests       Follow up with Dr. Laird ,  [facility] as previously scheduled.  for   hospital follow- up. No follow up labs or test are needed.    Follow up with PCP in 7 days for hospital follow up of gross hematuria and anemia.  The following labs are recommended: CBC.           Unresulted Labs Ordered in the Past 30 Days of this Admission     Date and Time Order Name Status Description    4/19/2023  8:40 AM Surgical Pathology Exam In process       These results will be followed up by [facility].    Discharge Disposition   Discharged to home  Condition at discharge: Stable    Hospital Course   Dilip Long is a 88 year old male admitted with acute blood loss anemia and hematuria in the setting of chronic indwelling jorge catheterization.  Underwent cystoscopy, clot evacuation, biopsy of small papule and fulguration on 4/19/2023.  Hematuria resolved prior to discharge.  Patient completed 10 course of Augmentin on 4/20/23 and antibiotics were not prescribed at discharge.  Apixaban will be resumed in 1 week per Urology preference.  Follow up recommended with Dr. Laird on 5/4/23 as previously arranged.     Consultations This Hospital Stay   UROLOGY IP CONSULT  CARE MANAGEMENT  / SOCIAL WORK IP CONSULT    Code Status   No CPR- Do NOT Intubate    Time Spent on this Encounter   I, Prem Anand DO, personally saw the patient today and spent greater than 30 minutes discharging this patient.       Prem Anand DO  [facility] [address] [address]  Phone: [phone]  Fax: [phone]  ______________________________________________________________________    Physical Exam   Vital Signs: Temp: 97.4  F (36.3  C) Temp src: Oral BP: 135/64 Pulse: 60   Resp: 16 SpO2: 98 % O2 Device: None (Room air)    Weight: 150 lbs 5.66 oz  Gen: sitting in bed in no extremis  CV:  Bradycardia, regular rhythm  Pulm: no acute resp distress, ctab anteriorly  GI:  Abdomen soft, NTTP  Neuro: alert, conversant       Primary Care Physician   [facility]    Discharge Orders      Discharge Instructions    You are being discharged with a catheter.  The nurse should have reviewed with you how to take care of the catheter while at home. Please hold on to any written instructions you may have received for your review.   You may wear the leg bag when you are walking or up in a chair (with your legs down), empty the bag when it is 2/3 full to avoid overfilling.  You should switch to the bigger (night) bag when lying down as the bag drains by gravity and can be hung on a waist paper basket at the side of the bed.   Leg bags and night bags should always be lower than the level of your bladder to encourage gravity drainage.    Clean around where the tube enters your body with soap and water, pat dry.     Men: You may apply bacitracin to the tip of the penis up to 3 times per day to help with discomfort     Follow-up and recommended labs and tests     Please maintain 5/4/23 appt with Dr. Laird, 11:00 AM at [facility]. You may call to confirm appointment as needed. [phone]     Reason for your hospital stay    Gross  hematuria, resolving urinary tract infection.     Follow-up and recommended labs and tests     Follow up with Dr. Laird ,  [facility] as previously scheduled.  for hospital follow- up. No follow up labs or test are needed.    Follow up with PCP in 7 days for hospital follow up of gross hematuria, repeat CBC.     Activity    Your activity upon discharge: activity as tolerated     When to contact your care team    Call your primary doctor if you have any of the following: temperature greater than 100.4, blood in Jorge bag, or increased pain.     Tubes and drains    You are going home with the following tubes or drains: jorge catheter.  Tube cares per nursing care instructions     Diet    Follow this diet upon discharge: Orders Placed This Encounter      Advance Diet as Tolerated: Regular Diet Adult       Significant Results and Procedures      Most Recent 3 CBC's:Recent Labs   Lab Test 04/20/23  0942 04/19/23  0718 04/18/23  0948   WBC 9.4 5.8 6.0   HGB 7.9* 8.0* 7.9*   * 105* 97    250 290     Most Recent 3 BMP's:Recent Labs   Lab Test 04/20/23  0942 04/19/23  0954 04/18/23  1625 04/18/23  0948 04/17/23  0835     --   --  134* 133*   POTASSIUM 3.8  --   --  3.7 3.8   CHLORIDE 103  --   --  102 102   CO2 24  --   --  25 22   BUN 15  --   --  12 14   CR 1.08  --   --  1.23 1.27   ANIONGAP 9  --   --  7 9   KRISTEN 8.2*  --   --  8.3* 8.0*    91 129* 108 96     Most Recent 2 LFT's:Recent Labs   Lab Test 04/14/23  0137 04/10/23  1356   AST 12 10   ALT <9 <9   ALKPHOS 60 61   BILITOTAL 0.3 0.5     Most Recent INR's and Anticoagulation Dosing History:  Anticoagulation Dose History         Latest Ref Rng & Units 7/18/2019 10/21/2022 11/29/2022 12/22/2022   Recent Dosing and Labs   INR 0.85 - 1.15 1.12   1.08      1.22   1.85   1.92         4/14/2023   Recent Dosing and Labs   INR 1.15           Multiple values from one day are sorted in reverse-chronological order           7-Day Micro Results      No results found for the last 168 hours.        Most Recent Urinalysis:Recent Labs   Lab Test 03/10/23  0350 10/21/22  1544 10/02/19  0056   COLOR Orange*   < > Yellow   APPEARANCE Turbid*   < > Turbid*   URINEGLC Negative   < > Negative   URINEBILI Negative   < > Negative   URINEKETONE Negative   < > Negative   SG 1.017   < > 1.014   UBLD >1.0 mg/dL*   < > Small*   URINEPH 5.5   < > 7.0   PROTEIN 50*   < > 100 mg/dL*   UROBILINOGEN  --   --  <2.0 E.U./dL   NITRITE Negative   < > Negative   LEUKEST 500 Deion/uL*   < > Large*   RBCU >182*   < > 5-10*   WBCU >182*   < > >100*    < > = values in this interval not displayed.   ,   Results for orders placed or performed during the hospital encounter of 04/14/23   CT Abdomen Pelvis w Contrast    Narrative    EXAM: CT ABDOMEN PELVIS W CONTRAST  LOCATION: [facility]  DATE/TIME: 4/14/2023 2:52 AM CDT    INDICATION: abdominal pain hematuria  COMPARISON: 04/10/2023 11/06/2022. 2322  TECHNIQUE: CT scan of the abdomen and pelvis was performed following injection of IV contrast. Multiplanar reformats were obtained. Dose reduction techniques were used.  CONTRAST: [id number] 75ml    FINDINGS:   LOWER CHEST: 4 mm subpleural nodule right middle lobe. 5 mm perifissural nodule probable lymph node right middle fissure. 6 mm nodule right lower lobe. 1 cm spiculated nodular opacity left lower lobe.    HEPATOBILIARY: Normal.    PANCREAS: Normal.    SPLEEN: Normal.    ADRENAL GLANDS: Normal.    KIDNEYS/BLADDER: Small air-contrast level with Lozano catheter in place. Radiodense debris in bladder compatible with blood products. Bladder diverticula.    BOWEL: Duodenal diverticulum. Diverticulosis of the colon. No acute inflammatory change. No obstruction. Contrast laden stools. Moderate stool gas retention.    LYMPH NODES: Normal.    VASCULATURE: Atherosclerosis.    PELVIC ORGANS: Prostatomegaly.    MUSCULOSKELETAL: Degenerative changes. Multiple compression fracture  deformities appear chronic.      Impression    IMPRESSION:   1.  Blood products in bladder with Lozano catheter in place.       Discharge Medications   Current Discharge Medication List      CONTINUE these medications which have CHANGED    Details   apixaban ANTICOAGULANT (ELIQUIS) 2.5 MG tablet Take 1 tablet (2.5 mg) by mouth daily    Associated Diagnoses: Paroxysmal atrial fibrillation (H)         CONTINUE these medications which have NOT CHANGED    Details   acetaminophen (TYLENOL) 500 MG tablet Take 1,000 mg by mouth every 6 hours as needed for mild pain      atenolol (TENORMIN) 25 MG tablet Take 25 mg by mouth daily      Calcium Carbonate-Vitamin D (OSCAL 500/200 D-3 PO) Take 1 tablet by mouth daily.      GLUCOSAMINE-CHONDROITIN-VIT D3 PO Take 1 tablet by mouth daily      hydrOXYzine (ATARAX) 10 MG tablet Take 20 mg by mouth At Bedtime      ipratropium - albuterol 0.5 mg/2.5 mg/3 mL (DUONEB) 0.5-2.5 (3) MG/3ML neb solution Take 1 vial (3 mLs) by nebulization every 6 hours as needed for shortness of breath, wheezing or cough  Qty: 90 mL, Refills: 0      levothyroxine (SYNTHROID/LEVOTHROID) 50 MCG tablet Take 1 tablet (50 mcg) by mouth every morning (before breakfast)  Qty: 30 tablet, Refills: 0    Associated Diagnoses: Hypothyroidism, postablative         STOP taking these medications       amoxicillin-clavulanate (AUGMENTIN) 875-125 MG tablet Comments:   Reason for Stopping:         levofloxacin (LEVAQUIN) 250 MG tablet Comments:   Reason for Stopping:         potassium chloride ER (KLOR-CON M) 20 MEQ CR tablet Comments:   Reason for Stopping:             Allergies   No Known Allergies

## 2023-04-20 NOTE — PLAN OF CARE
Slightly lower urine outputs - bladder scan done and = 13 ml so jorge is indeed patient. Urology by this afternoon - okay with patient discharging. Report given to evening shift RN who will assume care and discharge patient.    Very pleasant patient to take care of.

## 2023-04-20 NOTE — PROGRESS NOTES
Place of Service:  [facility]     Reason for follow up: Hematuria    SUBJECTIVE:  Events: POD#1Cystoscopy, clot evacuation, biopsy and fulguration.     No CBI overnight. Urine is clear yellow. Pt reports he is feeling well. Denies abdominal and bilateral flank pain, fever, chills.     Pt planning to discharge home this afternoon.     Granddaughter present.     OBJECTIVE:  PHYSICAL EXAM:  Temp: 97.4  F (36.3  C) Temp src: Oral BP: 135/64 Pulse: 60   Resp: 16 SpO2: 98 % O2 Device: None (Room air)    General: NAD, alert, cooperative  Head: normocephalic, without abnormality / atraumatic  Abdomen: soft, not tender, not distended.  There is no suprapubic fullness, no suprapubic tenderness.  No bilateral CVA tenderness.   Genitourinary: Penis and scrotum without erythema or edema. Urine clear yellow, draining via 22 Fr 3 way catheter.    Skin: No rashes or lesions  Musculoskeletal: moves all four extremities equally.   Psychological: alert and oriented, answers questions appropriately    LABS:  Creatinine   Date Value Ref Range Status   04/20/2023 1.08 0.70 - 1.30 mg/dL Final   03/13/2019 1.3 0.8 - 1.5 mg/dL Final     WBC Count   Date Value Ref Range Status   04/20/2023 9.4 4.0 - 11.0 10e3/uL Final     Hemoglobin   Date Value Ref Range Status   04/20/2023 7.9 (L) 13.3 - 17.7 g/dL Final   08/18/2015 12.2 (L) 13.3 - 17.7 g/dL Final     Platelet Count   Date Value Ref Range Status   04/20/2023 330 150 - 450 10e3/uL Final       UA:  UA RESULTS:  UA RESULTS:  Recent Labs   Lab Test 03/10/23  0350 10/21/22  1544 10/02/19  0056   COLOR Orange*   < > Yellow   APPEARANCE Turbid*   < > Turbid*   URINEGLC Negative   < > Negative   URINEBILI Negative   < > Negative   URINEKETONE Negative   < > Negative   SG 1.017   < > 1.014   UBLD >1.0 mg/dL*   < > Small*   URINEPH 5.5   < > 7.0   PROTEIN 50*   < > 100 mg/dL*   UROBILINOGEN  --   --  <2.0 E.U./dL   NITRITE Negative   < > Negative   LEUKEST 500 Deion/uL*   < > Large*   RBCU  >182*   < > 5-10*   WBCU >182*   < > >100*    < > = values in this interval not displayed.     Urine Culture 4/10: multiple morphotypes with no predominant culture, contamination suspected.      ASSESSMENT/PLAN:  Dilip Long is being seen by [facility] for gross hematuria, POD#1 Cystoscopy, clot evacuation, biopsy and fulguration.     - Urine clear yellow overnight and catheter patent. Hgb stable, 7.9. Night shift UO not documented. Output appears adequate today (current in bag plus 300 ml documented).   - WBC 9.4. Remains afebrile. Previous urine culture with multiple organisms consistent with contamination. Imaging from previous visit consistent with cystitis. Completed 10 day course of augmentin 4/20/23.   - Creatinine 1.08.   - Surgical pathology pending. Dr. Laird will call pt with results or discuss at follow up appt.   - Takes Eliquis for Afib. Recommend 1 week hold from 4/19.   - Maintain jorge at discharge (has chronic jorge). Will be exchanged at follow up visit.    - Maintain follow up with Dr. Laird 5/4/23. [facility] contact info in discharge orders.   - OK from Urology standpoint for discharge when deemed appropriate per Medicine.   - [facility] will sign off. Please re-consult with any new or worsening urologic concerns.      Luis Lugo, APRN, CNP  [facility]   [phone]

## 2023-04-20 NOTE — PLAN OF CARE
Problem: Plan of Care - These are the overarching goals to be used throughout the patient stay.    Goal: Readiness for Transition of Care  Outcome: Progressing     Problem: UTI (Urinary Tract Infection)  Goal: Improved Infection Symptoms  Outcome: Progressing     A&Ox4. Denies pain. Lozano draining clear, dark yellow urine. Tolerating regular diet. On PO abx. Likely discharge tomorrow (4/20).

## 2023-04-20 NOTE — PLAN OF CARE
Problem: Plan of Care - These are the overarching goals to be used throughout the patient stay.    Goal: Plan of Care Review  Description: The Plan of Care Review/Shift note should be completed every shift.  The Outcome Evaluation is a brief statement about your assessment that the patient is improving, declining, or no change.  This information will be displayed automatically on your shift note.  Outcome: Progressing  Goal: Optimal Comfort and Wellbeing  Outcome: Progressing   Goal Outcome Evaluation:  VSS and afebrile. No complaints of pain overnight. Visitor exception, family at bedside. Lozano draining dark yellow, clear urine. Discharge today pending AM lab results.

## 2023-04-21 LAB
PATH REPORT.ADDENDUM SPEC: NORMAL
PATH REPORT.COMMENTS IMP SPEC: NORMAL
PATH REPORT.FINAL DX SPEC: NORMAL
PATH REPORT.GROSS SPEC: NORMAL
PATH REPORT.MICROSCOPIC SPEC OTHER STN: NORMAL
PATH REPORT.RELEVANT HX SPEC: NORMAL
PHOTO IMAGE: NORMAL

## 2023-04-22 ENCOUNTER — PATIENT OUTREACH (OUTPATIENT)
Dept: CARE COORDINATION | Facility: CLINIC | Age: 88
End: 2023-04-22
Payer: COMMERCIAL

## 2023-04-22 NOTE — PROGRESS NOTES
[facility] Resource Center Contact  [facility]/Voicemail     Clinical Data: Transitional Care Management Outreach     Outreach attempted x 2.  Unable to leave vm   Plan:  [facility] will do no further outreaches at this time.       Roseann Romero  Community Health Worker  [facility]  [phone]      *Connected Care Resource Team does NOT follow patient ongoing. Referrals are identified based on internal discharge reports and the outreach is to ensure patient has an understanding of their discharge instructions.

## 2023-06-13 NOTE — ED NOTES
Patient has no medical complaint at this time, so assessment WDL at this time.   Pinch Graft Text: The defect edges were debeveled with a #15 scalpel blade. Given the location of the defect, shape of the defect and the proximity to free margins a pinch graft was deemed most appropriate. Using a sterile surgical marker, the primary defect shape was transferred to the donor site. The area thus outlined was incised deep to adipose tissue with a #15 scalpel blade.  The harvested graft was then trimmed of adipose tissue until only dermis and epidermis was left. The skin margins of the secondary defect were undermined to an appropriate distance in all directions utilizing iris scissors.  The secondary defect was closed with interrupted buried subcutaneous sutures.  The skin edges were then re-apposed with running  sutures.  The skin graft was then placed in the primary defect and oriented appropriately.

## 2024-02-04 ENCOUNTER — HOSPITAL ENCOUNTER (OUTPATIENT)
Facility: HOSPITAL | Age: 89
Setting detail: OBSERVATION
Discharge: HOME | End: 2024-02-05
Attending: EMERGENCY MEDICINE | Admitting: STUDENT IN AN ORGANIZED HEALTH CARE EDUCATION/TRAINING PROGRAM
Payer: COMMERCIAL

## 2024-02-04 ENCOUNTER — APPOINTMENT (OUTPATIENT)
Dept: CT IMAGING | Facility: HOSPITAL | Age: 89
End: 2024-02-04
Attending: EMERGENCY MEDICINE
Payer: COMMERCIAL

## 2024-02-04 DIAGNOSIS — N28.9 ACUTE RENAL INSUFFICIENCY: ICD-10-CM
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
## 2024-02-04 DIAGNOSIS — T83.511A URINARY TRACT INFECTION ASSOCIATED WITH INDWELLING URETHRAL CATHETER, INITIAL ENCOUNTER (H): ICD-10-CM

## 2024-02-04 DIAGNOSIS — G93.40 ACUTE ENCEPHALOPATHY: ICD-10-CM

## 2024-02-04 DIAGNOSIS — N39.0 COMPLICATED UTI (URINARY TRACT INFECTION): Primary | ICD-10-CM
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
## 2024-02-04 DIAGNOSIS — N39.0 URINARY TRACT INFECTION ASSOCIATED WITH INDWELLING URETHRAL CATHETER, INITIAL ENCOUNTER (H): ICD-10-CM

## 2024-02-04 LAB
ABO/RH(D): NORMAL
ALBUMIN SERPL BCG-MCNC: 3.4 G/DL (ref 3.5–5.2)
ALBUMIN UR-MCNC: 70 MG/DL
ALP SERPL-CCNC: 85 U/L (ref 40–150)
ALT SERPL W P-5'-P-CCNC: <5 U/L (ref 0–70)
ANION GAP SERPL CALCULATED.3IONS-SCNC: 9 MMOL/L (ref 7–15)
ANTIBODY SCREEN: NEGATIVE
APPEARANCE UR: ABNORMAL
APTT PPP: 28 SECONDS (ref 22–38)
AST SERPL W P-5'-P-CCNC: 11 U/L (ref 0–45)
BACTERIA #/AREA URNS HPF: ABNORMAL /HPF
BASOPHILS # BLD AUTO: 0 10E3/UL (ref 0–0.2)
BASOPHILS NFR BLD AUTO: 1 %
BILIRUB DIRECT SERPL-MCNC: <0.2 MG/DL (ref 0–0.3)
BILIRUB SERPL-MCNC: 0.3 MG/DL
BILIRUB UR QL STRIP: NEGATIVE
BUN SERPL-MCNC: 24.9 MG/DL (ref 8–23)
CALCIUM SERPL-MCNC: 8.9 MG/DL (ref 8.8–10.2)
CHLORIDE SERPL-SCNC: 101 MMOL/L (ref 98–107)
CK SERPL-CCNC: 49 U/L (ref 39–308)
COLOR UR AUTO: YELLOW
CREAT SERPL-MCNC: 1.47 MG/DL (ref 0.67–1.17)
DEPRECATED HCO3 PLAS-SCNC: 27 MMOL/L (ref 22–29)
EGFRCR SERPLBLD CKD-EPI 2021: 46 ML/MIN/1.73M2
EOSINOPHIL # BLD AUTO: 0.1 10E3/UL (ref 0–0.7)
EOSINOPHIL NFR BLD AUTO: 2 %
ERYTHROCYTE [DISTWIDTH] IN BLOOD BY AUTOMATED COUNT: 11.9 % (ref 10–15)
ETHANOL SERPL-MCNC: <0.01 G/DL
GLUCOSE SERPL-MCNC: 119 MG/DL (ref 70–99)
GLUCOSE UR STRIP-MCNC: NEGATIVE MG/DL
HCT VFR BLD AUTO: 34.3 % (ref 40–53)
HGB BLD-MCNC: 11.7 G/DL (ref 13.3–17.7)
HGB UR QL STRIP: ABNORMAL
IMM GRANULOCYTES # BLD: 0 10E3/UL
IMM GRANULOCYTES NFR BLD: 0 %
INR PPP: 1.05 (ref 0.85–1.15)
KETONES UR STRIP-MCNC: NEGATIVE MG/DL
LEUKOCYTE ESTERASE UR QL STRIP: ABNORMAL
LYMPHOCYTES # BLD AUTO: 1.5 10E3/UL (ref 0.8–5.3)
LYMPHOCYTES NFR BLD AUTO: 25 %
MCH RBC QN AUTO: 32.7 PG (ref 26.5–33)
MCHC RBC AUTO-ENTMCNC: 34.1 G/DL (ref 31.5–36.5)
MCV RBC AUTO: 96 FL (ref 78–100)
MONOCYTES # BLD AUTO: 0.7 10E3/UL (ref 0–1.3)
MONOCYTES NFR BLD AUTO: 12 %
MUCOUS THREADS #/AREA URNS LPF: PRESENT /LPF
NEUTROPHILS # BLD AUTO: 3.6 10E3/UL (ref 1.6–8.3)
NEUTROPHILS NFR BLD AUTO: 60 %
NITRATE UR QL: NEGATIVE
NRBC # BLD AUTO: 0 10E3/UL
NRBC BLD AUTO-RTO: 0 /100
PH UR STRIP: 6 [PH] (ref 5–7)
PLATELET # BLD AUTO: 310 10E3/UL (ref 150–450)
POTASSIUM SERPL-SCNC: 3.8 MMOL/L (ref 3.4–5.3)
PROCALCITONIN SERPL IA-MCNC: 0.05 NG/ML
PROT SERPL-MCNC: 5.8 G/DL (ref 6.4–8.3)
RBC # BLD AUTO: 3.58 10E6/UL (ref 4.4–5.9)
RBC URINE: 46 /HPF
SODIUM SERPL-SCNC: 137 MMOL/L (ref 135–145)
SP GR UR STRIP: 1.03 (ref 1–1.03)
SPECIMEN EXPIRATION DATE: NORMAL
SQUAMOUS EPITHELIAL: 4 /HPF
TROPONIN T SERPL HS-MCNC: 41 NG/L
TROPONIN T SERPL HS-MCNC: 41 NG/L
UROBILINOGEN UR STRIP-MCNC: <2 MG/DL
WBC # BLD AUTO: 5.9 10E3/UL (ref 4–11)
WBC CLUMPS #/AREA URNS HPF: PRESENT /HPF
WBC URINE: >182 /HPF

## 2024-02-04 PROCEDURE — 82077 ASSAY SPEC XCP UR&BREATH IA: CPT | Performed by: EMERGENCY MEDICINE

## 2024-02-04 PROCEDURE — 36415 COLL VENOUS BLD VENIPUNCTURE: CPT | Performed by: EMERGENCY MEDICINE

## 2024-02-04 PROCEDURE — 71275 CT ANGIOGRAPHY CHEST: CPT

## 2024-02-04 PROCEDURE — 250N000011 HC RX IP 250 OP 636: Performed by: EMERGENCY MEDICINE

## 2024-02-04 PROCEDURE — 93005 ELECTROCARDIOGRAM TRACING: CPT | Performed by: EMERGENCY MEDICINE

## 2024-02-04 PROCEDURE — 82550 ASSAY OF CK (CPK): CPT | Performed by: EMERGENCY MEDICINE

## 2024-02-04 PROCEDURE — 81001 URINALYSIS AUTO W/SCOPE: CPT | Performed by: EMERGENCY MEDICINE

## 2024-02-04 PROCEDURE — 258N000003 HC RX IP 258 OP 636: Performed by: STUDENT IN AN ORGANIZED HEALTH CARE EDUCATION/TRAINING PROGRAM

## 2024-02-04 PROCEDURE — 84484 ASSAY OF TROPONIN QUANT: CPT | Performed by: EMERGENCY MEDICINE

## 2024-02-04 PROCEDURE — 85025 COMPLETE CBC W/AUTO DIFF WBC: CPT | Performed by: EMERGENCY MEDICINE

## 2024-02-04 PROCEDURE — 258N000003 HC RX IP 258 OP 636: Performed by: EMERGENCY MEDICINE

## 2024-02-04 PROCEDURE — 72125 CT NECK SPINE W/O DYE: CPT

## 2024-02-04 PROCEDURE — 82248 BILIRUBIN DIRECT: CPT | Performed by: EMERGENCY MEDICINE

## 2024-02-04 PROCEDURE — 96361 HYDRATE IV INFUSION ADD-ON: CPT

## 2024-02-04 PROCEDURE — 99285 EMERGENCY DEPT VISIT HI MDM: CPT | Mod: 25

## 2024-02-04 PROCEDURE — 72131 CT LUMBAR SPINE W/O DYE: CPT

## 2024-02-04 PROCEDURE — 250N000013 HC RX MED GY IP 250 OP 250 PS 637: Performed by: EMERGENCY MEDICINE

## 2024-02-04 PROCEDURE — 87086 URINE CULTURE/COLONY COUNT: CPT | Performed by: EMERGENCY MEDICINE

## 2024-02-04 PROCEDURE — 86900 BLOOD TYPING SEROLOGIC ABO: CPT | Performed by: EMERGENCY MEDICINE

## 2024-02-04 PROCEDURE — 96365 THER/PROPH/DIAG IV INF INIT: CPT

## 2024-02-04 PROCEDURE — 96375 TX/PRO/DX INJ NEW DRUG ADDON: CPT

## 2024-02-04 PROCEDURE — 80053 COMPREHEN METABOLIC PANEL: CPT | Performed by: EMERGENCY MEDICINE

## 2024-02-04 PROCEDURE — G0378 HOSPITAL OBSERVATION PER HR: HCPCS

## 2024-02-04 PROCEDURE — 72128 CT CHEST SPINE W/O DYE: CPT

## 2024-02-04 PROCEDURE — 70450 CT HEAD/BRAIN W/O DYE: CPT

## 2024-02-04 PROCEDURE — 99223 1ST HOSP IP/OBS HIGH 75: CPT | Performed by: STUDENT IN AN ORGANIZED HEALTH CARE EDUCATION/TRAINING PROGRAM

## 2024-02-04 PROCEDURE — 85730 THROMBOPLASTIN TIME PARTIAL: CPT | Performed by: EMERGENCY MEDICINE

## 2024-02-04 PROCEDURE — 84145 PROCALCITONIN (PCT): CPT | Performed by: EMERGENCY MEDICINE

## 2024-02-04 PROCEDURE — 85610 PROTHROMBIN TIME: CPT | Performed by: EMERGENCY MEDICINE

## 2024-02-04 RX ORDER — IOPAMIDOL 755 MG/ML
80 INJECTION, SOLUTION INTRAVASCULAR ONCE
Status: COMPLETED | OUTPATIENT
Start: 2024-02-04 | End: 2024-02-04

## 2024-02-04 RX ORDER — LIDOCAINE HYDROCHLORIDE 20 MG/ML
10 JELLY TOPICAL ONCE
Status: COMPLETED | OUTPATIENT
Start: 2024-02-04 | End: 2024-02-04

## 2024-02-04 RX ORDER — VITAMIN B COMPLEX
1 TABLET ORAL EVERY EVENING
COMMUNITY
End: 2024-06-29

## 2024-02-04 RX ORDER — PROCHLORPERAZINE 25 MG
12.5 SUPPOSITORY, RECTAL RECTAL EVERY 12 HOURS PRN
Status: DISCONTINUED | OUTPATIENT
Start: 2024-02-04 | End: 2024-02-05 | Stop reason: HOSPADM

## 2024-02-04 RX ORDER — BISACODYL 10 MG
10 SUPPOSITORY, RECTAL RECTAL DAILY PRN
Status: DISCONTINUED | OUTPATIENT
Start: 2024-02-04 | End: 2024-02-05 | Stop reason: HOSPADM

## 2024-02-04 RX ORDER — FINASTERIDE 5 MG/1
5 TABLET, FILM COATED ORAL DAILY
COMMUNITY

## 2024-02-04 RX ORDER — IOPAMIDOL 755 MG/ML
80 INJECTION, SOLUTION INTRAVASCULAR ONCE
Status: DISCONTINUED | OUTPATIENT
Start: 2024-02-04 | End: 2024-02-05 | Stop reason: HOSPADM

## 2024-02-04 RX ORDER — ACETAMINOPHEN 650 MG/1
650 SUPPOSITORY RECTAL EVERY 4 HOURS PRN
Status: DISCONTINUED | OUTPATIENT
Start: 2024-02-04 | End: 2024-02-05 | Stop reason: HOSPADM

## 2024-02-04 RX ORDER — OXYCODONE HYDROCHLORIDE 5 MG/1
5 TABLET ORAL EVERY 4 HOURS PRN
Status: DISCONTINUED | OUTPATIENT
Start: 2024-02-04 | End: 2024-02-05 | Stop reason: HOSPADM

## 2024-02-04 RX ORDER — PIPERACILLIN SODIUM, TAZOBACTAM SODIUM 3; .375 G/15ML; G/15ML
3.38 INJECTION, POWDER, LYOPHILIZED, FOR SOLUTION INTRAVENOUS ONCE
Status: COMPLETED | OUTPATIENT
Start: 2024-02-04 | End: 2024-02-04

## 2024-02-04 RX ORDER — CEFTRIAXONE 1 G/1
1 INJECTION, POWDER, FOR SOLUTION INTRAMUSCULAR; INTRAVENOUS EVERY 24 HOURS
Status: DISCONTINUED | OUTPATIENT
Start: 2024-02-05 | End: 2024-02-05 | Stop reason: HOSPADM

## 2024-02-04 RX ORDER — ACETAMINOPHEN 325 MG/1
650 TABLET ORAL EVERY 4 HOURS PRN
Status: DISCONTINUED | OUTPATIENT
Start: 2024-02-04 | End: 2024-02-05 | Stop reason: HOSPADM

## 2024-02-04 RX ORDER — ENOXAPARIN SODIUM 100 MG/ML
40 INJECTION SUBCUTANEOUS EVERY 24 HOURS
Status: DISCONTINUED | OUTPATIENT
Start: 2024-02-05 | End: 2024-02-05

## 2024-02-04 RX ORDER — ONDANSETRON 2 MG/ML
4 INJECTION INTRAMUSCULAR; INTRAVENOUS ONCE
Status: COMPLETED | OUTPATIENT
Start: 2024-02-04 | End: 2024-02-04

## 2024-02-04 RX ORDER — HYDRALAZINE HYDROCHLORIDE 20 MG/ML
10 INJECTION INTRAMUSCULAR; INTRAVENOUS EVERY 4 HOURS PRN
Status: DISCONTINUED | OUTPATIENT
Start: 2024-02-04 | End: 2024-02-05 | Stop reason: HOSPADM

## 2024-02-04 RX ORDER — ONDANSETRON 4 MG/1
4 TABLET, ORALLY DISINTEGRATING ORAL EVERY 6 HOURS PRN
Status: DISCONTINUED | OUTPATIENT
Start: 2024-02-04 | End: 2024-02-05 | Stop reason: HOSPADM

## 2024-02-04 RX ORDER — PROCHLORPERAZINE MALEATE 5 MG
5 TABLET ORAL EVERY 6 HOURS PRN
Status: DISCONTINUED | OUTPATIENT
Start: 2024-02-04 | End: 2024-02-05 | Stop reason: HOSPADM

## 2024-02-04 RX ORDER — LIDOCAINE 40 MG/G
CREAM TOPICAL
Status: DISCONTINUED | OUTPATIENT
Start: 2024-02-04 | End: 2024-02-05 | Stop reason: HOSPADM

## 2024-02-04 RX ORDER — ACETAMINOPHEN 325 MG/1
650 TABLET ORAL ONCE
Status: COMPLETED | OUTPATIENT
Start: 2024-02-04 | End: 2024-02-04

## 2024-02-04 RX ORDER — TRAZODONE HYDROCHLORIDE 50 MG/1
50 TABLET, FILM COATED ORAL AT BEDTIME
Status: ON HOLD | COMMUNITY
End: 2024-03-04

## 2024-02-04 RX ORDER — ONDANSETRON 2 MG/ML
4 INJECTION INTRAMUSCULAR; INTRAVENOUS EVERY 6 HOURS PRN
Status: DISCONTINUED | OUTPATIENT
Start: 2024-02-04 | End: 2024-02-05 | Stop reason: HOSPADM

## 2024-02-04 RX ORDER — LEVOTHYROXINE SODIUM 75 UG/1
75 TABLET ORAL DAILY
Status: ON HOLD | COMMUNITY
End: 2024-03-04

## 2024-02-04 RX ORDER — POLYETHYLENE GLYCOL 3350 17 G/17G
17 POWDER, FOR SOLUTION ORAL 2 TIMES DAILY PRN
Status: DISCONTINUED | OUTPATIENT
Start: 2024-02-04 | End: 2024-02-05 | Stop reason: HOSPADM

## 2024-02-04 RX ADMIN — SODIUM CHLORIDE, POTASSIUM CHLORIDE, SODIUM LACTATE AND CALCIUM CHLORIDE 1000 ML: 600; 310; 30; 20 INJECTION, SOLUTION INTRAVENOUS at 23:22

## 2024-02-04 RX ADMIN — MIDAZOLAM 1 MG: 1 INJECTION INTRAMUSCULAR; INTRAVENOUS at 20:40

## 2024-02-04 RX ADMIN — TRAZODONE HYDROCHLORIDE 25 MG: 50 TABLET ORAL at 23:39

## 2024-02-04 RX ADMIN — ACETAMINOPHEN 650 MG: 325 TABLET ORAL at 22:14

## 2024-02-04 RX ADMIN — PIPERACILLIN AND TAZOBACTAM 3.38 G: 3; .375 INJECTION, POWDER, FOR SOLUTION INTRAVENOUS at 22:14

## 2024-02-04 RX ADMIN — ONDANSETRON 4 MG: 2 INJECTION INTRAMUSCULAR; INTRAVENOUS at 21:30

## 2024-02-04 RX ADMIN — IOPAMIDOL 80 ML: 755 INJECTION, SOLUTION INTRAVENOUS at 22:05

## 2024-02-04 RX ADMIN — SODIUM CHLORIDE 500 ML: 9 INJECTION, SOLUTION INTRAVENOUS at 20:40

## 2024-02-04 ASSESSMENT — ACTIVITIES OF DAILY LIVING (ADL)
ADLS_ACUITY_SCORE: 35
ADLS_ACUITY_SCORE: 35

## 2024-02-05 ENCOUNTER — APPOINTMENT (OUTPATIENT)
Dept: PHYSICAL THERAPY | Facility: HOSPITAL | Age: 89
End: 2024-02-05
Attending: STUDENT IN AN ORGANIZED HEALTH CARE EDUCATION/TRAINING PROGRAM
Payer: COMMERCIAL

## 2024-02-05 VITALS
RESPIRATION RATE: 18 BRPM | SYSTOLIC BLOOD PRESSURE: 148 MMHG | HEIGHT: 68 IN | HEART RATE: 58 BPM | TEMPERATURE: 98.1 F | WEIGHT: 150 LBS | DIASTOLIC BLOOD PRESSURE: 68 MMHG | BODY MASS INDEX: 22.73 KG/M2 | OXYGEN SATURATION: 95 %

## 2024-02-05 LAB
ALBUMIN SERPL BCG-MCNC: 3.1 G/DL (ref 3.5–5.2)
ALP SERPL-CCNC: 65 U/L (ref 40–150)
ALT SERPL W P-5'-P-CCNC: 5 U/L (ref 0–70)
ANION GAP SERPL CALCULATED.3IONS-SCNC: 7 MMOL/L (ref 7–15)
AST SERPL W P-5'-P-CCNC: 10 U/L (ref 0–45)
ATRIAL RATE - MUSE: 156 BPM
BILIRUB SERPL-MCNC: 0.5 MG/DL
BUN SERPL-MCNC: 22.1 MG/DL (ref 8–23)
CALCIUM SERPL-MCNC: 8.2 MG/DL (ref 8.8–10.2)
CHLORIDE SERPL-SCNC: 102 MMOL/L (ref 98–107)
CREAT SERPL-MCNC: 1.31 MG/DL (ref 0.67–1.17)
DEPRECATED HCO3 PLAS-SCNC: 27 MMOL/L (ref 22–29)
DIASTOLIC BLOOD PRESSURE - MUSE: 83 MMHG
EGFRCR SERPLBLD CKD-EPI 2021: 53 ML/MIN/1.73M2
ERYTHROCYTE [DISTWIDTH] IN BLOOD BY AUTOMATED COUNT: 11.9 % (ref 10–15)
GLUCOSE SERPL-MCNC: 121 MG/DL (ref 70–99)
HCT VFR BLD AUTO: 32.3 % (ref 40–53)
HGB BLD-MCNC: 11 G/DL (ref 13.3–17.7)
INTERPRETATION ECG - MUSE: NORMAL
MCH RBC QN AUTO: 32.4 PG (ref 26.5–33)
MCHC RBC AUTO-ENTMCNC: 34.1 G/DL (ref 31.5–36.5)
MCV RBC AUTO: 95 FL (ref 78–100)
P AXIS - MUSE: NORMAL DEGREES
PLATELET # BLD AUTO: 265 10E3/UL (ref 150–450)
POTASSIUM SERPL-SCNC: 3.7 MMOL/L (ref 3.4–5.3)
PR INTERVAL - MUSE: NORMAL MS
PROT SERPL-MCNC: 5.5 G/DL (ref 6.4–8.3)
QRS DURATION - MUSE: 82 MS
QT - MUSE: 454 MS
QTC - MUSE: 426 MS
R AXIS - MUSE: 21 DEGREES
RBC # BLD AUTO: 3.39 10E6/UL (ref 4.4–5.9)
SODIUM SERPL-SCNC: 136 MMOL/L (ref 135–145)
SYSTOLIC BLOOD PRESSURE - MUSE: 172 MMHG
T AXIS - MUSE: 48 DEGREES
VENTRICULAR RATE- MUSE: 53 BPM
WBC # BLD AUTO: 14.9 10E3/UL (ref 4–11)

## 2024-02-05 PROCEDURE — 97162 PT EVAL MOD COMPLEX 30 MIN: CPT | Mod: GP

## 2024-02-05 PROCEDURE — 99239 HOSP IP/OBS DSCHRG MGMT >30: CPT | Performed by: HOSPITALIST

## 2024-02-05 PROCEDURE — 96375 TX/PRO/DX INJ NEW DRUG ADDON: CPT

## 2024-02-05 PROCEDURE — 97116 GAIT TRAINING THERAPY: CPT | Mod: GP

## 2024-02-05 PROCEDURE — G0378 HOSPITAL OBSERVATION PER HR: HCPCS

## 2024-02-05 PROCEDURE — 97530 THERAPEUTIC ACTIVITIES: CPT | Mod: GP

## 2024-02-05 PROCEDURE — 36415 COLL VENOUS BLD VENIPUNCTURE: CPT | Performed by: STUDENT IN AN ORGANIZED HEALTH CARE EDUCATION/TRAINING PROGRAM

## 2024-02-05 PROCEDURE — 85027 COMPLETE CBC AUTOMATED: CPT | Performed by: STUDENT IN AN ORGANIZED HEALTH CARE EDUCATION/TRAINING PROGRAM

## 2024-02-05 PROCEDURE — 96361 HYDRATE IV INFUSION ADD-ON: CPT

## 2024-02-05 PROCEDURE — 258N000003 HC RX IP 258 OP 636: Performed by: HOSPITALIST

## 2024-02-05 PROCEDURE — 250N000013 HC RX MED GY IP 250 OP 250 PS 637: Performed by: STUDENT IN AN ORGANIZED HEALTH CARE EDUCATION/TRAINING PROGRAM

## 2024-02-05 PROCEDURE — 250N000011 HC RX IP 250 OP 636: Performed by: STUDENT IN AN ORGANIZED HEALTH CARE EDUCATION/TRAINING PROGRAM

## 2024-02-05 PROCEDURE — 80053 COMPREHEN METABOLIC PANEL: CPT | Performed by: STUDENT IN AN ORGANIZED HEALTH CARE EDUCATION/TRAINING PROGRAM

## 2024-02-05 RX ORDER — SODIUM CHLORIDE 9 MG/ML
INJECTION, SOLUTION INTRAVENOUS CONTINUOUS
Status: DISCONTINUED | OUTPATIENT
Start: 2024-02-05 | End: 2024-02-05 | Stop reason: HOSPADM

## 2024-02-05 RX ORDER — NALOXONE HYDROCHLORIDE 0.4 MG/ML
0.4 INJECTION, SOLUTION INTRAMUSCULAR; INTRAVENOUS; SUBCUTANEOUS
Status: DISCONTINUED | OUTPATIENT
Start: 2024-02-05 | End: 2024-02-05 | Stop reason: HOSPADM

## 2024-02-05 RX ORDER — NALOXONE HYDROCHLORIDE 0.4 MG/ML
0.2 INJECTION, SOLUTION INTRAMUSCULAR; INTRAVENOUS; SUBCUTANEOUS
Status: DISCONTINUED | OUTPATIENT
Start: 2024-02-05 | End: 2024-02-05 | Stop reason: HOSPADM

## 2024-02-05 RX ORDER — FINASTERIDE 5 MG/1
5 TABLET, FILM COATED ORAL DAILY
Status: DISCONTINUED | OUTPATIENT
Start: 2024-02-05 | End: 2024-02-05 | Stop reason: HOSPADM

## 2024-02-05 RX ORDER — ATENOLOL 25 MG/1
25 TABLET ORAL EVERY EVENING
Status: DISCONTINUED | OUTPATIENT
Start: 2024-02-05 | End: 2024-02-05 | Stop reason: HOSPADM

## 2024-02-05 RX ORDER — AMOXICILLIN 500 MG/1
500 CAPSULE ORAL 2 TIMES DAILY
Qty: 10 CAPSULE | Refills: 0 | Status: ON HOLD | OUTPATIENT
Start: 2024-02-05 | End: 2024-03-04

## 2024-02-05 RX ORDER — TRAZODONE HYDROCHLORIDE 50 MG/1
50 TABLET, FILM COATED ORAL AT BEDTIME
Status: DISCONTINUED | OUTPATIENT
Start: 2024-02-05 | End: 2024-02-05 | Stop reason: HOSPADM

## 2024-02-05 RX ORDER — LEVOTHYROXINE SODIUM 25 UG/1
75 TABLET ORAL DAILY
Status: DISCONTINUED | OUTPATIENT
Start: 2024-02-05 | End: 2024-02-05 | Stop reason: HOSPADM

## 2024-02-05 RX ADMIN — CEFTRIAXONE SODIUM 1 G: 1 INJECTION, POWDER, FOR SOLUTION INTRAMUSCULAR; INTRAVENOUS at 05:55

## 2024-02-05 RX ADMIN — SODIUM CHLORIDE: 9 INJECTION, SOLUTION INTRAVENOUS at 11:30

## 2024-02-05 RX ADMIN — LEVOTHYROXINE SODIUM 75 MCG: 75 TABLET ORAL at 08:28

## 2024-02-05 RX ADMIN — FINASTERIDE 5 MG: 5 TABLET, FILM COATED ORAL at 08:28

## 2024-02-05 ASSESSMENT — ACTIVITIES OF DAILY LIVING (ADL)
DEPENDENT_IADLS:: CLEANING;COOKING;LAUNDRY;SHOPPING;MEAL PREPARATION;MEDICATION MANAGEMENT;MONEY MANAGEMENT;TRANSPORTATION
ADLS_ACUITY_SCORE: 41
ADLS_ACUITY_SCORE: 37
ADLS_ACUITY_SCORE: 41
ADLS_ACUITY_SCORE: 37
ADLS_ACUITY_SCORE: 41
ADLS_ACUITY_SCORE: 41

## 2024-02-05 NOTE — H&P
[facility]    History and Physical - Hospitalist Service       Date of Admission:  2/4/2024    Assessment & Plan      Dilip Long is a 88M presents with confusion; pmhx includes urinary retention (chronic indwelling jorge), atrial fibrillation (no AC, previously apixaban), HTN/HLD, hypothyroid, mild cognitive deficit; admitted to observation for LEONIDAS, encephalopathy.    #Encephalopathy  Possible infectious, UA suggestive of colonisation, WBC less consistent with UTI, though similar to prior episodes.  -CMP trend  -CBC trend  -ceftriaxone 1g IV every day  -PT evaluations    #chronic indwelling jorge  #urinary colonisation  For BPH, urinary retention.  -CBC trend  -CMP trend  -finasteride 5mg PO every day  -ceftriaxone 1g IV every day    #LEONIDAS  Baseline Cr estimated around 1.  -LR 1L bolus  -BMP trend    #mild protein calorie malnutrition  -RD consult for supplement recommendations    #normocytic anaemia  Baseline Hgb estimated around 10.  -CBC trend    #hypothyroid  -synthroid 75mcg PO qAM    #atrial fibrillation  [id number] 3.  -no home AC  -atenolol 25mg PO qD    #code status  DPOA at bedside confirms code status of DNR/DNI.  -DNR/DNI       Observation Goals: -diagnostic tests and consults completed and resulted, -vital signs normal or at patient baseline, Nurse to notify provider when observation goals have been met and patient is ready for discharge.  Diet: Regular Diet Adult    DVT Prophylaxis: Enoxaparin (Lovenox) SQ  Jorge Catheter: Not present  Lines: None     Cardiac Monitoring: None  Code Status: No CPR- Do NOT Intubate    Clinically Significant Risk Factors Present on Admission              # Hypoalbuminemia: Lowest albumin = 3.4 g/dL at 2/4/2024  7:26 PM, will monitor as appropriate     # Hypertension: Noted on problem list   # Acute Respiratory Failure: Documented O2 saturation < 91%.  Continue supplemental oxygen as needed                Disposition Plan      Expected  Discharge Date: 02/05/2024                  Emery Wilson MD  Hospitalist Service  [facility]  Securely message with Zee[employer] (more info)  Text page via [employer] Paging/Directory     ______________________________________________________________________    Chief Complaint   confusion    History is obtained from the patient's caregiver    History of Present Illness   Dilip NICANOR Long is a 88M presents with confusion; pmhx includes urinary retention (chronic indwelling jorge), atrial fibrillation (no AC), HTN/HLD, hypothyroid, mild cognitive deficit; admitted to observation for LEONIDAS, encephalopathy.    Presents with progressive confusion.  He was previously normal earlier on the day of 2/4, however he was little more loopy by the afternoon.  Granddaughter who is at bedside confirms that he started to have a little bit more confusion and this is similar to prior UTI events that he has had.  No fever/chills, no hematuria.  Has regular bowel movements with the last known on 2/3.      Past Medical History    Past Medical History:   Diagnosis Date    Acute heart failure with preserved ejection fraction (HFpEF) (H) 11/10/2022    Acute prostatitis     Asymptomatic bacteriuria 10/23/2013    Noted at 10/2/13 office visit at [facility]. No treatment recommended at that time.     Congestive heart failure (H)     Essential hypertension 08/18/2015    Hypertension     Paroxysmal atrial fib -- on Eliquis 11/06/2022    Syncope     Thyroid disease        Past Surgical History   Past Surgical History:   Procedure Laterality Date    BLADDER SURGERY      Bladder absces removal    Blepharoplasty Upper Lid W/ Excessive Skin[  1/29/2007    CATARACT EXTRACTION      CYSTOSCOPY, FULGURATE BLEEDERS, EVACUATE CLOT(S), COMBINED N/A 4/19/2023    Procedure: CYSTOSCOPY, BLADDER BIOPSY WITH FULGURATION AND CLOT EVACUATION;  Surgeon: Edgar Laird MD;  Location: [facility] Main OR    EYE SURGERY      both eyes 2015     IR LUMBAR EPIDURAL STEROID INJECTION  4/27/2004    IR LUMBAR EPIDURAL STEROID INJECTION  5/11/2004    IR LUMBAR EPIDURAL STEROID INJECTION  11/24/2004    IR LUMBAR EPIDURAL STEROID INJECTION  11/28/2007    SC CYSTOURETHROSCOPY W/IRRIG & EVAC CLOTS N/A 7/27/2018    Procedure: CYSTOSCOPY, CLOT EVACUATION ,URETHRAL DILATATION, DAUGHERTY CATHETER PLACEMENT;  Surgeon: Lowell Arias MD;  [place] [facility] [place]  Service: Urology       Prior to Admission Medications   Prior to Admission Medications   Prescriptions Last Dose Informant Patient Reported? Taking?   Calcium Carbonate-Vitamin D (OSCAL 500/200 D-3 PO) 2/4/2024 at am  Yes Yes   Sig: Take 1 tablet by mouth daily.   GLUCOSAMINE-CHONDROITIN-VIT D3 PO 2/4/2024 at am  Yes Yes   Sig: Take 1 tablet by mouth daily   Vitamin D3 (VITAMIN D, CHOLECALCIFEROL,) 25 mcg (1000 units) tablet 2/3/2024 at PM  Yes Yes   Sig: Take 1 tablet by mouth every evening   acetaminophen (TYLENOL) 500 MG tablet   Yes Yes   Sig: Take 1,000 mg by mouth every 6 hours as needed for mild pain   atenolol (TENORMIN) 25 MG tablet 2/3/2024 at PM  Yes Yes   Sig: Take 25 mg by mouth every evening   finasteride (PROSCAR) 5 MG tablet 2/4/2024 at am  Yes Yes   Sig: Take 5 mg by mouth daily   ipratropium - albuterol 0.5 mg/2.5 mg/3 mL (DUONEB) 0.5-2.5 (3) MG/3ML neb solution   No Yes   Sig: Take 1 vial (3 mLs) by nebulization every 6 hours as needed for shortness of breath, wheezing or cough   levothyroxine (SYNTHROID/LEVOTHROID) 75 MCG tablet 2/4/2024 at am  Yes Yes   Sig: Take 75 mcg by mouth daily   traZODone (DESYREL) 50 MG tablet 2/3/2024 at hs  Yes Yes   Sig: Take 50 mg by mouth at bedtime      Facility-Administered Medications: None        Review of Systems    The 10 point Review of Systems is negative other than noted in the HPI or here.      Physical Exam   Vital Signs: Temp: 98.5  F (36.9  C) Temp src: Oral BP: 111/61 Pulse: 82   Resp: 19 SpO2: 96 % O2 Device: None (Room air)    Weight:  150 lbs 0 oz    Constitutional: awake, alert, cooperative, no apparent distress, and appears stated age  Respiratory: CTAB  Cardiovascular: irregularly irregular, no m/g/r  GI: soft, nontender, nondistended    Medical Decision Making       45 MINUTES SPENT BY ME on the date of service doing chart review, history, exam, documentation & further activities per the note.  MANAGEMENT DISCUSSED with the following over the past 24 hours: patient, granddaughter   NOTE(S)/MEDICAL RECORDS REVIEWED over the past 24 hours: outpatient FM, discharge summary  Tests ORDERED & REVIEWED in the past 24 hours:  - See lab/imaging results included in the data section of the note      Data     I have personally reviewed the following data over the past 24 hrs:    5.9  \   11.7 (L)   / 310     137 101 24.9 (H) /  119 (H)   3.8 27 1.47 (H) \     ALT: <5 AST: 11 AP: 85 TBILI: 0.3   ALB: 3.4 (L) TOT PROTEIN: 5.8 (L) LIPASE: N/A     Trop: 41 (H) BNP: N/A     Procal: 0.05 CRP: N/A Lactic Acid: N/A       INR:  1.05 PTT:  28   D-dimer:  N/A Fibrinogen:  N/A       Imaging results reviewed over the past 24 hrs:   Recent Results (from the past 24 hour(s))   Head CT w/o contrast    Narrative    EXAM: CT HEAD W/O CONTRAST, CT CERVICAL SPINE W/O CONTRAST  LOCATION: [facility]  DATE: 2/4/2024    INDICATION: Confusion, head and neck injury  COMPARISON: CT head 11/29/2022  TECHNIQUE:   1) Routine CT Head without IV contrast. Multiplanar reformats. Dose reduction techniques were used.  2) Routine CT Cervical Spine without IV contrast. Multiplanar reformats. Dose reduction techniques were used.    FINDINGS:   HEAD CT:   INTRACRANIAL CONTENTS: No intracranial hemorrhage, extraaxial collection, or mass effect.  No CT evidence of acute infarct. Moderate presumed chronic small vessel ischemic changes. Mild generalized volume loss. No hydrocephalus.     VISUALIZED ORBITS/SINUSES/MASTOIDS: No intraorbital abnormality. No paranasal sinus  mucosal disease. No middle ear or mastoid effusion.    BONES/SOFT TISSUES: No acute abnormality.    CERVICAL SPINE CT:   VERTEBRA: Mild compression superior aspect of T1 and T2, age indeterminate; appears chronic. It is new since CT chest 07/27/2018. No definite acute fracture. Straightening of cervical lordosis. Nonaggressive appearing lytic lesion within the dens and   central aspect of body of C2; probably due to a somewhat atypical hemangioma. Subcentimeter nonaggressive appearing sclerotic lesion left lamina of C2.     CANAL/FORAMINA: Moderate degenerative changes without significant canal narrowing at any level. Multilevel mild-to-moderate neural foraminal narrowing.    PARASPINAL: No extraspinal abnormality. Visualized lung fields are clear.      Impression    IMPRESSION:  HEAD CT:  1.  No definite acute intracranial process.    CERVICAL SPINE CT:  1.  Mild compression superior aspect of T1 and T2, age indeterminate; appears chronic. It is new since CT chest 07/27/2018.   2.  No definite acute fracture.   3.  Moderate degenerative changes without significant canal narrowing at any level.   CT Cervical Spine w/o Contrast    Narrative    EXAM: CT HEAD W/O CONTRAST, CT CERVICAL SPINE W/O CONTRAST  LOCATION: [facility]  DATE: 2/4/2024    INDICATION: Confusion, head and neck injury  COMPARISON: CT head 11/29/2022  TECHNIQUE:   1) Routine CT Head without IV contrast. Multiplanar reformats. Dose reduction techniques were used.  2) Routine CT Cervical Spine without IV contrast. Multiplanar reformats. Dose reduction techniques were used.    FINDINGS:   HEAD CT:   INTRACRANIAL CONTENTS: No intracranial hemorrhage, extraaxial collection, or mass effect.  No CT evidence of acute infarct. Moderate presumed chronic small vessel ischemic changes. Mild generalized volume loss. No hydrocephalus.     VISUALIZED ORBITS/SINUSES/MASTOIDS: No intraorbital abnormality. No paranasal sinus mucosal disease. No  middle ear or mastoid effusion.    BONES/SOFT TISSUES: No acute abnormality.    CERVICAL SPINE CT:   VERTEBRA: Mild compression superior aspect of T1 and T2, age indeterminate; appears chronic. It is new since CT chest 07/27/2018. No definite acute fracture. Straightening of cervical lordosis. Nonaggressive appearing lytic lesion within the dens and   central aspect of body of C2; probably due to a somewhat atypical hemangioma. Subcentimeter nonaggressive appearing sclerotic lesion left lamina of C2.     CANAL/FORAMINA: Moderate degenerative changes without significant canal narrowing at any level. Multilevel mild-to-moderate neural foraminal narrowing.    PARASPINAL: No extraspinal abnormality. Visualized lung fields are clear.      Impression    IMPRESSION:  HEAD CT:  1.  No definite acute intracranial process.    CERVICAL SPINE CT:  1.  Mild compression superior aspect of T1 and T2, age indeterminate; appears chronic. It is new since CT chest 07/27/2018.   2.  No definite acute fracture.   3.  Moderate degenerative changes without significant canal narrowing at any level.   CT Thoracic Spine w/o Contrast    Narrative    EXAM: CT THORACIC SPINE W/O CONTRAST, CT LUMBAR SPINE W/O CONTRAST  LOCATION: [facility]  DATE: 2/4/2024    INDICATION: eval fractures (reconstructions please). Patient was found down. Potential trauma.   COMPARISON: Lumbar spine CT 11/29/2022  TECHNIQUE:  1) Routine CT Thoracic Spine without IV contrast. Multiplanar reformats. Dose reduction techniques were used.   2) Routine CT Lumbar Spine without IV contrast. Multiplanar reformats. Dose reduction techniques were used.     FINDINGS:    THORACIC SPINE CT:  The exam is moderately degraded by motion.    VERTEBRA: Mild compression forming disease at the superior endplates of T1 and T2 are age indeterminate. Subtle compression deformity at the superior endplate of T3 is age-indeterminate. Mild anterior wedging compression  deformity at the superior   endplate of T5 is age-indeterminate. There is an chronic anterior wedging compression deformity of T12 with up to 30-40% loss of height anteriorly. No acute subluxation.     CANAL/FORAMINA: No canal or neural foraminal stenosis.    PARASPINAL: No extraspinal abnormality.    LUMBAR SPINE CT:  VERTEBRA: 5 lumbar type vertebra. There is a chronic moderate to severe burst-type fracture of L1 with up to 60-70% loss of height anteriorly. There is slight buckling of the posterior/superior corner into the ventral canal. Vertebral body height is   otherwise normal. Mild grade 1 retrolisthesis of L2. There is 7 mm degenerative anterolisthesis of L5 on S1. Multilevel advanced disc degeneration.     CANAL/FORAMINA: Moderate central canal stenosis at L3-L4 and L4-L5. Mild to moderate multilevel neural foraminal narrowing.    PARASPINAL: No extraspinal abnormality.      Impression    IMPRESSION:  THORACIC SPINE CT:  1.  The exam is moderately degraded by motion.  2.  Age-indeterminate mild compression deformities at the superior endplates of T1, T2, T3 and T5.  3.  Chronic anterior wedging compression deformity of T12.    LUMBAR SPINE CT:  1.  No acute fracture or subluxation.  2.  Chronic moderate to severe burst-type fracture of L1.  3.  Moderate central canal stenosis at L3-L4 and L4-L5.     CT Lumbar Spine w/o Contrast    Narrative    EXAM: CT THORACIC SPINE W/O CONTRAST, CT LUMBAR SPINE W/O CONTRAST  LOCATION: [facility]  DATE: 2/4/2024    INDICATION: eval fractures (reconstructions please). Patient was found down. Potential trauma.   COMPARISON: Lumbar spine CT 11/29/2022  TECHNIQUE:  1) Routine CT Thoracic Spine without IV contrast. Multiplanar reformats. Dose reduction techniques were used.   2) Routine CT Lumbar Spine without IV contrast. Multiplanar reformats. Dose reduction techniques were used.     FINDINGS:    THORACIC SPINE CT:  The exam is moderately degraded by  motion.    VERTEBRA: Mild compression forming disease at the superior endplates of T1 and T2 are age indeterminate. Subtle compression deformity at the superior endplate of T3 is age-indeterminate. Mild anterior wedging compression deformity at the superior   endplate of T5 is age-indeterminate. There is an chronic anterior wedging compression deformity of T12 with up to 30-40% loss of height anteriorly. No acute subluxation.     CANAL/FORAMINA: No canal or neural foraminal stenosis.    PARASPINAL: No extraspinal abnormality.    LUMBAR SPINE CT:  VERTEBRA: 5 lumbar type vertebra. There is a chronic moderate to severe burst-type fracture of L1 with up to 60-70% loss of height anteriorly. There is slight buckling of the posterior/superior corner into the ventral canal. Vertebral body height is   otherwise normal. Mild grade 1 retrolisthesis of L2. There is 7 mm degenerative anterolisthesis of L5 on S1. Multilevel advanced disc degeneration.     CANAL/FORAMINA: Moderate central canal stenosis at L3-L4 and L4-L5. Mild to moderate multilevel neural foraminal narrowing.    PARASPINAL: No extraspinal abnormality.      Impression    IMPRESSION:  THORACIC SPINE CT:  1.  The exam is moderately degraded by motion.  2.  Age-indeterminate mild compression deformities at the superior endplates of T1, T2, T3 and T5.  3.  Chronic anterior wedging compression deformity of T12.    LUMBAR SPINE CT:  1.  No acute fracture or subluxation.  2.  Chronic moderate to severe burst-type fracture of L1.  3.  Moderate central canal stenosis at L3-L4 and L4-L5.     CTA Chest Abdomen Pelvis w Contrast    Narrative    EXAM: CT CHEST, ABDOMEN AND PELVIS WITH CONTRAST  LOCATION: [facility]  DATE/TIME: 2/4/2024 10:14 PM CST    INDICATION: Unwitnessed fall. Altered mental status.  COMPARISON: 07/27/2018.    TECHNIQUE: Note that this study was performed in conjunction with a CT scan of the thoracolumbar spine. Refer to separate  reports for findings from those studies. CT angiogram chest abdomen pelvis during arterial phase of injection of IV contrast. 2D and   3D MIP reconstructions were performed by the CT technologist. Dose reduction techniques were used.  CONTRAST: 75 mL [id number].    FINDINGS:    ANGIOGRAM CHEST, ABDOMEN, AND PELVIS: Atherosclerotic calcification in the aorta. The aorta is normal in caliber without dissection. A very small 1.4 cm wide x 0.5 cm deep apparent outpouching of the inferolateral aspect of the proximal descending   thoracic aorta (for example, series 6 image 57 and series 10 image 118), not present on 07/27/2018. No stranding or hematoma visualized in the mediastinum adjacent to this finding. No visualized pulmonary embolus.    LUNGS AND PLEURA: 0.5 cm nodule in the anterior mid right lung in the region of the minor fissure (series 7 image 169), unchanged since 07/27/2018 and therefore consistent with benign etiology. A few curvilinear opacities in the periphery of the lungs   likely represent scarring or atelectasis.    CORONARY ARTERY CALCIFICATION: Present.    MEDIASTINUM/AXILLAE: Unremarkable.    HEPATOBILIARY: Unremarkable.    SPLEEN: Unremarkable.    PANCREAS: Unremarkable.    ADRENAL GLANDS: Unremarkable.    KIDNEYS/BLADDER: A balloon tip catheter is present in the urinary bladder lumen. 2 cm diverticulum from the posterior left aspect of the urinary bladder.    BOWEL: Several colonic diverticula are present without evidence of diverticulitis. The appendix is not visualized.    LYMPH NODES: Unremarkable.    PELVIC ORGANS: Moderate enlargement of the prostate gland.    MUSCULOSKELETAL: Nonacute moderate compression deformity of the L1 vertebral body.    OTHER: None.      Impression    IMPRESSION:   1. A very small apparent slight focal outpouching of the proximal descending thoracic aorta. This is new since 07/27/2018, but otherwise age-indeterminate. It could represent changes due to atherosclerosis,  but a very small penetrating ulcer cannot be   excluded. It is unlikely to relate to acute trauma. A follow-up CT scan of the thoracic aorta is recommended in 2 weeks in order to evaluate for interval changes in this finding.  2. No other acute abnormality identified in the chest, abdomen or pelvis.

## 2024-02-05 NOTE — PROGRESS NOTES
Pt transferring to obs unit room 7.  Report called to Anh JIMENES.  Explained cares/transfer to family and pt.  Belongings with pt.

## 2024-02-05 NOTE — ED PROVIDER NOTES
Emergency Department Encounter     Evaluation Date & Time:   2024  7:09 PM    CHIEF COMPLAINT:  Fall and Altered Mental Status      Triage Note:       Impression and Plan       FINAL IMPRESSION:    ICD-10-CM    1. Acute encephalopathy  G93.40       2. Fall at home, initial encounter  W19.XXXA     Y92.009       3. Urinary tract infection associated with indwelling urethral catheter, initial encounter  (H24)  T83.511A     N39.0       4. Acute renal insufficiency  N28.9             ED COURSE & MEDICAL DECISION MAKIN:33 PM Met with patient for initial interview and exam.  10:04 PM I spoke with the patient's granddaughter who reported that he seemed confused around 6-6:30pm. She does not think that he had a significant fall. She reports that he has had similar confusion with UTI in the past.   10:08 PM Paged hospitalist.  10:21 PM I discussed the case with hospitalist, Dr Wilson, who accepts the patient for admission    88 year old male, history of CHF, PAF, syncope, HTN, HLD, stroke, SDH, chronic back pain, Grave's disease, CKD and recurrent UTI with indwelling jorge catheter, who presents from home by EMS for evaluation of altered mental status after a fall.    Patient does not provide any contributory history secondary to medical condition.    EMS reported that patient was found on the floor in his bathroom at home after a presumed unwitnessed fall with confusion.     The patient reports pain, however he does not vocalize what hurts.    On exam, he has an abrasion to the left elbow with no other signs of trauma. His GCS is ~14 for confusion with non-focal neuro exam, however he is not fully participatory.      Given that Eliquis was on his medication list, a Trauma Alert was called on patient arrival (it was later determined that patient is no longer taking Eliquis and coags are WNL).    Patient placed on cardiac monitor, IV access established and blood sent for labs.    Consider syncope for which EKG  was performed. EKG of poor quality limiting evaluation of rhythm, which looks to probably be sinus bradycardia (P waves appear to precede QRS complexes) with a sinus arrhythmia vs less likely atrial fibrillation (rhythm does not appear irregularly irregular) with ischemic changes.     Troponin elevated above reference cut-off for males (41) with a delta troponin unchanged.    Head CT performed and without definitive acute intracranial process.     Cervical spine CT performed and negative for definite fracture with mild compression superior aspect of T1 and T2 age-indeterminate, but appears chronic although is new since CT chest 07/27/2018. Moderate degenerative changes without significant canal narrowing at any level.    Labs otherwise remarkable for no leukocytosis (WBC 5.9) with normal procalcitonin (0.05).  He has mild anemia (Hb 11.7).    He has acute renal insufficiency with creatinine 1.47, GFR 46 - baseline ~1.08, GFR 66 (9 months ago). Consider pre-renal component from dehydration for which he was given gentle IV hydration. Total CK WNL (49) without suggestion of rhabdomyolysis.     Blood alcohol level negative.    UA suggestive of infection with 500 LE, moderate bacteria, >182 WBCs with WBC clumps. Patient had his jorge changed one week ago. Consider colonization, however previous similar urine studies have had urine cultures positive for Enterococcus faecalis sensitive to ampicillin, nitrofurantoin, penicillins and vancomycin. Urine culture pending and patient given dose of IV Zosyn.    His granddaughter presented to the ED later in the evening and reported that he was in his usual state of health when she saw him for Sunday lunch. Family then reported that he was confused ~6pm. She reports that he has had similar confusion with UTIs, however his urine always contains a lot of sediment as he does not stay well hydrated.    Given reported fall, CTA chest / abdomen / pelvis with reconstructions of thoracic  and lumbar spines also ordered, however pending at time of shift change.    On reassessment, patient mildly improved and is speaking more, however remains mildly agitated and confused. Decision made to admit patient for further evaluation and monitoring. Consider encephalopathy secondary to UTI. Also consider dehydration.     Patient hemodynamically stable throughout ED course.       At the conclusion of the encounter I discussed the results of all the tests and the disposition. The questions were answered. The patient and family acknowledged understanding and were agreeable with the care plan.      Medical Decision Making  Obtained supplemental history:Supplemental history obtained?: Family Member/Significant Other  Reviewed external records: External records reviewed?: Outpatient Record: 10/13/2023 [facility]  Care impacted by chronic illness:Chronic Kidney Disease, Heart Disease, Hyperlipidemia, Hypertension, and Other: Grave's disease  Care significantly affected by social determinants of health:N/A  Did you consider but not order tests?: Work up considered but not performed and documented in chart, if applicable  Did you interpret images independently?: Independent interpretation of ECG and images noted in documentation, when applicable.    8:41  I independently interpreted the patient's head CT and there is no obvious bleed or mass; please refer to radiologist's report for official read    Consultation discussion with other provider:Did you involve another provider (consultant, MH, pharmacy, etc.)?: I discussed the care with another health care provider, see documentation for details. Hospitalist  Admit.      MEDICATIONS GIVEN IN THE EMERGENCY DEPARTMENT:  Medications   lidocaine 1 % 0.1-1 mL (has no administration in time range)   lidocaine (LMX4) cream (has no administration in time range)   sodium chloride (PF) 0.9% PF flush 3 mL (3 mLs Intracatheter $Given 2/5/24 4880)   sodium chloride (PF)  0.9% PF flush 3 mL (has no administration in time range)   iopamidol (ISOVUE-370) solution 80 mL ( Intravenous Canceled Entry 2/4/24 2002)   ondansetron (ZOFRAN ODT) ODT tab 4 mg (has no administration in time range)     Or   ondansetron (ZOFRAN) injection 4 mg (has no administration in time range)   hydrALAZINE (APRESOLINE) injection 10 mg (has no administration in time range)   acetaminophen (TYLENOL) tablet 650 mg (has no administration in time range)     Or   acetaminophen (TYLENOL) Suppository 650 mg (has no administration in time range)   oxyCODONE IR (ROXICODONE) half-tab 2.5 mg (has no administration in time range)   oxyCODONE (ROXICODONE) tablet 5 mg (has no administration in time range)   polyethylene glycol (MIRALAX) Packet 17 g (has no administration in time range)   bisacodyl (DULCOLAX) suppository 10 mg (has no administration in time range)   prochlorperazine (COMPAZINE) injection 5 mg (has no administration in time range)     Or   prochlorperazine (COMPAZINE) tablet 5 mg (has no administration in time range)     Or   prochlorperazine (COMPAZINE) suppository 12.5 mg (has no administration in time range)   cefTRIAXone (ROCEPHIN) 1 g vial to attach to  mL bag for ADULTS or NS 50 mL bag for PEDS (1 g Intravenous $New Bag 2/5/24 0555)   atenolol (TENORMIN) tablet 25 mg (has no administration in time range)   finasteride (PROSCAR) tablet 5 mg (5 mg Oral $Given 2/5/24 0828)   levothyroxine (SYNTHROID/LEVOTHROID) tablet 75 mcg (75 mcg Oral $Given 2/5/24 0828)   traZODone (DESYREL) tablet 50 mg (50 mg Oral Not Given 2/5/24 0013)   sodium chloride 0.9 % infusion ( Intravenous $New Bag 2/5/24 1130)   lidocaine (XYLOCAINE) 2 % external gel 10 mL (10 mLs Urethral Not Given 2/4/24 2316)   sodium chloride 0.9% BOLUS 500 mL (500 mLs Intravenous $New Bag 2/4/24 2040)   midazolam (VERSED) injection 1 mg (1 mg Intravenous $Given 2/4/24 2040)   ondansetron (ZOFRAN) injection 4 mg (4 mg Intravenous $Given 2/4/24  2130)   piperacillin-tazobactam (ZOSYN) 3.375 g vial to attach to  mL bag (0 g Intravenous Stopped 2/4/24 2321)   iopamidol (ISOVUE-370) solution 80 mL (80 mLs Intravenous $Given 2/4/24 2205)   acetaminophen (TYLENOL) tablet 650 mg (650 mg Oral $Given 2/4/24 2214)   traZODone (DESYREL) half-tab 25 mg (25 mg Oral $Given 2/4/24 2339)   lactated ringers BOLUS 1,000 mL (0 mLs Intravenous Stopped 2/5/24 0137)       NEW PRESCRIPTIONS STARTED AT TODAY'S ED VISIT:  New Prescriptions    No medications on file       HPI     The history is provided by the patient and a relative. No  was used.      Dilip Long is an 88 year old male, history of CHF, PAF, syncope, HTN, HLD, stroke, SDH, chronic back pain, Grave's disease, CKD and recurrent UTI with indwelling jorge catheter, who presents to this ED by EMS for evaluation of altered mental status after a fall.    Patient does not provide any contributory history secondary to medical condition.    Per EMS, patient was found on the floor in his bathroom at home after a presumed unwitnessed fall; it is thought that he may have been on the floor for up to 45 minutes. He has an indwelling jorge catheter, which was changed one week ago.    The patient reports pain, however he does not vocalize what hurts.    His granddaughter presented to the ED later and reported that they had been to his home for Sunday lunch and he was in his usual state of health. She had left to return home in the afternoon and family reported that around 6-6:30 he seemed confused. She does not think that he had a significant fall. She reports that he has had similar confusion with UTIs. She also reports that his urine always contains a lot of sediment as he does not stay well hydrated.    Per chart review, the patient was seen on 10/13/2023 at [facility] for evaluation of cough via Telemedicine. Patient encouraged to do home COVID-19 test and call if positive.  Doxycycline prescribed for use if the patient did not improve in one week.        Medical History     Past Medical History:   Diagnosis Date    Acute heart failure with preserved ejection fraction (HFpEF) (H) 11/10/2022    Acute prostatitis     Asymptomatic bacteriuria 10/23/2013    Congestive heart failure (H)     Essential hypertension 08/18/2015    Hypertension     Paroxysmal atrial fib -- on Eliquis 11/06/2022    Syncope     Thyroid disease        Past Surgical History:   Procedure Laterality Date    BLADDER SURGERY      Bladder absces removal    Blepharoplasty Upper Lid W/ Excessive Skin[  1/29/2007    CATARACT EXTRACTION      CYSTOSCOPY, FULGURATE BLEEDERS, EVACUATE CLOT(S), COMBINED N/A 4/19/2023    Procedure: CYSTOSCOPY, BLADDER BIOPSY WITH FULGURATION AND CLOT EVACUATION;  Surgeon: Edgar Laird MD;  Location: [facility]    EYE SURGERY      both eyes 2015    IR LUMBAR EPIDURAL STEROID INJECTION  4/27/2004    IR LUMBAR EPIDURAL STEROID INJECTION  5/11/2004    IR LUMBAR EPIDURAL STEROID INJECTION  11/24/2004    IR LUMBAR EPIDURAL STEROID INJECTION  11/28/2007    DE CYSTOURETHROSCOPY W/IRRIG & EVAC CLOTS N/A 7/27/2018    Procedure: CYSTOSCOPY, CLOT EVACUATION ,URETHRAL DILATATION, DAUGHERTY CATHETER PLACEMENT;  Surgeon: Lowell Arias MD;  Location: [facility];  Service: Urology       Family History   Problem Relation Age of Onset    Diabetes No family hx of     Hypertension No family hx of     Other Cancer No family hx of     Coronary Artery Disease No family hx of     Hyperlipidemia No family hx of     Cerebrovascular Disease No family hx of     Breast Cancer No family hx of     Colon Cancer No family hx of     Prostate Cancer No family hx of     Depression No family hx of     Anxiety Disorder No family hx of     Mental Illness No family hx of     Substance Abuse No family hx of     Anesthesia Reaction No family hx of     Asthma No family hx of     Osteoporosis No family hx of      "Genetic Disorder No family hx of     Thyroid Disease No family hx of     Obesity No family hx of     No Known Problems Mother     No Known Problems Father        Social History     Tobacco Use    Smoking status: Former     Types: Cigarettes    Smokeless tobacco: Never    Tobacco comments:     Pt quit 40 years ago   Vaping Use    Vaping Use: Never used   Substance Use Topics    Alcohol use: Yes     Comment: < 1 drink a week    Drug use: No       acetaminophen (TYLENOL) 500 MG tablet  atenolol (TENORMIN) 25 MG tablet  Calcium Carbonate-Vitamin D (OSCAL 500/200 D-3 PO)  finasteride (PROSCAR) 5 MG tablet  GLUCOSAMINE-CHONDROITIN-VIT D3 PO  ipratropium - albuterol 0.5 mg/2.5 mg/3 mL (DUONEB) 0.5-2.5 (3) MG/3ML neb solution  levothyroxine (SYNTHROID/LEVOTHROID) 75 MCG tablet  traZODone (DESYREL) 50 MG tablet  Vitamin D3 (VITAMIN D, CHOLECALCIFEROL,) 25 mcg (1000 units) tablet        Physical Exam     First Vitals:  Patient Vitals for the past 24 hrs:   BP Temp Temp src Pulse Resp SpO2 Height Weight   02/05/24 0900 -- -- -- 68 -- -- -- --   02/05/24 0800 (!) 150/70 -- -- -- -- 100 % -- --   02/05/24 0700 -- -- -- 61 -- 100 % -- --   02/05/24 0600 -- 98.5  F (36.9  C) Oral 80 -- 99 % -- --   02/05/24 0405 (!) 169/79 -- -- 78 18 100 % -- --   02/05/24 0014 -- -- -- 82 -- 96 % -- --   02/05/24 0000 111/61 -- -- 89 -- (!) 85 % -- --   02/04/24 2350 (!) 137/95 -- -- 85 -- 94 % -- --   02/04/24 2045 -- -- -- 73 -- 94 % -- --   02/04/24 2040 -- 98.5  F (36.9  C) Oral -- -- -- 1.727 m (5' 8\") 68 kg (150 lb)   02/04/24 2030 -- -- -- (!) 243 -- (!) 70 % -- --   02/04/24 2000 -- -- -- 58 19 100 % -- --   02/04/24 1945 (!) 164/77 -- -- 65 26 100 % -- --   02/04/24 1930 (!) 172/83 -- -- 62 -- 97 % -- --   02/04/24 1922 (!) 178/89 -- -- 72 16 99 % -- --   02/04/24 1915 (!) 178/89 -- -- 68 -- (!) 87 % -- --       PHYSICAL EXAM:   Physical Exam    GENERAL: Awake, alert.  In mild acute distress with some agitation.   HEENT: Normocephalic, " atraumatic. Pupils equal, round and reactive. Conjunctiva normal. TM partially obstructed by cerumen, BL, with the portion that is visible without hemotympanum.   NECK: No midline tenderness to palpation of cervical spine. No stridor.  PULMONARY: Chest is atraumatic and non-tender to palpation. No palpable subcutaneous emphysema. Symmetrical breath sounds without distress.  Lungs clear to auscultation bilaterally without wheezes, rhonchi or rales.  CARDIO: Regular rate and rhythm.  No significant murmur, rub or gallop.  Radial pulses strong and symmetrical.  ABDOMINAL: Abdomen atraumatic, soft, non-distended and non-tender to palpation.  No CVAT, BL.  BACK:  Back is atraumatic. No midline tenderness to palpation of thoracic and lumbar spines. No palpable bony step-offs.   EXTREMITIES: Left elbow with abrasion. No lower extremity swelling or edema.      NEURO: GCS 14 for confusion.  Patient is alert and oriented to person. Does not provide answers when asked where he is or what is the year.  Cranial nerves grossly intact.  Moving all extremities with intact strength, however is not fully participatory with neuro exam.   PSYCH: Some agitation.  SKIN: Abrasion left elbow.     Results     LAB:  All pertinent labs reviewed and interpreted  Labs Ordered and Resulted from Time of ED Arrival to Time of ED Departure   COMPREHENSIVE METABOLIC PANEL - Abnormal       Result Value    Sodium 137      Potassium 3.8      Carbon Dioxide (CO2) 27      Anion Gap 9      Urea Nitrogen 24.9 (*)     Creatinine 1.47 (*)     GFR Estimate 46 (*)     Calcium 8.9      Chloride 101      Glucose 119 (*)     Alkaline Phosphatase 85      AST 11      ALT <5      Protein Total 5.8 (*)     Albumin 3.4 (*)     Bilirubin Total 0.3     CBC WITH PLATELETS AND DIFFERENTIAL - Abnormal    WBC Count 5.9      RBC Count 3.58 (*)     Hemoglobin 11.7 (*)     Hematocrit 34.3 (*)     MCV 96      MCH 32.7      MCHC 34.1      RDW 11.9      Platelet Count 310      %  Neutrophils 60      % Lymphocytes 25      % Monocytes 12      % Eosinophils 2      % Basophils 1      % Immature Granulocytes 0      NRBCs per 100 WBC 0      Absolute Neutrophils 3.6      Absolute Lymphocytes 1.5      Absolute Monocytes 0.7      Absolute Eosinophils 0.1      Absolute Basophils 0.0      Absolute Immature Granulocytes 0.0      Absolute NRBCs 0.0     ROUTINE UA WITH MICROSCOPIC REFLEX TO CULTURE - Abnormal    Color Urine Yellow      Appearance Urine Cloudy (*)     Glucose Urine Negative      Bilirubin Urine Negative      Ketones Urine Negative      Specific Gravity Urine 1.029      Blood Urine 0.2 mg/dL (*)     pH Urine 6.0      Protein Albumin Urine 70 (*)     Urobilinogen Urine <2.0      Nitrite Urine Negative      Leukocyte Esterase Urine 500 Deion/uL (*)     Bacteria Urine Moderate (*)     WBC Clumps Urine Present (*)     Mucus Urine Present (*)     RBC Urine 46 (*)     WBC Urine >182 (*)     Squamous Epithelials Urine 4 (*)    TROPONIN T, HIGH SENSITIVITY - Abnormal    Troponin T, High Sensitivity 41 (*)    TROPONIN T, HIGH SENSITIVITY - Abnormal    Troponin T, High Sensitivity 41 (*)    COMPREHENSIVE METABOLIC PANEL - Abnormal    Sodium 136      Potassium 3.7      Carbon Dioxide (CO2) 27      Anion Gap 7      Urea Nitrogen 22.1      Creatinine 1.31 (*)     GFR Estimate 53 (*)     Calcium 8.2 (*)     Chloride 102      Glucose 121 (*)     Alkaline Phosphatase 65      AST 10      ALT 5      Protein Total 5.5 (*)     Albumin 3.1 (*)     Bilirubin Total 0.5     CBC WITH PLATELETS - Abnormal    WBC Count 14.9 (*)     RBC Count 3.39 (*)     Hemoglobin 11.0 (*)     Hematocrit 32.3 (*)     MCV 95      MCH 32.4      MCHC 34.1      RDW 11.9      Platelet Count 265     INR - Normal    INR 1.05     PARTIAL THROMBOPLASTIN TIME - Normal    aPTT 28     ETHYL ALCOHOL LEVEL - Normal    Alcohol ethyl <0.01     BILIRUBIN DIRECT - Normal    Bilirubin Direct <0.20     CK TOTAL - Normal    CK 49     PROCALCITONIN -  Normal    Procalcitonin 0.05     GLUCOSE MONITOR NURSING POCT   ISTAT CREATININE POCT   TYPE AND SCREEN, ADULT    ABO/RH(D) O POS      Antibody Screen Negative      SPECIMEN EXPIRATION DATE [id number]     URINE CULTURE    Culture Culture in progress     ABO/RH TYPE AND SCREEN       RADIOLOGY:  CTA Chest Abdomen Pelvis w Contrast   Final Result   IMPRESSION:    1. A very small apparent slight focal outpouching of the proximal descending thoracic aorta. This is new since 07/27/2018, but otherwise age-indeterminate. It could represent changes due to atherosclerosis, but a very small penetrating ulcer cannot be    excluded. It is unlikely to relate to acute trauma. A follow-up CT scan of the thoracic aorta is recommended in 2 weeks in order to evaluate for interval changes in this finding.   2. No other acute abnormality identified in the chest, abdomen or pelvis.      CT Lumbar Spine w/o Contrast   Final Result   IMPRESSION:   THORACIC SPINE CT:   1.  The exam is moderately degraded by motion.   2.  Age-indeterminate mild compression deformities at the superior endplates of T1, T2, T3 and T5.   3.  Chronic anterior wedging compression deformity of T12.      LUMBAR SPINE CT:   1.  No acute fracture or subluxation.   2.  Chronic moderate to severe burst-type fracture of L1.   3.  Moderate central canal stenosis at L3-L4 and L4-L5.         CT Thoracic Spine w/o Contrast   Final Result   IMPRESSION:   THORACIC SPINE CT:   1.  The exam is moderately degraded by motion.   2.  Age-indeterminate mild compression deformities at the superior endplates of T1, T2, T3 and T5.   3.  Chronic anterior wedging compression deformity of T12.      LUMBAR SPINE CT:   1.  No acute fracture or subluxation.   2.  Chronic moderate to severe burst-type fracture of L1.   3.  Moderate central canal stenosis at L3-L4 and L4-L5.         CT Cervical Spine w/o Contrast   Final Result   IMPRESSION:   HEAD CT:   1.  No definite acute intracranial  process.      CERVICAL SPINE CT:   1.  Mild compression superior aspect of T1 and T2, age indeterminate; appears chronic. It is new since CT chest 2018.    2.  No definite acute fracture.    3.  Moderate degenerative changes without significant canal narrowing at any level.      Head CT w/o contrast   Final Result   IMPRESSION:   HEAD CT:   1.  No definite acute intracranial process.      CERVICAL SPINE CT:   1.  Mild compression superior aspect of T1 and T2, age indeterminate; appears chronic. It is new since CT chest 2018.    2.  No definite acute fracture.    3.  Moderate degenerative changes without significant canal narrowing at any level.          EC2024, 19:38; poor quality EKG - rhythm appears to be either sinus bradycardia (appears to be P waves preceding QRS complexes) with sinus arrhythmia vs atrial fibrillation (although rhythm does not appear irregularly irregular and there is hint of P waves preceding QRS complexes) with slow ventricular response (rate of 53 bpm); no ST-T wave changes consistent with ACS or pericarditis; compared to previous EKG dated 2022, sinus bradycardia may have replaced atrial fibrillation    EKG independently reviewed and interpreted by Yahaira Kelley MD      I, Abhijit Joiner, am serving as a scribe to document services personally performed by Yahaira Kelley MD, based on my observations and the provider's statements to me.  I, Yahaira Kelley MD, attest that Abhijit Joiner is acting in a scribe capacity, has observed my performance of the services and has documented them in accordance with my direction.      Yahaira Kelley MD  Emergency Medicine  [facility] EMERGENCY DEPARTMENT         Yahaira Kelley MD  [phone]

## 2024-02-05 NOTE — PLAN OF CARE
"PRIMARY DIAGNOSIS: \"GENERIC\" NURSING  OUTPATIENT/OBSERVATION GOALS TO BE MET BEFORE DISCHARGE:  ADLs back to baseline: No    Activity and level of assistance: bedrest.     Pain status: Pain free.    Return to near baseline physical activity: No     Discharge Planner Nurse   Safe discharge environment identified: No  Barriers to discharge: Yes       Entered by: Kristin Roberson RN 02/05/2024 2:17 AM     Please review provider order for any additional goals.   Nurse to notify provider when observation goals have been met and patient is ready for discharge.Goal Outcome Evaluation:    Patient's grand daughter at bedside. Confused at baseline. Denies pain. Received fluid bolus. Iv antibiotics for UTI. Urine culture pending. Has jorge in place. Vitals stable on room air.   "

## 2024-02-05 NOTE — CONSULTS
Care Management Initial Consult    General Information  Assessment completed with: Patient (Yogesh)Shruthi  Type of CM/SW Visit: Initial Assessment    Primary Care Provider verified and updated as needed: Yes   Readmission within the last 30 days: no previous admission in last 30 days      Reason for Consult: discharge planning  Advance Care Planning:            Communication Assessment  Patient's communication style: spoken language (English or Bilingual)             Cognitive  Cognitive/Neuro/Behavioral: .WDL except, orientation  Level of Consciousness: confused  Arousal Level: arouses to voice  Orientation: disoriented to, place, situation        Speech: clear    Living Environment:   People in home: spouse     Current living Arrangements: house      Able to return to prior arrangements: yes       Family/Social Support:  Care provided by:  (Yogesh Hawkins)  Provides care for: no one, unable/limited ability to care for self  Marital Status:   Wife, Children (yogesh)          Description of Support System: Supportive, Involved    Support Assessment: Adequate family and caregiver support    Current Resources:   Patient receiving home care services: No     Community Resources: None  Equipment currently used at home:    Supplies currently used at home: None    Employment/Financial:  Employment Status: retired        Financial Concerns: none           Does the patient's insurance plan have a 3 day qualifying hospital stay waiver?  Yes     Which insurance plan 3 day waiver is available? Alternative insurance waiver    Will the waiver be used for post-acute placement? Undetermined at this time    Lifestyle & Psychosocial Needs:  Social Determinants of Health     Food Insecurity: Not on file   Depression: Not at risk (3/13/2019)    PHQ-2     PHQ-2 Score: 0   Housing Stability: Not on file   Tobacco Use: Medium Risk (4/10/2023)    Patient History     Smoking Tobacco Use: Former      Smokeless Tobacco Use: Never     Passive Exposure: Not on file   Financial Resource Strain: Not on file   Alcohol Use: Not on file   Transportation Needs: Not on file   Physical Activity: Not on file   Interpersonal Safety: Not on file   Stress: Not on file   Social Connections: Not on file       Functional Status:  Prior to admission patient needed assistance:   Dependent ADLs:: Ambulation-walker, Bathing, Dressing, Toileting  Dependent IADLs:: Cleaning, Cooking, Laundry, Shopping, Meal Preparation, Medication Management, Money Management, Transportation       Mental Health Status:  Mental Health Status: No Current Concerns       Chemical Dependency Status:  Chemical Dependency Status: No Current Concerns             Values/Beliefs:  Spiritual, Cultural Beliefs, Religion Practices, Values that affect care: no               Additional Information:  CM met with patient and granddaughter Shruthi in room to discuss discharge planning and complete initial assessment.     Patient lives in a house with wife. He uses a walker for ambulation. Shruthi assists with dressing, bathing, and all IADLs including transportation and medication management.    ZEPEDA discussed, placed on chart and copy given to patient and Shruthi.    Unknown discharge needs. Therapy consults are pending. CM will continue to monitor progression of care, review team recommendations and provide discharge planning assist as needed.     1:56 PM  PT rec is home care. CM met with patient and Shruthi. They are agreeable to home care, referrals sent and pending. Shruthi asked that she be the  for home care agency.    Allie cMkay, BSW

## 2024-02-05 NOTE — ED NOTES
Bed: JNED-20  Expected date: 2/4/24  Expected time: 7:10 PM  Means of arrival: Ambulance  Comments:  Wili 89 yo M unwitnessed fall, AMS

## 2024-02-05 NOTE — CONSULTS
"CLINICAL NUTRITION SERVICES - ASSESSMENT NOTE     Nutrition Prescription    RECOMMENDATIONS FOR MDs/PROVIDERS TO ORDER:    Malnutrition Status:    Pt does not meet criteria for malnutrition.     Recommendations already ordered by Registered Dietitian (RD):    Future/Additional Recommendations:  Monitor po intake, wt.     REASON FOR ASSESSMENT  Dilip Long is a/an 88 year old male assessed by the dietitian for Provider Order - Mild pro suma malnutrition     HPI: Pt presents with confusion; pmhx includes urinary retention (chronic indwelling jorge), atrial fibrillation (no AC, previously apixaban), HTN/HLD, hypothyroid, mild cognitive deficit; admitted to observation for LEONIDAS, encephalopathy.     NUTRITION HISTORY  Met with pt and daughter at bedside this AM. Pt asleep during visit. Pt daughter reports pt was eating okay pta, x3 meals/day and no decreased intakes noted. Pt with no wt loss, daughter reports pt with weight gain. No nutrition concerns at this timer per family.   NKFA    Pt ordered large breakfast this AM- containing 750 kcals, 30 g protein.   Per chart review pt reports last BM 2/3    CURRENT NUTRITION ORDERS  Diet: Regular  Intake/Tolerance: Good per family reports of pt oral intakes.     LABS  Reviewed  Creat 1.31(H)     MEDICATIONS  Reviewed   Scheduled rocephin, lovenox, proscar, levothyroxine    ANTHROPOMETRICS  Height: 172.7 cm (5' 8\")  Most Recent Weight: 68 kg (150 lb)    IBW: 70 kg  BMI: Normal BMI  Weight History: No clinically significant wt loss  Wt Readings from Last 10 Encounters:   02/04/24 68 kg (150 lb)   04/19/23 68.2 kg (150 lb 5.7 oz)   04/10/23 72.6 kg (160 lb)   03/10/23 58.1 kg (128 lb)   02/04/23 58.1 kg (128 lb)   01/30/23 58.1 kg (128 lb)   01/12/23 58.1 kg (128 lb 1.6 oz)   12/22/22 70.3 kg (155 lb)   11/29/22 68 kg (150 lb)   11/16/22 68 kg (150 lb)      Dosing Weight: 68 kg    ASSESSED NUTRITION NEEDS  Estimated Energy Needs: 8560-7290 kcals/day (25 - 30 " kcals/kg)  Justification: Maintenance  Estimated Protein Needs: 68-82 grams protein/day (1 - 1.2 grams of pro/kg)  Justification: Increased needs  Estimated Fluid Needs: 8896-6679 mL/day (25 - 30 mL/kg)   Justification: Maintenance    PHYSICAL FINDINGS/GI   See malnutrition section below.  Per Flowsheets:   Chronic jorge   No BM noted     MALNUTRITION:  % Weight Loss:  None noted  % Intake:  No decreased intake noted  Subcutaneous Fat Loss:  None observed- baseline   Muscle Loss:  None observed- baseline   Fluid Retention:  None noted    Malnutrition Diagnosis: Patient does not meet two of the above criteria necessary for diagnosing malnutrition    NUTRITION DIAGNOSIS  No nutrition diagnosis at this time     INTERVENTIONS  Implementation  None    Goals  Pt to meet nutritional needs      Monitoring/Evaluation  Progress toward goals will be monitored and evaluated per protocol.

## 2024-02-05 NOTE — PLAN OF CARE
"PRIMARY DIAGNOSIS: \"GENERIC\" NURSING  OUTPATIENT/OBSERVATION GOALS TO BE MET BEFORE DISCHARGE:  ADLs back to baseline: No    Activity and level of assistance: Did not get up. PT/OT eval today.     Pain status: Pain free.    Return to near baseline physical activity: Yes     Discharge Planner Nurse   Safe discharge environment identified: No  Barriers to discharge: Yes, iv antibiotics for UTI.        Entered by: Kristin Roberson RN 02/05/2024 6:24 AM     Please review provider order for any additional goals.   Nurse to notify provider when observation goals have been met and patient is ready for discharge.Goal Outcome Evaluation:      Patient slept between cares. Denied pain. Oriented to self only. Grand daughter at bedside. Lozano patent with yellow cloudy urine. Iv antibiotics given for UTI.  "

## 2024-02-05 NOTE — PROGRESS NOTES
02/05/24 0915   Appointment Info   Signing Clinician's Name / Credentials (PT) Yoly Gunter PT   Quick Adds   Quick Adds Certification   Living Environment   People in Home spouse;other (see comments)  (family takes turns sstaying with and assisting pt and wife 24/7)   Current Living Arrangements house   Home Accessibility stairs to enter home   Number of Stairs, Main Entrance 4   Stair Railings, Main Entrance railings safe and in good condition   Transportation Anticipated family or friend will provide   Living Environment Comments per granddaughter, pt is usually able to walk to the bathroom on his own, stays on the main level and uses a 4WW   Self-Care   Current Activity Tolerance poor   Equipment Currently Used at Home walker, rolling  (4WW)   Fall history within last six months yes   Activity/Exercise/Self-Care Comment 24 hour supervision/assist from family   General Information   Onset of Illness/Injury or Date of Surgery 02/04/24   Referring Physician DHARMESH Wilson   Patient/Family Therapy Goals Statement (PT) return home   Pertinent History of Current Problem (include personal factors and/or comorbidities that impact the POC) 88M presents with confusion; pmhx includes urinary retention (chronic indwelling jorge), atrial fibrillation (no AC, previously apixaban), HTN/HLD, hypothyroid, mild cognitive deficit; admitted to observation for LEONIDAS, encephalopathy.   Existing Precautions/Restrictions fall   Cognition   Affect/Mental Status (Cognition) WFL   Orientation Status (Cognition) oriented to;person   Follows Commands (Cognition) WFL   Cognitive Status Comments sleepy, but willing to participate   Pain Assessment   Patient Currently in Pain No   Posture    Posture Comments very forward flexed both in sitting and in standing   Range of Motion (ROM)   ROM Comment AAROM BLE WFL   Strength (Manual Muscle Testing)   Strength Comments generalized weakness BLE, fatigues very quickly   Bed Mobility   Bed Mobility  supine-sit   Supine-Sit [place] (Bed Mobility) moderate assist (50% patient effort);verbal cues   Bed Mobility Limitations decreased ability to use arms for pushing/pulling;decreased ability to use legs for bridging/pushing   Impairments Contributing to Impaired Bed Mobility decreased strength   Assistive Device (Bed Mobility) draw sheet   Comment, (Bed Mobility) pt states he feesl very stiff and tired, has not been up since admitted   Transfers   Transfers sit-stand transfer   Sit-Stand Transfer   Sit-Stand [place] (Transfers) minimum assist (75% patient effort);verbal cues   Assistive Device (Sit-Stand Transfers) walker, front-wheeled   Comment, (Sit-Stand Transfer) cues for posture and keeping walker closer to boday.   Gait/Stairs (Locomotion)   [place] Level (Gait) minimum assist (75% patient effort);moderate assist (50% patient effort);1 person assist;verbal cues   Assistive Device (Gait) walker, front-wheeled   Distance in Feet (Gait) 5   Pattern (Gait) step-to   Deviations/Abnormal Patterns (Gait) demetrice decreased;stride length decreased   Comment, (Gait/Stairs) pt moves very slowly, fatigues quickly   Clinical Impression   Criteria for Skilled Therapeutic Intervention Yes, treatment indicated   PT Diagnosis (PT) impaired functionalmobility   Influenced by the following impairments weakness, bedrest deconditioning, fatigue   Functional limitations due to impairments bed mobility, transfers, gait   Clinical Presentation (PT Evaluation Complexity) evolving   Clinical Presentation Rationale presents as diagnosed   Clinical Decision Making (Complexity) moderate complexity   Planned Therapy Interventions (PT) bed mobility training;transfer training;gait training;strengthening;ROM (range of motion)   Risk & Benefits of therapy have been explained care plan/treatment goals reviewed;patient;caregiver  (granddaughter)   PT Total Evaluation Time   PT [place] Moderate Complexity Minutes (59091) 12    Therapy Certification   Start of care date 02/05/24   Certification date from 02/05/24   Certification date to 02/12/24   Medical Diagnosis Fall, AMS   Physical Therapy Goals   PT Frequency Daily   PT Predicted Duration/Target Date for Goal Attainment 02/08/24   PT Goals Bed Mobility;Transfers;Gait   PT: Bed Mobility Supine to/from sit;Rolling;Supervision/stand-by assist   PT: Transfers Sit to/from stand;Bed to/from chair;Assistive device  (CGA)   PT: Gait Rolling walker;25 feet  (CGA)   Interventions   Interventions Quick Adds Gait Training;Therapeutic Activity   Therapeutic Activity   Therapeutic Activities: dynamic activities to improve functional performance Minutes (95826) 13   Symptoms Noted During/After Treatment Fatigue   Treatment Detail/Skilled Intervention static sitting balance/tolerance at EOB with SBA, 5 min.  Static standing balance with FWW, CGA, 1 min.  Seated BLE ex with direction/demo, 5 reps each   Gait Training   Gait Training Minutes (57914) 12   Symptoms Noted During/After Treatment (Gait Training) fatigue   Treatment Detail/Skilled Intervention additional 10 ft, sit, then 4 side steps.  Slow pace, small steps, constant cueing for posture and keeping walker closer.  No LOB but fatigues quickly   Distance in Feet 10,4   [place] Level (Gait Training) moderate assist (50% patient effort)   Physical Assistance Level (Gait Training) 1 person assist   Weight Bearing (Gait Training) weight-bearing as tolerated   Gait Analysis Deviations decreased demetrice;increased time in double stance;decreased step length   Impairments (Gait Analysis/Training) flexibility decreased;strength decreased  (fatigue)   PT Discharge Planning   PT Plan bed mobility, transfers and progress gt with 4WW (bring).  LE strengthening, ROM   PT Discharge Recommendation (DC Rec) home with assist;home with home care physical therapy   PT Rationale for DC Rec per granddaughter, pt will have 24 hour assist and she feels  comfortable bringing pt home, states he usually improves quickly once getting up and moving again   PT Brief overview of current status mod assist of 1 for bed mobility, transfers and gt 10' with FWW, pt is use to a 4WW   PT Equipment Needed at Discharge walker, rolling  (4WW)   Total Session Time   Timed Code Treatment Minutes 25   Total Session Time (sum of timed and untimed services) 37   [facility]  OUTPATIENT PHYSICAL THERAPY EVALUATION  PLAN OF TREATMENT FOR OUTPATIENT REHABILITATION  (COMPLETE FOR INITIAL CLAIMS ONLY)  Patient's Last Name, First Name, M.I.  YOB: 1935  Dilip Long                        Provider's Name  [facility] Medical Record No.  [id number]                             Onset Date:  02/04/24   Start of Care Date:  02/05/24   Type:     _X_PT   ___OT   ___SLP Medical Diagnosis:  Fall, AMS              PT Diagnosis:  impaired functionalmobility Visits from SOC:  1     See note for plan of treatment, functional goals and certification details    I CERTIFY THE NEED FOR THESE SERVICES FURNISHED UNDER        THIS PLAN OF TREATMENT AND WHILE UNDER MY CARE     (Physician co-signature of this document indicates review and certification of the therapy plan).

## 2024-02-05 NOTE — ED NOTES
Bed: JNED-37  Expected date: 2/5/24  Expected time: 10:31 AM  Means of arrival:   Comments:  ED 20

## 2024-02-05 NOTE — ED TRIAGE NOTES
Patient found at home in the bathroom by son. Patient lives with elderly wife and it taken care of by family. Uncle was there at 1730 and was found by son around 1830. Change in mentation.Trauma alert was called. Granddaughter is here at bedside, and reports that thinner has been stopped. Trauma alert remains.     Patient BS was 121 in route.     Lozano in place, with dirty urine         Triage Assessment (Adult)       Row Name 02/04/24 1932          Triage Assessment    Airway WDL WDL        Respiratory WDL    Respiratory WDL WDL        Skin Circulation/Temperature WDL    Skin Circulation/Temperature WDL X  skin tear on left elbow        Peripheral/Neurovascular WDL    Peripheral Neurovascular WDL WDL

## 2024-02-05 NOTE — DISCHARGE SUMMARY
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  The following labs/tests are recommended: BMP, Urine   culture results.    CTA thoracic aorta in 2 weeks            Unresulted Labs Ordered in the Past 30 Days of this Admission       Date and Time Order Name Status Description    2/4/2024  7:59 PM Urine Culture Preliminary         These results will be followed up by [facility]    Discharge Disposition   Discharged to home  Condition at discharge: Stable    Hospital Course   Patient is an 88-year-old male who presented with confusion and found to have mildly elevated creatinine and UTI.    #Encephalopathy, resolved  #UTI, catheter associated  s/p ceftriaxone   UCx preliminarily growing Enterococcus faecalis which has been pansensitive in the past  Continue with amoxicillin  Advised PCP follow-up for finalized culture results    #LEONIDAS on CKD stage II  Renal fxn close to baseline after IV fluids  Advised PCP follow up with BMP to follow up renal fxn    #Abnormal CTA chest  CTA shows very small focal outpouching of proximal descending thoracic aorta.  Could represent changes due to atherosclerosis, but a very small penetrating ulcer cannot be excluded.  Unlikely to relate to acute trauma.  Follow-up CT of the thoracic aorta recommended in 2 weeks.      Consultations This Hospital Stay   PHYSICAL THERAPY ADULT IP CONSULT  OCCUPATIONAL THERAPY ADULT IP CONSULT  CARE MANAGEMENT / SOCIAL WORK  IP CONSULT  NUTRITION SERVICES ADULT IP CONSULT    Code Status   No CPR- Do NOT Intubate    Time Spent on this Encounter   I, Evans Franz DO, personally saw the patient today and spent greater than 30 minutes discharging this patient.       Evans Franz DO  [facility] EXTENDED RECOVERY AND SHORT STAY  [address] [place] [id number]  Phone: [phone]  Fax: [phone]  ______________________________________________________________________    Physical Exam   Vital Signs: Temp: 98.1  F (36.7  C) Temp src: Oral BP: (!) 148/68 Pulse: 58   Resp: 18 SpO2: 95 % O2 Device: None (Room air)    Weight: 150 lbs 0 oz  General Appearance:  No acute distress  Respiratory: Clear to auscultation bilaterally  Cardiovascular: Regular rate and rhythm  Neuro: Alert and oriented x 3, normal speech         Primary Care Physician   Kolton Junior    Discharge Orders      Reason for your hospital stay    LEONIDAS, UTI     Activity    Your activity upon discharge: activity as tolerated     Follow-up and recommended labs and tests     Follow up with primary care provider, Kolton Junior, within 7 days for hospital follow- up.  The following labs/tests are recommended: BMP, Urine culture results.    CTA thoracic aorta in 2 weeks     Diet    Follow this diet upon discharge: Orders Placed This Encounter      Regular Diet Adult       Significant Results and Procedures   Most Recent 3 CBC's:  Recent Labs   Lab Test 02/05/24  0720 02/04/24 1926 04/20/23  0942   WBC 14.9* 5.9 9.4   HGB 11.0* 11.7* 7.9*   MCV 95 96 102*    310 330     Most Recent 3 BMP's:  Recent Labs   Lab Test 02/05/24  0720 02/04/24 1926 04/20/23  0942    137 136   POTASSIUM 3.7 3.8 3.8   CHLORIDE 102 101 103   CO2 27 27 24   BUN 22.1 24.9* 15   CR 1.31* 1.47* 1.08   ANIONGAP 7 9 9   KRISTEN 8.2* 8.9 8.2*   * 119* 122     Most Recent Urinalysis:  Recent Labs   Lab Test 02/04/24  1932 10/21/22  1544 10/02/19  0056   COLOR  Yellow   < > Yellow   APPEARANCE Cloudy*   < > Turbid*   URINEGLC Negative   < > Negative   URINEBILI Negative   < > Negative   URINEKETONE Negative   < > Negative   SG 1.029   < > 1.014   UBLD 0.2 mg/dL*   < > Small*   URINEPH 6.0   < > 7.0   PROTEIN 70*   < > 100 mg/dL*   UROBILINOGEN  --   --  <2.0 E.U./dL   NITRITE Negative   < > Negative   LEUKEST 500 Deion/uL*   < > Large*   RBCU 46*   < > 5-10*   WBCU >182*   < > >100*    < > = values in this interval not displayed.   ,   Results for orders placed or performed during the hospital encounter of 02/04/24   Head CT w/o contrast    Narrative    EXAM: CT HEAD W/O CONTRAST, CT CERVICAL SPINE W/O CONTRAST  LOCATION: [facility]  DATE: 2/4/2024    INDICATION: Confusion, head and neck injury  COMPARISON: CT head 11/29/2022  TECHNIQUE:   1) Routine CT Head without IV contrast. Multiplanar reformats. Dose reduction techniques were used.  2) Routine CT Cervical Spine without IV contrast. Multiplanar reformats. Dose reduction techniques were used.    FINDINGS:   HEAD CT:   INTRACRANIAL CONTENTS: No intracranial hemorrhage, extraaxial collection, or mass effect.  No CT evidence of acute infarct. Moderate presumed chronic small vessel ischemic changes. Mild generalized volume loss. No hydrocephalus.     VISUALIZED ORBITS/SINUSES/MASTOIDS: No intraorbital abnormality. No paranasal sinus mucosal disease. No middle ear or mastoid effusion.    BONES/SOFT TISSUES: No acute abnormality.    CERVICAL SPINE CT:   VERTEBRA: Mild compression superior aspect of T1 and T2, age indeterminate; appears chronic. It is new since CT chest 07/27/2018. No definite acute fracture. Straightening of cervical lordosis. Nonaggressive appearing lytic lesion within the dens and   central aspect of body of C2; probably due to a somewhat atypical hemangioma. Subcentimeter nonaggressive appearing sclerotic lesion left lamina of C2.     CANAL/FORAMINA: Moderate degenerative changes  without significant canal narrowing at any level. Multilevel mild-to-moderate neural foraminal narrowing.    PARASPINAL: No extraspinal abnormality. Visualized lung fields are clear.      Impression    IMPRESSION:  HEAD CT:  1.  No definite acute intracranial process.    CERVICAL SPINE CT:  1.  Mild compression superior aspect of T1 and T2, age indeterminate; appears chronic. It is new since CT chest 07/27/2018.   2.  No definite acute fracture.   3.  Moderate degenerative changes without significant canal narrowing at any level.   CT Cervical Spine w/o Contrast    Narrative    EXAM: CT HEAD W/O CONTRAST, CT CERVICAL SPINE W/O CONTRAST  LOCATION: [facility]  DATE: 2/4/2024    INDICATION: Confusion, head and neck injury  COMPARISON: CT head 11/29/2022  TECHNIQUE:   1) Routine CT Head without IV contrast. Multiplanar reformats. Dose reduction techniques were used.  2) Routine CT Cervical Spine without IV contrast. Multiplanar reformats. Dose reduction techniques were used.    FINDINGS:   HEAD CT:   INTRACRANIAL CONTENTS: No intracranial hemorrhage, extraaxial collection, or mass effect.  No CT evidence of acute infarct. Moderate presumed chronic small vessel ischemic changes. Mild generalized volume loss. No hydrocephalus.     VISUALIZED ORBITS/SINUSES/MASTOIDS: No intraorbital abnormality. No paranasal sinus mucosal disease. No middle ear or mastoid effusion.    BONES/SOFT TISSUES: No acute abnormality.    CERVICAL SPINE CT:   VERTEBRA: Mild compression superior aspect of T1 and T2, age indeterminate; appears chronic. It is new since CT chest 07/27/2018. No definite acute fracture. Straightening of cervical lordosis. Nonaggressive appearing lytic lesion within the dens and   central aspect of body of C2; probably due to a somewhat atypical hemangioma. Subcentimeter nonaggressive appearing sclerotic lesion left lamina of C2.     CANAL/FORAMINA: Moderate degenerative changes without significant  canal narrowing at any level. Multilevel mild-to-moderate neural foraminal narrowing.    PARASPINAL: No extraspinal abnormality. Visualized lung fields are clear.      Impression    IMPRESSION:  HEAD CT:  1.  No definite acute intracranial process.    CERVICAL SPINE CT:  1.  Mild compression superior aspect of T1 and T2, age indeterminate; appears chronic. It is new since CT chest 07/27/2018.   2.  No definite acute fracture.   3.  Moderate degenerative changes without significant canal narrowing at any level.   CTA Chest Abdomen Pelvis w Contrast    Narrative    EXAM: CT CHEST, ABDOMEN AND PELVIS WITH CONTRAST  LOCATION: [facility]  DATE/TIME: 2/4/2024 10:14 PM CST    INDICATION: Unwitnessed fall. Altered mental status.  COMPARISON: 07/27/2018.    TECHNIQUE: Note that this study was performed in conjunction with a CT scan of the thoracolumbar spine. Refer to separate reports for findings from those studies. CT angiogram chest abdomen pelvis during arterial phase of injection of IV contrast. 2D and   3D MIP reconstructions were performed by the CT technologist. Dose reduction techniques were used.  CONTRAST: 75 mL [id number].    FINDINGS:    ANGIOGRAM CHEST, ABDOMEN, AND PELVIS: Atherosclerotic calcification in the aorta. The aorta is normal in caliber without dissection. A very small 1.4 cm wide x 0.5 cm deep apparent outpouching of the inferolateral aspect of the proximal descending   thoracic aorta (for example, series 6 image 57 and series 10 image 118), not present on 07/27/2018. No stranding or hematoma visualized in the mediastinum adjacent to this finding. No visualized pulmonary embolus.    LUNGS AND PLEURA: 0.5 cm nodule in the anterior mid right lung in the region of the minor fissure (series 7 image 169), unchanged since 07/27/2018 and therefore consistent with benign etiology. A few curvilinear opacities in the periphery of the lungs   likely represent scarring or  atelectasis.    CORONARY ARTERY CALCIFICATION: Present.    MEDIASTINUM/AXILLAE: Unremarkable.    HEPATOBILIARY: Unremarkable.    SPLEEN: Unremarkable.    PANCREAS: Unremarkable.    ADRENAL GLANDS: Unremarkable.    KIDNEYS/BLADDER: A balloon tip catheter is present in the urinary bladder lumen. 2 cm diverticulum from the posterior left aspect of the urinary bladder.    BOWEL: Several colonic diverticula are present without evidence of diverticulitis. The appendix is not visualized.    LYMPH NODES: Unremarkable.    PELVIC ORGANS: Moderate enlargement of the prostate gland.    MUSCULOSKELETAL: Nonacute moderate compression deformity of the L1 vertebral body.    OTHER: None.      Impression    IMPRESSION:   1. A very small apparent slight focal outpouching of the proximal descending thoracic aorta. This is new since 07/27/2018, but otherwise age-indeterminate. It could represent changes due to atherosclerosis, but a very small penetrating ulcer cannot be   excluded. It is unlikely to relate to acute trauma. A follow-up CT scan of the thoracic aorta is recommended in 2 weeks in order to evaluate for interval changes in this finding.  2. No other acute abnormality identified in the chest, abdomen or pelvis.   CT Thoracic Spine w/o Contrast    Narrative    EXAM: CT THORACIC SPINE W/O CONTRAST, CT LUMBAR SPINE W/O CONTRAST  LOCATION: [facility]  DATE: 2/4/2024    INDICATION: eval fractures (reconstructions please). Patient was found down. Potential trauma.   COMPARISON: Lumbar spine CT 11/29/2022  TECHNIQUE:  1) Routine CT Thoracic Spine without IV contrast. Multiplanar reformats. Dose reduction techniques were used.   2) Routine CT Lumbar Spine without IV contrast. Multiplanar reformats. Dose reduction techniques were used.     FINDINGS:    THORACIC SPINE CT:  The exam is moderately degraded by motion.    VERTEBRA: Mild compression forming disease at the superior endplates of T1 and T2 are age  indeterminate. Subtle compression deformity at the superior endplate of T3 is age-indeterminate. Mild anterior wedging compression deformity at the superior   endplate of T5 is age-indeterminate. There is an chronic anterior wedging compression deformity of T12 with up to 30-40% loss of height anteriorly. No acute subluxation.     CANAL/FORAMINA: No canal or neural foraminal stenosis.    PARASPINAL: No extraspinal abnormality.    LUMBAR SPINE CT:  VERTEBRA: 5 lumbar type vertebra. There is a chronic moderate to severe burst-type fracture of L1 with up to 60-70% loss of height anteriorly. There is slight buckling of the posterior/superior corner into the ventral canal. Vertebral body height is   otherwise normal. Mild grade 1 retrolisthesis of L2. There is 7 mm degenerative anterolisthesis of L5 on S1. Multilevel advanced disc degeneration.     CANAL/FORAMINA: Moderate central canal stenosis at L3-L4 and L4-L5. Mild to moderate multilevel neural foraminal narrowing.    PARASPINAL: No extraspinal abnormality.      Impression    IMPRESSION:  THORACIC SPINE CT:  1.  The exam is moderately degraded by motion.  2.  Age-indeterminate mild compression deformities at the superior endplates of T1, T2, T3 and T5.  3.  Chronic anterior wedging compression deformity of T12.    LUMBAR SPINE CT:  1.  No acute fracture or subluxation.  2.  Chronic moderate to severe burst-type fracture of L1.  3.  Moderate central canal stenosis at L3-L4 and L4-L5.     CT Lumbar Spine w/o Contrast    Narrative    EXAM: CT THORACIC SPINE W/O CONTRAST, CT LUMBAR SPINE W/O CONTRAST  LOCATION: [facility]  DATE: 2/4/2024    INDICATION: eval fractures (reconstructions please). Patient was found down. Potential trauma.   COMPARISON: Lumbar spine CT 11/29/2022  TECHNIQUE:  1) Routine CT Thoracic Spine without IV contrast. Multiplanar reformats. Dose reduction techniques were used.   2) Routine CT Lumbar Spine without IV contrast.  Multiplanar reformats. Dose reduction techniques were used.     FINDINGS:    THORACIC SPINE CT:  The exam is moderately degraded by motion.    VERTEBRA: Mild compression forming disease at the superior endplates of T1 and T2 are age indeterminate. Subtle compression deformity at the superior endplate of T3 is age-indeterminate. Mild anterior wedging compression deformity at the superior   endplate of T5 is age-indeterminate. There is an chronic anterior wedging compression deformity of T12 with up to 30-40% loss of height anteriorly. No acute subluxation.     CANAL/FORAMINA: No canal or neural foraminal stenosis.    PARASPINAL: No extraspinal abnormality.    LUMBAR SPINE CT:  VERTEBRA: 5 lumbar type vertebra. There is a chronic moderate to severe burst-type fracture of L1 with up to 60-70% loss of height anteriorly. There is slight buckling of the posterior/superior corner into the ventral canal. Vertebral body height is   otherwise normal. Mild grade 1 retrolisthesis of L2. There is 7 mm degenerative anterolisthesis of L5 on S1. Multilevel advanced disc degeneration.     CANAL/FORAMINA: Moderate central canal stenosis at L3-L4 and L4-L5. Mild to moderate multilevel neural foraminal narrowing.    PARASPINAL: No extraspinal abnormality.      Impression    IMPRESSION:  THORACIC SPINE CT:  1.  The exam is moderately degraded by motion.  2.  Age-indeterminate mild compression deformities at the superior endplates of T1, T2, T3 and T5.  3.  Chronic anterior wedging compression deformity of T12.    LUMBAR SPINE CT:  1.  No acute fracture or subluxation.  2.  Chronic moderate to severe burst-type fracture of L1.  3.  Moderate central canal stenosis at L3-L4 and L4-L5.         Discharge Medications   Current Discharge Medication List        START taking these medications    Details   amoxicillin (AMOXIL) 500 MG capsule Take 1 capsule (500 mg) by mouth 2 times daily  Qty: 10 capsule, Refills: 0    Associated Diagnoses:  Complicated UTI (urinary tract infection)           CONTINUE these medications which have NOT CHANGED    Details   acetaminophen (TYLENOL) 500 MG tablet Take 1,000 mg by mouth every 6 hours as needed for mild pain      atenolol (TENORMIN) 25 MG tablet Take 25 mg by mouth every evening      Calcium Carbonate-Vitamin D (OSCAL 500/200 D-3 PO) Take 1 tablet by mouth daily.      finasteride (PROSCAR) 5 MG tablet Take 5 mg by mouth daily      GLUCOSAMINE-CHONDROITIN-VIT D3 PO Take 1 tablet by mouth daily      ipratropium - albuterol 0.5 mg/2.5 mg/3 mL (DUONEB) 0.5-2.5 (3) MG/3ML neb solution Take 1 vial (3 mLs) by nebulization every 6 hours as needed for shortness of breath, wheezing or cough  Qty: 90 mL, Refills: 0      levothyroxine (SYNTHROID/LEVOTHROID) 75 MCG tablet Take 75 mcg by mouth daily      traZODone (DESYREL) 50 MG tablet Take 50 mg by mouth at bedtime      Vitamin D3 (VITAMIN D, CHOLECALCIFEROL,) 25 mcg (1000 units) tablet Take 1 tablet by mouth every evening           Allergies   No Known Allergies

## 2024-02-05 NOTE — PROGRESS NOTES
PRIMARY DIAGNOSIS: SYNCOPE/TIA  OUTPATIENT/OBSERVATION GOALS TO BE MET BEFORE DISCHARGE:  1. Orthostatic performed: N/A    2. Diagnostic testing complete & at baseline neurologic testing: Yes    3. Cleared by consultants (if involved): Yes    4. Interpretation of cardiac rhythm per telemetry tech: Sinus Bradycardia    5. Tolerating adequate PO diet and medications: Yes    6. Return to near baseline physical activity or neurologic status: Yes    Discharge Planner Nurse   Safe discharge environment identified: Yes  Barriers to discharge: No       Entered by: Susanna Lomois RN 02/05/2024 5:41 PM   Patient has met all observation goals and discharge order received. Patient discharged to home under the care of his grand daughter. Discharge instructions taught to grand daughter and read back.   Please review provider order for any additional goals.   Nurse to notify provider when observation goals have been met and patient is ready for discharge.

## 2024-02-05 NOTE — PROGRESS NOTES
02/05/24 1300   Appointment Info   Signing Clinician's Name / Credentials (OT) Zoila AVALOS OTR/L CLT   Appointment Cancel Comments (OT) OT not indicated. per Physical Therapy patient receives 24hr A at home.

## 2024-02-05 NOTE — PHARMACY-ADMISSION MEDICATION HISTORY
Pharmacist Admission Medication History    Admission medication history is complete. The information provided in this note is only as accurate as the sources available at the time of the update.    Information Source(s): Family member, Caregiver, Clinic records, and CareEverywhere/SureScripts via in-person    Pertinent Information: none    Changes made to PTA medication list:  Added: vitamin D, finasteride, trazodone  Deleted: apixaban, hydroxyzine  Changed: levothyroxine     Medication Affordability:       Allergies reviewed with patient and updates made in EHR: yes    Medication History Completed By: oJe Robison [facility] 2/4/2024 8:52 PM    Prior to Admission medications    Medication Sig Last Dose Taking? Auth Provider Long Term End Date   acetaminophen (TYLENOL) 500 MG tablet Take 1,000 mg by mouth every 6 hours as needed for mild pain  Yes Unknown, Entered By History     atenolol (TENORMIN) 25 MG tablet Take 25 mg by mouth every evening 2/3/2024 at PM Yes Unknown, Entered By History No    Calcium Carbonate-Vitamin D (OSCAL 500/200 D-3 PO) Take 1 tablet by mouth daily. 2/4/2024 at am Yes Reported, Patient     finasteride (PROSCAR) 5 MG tablet Take 5 mg by mouth daily 2/4/2024 at am Yes Unknown, Entered By History     GLUCOSAMINE-CHONDROITIN-VIT D3 PO Take 1 tablet by mouth daily 2/4/2024 at am Yes Unknown, Entered By History     ipratropium - albuterol 0.5 mg/2.5 mg/3 mL (DUONEB) 0.5-2.5 (3) MG/3ML neb solution Take 1 vial (3 mLs) by nebulization every 6 hours as needed for shortness of breath, wheezing or cough  Yes Contreras Tan MD Yes    levothyroxine (SYNTHROID/LEVOTHROID) 75 MCG tablet Take 75 mcg by mouth daily 2/4/2024 at am Yes Unknown, Entered By History No    traZODone (DESYREL) 50 MG tablet Take 50 mg by mouth at bedtime 2/3/2024 at hs Yes Unknown, Entered By History Yes    Vitamin D3 (VITAMIN D, CHOLECALCIFEROL,) 25 mcg (1000 units) tablet Take 1 tablet by mouth every evening 2/3/2024 at PM  Yes Unknown, Entered By History

## 2024-02-06 LAB — BACTERIA UR CULT: ABNORMAL

## 2024-02-06 NOTE — PLAN OF CARE
Physical Therapy Discharge Summary    Reason for therapy discharge:    Discharged to home with home therapy.    Progress towards therapy goal(s). See goals on Care Plan in [facility] electronic health record for goal details.  Goals partially met.  Barriers to achieving goals:   discharge from facility.    Therapy recommendation(s):    Recommend continued therapies to improve overall strength, balance and mobility.       Myla Rosario, PT, DPT  2/6/2024

## 2024-02-07 ENCOUNTER — PATIENT OUTREACH (OUTPATIENT)
Dept: CARE COORDINATION | Facility: CLINIC | Age: 89
End: 2024-02-07
Payer: COMMERCIAL

## 2024-02-29 ENCOUNTER — APPOINTMENT (OUTPATIENT)
Dept: CT IMAGING | Facility: HOSPITAL | Age: 89
DRG: 698 | End: 2024-02-29
Attending: EMERGENCY MEDICINE
Payer: COMMERCIAL

## 2024-02-29 ENCOUNTER — APPOINTMENT (OUTPATIENT)
Dept: ULTRASOUND IMAGING | Facility: HOSPITAL | Age: 89
DRG: 698 | End: 2024-02-29
Attending: STUDENT IN AN ORGANIZED HEALTH CARE EDUCATION/TRAINING PROGRAM
Payer: COMMERCIAL

## 2024-02-29 ENCOUNTER — APPOINTMENT (OUTPATIENT)
Dept: CT IMAGING | Facility: HOSPITAL | Age: 89
DRG: 698 | End: 2024-02-29
Attending: INTERNAL MEDICINE
Payer: COMMERCIAL

## 2024-02-29 ENCOUNTER — HOSPITAL ENCOUNTER (INPATIENT)
Facility: HOSPITAL | Age: 89
LOS: 4 days | Discharge: HOME | DRG: 698 | End: 2024-03-04
Attending: EMERGENCY MEDICINE | Admitting: STUDENT IN AN ORGANIZED HEALTH CARE EDUCATION/TRAINING PROGRAM
Payer: COMMERCIAL

## 2024-02-29 ENCOUNTER — APPOINTMENT (OUTPATIENT)
Dept: RADIOLOGY | Facility: HOSPITAL | Age: 89
DRG: 698 | End: 2024-02-29
Attending: EMERGENCY MEDICINE
Payer: COMMERCIAL

## 2024-02-29 DIAGNOSIS — R53.81 PHYSICAL DECONDITIONING: ICD-10-CM

## 2024-02-29 DIAGNOSIS — E53.1 VITAMIN B6 DEFICIENCY: ICD-10-CM

## 2024-02-29 DIAGNOSIS — E56.9 VITAMIN DEFICIENCY: ICD-10-CM

## 2024-02-29 DIAGNOSIS — N39.0 URINARY TRACT INFECTION ASSOCIATED WITH INDWELLING URETHRAL CATHETER, SUBSEQUENT ENCOUNTER: ICD-10-CM

## 2024-02-29 DIAGNOSIS — T83.511D URINARY TRACT INFECTION ASSOCIATED WITH INDWELLING URETHRAL CATHETER, SUBSEQUENT ENCOUNTER: ICD-10-CM

## 2024-02-29 DIAGNOSIS — I71.9 ATHEROSCLEROTIC ULCER OF AORTA (H): ICD-10-CM

## 2024-02-29 DIAGNOSIS — E03.4 HYPOTHYROIDISM DUE TO ACQUIRED ATROPHY OF THYROID: ICD-10-CM

## 2024-02-29 DIAGNOSIS — E03.8 OTHER SPECIFIED HYPOTHYROIDISM: ICD-10-CM

## 2024-02-29 DIAGNOSIS — M62.838 MUSCLE SPASMS OF NECK: ICD-10-CM

## 2024-02-29 DIAGNOSIS — N39.0 URINARY TRACT INFECTION ASSOCIATED WITH CATHETERIZATION OF URINARY TRACT, UNSPECIFIED INDWELLING URINARY CATHETER TYPE, SUBSEQUENT ENCOUNTER: ICD-10-CM

## 2024-02-29 DIAGNOSIS — N41.9 PROSTATITIS, UNSPECIFIED PROSTATITIS TYPE: ICD-10-CM

## 2024-02-29 DIAGNOSIS — E44.0 MODERATE PROTEIN-CALORIE MALNUTRITION (H): ICD-10-CM

## 2024-02-29 DIAGNOSIS — L98.9 SKIN LESION: Primary | ICD-10-CM

## 2024-02-29 DIAGNOSIS — T83.511D URINARY TRACT INFECTION ASSOCIATED WITH CATHETERIZATION OF URINARY TRACT, UNSPECIFIED INDWELLING URINARY CATHETER TYPE, SUBSEQUENT ENCOUNTER: ICD-10-CM

## 2024-02-29 DIAGNOSIS — I70.0 ATHEROSCLEROTIC ULCER OF AORTA (H): ICD-10-CM

## 2024-02-29 DIAGNOSIS — E53.8 VITAMIN B 12 DEFICIENCY: ICD-10-CM

## 2024-02-29 DIAGNOSIS — I10 ESSENTIAL HYPERTENSION: ICD-10-CM

## 2024-02-29 DIAGNOSIS — G93.40 ENCEPHALOPATHY: ICD-10-CM

## 2024-02-29 DIAGNOSIS — R41.0 DISORIENTATION: ICD-10-CM

## 2024-02-29 PROBLEM — I63.9 CEREBROVASCULAR ACCIDENT (H): Status: ACTIVE | Noted: 2022-11-01

## 2024-02-29 LAB
ALBUMIN SERPL BCG-MCNC: 3.5 G/DL (ref 3.5–5.2)
ALBUMIN UR-MCNC: 10 MG/DL
ALP SERPL-CCNC: 69 U/L (ref 40–150)
ALT SERPL W P-5'-P-CCNC: <5 U/L (ref 0–70)
ANION GAP SERPL CALCULATED.3IONS-SCNC: 10 MMOL/L (ref 7–15)
APPEARANCE UR: ABNORMAL
APTT PPP: 30 SECONDS (ref 22–38)
AST SERPL W P-5'-P-CCNC: 17 U/L (ref 0–45)
ATRIAL RATE - MUSE: 57 BPM
BASOPHILS # BLD AUTO: 0 10E3/UL (ref 0–0.2)
BASOPHILS NFR BLD AUTO: 1 %
BILIRUB SERPL-MCNC: 0.4 MG/DL
BILIRUB UR QL STRIP: NEGATIVE
BUN SERPL-MCNC: 16.1 MG/DL (ref 8–23)
CALCIUM SERPL-MCNC: 9 MG/DL (ref 8.8–10.2)
CHLORIDE SERPL-SCNC: 98 MMOL/L (ref 98–107)
COLOR UR AUTO: ABNORMAL
CREAT SERPL-MCNC: 1.22 MG/DL (ref 0.67–1.17)
DEPRECATED HCO3 PLAS-SCNC: 28 MMOL/L (ref 22–29)
DIASTOLIC BLOOD PRESSURE - MUSE: 81 MMHG
EGFRCR SERPLBLD CKD-EPI 2021: 57 ML/MIN/1.73M2
EOSINOPHIL # BLD AUTO: 0.1 10E3/UL (ref 0–0.7)
EOSINOPHIL NFR BLD AUTO: 1 %
ERYTHROCYTE [DISTWIDTH] IN BLOOD BY AUTOMATED COUNT: 11.7 % (ref 10–15)
FLUAV RNA SPEC QL NAA+PROBE: NEGATIVE
FLUBV RNA RESP QL NAA+PROBE: NEGATIVE
GLUCOSE BLDC GLUCOMTR-MCNC: 120 MG/DL (ref 70–99)
GLUCOSE SERPL-MCNC: 111 MG/DL (ref 70–99)
GLUCOSE UR STRIP-MCNC: NEGATIVE MG/DL
HCT VFR BLD AUTO: 34.7 % (ref 40–53)
HGB BLD-MCNC: 11.8 G/DL (ref 13.3–17.7)
HGB UR QL STRIP: ABNORMAL
IMM GRANULOCYTES # BLD: 0 10E3/UL
IMM GRANULOCYTES NFR BLD: 1 %
INR PPP: 1.02 (ref 0.85–1.15)
INTERPRETATION ECG - MUSE: NORMAL
KETONES UR STRIP-MCNC: NEGATIVE MG/DL
LEUKOCYTE ESTERASE UR QL STRIP: ABNORMAL
LYMPHOCYTES # BLD AUTO: 0.8 10E3/UL (ref 0.8–5.3)
LYMPHOCYTES NFR BLD AUTO: 13 %
MAGNESIUM SERPL-MCNC: 1.8 MG/DL (ref 1.7–2.3)
MCH RBC QN AUTO: 32.2 PG (ref 26.5–33)
MCHC RBC AUTO-ENTMCNC: 34 G/DL (ref 31.5–36.5)
MCV RBC AUTO: 95 FL (ref 78–100)
MONOCYTES # BLD AUTO: 0.8 10E3/UL (ref 0–1.3)
MONOCYTES NFR BLD AUTO: 14 %
MUCOUS THREADS #/AREA URNS LPF: PRESENT /LPF
NEUTROPHILS # BLD AUTO: 4.1 10E3/UL (ref 1.6–8.3)
NEUTROPHILS NFR BLD AUTO: 70 %
NITRATE UR QL: NEGATIVE
NRBC # BLD AUTO: 0 10E3/UL
NRBC BLD AUTO-RTO: 0 /100
P AXIS - MUSE: NORMAL DEGREES
PH UR STRIP: 6.5 [PH] (ref 5–7)
PLATELET # BLD AUTO: 292 10E3/UL (ref 150–450)
POTASSIUM SERPL-SCNC: 3.4 MMOL/L (ref 3.4–5.3)
PR INTERVAL - MUSE: NORMAL MS
PROT SERPL-MCNC: 6.2 G/DL (ref 6.4–8.3)
QRS DURATION - MUSE: 88 MS
QT - MUSE: 438 MS
QTC - MUSE: 444 MS
R AXIS - MUSE: -28 DEGREES
RBC # BLD AUTO: 3.66 10E6/UL (ref 4.4–5.9)
RBC URINE: 4 /HPF
RSV RNA SPEC NAA+PROBE: NEGATIVE
SARS-COV-2 RNA RESP QL NAA+PROBE: NEGATIVE
SODIUM SERPL-SCNC: 136 MMOL/L (ref 135–145)
SP GR UR STRIP: 1.01 (ref 1–1.03)
SQUAMOUS EPITHELIAL: 17 /HPF
SYSTOLIC BLOOD PRESSURE - MUSE: 158 MMHG
T AXIS - MUSE: 31 DEGREES
T4 FREE SERPL-MCNC: 1.23 NG/DL (ref 0.9–1.7)
TROPONIN T SERPL HS-MCNC: 39 NG/L
TROPONIN T SERPL HS-MCNC: 41 NG/L
TSH SERPL DL<=0.005 MIU/L-ACNC: 14.4 UIU/ML (ref 0.3–4.2)
UROBILINOGEN UR STRIP-MCNC: <2 MG/DL
VENTRICULAR RATE- MUSE: 62 BPM
WBC # BLD AUTO: 5.9 10E3/UL (ref 4–11)
WBC CLUMPS #/AREA URNS HPF: PRESENT /HPF
WBC URINE: 31 /HPF

## 2024-02-29 PROCEDURE — 83735 ASSAY OF MAGNESIUM: CPT | Performed by: EMERGENCY MEDICINE

## 2024-02-29 PROCEDURE — 84439 ASSAY OF FREE THYROXINE: CPT | Performed by: STUDENT IN AN ORGANIZED HEALTH CARE EDUCATION/TRAINING PROGRAM

## 2024-02-29 PROCEDURE — 87637 SARSCOV2&INF A&B&RSV AMP PRB: CPT | Performed by: EMERGENCY MEDICINE

## 2024-02-29 PROCEDURE — 99418 PROLNG IP/OBS E/M EA 15 MIN: CPT | Performed by: STUDENT IN AN ORGANIZED HEALTH CARE EDUCATION/TRAINING PROGRAM

## 2024-02-29 PROCEDURE — 70450 CT HEAD/BRAIN W/O DYE: CPT

## 2024-02-29 PROCEDURE — 36415 COLL VENOUS BLD VENIPUNCTURE: CPT | Performed by: EMERGENCY MEDICINE

## 2024-02-29 PROCEDURE — 96375 TX/PRO/DX INJ NEW DRUG ADDON: CPT

## 2024-02-29 PROCEDURE — 84443 ASSAY THYROID STIM HORMONE: CPT | Performed by: STUDENT IN AN ORGANIZED HEALTH CARE EDUCATION/TRAINING PROGRAM

## 2024-02-29 PROCEDURE — 250N000011 HC RX IP 250 OP 636: Performed by: STUDENT IN AN ORGANIZED HEALTH CARE EDUCATION/TRAINING PROGRAM

## 2024-02-29 PROCEDURE — 71275 CT ANGIOGRAPHY CHEST: CPT

## 2024-02-29 PROCEDURE — 250N000011 HC RX IP 250 OP 636: Performed by: INTERNAL MEDICINE

## 2024-02-29 PROCEDURE — 93005 ELECTROCARDIOGRAM TRACING: CPT | Performed by: EMERGENCY MEDICINE

## 2024-02-29 PROCEDURE — 99223 1ST HOSP IP/OBS HIGH 75: CPT | Performed by: INTERNAL MEDICINE

## 2024-02-29 PROCEDURE — 81001 URINALYSIS AUTO W/SCOPE: CPT | Performed by: EMERGENCY MEDICINE

## 2024-02-29 PROCEDURE — 85730 THROMBOPLASTIN TIME PARTIAL: CPT | Performed by: EMERGENCY MEDICINE

## 2024-02-29 PROCEDURE — 80053 COMPREHEN METABOLIC PANEL: CPT | Performed by: EMERGENCY MEDICINE

## 2024-02-29 PROCEDURE — 99285 EMERGENCY DEPT VISIT HI MDM: CPT | Mod: 25

## 2024-02-29 PROCEDURE — 85025 COMPLETE CBC W/AUTO DIFF WBC: CPT | Performed by: EMERGENCY MEDICINE

## 2024-02-29 PROCEDURE — 87040 BLOOD CULTURE FOR BACTERIA: CPT | Performed by: INTERNAL MEDICINE

## 2024-02-29 PROCEDURE — 76770 US EXAM ABDO BACK WALL COMP: CPT

## 2024-02-29 PROCEDURE — 84484 ASSAY OF TROPONIN QUANT: CPT | Performed by: EMERGENCY MEDICINE

## 2024-02-29 PROCEDURE — 36415 COLL VENOUS BLD VENIPUNCTURE: CPT | Performed by: INTERNAL MEDICINE

## 2024-02-29 PROCEDURE — 71045 X-RAY EXAM CHEST 1 VIEW: CPT

## 2024-02-29 PROCEDURE — 250N000011 HC RX IP 250 OP 636: Mod: JZ | Performed by: EMERGENCY MEDICINE

## 2024-02-29 PROCEDURE — 85610 PROTHROMBIN TIME: CPT | Performed by: EMERGENCY MEDICINE

## 2024-02-29 PROCEDURE — 120N000001 HC R&B MED SURG/OB

## 2024-02-29 PROCEDURE — 99223 1ST HOSP IP/OBS HIGH 75: CPT | Performed by: STUDENT IN AN ORGANIZED HEALTH CARE EDUCATION/TRAINING PROGRAM

## 2024-02-29 PROCEDURE — 87086 URINE CULTURE/COLONY COUNT: CPT | Performed by: EMERGENCY MEDICINE

## 2024-02-29 PROCEDURE — 250N000013 HC RX MED GY IP 250 OP 250 PS 637: Performed by: STUDENT IN AN ORGANIZED HEALTH CARE EDUCATION/TRAINING PROGRAM

## 2024-02-29 PROCEDURE — 96374 THER/PROPH/DIAG INJ IV PUSH: CPT

## 2024-02-29 RX ORDER — PIPERACILLIN SODIUM, TAZOBACTAM SODIUM 3; .375 G/15ML; G/15ML
3.38 INJECTION, POWDER, LYOPHILIZED, FOR SOLUTION INTRAVENOUS ONCE
Status: COMPLETED | OUTPATIENT
Start: 2024-02-29 | End: 2024-02-29

## 2024-02-29 RX ORDER — LABETALOL HYDROCHLORIDE 5 MG/ML
10 INJECTION, SOLUTION INTRAVENOUS EVERY 4 HOURS PRN
Status: DISCONTINUED | OUTPATIENT
Start: 2024-02-29 | End: 2024-02-29

## 2024-02-29 RX ORDER — AMOXICILLIN 250 MG
2 CAPSULE ORAL 2 TIMES DAILY PRN
Status: DISCONTINUED | OUTPATIENT
Start: 2024-02-29 | End: 2024-03-04 | Stop reason: HOSPADM

## 2024-02-29 RX ORDER — FINASTERIDE 5 MG/1
5 TABLET, FILM COATED ORAL DAILY
Status: DISCONTINUED | OUTPATIENT
Start: 2024-02-29 | End: 2024-03-04 | Stop reason: HOSPADM

## 2024-02-29 RX ORDER — HYDRALAZINE HYDROCHLORIDE 20 MG/ML
10 INJECTION INTRAMUSCULAR; INTRAVENOUS ONCE
Status: COMPLETED | OUTPATIENT
Start: 2024-02-29 | End: 2024-02-29

## 2024-02-29 RX ORDER — CEFTRIAXONE 1 G/1
1 INJECTION, POWDER, FOR SOLUTION INTRAMUSCULAR; INTRAVENOUS ONCE
Status: DISCONTINUED | OUTPATIENT
Start: 2024-02-29 | End: 2024-02-29

## 2024-02-29 RX ORDER — LIDOCAINE 40 MG/G
CREAM TOPICAL
Status: DISCONTINUED | OUTPATIENT
Start: 2024-02-29 | End: 2024-03-04 | Stop reason: HOSPADM

## 2024-02-29 RX ORDER — NALOXONE HYDROCHLORIDE 0.4 MG/ML
0.4 INJECTION, SOLUTION INTRAMUSCULAR; INTRAVENOUS; SUBCUTANEOUS
Status: DISCONTINUED | OUTPATIENT
Start: 2024-02-29 | End: 2024-03-04 | Stop reason: HOSPADM

## 2024-02-29 RX ORDER — PIPERACILLIN SODIUM, TAZOBACTAM SODIUM 3; .375 G/15ML; G/15ML
3.38 INJECTION, POWDER, LYOPHILIZED, FOR SOLUTION INTRAVENOUS ONCE
Status: DISCONTINUED | OUTPATIENT
Start: 2024-02-29 | End: 2024-02-29

## 2024-02-29 RX ORDER — MEROPENEM 1 G/1
1 INJECTION, POWDER, FOR SOLUTION INTRAVENOUS ONCE
Status: DISCONTINUED | OUTPATIENT
Start: 2024-02-29 | End: 2024-02-29

## 2024-02-29 RX ORDER — LABETALOL HYDROCHLORIDE 5 MG/ML
10 INJECTION, SOLUTION INTRAVENOUS EVERY 4 HOURS PRN
Status: DISCONTINUED | OUTPATIENT
Start: 2024-02-29 | End: 2024-03-01

## 2024-02-29 RX ORDER — PIPERACILLIN SODIUM, TAZOBACTAM SODIUM 3; .375 G/15ML; G/15ML
3.38 INJECTION, POWDER, LYOPHILIZED, FOR SOLUTION INTRAVENOUS EVERY 8 HOURS
Status: DISCONTINUED | OUTPATIENT
Start: 2024-02-29 | End: 2024-03-04

## 2024-02-29 RX ORDER — QUETIAPINE FUMARATE 25 MG/1
25 TABLET, FILM COATED ORAL
Status: DISCONTINUED | OUTPATIENT
Start: 2024-02-29 | End: 2024-02-29

## 2024-02-29 RX ORDER — NALOXONE HYDROCHLORIDE 0.4 MG/ML
0.2 INJECTION, SOLUTION INTRAMUSCULAR; INTRAVENOUS; SUBCUTANEOUS
Status: DISCONTINUED | OUTPATIENT
Start: 2024-02-29 | End: 2024-03-04 | Stop reason: HOSPADM

## 2024-02-29 RX ORDER — CALCIUM CARBONATE 500 MG/1
1000 TABLET, CHEWABLE ORAL 4 TIMES DAILY PRN
Status: DISCONTINUED | OUTPATIENT
Start: 2024-02-29 | End: 2024-03-04 | Stop reason: HOSPADM

## 2024-02-29 RX ORDER — IOPAMIDOL 755 MG/ML
75 INJECTION, SOLUTION INTRAVASCULAR ONCE
Status: COMPLETED | OUTPATIENT
Start: 2024-02-29 | End: 2024-02-29

## 2024-02-29 RX ORDER — ACETAMINOPHEN 650 MG/1
650 SUPPOSITORY RECTAL EVERY 4 HOURS PRN
Status: DISCONTINUED | OUTPATIENT
Start: 2024-02-29 | End: 2024-03-04 | Stop reason: HOSPADM

## 2024-02-29 RX ORDER — HYDRALAZINE HYDROCHLORIDE 20 MG/ML
10 INJECTION INTRAMUSCULAR; INTRAVENOUS EVERY 4 HOURS PRN
Status: DISCONTINUED | OUTPATIENT
Start: 2024-02-29 | End: 2024-03-01

## 2024-02-29 RX ORDER — ENOXAPARIN SODIUM 100 MG/ML
40 INJECTION SUBCUTANEOUS EVERY 24 HOURS
Status: DISCONTINUED | OUTPATIENT
Start: 2024-02-29 | End: 2024-03-04 | Stop reason: HOSPADM

## 2024-02-29 RX ORDER — IPRATROPIUM BROMIDE AND ALBUTEROL SULFATE 2.5; .5 MG/3ML; MG/3ML
1 SOLUTION RESPIRATORY (INHALATION) EVERY 6 HOURS PRN
Status: DISCONTINUED | OUTPATIENT
Start: 2024-02-29 | End: 2024-03-04 | Stop reason: HOSPADM

## 2024-02-29 RX ORDER — AMOXICILLIN 250 MG
1 CAPSULE ORAL 2 TIMES DAILY PRN
Status: DISCONTINUED | OUTPATIENT
Start: 2024-02-29 | End: 2024-03-04 | Stop reason: HOSPADM

## 2024-02-29 RX ORDER — ATENOLOL 25 MG/1
25 TABLET ORAL EVERY EVENING
Status: DISCONTINUED | OUTPATIENT
Start: 2024-02-29 | End: 2024-03-01

## 2024-02-29 RX ORDER — LEVOTHYROXINE SODIUM 75 UG/1
75 TABLET ORAL DAILY
Status: DISCONTINUED | OUTPATIENT
Start: 2024-02-29 | End: 2024-03-04

## 2024-02-29 RX ORDER — ACETAMINOPHEN 325 MG/1
650 TABLET ORAL EVERY 4 HOURS PRN
Status: DISCONTINUED | OUTPATIENT
Start: 2024-02-29 | End: 2024-03-04 | Stop reason: HOSPADM

## 2024-02-29 RX ORDER — POLYETHYLENE GLYCOL 3350 17 G/17G
17 POWDER, FOR SOLUTION ORAL 2 TIMES DAILY PRN
Status: DISCONTINUED | OUTPATIENT
Start: 2024-02-29 | End: 2024-03-04 | Stop reason: HOSPADM

## 2024-02-29 RX ORDER — HALOPERIDOL 5 MG/ML
2 INJECTION INTRAMUSCULAR ONCE
Status: COMPLETED | OUTPATIENT
Start: 2024-02-29 | End: 2024-02-29

## 2024-02-29 RX ADMIN — Medication 1 MG: at 17:59

## 2024-02-29 RX ADMIN — PIPERACILLIN AND TAZOBACTAM 3.38 G: 3; .375 INJECTION, POWDER, FOR SOLUTION INTRAVENOUS at 17:40

## 2024-02-29 RX ADMIN — HALOPERIDOL LACTATE 2 MG: 5 INJECTION, SOLUTION INTRAMUSCULAR at 14:04

## 2024-02-29 RX ADMIN — IOPAMIDOL 75 ML: 755 INJECTION, SOLUTION INTRAVENOUS at 15:32

## 2024-02-29 RX ADMIN — CEFTRIAXONE SODIUM 1 G: 1 INJECTION, POWDER, FOR SOLUTION INTRAMUSCULAR; INTRAVENOUS at 14:04

## 2024-02-29 RX ADMIN — HYDRALAZINE HYDROCHLORIDE 10 MG: 20 INJECTION INTRAMUSCULAR; INTRAVENOUS at 16:35

## 2024-02-29 RX ADMIN — HYDRALAZINE HYDROCHLORIDE 10 MG: 20 INJECTION INTRAMUSCULAR; INTRAVENOUS at 17:55

## 2024-02-29 RX ADMIN — QUETIAPINE FUMARATE 12.5 MG: 25 TABLET, FILM COATED ORAL at 19:09

## 2024-02-29 RX ADMIN — ATENOLOL 25 MG: 25 TABLET ORAL at 19:09

## 2024-02-29 RX ADMIN — ENOXAPARIN SODIUM 40 MG: 40 INJECTION SUBCUTANEOUS at 20:16

## 2024-02-29 RX ADMIN — FINASTERIDE 5 MG: 5 TABLET, FILM COATED ORAL at 17:40

## 2024-02-29 RX ADMIN — PIPERACILLIN AND TAZOBACTAM 3.38 G: 3; .375 INJECTION, POWDER, FOR SOLUTION INTRAVENOUS at 20:16

## 2024-02-29 ASSESSMENT — ACTIVITIES OF DAILY LIVING (ADL)
ADLS_ACUITY_SCORE: 41
ADLS_ACUITY_SCORE: 39
DEPENDENT_IADLS:: CLEANING;COOKING;LAUNDRY;SHOPPING;MEAL PREPARATION;MEDICATION MANAGEMENT;MONEY MANAGEMENT;TRANSPORTATION
ADLS_ACUITY_SCORE: 39
ADLS_ACUITY_SCORE: 39
ADLS_ACUITY_SCORE: 41
ADLS_ACUITY_SCORE: 39
ADLS_ACUITY_SCORE: 39
ADLS_ACUITY_SCORE: 41
CONCENTRATING,_REMEMBERING_OR_MAKING_DECISIONS_DIFFICULTY: YES
ADLS_ACUITY_SCORE: 39
ADLS_ACUITY_SCORE: 37
ADLS_ACUITY_SCORE: 41
ADLS_ACUITY_SCORE: 39
ADLS_ACUITY_SCORE: 41
ADLS_ACUITY_SCORE: 41

## 2024-02-29 ASSESSMENT — ENCOUNTER SYMPTOMS
FEVER: 0
HEADACHES: 1
HALLUCINATIONS: 1

## 2024-02-29 ASSESSMENT — COLUMBIA-SUICIDE SEVERITY RATING SCALE - C-SSRS: IS THE PATIENT NOT ABLE TO COMPLETE C-SSRS: UNABLE TO VERBALIZE

## 2024-02-29 NOTE — CONSULTS
[facility]    Infectious Disease Consultation     Date of Admission:  2/29/2024  Date of Consult (When I saw the patient): 02/29/24    Assessment & Plan   Dilpi Long is a 88 year old male who was admitted on 2/29/2024.     Impression:  Encephalopathy, hallucinations, altered mental status  Acute kidney injury on chronic kidney disease  Abnormal CT chest history, outpouching from thoracic aorta in the past  Urinary tract infection history, likely bacterial colonization--patient has had Enterococcus in urine cultures in the past.  Was treated with amoxicillin indwelling Lozano catheter.  Prostate enlargement, risk of prostatitis  Fall, at home  Advanced age, 88 years old, lives at home with family members  Recent hospitalization 2/4 - 2/5/2024-for encephalopathy, UTI, acute kidney injury on chronic kidney disease  Previous urine cultures:    Susceptibility data from last 90 days.  Collected Specimen Info Organism Ampicillin Nitrofurantoin Vancomycin   02/04/24 Urine, Midstream Enterococcus faecalis  S  S  S       Recommendations    Follow culture results, obtain blood cultures monitor CBC, CMP  Empiric Zosyn de-escalate based on culture results and clinical response  CTA chest abdomen pelvis, as recommended from previous discharge summary.  Patient was scheduled for outpatient CT scan however could not be performed as patient was weak  Lozano catheter management per urology.  Patient has history of prostate enlargement, bilateral diverticula  Depending on culture results, will determine antibiotic plan.  Due to risk of prostatitis recommend 4-week course of antibiotics  Consider MRI brain when patient is medically stable.  Consider neurology input if mentation does not improve--discussed with hospitalist, appreciate coordination of care  Pulmonary nodule management per primary  Discussed with patient, patient's granddaughter in ED.  Discussed with hospitalist in ED  Appreciate input from  Radiology tech  Thank you for consulting infectious disease.  Will follow with you    Joan Sosa MD  [facility]  Answering Service: [phone]  On-Call ID provider: [phone], option: 9      Reason for Consult   Reason for consult: I was asked to evaluate this patient for     Recurrent Enterococcus UTI   .    Primary Care Physician   Kolton Junior    Chief Complaint   Worsening confusion and hallucinations    History is obtained from the patient and medical records    History of Present Illness   Dilip Long is a 88 year old male who presents with history of indwelling Lozano catheter, acute kidney injury, intermittent agitation, recent fall at home.  Patient presents with worsening confusions, hallucinations.  No fevers or chills.  Patient was hypertensive on arrival.  Intermittently confused and requires recurrent redirection.  Influenza and COVID testing negative.  Cultures in process.  Patient has been on amoxicillin in the past.  Patient seen in ED, patient denies any new pain.  According to the daughter Lozano catheter in place, color has changed slightly however improved as compared to prior hospitalizations.    Past Medical History   I have reviewed this patient's medical history and updated it with pertinent information if needed.   Past Medical History:   Diagnosis Date    Acute heart failure with preserved ejection fraction (HFpEF) (H) 11/10/2022    Acute prostatitis     Asymptomatic bacteriuria 10/23/2013    Noted at 10/2/13 office visit at [facility]. No treatment recommended at that time.     Congestive heart failure (H)     Essential hypertension 08/18/2015    Hypertension     Paroxysmal atrial fib -- on Eliquis 11/06/2022    Syncope     Thyroid disease        Past Surgical History   I have reviewed this patient's surgical history and updated it with pertinent information if needed.  Past Surgical History:   Procedure Laterality Date    BLADDER SURGERY       Bladder absces removal    Blepharoplasty Upper Lid W/ Excessive Skin[  1/29/2007    CATARACT EXTRACTION      CYSTOSCOPY, FULGURATE BLEEDERS, EVACUATE CLOT(S), COMBINED N/A 4/19/2023    Procedure: CYSTOSCOPY, BLADDER BIOPSY WITH FULGURATION AND CLOT EVACUATION;  Surgeon: Edgar Laird MD;  Location: [facility]    EYE SURGERY      both eyes 2015    IR LUMBAR EPIDURAL STEROID INJECTION  4/27/2004    IR LUMBAR EPIDURAL STEROID INJECTION  5/11/2004    IR LUMBAR EPIDURAL STEROID INJECTION  11/24/2004    IR LUMBAR EPIDURAL STEROID INJECTION  11/28/2007    NC CYSTOURETHROSCOPY W/IRRIG & EVAC CLOTS N/A 7/27/2018    Procedure: CYSTOSCOPY, CLOT EVACUATION ,URETHRAL DILATATION, DAUGHERTY CATHETER PLACEMENT;  Surgeon: Lowell Arias MD;  Location: [facility];  Service: Urology       Prior to Admission Medications   Prior to Admission Medications   Prescriptions Last Dose Informant Patient Reported? Taking?   Calcium Carbonate-Vitamin D (OSCAL 500/200 D-3 PO) 2/28/2024 at am  Yes Yes   Sig: Take 1 tablet by mouth daily.   GLUCOSAMINE-CHONDROITIN-VIT D3 PO 2/28/2024 at am  Yes Yes   Sig: Take 1 tablet by mouth daily   Vitamin D3 (VITAMIN D, CHOLECALCIFEROL,) 25 mcg (1000 units) tablet 2/28/2024 at pm  Yes Yes   Sig: Take 1 tablet by mouth every evening   acetaminophen (TYLENOL) 500 MG tablet Past Week at prn  Yes Yes   Sig: Take 1,000 mg by mouth every 6 hours as needed for mild pain   amoxicillin (AMOXIL) 500 MG capsule 2/28/2024 at pm  No Yes   Sig: Take 1 capsule (500 mg) by mouth 2 times daily   atenolol (TENORMIN) 25 MG tablet 2/28/2024 at pm  Yes Yes   Sig: Take 25 mg by mouth every evening   finasteride (PROSCAR) 5 MG tablet 2/28/2024 at am  Yes Yes   Sig: Take 5 mg by mouth daily   ipratropium - albuterol 0.5 mg/2.5 mg/3 mL (DUONEB) 0.5-2.5 (3) MG/3ML neb solution Unknown at prn  No Yes   Sig: Take 1 vial (3 mLs) by nebulization every 6 hours as needed for shortness of breath, wheezing or cough    levothyroxine (SYNTHROID/LEVOTHROID) 75 MCG tablet 2/28/2024 at am  Yes Yes   Sig: Take 75 mcg by mouth daily   traZODone (DESYREL) 50 MG tablet 2/29/2024 at am  Yes Yes   Sig: Take 50 mg by mouth at bedtime      Facility-Administered Medications: None     Allergies   No Known Allergies    Immunization History   Immunization History   Administered Date(s) Administered    COVID-19 MONOVALENT 12+ (Pfizer) 12/27/2021    COVID-19 Vaccine (Ania) 03/31/2021    Influenza (IIV3) PF 10/28/2012    Influenza Vaccine 65+ (Fluzone HD) 11/08/2022    Pneumococcal 23 valent 05/21/2010, 05/01/2021    TD,PF 7+ (Tenivac) 01/20/1999, 05/21/2010    TDAP (Adacel,Boostrix) 05/01/2021    Td (Adult), Adsorbed 01/20/1999, 05/21/2010    Tdap (Adult) Unspecified Formulation 01/20/1999, 05/21/2010       Social History   I have reviewed this patient's social history and updated it with pertinent information if needed. Dilip VICK Mercedes  reports that he has quit smoking. His smoking use included cigarettes. He has never used smokeless tobacco. He reports current alcohol use. He reports that he does not use drugs.    Family History   I have reviewed this patient's family history and updated it with pertinent information if needed.  No known sick contacts at home    Family History   Problem Relation Age of Onset    Diabetes No family hx of     Hypertension No family hx of     Other Cancer No family hx of     Coronary Artery Disease No family hx of     Hyperlipidemia No family hx of     Cerebrovascular Disease No family hx of     Breast Cancer No family hx of     Colon Cancer No family hx of     Prostate Cancer No family hx of     Depression No family hx of     Anxiety Disorder No family hx of     Mental Illness No family hx of     Substance Abuse No family hx of     Anesthesia Reaction No family hx of     Asthma No family hx of     Osteoporosis No family hx of     Genetic Disorder No family hx of     Thyroid Disease No family hx of     Obesity  No family hx of     No Known Problems Mother     No Known Problems Father        Review of Systems   The 10 point Review of Systems is negative other than noted in the HPI or here.     Physical Exam   Temp: 98.3  F (36.8  C) Temp src: Temporal BP: (!) 199/85 Pulse: 72   Resp: 18 SpO2: 98 % O2 Device: None (Room air)    Vital Signs with Ranges  Temp:  [98.3  F (36.8  C)] 98.3  F (36.8  C)  Pulse:  [59-75] 72  Resp:  [18] 18  BP: (162-199)/(76-88) 199/85  SpO2:  [95 %-98 %] 98 %  150 lbs 0 oz  Body mass index is 22.81 kg/m .    GENERAL APPEARANCE: Elderly, requires redirection, does follow commands however requires frequent reminding  EYES: Prefers to keep closed  HENT: Dry  NECK: supple  RESP: Diminished at bases  CV: S1-S2  LYMPHATICS:swelling  ABDOMEN: Nontender, nondistended, Lozano catheter in place  MS: Very deconditioned  SKIN: Warm, ecchymosis  NEURO: Oriented x 1, requires frequent reminding to follow command, answers few questions.      LABORATORY DATA:  Reviewed  WBC normal, hemoglobin 11.8, platelets 292    CBC RESULTS:   Recent Labs   Lab Test 02/29/24  1040   WBC 5.9   RBC 3.66*   HGB 11.8*   HCT 34.7*   MCV 95   MCH 32.2   MCHC 34.0   RDW 11.7           Last Comprehensive Metabolic Panel:  Sodium   Date Value Ref Range Status   02/29/2024 136 135 - 145 mmol/L Final     Comment:     Reference intervals for this test were updated on 09/26/2023 to more accurately reflect our healthy population. There may be differences in the flagging of prior results with similar values performed with this method. Interpretation of those prior results can be made in the context of the updated reference intervals.    03/13/2019 141.0 133.0 - 144.0 mmol/L Final     Potassium   Date Value Ref Range Status   02/29/2024 3.4 3.4 - 5.3 mmol/L Final   04/20/2023 3.8 3.5 - 5.0 mmol/L Final   03/13/2019 4.1 3.4 - 5.3 mmol/L Final     Chloride   Date Value Ref Range Status   02/29/2024 98 98 - 107 mmol/L Final   04/20/2023 103  98 - 107 mmol/L Final   03/13/2019 102.0 94.0 - 109.0 mmol/L Final     Carbon Dioxide   Date Value Ref Range Status   03/13/2019 30.0 20.0 - 32.0 mmol/L Final     Carbon Dioxide (CO2)   Date Value Ref Range Status   02/29/2024 28 22 - 29 mmol/L Final   04/20/2023 24 22 - 31 mmol/L Final     Anion Gap   Date Value Ref Range Status   02/29/2024 10 7 - 15 mmol/L Final   04/20/2023 9 5 - 18 mmol/L Final     Glucose   Date Value Ref Range Status   02/29/2024 111 (H) 70 - 99 mg/dL Final   04/20/2023 122 70 - 125 mg/dL Final   03/13/2019 117.0 (H) 60.0 - 109.0 mg/dL Final     Urea Nitrogen   Date Value Ref Range Status   02/29/2024 16.1 8.0 - 23.0 mg/dL Final   04/20/2023 15 8 - 28 mg/dL Final   03/13/2019 21.0 7.0 - 30.0 mg/dL Final     Creatinine   Date Value Ref Range Status   02/29/2024 1.22 (H) 0.67 - 1.17 mg/dL Final   03/13/2019 1.3 0.8 - 1.5 mg/dL Final     GFR Estimate   Date Value Ref Range Status   02/29/2024 57 (L) >60 mL/min/1.73m2 Final   07/18/2019 >60 >60 mL/min/1.73m2 Final   06/15/2017 >60 >60 mL/min/1.73m2 Final     Calcium   Date Value Ref Range Status   02/29/2024 9.0 8.8 - 10.2 mg/dL Final   03/13/2019 9.5 8.5 - 10.4 mg/dL Final     Bilirubin Total   Date Value Ref Range Status   02/29/2024 0.4 <=1.2 mg/dL Final     Alkaline Phosphatase   Date Value Ref Range Status   02/29/2024 69 40 - 150 U/L Final     Comment:     Reference intervals for this test were updated on 11/14/2023 to more accurately reflect our healthy population. There may be differences in the flagging of prior results with similar values performed with this method. Interpretation of those prior results can be made in the context of the updated reference intervals.     ALT   Date Value Ref Range Status   02/29/2024 <5 0 - 70 U/L Final     Comment:     Reference intervals for this test were updated on 6/12/2023 to more accurately reflect our healthy population. There may be differences in the flagging of prior results with similar values  "performed with this method. Interpretation of those prior results can be made in the context of the updated reference intervals.       AST   Date Value Ref Range Status   02/29/2024 17 0 - 45 U/L Final     Comment:     Reference intervals for this test were updated on 6/12/2023 to more accurately reflect our healthy population. There may be differences in the flagging of prior results with similar values performed with this method. Interpretation of those prior results can be made in the context of the updated reference intervals.             No results found for: \"CRP\"         MICROBIOLOGY:    Reviewed    Blood cultures  Other Micro:  Susceptibility data from last 90 days.  Collected Specimen Info Organism Ampicillin Nitrofurantoin Vancomycin   02/04/24 Urine, Midstream Enterococcus faecalis  S  S  S      RADIOLOGY:    Head CT w/o contrast    Result Date: 2/29/2024  EXAM: CT HEAD W/O CONTRAST LOCATION: [facility] DATE: 2/29/2024 INDICATION: Altered mental status. COMPARISON: CT dated 02/04/2024. TECHNIQUE: Routine CT Head without IV contrast. Multiplanar reformats. Dose reduction techniques were used. FINDINGS: Mildly motion degraded exam. INTRACRANIAL CONTENTS: No acute intracranial hemorrhage, extra-axial fluid collection, or mass effect. No evidence of an acute transcortical confluent infarct. Grossly similar foci of hypoattenuation involving the bilateral cerebral white matter, nonspecific although most likely the sequela of chronic microvascular ischemia. Mild generalized cerebral parenchymal volume loss. No hydrocephalus. VISUALIZED ORBITS/SINUSES/MASTOIDS: No acute intraorbital finding. Status post functional endoscopic sinus surgery with overall similar trace mucosal thickening scattered about the visualized paranasal sinuses without an air-fluid level. No mastoid or middle ear effusion. Cerumen within the bilateral external auditory canals. BONES/SOFT TISSUES: No acute abnormality. " An incidental bilateral frontal scalp lipomas. Severe right temporal mandibular joint degenerative change.     IMPRESSION: 1.  No acute intracranial abnormality. 2.  Similar chronic changes, as described.    XR Chest Port 1 View    Result Date: 2/29/2024  EXAM: XR CHEST PORT 1 VIEW LOCATION: [facility] DATE: 2/29/2024 INDICATION: Chest Pain COMPARISON: CTA chest 02/04/2024     IMPRESSION: Patient's rotated to the left with a left head tilt as well. Lungs are clear. Heart and pulmonary vascularity are normal. No signs of pneumonia or failure. Old right posterior rib fractures again noted.    CTA Chest Abdomen Pelvis w Contrast    Result Date: 2/4/2024  EXAM: CT CHEST, ABDOMEN AND PELVIS WITH CONTRAST LOCATION: [facility] DATE/TIME: 2/4/2024 10:14 PM CST INDICATION: Unwitnessed fall. Altered mental status. COMPARISON: 07/27/2018. TECHNIQUE: Note that this study was performed in conjunction with a CT scan of the thoracolumbar spine. Refer to separate reports for findings from those studies. CT angiogram chest abdomen pelvis during arterial phase of injection of IV contrast. 2D and  3D MIP reconstructions were performed by the CT technologist. Dose reduction techniques were used. CONTRAST: 75 mL [id number]. FINDINGS: ANGIOGRAM CHEST, ABDOMEN, AND PELVIS: Atherosclerotic calcification in the aorta. The aorta is normal in caliber without dissection. A very small 1.4 cm wide x 0.5 cm deep apparent outpouching of the inferolateral aspect of the proximal descending thoracic aorta (for example, series 6 image 57 and series 10 image 118), not present on 07/27/2018. No stranding or hematoma visualized in the mediastinum adjacent to this finding. No visualized pulmonary embolus. LUNGS AND PLEURA: 0.5 cm nodule in the anterior mid right lung in the region of the minor fissure (series 7 image 169), unchanged since 07/27/2018 and therefore consistent with benign etiology. A few  curvilinear opacities in the periphery of the lungs likely represent scarring or atelectasis. CORONARY ARTERY CALCIFICATION: Present. MEDIASTINUM/AXILLAE: Unremarkable. HEPATOBILIARY: Unremarkable. SPLEEN: Unremarkable. PANCREAS: Unremarkable. ADRENAL GLANDS: Unremarkable. KIDNEYS/BLADDER: A balloon tip catheter is present in the urinary bladder lumen. 2 cm diverticulum from the posterior left aspect of the urinary bladder. BOWEL: Several colonic diverticula are present without evidence of diverticulitis. The appendix is not visualized. LYMPH NODES: Unremarkable. PELVIC ORGANS: Moderate enlargement of the prostate gland. MUSCULOSKELETAL: Nonacute moderate compression deformity of the L1 vertebral body. OTHER: None.     IMPRESSION: 1. A very small apparent slight focal outpouching of the proximal descending thoracic aorta. This is new since 07/27/2018, but otherwise age-indeterminate. It could represent changes due to atherosclerosis, but a very small penetrating ulcer cannot be excluded. It is unlikely to relate to acute trauma. A follow-up CT scan of the thoracic aorta is recommended in 2 weeks in order to evaluate for interval changes in this finding. 2. No other acute abnormality identified in the chest, abdomen or pelvis.    CT Thoracic Spine w/o Contrast    Result Date: 2/4/2024  EXAM: CT THORACIC SPINE W/O CONTRAST, CT LUMBAR SPINE W/O CONTRAST LOCATION: [facility] DATE: 2/4/2024 INDICATION: eval fractures (reconstructions please). Patient was found down. Potential trauma. COMPARISON: Lumbar spine CT 11/29/2022 TECHNIQUE: 1) Routine CT Thoracic Spine without IV contrast. Multiplanar reformats. Dose reduction techniques were used. 2) Routine CT Lumbar Spine without IV contrast. Multiplanar reformats. Dose reduction techniques were used. FINDINGS: THORACIC SPINE CT: The exam is moderately degraded by motion. VERTEBRA: Mild compression forming disease at the superior endplates of T1 and  T2 are age indeterminate. Subtle compression deformity at the superior endplate of T3 is age-indeterminate. Mild anterior wedging compression deformity at the superior endplate of T5 is age-indeterminate. There is an chronic anterior wedging compression deformity of T12 with up to 30-40% loss of height anteriorly. No acute subluxation. CANAL/FORAMINA: No canal or neural foraminal stenosis. PARASPINAL: No extraspinal abnormality. LUMBAR SPINE CT: VERTEBRA: 5 lumbar type vertebra. There is a chronic moderate to severe burst-type fracture of L1 with up to 60-70% loss of height anteriorly. There is slight buckling of the posterior/superior corner into the ventral canal. Vertebral body height is otherwise normal. Mild grade 1 retrolisthesis of L2. There is 7 mm degenerative anterolisthesis of L5 on S1. Multilevel advanced disc degeneration. CANAL/FORAMINA: Moderate central canal stenosis at L3-L4 and L4-L5. Mild to moderate multilevel neural foraminal narrowing. PARASPINAL: No extraspinal abnormality.     IMPRESSION: THORACIC SPINE CT: 1.  The exam is moderately degraded by motion. 2.  Age-indeterminate mild compression deformities at the superior endplates of T1, T2, T3 and T5. 3.  Chronic anterior wedging compression deformity of T12. LUMBAR SPINE CT: 1.  No acute fracture or subluxation. 2.  Chronic moderate to severe burst-type fracture of L1. 3.  Moderate central canal stenosis at L3-L4 and L4-L5.     CT Lumbar Spine w/o Contrast    Result Date: 2/4/2024  EXAM: CT THORACIC SPINE W/O CONTRAST, CT LUMBAR SPINE W/O CONTRAST LOCATION: [facility] DATE: 2/4/2024 INDICATION: eval fractures (reconstructions please). Patient was found down. Potential trauma. COMPARISON: Lumbar spine CT 11/29/2022 TECHNIQUE: 1) Routine CT Thoracic Spine without IV contrast. Multiplanar reformats. Dose reduction techniques were used. 2) Routine CT Lumbar Spine without IV contrast. Multiplanar reformats. Dose reduction  techniques were used. FINDINGS: THORACIC SPINE CT: The exam is moderately degraded by motion. VERTEBRA: Mild compression forming disease at the superior endplates of T1 and T2 are age indeterminate. Subtle compression deformity at the superior endplate of T3 is age-indeterminate. Mild anterior wedging compression deformity at the superior endplate of T5 is age-indeterminate. There is an chronic anterior wedging compression deformity of T12 with up to 30-40% loss of height anteriorly. No acute subluxation. CANAL/FORAMINA: No canal or neural foraminal stenosis. PARASPINAL: No extraspinal abnormality. LUMBAR SPINE CT: VERTEBRA: 5 lumbar type vertebra. There is a chronic moderate to severe burst-type fracture of L1 with up to 60-70% loss of height anteriorly. There is slight buckling of the posterior/superior corner into the ventral canal. Vertebral body height is otherwise normal. Mild grade 1 retrolisthesis of L2. There is 7 mm degenerative anterolisthesis of L5 on S1. Multilevel advanced disc degeneration. CANAL/FORAMINA: Moderate central canal stenosis at L3-L4 and L4-L5. Mild to moderate multilevel neural foraminal narrowing. PARASPINAL: No extraspinal abnormality.     IMPRESSION: THORACIC SPINE CT: 1.  The exam is moderately degraded by motion. 2.  Age-indeterminate mild compression deformities at the superior endplates of T1, T2, T3 and T5. 3.  Chronic anterior wedging compression deformity of T12. LUMBAR SPINE CT: 1.  No acute fracture or subluxation. 2.  Chronic moderate to severe burst-type fracture of L1. 3.  Moderate central canal stenosis at L3-L4 and L4-L5.     Head CT w/o contrast    Result Date: 2/4/2024  EXAM: CT HEAD W/O CONTRAST, CT CERVICAL SPINE W/O CONTRAST LOCATION: [facility] DATE: 2/4/2024 INDICATION: Confusion, head and neck injury COMPARISON: CT head 11/29/2022 TECHNIQUE: 1) Routine CT Head without IV contrast. Multiplanar reformats. Dose reduction techniques were  used. 2) Routine CT Cervical Spine without IV contrast. Multiplanar reformats. Dose reduction techniques were used. FINDINGS: HEAD CT: INTRACRANIAL CONTENTS: No intracranial hemorrhage, extraaxial collection, or mass effect.  No CT evidence of acute infarct. Moderate presumed chronic small vessel ischemic changes. Mild generalized volume loss. No hydrocephalus. VISUALIZED ORBITS/SINUSES/MASTOIDS: No intraorbital abnormality. No paranasal sinus mucosal disease. No middle ear or mastoid effusion. BONES/SOFT TISSUES: No acute abnormality. CERVICAL SPINE CT: VERTEBRA: Mild compression superior aspect of T1 and T2, age indeterminate; appears chronic. It is new since CT chest 07/27/2018. No definite acute fracture. Straightening of cervical lordosis. Nonaggressive appearing lytic lesion within the dens and central aspect of body of C2; probably due to a somewhat atypical hemangioma. Subcentimeter nonaggressive appearing sclerotic lesion left lamina of C2. CANAL/FORAMINA: Moderate degenerative changes without significant canal narrowing at any level. Multilevel mild-to-moderate neural foraminal narrowing. PARASPINAL: No extraspinal abnormality. Visualized lung fields are clear.     IMPRESSION: HEAD CT: 1.  No definite acute intracranial process. CERVICAL SPINE CT: 1.  Mild compression superior aspect of T1 and T2, age indeterminate; appears chronic. It is new since CT chest 07/27/2018. 2.  No definite acute fracture. 3.  Moderate degenerative changes without significant canal narrowing at any level.    CT Cervical Spine w/o Contrast    Result Date: 2/4/2024  EXAM: CT HEAD W/O CONTRAST, CT CERVICAL SPINE W/O CONTRAST LOCATION: [facility] DATE: 2/4/2024 INDICATION: Confusion, head and neck injury COMPARISON: CT head 11/29/2022 TECHNIQUE: 1) Routine CT Head without IV contrast. Multiplanar reformats. Dose reduction techniques were used. 2) Routine CT Cervical Spine without IV contrast. Multiplanar  reformats. Dose reduction techniques were used. FINDINGS: HEAD CT: INTRACRANIAL CONTENTS: No intracranial hemorrhage, extraaxial collection, or mass effect.  No CT evidence of acute infarct. Moderate presumed chronic small vessel ischemic changes. Mild generalized volume loss. No hydrocephalus. VISUALIZED ORBITS/SINUSES/MASTOIDS: No intraorbital abnormality. No paranasal sinus mucosal disease. No middle ear or mastoid effusion. BONES/SOFT TISSUES: No acute abnormality. CERVICAL SPINE CT: VERTEBRA: Mild compression superior aspect of T1 and T2, age indeterminate; appears chronic. It is new since CT chest 07/27/2018. No definite acute fracture. Straightening of cervical lordosis. Nonaggressive appearing lytic lesion within the dens and central aspect of body of C2; probably due to a somewhat atypical hemangioma. Subcentimeter nonaggressive appearing sclerotic lesion left lamina of C2. CANAL/FORAMINA: Moderate degenerative changes without significant canal narrowing at any level. Multilevel mild-to-moderate neural foraminal narrowing. PARASPINAL: No extraspinal abnormality. Visualized lung fields are clear.     IMPRESSION: HEAD CT: 1.  No definite acute intracranial process. CERVICAL SPINE CT: 1.  Mild compression superior aspect of T1 and T2, age indeterminate; appears chronic. It is new since CT chest 07/27/2018. 2.  No definite acute fracture. 3.  Moderate degenerative changes without significant canal narrowing at any level.       Medical Decision Making       MANAGEMENT DISCUSSED with the following over the past 24 hours: patient, patient's [facility] hospitalist   NOTE(S)/MEDICAL RECORDS REVIEWED over the past 24 hours: reviewed  Tests ORDERED & REVIEWED in the past 24 hours:  - See lab/imaging results included in the data section of the note  Tests personally interpreted in the past 24 hours:  - CHEST CT showing pending  - ABDOMINAL CT showing diveritculi  SUPPLEMENTAL HISTORY, in addition to the patient's  history, over the past 24 hours obtained from:   - grand daughter, lives with patient  Medical complexity over the past 24 hours:  - Intensive monitoring for MEDICATION TOXICITY  - Decision regarding ESCALATION OF LEVEL OF CARE  - evaluation, management

## 2024-02-29 NOTE — ED TRIAGE NOTES
He is on antibiotics for UTI. Now having hallucinations according to caregiver granddaughter. Also having headache and low back pain which sounds chronic.

## 2024-02-29 NOTE — H&P
[facility]    History and Physical - Hospitalist Service       Date of Admission:  2/29/2024    Assessment & Plan        Dilip Long is a 88 year old male admitted on 2/29/2024 for encephalopathy.    Acute toxic and metabolic encephalopathy  -granddaughter at bedsdide stats patient normally conversational and alert and oriented without much issues, currently only alert to self  -CT head w/o contrast negative for acute findings   -secondary to infection, hypertensive emergency and question other etiology including ?PRES which would not be seen on CT. Consider MRI however patient restless and would not be able to complete    -neurology consult   -will also check TSH, trend trop    Recurrent UTI  -given recurrent UTI will consult ID  -follow up on culture results   -kidney ultrasound to assess for abscess   -continue zosyn per ID recs     HTN emergency  -initially ordered nicardipine drip given symptomatology, however this can only be administered in the ICU which does not have beds   -will use labetalol as first line and hydralazine as a second option  -BP goal to lower slowly over first hour with goal -160  -neurology consulted, recs appreciated    -question utilization of MRI head as noted above     CKD  -creatinine baseline 1.1-1.2  -CTM    Chronic anemia  -stable, no s/s of active bleed  -CTM     HFpEF  -euvolemic on exam, not in exacerbation   -not on home diuretics  -monitor volume status     A-fib  -PTA atenolol   -not on home AC per med rec     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  -vascular consult   -BP control as noted above         Diet: Regular Diet Adult    DVT Prophylaxis: Enoxaparin (Lovenox) SQ  Lozano Catheter: Not present  Lines: None     Cardiac Monitoring: None  Code Status: No CPR- Do NOT Intubate      Clinically Significant Risk Factors Present on Admission                  # Hypertension: Noted on problem list          # Financial/Environmental  Concerns:           Disposition Plan      Expected Discharge Date: 03/02/2024    Discharge Delays: IV Medication - consider oral or Home Infusion              Homar Lala DO  Hospitalist Service  [facility]  Securely message with [employer] (more info)  Text page via [employer] Paging/Directory     ______________________________________________________________________    Chief Complaint   Confusion     History is obtained from the patient's granddaughter    History of Present Illness   Dilip NICANOR Long is a 88 year old male who is presenting with increased confusion for the past several days.  Patient has a history of heart failure with preserved ejection fraction, hypertension, A-fib, recurrent urinary tract infections for which she was recently admitted and discharged on amoxicillin.  Daughter states that patient seem to be improving several days post discharge.  He then received a Lozano exchange for his chronic indwelling Lozano last week.  A day or 2 after the Lozano exchange, granddaughter states that patient was notably confused.  At baseline, patient is conversational and overall without significant cognitive impairment but does have some very mild short-term memory loss.  No fevers, chills, vomiting noted.  No abdominal pain.  Patient has been complaining of a headache over the past week.  Daughter states that he has been noting hallucinations as well.  No bowel or bladder incontinence.  Denies any visual changes.      Past Medical History    Past Medical History:   Diagnosis Date    Acute heart failure with preserved ejection fraction (HFpEF) (H) 11/10/2022    Acute prostatitis     Asymptomatic bacteriuria 10/23/2013    Noted at 10/2/13 office visit at [facility]. No treatment recommended at that time.     Congestive heart failure (H)     Essential hypertension 08/18/2015    Hypertension     Paroxysmal atrial fib -- on Eliquis 11/06/2022    Syncope     Thyroid disease        Past  Surgical History   Past Surgical History:   Procedure Laterality Date    BLADDER SURGERY      Bladder absces removal    Blepharoplasty Upper Lid W/ Excessive Skin[  1/29/2007    CATARACT EXTRACTION      CYSTOSCOPY, FULGURATE BLEEDERS, EVACUATE CLOT(S), COMBINED N/A 4/19/2023    Procedure: CYSTOSCOPY, BLADDER BIOPSY WITH FULGURATION AND CLOT EVACUATION;  Surgeon: Edgar Laird MD;  Location: [facility]    EYE SURGERY      both eyes 2015    IR LUMBAR EPIDURAL STEROID INJECTION  4/27/2004    IR LUMBAR EPIDURAL STEROID INJECTION  5/11/2004    IR LUMBAR EPIDURAL STEROID INJECTION  11/24/2004    IR LUMBAR EPIDURAL STEROID INJECTION  11/28/2007    CA CYSTOURETHROSCOPY W/IRRIG & EVAC CLOTS N/A 7/27/2018    Procedure: CYSTOSCOPY, CLOT EVACUATION ,URETHRAL DILATATION, DAUGHERTY CATHETER PLACEMENT;  Surgeon: Lowell Arias MD;  Location: [facility];  Service: Urology       Prior to Admission Medications   Prior to Admission Medications   Prescriptions Last Dose Informant Patient Reported? Taking?   Calcium Carbonate-Vitamin D (OSCAL 500/200 D-3 PO) 2/28/2024 at am  Yes Yes   Sig: Take 1 tablet by mouth daily.   GLUCOSAMINE-CHONDROITIN-VIT D3 PO 2/28/2024 at am  Yes Yes   Sig: Take 1 tablet by mouth daily   Vitamin D3 (VITAMIN D, CHOLECALCIFEROL,) 25 mcg (1000 units) tablet 2/28/2024 at pm  Yes Yes   Sig: Take 1 tablet by mouth every evening   acetaminophen (TYLENOL) 500 MG tablet Past Week at prn  Yes Yes   Sig: Take 1,000 mg by mouth every 6 hours as needed for mild pain   amoxicillin (AMOXIL) 500 MG capsule 2/28/2024 at pm  No Yes   Sig: Take 1 capsule (500 mg) by mouth 2 times daily   atenolol (TENORMIN) 25 MG tablet 2/28/2024 at pm  Yes Yes   Sig: Take 25 mg by mouth every evening   finasteride (PROSCAR) 5 MG tablet 2/28/2024 at am  Yes Yes   Sig: Take 5 mg by mouth daily   ipratropium - albuterol 0.5 mg/2.5 mg/3 mL (DUONEB) 0.5-2.5 (3) MG/3ML neb solution Unknown at prn  No Yes   Sig: Take 1  vial (3 mLs) by nebulization every 6 hours as needed for shortness of breath, wheezing or cough   levothyroxine (SYNTHROID/LEVOTHROID) 75 MCG tablet 2/28/2024 at am  Yes Yes   Sig: Take 75 mcg by mouth daily   traZODone (DESYREL) 50 MG tablet 2/29/2024 at am  Yes Yes   Sig: Take 50 mg by mouth at bedtime      Facility-Administered Medications: None        Social History   I have reviewed this patient's social history and updated it with pertinent information if needed.  Social History     Tobacco Use    Smoking status: Former     Types: Cigarettes    Smokeless tobacco: Never    Tobacco comments:     Pt quit 40 years ago   Vaping Use    Vaping Use: Never used   Substance Use Topics    Alcohol use: Yes     Comment: < 1 drink a week    Drug use: No         Family History   I have reviewed this patient's family history and updated it with pertinent information if needed.  Family History   Problem Relation Age of Onset    Diabetes No family hx of     Hypertension No family hx of     Other Cancer No family hx of     Coronary Artery Disease No family hx of     Hyperlipidemia No family hx of     Cerebrovascular Disease No family hx of     Breast Cancer No family hx of     Colon Cancer No family hx of     Prostate Cancer No family hx of     Depression No family hx of     Anxiety Disorder No family hx of     Mental Illness No family hx of     Substance Abuse No family hx of     Anesthesia Reaction No family hx of     Asthma No family hx of     Osteoporosis No family hx of     Genetic Disorder No family hx of     Thyroid Disease No family hx of     Obesity No family hx of     No Known Problems Mother     No Known Problems Father          Allergies   No Known Allergies     Physical Exam   Vital Signs: Temp: 98.3  F (36.8  C) Temp src: Temporal BP: (!) 199/85 Pulse: 72   Resp: 18 SpO2: 98 % O2 Device: None (Room air)    Weight: 150 lbs 0 oz    General Appearance: Nondiaphoretic, responsive to name and noxious  stimuli  Respiratory: Decreased effort, clear to auscultation  Cardiovascular: Pulses equal in all 4 extremities, no JVD, no lower extremity edema, normal S1 and S2  GI: No pain, hyperactive bowel sounds  Skin: No rash noted throughout  Other: Patient is disoriented to time, place, event.  He has tangential thoughts.  There is bilateral ankle clonus with upgoing Babinski.  She is unable to participate in strength testing.  Pupils are equal and reactive bilaterally, no facial droop    Medical Decision Making       90 MINUTES SPENT BY ME on the date of service doing chart review, history, exam, documentation & further activities per the note.      Data     I have personally reviewed the following data over the past 24 hrs:    5.9  \   11.8 (L)   / 292     136 98 16.1 /  120 (H)   3.4 28 1.22 (H) \     ALT: <5 AST: 17 AP: 69 TBILI: 0.4   ALB: 3.5 TOT PROTEIN: 6.2 (L) LIPASE: N/A     Trop: 39 (H) BNP: N/A     INR:  1.02 PTT:  30   D-dimer:  N/A Fibrinogen:  N/A       Imaging results reviewed over the past 24 hrs:   Recent Results (from the past 24 hour(s))   XR Chest Port 1 View    Narrative    EXAM: XR CHEST PORT 1 VIEW  LOCATION: [facility]  DATE: 2/29/2024    INDICATION: Chest Pain  COMPARISON: CTA chest 02/04/2024      Impression    IMPRESSION: Patient's rotated to the left with a left head tilt as well.    Lungs are clear. Heart and pulmonary vascularity are normal. No signs of pneumonia or failure.  Old right posterior rib fractures again noted.   Head CT w/o contrast    Narrative    EXAM: CT HEAD W/O CONTRAST  LOCATION: [facility]  DATE: 2/29/2024    INDICATION: Altered mental status.  COMPARISON: CT dated 02/04/2024.  TECHNIQUE: Routine CT Head without IV contrast. Multiplanar reformats. Dose reduction techniques were used.    FINDINGS:  Mildly motion degraded exam.    INTRACRANIAL CONTENTS: No acute intracranial hemorrhage, extra-axial fluid collection, or mass  effect. No evidence of an acute transcortical confluent infarct. Grossly similar foci of hypoattenuation involving the bilateral cerebral white matter,   nonspecific although most likely the sequela of chronic microvascular ischemia. Mild generalized cerebral parenchymal volume loss. No hydrocephalus.     VISUALIZED ORBITS/SINUSES/MASTOIDS: No acute intraorbital finding. Status post functional endoscopic sinus surgery with overall similar trace mucosal thickening scattered about the visualized paranasal sinuses without an air-fluid level. No mastoid or   middle ear effusion. Cerumen within the bilateral external auditory canals.    BONES/SOFT TISSUES: No acute abnormality. An incidental bilateral frontal scalp lipomas. Severe right temporal mandibular joint degenerative change.      Impression    IMPRESSION:  1.  No acute intracranial abnormality.  2.  Similar chronic changes, as described.   CTA Chest Abdomen Pelvis w Contrast    Narrative    EXAM: CTA CHEST ABDOMEN PELVIS W CONTRAST  LOCATION: [facility]  DATE: 2/29/2024    INDICATION: Sepsis, thoracic aorta outpouching on previous CT  follow up,sepsis  COMPARISON: 2/4/2024  TECHNIQUE: CT angiogram chest abdomen pelvis during arterial phase of injection of IV contrast. 2D and 3D MIP reconstructions were performed by the CT technologist. Dose reduction techniques were used.   CONTRAST: isovue 370 75ml    FINDINGS:   ANGIOGRAM CHEST, ABDOMEN, AND PELVIS: No evidence of intramural hematoma, dissection, or aneurysm. Mild-moderate atherosclerotic calcification throughout the aorta and iliofemoral arteries. No significant stenosis. Unchanged 1.4 x 0.5 cm outpouching from   the inferolateral aspect of the proximal descending thoracic aorta (6/48, 10/103). No stranding or hematoma. No central pulmonary embolus.    LUNGS AND PLEURA: Unchanged 0.5 cm nodule along the minor fissure since 07/27/2018 and therefore consistent with benign etiology. A 0.5  cm right lower lobe nodule is also unchanged. No new or enlarging suspicious nodules.    CORONARY ARTERY CALCIFICATION: Moderate    MEDIASTINUM/AXILLAE: Unremarkable.    HEPATOBILIARY: Unremarkable.    SPLEEN: Unremarkable.    PANCREAS: Unremarkable.    ADRENAL GLANDS: Unremarkable.    KIDNEYS/BLADDER: A balloon tip catheter is present in the urinary bladder lumen. The bladder is underdistended with a Lozano catheter in place and bilateral diverticula.     BOWEL: Left-sided colonic diverticulosis. No acute inflammation or obstruction.    LYMPH NODES: Unremarkable.    PELVIC ORGANS: Moderate enlargement of the prostate.    MUSCULOSKELETAL: Diffuse osteopenia and multilevel degenerative changes in the spine. Healed sternal and rib fractures. Unchanged L1 compression deformity.      Impression    IMPRESSION:   Unchanged small outpouching from the undersurface of the proximal descending thoracic aorta, which could represent an ulcerated plaque or short segment dissection flap. No evidence of acute aortic syndrome.   US Renal Complete Non-Vascular    Narrative    EXAM: US RENAL COMPLETE NON-VASCULAR  LOCATION: [facility]  DATE: 2/29/2024    INDICATION: Recurrent UTI.  COMPARISON: CT same-day.  TECHNIQUE: Routine Bilateral Renal and Bladder Ultrasound.    FINDINGS:    RIGHT KIDNEY: 10 x 6.2 x 5 cm. Normal without hydronephrosis or masses. Incidental 1.4 cm cyst.    LEFT KIDNEY: 10.1 x 4.2 x 4.3 cm. Normal without hydronephrosis or masses.     BLADDER: Decompressed bladder with intrinsic portacatheter.      Impression    IMPRESSION:    1.  Unremarkable kidney ultrasound.    2.  Decompressed bladder with intrinsic portacatheter.    3.  Please see same-day CT abdomen - pelvis.

## 2024-02-29 NOTE — MEDICATION SCRIBE - ADMISSION MEDICATION HISTORY
Medication Scribe Admission Medication History    Admission medication history is complete. The information provided in this note is only as accurate as the sources available at the time of the update.    Information Source(s): Family member via in-person    Pertinent Information: patient's medications are being managed by granddaughterShruthi. Shruthi, [phone], was present at bedside for interview.     Patient reported to not have taken any of his AM medications, with the exception of a Trazodone this morning.     Amoxicillin: Patient reports taking a total of 6 doses so far: Last does administered 2/28/24 PM.    Changes made to PTA medication list:  Added: None  Deleted: None  Changed: None    Allergies reviewed with patient and updates made in EHR: yes    Medication History Completed By: Eduardo Metz 2/29/2024 1:02 PM    PTA Med List   Medication Sig Note Last Dose    acetaminophen (TYLENOL) 500 MG tablet Take 1,000 mg by mouth every 6 hours as needed for mild pain  Past Week at prn    amoxicillin (AMOXIL) 500 MG capsule Take 1 capsule (500 mg) by mouth 2 times daily 2/29/2024: Patient reports taking a total of 6 doses so far: Last does administered 2/28/24 PM. 2/28/2024 at pm    atenolol (TENORMIN) 25 MG tablet Take 25 mg by mouth every evening  2/28/2024 at pm    Calcium Carbonate-Vitamin D (OSCAL 500/200 D-3 PO) Take 1 tablet by mouth daily.  2/28/2024 at am    finasteride (PROSCAR) 5 MG tablet Take 5 mg by mouth daily  2/28/2024 at am    GLUCOSAMINE-CHONDROITIN-VIT D3 PO Take 1 tablet by mouth daily  2/28/2024 at am    ipratropium - albuterol 0.5 mg/2.5 mg/3 mL (DUONEB) 0.5-2.5 (3) MG/3ML neb solution Take 1 vial (3 mLs) by nebulization every 6 hours as needed for shortness of breath, wheezing or cough  Unknown at prn    levothyroxine (SYNTHROID/LEVOTHROID) 75 MCG tablet Take 75 mcg by mouth daily  2/28/2024 at am    traZODone (DESYREL) 50 MG tablet Take 50 mg by mouth at bedtime  2/29/2024 at am     Vitamin D3 (VITAMIN D, CHOLECALCIFEROL,) 25 mcg (1000 units) tablet Take 1 tablet by mouth every evening  2/28/2024 at pm

## 2024-02-29 NOTE — ED PROVIDER NOTES
EMERGENCY DEPARTMENT ENCOUNTER      NAME: Dilip Long  AGE: 88 year old male  YOB: 1935  MRN: [id number]  EVALUATION DATE & TIME: 2024 10:01 AM    PCP: Kolton Junior    ED PROVIDER: Yesica Engel DO      Chief Complaint   Patient presents with    Cystitis    Headache         FINAL IMPRESSION:  1. Urinary tract infection associated with indwelling urethral catheter, subsequent encounter    2. Disorientation          ED COURSE & MEDICAL DECISION MAKIN-year-old male who is currently on amoxicillin to treat the urinary tract infection was brought into the ED by granddaughter for evaluation of worsening confusion and hallucinations.  No other significant symptoms were reported.  Patient had no significant complaints.  Patient was hypertensive upon arrival, but he was otherwise hemodynamically stable.  Appeared to be confused although he could answer basic questions.  Physical exam was unremarkable.    An EKG was obtained which revealed normal sinus rhythm without any concerning ST or T wave changes.    CBC, CMP, and magnesium were all reassuring.  The patient's troponin was slightly elevated at 41.  Testing for COVID and influenza were both negative.  Urinalysis appears to show a urinary tract infection.    Head CT scan and chest x-ray were nondiagnostic.      The repeat troponin was unchanged.    The patient was reevaluated and the granddaughter was informed of the workup results.  Granddaughter was informed that the symptoms were related to an ongoing urinary tract infection.  After reviewing the patient's chart it appears that he has been placed on amoxicillin 3 separate occasions for the seemingly same urinary tract infection.  The patient was still quite agitated and restless appearing at the time of reevaluation.  He was given a dose of Haldol to treat the agitation.    Patient was admitted to for further evaluation and treatment of the suspected urinary tract infection after  discussing the case with the hospitalist.  The patient was started on IV meropenem to treat the urinary tract infection.          Pertinent Labs & Imaging studies reviewed. (See chart for details)    10:22 AM I met with the patient to gather history and to perform my initial exam. We discussed plans for the ED course, including diagnostic testing and treatment.     2:10 PM I spoke on the phone with Dr. Lala, hospitalist, who accepts this patient for admission.       At the conclusion of the encounter I discussed the results of all of the tests and the disposition. The questions were answered. The patient or family acknowledged understanding and was agreeable with the care plan.     Medical Decision Making    History:  Supplemental history from: Documented in chart and Family Member/Significant Other  External Record(s) reviewed: Documented in chart    Work Up:  Chart documentation includes differential considered and any EKGs or imaging independently interpreted by provider, where specified.  In additional to work up documented, I considered the following work up: Documented in chart, if applicable.    External consultation:  Discussion of management with another provider: Documented in chart, if applicable    Complicating factors:  Care impacted by chronic illness: Cerebrovascular Disease, Chronic Kidney Disease, Heart Disease, Hyperlipidemia, Hypertension, and Peripheral Vascular Disease  Care affected by social determinants of health: N/A    Disposition considerations: Admit.      PPE worn: n95 mask, goggles    MEDICATIONS GIVEN IN THE EMERGENCY:  Medications   lidocaine 1 % 0.1-1 mL (has no administration in time range)   lidocaine (LMX4) cream (has no administration in time range)   sodium chloride (PF) 0.9% PF flush 3 mL (3 mLs Intracatheter Not Given 2/29/24 1527)   sodium chloride (PF) 0.9% PF flush 3 mL (has no administration in time range)   senna-docusate (SENOKOT-S/PERICOLACE) 8.6-50 MG per tablet 1  tablet (has no administration in time range)     Or   senna-docusate (SENOKOT-S/PERICOLACE) 8.6-50 MG per tablet 2 tablet (has no administration in time range)   calcium carbonate (TUMS) chewable tablet 1,000 mg (has no administration in time range)   enoxaparin ANTICOAGULANT (LOVENOX) injection 40 mg (has no administration in time range)   acetaminophen (TYLENOL) tablet 650 mg (has no administration in time range)     Or   acetaminophen (TYLENOL) Suppository 650 mg (has no administration in time range)   melatonin tablet 1 mg (has no administration in time range)   polyethylene glycol (MIRALAX) Packet 17 g (has no administration in time range)   piperacillin-tazobactam (ZOSYN) 3.375 g vial to attach to  mL bag (has no administration in time range)   piperacillin-tazobactam (ZOSYN) 3.375 g vial to attach to  mL bag (has no administration in time range)   naloxone (NARCAN) injection 0.2 mg (has no administration in time range)     Or   naloxone (NARCAN) injection 0.4 mg (has no administration in time range)     Or   naloxone (NARCAN) injection 0.2 mg (has no administration in time range)     Or   naloxone (NARCAN) injection 0.4 mg (has no administration in time range)   haloperidol lactate (HALDOL) injection 2 mg (2 mg Intravenous $Given 2/29/24 1404)   iopamidol (ISOVUE-370) solution 75 mL (75 mLs Intravenous $Given 2/29/24 1532)       NEW PRESCRIPTIONS STARTED AT TODAY'S ER VISIT  New Prescriptions    No medications on file          =================================================================    HPI    Patient information was obtained from: Patient's granddaughter    Use of : N/A        Dilip Long is a 88 year old male with a pertinent history of recurrent UTI with ojrge, hyperlipidemia, hypertension, CKD stage 3a, paroxysmal atrial fibrillation, CAD, PAD, CVA, who presents to this ED via wheelchair for evaluation of hallucinations and headache.     The patient's granddaughter  "reports that for the past 1.5 weeks, the patient has been unaware of his surroundings and hallucinating, saying that the patient has been \"seeing and hearing people,\" such as hearing babies cry. She notes that the patient has not slept in 2 days. The patient has intermittently recurring headaches, which his granddaughter says have been \"gone for awhile,\" but have returned within the last week.     Patient's granddaughter also mentions that the patient has been on 3 rounds of antibiotics for cystitis in the past month, with his most recent course starting on Monday (02/26/2024, 3 days ago). Patient has a jorge catheter, and his granddaughter notes that his urine output has been dark but generally \"looking better\" over the previous week, with less sediment than normal. She also notes that the patient has had 2 recent falls, with the most recent on 02/18/2024 (11 days ago), at which time the patient did not his his head or lose consciousness.     Denies fever, new or changed medications, or any other concerns at this time.     Per chart review, the patient was admitted to [facility] on 02/04/2024 for acute renal insufficiency. Patient presented to the ED after he had an unwitnessed fall at home and experienced an altered mental state. CT unremarkable. Labs showed renal insufficiency and urinalysis was consistent with urinary infection. Patient was given Zosyn 3.375 g, midazolam 1 mg, and Zofran 4 mg in the hospital. Patient was discharged to home on 02/05/2024 with instructions to start amoxicillin 500 mg twice daily for 5 days, follow-up with PCP for BMP and urine culture, and CTA thoracic aorta.       REVIEW OF SYSTEMS   Review of Systems   Constitutional:  Negative for fever.   Neurological:  Positive for headaches.   Psychiatric/Behavioral:  Positive for hallucinations.    All other systems reviewed and are negative.       PAST MEDICAL HISTORY:  Past Medical History:   Diagnosis Date    Acute heart failure " with preserved ejection fraction (HFpEF) (H) 11/10/2022    Acute prostatitis     Asymptomatic bacteriuria 10/23/2013    Noted at 10/2/13 office visit at [facility]. No treatment recommended at that time.     Congestive heart failure (H)     Essential hypertension 08/18/2015    Hypertension     Paroxysmal atrial fib -- on Eliquis 11/06/2022    Syncope     Thyroid disease        PAST SURGICAL HISTORY:  Past Surgical History:   Procedure Laterality Date    BLADDER SURGERY      Bladder absces removal    Blepharoplasty Upper Lid W/ Excessive Skin[  1/29/2007    CATARACT EXTRACTION      CYSTOSCOPY, FULGURATE BLEEDERS, EVACUATE CLOT(S), COMBINED N/A 4/19/2023    Procedure: CYSTOSCOPY, BLADDER BIOPSY WITH FULGURATION AND CLOT EVACUATION;  Surgeon: Edgar Laird MD;  Location: [facility]    EYE SURGERY      both eyes 2015    IR LUMBAR EPIDURAL STEROID INJECTION  4/27/2004    IR LUMBAR EPIDURAL STEROID INJECTION  5/11/2004    IR LUMBAR EPIDURAL STEROID INJECTION  11/24/2004    IR LUMBAR EPIDURAL STEROID INJECTION  11/28/2007    NH CYSTOURETHROSCOPY W/IRRIG & EVAC CLOTS N/A 7/27/2018    Procedure: CYSTOSCOPY, CLOT EVACUATION ,URETHRAL DILATATION, DAUGHERTY CATHETER PLACEMENT;  Surgeon: Lowell Arias MD;  Location: [facility] - [place], [place];  Service: Urology           CURRENT MEDICATIONS:    acetaminophen (TYLENOL) 500 MG tablet  amoxicillin (AMOXIL) 500 MG capsule  atenolol (TENORMIN) 25 MG tablet  Calcium Carbonate-Vitamin D (OSCAL 500/200 D-3 PO)  finasteride (PROSCAR) 5 MG tablet  GLUCOSAMINE-CHONDROITIN-VIT D3 PO  ipratropium - albuterol 0.5 mg/2.5 mg/3 mL (DUONEB) 0.5-2.5 (3) MG/3ML neb solution  levothyroxine (SYNTHROID/LEVOTHROID) 75 MCG tablet  traZODone (DESYREL) 50 MG tablet  Vitamin D3 (VITAMIN D, CHOLECALCIFEROL,) 25 mcg (1000 units) tablet        ALLERGIES:  No Known Allergies    FAMILY HISTORY:  Family History   Problem Relation Age of Onset    Diabetes No family hx of     Hypertension No family  hx of     Other Cancer No family hx of     Coronary Artery Disease No family hx of     Hyperlipidemia No family hx of     Cerebrovascular Disease No family hx of     Breast Cancer No family hx of     Colon Cancer No family hx of     Prostate Cancer No family hx of     Depression No family hx of     Anxiety Disorder No family hx of     Mental Illness No family hx of     Substance Abuse No family hx of     Anesthesia Reaction No family hx of     Asthma No family hx of     Osteoporosis No family hx of     Genetic Disorder No family hx of     Thyroid Disease No family hx of     Obesity No family hx of     No Known Problems Mother     No Known Problems Father        SOCIAL HISTORY:   Social History     Socioeconomic History    Marital status:     Number of children: 4   Occupational History    Occupation: Retired   Tobacco Use    Smoking status: Former     Types: Cigarettes    Smokeless tobacco: Never    Tobacco comments:     Pt quit 40 years ago   Vaping Use    Vaping Use: Never used   Substance and Sexual Activity    Alcohol use: Yes     Comment: < 1 drink a week    Drug use: No   Social History Narrative    , 4 children, lives with his wife, and is a retired .  Desires DNR/DNI.  (last updated 11/29/2022)        VITALS:  BP (!) 199/85   Pulse 72   Temp 98.3  F (36.8  C) (Temporal)   Resp 18   Wt 68 kg (150 lb)   SpO2 98%   BMI 22.81 kg/m      PHYSICAL EXAM    General presentation: Vital signs reviewed. NAD. Awake & alert. Fatigued appearing.  HENT: ENT inspection is normal. Oropharynx is moist and clear.   Eye: Pupils are equal and reactive to light. EOMI  Neck: The neck is supple, with full ROM, with no evidence of meningismus.  Pulmonary: Currently in no acute respiratory distress. Normal, non labored respirations, the lung sounds are normal with good equal air movement. Clear to auscultation bilaterally.   Circulatory: Regular rate and rhythm. Peripheral pulses are strong and  equal. No murmurs, rubs, or gallops.   Abdominal: The abdomen is soft. Nontender. No rigidity, guarding, or rebound. Bowel sounds normal.   Neurologic: Alert, oriented to person, place, and time. No motor deficit. No sensory deficit. Cranial nerves II through XII are intact.  Musculoskeletal: No extremity tenderness. Full range of motion in all extremities. No extremity edema.   Skin: Skin color is normal. No rash. Warm. Dry to touch.      LAB:  All pertinent labs reviewed and interpreted.  Results for orders placed or performed during the hospital encounter of 02/29/24   XR Chest Port 1 View    Impression    IMPRESSION: Patient's rotated to the left with a left head tilt as well.    Lungs are clear. Heart and pulmonary vascularity are normal. No signs of pneumonia or failure.  Old right posterior rib fractures again noted.   Head CT w/o contrast    Impression    IMPRESSION:  1.  No acute intracranial abnormality.  2.  Similar chronic changes, as described.   Result Value Ref Range    INR 1.02 0.85 - 1.15   Partial thromboplastin time   Result Value Ref Range    aPTT 30 22 - 38 Seconds   Comprehensive metabolic panel   Result Value Ref Range    Sodium 136 135 - 145 mmol/L    Potassium 3.4 3.4 - 5.3 mmol/L    Carbon Dioxide (CO2) 28 22 - 29 mmol/L    Anion Gap 10 7 - 15 mmol/L    Urea Nitrogen 16.1 8.0 - 23.0 mg/dL    Creatinine 1.22 (H) 0.67 - 1.17 mg/dL    GFR Estimate 57 (L) >60 mL/min/1.73m2    Calcium 9.0 8.8 - 10.2 mg/dL    Chloride 98 98 - 107 mmol/L    Glucose 111 (H) 70 - 99 mg/dL    Alkaline Phosphatase 69 40 - 150 U/L    AST 17 0 - 45 U/L    ALT <5 0 - 70 U/L    Protein Total 6.2 (L) 6.4 - 8.3 g/dL    Albumin 3.5 3.5 - 5.2 g/dL    Bilirubin Total 0.4 <=1.2 mg/dL   Result Value Ref Range    Magnesium 1.8 1.7 - 2.3 mg/dL   Result Value Ref Range    Troponin T, High Sensitivity 41 (H) <=22 ng/L   UA with Microscopic reflex to Culture    Specimen: Urine, Lozano Catheter   Result Value Ref Range    Color Urine  Light Yellow Colorless, Straw, Light Yellow, Yellow    Appearance Urine Turbid (A) Clear    Glucose Urine Negative Negative mg/dL    Bilirubin Urine Negative Negative    Ketones Urine Negative Negative mg/dL    Specific Gravity Urine 1.013 1.001 - 1.030    Blood Urine 0.06 mg/dL (A) Negative    pH Urine 6.5 5.0 - 7.0    Protein Albumin Urine 10 (A) Negative mg/dL    Urobilinogen Urine <2.0 <2.0 mg/dL    Nitrite Urine Negative Negative    Leukocyte Esterase Urine 500 Deion/uL (A) Negative    WBC Clumps Urine Present (A) None Seen /HPF    Mucus Urine Present (A) None Seen /LPF    RBC Urine 4 (H) <=2 /HPF    WBC Urine 31 (H) <=5 /HPF    Squamous Epithelials Urine 17 (H) <=1 /HPF   Symptomatic Influenza A/B, RSV, & SARS-CoV2 PCR (COVID-19) Nasopharyngeal    Specimen: Nasopharyngeal; Swab   Result Value Ref Range    Influenza A PCR Negative Negative    Influenza B PCR Negative Negative    RSV PCR Negative Negative    SARS CoV2 PCR Negative Negative   CBC with platelets and differential   Result Value Ref Range    WBC Count 5.9 4.0 - 11.0 10e3/uL    RBC Count 3.66 (L) 4.40 - 5.90 10e6/uL    Hemoglobin 11.8 (L) 13.3 - 17.7 g/dL    Hematocrit 34.7 (L) 40.0 - 53.0 %    MCV 95 78 - 100 fL    MCH 32.2 26.5 - 33.0 pg    MCHC 34.0 31.5 - 36.5 g/dL    RDW 11.7 10.0 - 15.0 %    Platelet Count 292 150 - 450 10e3/uL    % Neutrophils 70 %    % Lymphocytes 13 %    % Monocytes 14 %    % Eosinophils 1 %    % Basophils 1 %    % Immature Granulocytes 1 %    NRBCs per 100 WBC 0 <1 /100    Absolute Neutrophils 4.1 1.6 - 8.3 10e3/uL    Absolute Lymphocytes 0.8 0.8 - 5.3 10e3/uL    Absolute Monocytes 0.8 0.0 - 1.3 10e3/uL    Absolute Eosinophils 0.1 0.0 - 0.7 10e3/uL    Absolute Basophils 0.0 0.0 - 0.2 10e3/uL    Absolute Immature Granulocytes 0.0 <=0.4 10e3/uL    Absolute NRBCs 0.0 10e3/uL   Troponin T, High Sensitivity (now)   Result Value Ref Range    Troponin T, High Sensitivity 39 (H) <=22 ng/L       RADIOLOGY:  Reviewed all pertinent  imaging. Please see official radiology report.  Head CT w/o contrast   Final Result   IMPRESSION:   1.  No acute intracranial abnormality.   2.  Similar chronic changes, as described.      XR Chest Port 1 View   Final Result   IMPRESSION: Patient's rotated to the left with a left head tilt as well.      Lungs are clear. Heart and pulmonary vascularity are normal. No signs of pneumonia or failure.   Old right posterior rib fractures again noted.      US Renal Complete Non-Vascular    (Results Pending)   CTA Chest Abdomen Pelvis w Contrast    (Results Pending)       EKG:    Normal sinus rhythm.  Normal QRS.  Normal QT.  No ST or T wave changes.  No significant change when compared to the EKG on 2/4/2024    I have independently reviewed and interpreted the EKG(s) documented above.      I, Monica Pulliam, am serving as a scribe to document services personally performed by Yesica Engel DO based on my observation and the provider's statements to me. I, Yesica Engel, attest that Monica Pulliam is acting in a scribe capacity, has observed my performance of the services and has documented them in accordance with my direction.    Yesica Engel DO  Emergency Medicine  [facility] EMERGENCY DEPARTMENT  [address] [place] [id number]  [phone]       Yesica Engel DO  02/29/24 [id number]

## 2024-02-29 NOTE — CONSULTS
"Care Management Initial Consult    General Information  Assessment completed with: Patient, Family, Caregiver, Jose Luis who is granddaughter and main caregiver  Type of CM/SW Visit: Initial Assessment    Primary Care Provider verified and updated as needed: Yes   Readmission within the last 30 days: previous discharge plan unsuccessful (2/4/24 - 2/5/24)   Return Category: Progression of disease  Reason for Consult: discharge planning  Advance Care Planning: Advance Care Planning Reviewed: no concerns identified          Communication Assessment  Patient's communication style: spoken language (English or Bilingual)             Cognitive  Cognitive/Neuro/Behavioral:  Per Shruthi granddaughter, \"the confusion and hallucinations are new for him with this infection\".                      Living Environment:   People in home: spouse  Dilip and wife Laina and \"another family member such as 1 of 3 sons or granddaughter Shruthi is always at the house to help with cares\".  Current living Arrangements: house (\"4 steps to get inside the house and then he can live on the main level. He normally uses the steps by holding onto granddaughters arms, but this past week he is too weak to use these steps\".)      Able to return to prior arrangements: other (see comments) (unknown at this time, but Shruthi states, \"he only wants to go home and stay at home even if his care needs increase and if he goes on hospice\".)       Family/Social Support:  Care provided by: self, child(tami), other (see comments) (chandana Hawkins is his main caregiver.)  Provides care for: no one, unable/limited ability to care for self  Marital Status:   Wife, Children, Other (specify) (granddajenn Hawkins and sons Lito, Ad, and Jacinto Marina       Description of Support System: Supportive, Involved    Support Assessment: Adequate family and caregiver support, Adequate social supports    Current Resources:   Patient receiving " "home care services: No     Community Resources: DME  Equipment currently used at home: walker, rolling, wheelchair, manual, shower chair (\"He uses his 4WW inside the house. He uses the wheelchair whenever outside the house to a doctor appointment. This week he became too weak and is not able to stay sitting up on the shower chair now\".)  Supplies currently used at home: Incontinence Supplies, Nebulizer tubing, Other (\"jorge catheter at all times, he is continent of stool. Nebulizer, partial upper dentures, glasses\".)    Employment/Financial:  Employment Status: retired     Employment/ Comments: \"no  history\"  Financial Concerns:     Referral to Financial Worker: No       Does the patient's insurance plan have a 3 day qualifying hospital stay waiver?  No      Functional Status:  Prior to admission patient needed assistance:   Dependent ADLs:: Ambulation-walker, Wheelchair-with assist, Wheelchair-independent, Bathing, Dressing, Grooming, Positioning, Transfers, Toileting  Dependent IADLs:: Cleaning, Cooking, Laundry, Shopping, Meal Preparation, Medication Management, Money Management, Transportation  Assesssment of Functional Status: Not at baseline with ADL Functioning, Not at baseline with mobility, Not at  functional baseline    Mental Health Status:          Chemical Dependency Status:                Values/Beliefs:  Spiritual, Cultural Beliefs, Holiness Practices, Values that affect care:                 Additional Information:  Dilip lives in a house with his wife Laina and \"another family member such as 1 of 3 sons (Lito, Ad, and Mark) or granddaughter Shruthi is always at the house to help with cares\". Shruthi is \"the primary caregiver with him\". Family is helping with all ADLs and IADLs.    The house has \"4 steps to get inside the house and then he can live on the main level. He normally uses the steps by holding onto granddaughters arms, but this past week he is too weak to use these " "steps\". \"He uses his 4WW inside the house. He uses the wheelchair whenever outside the house to a doctor appointment. This week he became too weak and is not able to stay sitting up on the shower chair now. I really need help to give him a shower now\". He has the jorge catheter in place at home also. He is continent of stool.    Unknown discharge needs at this time. Granddaughter would like help with bathing if that would be covered by skilled home care. She is also interested in hospice and I gave her the list to look at agencies she is interested in. I started the explanation of what hospice is and what services they would provide. She states, \"I think that I would want his infection to get looked at and try to treat it so he can maybe get closer back to baseline with his confusion and hallucination that just started recently. Also if he could get back to his usual abilities where he is always somewhat weak, but not as weak as he is now. I know that in the past he doesn't qualify for PT and OT because he is not going to get better or stronger and we are fine with that. But it is his wish to go home and stay at home even if his care needs increase and if he goes on hospice. I would want to look into hospice to possibly go home with or after we are back at home\".    [facility] transport if family cannot transport.    CM to follow for medical progression of care, discharge recommendations, and final discharge plan.    Pauline Singh RN    "

## 2024-02-29 NOTE — ED NOTES
Pt increasingly disoriented, keeps thinking he is going to fall out of bed. Granddaughter hit staff assist, pt found to be perpendicular in bed with legs over side rails. Pt repositioned and will administer meds.

## 2024-02-29 NOTE — ED NOTES
[facility] ED Handoff Report    ED Chief Complaint: Cystitis, Disorientation    ED Diagnosis:  (T83.511D,  N39.0) Urinary tract infection associated with indwelling urethral catheter, subsequent encounter  Comment: Indwelling jorge catheter  Plan: IV antibx    (R41.0) Disorientation  Comment: Pt is increasingly restless and disoriented, grabbing on to side rails thinking he is going to fall, pt has put himself in twisted positions in bed requiring staff to reposition him  Plan: IV haldol administered in ER       PMH:    Past Medical History:   Diagnosis Date    Acute heart failure with preserved ejection fraction (HFpEF) (H) 11/10/2022    Acute prostatitis     Asymptomatic bacteriuria 10/23/2013    Noted at 10/2/13 office visit at [facility]. No treatment recommended at that time.     Congestive heart failure (H)     Essential hypertension 08/18/2015    Hypertension     Paroxysmal atrial fib -- on Eliquis 11/06/2022    Syncope     Thyroid disease         Code Status:  Prior     Falls Risk: Yes Band: Applied    Current Living Situation/Residence: lives alone and lives in a house     Elimination Status: Continent: indwelling catheter     Activity Level: 2 assist, uses wheelchair    Patients Preferred Language:  English     Needed: No    Vital Signs:  BP (!) 199/85   Pulse 72   Temp 98.3  F (36.8  C) (Temporal)   Resp 18   Wt 68 kg (150 lb)   SpO2 98%   BMI 22.81 kg/m       Cardiac Rhythm: NSR    Pain Score: Patient is unable to quantify.    Is the Patient Confused:  Yes    Last Food or Drink: unknown    Focused Assessment:  Pt presents to ED with granddaughter from home for evaluation of UTI, failure to thrive, and disorientation. Pt has indwelling jorge catheter, urine appears to be hazy but flowing well. Granddaughter of pt states that family takes care of pt, but he has deconditioned to the point where they cannot safely take care of him. Pt alert and oriented to self and place only.  Patient with intermittent aphasia, inattention, and delayed response    Area in R frontal lobe concerning for possible new infarct. Patient has hyperdensity in DWI and correlating hypodensity in ADC. However this new lesion may reflect a new demyelinating lesion. Depending on stage of a demyelinating lesion, it could appear as an acute stroke on MRI. At this point, it is difficult to ascertain the cause of the R frontal lesion. Therefore, would treat as an acute stroke until proven otherwise.    Repeated MRI is limited 2/2 motion artifact, revealing multiple scattered foci suggesting demyelination vs stroke.    Neurology suspecting vasculitis, w/u underway. 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 Cardiology are not suspecting endocarditis and GIOVANNI recommended to be done in the out patient settings.     Patient given cytoxan on night of 6/2. No complaints from this. Will need one dose monthly for three months as per neuro. Plan to keep one more night in observation.        Tests Performed: Done: Labs and Imaging    Treatments Provided:  IV antbx    Family Dynamics/Concerns: No    Family Updated On Visitor Policy: Yes    Plan of Care Communicated to Family: Yes    Who Was Updated about Plan of Care: granddaughter      Covid: symptomatic, negative    Additional info: US and CTA completed while in ER

## 2024-02-29 NOTE — ED NOTES
[facility] ED Handoff Report    ED Chief Complaint: Cystitis, Disorientation    ED Diagnosis:  (T83.511D,  N39.0) Urinary tract infection associated with indwelling urethral catheter, subsequent encounter  Comment: Indwelling jorge catheter  Plan: IV antibx    (R41.0) Disorientation  Comment: Pt is increasingly restless and disoriented, grabbing on to side rails thinking he is going to fall, pt has put himself in twisted positions in bed requiring staff to reposition him  Plan: IV haldol administered in ER       PMH:    Past Medical History:   Diagnosis Date    Acute heart failure with preserved ejection fraction (HFpEF) (H) 11/10/2022    Acute prostatitis     Asymptomatic bacteriuria 10/23/2013    Noted at 10/2/13 office visit at [facility]. No treatment recommended at that time.     Congestive heart failure (H)     Essential hypertension 08/18/2015    Hypertension     Paroxysmal atrial fib -- on Eliquis 11/06/2022    Syncope     Thyroid disease         Code Status:  Prior     Falls Risk: Yes Band: Applied    Current Living Situation/Residence: lives alone and lives in a house     Elimination Status: Continent: indwelling catheter     Activity Level: 2 assist, uses wheelchair    Patients Preferred Language:  English     Needed: No    Vital Signs:  BP (!) 199/85   Pulse 72   Temp 98.3  F (36.8  C) (Temporal)   Resp 18   Wt 68 kg (150 lb)   SpO2 98%   BMI 22.81 kg/m       Cardiac Rhythm: NSR    Pain Score: Patient is unable to quantify.    Is the Patient Confused:  Yes    Last Food or Drink:  unknown    Focused Assessment:  Pt presents to ED with granddaughter from home for evaluation of UTI, failure to thrive, and disorientation. Pt has indwelling jorge catheter, urine appears to be hazy but flowing well. Granddaughter of pt states that family takes care of pt, but he has deconditioned to the point where they cannot safely take care of him. Pt alert and oriented to self and place only.      Tests Performed: Done: Labs and Imaging    Treatments Provided:  IV antbx    Family Dynamics/Concerns: No    Family Updated On Visitor Policy: Yes    Plan of Care Communicated to Family: Yes    Who Was Updated about Plan of Care: granddaughter      Covid: symptomatic, negative      RN: Meaghan Medina RN 2/29/2024 2:19 PM

## 2024-03-01 ENCOUNTER — APPOINTMENT (OUTPATIENT)
Dept: NEUROLOGY | Facility: HOSPITAL | Age: 89
DRG: 698 | End: 2024-03-01
Attending: PSYCHIATRY & NEUROLOGY
Payer: COMMERCIAL

## 2024-03-01 LAB
AMMONIA PLAS-SCNC: 14 UMOL/L (ref 16–60)
ANION GAP SERPL CALCULATED.3IONS-SCNC: 13 MMOL/L (ref 7–15)
BACTERIA UR CULT: NO GROWTH
BUN SERPL-MCNC: 14.3 MG/DL (ref 8–23)
CALCIUM SERPL-MCNC: 8.4 MG/DL (ref 8.8–10.2)
CHLORIDE SERPL-SCNC: 97 MMOL/L (ref 98–107)
CREAT SERPL-MCNC: 1.21 MG/DL (ref 0.67–1.17)
DEPRECATED HCO3 PLAS-SCNC: 24 MMOL/L (ref 22–29)
EGFRCR SERPLBLD CKD-EPI 2021: 58 ML/MIN/1.73M2
ERYTHROCYTE [DISTWIDTH] IN BLOOD BY AUTOMATED COUNT: 11.8 % (ref 10–15)
FOLATE SERPL-MCNC: 9.3 NG/ML (ref 4.6–34.8)
GLUCOSE SERPL-MCNC: 105 MG/DL (ref 70–99)
HCT VFR BLD AUTO: 33.1 % (ref 40–53)
HGB BLD-MCNC: 11.5 G/DL (ref 13.3–17.7)
HOLD SPECIMEN: NORMAL
HOLD SPECIMEN: NORMAL
MCH RBC QN AUTO: 32.5 PG (ref 26.5–33)
MCHC RBC AUTO-ENTMCNC: 34.7 G/DL (ref 31.5–36.5)
MCV RBC AUTO: 94 FL (ref 78–100)
PLATELET # BLD AUTO: 298 10E3/UL (ref 150–450)
POTASSIUM SERPL-SCNC: 3.2 MMOL/L (ref 3.4–5.3)
POTASSIUM SERPL-SCNC: 3.3 MMOL/L (ref 3.4–5.3)
RBC # BLD AUTO: 3.54 10E6/UL (ref 4.4–5.9)
SODIUM SERPL-SCNC: 134 MMOL/L (ref 135–145)
T3 SERPL-MCNC: 44 NG/DL (ref 85–202)
THYROPEROXIDASE AB SERPL-ACNC: <10 IU/ML
VIT B12 SERPL-MCNC: 275 PG/ML (ref 232–1245)
WBC # BLD AUTO: 8.8 10E3/UL (ref 4–11)

## 2024-03-01 PROCEDURE — 250N000011 HC RX IP 250 OP 636: Performed by: STUDENT IN AN ORGANIZED HEALTH CARE EDUCATION/TRAINING PROGRAM

## 2024-03-01 PROCEDURE — 95816 EEG AWAKE AND DROWSY: CPT

## 2024-03-01 PROCEDURE — 250N000011 HC RX IP 250 OP 636: Performed by: INTERNAL MEDICINE

## 2024-03-01 PROCEDURE — 84132 ASSAY OF SERUM POTASSIUM: CPT | Performed by: INTERNAL MEDICINE

## 2024-03-01 PROCEDURE — 120N000001 HC R&B MED SURG/OB

## 2024-03-01 PROCEDURE — 250N000013 HC RX MED GY IP 250 OP 250 PS 637: Performed by: STUDENT IN AN ORGANIZED HEALTH CARE EDUCATION/TRAINING PROGRAM

## 2024-03-01 PROCEDURE — 99207 PR NOT IN PERSON INPATIENT CONSULT STATISTICAL MARKER: CPT | Mod: GC | Performed by: SURGERY

## 2024-03-01 PROCEDURE — 84480 ASSAY TRIIODOTHYRONINE (T3): CPT | Performed by: PSYCHIATRY & NEUROLOGY

## 2024-03-01 PROCEDURE — 99222 1ST HOSP IP/OBS MODERATE 55: CPT | Performed by: PSYCHIATRY & NEUROLOGY

## 2024-03-01 PROCEDURE — 99232 SBSQ HOSP IP/OBS MODERATE 35: CPT | Performed by: INTERNAL MEDICINE

## 2024-03-01 PROCEDURE — 82746 ASSAY OF FOLIC ACID SERUM: CPT | Performed by: PSYCHIATRY & NEUROLOGY

## 2024-03-01 PROCEDURE — 83921 ORGANIC ACID SINGLE QUANT: CPT | Performed by: PSYCHIATRY & NEUROLOGY

## 2024-03-01 PROCEDURE — 250N000013 HC RX MED GY IP 250 OP 250 PS 637: Performed by: INTERNAL MEDICINE

## 2024-03-01 PROCEDURE — 36415 COLL VENOUS BLD VENIPUNCTURE: CPT | Performed by: INTERNAL MEDICINE

## 2024-03-01 PROCEDURE — 86376 MICROSOMAL ANTIBODY EACH: CPT | Performed by: PSYCHIATRY & NEUROLOGY

## 2024-03-01 PROCEDURE — 36415 COLL VENOUS BLD VENIPUNCTURE: CPT | Performed by: PSYCHIATRY & NEUROLOGY

## 2024-03-01 PROCEDURE — 85027 COMPLETE CBC AUTOMATED: CPT | Performed by: STUDENT IN AN ORGANIZED HEALTH CARE EDUCATION/TRAINING PROGRAM

## 2024-03-01 PROCEDURE — 84425 ASSAY OF VITAMIN B-1: CPT | Performed by: PSYCHIATRY & NEUROLOGY

## 2024-03-01 PROCEDURE — 82607 VITAMIN B-12: CPT | Performed by: PSYCHIATRY & NEUROLOGY

## 2024-03-01 PROCEDURE — 80048 BASIC METABOLIC PNL TOTAL CA: CPT | Performed by: STUDENT IN AN ORGANIZED HEALTH CARE EDUCATION/TRAINING PROGRAM

## 2024-03-01 PROCEDURE — 84207 ASSAY OF VITAMIN B-6: CPT | Performed by: PSYCHIATRY & NEUROLOGY

## 2024-03-01 PROCEDURE — 82140 ASSAY OF AMMONIA: CPT | Performed by: PSYCHIATRY & NEUROLOGY

## 2024-03-01 PROCEDURE — 99233 SBSQ HOSP IP/OBS HIGH 50: CPT | Performed by: INTERNAL MEDICINE

## 2024-03-01 PROCEDURE — G0463 HOSPITAL OUTPT CLINIC VISIT: HCPCS | Mod: 25

## 2024-03-01 PROCEDURE — 36415 COLL VENOUS BLD VENIPUNCTURE: CPT | Performed by: STUDENT IN AN ORGANIZED HEALTH CARE EDUCATION/TRAINING PROGRAM

## 2024-03-01 PROCEDURE — 95816 EEG AWAKE AND DROWSY: CPT | Mod: 26 | Performed by: PSYCHIATRY & NEUROLOGY

## 2024-03-01 RX ORDER — POTASSIUM CHLORIDE 1.5 G/1.58G
40 POWDER, FOR SOLUTION ORAL ONCE
Status: COMPLETED | OUTPATIENT
Start: 2024-03-01 | End: 2024-03-01

## 2024-03-01 RX ORDER — CLONIDINE HYDROCHLORIDE 0.1 MG/1
0.1 TABLET ORAL EVERY 6 HOURS PRN
Status: DISCONTINUED | OUTPATIENT
Start: 2024-03-01 | End: 2024-03-04 | Stop reason: HOSPADM

## 2024-03-01 RX ORDER — POTASSIUM CHLORIDE 1500 MG/1
40 TABLET, EXTENDED RELEASE ORAL ONCE
Status: COMPLETED | OUTPATIENT
Start: 2024-03-01 | End: 2024-03-01

## 2024-03-01 RX ORDER — LABETALOL HYDROCHLORIDE 5 MG/ML
10 INJECTION, SOLUTION INTRAVENOUS EVERY 6 HOURS PRN
Status: DISCONTINUED | OUTPATIENT
Start: 2024-03-01 | End: 2024-03-04 | Stop reason: HOSPADM

## 2024-03-01 RX ORDER — HYDRALAZINE HYDROCHLORIDE 20 MG/ML
10 INJECTION INTRAMUSCULAR; INTRAVENOUS EVERY 4 HOURS PRN
Status: DISCONTINUED | OUTPATIENT
Start: 2024-03-01 | End: 2024-03-04 | Stop reason: HOSPADM

## 2024-03-01 RX ORDER — MULTIPLE VITAMINS W/ MINERALS TAB 9MG-400MCG
1 TAB ORAL DAILY
Status: DISCONTINUED | OUTPATIENT
Start: 2024-03-01 | End: 2024-03-04 | Stop reason: HOSPADM

## 2024-03-01 RX ADMIN — HYDRALAZINE HYDROCHLORIDE 10 MG: 20 INJECTION INTRAMUSCULAR; INTRAVENOUS at 18:30

## 2024-03-01 RX ADMIN — LEVOTHYROXINE SODIUM 75 MCG: 75 TABLET ORAL at 08:02

## 2024-03-01 RX ADMIN — POTASSIUM CHLORIDE 40 MEQ: 1500 TABLET, EXTENDED RELEASE ORAL at 16:28

## 2024-03-01 RX ADMIN — PIPERACILLIN AND TAZOBACTAM 3.38 G: 3; .375 INJECTION, POWDER, FOR SOLUTION INTRAVENOUS at 22:02

## 2024-03-01 RX ADMIN — METOPROLOL TARTRATE 12.5 MG: 25 TABLET, FILM COATED ORAL at 22:05

## 2024-03-01 RX ADMIN — METOPROLOL TARTRATE 12.5 MG: 25 TABLET, FILM COATED ORAL at 18:38

## 2024-03-01 RX ADMIN — Medication 1 TABLET: at 15:54

## 2024-03-01 RX ADMIN — HYDRALAZINE HYDROCHLORIDE 10 MG: 20 INJECTION INTRAMUSCULAR; INTRAVENOUS at 11:56

## 2024-03-01 RX ADMIN — LABETALOL HYDROCHLORIDE 10 MG: 5 INJECTION, SOLUTION INTRAVENOUS at 17:50

## 2024-03-01 RX ADMIN — PIPERACILLIN AND TAZOBACTAM 3.38 G: 3; .375 INJECTION, POWDER, FOR SOLUTION INTRAVENOUS at 05:20

## 2024-03-01 RX ADMIN — FINASTERIDE 5 MG: 5 TABLET, FILM COATED ORAL at 08:02

## 2024-03-01 RX ADMIN — THIAMINE HCL TAB 100 MG 100 MG: 100 TAB at 15:54

## 2024-03-01 RX ADMIN — LABETALOL HYDROCHLORIDE 10 MG: 5 INJECTION, SOLUTION INTRAVENOUS at 08:15

## 2024-03-01 RX ADMIN — ENOXAPARIN SODIUM 40 MG: 40 INJECTION SUBCUTANEOUS at 22:02

## 2024-03-01 RX ADMIN — PIPERACILLIN AND TAZOBACTAM 3.38 G: 3; .375 INJECTION, POWDER, FOR SOLUTION INTRAVENOUS at 12:01

## 2024-03-01 RX ADMIN — POTASSIUM CHLORIDE 40 MEQ: 1.5 POWDER, FOR SOLUTION ORAL at 22:06

## 2024-03-01 ASSESSMENT — ACTIVITIES OF DAILY LIVING (ADL)
ADLS_ACUITY_SCORE: 48
ADLS_ACUITY_SCORE: 49
ADLS_ACUITY_SCORE: 48
ADLS_ACUITY_SCORE: 41
ADLS_ACUITY_SCORE: 49
ADLS_ACUITY_SCORE: 48
ADLS_ACUITY_SCORE: 41
ADLS_ACUITY_SCORE: 41
ADLS_ACUITY_SCORE: 48
ADLS_ACUITY_SCORE: 41
ADLS_ACUITY_SCORE: 49
ADLS_ACUITY_SCORE: 48
ADLS_ACUITY_SCORE: 41
ADLS_ACUITY_SCORE: 49
ADLS_ACUITY_SCORE: 48
ADLS_ACUITY_SCORE: 49
ADLS_ACUITY_SCORE: 41
ADLS_ACUITY_SCORE: 41
ADLS_ACUITY_SCORE: 49
ADLS_ACUITY_SCORE: 49
ADLS_ACUITY_SCORE: 48
ADLS_ACUITY_SCORE: 45
ADLS_ACUITY_SCORE: 41

## 2024-03-01 NOTE — PROGRESS NOTES
Bedside EEG was performed.  Photic stimulation and hyperventilation was deferred due to pt condition. Pt unable to follow commands to state of his cognition.The patient was awake and asleep throughout the recording. Skin intact.    [id number] used.

## 2024-03-01 NOTE — PLAN OF CARE
"Goal Outcome Evaluation:         Problem: Adult Inpatient Plan of Care  Goal: Plan of Care Review  Description: The Plan of Care Review/Shift note should be completed every shift.  The Outcome Evaluation is a brief statement about your assessment that the patient is improving, declining, or no change.  This information will be displayed automatically on your shift  note.  3/1/2024 1009 by Sahra Serrato RN  Outcome: Not Progressing  3/1/2024 1006 by Sahra Serrato RN  Outcome: Not Progressing  Goal: Patient-Specific Goal (Individualized)  Description: You can add care plan individualizations to a care plan. Examples of Individualization might be:  \"Parent requests to be called daily at 9am for status\", \"I have a hard time hearing out of my right ear\", or \"Do not touch me to wake me up as it startles  me\".  3/1/2024 1009 by Sahra Serrato RN  Outcome: Not Progressing  3/1/2024 1006 by Sahra Serrato RN  Outcome: Not Progressing  Goal: Absence of Hospital-Acquired Illness or Injury  3/1/2024 1009 by Sahra Serrato RN  Outcome: Not Progressing  3/1/2024 1006 by Sahra Serrato RN  Outcome: Not Progressing  Intervention: Identify and Manage Fall Risk  Recent Flowsheet Documentation  Taken 3/1/2024 0815 by Sahra Serrato RN  Safety Promotion/Fall Prevention:   bedside attendant   clutter free environment maintained   lighting adjusted   nonskid shoes/slippers when out of bed   room near nurse's station   supervised activity  Intervention: Prevent Skin Injury  Recent Flowsheet Documentation  Taken 3/1/2024 0815 by Sahra Serrato RN  Body Position:   turned   left   heels elevated   log-rolled   neutral head position  Device Skin Pressure Protection: tubing/devices free from skin contact  Intervention: Prevent Infection  Recent Flowsheet Documentation  Taken 3/1/2024 0815 by Sahra Serrato RN  Infection Prevention:   hand hygiene promoted   rest/sleep promoted  Goal: Optimal " Comfort and Wellbeing  3/1/2024 1009 by Sahra Serrato RN  Outcome: Not Progressing  3/1/2024 1006 by Sahra Serrato RN  Outcome: Not Progressing  Goal: Readiness for Transition of Care  3/1/2024 1009 by Sahra Serrato RN  Outcome: Not Progressing  3/1/2024 1006 by Sahra Serrato RN  Outcome: Not Progressing     Problem: Risk for Delirium  Goal: Optimal Coping  3/1/2024 1009 by Sahra Serrato RN  Outcome: Not Progressing  3/1/2024 1006 by Sahra Serrato RN  Outcome: Not Progressing  Intervention: Optimize Psychosocial Adjustment to Delirium  Recent Flowsheet Documentation  Taken 3/1/2024 0815 by Sahra Serrato RN  Supportive Measures:   active listening utilized   positive reinforcement provided   relaxation techniques promoted  Goal: Improved Behavioral Control  3/1/2024 1009 by Sahra Serrato RN  Outcome: Not Progressing  3/1/2024 1006 by Sahra Serrato RN  Outcome: Not Progressing  Intervention: Minimize Safety Risk  Recent Flowsheet Documentation  Taken 3/1/2024 0815 by Sahra Serrato RN  Communication Enhancement Strategies:   communication board used   family involved in communication plan   one-step directions provided  Goal: Improved Attention and Thought Clarity  3/1/2024 1009 by Sahra Serrato RN  Outcome: Not Progressing  3/1/2024 1006 by Sahra Serrato RN  Outcome: Not Progressing  Intervention: Maximize Cognitive Function  Recent Flowsheet Documentation  Taken 3/1/2024 0815 by Sahra Serrato RN  Sensory Stimulation Regulation:   auditory stimulation minimized   care clustered   music on   television on   quiet environment promoted  Reorientation Measures:   clock in view   reorientation provided  Goal: Improved Sleep  3/1/2024 1009 by Sahra Serrato RN  Outcome: Not Progressing  3/1/2024 1006 by Sahra Serrato RN  Outcome: Not Progressing     Problem: UTI (Urinary Tract Infection)  Goal: Improved Infection Symptoms  Outcome: Not  Progressing     Problem: Fall Injury Risk  Goal: Absence of Fall and Fall-Related Injury  Outcome: Not Progressing  Intervention: Identify and Manage Contributors  Recent Flowsheet Documentation  Taken 3/1/2024 0815 by Sahra Serrato, RN  Medication Review/Management: medications reviewed  Intervention: Promote Injury-Free Environment  Recent Flowsheet Documentation  Taken 3/1/2024 0815 by Sahra Serrato, RN  Safety Promotion/Fall Prevention:   bedside attendant   clutter free environment maintained   lighting adjusted   nonskid shoes/slippers when out of bed   room near nurse's station   supervised activity     Problem: Electrolyte Imbalance  Goal: Electrolyte Balance  Outcome: Not Progressing     Problem: Oral Intake Inadequate  Goal: Improved Oral Intake  Outcome: Not Progressing   Pt hallucinating, reaching out and picking at air at times. Garbled, mumbling speech, incoherent. Did not sleep all noc per 1:1 attendant. Not sleeping this shift thus far.  No activity ordered for pt. Uses walker independently at baseline. [facility] did fee pt [employer] for breakfast, ate 75%.

## 2024-03-01 NOTE — PROGRESS NOTES
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 Reviewed today s ct scan. Have not viewed previous imaging however per radiology report this is unchanged.     In this patient , no indication for acute vascular surgical intervention.   Would recommend anti-impulse therapy with goal SBP <120 and HR <80.     Formal consult to follow.     Discussed with v [facility] surgery staff, Dr. Pichardo.     Bud Dong MD PGY3

## 2024-03-01 NOTE — CONSULTS
Vascular Surgery Consult - Virtual  2024    Dilip Long  : 1935    Date of Service: 3/1/2024 5:38 PM    Assessment and Plan:  Dilip Long is a 88 year old male with PMH of HF, HTN, afib, and recurrent UTIs who presented  with increasing confusion and hallucination. On workup, found to have CAROLINA in descending thoracic aorta, which appears unchanged from previous imaging in early February, and is noted to be present on my review of  imaging from .  Per chart review, no abdominal or back pain reported. Does remain hypertensive with SBPs in  160s-180s    - No indication for acute vascular intervention   - Recommend improved BP control, aggressive anti-impulse therapy  - goal SBP <120 and HR <60   - Recommend repeat CTA Monday 3/4 to ensure no progression - will order  - Please alert vascular surgery on call if patient develops new back pain, chest pain, or changes in vital signs.   - Vascular surgery will continue to follow    Discussed with Dr. Pichardo, vascular surgery staff    Bud Dong MD  Surgery PGY3    Note patient not seen, information gathered from review of imaging and chart review.     History of Present Illness:    Dilip Long is a 88 year old male that presents with PMH of HF, HTN, afib, and recurrent UTIs who presented  with increasing confusion and hallucinations. Reportedly has some mild short term memory loss at baseline.     Past Medical History:  Past Medical History:   Diagnosis Date    Acute heart failure with preserved ejection fraction (HFpEF) (H) 11/10/2022    Acute prostatitis     Asymptomatic bacteriuria 10/23/2013    Noted at 10/2/13 office visit at [facility]. No treatment recommended at that time.     Congestive heart failure (H)     Essential hypertension 2015    Hypertension     Paroxysmal atrial fib -- on Eliquis 2022    Syncope     Thyroid disease        Past Surgical History  Past Surgical History:   Procedure Laterality Date     BLADDER SURGERY      Bladder absces removal    Blepharoplasty Upper Lid W/ Excessive Skin[  1/29/2007    CATARACT EXTRACTION      CYSTOSCOPY, FULGURATE BLEEDERS, EVACUATE CLOT(S), COMBINED N/A 4/19/2023    Procedure: CYSTOSCOPY, BLADDER BIOPSY WITH FULGURATION AND CLOT EVACUATION;  Surgeon: Edgar Laird MD;  Location: [facility] Main OR    EYE SURGERY      both eyes 2015    IR LUMBAR EPIDURAL STEROID INJECTION  4/27/2004    IR LUMBAR EPIDURAL STEROID INJECTION  5/11/2004    IR LUMBAR EPIDURAL STEROID INJECTION  11/24/2004    IR LUMBAR EPIDURAL STEROID INJECTION  11/28/2007    AK CYSTOURETHROSCOPY W/IRRIG & EVAC CLOTS N/A 7/27/2018    Procedure: CYSTOSCOPY, CLOT EVACUATION ,URETHRAL DILATATION, DAUGHERTY CATHETER PLACEMENT;  Surgeon: Lowell Arias MD;  Location: [facility];  Service: Urology       Family History:  Family History   Problem Relation Age of Onset    Diabetes No family hx of     Hypertension No family hx of     Other Cancer No family hx of     Coronary Artery Disease No family hx of     Hyperlipidemia No family hx of     Cerebrovascular Disease No family hx of     Breast Cancer No family hx of     Colon Cancer No family hx of     Prostate Cancer No family hx of     Depression No family hx of     Anxiety Disorder No family hx of     Mental Illness No family hx of     Substance Abuse No family hx of     Anesthesia Reaction No family hx of     Asthma No family hx of     Osteoporosis No family hx of     Genetic Disorder No family hx of     Thyroid Disease No family hx of     Obesity No family hx of     No Known Problems Mother     No Known Problems Father        Social History:  Social History     Socioeconomic History    Marital status:      Spouse name: Not on file    Number of children: 4    Years of education: Not on file    Highest education level: Not on file   Occupational History    Occupation: Retired   Tobacco Use    Smoking status: Former     Types: Cigarettes     Smokeless tobacco: Never    Tobacco comments:     Pt quit 40 years ago   Vaping Use    Vaping Use: Never used   Substance and Sexual Activity    Alcohol use: Yes     Comment: < 1 drink a week    Drug use: No    Sexual activity: Not on file   Other Topics Concern    Parent/sibling w/ CABG, MI or angioplasty before 65F 55M? Not Asked   Social History Narrative    , 4 children, lives with his wife, and is a retired .  Desires DNR/DNI.  (last updated 11/29/2022)      Social Determinants of Health     Financial Resource Strain: Not on file   Food Insecurity: Not on file   Transportation Needs: Not on file   Physical Activity: Not on file   Stress: Not on file   Social Connections: Not on file   Interpersonal Safety: Not on file   Housing Stability: Not on file       Medications:  No current outpatient medications on file.       Allergies:   No Known Allergies    Review of Symptoms:  A 10 point review of symptoms has been conducted and is negative except for that mentioned in the above HPI.    Physical Exam: Not examined    Labs:  CBC RESULTS:   Recent Labs   Lab Test 03/01/24  0624   WBC 8.8   RBC 3.54*   HGB 11.5*   HCT 33.1*   MCV 94   MCH 32.5   MCHC 34.7   RDW 11.8        Last Basic Metabolic Panel:  Lab Results   Component Value Date     03/01/2024    .0 03/13/2019      Lab Results   Component Value Date    POTASSIUM 3.2 03/01/2024    POTASSIUM 3.8 04/20/2023    POTASSIUM 4.1 03/13/2019     Lab Results   Component Value Date    CHLORIDE 97 03/01/2024    CHLORIDE 103 04/20/2023    CHLORIDE 102.0 03/13/2019     Lab Results   Component Value Date    KRISTEN 8.4 03/01/2024    KRISTEN 9.5 03/13/2019     Lab Results   Component Value Date    CO2 24 03/01/2024    CO2 24 04/20/2023    CO2 30.0 03/13/2019     Lab Results   Component Value Date    BUN 14.3 03/01/2024    BUN 15 04/20/2023    BUN 21.0 03/13/2019     Lab Results   Component Value Date    CR 1.21 03/01/2024    CR 1.3 03/13/2019      Lab Results   Component Value Date     2024     2024     2023    .0 2019       Imaging:  EEG Awake or Drowsy Routine    Result Date: 3/1/2024  Table formatting from the original result was not included. Images from the original result were not included. ELECTROENCEPHALOGRAM (EEG) REPORT [facility] NEUROLOGY [place] [address] [place] [address] Tel: [phone]  Fax: [phone] [email or web address] Dilip Long,  1935, MRN [id number] PCP: Kolton Junior Date: 3/1/2024 Principal Diagnosis: Altered mental status History : Patient is being evaluated for altered mental status. Medication listed includes no anticonvulsant Description of the Recording:  This is a multichannel digital EEG recording done using the international 10-20 placement system. Background of the recording consists of an irregular 6-7 hz activity with an amplitude of 20-30  V.  In addition, occasionally there is  1-2 Hz activity with similar amplitude.  This diffusely slow background attenuates with alerting procedures.  Photic stimulation performed with eyes open, according to the standard protocol, minimal change.  Hyperventilation was not performed.  Sleep was not obtained. During this recording, no sharp discharges, spikes or electrographic seizure activity was recorded. Impression: This is an abnormal EEG due to diffusely slow background in theta and delta range that suggests a nonspecific generalized cerebral dysfunction.  Such slowing can be seen in metabolic or toxic abnormalities, clinical correlation is recommended.  No sharp discharges or spikes were recorded during this recording to suggest a seizure disorder. Classification: Dysrhythmia grade II generalized                          Delta grade I generalized Joe Kearns MD [facility] This note was dictated using voice recognition software.  Any grammatical or  context distortions are unintentional and inherent to the software.     US Renal Complete Non-Vascular    Result Date: 2/29/2024  EXAM: US RENAL COMPLETE NON-VASCULAR LOCATION: [facility] DATE: 2/29/2024 INDICATION: Recurrent UTI. COMPARISON: CT same-day. TECHNIQUE: Routine Bilateral Renal and Bladder Ultrasound. FINDINGS: RIGHT KIDNEY: 10 x 6.2 x 5 cm. Normal without hydronephrosis or masses. Incidental 1.4 cm cyst. LEFT KIDNEY: 10.1 x 4.2 x 4.3 cm. Normal without hydronephrosis or masses. BLADDER: Decompressed bladder with intrinsic portacatheter.     IMPRESSION: 1.  Unremarkable kidney ultrasound. 2.  Decompressed bladder with intrinsic portacatheter. 3.  Please see same-day CT abdomen - pelvis.     CTA Chest Abdomen Pelvis w Contrast    Result Date: 2/29/2024  EXAM: CTA CHEST ABDOMEN PELVIS W CONTRAST LOCATION: [facility] DATE: 2/29/2024 INDICATION: Sepsis, thoracic aorta outpouching on previous CT  follow up,sepsis COMPARISON: 2/4/2024 TECHNIQUE: CT angiogram chest abdomen pelvis during arterial phase of injection of IV contrast. 2D and 3D MIP reconstructions were performed by the CT technologist. Dose reduction techniques were used. CONTRAST: isovue 370 75ml FINDINGS: ANGIOGRAM CHEST, ABDOMEN, AND PELVIS: No evidence of intramural hematoma, dissection, or aneurysm. Mild-moderate atherosclerotic calcification throughout the aorta and iliofemoral arteries. No significant stenosis. Unchanged 1.4 x 0.5 cm outpouching from  the inferolateral aspect of the proximal descending thoracic aorta (6/48, 10/103). No stranding or hematoma. No central pulmonary embolus. LUNGS AND PLEURA: Unchanged 0.5 cm nodule along the minor fissure since 07/27/2018 and therefore consistent with benign etiology. A 0.5 cm right lower lobe nodule is also unchanged. No new or enlarging suspicious nodules. CORONARY ARTERY CALCIFICATION: Moderate MEDIASTINUM/AXILLAE: Unremarkable. HEPATOBILIARY:  Unremarkable. SPLEEN: Unremarkable. PANCREAS: Unremarkable. ADRENAL GLANDS: Unremarkable. KIDNEYS/BLADDER: A balloon tip catheter is present in the urinary bladder lumen. The bladder is underdistended with a Lozano catheter in place and bilateral diverticula. BOWEL: Left-sided colonic diverticulosis. No acute inflammation or obstruction. LYMPH NODES: Unremarkable. PELVIC ORGANS: Moderate enlargement of the prostate. MUSCULOSKELETAL: Diffuse osteopenia and multilevel degenerative changes in the spine. Healed sternal and rib fractures. Unchanged L1 compression deformity.     IMPRESSION: Unchanged small outpouching from the undersurface of the proximal descending thoracic aorta, which could represent an ulcerated plaque or short segment dissection flap. No evidence of acute aortic syndrome.    Head CT w/o contrast    Result Date: 2/29/2024  EXAM: CT HEAD W/O CONTRAST LOCATION: [facility] DATE: 2/29/2024 INDICATION: Altered mental status. COMPARISON: CT dated 02/04/2024. TECHNIQUE: Routine CT Head without IV contrast. Multiplanar reformats. Dose reduction techniques were used. FINDINGS: Mildly motion degraded exam. INTRACRANIAL CONTENTS: No acute intracranial hemorrhage, extra-axial fluid collection, or mass effect. No evidence of an acute transcortical confluent infarct. Grossly similar foci of hypoattenuation involving the bilateral cerebral white matter, nonspecific although most likely the sequela of chronic microvascular ischemia. Mild generalized cerebral parenchymal volume loss. No hydrocephalus. VISUALIZED ORBITS/SINUSES/MASTOIDS: No acute intraorbital finding. Status post functional endoscopic sinus surgery with overall similar trace mucosal thickening scattered about the visualized paranasal sinuses without an air-fluid level. No mastoid or middle ear effusion. Cerumen within the bilateral external auditory canals. BONES/SOFT TISSUES: No acute abnormality. An incidental bilateral  frontal scalp lipomas. Severe right temporal mandibular joint degenerative change.     IMPRESSION: 1.  No acute intracranial abnormality. 2.  Similar chronic changes, as described.    XR Chest Port 1 View    Result Date: 2/29/2024  EXAM: XR CHEST PORT 1 VIEW LOCATION: [facility] DATE: 2/29/2024 INDICATION: Chest Pain COMPARISON: CTA chest 02/04/2024     IMPRESSION: Patient's rotated to the left with a left head tilt as well. Lungs are clear. Heart and pulmonary vascularity are normal. No signs of pneumonia or failure. Old right posterior rib fractures again noted.    CTA Chest Abdomen Pelvis w Contrast    Result Date: 2/4/2024  EXAM: CT CHEST, ABDOMEN AND PELVIS WITH CONTRAST LOCATION: [facility] DATE/TIME: 2/4/2024 10:14 PM CST INDICATION: Unwitnessed fall. Altered mental status. COMPARISON: 07/27/2018. TECHNIQUE: Note that this study was performed in conjunction with a CT scan of the thoracolumbar spine. Refer to separate reports for findings from those studies. CT angiogram chest abdomen pelvis during arterial phase of injection of IV contrast. 2D and  3D MIP reconstructions were performed by the CT technologist. Dose reduction techniques were used. CONTRAST: 75 mL Isovue-370. FINDINGS: ANGIOGRAM CHEST, ABDOMEN, AND PELVIS: Atherosclerotic calcification in the aorta. The aorta is normal in caliber without dissection. A very small 1.4 cm wide x 0.5 cm deep apparent outpouching of the inferolateral aspect of the proximal descending thoracic aorta (for example, series 6 image 57 and series 10 image 118), not present on 07/27/2018. No stranding or hematoma visualized in the mediastinum adjacent to this finding. No visualized pulmonary embolus. LUNGS AND PLEURA: 0.5 cm nodule in the anterior mid right lung in the region of the minor fissure (series 7 image 169), unchanged since 07/27/2018 and therefore consistent with benign etiology. A few curvilinear opacities in the  periphery of the lungs likely represent scarring or atelectasis. CORONARY ARTERY CALCIFICATION: Present. MEDIASTINUM/AXILLAE: Unremarkable. HEPATOBILIARY: Unremarkable. SPLEEN: Unremarkable. PANCREAS: Unremarkable. ADRENAL GLANDS: Unremarkable. KIDNEYS/BLADDER: A balloon tip catheter is present in the urinary bladder lumen. 2 cm diverticulum from the posterior left aspect of the urinary bladder. BOWEL: Several colonic diverticula are present without evidence of diverticulitis. The appendix is not visualized. LYMPH NODES: Unremarkable. PELVIC ORGANS: Moderate enlargement of the prostate gland. MUSCULOSKELETAL: Nonacute moderate compression deformity of the L1 vertebral body. OTHER: None.     IMPRESSION: 1. A very small apparent slight focal outpouching of the proximal descending thoracic aorta. This is new since 07/27/2018, but otherwise age-indeterminate. It could represent changes due to atherosclerosis, but a very small penetrating ulcer cannot be excluded. It is unlikely to relate to acute trauma. A follow-up CT scan of the thoracic aorta is recommended in 2 weeks in order to evaluate for interval changes in this finding. 2. No other acute abnormality identified in the chest, abdomen or pelvis.    CT Thoracic Spine w/o Contrast    Result Date: 2/4/2024  EXAM: CT THORACIC SPINE W/O CONTRAST, CT LUMBAR SPINE W/O CONTRAST LOCATION: [facility] DATE: 2/4/2024 INDICATION: eval fractures (reconstructions please). Patient was found down. Potential trauma. COMPARISON: Lumbar spine CT 11/29/2022 TECHNIQUE: 1) Routine CT Thoracic Spine without IV contrast. Multiplanar reformats. Dose reduction techniques were used. 2) Routine CT Lumbar Spine without IV contrast. Multiplanar reformats. Dose reduction techniques were used. FINDINGS: THORACIC SPINE CT: The exam is moderately degraded by motion. VERTEBRA: Mild compression forming disease at the superior endplates of T1 and T2 are age indeterminate.  Subtle compression deformity at the superior endplate of T3 is age-indeterminate. Mild anterior wedging compression deformity at the superior endplate of T5 is age-indeterminate. There is an chronic anterior wedging compression deformity of T12 with up to 30-40% loss of height anteriorly. No acute subluxation. CANAL/FORAMINA: No canal or neural foraminal stenosis. PARASPINAL: No extraspinal abnormality. LUMBAR SPINE CT: VERTEBRA: 5 lumbar type vertebra. There is a chronic moderate to severe burst-type fracture of L1 with up to 60-70% loss of height anteriorly. There is slight buckling of the posterior/superior corner into the ventral canal. Vertebral body height is otherwise normal. Mild grade 1 retrolisthesis of L2. There is 7 mm degenerative anterolisthesis of L5 on S1. Multilevel advanced disc degeneration. CANAL/FORAMINA: Moderate central canal stenosis at L3-L4 and L4-L5. Mild to moderate multilevel neural foraminal narrowing. PARASPINAL: No extraspinal abnormality.     IMPRESSION: THORACIC SPINE CT: 1.  The exam is moderately degraded by motion. 2.  Age-indeterminate mild compression deformities at the superior endplates of T1, T2, T3 and T5. 3.  Chronic anterior wedging compression deformity of T12. LUMBAR SPINE CT: 1.  No acute fracture or subluxation. 2.  Chronic moderate to severe burst-type fracture of L1. 3.  Moderate central canal stenosis at L3-L4 and L4-L5.     CT Lumbar Spine w/o Contrast    Result Date: 2/4/2024  EXAM: CT THORACIC SPINE W/O CONTRAST, CT LUMBAR SPINE W/O CONTRAST LOCATION: [facility] DATE: 2/4/2024 INDICATION: eval fractures (reconstructions please). Patient was found down. Potential trauma. COMPARISON: Lumbar spine CT 11/29/2022 TECHNIQUE: 1) Routine CT Thoracic Spine without IV contrast. Multiplanar reformats. Dose reduction techniques were used. 2) Routine CT Lumbar Spine without IV contrast. Multiplanar reformats. Dose reduction techniques were used.  FINDINGS: THORACIC SPINE CT: The exam is moderately degraded by motion. VERTEBRA: Mild compression forming disease at the superior endplates of T1 and T2 are age indeterminate. Subtle compression deformity at the superior endplate of T3 is age-indeterminate. Mild anterior wedging compression deformity at the superior endplate of T5 is age-indeterminate. There is an chronic anterior wedging compression deformity of T12 with up to 30-40% loss of height anteriorly. No acute subluxation. CANAL/FORAMINA: No canal or neural foraminal stenosis. PARASPINAL: No extraspinal abnormality. LUMBAR SPINE CT: VERTEBRA: 5 lumbar type vertebra. There is a chronic moderate to severe burst-type fracture of L1 with up to 60-70% loss of height anteriorly. There is slight buckling of the posterior/superior corner into the ventral canal. Vertebral body height is otherwise normal. Mild grade 1 retrolisthesis of L2. There is 7 mm degenerative anterolisthesis of L5 on S1. Multilevel advanced disc degeneration. CANAL/FORAMINA: Moderate central canal stenosis at L3-L4 and L4-L5. Mild to moderate multilevel neural foraminal narrowing. PARASPINAL: No extraspinal abnormality.     IMPRESSION: THORACIC SPINE CT: 1.  The exam is moderately degraded by motion. 2.  Age-indeterminate mild compression deformities at the superior endplates of T1, T2, T3 and T5. 3.  Chronic anterior wedging compression deformity of T12. LUMBAR SPINE CT: 1.  No acute fracture or subluxation. 2.  Chronic moderate to severe burst-type fracture of L1. 3.  Moderate central canal stenosis at L3-L4 and L4-L5.     Head CT w/o contrast    Result Date: 2/4/2024  EXAM: CT HEAD W/O CONTRAST, CT CERVICAL SPINE W/O CONTRAST LOCATION: [facility] DATE: 2/4/2024 INDICATION: Confusion, head and neck injury COMPARISON: CT head 11/29/2022 TECHNIQUE: 1) Routine CT Head without IV contrast. Multiplanar reformats. Dose reduction techniques were used. 2) Routine CT  Cervical Spine without IV contrast. Multiplanar reformats. Dose reduction techniques were used. FINDINGS: HEAD CT: INTRACRANIAL CONTENTS: No intracranial hemorrhage, extraaxial collection, or mass effect.  No CT evidence of acute infarct. Moderate presumed chronic small vessel ischemic changes. Mild generalized volume loss. No hydrocephalus. VISUALIZED ORBITS/SINUSES/MASTOIDS: No intraorbital abnormality. No paranasal sinus mucosal disease. No middle ear or mastoid effusion. BONES/SOFT TISSUES: No acute abnormality. CERVICAL SPINE CT: VERTEBRA: Mild compression superior aspect of T1 and T2, age indeterminate; appears chronic. It is new since CT chest 07/27/2018. No definite acute fracture. Straightening of cervical lordosis. Nonaggressive appearing lytic lesion within the dens and central aspect of body of C2; probably due to a somewhat atypical hemangioma. Subcentimeter nonaggressive appearing sclerotic lesion left lamina of C2. CANAL/FORAMINA: Moderate degenerative changes without significant canal narrowing at any level. Multilevel mild-to-moderate neural foraminal narrowing. PARASPINAL: No extraspinal abnormality. Visualized lung fields are clear.     IMPRESSION: HEAD CT: 1.  No definite acute intracranial process. CERVICAL SPINE CT: 1.  Mild compression superior aspect of T1 and T2, age indeterminate; appears chronic. It is new since CT chest 07/27/2018. 2.  No definite acute fracture. 3.  Moderate degenerative changes without significant canal narrowing at any level.    CT Cervical Spine w/o Contrast    Result Date: 2/4/2024  EXAM: CT HEAD W/O CONTRAST, CT CERVICAL SPINE W/O CONTRAST LOCATION: [facility] DATE: 2/4/2024 INDICATION: Confusion, head and neck injury COMPARISON: CT head 11/29/2022 TECHNIQUE: 1) Routine CT Head without IV contrast. Multiplanar reformats. Dose reduction techniques were used. 2) Routine CT Cervical Spine without IV contrast. Multiplanar reformats. Dose  reduction techniques were used. FINDINGS: HEAD CT: INTRACRANIAL CONTENTS: No intracranial hemorrhage, extraaxial collection, or mass effect.  No CT evidence of acute infarct. Moderate presumed chronic small vessel ischemic changes. Mild generalized volume loss. No hydrocephalus. VISUALIZED ORBITS/SINUSES/MASTOIDS: No intraorbital abnormality. No paranasal sinus mucosal disease. No middle ear or mastoid effusion. BONES/SOFT TISSUES: No acute abnormality. CERVICAL SPINE CT: VERTEBRA: Mild compression superior aspect of T1 and T2, age indeterminate; appears chronic. It is new since CT chest 07/27/2018. No definite acute fracture. Straightening of cervical lordosis. Nonaggressive appearing lytic lesion within the dens and central aspect of body of C2; probably due to a somewhat atypical hemangioma. Subcentimeter nonaggressive appearing sclerotic lesion left lamina of C2. CANAL/FORAMINA: Moderate degenerative changes without significant canal narrowing at any level. Multilevel mild-to-moderate neural foraminal narrowing. PARASPINAL: No extraspinal abnormality. Visualized lung fields are clear.     IMPRESSION: HEAD CT: 1.  No definite acute intracranial process. CERVICAL SPINE CT: 1.  Mild compression superior aspect of T1 and T2, age indeterminate; appears chronic. It is new since CT chest 07/27/2018. 2.  No definite acute fracture. 3.  Moderate degenerative changes without significant canal narrowing at any level.     Vascular surgery attending staff note: This is a virtual consultation.  Imaging reviewed in detail.  I agree with the documentation by fellow, Dr. Dong.  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  Nothing for vascular surgery to offer.    MAUREEN BROWN M.D.

## 2024-03-01 NOTE — PROGRESS NOTES
"CLINICAL NUTRITION SERVICES - ASSESSMENT NOTE     Nutrition Prescription    RECOMMENDATIONS FOR MDs/PROVIDERS TO ORDER:  -Recommend mvi with minerals and thiamine 100 mg x 10 days d/t not meeting RDIs x 1 week, risk of refeeding    Malnutrition Status:    Unable to assess    Recommendations already ordered by Registered Dietitian (RD):  -Add ensure enlive and gel plus daily   -New weight    Future/Additional Recommendations:  Perform NFPA when able.   Adjust supplements pending intake, weight, tolerance, acceptance       REASON FOR ASSESSMENT  Dilip Long is a/an 88 year old male assessed by the dietitian for Admission Nutrition Risk Screen for positive with 2-13 lb weight loss and eating poorly d/t decreased appetite    Pt presents with encephalopathy, LEONIDAS on CKD, recurrent UTI    NUTRITION HISTORY  Pt lives with his wife and granddaughter or 1 of 3 sons caregiving    Pt assessed by RD 2/5. He had no decrease in appetite or weight loss at the time. Good intake    Son reports pt intake has decreased significantly x 1 week, eating </= 50%. Mostly eating only [employer] pancakes q am. Occasionally waffle at supper.   Pt only wanting fast food/take out .     Protein shakes were tried and he did not like them at the time    Pt sleeping. 1:1 nurse in the room     CURRENT NUTRITION ORDERS  Diet: Regular. 1:1 feeding    Intake/Tolerance: Fair  Ate 75% of [employer] breakfast from granddaughter this am   Ate part of a [employer] apple pie for lunch.     Son thought it would be ok to re-try protein shakes  .   LABS  Labs reviewed  Na 134 (L), decreased  K+ 3.2 (L), decreased  Cr 1.2 (H), improving    MEDICATIONS  Medications reviewed  Levothyroxine, iv abx    ANTHROPOMETRICS  Height: 172.7 cm (5' 8\")  IBW: 70 kg  BMI: Normal BMI  Weight History: 8.9% weight loss in 7 months or less  Wt Readings from Last 10 Encounters:   02/29/24 02/09/24 68 kg (150 lb)- likely stated in ED  64.7 kg (142 lb 9.6 oz) -office "   02/04/24 07/13/23 68 kg (150 lb)- ED  71.2 kg (156 lb) - office   04/19/23 68.2 kg (150 lb 5.7 oz)   04/10/23 72.6 kg (160 lb)   03/10/23 58.1 kg (128 lb)   02/04/23 58.1 kg (128 lb)   01/30/23 58.1 kg (128 lb)   01/12/23 58.1 kg (128 lb 1.6 oz)   12/22/22 70.3 kg (155 lb)   11/29/22 68 kg (150 lb)       Dosing Weight: 70 kg    ASSESSED NUTRITION NEEDS  Estimated Energy Needs: 2512-4713 kcals/day (25 - 30 kcals/kg)  Justification: Maintenance  Estimated Protein Needs:  grams protein/day (1.2 - 1.5 grams of pro/kg)  Justification: Repletion  Estimated Fluid Needs: 8747-5325 mL/day (1 mL/kcal)   Justification: Maintenance    PHYSICAL FINDINGS  See malnutrition section below.  GI - last BM 2/28  Deferred physical assessment. Pt is sleeping and confused      MALNUTRITION:  % Weight Loss:  Unable to determine-suspect weight in ED is stated  % Intake:  </= 50% for >/= 5 days (severe malnutrition)  Subcutaneous Fat Loss:  Unable to assess  Muscle Loss:  Unable to assess  Fluid Retention:  None noted Per nsg    Malnutrition Diagnosis: Unable to determine due to need new weight, NFPA  In Context of:  Acute illness or injury    NUTRITION DIAGNOSIS  Inadequate oral intake related to AMS, UTI as evidenced by intake < 50% x 1 week per family , deconditioning    INTERVENTIONS  Implementation  -Add ensure enlive and gel plus daily   -New weight  -Recommend mvi with minerals and thiamine 100 mg x 10 days d/t not meeting RDIs x 1 week, risk of refeeding  -Encouraged desired food from home if family able-food preferences on white board in room when needing to order    Goals  Meet > 75% of estimated nutrition needs  Maintain weight  Electrolytes WNL     Monitoring/Evaluation  Progress toward goals will be monitored and evaluated per protocol.

## 2024-03-01 NOTE — CONSULTS
NEUROLOGY CONSULTATION NOTE     Dilip Long,  1935, MRN [id number] Date: 3/1/2024     [facility]   Code status:  No CPR- Do NOT Intubate   PCP: Kolton Junior, [phone]      ASSESSMENT & PLAN   Diagnosis code: Encephalopathy, likely multifactorial    88-year-old man with recent UTI presenting with confusion    Head CT shows nothing acute  MRI if able  Electroencephalogram ordered  TSH  high at 14.40, T4 normal. Check T3, TPO Ab - ordered  Check B1, B6, B12, MMA, Folate, Ammonia - ordered  ID has been consulted for recurrent UTI, management of infection  Hypertensive emergency management per primary team. Blood culture prelim negative  Neurology will follow along     Chief Complaint   Patient presents with     Cystitis     Headache        HISTORY OF PRESENT ILLNESS     We have been requested by Dr. Lala to evaluate Dilip Long who is a 88 year old male for encephalopathy. He presented on  with increasing confusion over several days.  Dilip has history of heart failure, A-fib, recurrent UTIs with recent admission and antibiotic treatment.  Presented with Lozano catheter in place.  Baseline cognition is conversational and overall no significant impairment with some reported mild short-term memory loss.  Patient is also been complaining about headache over the past week and daughter mentioned some hallucinations.    Dilip's granddaughter is at bedside.  She relays increased confusion over the past week.  He is typically relatively independent, lives with his wife.  He is undergoing EEG currently.     PAST MEDICAL & SURGICAL HISTORY     Medical History  Past Medical History:   Diagnosis Date     Acute heart failure with preserved ejection fraction (HFpEF) (H) 11/10/2022     Acute prostatitis      Asymptomatic bacteriuria 10/23/2013    Noted at 10/2/13 office visit at [facility], [place], operated by [facility]. No treatment recommended at that time.      Congestive heart failure (H)      Essential hypertension  08/18/2015     Hypertension      Paroxysmal atrial fib -- on Eliquis 11/06/2022     Syncope      Thyroid disease      Surgical History  Past Surgical History:   Procedure Laterality Date     BLADDER SURGERY      Bladder absces removal     Blepharoplasty Upper Lid W/ Excessive Skin[  1/29/2007     CATARACT EXTRACTION       CYSTOSCOPY, FULGURATE BLEEDERS, EVACUATE CLOT(S), COMBINED N/A 4/19/2023    Procedure: CYSTOSCOPY, BLADDER BIOPSY WITH FULGURATION AND CLOT EVACUATION;  Surgeon: Edgar Laird MD;  [facility]: [facility]     EYE SURGERY      both eyes 2015     IR LUMBAR EPIDURAL STEROID INJECTION  4/27/2004     IR LUMBAR EPIDURAL STEROID INJECTION  5/11/2004     IR LUMBAR EPIDURAL STEROID INJECTION  11/24/2004     IR LUMBAR EPIDURAL STEROID INJECTION  11/28/2007     WA CYSTOURETHROSCOPY W/IRRIG & EVAC CLOTS N/A 7/27/2018    Procedure: CYSTOSCOPY, CLOT EVACUATION ,URETHRAL DILATATION, DAUGHERTY CATHETER PLACEMENT;  Surgeon: Lowell Arias MD;  Location: [facility] [place];  Service: Urology        SOCIAL HISTORY     Social History      FAMILY HISTORY     Reviewed, and family history includes No Known Problems in his father and mother.     ALLERGIES     No Known Allergies     REVIEW OF SYSTEMS     Review of systems not obtained due to patient factors.     HOME & HOSPITAL MEDICATIONS     Prior to Admission Medications  Medications Prior to Admission   Medication Sig Dispense Refill Last Dose     acetaminophen (TYLENOL) 500 MG tablet Take 1,000 mg by mouth every 6 hours as needed for mild pain   Past Week at prn     amoxicillin (AMOXIL) 500 MG capsule Take 1 capsule (500 mg) by mouth 2 times daily 10 capsule 0 2/28/2024 at pm     atenolol (TENORMIN) 25 MG tablet Take 25 mg by mouth every evening   2/28/2024 at pm     Calcium Carbonate-Vitamin D (OSCAL 500/200 D-3 PO) Take 1 tablet by mouth daily.   2/28/2024 at am     finasteride (PROSCAR) 5 MG tablet Take 5 mg by mouth daily   2/28/2024 at am      GLUCOSAMINE-CHONDROITIN-VIT D3 PO Take 1 tablet by mouth daily   2/28/2024 at am     ipratropium - albuterol 0.5 mg/2.5 mg/3 mL (DUONEB) 0.5-2.5 (3) MG/3ML neb solution Take 1 vial (3 mLs) by nebulization every 6 hours as needed for shortness of breath, wheezing or cough 90 mL 0 Unknown at prn     levothyroxine (SYNTHROID/LEVOTHROID) 75 MCG tablet Take 75 mcg by mouth daily   2/28/2024 at am     traZODone (DESYREL) 50 MG tablet Take 50 mg by mouth at bedtime   2/29/2024 at am     Vitamin D3 (VITAMIN D, CHOLECALCIFEROL,) 25 mcg (1000 units) tablet Take 1 tablet by mouth every evening   2/28/2024 at pm       Hospital Medications    atenolol  25 mg Oral QPM     enoxaparin ANTICOAGULANT  40 mg Subcutaneous Q24H     finasteride  5 mg Oral Daily     levothyroxine  75 mcg Oral Daily     piperacillin-tazobactam  3.375 g Intravenous Q8H     sodium chloride (PF)  3 mL Intracatheter Q8H        PHYSICAL EXAM     Vital signs  Temp:  [97.5  F (36.4  C)-98.6  F (37  C)] 98.3  F (36.8  C)  Pulse:  [59-95] 72  Resp:  [18-22] 22  BP: (131-224)/() 131/83  SpO2:  [93 %-98 %] 93 %    Weight:   [unfilled]    General Physical Exam: Patient is alert and oriented x 0. Vital signs were reviewed and are documented in EMR.Neurological Exam:  Patient appears somnolent, no apparent respiratory distress.  Moves all extremities spontaneously.  Undergoing EEG at the time of my visit     DIAGNOSTIC STUDIES     Pertinent Radiology   Radiology Results: Personally reviewed image/s    CT  IMPRESSION:  1.  No acute intracranial abnormality.  2.  Similar chronic changes, as described.    Electroencephalogram  pending    Pertinent Labs   Lab Results: personally reviewed.   Recent Results (from the past 24 hour(s))   UA with Microscopic reflex to Culture    Collection Time: 02/29/24 10:33 AM    Specimen: Urine, Lozano Catheter   Result Value Ref Range    Color Urine Light Yellow Colorless, Straw, Light Yellow, Yellow    Appearance Urine Turbid (A)  Clear    Glucose Urine Negative Negative mg/dL    Bilirubin Urine Negative Negative    Ketones Urine Negative Negative mg/dL    Specific Gravity Urine 1.013 1.001 - 1.030    Blood Urine 0.06 mg/dL (A) Negative    pH Urine 6.5 5.0 - 7.0    Protein Albumin Urine 10 (A) Negative mg/dL    Urobilinogen Urine <2.0 <2.0 mg/dL    Nitrite Urine Negative Negative    Leukocyte Esterase Urine 500 [place]/uL (A) Negative    WBC Clumps Urine Present (A) None Seen /HPF    Mucus Urine Present (A) None Seen /LPF    RBC Urine 4 (H) <=2 /HPF    WBC Urine 31 (H) <=5 /HPF    Squamous Epithelials Urine 17 (H) <=1 /HPF   Symptomatic Influenza A/B, RSV, & SARS-CoV2 PCR (COVID-19) Nasopharyngeal    Collection Time: 02/29/24 10:35 AM    Specimen: Nasopharyngeal; Swab   Result Value Ref Range    Influenza A PCR Negative Negative    Influenza B PCR Negative Negative    RSV PCR Negative Negative    SARS CoV2 PCR Negative Negative   INR    Collection Time: 02/29/24 10:40 AM   Result Value Ref Range    INR 1.02 0.85 - 1.15   Partial thromboplastin time    Collection Time: 02/29/24 10:40 AM   Result Value Ref Range    aPTT 30 22 - 38 Seconds   Comprehensive metabolic panel    Collection Time: 02/29/24 10:40 AM   Result Value Ref Range    Sodium 136 135 - 145 mmol/L    Potassium 3.4 3.4 - 5.3 mmol/L    Carbon Dioxide (CO2) 28 22 - 29 mmol/L    Anion Gap 10 7 - 15 mmol/L    Urea Nitrogen 16.1 8.0 - 23.0 mg/dL    Creatinine 1.22 (H) 0.67 - 1.17 mg/dL    GFR Estimate 57 (L) >60 mL/min/1.73m2    Calcium 9.0 8.8 - 10.2 mg/dL    Chloride 98 98 - 107 mmol/L    Glucose 111 (H) 70 - 99 mg/dL    Alkaline Phosphatase 69 40 - 150 U/L    AST 17 0 - 45 U/L    ALT <5 0 - 70 U/L    Protein Total 6.2 (L) 6.4 - 8.3 g/dL    Albumin 3.5 3.5 - 5.2 g/dL    Bilirubin Total 0.4 <=1.2 mg/dL   Magnesium    Collection Time: 02/29/24 10:40 AM   Result Value Ref Range    Magnesium 1.8 1.7 - 2.3 mg/dL   Troponin T, High Sensitivity    Collection Time: 02/29/24 10:40 AM   Result  Value Ref Range    Troponin T, High Sensitivity 41 (H) <=22 ng/L   CBC with platelets and differential    Collection Time: 02/29/24 10:40 AM   Result Value Ref Range    WBC Count 5.9 4.0 - 11.0 10e3/uL    RBC Count 3.66 (L) 4.40 - 5.90 10e6/uL    Hemoglobin 11.8 (L) 13.3 - 17.7 g/dL    Hematocrit 34.7 (L) 40.0 - 53.0 %    MCV 95 78 - 100 fL    MCH 32.2 26.5 - 33.0 pg    MCHC 34.0 31.5 - 36.5 g/dL    RDW 11.7 10.0 - 15.0 %    Platelet Count 292 150 - 450 10e3/uL    % Neutrophils 70 %    % Lymphocytes 13 %    % Monocytes 14 %    % Eosinophils 1 %    % Basophils 1 %    % Immature Granulocytes 1 %    NRBCs per 100 WBC 0 <1 /100    Absolute Neutrophils 4.1 1.6 - 8.3 10e3/uL    Absolute Lymphocytes 0.8 0.8 - 5.3 10e3/uL    Absolute Monocytes 0.8 0.0 - 1.3 10e3/uL    Absolute Eosinophils 0.1 0.0 - 0.7 10e3/uL    Absolute Basophils 0.0 0.0 - 0.2 10e3/uL    Absolute Immature Granulocytes 0.0 <=0.4 10e3/uL    Absolute NRBCs 0.0 10e3/uL   ECG 12-LEAD WITH MUSE (LHE)    Collection Time: 02/29/24 10:45 AM   Result Value Ref Range    Systolic Blood Pressure 158 mmHg    Diastolic Blood Pressure 81 mmHg    Ventricular Rate 62 BPM    Atrial Rate 57 BPM    KS Interval  ms    QRS Duration 88 ms     ms    QTc 444 ms    P Axis  degrees    R AXIS -28 degrees    T Axis 31 degrees    Interpretation ECG       Junctional rhythm  Possible Anterior infarct , age undetermined  Abnormal ECG  When compared with ECG of 04-FEB-2024 19:38,  Previous ECG has undetermined rhythm, needs review  QRS axis Shifted left  Confirmed by SEE ED PROVIDER NOTE FOR, ECG INTERPRETATION ([id number]),  KEIRY FORREST ([id number]) on 2/29/2024 10:37:35 PM     Troponin T, High Sensitivity (now)    Collection Time: 02/29/24 12:57 PM   Result Value Ref Range    Troponin T, High Sensitivity 39 (H) <=22 ng/L   TSH with free T4 reflex    Collection Time: 02/29/24 12:57 PM   Result Value Ref Range    TSH 14.40 (H) 0.30 - 4.20 uIU/mL   T4 free    Collection Time: 02/29/24  12:57 PM   Result Value Ref Range    Free T4 1.23 0.90 - 1.70 ng/dL   Blood Culture Hand, Left    Collection Time: 02/29/24  3:55 PM    Specimen: Hand, Left; Blood   Result Value Ref Range    Culture No growth after 12 hours    Blood Culture Arm, Left    Collection Time: 02/29/24  3:55 PM    Specimen: Arm, Left; Blood   Result Value Ref Range    Culture No growth after 12 hours    Glucose by meter    Collection Time: 02/29/24  4:38 PM   Result Value Ref Range    GLUCOSE BY METER POCT 120 (H) 70 - 99 mg/dL   Basic metabolic panel    Collection Time: 03/01/24  6:24 AM   Result Value Ref Range    Sodium 134 (L) 135 - 145 mmol/L    Potassium 3.2 (L) 3.4 - 5.3 mmol/L    Chloride 97 (L) 98 - 107 mmol/L    Carbon Dioxide (CO2) 24 22 - 29 mmol/L    Anion Gap 13 7 - 15 mmol/L    Urea Nitrogen 14.3 8.0 - 23.0 mg/dL    Creatinine 1.21 (H) 0.67 - 1.17 mg/dL    GFR Estimate 58 (L) >60 mL/min/1.73m2    Calcium 8.4 (L) 8.8 - 10.2 mg/dL    Glucose 105 (H) 70 - 99 mg/dL   CBC with platelets    Collection Time: 03/01/24  6:24 AM   Result Value Ref Range    WBC Count 8.8 4.0 - 11.0 10e3/uL    RBC Count 3.54 (L) 4.40 - 5.90 10e6/uL    Hemoglobin 11.5 (L) 13.3 - 17.7 g/dL    Hematocrit 33.1 (L) 40.0 - 53.0 %    MCV 94 78 - 100 fL    MCH 32.5 26.5 - 33.0 pg    MCHC 34.7 31.5 - 36.5 g/dL    RDW 11.8 10.0 - 15.0 %    Platelet Count 298 150 - 450 10e3/uL       Total time spent for face to face visit, reviewing labs/imaging studies, counseling and coordination of care was: 1 Hour 15 Minutes. More than 50% of this time was spent on counseling and coordination of care.    Dragon software used in the dictation of this note.    Haven Dutta MD  [facility] Neurology [facility] - [address], [address]  [place] [address]  [phone]

## 2024-03-01 NOTE — PLAN OF CARE
Problem: Adult Inpatient Plan of Care  Goal: Plan of Care Review  Description: The Plan of Care Review/Shift note should be completed every shift.  The Outcome Evaluation is a brief statement about your assessment that the patient is improving, declining, or no change.  This information will be displayed automatically on your shift  note.  Outcome: Not Progressing   Goal Outcome Evaluation:       Pt is alert and oriented to self. Restless and Intermittently tries to get out of bed. Blood pressure is getting better. Afebrile. On zosyn. Lozano catheter in place. Oxygen on RA. Lung sounds diminished. Continues to be on 1:1 or safety.

## 2024-03-01 NOTE — PROGRESS NOTES
Pt attempted to sleep. Did appear to sleep soundly approximately 30-45 minutes. Pt with eyes closed then began picking in the air, speaking/mumbling and moving head around, feet were twitching and moving. Increased restlessness noted as time progressed. Grand daughter and pt son stated pt had not slept for two days. Pt up in chair at this time as sleeps in chair in at home. Continue with 1:1 staff at this time.

## 2024-03-01 NOTE — CONSULTS
[facility]  WOC Nurse Inpatient Assessment     Consulted for: sacrum    Summary: WOC signing off- no open areas    Patient History (according to provider note(s):      Dilip Long is a 88 year old male who is presenting with increased confusion for the past several days.  Patient has a history of heart failure with preserved ejection fraction, hypertension, A-fib, recurrent urinary tract infections for which she was recently admitted and discharged on amoxicillin.  Daughter states that patient seem to be improving several days post discharge.  He then received a Lozano exchange for his chronic indwelling Lozano last week.  A day or 2 after the Lozano exchange, granddaughter states that patient was notably confused.  At baseline, patient is conversational and overall without significant cognitive impairment but does have some very mild short-term memory loss.  No fevers, chills, vomiting noted.  No abdominal pain.  Patient has been complaining of a headache over the past week.  Daughter states that he has been noting hallucinations as well.  No bowel or bladder incontinence.  Denies any visual changes.       Assessment:      Areas visualized during today's visit: Focused:    No open areas on buttocks. 2 areas of minor scabbing from friction- one on each buttock.     Treatment Plan:     Ok to apply protective mepilex and change every 5 days    Orders: Written    RECOMMEND PRIMARY TEAM ORDER: None, at this time  Education provided: plan of care  Discussed plan of care with: Patient  WOC nurse follow-up plan: signing off  Notify WOC if wound(s) deteriorate.  Nursing to notify the Provider(s) and re-consult the WOC Nurse if new skin concern.    DATA:     Current support surface: Standard  Standard gel/foam mattress (IsoFlex, Atmos air, etc)  Containment of urine/stool: Incontinence Protocol  BMI: Body mass index is 22.81 kg/m .   Active diet order: Orders Placed This Encounter      Regular Diet  Adult     Output: I/O last 3 completed shifts:  In: -   Out: 850 [Urine:850]     Labs:   Recent Labs   Lab 03/01/24  0624 02/29/24  1040   ALBUMIN  --  3.5   HGB 11.5* 11.8*   INR  --  1.02   WBC 8.8 5.9     Pressure injury risk assessment:   Sensory Perception: 2-->very limited  Moisture: 4-->rarely moist  Activity: 1-->bedfast  Mobility: 3-->slightly limited  Nutrition: 2-->probably inadequate  Friction and Shear: 2-->potential problem  Esdras Score: 14    YESENIA KRUEGER RN CWOCN, CFCN  Pager no longer is use, please contact through [place]

## 2024-03-01 NOTE — PROGRESS NOTES
[facility]    Infectious Disease Progress Note    Date of Service : 03/01/2024     Assessment & Plan   Dilip Long is a 88 year old male who was admitted on 2/29/2024.     ASSESSMENT:  Encephalopathy, hallucinations, altered mental status  Acute kidney injury on chronic kidney disease, improving  Abnormal CT chest, seen by vascular, no interventions at this time  Urinary tract infection likely bacteriuria.  Had Enterococcus in urine cultures in the past.  Was treated with amoxicillin with indwelling Lozano catheter  Prostate enlargement risk of prostatitis  Fall at home  Advanced age 88 years old lives at home with family members  Recent hospitalization 2/4 - 2/5/2024 for encephalopathy UTI, acute kidney injury on chronic kidney disease  Previous urine cultures    Susceptibility data from last 90 days.  Collected Specimen Info Organism Ampicillin Nitrofurantoin Vancomycin   02/04/24 Urine, Midstream Enterococcus faecalis  S  S  S           RECOMMENDATIONS:    Antibiotics follow culture results, empiric Zosyn  Follow culture results   Focus/de-escalate antibiotics based on final culture results  Monitor CBC, CMP  Lozano catheter management per urology.  Due to history of prostate enlargement, bilateral diverticula, would recommend a 4-week course of oral antibiotics if possible  To do brain MRI when stable, consider neurology input if mentation does not improve.  Discussed with one-to-one sitter at patient's bedside  ID will follow      Joan Sosa MD  [facility]  [phone]      Interval History   Remains confused, lying in recliner at bedside.  One-to-one at bedside    Physical Exam   Temp: 98  F (36.7  C) Temp src: Axillary BP: (!) 162/74 Pulse: 78   Resp: 16 SpO2: (!) 89 % (sleeping) O2 Device: None (Room air)    Vitals:    02/29/24 0959   Weight: 68 kg (150 lb)     Vital Signs with Ranges  Temp:  [97.5  F (36.4  C)-98.8  F (37.1  C)] 98  F (36.7  " C)  Pulse:  [70-95] 78  Resp:  [16-22] 16  BP: (131-224)/() 162/74  SpO2:  [89 %-98 %] 89 %    Constitutional: Appears chronically ill  Lungs: Intermittent coughing  Cardiovascular: S1-S2 previously  Abdomen: non distended, nontender, Lozano  Skin: warm  Neuro: deconditioned  Psych: Oriented x 1    Medications      atenolol  25 mg Oral QPM    enoxaparin ANTICOAGULANT  40 mg Subcutaneous Q24H    finasteride  5 mg Oral Daily    levothyroxine  75 mcg Oral Daily    multivitamin w/minerals  1 tablet Oral Daily    piperacillin-tazobactam  3.375 g Intravenous Q8H    sodium chloride (PF)  3 mL Intracatheter Q8H    thiamine  100 mg Oral Daily       Data   All microbiology laboratory data reviewed.  Recent Labs   Lab Test 03/01/24  0624 02/29/24  1040 02/05/24  0720   WBC 8.8 5.9 14.9*   HGB 11.5* 11.8* 11.0*   HCT 33.1* 34.7* 32.3*   MCV 94 95 95    292 265     Recent Labs   Lab Test 03/01/24  0624 02/29/24  1040 02/05/24  0720   CR 1.21* 1.22* 1.31*     No lab results found.  No lab results found.    Invalid input(s): \"UC\"    MICROBIOLOGY:    Reviewed    7-Day Micro Results       Collected Updated Procedure Result Status      02/29/2024 1555 03/01/2024 0702 Blood Culture Hand, Left [[id number]]   Blood from Hand, Left    Preliminary result Component Value   Culture No growth after 12 hours  [P]                02/29/2024 1555 03/01/2024 0702 Blood Culture Arm, Left [[id number]]   Blood from Arm, Left    Preliminary result Component Value   Culture No growth after 12 hours  [P]                02/29/2024 1035 02/29/2024 1136 Symptomatic Influenza A/B, RSV, & SARS-CoV2 PCR (COVID-19) Nasopharyngeal [[id number]]    Swab from Nasopharyngeal    Final result Component Value   Influenza A PCR Negative   Influenza B PCR Negative   RSV PCR Negative   SARS CoV2 PCR Negative   NEGATIVE: SARS-CoV-2 (COVID-19) RNA not detected, presumed negative.            02/29/2024 1033 03/01/2024 0958 Urine Culture [[id number]] "   Urine, Lozano Catheter    Final result Component Value   Culture No Growth                        RADIOLOGY:    Reviewed  EEG Awake or Drowsy Routine    Result Date: 3/1/2024  Table formatting from the original result was not included. Images from the original result were not included. ELECTROENCEPHALOGRAM (EEG) REPORT [facility] NEUROLOGY [place] [address] [place] [address] Tel: [phone]  Fax: [phone] [email or web address] EDWIN Tabor 1935, MRN [id number] PCP: Kolton Junior Date: 3/1/2024 Principal Diagnosis: Altered mental status History : Patient is being evaluated for altered mental status. Medication listed includes no anticonvulsant Description of the Recording:  This is a multichannel digital EEG recording done using the international 10-20 placement system. Background of the recording consists of an irregular 6-7 hz activity with an amplitude of 20-30  V.  In addition, occasionally there is  1-2 Hz activity with similar amplitude.  This diffusely slow background attenuates with alerting procedures.  Photic stimulation performed with eyes open, according to the standard protocol, minimal change.  Hyperventilation was not performed.  Sleep was not obtained. During this recording, no sharp discharges, spikes or electrographic seizure activity was recorded. Impression: This is an abnormal EEG due to diffusely slow background in theta and delta range that suggests a nonspecific generalized cerebral dysfunction.  Such slowing can be seen in metabolic or toxic abnormalities, clinical correlation is recommended.  No sharp discharges or spikes were recorded during this recording to suggest a seizure disorder. Classification: Dysrhythmia grade II generalized                          Delta grade I generalized Joe Kearns MD [facility] This note was dictated using voice recognition software.  Any grammatical or context distortions are  unintentional and inherent to the software.     US Renal Complete Non-Vascular    Result Date: 2/29/2024  EXAM: US RENAL COMPLETE NON-VASCULAR LOCATION: [facility] DATE: 2/29/2024 INDICATION: Recurrent UTI. COMPARISON: CT same-day. TECHNIQUE: Routine Bilateral Renal and Bladder Ultrasound. FINDINGS: RIGHT KIDNEY: 10 x 6.2 x 5 cm. Normal without hydronephrosis or masses. Incidental 1.4 cm cyst. LEFT KIDNEY: 10.1 x 4.2 x 4.3 cm. Normal without hydronephrosis or masses. BLADDER: Decompressed bladder with intrinsic portacatheter.     IMPRESSION: 1.  Unremarkable kidney ultrasound. 2.  Decompressed bladder with intrinsic portacatheter. 3.  Please see same-day CT abdomen - pelvis.     CTA Chest Abdomen Pelvis w Contrast    Result Date: 2/29/2024  EXAM: CTA CHEST ABDOMEN PELVIS W CONTRAST LOCATION: [facility] DATE: 2/29/2024 INDICATION: Sepsis, thoracic aorta outpouching on previous CT  follow up,sepsis COMPARISON: 2/4/2024 TECHNIQUE: CT angiogram chest abdomen pelvis during arterial phase of injection of IV contrast. 2D and 3D MIP reconstructions were performed by the CT technologist. Dose reduction techniques were used. CONTRAST: isovue 370 75ml FINDINGS: ANGIOGRAM CHEST, ABDOMEN, AND PELVIS: No evidence of intramural hematoma, dissection, or aneurysm. Mild-moderate atherosclerotic calcification throughout the aorta and iliofemoral arteries. No significant stenosis. Unchanged 1.4 x 0.5 cm outpouching from  the inferolateral aspect of the proximal descending thoracic aorta (6/48, 10/103). No stranding or hematoma. No central pulmonary embolus. LUNGS AND PLEURA: Unchanged 0.5 cm nodule along the minor fissure since 07/27/2018 and therefore consistent with benign etiology. A 0.5 cm right lower lobe nodule is also unchanged. No new or enlarging suspicious nodules. CORONARY ARTERY CALCIFICATION: Moderate MEDIASTINUM/AXILLAE: Unremarkable. HEPATOBILIARY: Unremarkable. SPLEEN:  Unremarkable. PANCREAS: Unremarkable. ADRENAL GLANDS: Unremarkable. KIDNEYS/BLADDER: A balloon tip catheter is present in the urinary bladder lumen. The bladder is underdistended with a Lozano catheter in place and bilateral diverticula. BOWEL: Left-sided colonic diverticulosis. No acute inflammation or obstruction. LYMPH NODES: Unremarkable. PELVIC ORGANS: Moderate enlargement of the prostate. MUSCULOSKELETAL: Diffuse osteopenia and multilevel degenerative changes in the spine. Healed sternal and rib fractures. Unchanged L1 compression deformity.     IMPRESSION: Unchanged small outpouching from the undersurface of the proximal descending thoracic aorta, which could represent an ulcerated plaque or short segment dissection flap. No evidence of acute aortic syndrome.    Head CT w/o contrast    Result Date: 2/29/2024  EXAM: CT HEAD W/O CONTRAST LOCATION: [facility] DATE: 2/29/2024 INDICATION: Altered mental status. COMPARISON: CT dated 02/04/2024. TECHNIQUE: Routine CT Head without IV contrast. Multiplanar reformats. Dose reduction techniques were used. FINDINGS: Mildly motion degraded exam. INTRACRANIAL CONTENTS: No acute intracranial hemorrhage, extra-axial fluid collection, or mass effect. No evidence of an acute transcortical confluent infarct. Grossly similar foci of hypoattenuation involving the bilateral cerebral white matter, nonspecific although most likely the sequela of chronic microvascular ischemia. Mild generalized cerebral parenchymal volume loss. No hydrocephalus. VISUALIZED ORBITS/SINUSES/MASTOIDS: No acute intraorbital finding. Status post functional endoscopic sinus surgery with overall similar trace mucosal thickening scattered about the visualized paranasal sinuses without an air-fluid level. No mastoid or middle ear effusion. Cerumen within the bilateral external auditory canals. BONES/SOFT TISSUES: No acute abnormality. An incidental bilateral frontal scalp lipomas.  Severe right temporal mandibular joint degenerative change.     IMPRESSION: 1.  No acute intracranial abnormality. 2.  Similar chronic changes, as described.    XR Chest Port 1 View    Result Date: 2/29/2024  EXAM: XR CHEST PORT 1 VIEW LOCATION: [facility] DATE: 2/29/2024 INDICATION: Chest Pain COMPARISON: CTA chest 02/04/2024     IMPRESSION: Patient's rotated to the left with a left head tilt as well. Lungs are clear. Heart and pulmonary vascularity are normal. No signs of pneumonia or failure. Old right posterior rib fractures again noted.    CTA Chest Abdomen Pelvis w Contrast    Result Date: 2/4/2024  EXAM: CT CHEST, ABDOMEN AND PELVIS WITH CONTRAST LOCATION: [facility] DATE/TIME: 2/4/2024 10:14 PM CST INDICATION: Unwitnessed fall. Altered mental status. COMPARISON: 07/27/2018. TECHNIQUE: Note that this study was performed in conjunction with a CT scan of the thoracolumbar spine. Refer to separate reports for findings from those studies. CT angiogram chest abdomen pelvis during arterial phase of injection of IV contrast. 2D and  3D MIP reconstructions were performed by the CT technologist. Dose reduction techniques were used. CONTRAST: 75 mL Isovue-370. FINDINGS: ANGIOGRAM CHEST, ABDOMEN, AND PELVIS: Atherosclerotic calcification in the aorta. The aorta is normal in caliber without dissection. A very small 1.4 cm wide x 0.5 cm deep apparent outpouching of the inferolateral aspect of the proximal descending thoracic aorta (for example, series 6 image 57 and series 10 image 118), not present on 07/27/2018. No stranding or hematoma visualized in the mediastinum adjacent to this finding. No visualized pulmonary embolus. LUNGS AND PLEURA: 0.5 cm nodule in the anterior mid right lung in the region of the minor fissure (series 7 image 169), unchanged since 07/27/2018 and therefore consistent with benign etiology. A few curvilinear opacities in the periphery of the lungs  likely represent scarring or atelectasis. CORONARY ARTERY CALCIFICATION: Present. MEDIASTINUM/AXILLAE: Unremarkable. HEPATOBILIARY: Unremarkable. SPLEEN: Unremarkable. PANCREAS: Unremarkable. ADRENAL GLANDS: Unremarkable. KIDNEYS/BLADDER: A balloon tip catheter is present in the urinary bladder lumen. 2 cm diverticulum from the posterior left aspect of the urinary bladder. BOWEL: Several colonic diverticula are present without evidence of diverticulitis. The appendix is not visualized. LYMPH NODES: Unremarkable. PELVIC ORGANS: Moderate enlargement of the prostate gland. MUSCULOSKELETAL: Nonacute moderate compression deformity of the L1 vertebral body. OTHER: None.     IMPRESSION: 1. A very small apparent slight focal outpouching of the proximal descending thoracic aorta. This is new since 07/27/2018, but otherwise age-indeterminate. It could represent changes due to atherosclerosis, but a very small penetrating ulcer cannot be excluded. It is unlikely to relate to acute trauma. A follow-up CT scan of the thoracic aorta is recommended in 2 weeks in order to evaluate for interval changes in this finding. 2. No other acute abnormality identified in the chest, abdomen or pelvis.    CT Thoracic Spine w/o Contrast    Result Date: 2/4/2024  EXAM: CT THORACIC SPINE W/O CONTRAST, CT LUMBAR SPINE W/O CONTRAST LOCATION: [facility] DATE: 2/4/2024 INDICATION: eval fractures (reconstructions please). Patient was found down. Potential trauma. COMPARISON: Lumbar spine CT 11/29/2022 TECHNIQUE: 1) Routine CT Thoracic Spine without IV contrast. Multiplanar reformats. Dose reduction techniques were used. 2) Routine CT Lumbar Spine without IV contrast. Multiplanar reformats. Dose reduction techniques were used. FINDINGS: THORACIC SPINE CT: The exam is moderately degraded by motion. VERTEBRA: Mild compression forming disease at the superior endplates of T1 and T2 are age indeterminate. Subtle compression  deformity at the superior endplate of T3 is age-indeterminate. Mild anterior wedging compression deformity at the superior endplate of T5 is age-indeterminate. There is an chronic anterior wedging compression deformity of T12 with up to 30-40% loss of height anteriorly. No acute subluxation. CANAL/FORAMINA: No canal or neural foraminal stenosis. PARASPINAL: No extraspinal abnormality. LUMBAR SPINE CT: VERTEBRA: 5 lumbar type vertebra. There is a chronic moderate to severe burst-type fracture of L1 with up to 60-70% loss of height anteriorly. There is slight buckling of the posterior/superior corner into the ventral canal. Vertebral body height is otherwise normal. Mild grade 1 retrolisthesis of L2. There is 7 mm degenerative anterolisthesis of L5 on S1. Multilevel advanced disc degeneration. CANAL/FORAMINA: Moderate central canal stenosis at L3-L4 and L4-L5. Mild to moderate multilevel neural foraminal narrowing. PARASPINAL: No extraspinal abnormality.     IMPRESSION: THORACIC SPINE CT: 1.  The exam is moderately degraded by motion. 2.  Age-indeterminate mild compression deformities at the superior endplates of T1, T2, T3 and T5. 3.  Chronic anterior wedging compression deformity of T12. LUMBAR SPINE CT: 1.  No acute fracture or subluxation. 2.  Chronic moderate to severe burst-type fracture of L1. 3.  Moderate central canal stenosis at L3-L4 and L4-L5.     CT Lumbar Spine w/o Contrast    Result Date: 2/4/2024  EXAM: CT THORACIC SPINE W/O CONTRAST, CT LUMBAR SPINE W/O CONTRAST LOCATION: [facility] DATE: 2/4/2024 INDICATION: eval fractures (reconstructions please). Patient was found down. Potential trauma. COMPARISON: Lumbar spine CT 11/29/2022 TECHNIQUE: 1) Routine CT Thoracic Spine without IV contrast. Multiplanar reformats. Dose reduction techniques were used. 2) Routine CT Lumbar Spine without IV contrast. Multiplanar reformats. Dose reduction techniques were used. FINDINGS: THORACIC  SPINE CT: The exam is moderately degraded by motion. VERTEBRA: Mild compression forming disease at the superior endplates of T1 and T2 are age indeterminate. Subtle compression deformity at the superior endplate of T3 is age-indeterminate. Mild anterior wedging compression deformity at the superior endplate of T5 is age-indeterminate. There is an chronic anterior wedging compression deformity of T12 with up to 30-40% loss of height anteriorly. No acute subluxation. CANAL/FORAMINA: No canal or neural foraminal stenosis. PARASPINAL: No extraspinal abnormality. LUMBAR SPINE CT: VERTEBRA: 5 lumbar type vertebra. There is a chronic moderate to severe burst-type fracture of L1 with up to 60-70% loss of height anteriorly. There is slight buckling of the posterior/superior corner into the ventral canal. Vertebral body height is otherwise normal. Mild grade 1 retrolisthesis of L2. There is 7 mm degenerative anterolisthesis of L5 on S1. Multilevel advanced disc degeneration. CANAL/FORAMINA: Moderate central canal stenosis at L3-L4 and L4-L5. Mild to moderate multilevel neural foraminal narrowing. PARASPINAL: No extraspinal abnormality.     IMPRESSION: THORACIC SPINE CT: 1.  The exam is moderately degraded by motion. 2.  Age-indeterminate mild compression deformities at the superior endplates of T1, T2, T3 and T5. 3.  Chronic anterior wedging compression deformity of T12. LUMBAR SPINE CT: 1.  No acute fracture or subluxation. 2.  Chronic moderate to severe burst-type fracture of L1. 3.  Moderate central canal stenosis at L3-L4 and L4-L5.     Head CT w/o contrast    Result Date: 2/4/2024  EXAM: CT HEAD W/O CONTRAST, CT CERVICAL SPINE W/O CONTRAST LOCATION: [facility] DATE: 2/4/2024 INDICATION: Confusion, head and neck injury COMPARISON: CT head 11/29/2022 TECHNIQUE: 1) Routine CT Head without IV contrast. Multiplanar reformats. Dose reduction techniques were used. 2) Routine CT Cervical Spine without IV  contrast. Multiplanar reformats. Dose reduction techniques were used. FINDINGS: HEAD CT: INTRACRANIAL CONTENTS: No intracranial hemorrhage, extraaxial collection, or mass effect.  No CT evidence of acute infarct. Moderate presumed chronic small vessel ischemic changes. Mild generalized volume loss. No hydrocephalus. VISUALIZED ORBITS/SINUSES/MASTOIDS: No intraorbital abnormality. No paranasal sinus mucosal disease. No middle ear or mastoid effusion. BONES/SOFT TISSUES: No acute abnormality. CERVICAL SPINE CT: VERTEBRA: Mild compression superior aspect of T1 and T2, age indeterminate; appears chronic. It is new since CT chest 07/27/2018. No definite acute fracture. Straightening of cervical lordosis. Nonaggressive appearing lytic lesion within the dens and central aspect of body of C2; probably due to a somewhat atypical hemangioma. Subcentimeter nonaggressive appearing sclerotic lesion left lamina of C2. CANAL/FORAMINA: Moderate degenerative changes without significant canal narrowing at any level. Multilevel mild-to-moderate neural foraminal narrowing. PARASPINAL: No extraspinal abnormality. Visualized lung fields are clear.     IMPRESSION: HEAD CT: 1.  No definite acute intracranial process. CERVICAL SPINE CT: 1.  Mild compression superior aspect of T1 and T2, age indeterminate; appears chronic. It is new since CT chest 07/27/2018. 2.  No definite acute fracture. 3.  Moderate degenerative changes without significant canal narrowing at any level.    CT Cervical Spine w/o Contrast    Result Date: 2/4/2024  EXAM: CT HEAD W/O CONTRAST, CT CERVICAL SPINE W/O CONTRAST LOCATION: [facility] DATE: 2/4/2024 INDICATION: Confusion, head and neck injury COMPARISON: CT head 11/29/2022 TECHNIQUE: 1) Routine CT Head without IV contrast. Multiplanar reformats. Dose reduction techniques were used. 2) Routine CT Cervical Spine without IV contrast. Multiplanar reformats. Dose reduction techniques were used.  FINDINGS: HEAD CT: INTRACRANIAL CONTENTS: No intracranial hemorrhage, extraaxial collection, or mass effect.  No CT evidence of acute infarct. Moderate presumed chronic small vessel ischemic changes. Mild generalized volume loss. No hydrocephalus. VISUALIZED ORBITS/SINUSES/MASTOIDS: No intraorbital abnormality. No paranasal sinus mucosal disease. No middle ear or mastoid effusion. BONES/SOFT TISSUES: No acute abnormality. CERVICAL SPINE CT: VERTEBRA: Mild compression superior aspect of T1 and T2, age indeterminate; appears chronic. It is new since CT chest 07/27/2018. No definite acute fracture. Straightening of cervical lordosis. Nonaggressive appearing lytic lesion within the dens and central aspect of body of C2; probably due to a somewhat atypical hemangioma. Subcentimeter nonaggressive appearing sclerotic lesion left lamina of C2. CANAL/FORAMINA: Moderate degenerative changes without significant canal narrowing at any level. Multilevel mild-to-moderate neural foraminal narrowing. PARASPINAL: No extraspinal abnormality. Visualized lung fields are clear.     IMPRESSION: HEAD CT: 1.  No definite acute intracranial process. CERVICAL SPINE CT: 1.  Mild compression superior aspect of T1 and T2, age indeterminate; appears chronic. It is new since CT chest 07/27/2018. 2.  No definite acute fracture. 3.  Moderate degenerative changes without significant canal narrowing at any level.

## 2024-03-01 NOTE — PROGRESS NOTES
"Care Management Follow Up    Length of Stay (days): 1    Expected Discharge Date: 03/04/2024     Concerns to be Addressed:  diagnostic work up in process    Patient plan of care discussed at interdisciplinary rounds: Yes    Anticipated Discharge Disposition:  TBD     Anticipated Discharge Services:  TBD    Anticipated Discharge DME:  TBD      Additional Information:  Patient presenting for encephalopathy.     PT/OT eval and recs needed.       Social History: Per initial CM assessment  Dilip lives in a house with his wife Laina and \"another family member such as 1 of 3 sons (Lito, Ad, and Mark) or granddaughter Shruthi is always at the house to help with cares\". Shruthi is \"the primary caregiver with him\". Family is helping with all ADLs and IADLs.     The house has \"4 steps to get inside the house and then he can live on the main level. He normally uses the steps by holding onto granddaughters arms, but this past week he is too weak to use these steps\". \"He uses his 4WW inside the house. He uses the wheelchair whenever outside the house to a doctor appointment. This week he became too weak and is not able to stay sitting up on the shower chair now. I really need help to give him a shower now\". He has the jorge catheter in place at home also. He is continent of stool.    Granddaughter would like help with bathing if that would be covered by skilled home care. She is also interested in hospice and I gave her the list to look at agencies she is interested in. I started the explanation of what hospice is and what services they would provide. She states, \"I think that I would want his infection to get looked at and try to treat it so he can maybe get closer back to baseline with his confusion and hallucination that just started recently. Also if he could get back to his usual abilities where he is always somewhat weak, but not as weak as he is now. I know that in the past he doesn't qualify for PT and OT because " "he is not going to get better or stronger and we are fine with that. But it is his wish to go home and stay at home even if his care needs increase and if he goes on hospice. I would want to look into hospice to possibly go home with or after we are back at home\".     [facility] transport if family cannot transport.        3/1/24:  Final discharge plan pending progression and recommendations.           Debbie Gaitan RN      "

## 2024-03-01 NOTE — PROGRESS NOTES
Patient arrive on unit around 1600, once vitals were completed BP= 224/102. Rapid response activated.     PRN hydralazine and labetalol ordered.     Patient denies pain. Restless. Order for one time dose of seroquel 12.5 mg placed, administered. Moderate results noted.    Luma Bonilla RN

## 2024-03-01 NOTE — PROGRESS NOTES
[facility]    Hospitalist Progress Note    Date of Service (when I saw the patient): 03/01/2024    Assessment & Plan   Dilip Long is a pleasant 88 year old gentleman admitted on 2/29/2024 for encephalopathy.  Current problems include:     Acute toxic and metabolic encephalopathy  Elevated TSH; free T4 normal; likely euthyroid sick  Hypothyroidism, by history  - granddaughter 2/29 reported Dilip is normally conversational and alert and oriented without many issues, currently only alert to self  - CT head w/o contrast negative for acute findings   - DDx includes infection, hypertensive emergency and question other etiology, including ? PRES which would not be seen on CT.   - considering MRI as soon as able (if/when he is less restless)    - appreciate Neurology consult  - continue current synthroid dose  - trend troponins    Per Neurology:     MRI if able  Electroencephalogram ordered - done today, 3/1  TSH  high at 14.40, T4 normal. Check T3, TPO Ab - ordered  Check B1, B6, B12, MMA, Folate, Ammonia - ordered  ID has been consulted for recurrent UTI, management of infection  Hypertensive emergency management per primary team.       Recurrent UTI  - appreciate ID consult  - follow up on culture results   - kidney ultrasound done: no abscess   - continue Zosyn per ID recs      HTN emergency; pressures still elevated  - per prior: initially ordered nicardipine drip given symptomatology, however this can only be administered in the ICU (did not have bed at time of admission)  - continue PRN labetalol as first line (if HR tolerates) and hydralazine as a second option  - earlier BP goal per Neurology as of 2/29: -160  - attempt further BP control today, to keep SBP < 160  - add PRN clonidine  - add low-dose metoprolol BID    Falls  Physical deconditioning  - falls precautions  - PT/OT eval's when able      CKD  - creatinine baseline 1.1-1.2  - daily BMP    Hypokalemia  - add replacement  orders; recheck 3/2  - 2/29 Mg normal     Chronic anemia, normocytic  - stable, no s/s of active bleed  - monitor     HFpEF  - euvolemic on exam, not in exacerbation   - not on home diuretics  - monitor volume status      A-fib; rates stable  - continue PTA atenolol   - not on home AC per med rec      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  Ate breakfast and lunch, w/ assistance.  In chair for part of the day.  BP's still difficult to control.  Reviewed care w/ RN.      Data reviewed today: I reviewed all new labs and imaging results over the last 24 hours.    Physical Exam   Temp: 98.8  F (37.1  C) Temp src: Axillary BP: (!) 176/86 Pulse: 74   Resp: 20 SpO2: 95 % O2 Device: None (Room air)    Vitals:    02/29/24 0959   Weight: 68 kg (150 lb)     Vital Signs with Ranges  Temp:  [97.5  F (36.4  C)-98.8  F (37.1  C)] 98.8  F (37.1  C)  Pulse:  [65-95] 74  Resp:  [18-22] 20  BP: (131-224)/() 176/86  SpO2:  [93 %-98 %] 95 %  I/O last 3 completed shifts:  In: -   Out: 850 [Urine:850]    Constitutional: awake, no apparent distress; sitting in recliner, next to bed; leans to his Left side.  Is trying to get up.  HEENT: sclerae clear; MM's moist.  No apparent skull injuries  Respiratory: good a/e bilaterally, no wheezing or rhonchi  Cardiovascular: regular rate and rhythm, S1, S2 noted; no m/r/g  GI: abdomen flat, + bowel sounds; soft, non-tender, non-distended  Skin/Integumen: no rashes, no cyanosis, no jaundice  Musculoskeletal: no  edema  Neurologic: disoriented to date, location; knows his granddaughter's name; follows some directions well, after repeating; is distracted and impulsive, and needs frequent redirection.  Bilateral /arm strength 5/5 and symmetric; no focal deficits      Medications      atenolol  25 mg Oral QPM    enoxaparin ANTICOAGULANT  40 mg Subcutaneous Q24H    finasteride  5 mg Oral Daily    levothyroxine  75 mcg Oral Daily    piperacillin-tazobactam  3.375 g Intravenous Q8H    sodium chloride (PF)  3 mL Intracatheter Q8H       Data   Recent Labs   Lab 03/01/24  0624 02/29/24  1638 02/29/24  1040   WBC 8.8  --  5.9   HGB 11.5*  --  11.8*   MCV 94  --  95     --  292   INR  --   --  1.02   *  --  136   POTASSIUM 3.2*  --  3.4   CHLORIDE 97*  --  98   CO2 24  --  28   BUN 14.3  --  16.1   CR 1.21*  --  1.22*   ANIONGAP 13  --  10   KRISTEN 8.4*  --  9.0   * 120* 111*   ALBUMIN  --   --  3.5   PROTTOTAL  --   --  6.2*   BILITOTAL  --   --  0.4   ALKPHOS  --   --  69   ALT  --   --  <5   AST  --   --  17       Recent Results (from the past 24 hour(s))   Head CT w/o contrast    Narrative    EXAM: CT HEAD W/O CONTRAST  LOCATION: [facility]  DATE: 2/29/2024    INDICATION: Altered mental status.  COMPARISON: CT dated 02/04/2024.  TECHNIQUE: Routine CT Head without IV contrast. Multiplanar reformats. Dose reduction techniques were used.    FINDINGS:  Mildly motion degraded exam.    INTRACRANIAL CONTENTS: No acute intracranial hemorrhage, extra-axial fluid collection, or mass effect. No evidence of an acute transcortical confluent infarct. Grossly similar foci of hypoattenuation involving the bilateral cerebral white matter,   nonspecific although most likely the sequela of chronic microvascular ischemia. Mild generalized cerebral parenchymal volume loss. No hydrocephalus.     VISUALIZED ORBITS/SINUSES/MASTOIDS: No acute intraorbital finding. Status post functional endoscopic sinus surgery  with overall similar trace mucosal thickening scattered about the visualized paranasal sinuses without an air-fluid level. No mastoid or   middle ear effusion. Cerumen within the bilateral external auditory canals.    BONES/SOFT TISSUES: No acute abnormality. An incidental bilateral frontal scalp lipomas. Severe right temporal mandibular joint degenerative change.      Impression    IMPRESSION:  1.  No acute intracranial abnormality.  2.  Similar chronic changes, as described.   CTA Chest Abdomen Pelvis w Contrast    Narrative    EXAM: CTA CHEST ABDOMEN PELVIS W CONTRAST  LOCATION: [facility]  DATE: 2/29/2024    INDICATION: Sepsis, thoracic aorta outpouching on previous CT  follow up,sepsis  COMPARISON: 2/4/2024  TECHNIQUE: CT angiogram chest abdomen pelvis during arterial phase of injection of IV contrast. 2D and 3D MIP reconstructions were performed by the CT technologist. Dose reduction techniques were used.   CONTRAST: isovue 370 75ml    FINDINGS:   ANGIOGRAM CHEST, ABDOMEN, AND PELVIS: No evidence of intramural hematoma, dissection, or aneurysm. Mild-moderate atherosclerotic calcification throughout the aorta and iliofemoral arteries. No significant stenosis. Unchanged 1.4 x 0.5 cm outpouching from   the inferolateral aspect of the proximal descending thoracic aorta (6/48, 10/103). No stranding or hematoma. No central pulmonary embolus.    LUNGS AND PLEURA: Unchanged 0.5 cm nodule along the minor fissure since 07/27/2018 and therefore consistent with benign etiology. A 0.5 cm right lower lobe nodule is also unchanged. No new or enlarging suspicious nodules.    CORONARY ARTERY CALCIFICATION: Moderate    MEDIASTINUM/AXILLAE: Unremarkable.    HEPATOBILIARY: Unremarkable.    SPLEEN: Unremarkable.    PANCREAS: Unremarkable.    ADRENAL GLANDS: Unremarkable.    KIDNEYS/BLADDER: A balloon tip catheter is present in the urinary bladder lumen. The bladder is underdistended with a Lozano catheter  in place and bilateral diverticula.     BOWEL: Left-sided colonic diverticulosis. No acute inflammation or obstruction.    LYMPH NODES: Unremarkable.    PELVIC ORGANS: Moderate enlargement of the prostate.    MUSCULOSKELETAL: Diffuse osteopenia and multilevel degenerative changes in the spine. Healed sternal and rib fractures. Unchanged L1 compression deformity.      Impression    IMPRESSION:   Unchanged small outpouching from the undersurface of the proximal descending thoracic aorta, which could represent an ulcerated plaque or short segment dissection flap. No evidence of acute aortic syndrome.   US Renal Complete Non-Vascular    Narrative    EXAM: US RENAL COMPLETE NON-VASCULAR  LOCATION: [facility]  DATE: 2/29/2024    INDICATION: Recurrent UTI.  COMPARISON: CT same-day.  TECHNIQUE: Routine Bilateral Renal and Bladder Ultrasound.    FINDINGS:    RIGHT KIDNEY: 10 x 6.2 x 5 cm. Normal without hydronephrosis or masses. Incidental 1.4 cm cyst.    LEFT KIDNEY: 10.1 x 4.2 x 4.3 cm. Normal without hydronephrosis or masses.     BLADDER: Decompressed bladder with intrinsic portacatheter.      Impression    IMPRESSION:    1.  Unremarkable kidney ultrasound.    2.  Decompressed bladder with intrinsic portacatheter.    3.  Please see same-day CT abdomen - pelvis.

## 2024-03-02 LAB
ANION GAP SERPL CALCULATED.3IONS-SCNC: 12 MMOL/L (ref 7–15)
BUN SERPL-MCNC: 15.2 MG/DL (ref 8–23)
CALCIUM SERPL-MCNC: 8.1 MG/DL (ref 8.8–10.2)
CHLORIDE SERPL-SCNC: 101 MMOL/L (ref 98–107)
CREAT SERPL-MCNC: 1.31 MG/DL (ref 0.67–1.17)
DEPRECATED HCO3 PLAS-SCNC: 23 MMOL/L (ref 22–29)
EGFRCR SERPLBLD CKD-EPI 2021: 53 ML/MIN/1.73M2
GLUCOSE SERPL-MCNC: 96 MG/DL (ref 70–99)
POTASSIUM SERPL-SCNC: 4 MMOL/L (ref 3.4–5.3)
POTASSIUM SERPL-SCNC: 4.2 MMOL/L (ref 3.4–5.3)
SODIUM SERPL-SCNC: 136 MMOL/L (ref 135–145)

## 2024-03-02 PROCEDURE — 250N000011 HC RX IP 250 OP 636: Performed by: INTERNAL MEDICINE

## 2024-03-02 PROCEDURE — 99232 SBSQ HOSP IP/OBS MODERATE 35: CPT | Performed by: INTERNAL MEDICINE

## 2024-03-02 PROCEDURE — 99233 SBSQ HOSP IP/OBS HIGH 50: CPT | Performed by: PSYCHIATRY & NEUROLOGY

## 2024-03-02 PROCEDURE — 80048 BASIC METABOLIC PNL TOTAL CA: CPT | Performed by: INTERNAL MEDICINE

## 2024-03-02 PROCEDURE — 250N000013 HC RX MED GY IP 250 OP 250 PS 637: Performed by: INTERNAL MEDICINE

## 2024-03-02 PROCEDURE — 250N000013 HC RX MED GY IP 250 OP 250 PS 637: Performed by: STUDENT IN AN ORGANIZED HEALTH CARE EDUCATION/TRAINING PROGRAM

## 2024-03-02 PROCEDURE — 120N000001 HC R&B MED SURG/OB

## 2024-03-02 PROCEDURE — 250N000013 HC RX MED GY IP 250 OP 250 PS 637: Performed by: PSYCHIATRY & NEUROLOGY

## 2024-03-02 PROCEDURE — 250N000011 HC RX IP 250 OP 636: Performed by: STUDENT IN AN ORGANIZED HEALTH CARE EDUCATION/TRAINING PROGRAM

## 2024-03-02 PROCEDURE — 36415 COLL VENOUS BLD VENIPUNCTURE: CPT | Performed by: INTERNAL MEDICINE

## 2024-03-02 PROCEDURE — 82310 ASSAY OF CALCIUM: CPT | Performed by: INTERNAL MEDICINE

## 2024-03-02 RX ORDER — METOPROLOL TARTRATE 25 MG/1
25 TABLET, FILM COATED ORAL 2 TIMES DAILY
Status: DISCONTINUED | OUTPATIENT
Start: 2024-03-02 | End: 2024-03-04 | Stop reason: HOSPADM

## 2024-03-02 RX ORDER — MENTHOL AND METHYL SALICYLATE 7.6; 29 G/100G; G/100G
OINTMENT TOPICAL
Status: DISCONTINUED | OUTPATIENT
Start: 2024-03-02 | End: 2024-03-04 | Stop reason: HOSPADM

## 2024-03-02 RX ADMIN — LEVOTHYROXINE SODIUM 75 MCG: 75 TABLET ORAL at 09:00

## 2024-03-02 RX ADMIN — PIPERACILLIN AND TAZOBACTAM 3.38 G: 3; .375 INJECTION, POWDER, FOR SOLUTION INTRAVENOUS at 05:20

## 2024-03-02 RX ADMIN — Medication 1 TABLET: at 09:00

## 2024-03-02 RX ADMIN — Medication 1000 MCG: at 19:37

## 2024-03-02 RX ADMIN — THIAMINE HCL TAB 100 MG 100 MG: 100 TAB at 09:00

## 2024-03-02 RX ADMIN — METOPROLOL TARTRATE 12.5 MG: 25 TABLET, FILM COATED ORAL at 09:00

## 2024-03-02 RX ADMIN — METOPROLOL TARTRATE 25 MG: 25 TABLET, FILM COATED ORAL at 20:28

## 2024-03-02 RX ADMIN — CLONIDINE HYDROCHLORIDE 0.1 MG: 0.1 TABLET ORAL at 16:29

## 2024-03-02 RX ADMIN — Medication 1 MG: at 20:28

## 2024-03-02 RX ADMIN — PIPERACILLIN AND TAZOBACTAM 3.38 G: 3; .375 INJECTION, POWDER, FOR SOLUTION INTRAVENOUS at 14:33

## 2024-03-02 RX ADMIN — ENOXAPARIN SODIUM 40 MG: 40 INJECTION SUBCUTANEOUS at 20:28

## 2024-03-02 RX ADMIN — MENTHOL AND METHYL SALICYLATE: 7.6; 29 OINTMENT TOPICAL at 20:35

## 2024-03-02 RX ADMIN — HYDRALAZINE HYDROCHLORIDE 10 MG: 20 INJECTION INTRAMUSCULAR; INTRAVENOUS at 18:12

## 2024-03-02 RX ADMIN — ACETAMINOPHEN 650 MG: 325 TABLET ORAL at 19:37

## 2024-03-02 RX ADMIN — MENTHOL AND METHYL SALICYLATE: 7.6; 29 OINTMENT TOPICAL at 18:05

## 2024-03-02 RX ADMIN — FINASTERIDE 5 MG: 5 TABLET, FILM COATED ORAL at 09:00

## 2024-03-02 RX ADMIN — PIPERACILLIN AND TAZOBACTAM 3.38 G: 3; .375 INJECTION, POWDER, FOR SOLUTION INTRAVENOUS at 20:29

## 2024-03-02 ASSESSMENT — ACTIVITIES OF DAILY LIVING (ADL)
ADLS_ACUITY_SCORE: 51
ADLS_ACUITY_SCORE: 49
ADLS_ACUITY_SCORE: 51
ADLS_ACUITY_SCORE: 49
ADLS_ACUITY_SCORE: 51
ADLS_ACUITY_SCORE: 50
ADLS_ACUITY_SCORE: 51
ADLS_ACUITY_SCORE: 49
ADLS_ACUITY_SCORE: 51
ADLS_ACUITY_SCORE: 51
ADLS_ACUITY_SCORE: 49
ADLS_ACUITY_SCORE: 51
ADLS_ACUITY_SCORE: 51
ADLS_ACUITY_SCORE: 49
ADLS_ACUITY_SCORE: 51
ADLS_ACUITY_SCORE: 51

## 2024-03-02 NOTE — PLAN OF CARE
"  Problem: Adult Inpatient Plan of Care  Goal: Plan of Care Review  Description: The Plan of Care Review/Shift note should be completed every shift.  The Outcome Evaluation is a brief statement about your assessment that the patient is improving, declining, or no change.  This information will be displayed automatically on your shift  note.  Outcome: Progressing  Goal: Patient-Specific Goal (Individualized)  Description: You can add care plan individualizations to a care plan. Examples of Individualization might be:  \"Parent requests to be called daily at 9am for status\", \"I have a hard time hearing out of my right ear\", or \"Do not touch me to wake me up as it startles  me\".  Outcome: Progressing  Goal: Absence of Hospital-Acquired Illness or Injury  Outcome: Progressing  Intervention: Identify and Manage Fall Risk  Recent Flowsheet Documentation  Taken 3/1/2024 2318 by Lena Pritchett RN  Safety Promotion/Fall Prevention:   activity supervised   assistive device/personal items within reach   bedside attendant   clutter free environment maintained   lighting adjusted   nonskid shoes/slippers when out of bed   patient and family education   room near nurse's station   safety round/check completed   supervised activity  Intervention: Prevent Skin Injury  Recent Flowsheet Documentation  Taken 3/1/2024 2318 by Lena Pritchett RN  Body Position:   turned   right  Intervention: Prevent and Manage VTE (Venous Thromboembolism) Risk  Recent Flowsheet Documentation  Taken 3/1/2024 2318 by Lena Pritchett RN  VTE Prevention/Management: SCDs (sequential compression devices) off  Intervention: Prevent Infection  Recent Flowsheet Documentation  Taken 3/1/2024 2318 by Lena Pritchett RN  Infection Prevention:   hand hygiene promoted   rest/sleep promoted  Goal: Optimal Comfort and Wellbeing  Outcome: Progressing  Goal: Readiness for Transition of Care  Outcome: Progressing     Problem: Fall Injury Risk  Goal: Absence of " Fall and Fall-Related Injury  Outcome: Progressing  Intervention: Identify and Manage Contributors  Recent Flowsheet Documentation  Taken 3/1/2024 2318 by Lena Pritchett RN  Medication Review/Management: medications reviewed  Intervention: Promote Injury-Free Environment  Recent Flowsheet Documentation  Taken 3/1/2024 2318 by Lena Pritchett RN  Safety Promotion/Fall Prevention:   activity supervised   assistive device/personal items within reach   bedside attendant   clutter free environment maintained   lighting adjusted   nonskid shoes/slippers when out of bed   patient and family education   room near nurse's station   safety round/check completed   supervised activity   Goal Outcome Evaluation: patient alert and oriented to self. Pt 1:1 for safety. Patient reclines and up in chair at start of shift and through the night. Denies pain. Lozano in place,100 ml output. /79 & 132/83 overnight. Bradycardic at time when  asleep. Infrequent productive cough noted.  On  nasal cannula 1L overnight due to 02 sats 89-90% RA. Patient slept most of the night and up for about 20 minutes around 0517. On telemetry A-fib. Peripheral Iv intact, zosyn infusing. Potassium 4.2 at 0145 and 4.0 at 0534. Patient had couple of mildly thick water with less cough.

## 2024-03-02 NOTE — PROGRESS NOTES
[facility]    Infectious Disease Progress Note    Date of Service : 03/02/2024     Assessment & Plan   Dilip Long is a 88 year old male who was admitted on 2/29/2024.     ASSESSMENT:  Encephalopathy, hallucinations, altered mental status. Active issue.   Acute kidney injury on chronic kidney disease, improving  Abnormal CT chest, seen by vascular, no interventions at this time  Urinary tract infection likely bacteriuria.  Had Enterococcus in urine cultures in the past.  Was treated with amoxicillin with indwelling Lozano catheter  Prostate enlargement risk of prostatitis  Fall at home  Advanced age 88 years old lives at home with family members  Recent hospitalization 2/4 - 2/5/2024 for encephalopathy UTI, acute kidney injury on chronic kidney disease  Previous urine cultures    Susceptibility data from last 90 days.  Collected Specimen Info Organism Ampicillin Nitrofurantoin Vancomycin   02/04/24 Urine, Midstream Enterococcus faecalis  S  S  S           RECOMMENDATIONS:    Antibiotics follow culture results, empiric Zosyn  Follow culture results-NGTD  Focus/de-escalate antibiotics based on final culture results  Monitor CBC, CMP  Lozano catheter management per urology.  Due to history of prostate enlargement, bilateral diverticula, would recommend a 4-week course of oral antibiotics if possible  To do brain MRI when stable, consider neurology input if mentation does not improve.  Please call with questions or change in clinical status over weekend, Dr Sosa will follow up next week.     Nicole Mitchell MD  [facility]  [phone]      Interval History   First visit by me. In chair. Feels ok. Family visiting at bedside.     Physical Exam   Temp: 97.4  F (36.3  C) Temp src: Axillary BP: (!) 141/59 Pulse: 84   Resp: 18 SpO2: 95 % O2 Device: None (Room air) Oxygen Delivery: 1 LPM  Vitals:    02/29/24 0959   Weight: 68 kg (150 lb)     Vital Signs with  "Ranges  Temp:  [97.2  F (36.2  C)-97.9  F (36.6  C)] 97.4  F (36.3  C)  Pulse:  [63-93] 84  Resp:  [18-20] 18  BP: ()/() 141/59  SpO2:  [89 %-97 %] 95 %    Constitutional: Appears chronically ill. In chair  Lungs: no respiratory distress  Abdomen: non distended, nontender, Lozano  Skin: warm  Neuro: deconditioned  Psych: Oriented x 1    Medications      enoxaparin ANTICOAGULANT  40 mg Subcutaneous Q24H    finasteride  5 mg Oral Daily    levothyroxine  75 mcg Oral Daily    metoprolol tartrate  12.5 mg Oral BID    multivitamin w/minerals  1 tablet Oral Daily    piperacillin-tazobactam  3.375 g Intravenous Q8H    sodium chloride (PF)  3 mL Intracatheter Q8H    thiamine  100 mg Oral Daily       Data   All microbiology laboratory data reviewed.  Recent Labs   Lab Test 03/01/24  0624 02/29/24  1040 02/05/24  0720   WBC 8.8 5.9 14.9*   HGB 11.5* 11.8* 11.0*   HCT 33.1* 34.7* 32.3*   MCV 94 95 95    292 265     Recent Labs   Lab Test 03/02/24  0534 03/01/24  0624 02/29/24  1040   CR 1.31* 1.21* 1.22*     No lab results found.  No lab results found.    Invalid input(s): \"UC\"    MICROBIOLOGY:    Reviewed    7-Day Micro Results       Collected Updated Procedure Result Status      02/29/2024 1555 03/01/2024 1901 Blood Culture Hand, Left [[id number]]   Blood from Hand, Left    Preliminary result Component Value   Culture No growth after 1 day  [P]                02/29/2024 1555 03/01/2024 1901 Blood Culture Arm, Left [[id number]]   Blood from Arm, Left    Preliminary result Component Value   Culture No growth after 1 day  [P]                02/29/2024 1035 02/29/2024 1136 Symptomatic Influenza A/B, RSV, & SARS-CoV2 PCR (COVID-19) Nasopharyngeal [[id number]]    Swab from Nasopharyngeal    Final result Component Value   Influenza A PCR Negative   Influenza B PCR Negative   RSV PCR Negative   SARS CoV2 PCR Negative   NEGATIVE: SARS-CoV-2 (COVID-19) RNA not detected, presumed negative.            02/29/2024 " 1033 2024 0958 Urine Culture [[id number]]   Urine, Lozano Catheter    Final result Component Value   Culture No Growth                        RADIOLOGY:    Reviewed  EEG Awake or Drowsy Routine    Result Date: 3/1/2024  Table formatting from the original result was not included. Images from the original result were not included. ELECTROENCEPHALOGRAM (EEG) REPORT [facility] NEUROLOGY [place] [address] [place] [address] Tel: [phone]  Fax: [phone] [email or web address] Dilip Long,  1935, MRN [id number] PCP: Kolton Junior Date: 3/1/2024 Principal Diagnosis: Altered mental status History : Patient is being evaluated for altered mental status. Medication listed includes no anticonvulsant Description of the Recording:  This is a multichannel digital EEG recording done using the international 10-20 placement system. Background of the recording consists of an irregular 6-7 hz activity with an amplitude of 20-30  V.  In addition, occasionally there is  1-2 Hz activity with similar amplitude.  This diffusely slow background attenuates with alerting procedures.  Photic stimulation performed with eyes open, according to the standard protocol, minimal change.  Hyperventilation was not performed.  Sleep was not obtained. During this recording, no sharp discharges, spikes or electrographic seizure activity was recorded. Impression: This is an abnormal EEG due to diffusely slow background in theta and delta range that suggests a nonspecific generalized cerebral dysfunction.  Such slowing can be seen in metabolic or toxic abnormalities, clinical correlation is recommended.  No sharp discharges or spikes were recorded during this recording to suggest a seizure disorder. Classification: Dysrhythmia grade II generalized                          Delta grade I generalized Joe Kearns MD [facility] This note was dictated using voice recognition  software.  Any grammatical or context distortions are unintentional and inherent to the software.     US Renal Complete Non-Vascular    Result Date: 2/29/2024  EXAM: US RENAL COMPLETE NON-VASCULAR LOCATION: [facility] DATE: 2/29/2024 INDICATION: Recurrent UTI. COMPARISON: CT same-day. TECHNIQUE: Routine Bilateral Renal and Bladder Ultrasound. FINDINGS: RIGHT KIDNEY: 10 x 6.2 x 5 cm. Normal without hydronephrosis or masses. Incidental 1.4 cm cyst. LEFT KIDNEY: 10.1 x 4.2 x 4.3 cm. Normal without hydronephrosis or masses. BLADDER: Decompressed bladder with intrinsic portacatheter.     IMPRESSION: 1.  Unremarkable kidney ultrasound. 2.  Decompressed bladder with intrinsic portacatheter. 3.  Please see same-day CT abdomen - pelvis.     CTA Chest Abdomen Pelvis w Contrast    Result Date: 2/29/2024  EXAM: CTA CHEST ABDOMEN PELVIS W CONTRAST LOCATION: [facility] DATE: 2/29/2024 INDICATION: Sepsis, thoracic aorta outpouching on previous CT  follow up,sepsis COMPARISON: 2/4/2024 TECHNIQUE: CT angiogram chest abdomen pelvis during arterial phase of injection of IV contrast. 2D and 3D MIP reconstructions were performed by the CT technologist. Dose reduction techniques were used. CONTRAST: isovue 370 75ml FINDINGS: ANGIOGRAM CHEST, ABDOMEN, AND PELVIS: No evidence of intramural hematoma, dissection, or aneurysm. Mild-moderate atherosclerotic calcification throughout the aorta and iliofemoral arteries. No significant stenosis. Unchanged 1.4 x 0.5 cm outpouching from  the inferolateral aspect of the proximal descending thoracic aorta (6/48, 10/103). No stranding or hematoma. No central pulmonary embolus. LUNGS AND PLEURA: Unchanged 0.5 cm nodule along the minor fissure since 07/27/2018 and therefore consistent with benign etiology. A 0.5 cm right lower lobe nodule is also unchanged. No new or enlarging suspicious nodules. CORONARY ARTERY CALCIFICATION: Moderate  MEDIASTINUM/AXILLAE: Unremarkable. HEPATOBILIARY: Unremarkable. SPLEEN: Unremarkable. PANCREAS: Unremarkable. ADRENAL GLANDS: Unremarkable. KIDNEYS/BLADDER: A balloon tip catheter is present in the urinary bladder lumen. The bladder is underdistended with a Lozano catheter in place and bilateral diverticula. BOWEL: Left-sided colonic diverticulosis. No acute inflammation or obstruction. LYMPH NODES: Unremarkable. PELVIC ORGANS: Moderate enlargement of the prostate. MUSCULOSKELETAL: Diffuse osteopenia and multilevel degenerative changes in the spine. Healed sternal and rib fractures. Unchanged L1 compression deformity.     IMPRESSION: Unchanged small outpouching from the undersurface of the proximal descending thoracic aorta, which could represent an ulcerated plaque or short segment dissection flap. No evidence of acute aortic syndrome.    Head CT w/o contrast    Result Date: 2/29/2024  EXAM: CT HEAD W/O CONTRAST LOCATION: [facility] DATE: 2/29/2024 INDICATION: Altered mental status. COMPARISON: CT dated 02/04/2024. TECHNIQUE: Routine CT Head without IV contrast. Multiplanar reformats. Dose reduction techniques were used. FINDINGS: Mildly motion degraded exam. INTRACRANIAL CONTENTS: No acute intracranial hemorrhage, extra-axial fluid collection, or mass effect. No evidence of an acute transcortical confluent infarct. Grossly similar foci of hypoattenuation involving the bilateral cerebral white matter, nonspecific although most likely the sequela of chronic microvascular ischemia. Mild generalized cerebral parenchymal volume loss. No hydrocephalus. VISUALIZED ORBITS/SINUSES/MASTOIDS: No acute intraorbital finding. Status post functional endoscopic sinus surgery with overall similar trace mucosal thickening scattered about the visualized paranasal sinuses without an air-fluid level. No mastoid or middle ear effusion. Cerumen within the bilateral external auditory canals. BONES/SOFT TISSUES: No  acute abnormality. An incidental bilateral frontal scalp lipomas. Severe right temporal mandibular joint degenerative change.     IMPRESSION: 1.  No acute intracranial abnormality. 2.  Similar chronic changes, as described.    XR Chest Port 1 View    Result Date: 2/29/2024  EXAM: XR CHEST PORT 1 VIEW LOCATION: [facility] DATE: 2/29/2024 INDICATION: Chest Pain COMPARISON: CTA chest 02/04/2024     IMPRESSION: Patient's rotated to the left with a left head tilt as well. Lungs are clear. Heart and pulmonary vascularity are normal. No signs of pneumonia or failure. Old right posterior rib fractures again noted.    CTA Chest Abdomen Pelvis w Contrast    Result Date: 2/4/2024  EXAM: CT CHEST, ABDOMEN AND PELVIS WITH CONTRAST LOCATION: [facility] DATE/TIME: 2/4/2024 10:14 PM CST INDICATION: Unwitnessed fall. Altered mental status. COMPARISON: 07/27/2018. TECHNIQUE: Note that this study was performed in conjunction with a CT scan of the thoracolumbar spine. Refer to separate reports for findings from those studies. CT angiogram chest abdomen pelvis during arterial phase of injection of IV contrast. 2D and  3D MIP reconstructions were performed by the CT technologist. Dose reduction techniques were used. CONTRAST: 75 mL Isovue-370. FINDINGS: ANGIOGRAM CHEST, ABDOMEN, AND PELVIS: Atherosclerotic calcification in the aorta. The aorta is normal in caliber without dissection. A very small 1.4 cm wide x 0.5 cm deep apparent outpouching of the inferolateral aspect of the proximal descending thoracic aorta (for example, series 6 image 57 and series 10 image 118), not present on 07/27/2018. No stranding or hematoma visualized in the mediastinum adjacent to this finding. No visualized pulmonary embolus. LUNGS AND PLEURA: 0.5 cm nodule in the anterior mid right lung in the region of the minor fissure (series 7 image 169), unchanged since 07/27/2018 and therefore consistent with benign  etiology. A few curvilinear opacities in the periphery of the lungs likely represent scarring or atelectasis. CORONARY ARTERY CALCIFICATION: Present. MEDIASTINUM/AXILLAE: Unremarkable. HEPATOBILIARY: Unremarkable. SPLEEN: Unremarkable. PANCREAS: Unremarkable. ADRENAL GLANDS: Unremarkable. KIDNEYS/BLADDER: A balloon tip catheter is present in the urinary bladder lumen. 2 cm diverticulum from the posterior left aspect of the urinary bladder. BOWEL: Several colonic diverticula are present without evidence of diverticulitis. The appendix is not visualized. LYMPH NODES: Unremarkable. PELVIC ORGANS: Moderate enlargement of the prostate gland. MUSCULOSKELETAL: Nonacute moderate compression deformity of the L1 vertebral body. OTHER: None.     IMPRESSION: 1. A very small apparent slight focal outpouching of the proximal descending thoracic aorta. This is new since 07/27/2018, but otherwise age-indeterminate. It could represent changes due to atherosclerosis, but a very small penetrating ulcer cannot be excluded. It is unlikely to relate to acute trauma. A follow-up CT scan of the thoracic aorta is recommended in 2 weeks in order to evaluate for interval changes in this finding. 2. No other acute abnormality identified in the chest, abdomen or pelvis.    CT Thoracic Spine w/o Contrast    Result Date: 2/4/2024  EXAM: CT THORACIC SPINE W/O CONTRAST, CT LUMBAR SPINE W/O CONTRAST LOCATION: [facility] DATE: 2/4/2024 INDICATION: eval fractures (reconstructions please). Patient was found down. Potential trauma. COMPARISON: Lumbar spine CT 11/29/2022 TECHNIQUE: 1) Routine CT Thoracic Spine without IV contrast. Multiplanar reformats. Dose reduction techniques were used. 2) Routine CT Lumbar Spine without IV contrast. Multiplanar reformats. Dose reduction techniques were used. FINDINGS: THORACIC SPINE CT: The exam is moderately degraded by motion. VERTEBRA: Mild compression forming disease at the superior  endplates of T1 and T2 are age indeterminate. Subtle compression deformity at the superior endplate of T3 is age-indeterminate. Mild anterior wedging compression deformity at the superior endplate of T5 is age-indeterminate. There is an chronic anterior wedging compression deformity of T12 with up to 30-40% loss of height anteriorly. No acute subluxation. CANAL/FORAMINA: No canal or neural foraminal stenosis. PARASPINAL: No extraspinal abnormality. LUMBAR SPINE CT: VERTEBRA: 5 lumbar type vertebra. There is a chronic moderate to severe burst-type fracture of L1 with up to 60-70% loss of height anteriorly. There is slight buckling of the posterior/superior corner into the ventral canal. Vertebral body height is otherwise normal. Mild grade 1 retrolisthesis of L2. There is 7 mm degenerative anterolisthesis of L5 on S1. Multilevel advanced disc degeneration. CANAL/FORAMINA: Moderate central canal stenosis at L3-L4 and L4-L5. Mild to moderate multilevel neural foraminal narrowing. PARASPINAL: No extraspinal abnormality.     IMPRESSION: THORACIC SPINE CT: 1.  The exam is moderately degraded by motion. 2.  Age-indeterminate mild compression deformities at the superior endplates of T1, T2, T3 and T5. 3.  Chronic anterior wedging compression deformity of T12. LUMBAR SPINE CT: 1.  No acute fracture or subluxation. 2.  Chronic moderate to severe burst-type fracture of L1. 3.  Moderate central canal stenosis at L3-L4 and L4-L5.     CT Lumbar Spine w/o Contrast    Result Date: 2/4/2024  EXAM: CT THORACIC SPINE W/O CONTRAST, CT LUMBAR SPINE W/O CONTRAST LOCATION: [facility] DATE: 2/4/2024 INDICATION: eval fractures (reconstructions please). Patient was found down. Potential trauma. COMPARISON: Lumbar spine CT 11/29/2022 TECHNIQUE: 1) Routine CT Thoracic Spine without IV contrast. Multiplanar reformats. Dose reduction techniques were used. 2) Routine CT Lumbar Spine without IV contrast. Multiplanar  reformats. Dose reduction techniques were used. FINDINGS: THORACIC SPINE CT: The exam is moderately degraded by motion. VERTEBRA: Mild compression forming disease at the superior endplates of T1 and T2 are age indeterminate. Subtle compression deformity at the superior endplate of T3 is age-indeterminate. Mild anterior wedging compression deformity at the superior endplate of T5 is age-indeterminate. There is an chronic anterior wedging compression deformity of T12 with up to 30-40% loss of height anteriorly. No acute subluxation. CANAL/FORAMINA: No canal or neural foraminal stenosis. PARASPINAL: No extraspinal abnormality. LUMBAR SPINE CT: VERTEBRA: 5 lumbar type vertebra. There is a chronic moderate to severe burst-type fracture of L1 with up to 60-70% loss of height anteriorly. There is slight buckling of the posterior/superior corner into the ventral canal. Vertebral body height is otherwise normal. Mild grade 1 retrolisthesis of L2. There is 7 mm degenerative anterolisthesis of L5 on S1. Multilevel advanced disc degeneration. CANAL/FORAMINA: Moderate central canal stenosis at L3-L4 and L4-L5. Mild to moderate multilevel neural foraminal narrowing. PARASPINAL: No extraspinal abnormality.     IMPRESSION: THORACIC SPINE CT: 1.  The exam is moderately degraded by motion. 2.  Age-indeterminate mild compression deformities at the superior endplates of T1, T2, T3 and T5. 3.  Chronic anterior wedging compression deformity of T12. LUMBAR SPINE CT: 1.  No acute fracture or subluxation. 2.  Chronic moderate to severe burst-type fracture of L1. 3.  Moderate central canal stenosis at L3-L4 and L4-L5.     Head CT w/o contrast    Result Date: 2/4/2024  EXAM: CT HEAD W/O CONTRAST, CT CERVICAL SPINE W/O CONTRAST LOCATION: [facility] DATE: 2/4/2024 INDICATION: Confusion, head and neck injury COMPARISON: CT head 11/29/2022 TECHNIQUE: 1) Routine CT Head without IV contrast. Multiplanar reformats. Dose  reduction techniques were used. 2) Routine CT Cervical Spine without IV contrast. Multiplanar reformats. Dose reduction techniques were used. FINDINGS: HEAD CT: INTRACRANIAL CONTENTS: No intracranial hemorrhage, extraaxial collection, or mass effect.  No CT evidence of acute infarct. Moderate presumed chronic small vessel ischemic changes. Mild generalized volume loss. No hydrocephalus. VISUALIZED ORBITS/SINUSES/MASTOIDS: No intraorbital abnormality. No paranasal sinus mucosal disease. No middle ear or mastoid effusion. BONES/SOFT TISSUES: No acute abnormality. CERVICAL SPINE CT: VERTEBRA: Mild compression superior aspect of T1 and T2, age indeterminate; appears chronic. It is new since CT chest 07/27/2018. No definite acute fracture. Straightening of cervical lordosis. Nonaggressive appearing lytic lesion within the dens and central aspect of body of C2; probably due to a somewhat atypical hemangioma. Subcentimeter nonaggressive appearing sclerotic lesion left lamina of C2. CANAL/FORAMINA: Moderate degenerative changes without significant canal narrowing at any level. Multilevel mild-to-moderate neural foraminal narrowing. PARASPINAL: No extraspinal abnormality. Visualized lung fields are clear.     IMPRESSION: HEAD CT: 1.  No definite acute intracranial process. CERVICAL SPINE CT: 1.  Mild compression superior aspect of T1 and T2, age indeterminate; appears chronic. It is new since CT chest 07/27/2018. 2.  No definite acute fracture. 3.  Moderate degenerative changes without significant canal narrowing at any level.    CT Cervical Spine w/o Contrast    Result Date: 2/4/2024  EXAM: CT HEAD W/O CONTRAST, CT CERVICAL SPINE W/O CONTRAST LOCATION: [facility] DATE: 2/4/2024 INDICATION: Confusion, head and neck injury COMPARISON: CT head 11/29/2022 TECHNIQUE: 1) Routine CT Head without IV contrast. Multiplanar reformats. Dose reduction techniques were used. 2) Routine CT Cervical Spine without IV  contrast. Multiplanar reformats. Dose reduction techniques were used. FINDINGS: HEAD CT: INTRACRANIAL CONTENTS: No intracranial hemorrhage, extraaxial collection, or mass effect.  No CT evidence of acute infarct. Moderate presumed chronic small vessel ischemic changes. Mild generalized volume loss. No hydrocephalus. VISUALIZED ORBITS/SINUSES/MASTOIDS: No intraorbital abnormality. No paranasal sinus mucosal disease. No middle ear or mastoid effusion. BONES/SOFT TISSUES: No acute abnormality. CERVICAL SPINE CT: VERTEBRA: Mild compression superior aspect of T1 and T2, age indeterminate; appears chronic. It is new since CT chest 07/27/2018. No definite acute fracture. Straightening of cervical lordosis. Nonaggressive appearing lytic lesion within the dens and central aspect of body of C2; probably due to a somewhat atypical hemangioma. Subcentimeter nonaggressive appearing sclerotic lesion left lamina of C2. CANAL/FORAMINA: Moderate degenerative changes without significant canal narrowing at any level. Multilevel mild-to-moderate neural foraminal narrowing. PARASPINAL: No extraspinal abnormality. Visualized lung fields are clear.     IMPRESSION: HEAD CT: 1.  No definite acute intracranial process. CERVICAL SPINE CT: 1.  Mild compression superior aspect of T1 and T2, age indeterminate; appears chronic. It is new since CT chest 07/27/2018. 2.  No definite acute fracture. 3.  Moderate degenerative changes without significant canal narrowing at any level.

## 2024-03-02 NOTE — PLAN OF CARE
Goal Outcome Evaluation:        Problem: Adult Inpatient Plan of Care  Goal: Optimal Comfort and Wellbeing  Outcome: Progressing     Problem: Risk for Delirium  Goal: Optimal Coping  Outcome: Progressing  Intervention: Optimize Psychosocial Adjustment to Delirium  Recent Flowsheet Documentation  Taken 3/2/2024 1145 by Alessandra Scott RN  Supportive Measures: active listening utilized  Taken 3/2/2024 0850 by Alessandra Scott RN  Supportive Measures: active listening utilized     Problem: Oral Intake Inadequate  Goal: Improved Oral Intake  Outcome: Progressing   Patient is pleasant and cooperative.  Alert to self only.  Up with assist of 1 and walker to the bathroom.  No bowel movement however is passing flatus. Lozano intact and patent.  Fluids encouraged. Family fed patient breakfast.  Coughing with liquids.  Requested speech eval from provider and ordered.  Will utilize thickened liquids until eval is completed per MD recommendation.  Denies pain.  IV abx per orders.  Patient slept most of the day in the recliner as family reported very poor sleep for the past 3 days or more.  Continue plan of care.

## 2024-03-02 NOTE — PLAN OF CARE
Problem: Adult Inpatient Plan of Care  Goal: Absence of Hospital-Acquired Illness or Injury  Outcome: Progressing  Intervention: Identify and Manage Fall Risk  Recent Flowsheet Documentation  Taken 3/1/2024 1600 by Rashaun Baires RN  Safety Promotion/Fall Prevention:   activity supervised   assistive device/personal items within reach   bedside attendant   clutter free environment maintained   lighting adjusted   nonskid shoes/slippers when out of bed   patient and family education   room near nurse's station   safety round/check completed   supervised activity  Intervention: Prevent Skin Injury  Recent Flowsheet Documentation  Taken 3/1/2024 1705 by Rashaun Baires RN  Body Position:   turned   left  Taken 3/1/2024 1630 by Rashaun Baires RN  Body Position:   turned   right  Taken 3/1/2024 1600 by Rashaun Baires RN  Body Position:   position changed independently   foot of bed elevated     Problem: Adult Inpatient Plan of Care  Goal: Optimal Comfort and Wellbeing  Outcome: Progressing  Intervention: Monitor Pain and Promote Comfort  Recent Flowsheet Documentation  Taken 3/1/2024 1540 by Rashaun Baires RN  Pain Management Interventions:   distraction   repositioned     Problem: Risk for Delirium  Goal: Optimal Coping  Outcome: Progressing  Goal: Improved Behavioral Control  Outcome: Progressing  Intervention: Minimize Safety Risk  Recent Flowsheet Documentation  Taken 3/1/2024 1600 by aRshaun Baires RN  Enhanced Safety Measures:   assistive devices when indicated   pain management   review medications for side effects with activity  Goal: Improved Attention and Thought Clarity  Outcome: Not Progressing  Goal: Improved Sleep  Outcome: Progressing     Problem: UTI (Urinary Tract Infection)  Goal: Improved Infection Symptoms  Outcome: Progressing     Problem: Fall Injury Risk  Goal: Absence of Fall and Fall-Related Injury  Outcome: Progressing  Intervention: Promote Injury-Free Environment  Recent Flowsheet  Documentation  Taken 3/1/2024 1600 by Rashaun Baires RN  Safety Promotion/Fall Prevention:   activity supervised   assistive device/personal items within reach   bedside attendant   clutter free environment maintained   lighting adjusted   nonskid shoes/slippers when out of bed   patient and family education   room near nurse's station   safety round/check completed   supervised activity     Problem: Electrolyte Imbalance  Goal: Electrolyte Balance  Outcome: Not Progressing     Problem: Oral Intake Inadequate  Goal: Improved Oral Intake  Outcome: Not Progressing     Pt confused and oriented only to self. Pt c/o back pain and was repositioned in the chair. Per family, pt sleeps in the recliner at home. Pt refusing to go to bed. Pt intermittently restless from 1500 - 2000. Pt was swatting or reaching at things in the air and trying to get up w/out assistance. Meds given whole in applesauce. Pt coughing w/ thin liquids and granddaughter did state this happens w/ thin liquids and regular food at home as well. Note placed for MD to consider SLP consult. RN tried mildly thick water w/ pt and he had less coughing. Pt is assist of 2 w/ pivot transfers per AM report.

## 2024-03-02 NOTE — PROGRESS NOTES
[facility]    Hospitalist Progress Note    Date of Service (when I saw the patient): 03/02/2024    Assessment & Plan   Dilip Long is a pleasant 88 year old gentleman admitted on 2/29/2024 for encephalopathy.  Current problems include:     Acute toxic and metabolic encephalopathy; much improved  Elevated TSH; free T4 normal; likely euthyroid sick  Hypothyroidism, by history  - per prior: granddaughter (Shruthi) 2/29 reported Dilip is normally conversational and alert and oriented without many issues  - CT head w/o contrast negative for acute findings   - DDx includes infection, hypertensive emergency and question other etiology, including ? PRES which would not be seen on CT.   - considering MRI as soon as able (if/when he is less restless)    - appreciate Neurology consult  - continue current synthroid dose  - trend troponins  -> PT/OT eval's  -> increase time OOB and taking steps, if able    Per Neurology:     MRI if able  Electroencephalogram ordered - done today, 3/1  TSH  high at 14.40, T4 normal. Check T3, TPO Ab - ordered  Check B1, B6, B12, MMA, Folate, Ammonia - ordered  ID has been consulted for recurrent UTI, management of infection  Hypertensive emergency management per primary team.       Recurrent UTI  Chronic indwelling Lozano catheter - per son Mark, this was placed ~ 1 year ago.  - appreciate ID consult  - follow up on culture results   - kidney ultrasound done: no abscess   - continue Zosyn per ID recs   -> needs follow up with Urology after discharge     HTN emergency; pressures variable/gradually improving today.  - per prior: initially ordered nicardipine drip given symptomatology, however this can only be administered in the ICU (did not have bed at time of admission)  - continue PRN labetalol as first line (if HR tolerates) and hydralazine as a second option  - earlier BP goal per Neurology as of 2/29: -160  - attempt further BP control today, to keep SBP  < 160  - continue PRN clonidine  - increase metoprolol to 25 mg BID    Falls  Physical deconditioning  - falls precautions  - PT/OT eval's when able   -> increase time OOB and taking steps, if able    Left neck dystonia; unclear duration.  Limits ability to lie comfortable and also his mobility.  - will ask Neurology to review; may benefit from Botox  - add topical antiinflammatory    Possible oropharyngeal dysphagia  - has had coughing with thin liquids while here;  -> add thickened liquids until eval'd by Speech  - Speech eval.     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  Dilip is much more awake and interactive today.  Complains about the cold pancakes his granddaughter brought him earlier.  Appetite fair.  + Left-sided neck pain; has been unable to straighten his neck to vertical x ? 1 month, or longer.  Per Mark: jorge placed ~ 1 year ago.    Data reviewed today: I reviewed all new labs and imaging results over the last 24 hours.    Physical Exam   Temp: 97.6  F (36.4  C) Temp src: Oral BP: (!) 167/81 Pulse: 85   Resp: 18  SpO2: 95 % O2 Device: None (Room air) Oxygen Delivery: 1 LPM  Vitals:    02/29/24 0959   Weight: 68 kg (150 lb)     Vital Signs with Ranges  Temp:  [97.2  F (36.2  C)-97.9  F (36.6  C)] 97.6  F (36.4  C)  Pulse:  [63-93] 85  Resp:  [18-20] 18  BP: ()/() 167/81  SpO2:  [89 %-97 %] 95 %  I/O last 3 completed shifts:  In: 333 [P.O.:180; I.V.:153]  Out: 350 [Urine:350]    Constitutional: awake, no apparent distress; sitting in recliner, next to bed; leans to his Left side.  HEENT: head tilted to Left in semi-fixed position; sclerae clear; MM's moist.  No apparent skull injuries  Neck: has dystonia/muscle spasms Left lateral neck; tenderness over this area.  Is unable to straighten his neck to vertical due to stiffness/pain  Respiratory: good a/e bilaterally, no wheezing or rhonchi  Cardiovascular: regular rate and rhythm, S1, S2 noted; no m/r/g  GI: abdomen flat, + bowel sounds; soft, non-tender, non-distended  Skin/Integumen: no rashes, no cyanosis, no jaundice  Musculoskeletal: no edema  Neurologic: is hard of hearing; disoriented to date, location; knows his granddaughter's name; follows most directions well, after repeating; maintains good eye contact today;  bilateral /arm strength 5/5 and symmetric; no focal deficits      Medications      enoxaparin ANTICOAGULANT  40 mg Subcutaneous Q24H    finasteride  5 mg Oral Daily    levothyroxine  75 mcg Oral Daily    metoprolol tartrate  12.5 mg Oral BID    multivitamin w/minerals  1 tablet Oral Daily    piperacillin-tazobactam  3.375 g Intravenous Q8H    sodium chloride (PF)  3 mL Intracatheter Q8H    thiamine  100 mg Oral Daily       Data   Recent Labs   Lab 03/02/24  0534 03/02/24  0145 03/01/24  2055 03/01/24  0624 02/29/24  1638 02/29/24  1040   WBC  --   --   --  8.8  --  5.9   HGB  --   --   --  11.5*  --  11.8*   MCV  --   --   --  94  --  95   PLT  --   --   --  298  --  292   INR  --   --   --   --   --  1.02     --   --  134*  --  136    POTASSIUM 4.0 4.2 3.3* 3.2*  --  3.4   CHLORIDE 101  --   --  97*  --  98   CO2 23  --   --  24  --  28   BUN 15.2  --   --  14.3  --  16.1   CR 1.31*  --   --  1.21*  --  1.22*   ANIONGAP 12  --   --  13  --  10   KRISTEN 8.1*  --   --  8.4*  --  9.0   GLC 96  --   --  105* 120* 111*   ALBUMIN  --   --   --   --   --  3.5   PROTTOTAL  --   --   --   --   --  6.2*   BILITOTAL  --   --   --   --   --  0.4   ALKPHOS  --   --   --   --   --  69   ALT  --   --   --   --   --  <5   AST  --   --   --   --   --  17

## 2024-03-03 ENCOUNTER — APPOINTMENT (OUTPATIENT)
Dept: RADIOLOGY | Facility: HOSPITAL | Age: 89
DRG: 698 | End: 2024-03-03
Attending: INTERNAL MEDICINE
Payer: COMMERCIAL

## 2024-03-03 ENCOUNTER — APPOINTMENT (OUTPATIENT)
Dept: SPEECH THERAPY | Facility: HOSPITAL | Age: 89
DRG: 698 | End: 2024-03-03
Payer: COMMERCIAL

## 2024-03-03 ENCOUNTER — APPOINTMENT (OUTPATIENT)
Dept: SPEECH THERAPY | Facility: HOSPITAL | Age: 89
DRG: 698 | End: 2024-03-03
Attending: INTERNAL MEDICINE
Payer: COMMERCIAL

## 2024-03-03 ENCOUNTER — APPOINTMENT (OUTPATIENT)
Dept: PHYSICAL THERAPY | Facility: HOSPITAL | Age: 89
DRG: 698 | End: 2024-03-03
Attending: INTERNAL MEDICINE
Payer: COMMERCIAL

## 2024-03-03 LAB
PLATELET # BLD AUTO: 278 10E3/UL (ref 150–450)
POTASSIUM SERPL-SCNC: 3.9 MMOL/L (ref 3.4–5.3)
PYRIDOXAL PHOS SERPL-SCNC: 5.7 NMOL/L

## 2024-03-03 PROCEDURE — 250N000011 HC RX IP 250 OP 636: Performed by: INTERNAL MEDICINE

## 2024-03-03 PROCEDURE — 250N000013 HC RX MED GY IP 250 OP 250 PS 637: Performed by: STUDENT IN AN ORGANIZED HEALTH CARE EDUCATION/TRAINING PROGRAM

## 2024-03-03 PROCEDURE — 250N000013 HC RX MED GY IP 250 OP 250 PS 637: Performed by: INTERNAL MEDICINE

## 2024-03-03 PROCEDURE — 99232 SBSQ HOSP IP/OBS MODERATE 35: CPT | Performed by: INTERNAL MEDICINE

## 2024-03-03 PROCEDURE — 120N000001 HC R&B MED SURG/OB

## 2024-03-03 PROCEDURE — 92610 EVALUATE SWALLOWING FUNCTION: CPT | Mod: GN

## 2024-03-03 PROCEDURE — 250N000011 HC RX IP 250 OP 636: Performed by: STUDENT IN AN ORGANIZED HEALTH CARE EDUCATION/TRAINING PROGRAM

## 2024-03-03 PROCEDURE — 92526 ORAL FUNCTION THERAPY: CPT | Mod: GN

## 2024-03-03 PROCEDURE — 250N000013 HC RX MED GY IP 250 OP 250 PS 637: Performed by: PSYCHIATRY & NEUROLOGY

## 2024-03-03 PROCEDURE — 36415 COLL VENOUS BLD VENIPUNCTURE: CPT | Performed by: STUDENT IN AN ORGANIZED HEALTH CARE EDUCATION/TRAINING PROGRAM

## 2024-03-03 PROCEDURE — 84132 ASSAY OF SERUM POTASSIUM: CPT | Performed by: INTERNAL MEDICINE

## 2024-03-03 PROCEDURE — 74230 X-RAY XM SWLNG FUNCJ C+: CPT

## 2024-03-03 PROCEDURE — 97162 PT EVAL MOD COMPLEX 30 MIN: CPT | Mod: GP

## 2024-03-03 PROCEDURE — 85049 AUTOMATED PLATELET COUNT: CPT | Performed by: STUDENT IN AN ORGANIZED HEALTH CARE EDUCATION/TRAINING PROGRAM

## 2024-03-03 PROCEDURE — 92611 MOTION FLUOROSCOPY/SWALLOW: CPT | Mod: GN

## 2024-03-03 PROCEDURE — 97116 GAIT TRAINING THERAPY: CPT | Mod: GP

## 2024-03-03 RX ORDER — ASPIRIN 81 MG/1
81 TABLET ORAL DAILY
Status: DISCONTINUED | OUTPATIENT
Start: 2024-03-03 | End: 2024-03-04 | Stop reason: HOSPADM

## 2024-03-03 RX ORDER — MUSCLE RUB CREAM 100; 150 MG/G; MG/G
CREAM TOPICAL EVERY 6 HOURS
Status: DISCONTINUED | OUTPATIENT
Start: 2024-03-03 | End: 2024-03-03

## 2024-03-03 RX ORDER — BARIUM SULFATE 400 MG/ML
SUSPENSION ORAL ONCE
Status: COMPLETED | OUTPATIENT
Start: 2024-03-03 | End: 2024-03-03

## 2024-03-03 RX ORDER — TROLAMINE SALICYLATE 10 G/100G
CREAM TOPICAL EVERY 6 HOURS
Status: DISCONTINUED | OUTPATIENT
Start: 2024-03-03 | End: 2024-03-04 | Stop reason: HOSPADM

## 2024-03-03 RX ORDER — ATORVASTATIN CALCIUM 40 MG/1
40 TABLET, FILM COATED ORAL EVERY EVENING
Status: DISCONTINUED | OUTPATIENT
Start: 2024-03-03 | End: 2024-03-04 | Stop reason: HOSPADM

## 2024-03-03 RX ADMIN — FINASTERIDE 5 MG: 5 TABLET, FILM COATED ORAL at 08:36

## 2024-03-03 RX ADMIN — MENTHOL AND METHYL SALICYLATE: 7.6; 29 OINTMENT TOPICAL at 08:39

## 2024-03-03 RX ADMIN — MENTHOL AND METHYL SALICYLATE: 7.6; 29 OINTMENT TOPICAL at 20:25

## 2024-03-03 RX ADMIN — LEVOTHYROXINE SODIUM 75 MCG: 75 TABLET ORAL at 08:36

## 2024-03-03 RX ADMIN — Medication: at 22:45

## 2024-03-03 RX ADMIN — PIPERACILLIN AND TAZOBACTAM 3.38 G: 3; .375 INJECTION, POWDER, FOR SOLUTION INTRAVENOUS at 20:37

## 2024-03-03 RX ADMIN — ENOXAPARIN SODIUM 40 MG: 40 INJECTION SUBCUTANEOUS at 20:26

## 2024-03-03 RX ADMIN — ACETAMINOPHEN 650 MG: 325 TABLET ORAL at 19:41

## 2024-03-03 RX ADMIN — BARIUM SULFATE: 400 SUSPENSION ORAL at 13:23

## 2024-03-03 RX ADMIN — Medication: at 17:20

## 2024-03-03 RX ADMIN — Medication 1 TABLET: at 08:36

## 2024-03-03 RX ADMIN — METOPROLOL TARTRATE 25 MG: 25 TABLET, FILM COATED ORAL at 08:36

## 2024-03-03 RX ADMIN — THIAMINE HCL TAB 100 MG 100 MG: 100 TAB at 08:36

## 2024-03-03 RX ADMIN — CLONIDINE HYDROCHLORIDE 0.1 MG: 0.1 TABLET ORAL at 21:34

## 2024-03-03 RX ADMIN — Medication: at 14:00

## 2024-03-03 RX ADMIN — Medication 1 MG: at 20:39

## 2024-03-03 RX ADMIN — PIPERACILLIN AND TAZOBACTAM 3.38 G: 3; .375 INJECTION, POWDER, FOR SOLUTION INTRAVENOUS at 14:01

## 2024-03-03 RX ADMIN — PIPERACILLIN AND TAZOBACTAM 3.38 G: 3; .375 INJECTION, POWDER, FOR SOLUTION INTRAVENOUS at 04:40

## 2024-03-03 RX ADMIN — Medication 81 MG: at 14:01

## 2024-03-03 RX ADMIN — Medication 1000 MCG: at 08:36

## 2024-03-03 RX ADMIN — HYDRALAZINE HYDROCHLORIDE 10 MG: 20 INJECTION INTRAMUSCULAR; INTRAVENOUS at 22:50

## 2024-03-03 RX ADMIN — ATORVASTATIN CALCIUM 40 MG: 40 TABLET, FILM COATED ORAL at 20:25

## 2024-03-03 RX ADMIN — METOPROLOL TARTRATE 25 MG: 25 TABLET, FILM COATED ORAL at 20:25

## 2024-03-03 ASSESSMENT — ACTIVITIES OF DAILY LIVING (ADL)
ADLS_ACUITY_SCORE: 47
ADLS_ACUITY_SCORE: 51
ADLS_ACUITY_SCORE: 49
ADLS_ACUITY_SCORE: 51
ADLS_ACUITY_SCORE: 51
ADLS_ACUITY_SCORE: 49
ADLS_ACUITY_SCORE: 47
ADLS_ACUITY_SCORE: 51
ADLS_ACUITY_SCORE: 49
ADLS_ACUITY_SCORE: 47
ADLS_ACUITY_SCORE: 51
ADLS_ACUITY_SCORE: 49
ADLS_ACUITY_SCORE: 47

## 2024-03-03 NOTE — PROGRESS NOTES
"   03/03/24 0900   Appointment Info   Signing Clinician's Name / Credentials (PT) Sadaf Florez, PT, DPT   Living Environment   People in Home spouse;other (see comments)  (Children and grandchildren take turns assisting spouse.)   Current Living Arrangements house   Home Accessibility stairs to enter home   Number of Stairs, Main Entrance 4   Stair Railings, Main Entrance railings safe and in good condition   Transportation Anticipated family or friend will provide   Self-Care   Usual Activity Tolerance moderate   Current Activity Tolerance fair   Equipment Currently Used at Home walker, rolling;wheelchair, manual  (4WW)   Fall history within last six months yes   Number of times patient has fallen within last six months   (Unknown)   Activity/Exercise/Self-Care Comment Family provides 24/7 assist/supervision for I/ADLs and mobility.   General Information   Onset of Illness/Injury or Date of Surgery 03/02/24   Referring Physician Chip Ewing MD   Patient/Family Therapy Goals Statement (PT) Return home   Pertinent History of Current Problem (include personal factors and/or comorbidities that impact the POC) Per chart, \"Dilip Long is a pleasant 88 year old gentleman admitted on 2/29/2024 for encephalopathy.\"   Existing Precautions/Restrictions fall   Posture    Posture Forward head position;Protracted shoulders   Range of Motion (ROM)   Range of Motion ROM is WFL   Strength (Manual Muscle Testing)   Strength (Manual Muscle Testing) Deficits observed during functional mobility   Bed Mobility   Bed Mobility supine-sit;sit-supine   Supine-Sit [place] (Bed Mobility) minimum assist (75% patient effort);verbal cues   Sit-Supine [place] (Bed Mobility) minimum assist (75% patient effort);verbal cues   Bed Mobility Limitations decreased ability to use arms for pushing/pulling;decreased ability to use legs for bridging/pushing   Impairments Contributing to Impaired Bed Mobility decreased strength "   Assistive Device (Bed Mobility) bed rails;draw sheet   Transfers   Transfers sit-stand transfer   Transfer Safety Concerns Noted decreased balance during turns   Impairments Contributing to Impaired Transfers impaired balance;decreased strength   Sit-Stand Transfer   Sit-Stand [place] (Transfers) minimum assist (75% patient effort);verbal cues   Assistive Device (Sit-Stand Transfers) walker, front-wheeled   Gait/Stairs (Locomotion)   [place] Level (Gait) minimum assist (75% patient effort);verbal cues   Assistive Device (Gait) walker, front-wheeled   Distance in Feet (Gait) 10'   Pattern (Gait) step-to   Deviations/Abnormal Patterns (Gait) demetrice decreased   Comment, (Gait/Stairs) Stairs not tested due to fatigue.   Clinical Impression   Criteria for Skilled Therapeutic Intervention Yes, treatment indicated   PT Diagnosis (PT) Impaired functional mobility   Influenced by the following impairments Fatigue, impaired balance, decreased strength   Functional limitations due to impairments Bed mobility, transfers, gait, stairs   Clinical Presentation (PT Evaluation Complexity) evolving   Clinical Presentation Rationale Patient presents as medically diagnosed.   Clinical Decision Making (Complexity) moderate complexity   Planned Therapy Interventions (PT) bed mobility training;gait training;home exercise program;patient/family education;stair training;strengthening;stretching;transfer training;home program guidelines   Risk & Benefits of therapy have been explained evaluation/treatment results reviewed;care plan/treatment goals reviewed;patient   PT Total Evaluation Time   PT Eval, Moderate Complexity Minutes (26124) 15   Physical Therapy Goals   PT Frequency 4x/week   PT Predicted Duration/Target Date for Goal Attainment 03/10/24   PT Goals Bed Mobility;Transfers;Gait;Stairs   PT: Bed Mobility Supervision/stand-by assist;Supine to/from sit   PT: Transfers Supervision/stand-by assist;Assistive device;Sit  to/from stand  (4WW)   PT: Gait Supervision/stand-by assist;Assistive device;100 feet  (4WW)   PT: Stairs Minimal assist;4 stairs;Rail on left;Rail on right   Gait Training   Gait Training Minutes (81984) 8   Symptoms Noted During/After Treatment (Gait Training) fatigue   Treatment Detail/Skilled Intervention Patient continued ambulation in hallways with Min A and FWW. Forward flexed posture throughout. Min A and verbal cues to maintain safe proximity to FWW. Fatigued quickly.   Distance in Feet 50'   [place] Level (Gait Training) minimum assist (75% patient effort)   Physical Assistance Level (Gait Training) verbal cues;1 person assist   Weight Bearing (Gait Training) full weight-bearing   Gait Analysis Deviations decreased demetrice;decreased step length;decreased toe-to-floor clearance   Impairments (Gait Analysis/Training) balance impaired;strength decreased   Assistive Device (Gait Training) rolling walker  (FWW)   Pattern Analysis (Gait Training) other (see comments)  (Step-to gait)   PT Discharge Planning   PT Plan Bed mobility, transfers, gait with FWW, 4 RAMON   PT Discharge Recommendation (DC Rec) home with assist;home with home care physical therapy   PT Rationale for DC Rec Patient is requiring Min A of 1 for all mobility, which patient's family can provide 24/7. Patient may benefit from home PT to improve endurance and strength. Family is also considering hospice services.   PT Brief overview of current status Min A of 1 for all mobility.   PT Equipment Needed at Discharge gait belt;walker, rolling  (4WW)   Total Session Time   Timed Code Treatment Minutes 8   Total Session Time (sum of timed and untimed services) 23

## 2024-03-03 NOTE — PLAN OF CARE
Problem: Adult Inpatient Plan of Care  Goal: Absence of Hospital-Acquired Illness or Injury  Outcome: Progressing  Intervention: Identify and Manage Fall Risk  Recent Flowsheet Documentation  Taken 3/2/2024 1635 by Rashaun Baires RN  Safety Promotion/Fall Prevention:   activity supervised   assistive device/personal items within reach   bedside attendant   clutter free environment maintained   lighting adjusted   nonskid shoes/slippers when out of bed   patient and family education   room near nurse's station   safety round/check completed   supervised activity     Problem: Adult Inpatient Plan of Care  Goal: Optimal Comfort and Wellbeing  Outcome: Progressing     Problem: Risk for Delirium  Goal: Optimal Coping  Outcome: Progressing  Goal: Improved Behavioral Control  Outcome: Progressing  Intervention: Minimize Safety Risk  Recent Flowsheet Documentation  Taken 3/2/2024 1635 by Rashaun Baires RN  Enhanced Safety Measures:   assistive devices when indicated   pain management   review medications for side effects with activity  Goal: Improved Attention and Thought Clarity  Outcome: Progressing  Goal: Improved Sleep  Outcome: Progressing     Problem: UTI (Urinary Tract Infection)  Goal: Improved Infection Symptoms  Outcome: Progressing     Problem: Fall Injury Risk  Goal: Absence of Fall and Fall-Related Injury  Outcome: Progressing  Intervention: Promote Injury-Free Environment  Recent Flowsheet Documentation  Taken 3/2/2024 1635 by Rashaun Baires RN  Safety Promotion/Fall Prevention:   activity supervised   assistive device/personal items within reach   bedside attendant   clutter free environment maintained   lighting adjusted   nonskid shoes/slippers when out of bed   patient and family education   room near nurse's station   safety round/check completed   supervised activity     Problem: Electrolyte Imbalance  Goal: Electrolyte Balance  Outcome: Progressing     Problem: Oral Intake Inadequate  Goal: Improved  Oral Intake  Outcome: Progressing     Pt confused, but is more alert today compared to yesterday; remains oriented to self. Pt denied pain and no non-verbal signs of pain observed thus far. Telemetry showing a.fib w/ PVC's. Meds whole in applesauce. Moderately thick liquids provided w/ less coughing. Pt educated on tucking his chin while swallowing; pt is able to follow instructions most of the time. Potassium of 4; recheck in the morning per protocol. PRN PO clonidine given for /81, which was not effective. BP remained 170/73 and PRN IV hydralazine was given, which was effective. BP was 132/58 at HS and scheduled PO metoprolol given. Lozano cares provided by MICHAEL. Pt is an assist of 1-2 w/ walker during transfers to the bathroom.

## 2024-03-03 NOTE — PLAN OF CARE
Occupational Therapy: Orders received. Chart reviewed and discussed with care team.? Occupational Therapy not indicated due to pt seen by PT and has assist with all ADLs at home.  Family does not feel pt needs OT eval..? Defer discharge recommendations to PT.? Will complete orders.

## 2024-03-03 NOTE — PROGRESS NOTES
"Speech-Language Pathology: Clinical Swallow Evaluation     03/03/24 1100   Appointment Info   Signing Clinician's Name / Credentials (SLP) VANDANA Manuel Student   General Information   Onset of Illness/Injury or Date of Surgery 02/29/24   Referring Physician Chip Ewing MD   Pertinent History of Current Problem Per EMR: \"Dilip Long is a 88 year old male who is presenting with increased confusion for the past several days.  Patient has a history of heart failure with preserved ejection fraction, hypertension, A-fib, recurrent urinary tract infections for which she was recently admitted and discharged on amoxicillin.  Daughter states that patient seem to be improving several days post discharge.  He then received a Lozano exchange for his chronic indwelling Lozano last week.  A day or 2 after the Lozano exchange, granddaughter states that patient was notably confused.  At baseline, patient is conversational and overall without significant cognitive impairment but does have some very mild short-term memory loss.  No fevers, chills, vomiting noted.  No abdominal pain.  Patient has been complaining of a headache over the past week.  Daughter states that he has been noting hallucinations as well.  No bowel or bladder incontinence.  Denies any visual changes.\"   General Observations Upright in chair, alert, and cooperative. [place] and benefits from increased volume   Type of Evaluation   Type of Evaluation Swallow Evaluation   Oral Motor   Oral Musculature generally intact   Mucosal Quality good   Dentition (Oral Motor)   Dentition (Oral Motor) natural dentition;adequate dentition   Facial Symmetry (Oral Motor)   Facial Symmetry (Oral Motor) WNL   Lip Function (Oral Motor)   Lip Range of Motion (Oral Motor) WNL   Lip Strength (Oral Motor) WFL   Tongue Function (Oral Motor)   Tongue Strength (Oral Motor) WFL   Tongue Coordination/Speed (Oral Motor) WNL   Tongue ROM (Oral Motor) WNL   Jaw Function (Oral Motor) "   Jaw Function (Oral Motor) WNL   Cough/Swallow/Gag Reflex (Oral Motor)   Soft Palate/Velum (Oral Motor) WNL   Volitional Throat Clear/Cough (Oral Motor) WNL   Volitional Swallow (Oral Motor) WNL   Vocal Quality/Secretion Management (Oral Motor)   Secretion Management (Oral Motor) WNL   General Swallowing Observations   Past History of Dysphagia RN reports coughing with and after taking medications. Pt's granddtr reports some coughing with meals. Pt unable to recall coughing instances.   Respiratory Support room air   Current Diet/Method of Nutritional Intake (General Swallowing Observations, NIS) regular diet;thin liquids (level 0)  (Nursing staff has been thickening liquids since 3/2)   Swallowing Evaluation Clinical swallow evaluation   Clinical Swallow Evaluation   Feeding Assistance no assistance needed   Additional evaluation(s) completed today No   Clinical Swallow Evaluation Textures Trialed thin liquids;mildly thick liquids;solid foods   Clinical Swallow Eval: Thin Liquid Texture Trial   Mode of Presentation, Thin Liquids cup   Volume of Liquid or Food Presented 2 sips   Oral Phase of Swallow WFL   Pharyngeal Phase of Swallow coughing/choking;throat clearing   Diagnostic Statement S/s of aspiration   Clinical Swallow Eval: Mildly Thick Liquids   Mode of Presentation cup   Volume Presented 2 oz.   Oral Phase WFL   Pharyngeal Phase coughing/choking  (1x)   Diagnostic Statement 1x coughing after inital mildly thick presentation, suspect d/t large bolus size. No subsequent coughing or throat clearing after small sip training.   Clinical Swallow Evaluation: Solid Food Texture Trial   Mode of Presentation self-fed   Volume Presented 1/2 cracker   Oral Phase WFL   Pharyngeal Phase intact   Diagnostic Statement No overt s/s of aspiration   Esophageal Phase of Swallow   Patient reports or presents with symptoms of esophageal dysphagia No   Swallowing Recommendations   Diet Consistency Recommendations mildly thick  liquids (level 2);regular diet   Mode of Delivery Recommendations bolus size, small;no straws   Recommended Feeding/Eating Techniques (Swallow Eval) maintain upright sitting position for eating   Medication Administration Recommendations, Swallowing (SLP) Whole or crushed in puree   Instrumental Assessment Recommendations VFSS (videofluoroscopic swallowing study)   Comment, Swallowing Recommendations Overt s/s of aspiration with Thin liquids and 1x with Mildly thick. Recommend VFSS to determine LDR.   Clinical Impression   Criteria for Skilled Therapeutic Interventions Met (SLP Eval) Yes, treatment indicated   Risks & Benefits of therapy have been explained evaluation/treatment results reviewed;care plan/treatment goals reviewed;participants voiced agreement with care plan;patient;participants included   Clinical Impression Comments Pt seen for a Clinical Swallow Evaluation following reports of coughing with and after taking medication. RN giving thickened liquids following episode and less symptoms present. Oral mech completed and unremarkable. Pt trialed 2 oz. of Mildly thick liquids via open cup with 1x cough and suspect d/t large bolus size. Pt trialed 2 sips of Thin liquid via open cup with throat clear and coughing. Pt notes Mildly thick is preferred in regard to ease of swallowing. Pt trialed 1/2 cracker with no s/s of aspiration. Mastication WFL. Recommend diet of Mildly thick (IDDSI 2) and Regular (IDDSI 7) with NO straws and small sips/bites. Recommend VFSS to determine LRD.   SLP Total Evaluation Time   Eval: oral/pharyngeal swallow function, clinical swallow Minutes (05845) 15   SLP Goals   Therapy Frequency (SLP Eval) 5 times/week   SLP Predicted Duration/Target Date for Goal Attainment 03/10/24   SLP Goals Swallow   SLP: Safely tolerate diet without signs/symptoms of aspiration Regular diet;Thin liquids;Independently   Interventions   Interventions Quick Adds Swallowing Dysfunction   Swallowing  Dysfunction &/or Oral Function for Feeding   Treatment of Swallowing Dysfunction &/or Oral Function for Feeding Minutes (92975) 8   Treatment Detail/Skilled Intervention Trained small sips as compensatory strategy. Pt trialed 3 sips of Mildly thick with one verbal cue, and no s/s of aspiration. Pt IND following training. Pt receptive to use of strategy. Continue Mildly thick liquids and Regular textures with use of strategies listed above.   SLP Discharge Planning   SLP Plan VFSS   SLP Discharge Recommendation other (see comments)  (Per treatment team)   SLP Rationale for DC Rec Alterted diet, thickened liquids   SLP Brief overview of current status  Coughing with thin liquid; Recommend diet of Mildly thick (IDDSI 2) and Regular (IDDSI 7) with NO straws and small sips/bites; Recommend VFSS to determine LRD.   Total Session Time   Total Session Time (sum of timed and untimed services) 79

## 2024-03-03 NOTE — PROGRESS NOTES
"Speech-Language Pathology: Video Swallow Study     03/03/24 1300   Appointment Info   Signing Clinician's Name / Credentials (SLP) VANDANA aMnuel Student   Student Supervision Direct supervision provided   General Information   Onset of Illness/Injury or Date of Surgery 02/29/24   Referring Physician Chip Ewing MD   Pertinent History of Current Problem Per EMR: \"Dilip Long is a 88 year old male who is presenting with increased confusion for the past several days.  Patient has a history of heart failure with preserved ejection fraction, hypertension, A-fib, recurrent urinary tract infections for which she was recently admitted and discharged on amoxicillin.  Daughter states that patient seem to be improving several days post discharge.  He then received a Lozano exchange for his chronic indwelling Lozano last week.  A day or 2 after the Lozano exchange, granddaughter states that patient was notably confused.  At baseline, patient is conversational and overall without significant cognitive impairment but does have some very mild short-term memory loss.  No fevers, chills, vomiting noted.  No abdominal pain.  Patient has been complaining of a headache over the past week.  Daughter states that he has been noting hallucinations as well.  No bowel or bladder incontinence.  Denies any visual changes.\"   General Observations Alert and cooperative   Type of Evaluation   Type of Evaluation Swallow Evaluation   General Swallowing Observations   Past History of Dysphagia RN reports coughing with and after taking medications. Pt's granddtr reports some coughing with meals. Pt unable to recall coughing instances. VFSS completed 7/18/19 with recommendations to begin diet of Mechanical Soft and Thin with strategies of small sip, slow rate, NO straws. At that time, pt presented with esophageal symptoms thus softer texture diet was initiated. Nonetheless, no aspiration occured with any consistency trialed. "   Respiratory Support room air   Current Diet/Method of Nutritional Intake (General Swallowing Observations, NIS) regular diet;mildly thick (nectar-thick) liquids (level 2)   Swallowing Evaluation Videofluoroscopic swallow study (VFSS)   VFSS Evaluation   Radiologist Dr. Phalen   Views Taken left lateral   Physical Location of Procedure [facility]   VFSS Textures Trialed thin liquids;mildly thick liquids;pureed;solid foods   VFSS Eval: Thin Liquid Texture Trial   Mode of Presentation, Thin Liquid spoon   Order of Presentation 4, 5   Preparatory Phase anterior loss of bolus  (Minimal)   Oral Phase, Thin Liquid premature pharyngeal entry   Bolus Location When Swallow Triggered posterior laryngeal surface of epiglottis   Pharyngeal Phase, Thin Liquid impaired hyolaryngeal excursion   Rosenbek's Penetration Aspiration Scale: Thin Liquid Trial Results 7 - contrast passes glottis, visible subglottic residue remains despite patient's response (aspiration)   Response to Aspiration unproductive reflexive cough   Diagnostic Statement Wilberto aspiration of thin liquid with reflexive cough. Pt's head posture tucked at baseline. Attempted head neutral with thin liquid without follow-through, although no aspiration with 2nd trial.   VFSS Eval: Mildly Thick Liquids   Mode of Presentation cup;spoon;straw   Order of Presentation 1, 2, 3   Preparatory Phase WFL   Oral Phase residue in oral cavity   Bolus Location When Swallow Triggered valleculae   Pharyngeal Phase impaired hyolaryngel excursion;residue in pyriform sinus;residue in vallecula;impaired tongue base retraction   Rosenbek's Penetration Aspiration Scale 1 - no aspiration, contrast does not enter airway   Diagnostic Statement Stasis of oral cavity, as well as valleculae and pyriforms with larger bolus sip and sequential swallows, however no aspiration or penetration.   VFSS Evaluation: Puree Solid Texture Trial   Mode of Presentation, Puree spoon   Order of  Presentation 6   Preparatory Phase WFL   Oral Phase, Puree WFL   Bolus Location When Swallow Triggered valleculae   Pharyngeal Phase, Puree impaired hyolaryngel excursion   Rosenbek's Penetration Aspiration Scale: Puree Food Trial Results 1 - no aspiration, contrast does not enter airway   VFSS Evaluation: Solid Food Texture Trial   Mode of Presentation, Solid spoon   Order of Presentation 7   Preparatory Phase WFL   Oral Phase, Solid WFL   Bolus Location When Swallow Triggered valleculae   Pharyngeal Phase, Solid impaired hyolaryngel excursion   Rosenbek's Penetration Aspiration Scale: Solid Food Trial Results 1 - no aspiration, contrast does not enter airway   Esophageal Phase of Swallow   Patient reports or presents with symptoms of esophageal dysphagia No   Swallowing Recommendations   Diet Consistency Recommendations mildly thick liquids (level 2);regular diet   Mode of Delivery Recommendations bolus size, small;no straws;slow rate of intake   Recommended Feeding/Eating Techniques (Swallow Eval) maintain upright sitting position for eating   Medication Administration Recommendations, Swallowing (SLP) Whole or crushed in puree   Comment, Swallowing Recommendations Addie aspiration of thin liquid followed by reflexive cough. Risk reduced with Mildly thick liquids and use of small sip strategy.   General Therapy Interventions   Planned Therapy Interventions Dysphagia Treatment   Dysphagia treatment Modified diet education;Compensatory strategies for swallowing   Clinical Impression   Criteria for Skilled Therapeutic Interventions Met (SLP Eval) Yes, treatment indicated   SLP Diagnosis Dysphagia   Risks & Benefits of therapy have been explained evaluation/treatment results reviewed;care plan/treatment goals reviewed;patient;participants voiced agreement with care plan;son   Clinical Impression Comments Videofluoroscopic Swallow Study completed. Patient had addie aspiration with thin liquid via spoon and followed  with reflexive cough.  No penetration with any trial/texture. Oral stasis following large bolus sips, otherwise oral phase is functional. Tongue base retraction was reduced with mildly thick textures, again after consuming large quanity. Swallow response was delayed most commonly initiating at the vallecuale. Epiglottic inversion was complete. Mild-mod stasis occurred with Mildly thick liquids with large bolus size and sequential sips. Puree and solid texure cleared stasis. Hyolaryngeal elevation and excursion was reduced.  Mastication WFL. Cricopharyngeus WFL. Recommend diet of Mildly thick liquids (IDDSI 2) and Regular (IDDSI 7) with strategies small bites/sips, slow rate, NO straws.  (Simultaneous filing. User may not have seen previous data.)   SLP Total Evaluation Time   Evaluation, videofluoroscopic eval of swallow function Minutes (27281) 15   SLP Discharge Planning   SLP Plan See pt in AM (DC PM); thickened liquid education; small sip training   SLP Discharge Recommendation other (see comments)  (Per treatment team)   SLP Rationale for DC Rec Altered diet, thickened liquids  (Simultaneous filing. User may not have seen previous data.)   SLP Brief overview of current status  VFSS completed 3/3/24 with addie aspiration of thin liquid followed by immediate reflexive cough. Pt on Mildly thick liquid and Regular diet; NO straws, small sips/bites. Ongoing ST warranted for possible liquid advancement.   Total Session Time   Total Session Time (sum of timed and untimed services) 15

## 2024-03-03 NOTE — PLAN OF CARE
Goal Outcome Evaluation:      Problem: Adult Inpatient Plan of Care  Goal: Optimal Comfort and Wellbeing  Outcome: Progressing     Problem: Risk for Delirium  Goal: Optimal Coping  Outcome: Progressing  Intervention: Optimize Psychosocial Adjustment to Delirium  Recent Flowsheet Documentation  Taken 3/3/2024 0820 by Alessandra Scott RN  Supportive Measures: active listening utilized     Problem: Oral Intake Inadequate  Goal: Improved Oral Intake  Outcome: Progressing     Patient is pleasant, cooperative, much more alert and mentally clear today.  Alert to self, place, and time.  Denies pain.  Up with assist of 1 and walker.  Lozano intact and patent.  Speech eval completed and changed to mildly thickened liquids.  No coughing with this changed.  BP improving.  Continue plan of care.

## 2024-03-03 NOTE — PLAN OF CARE
Problem: Fall Injury Risk  Goal: Absence of Fall and Fall-Related Injury  Outcome: Progressing  Intervention: Identify and Manage Contributors  Recent Flowsheet Documentation  Taken 3/3/2024 0056 by Denisha Camacho RN  Medication Review/Management: medications reviewed  Intervention: Promote Injury-Free Environment  Recent Flowsheet Documentation  Taken 3/3/2024 0056 by Denisha Camacho RN  Safety Promotion/Fall Prevention:   activity supervised   assistive device/personal items within reach   bedside attendant   clutter free environment maintained   lighting adjusted   nonskid shoes/slippers when out of bed   room near nurse's station   safety round/check completed   supervised activity     Problem: Fall Injury Risk  Goal: Absence of Fall and Fall-Related Injury  Outcome: Progressing  Intervention: Identify and Manage Contributors  Recent Flowsheet Documentation  Taken 3/3/2024 0056 by Denisha Camacho RN  Medication Review/Management: medications reviewed  Intervention: Promote Injury-Free Environment  Recent Flowsheet Documentation  Taken 3/3/2024 0056 by Denisha Camacho RN  Safety Promotion/Fall Prevention:   activity supervised   assistive device/personal items within reach   bedside attendant   clutter free environment maintained   lighting adjusted   nonskid shoes/slippers when out of bed   room near nurse's station   safety round/check completed   supervised activity     Problem: Fall Injury Risk  Goal: Absence of Fall and Fall-Related Injury  Outcome: Progressing  Intervention: Identify and Manage Contributors  Recent Flowsheet Documentation  Taken 3/3/2024 0056 by Denisha Camacho RN  Medication Review/Management: medications reviewed  Intervention: Promote Injury-Free Environment  Recent Flowsheet Documentation  Taken 3/3/2024 0056 by Denisha Camacho RN  Safety Promotion/Fall Prevention:   activity supervised   assistive device/personal items within reach   bedside attendant   clutter free  environment maintained   lighting adjusted   nonskid shoes/slippers when out of bed   room near nurse's station   safety round/check completed   supervised activity   Goal Outcome Evaluation:               Pt alert and oriented to self,had a quiet night,VS stable,no s/s of pain noted,1:1 continued for safety.

## 2024-03-03 NOTE — PROGRESS NOTES
[facility]    Hospitalist Progress Note    Date of Service (when I saw the patient): 03/03/2024    Assessment & Plan   Dilip Long is a pleasant 88 year old gentleman admitted on 2/29/2024 for encephalopathy.  Current problems include:     Acute toxic and metabolic encephalopathy; much improved  Elevated TSH; free T4 normal; likely euthyroid sick  Hypothyroidism, by history  - per prior: granddaughter (Shruthi) 2/29 reported Dilip is normally conversational and alert and oriented without many issues  - CT head w/o contrast negative for acute findings   - DDx includes infection, hypertensive emergency and question other etiology, including ? PRES which would not be seen on CT.   - considering MRI as soon as able (if/when he is less restless)    - appreciate Neurology consult  - continue current synthroid dose  - trend troponins  -> PT/OT eval's  -> increase time OOB and taking steps, if able    Per Neurology:     MRI if able  Electroencephalogram ordered - done today, 3/1  TSH  high at 14.40, T4 normal. Check T3, TPO Ab - ordered  Check B1, B6, B12, MMA, Folate, Ammonia - ordered  ID has been consulted for recurrent UTI, management of infection  Hypertensive emergency management per primary team.       Recurrent UTI  Chronic indwelling Lozano catheter - per son Mark, this was placed ~ 1 year ago.  - appreciate ID consult  - follow up culture results   - kidney ultrasound done: no abscess   - continue Zosyn per ID recs   -> needs follow up with Urology after discharge     HTN emergency; pressures variable/gradually improving today.  - per prior: initially ordered nicardipine drip given symptomatology, however this can only be administered in the ICU (did not have bed at time of admission)  - continue PRN labetalol as first line (if HR tolerates) and hydralazine as a second option  - earlier BP goal per Neurology as of 2/29: -160  - attempt further BP control today, to keep SBP <  160  - continue PRN clonidine  - increase metoprolol to 25 mg BID    Falls  Physical deconditioning  - falls precautions  - PT/OT eval's when able   -> increase time OOB and taking steps, if able    Left neck dystonia; unclear duration.  Limits ability to lie comfortable and also his mobility.  - will ask Neurology to review; RN reviewed w/ Neurology staff: consider Botox after discharge  - add topical antiinflammatory Q 6 hours while awake  - review positioning options w/ OT    Oropharyngeal dysphagia  - has had coughing with thin liquids while here;  - continue thickened liquids  - appreciate Speech eval.;   - had VSS today, 3/4: see report;     CKD  - creatinine baseline 1.1-1.2  - daily BMP    Hypokalemia; corrected/stable  - continue replacement orders; recheck in 1-2 days  - 2/29 Mg normal     Chronic anemia, normocytic  - stable, no s/s of active bleed  - monitor     HFpEF  - euvolemic on exam, not in exacerbation   - not on home diuretics  - monitor volume status      A-fib; rates stable  - continue PTA atenolol   - not on home AC per med rec      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  - appreciate Vascular Surgery follow up;  -> will resume ASA 81 mg daily and add atorvastatin 40 mg daily  - BP control as above         Diet: Regular Diet Adult    DVT Prophylaxis: Enoxaparin (Lovenox) SQ  Lozano Catheter: Not present  Lines: None     Cardiac Monitoring: None  Code Status: No CPR- Do NOT Intubate    Disposition: Expected discharge in next 1-2 days      MARIA G Ewing MD, [facility]  Text Page (7am - 6pm)    Interval History   Reviewed care w/ RN: much more alert and mentally clear today.  Denies pain. Up with assist of 1 and walker. Lozano intact and patent. Speech eval completed; continuing with mildly thickened liquids. No coughing with liquids/pills today.  Appetite good.  Ongoing:  + Left-sided neck pain; has been unable to straighten his neck to vertical x ? 1 month,  or longer.  Has chronic jorge: placed ~ 1 year ago.    Data reviewed today: I reviewed all new labs and imaging results over the last 24 hours.    Physical Exam   Temp: 97.9  F (36.6  C) Temp src: Oral BP: (!) 143/63 (RN notified) Pulse: 66   Resp: 18 SpO2: 97 % O2 Device: None (Room air)    Vitals:    02/29/24 0959   Weight: 68 kg (150 lb)     Vital Signs with Ranges  Temp:  [97.3  F (36.3  C)-97.9  F (36.6  C)] 97.9  F (36.6  C)  Pulse:  [66-85] 66  Resp:  [17-18] 18  BP: (113-170)/(58-81) 143/63  SpO2:  [95 %-97 %] 97 %  I/O last 3 completed shifts:  In: 630 [P.O.:480; I.V.:150]  Out: 350 [Urine:350]    Constitutional: awake, no apparent distress; sitting in chair next to bed; leans (somewhat less) to his Left side today.  HEENT: head tilted to Left in semi-fixed position; sclerae clear; MM's moist.  No apparent skull injuries  Neck: has dystonia/muscle spasms Left lateral neck; tenderness over this area.  Is unable to straighten his neck to vertical due to stiffness/pain  Respiratory: good a/e bilaterally, no wheezing or rhonchi  Cardiovascular: regular rate and rhythm, S1, S2 noted; no m/r/g  GI: abdomen flat, + bowel sounds; soft, non-tender, non-distended  Skin/Integumen: no rashes, no cyanosis, no jaundice  Musculoskeletal: no edema  Neurologic: is hard of hearing; disoriented to date, location; knows his granddaughter's name; follows most directions well, after repeating; maintains good eye contact today;  bilateral /arm strength 5/5 and symmetric; no focal deficits      Medications      aspirin  81 mg Oral Daily    atorvastatin  40 mg Oral QPM    cyanocobalamin  1,000 mcg Sublingual Daily    enoxaparin ANTICOAGULANT  40 mg Subcutaneous Q24H    finasteride  5 mg Oral Daily    levothyroxine  75 mcg Oral Daily    methyl salicylate-menthol   Topical Q6H WA    metoprolol tartrate  25 mg Oral BID    multivitamin w/minerals  1 tablet Oral Daily    piperacillin-tazobactam  3.375 g Intravenous Q8H    sodium  chloride (PF)  3 mL Intracatheter Q8H    thiamine  100 mg Oral Daily    trolamine salicylate   Topical Q6H       Data   Recent Labs   Lab 03/03/24  0455 03/02/24  0534 03/02/24  0145 03/01/24  2055 03/01/24  0624 02/29/24  1638 02/29/24  1040   WBC  --   --   --   --  8.8  --  5.9   HGB  --   --   --   --  11.5*  --  11.8*   MCV  --   --   --   --  94  --  95     --   --   --  298  --  292   INR  --   --   --   --   --   --  1.02   NA  --  136  --   --  134*  --  136   POTASSIUM 3.9 4.0 4.2   < > 3.2*  --  3.4   CHLORIDE  --  101  --   --  97*  --  98   CO2  --  23  --   --  24  --  28   BUN  --  15.2  --   --  14.3  --  16.1   CR  --  1.31*  --   --  1.21*  --  1.22*   ANIONGAP  --  12  --   --  13  --  10   KRISTEN  --  8.1*  --   --  8.4*  --  9.0   GLC  --  96  --   --  105* 120* 111*   ALBUMIN  --   --   --   --   --   --  3.5   PROTTOTAL  --   --   --   --   --   --  6.2*   BILITOTAL  --   --   --   --   --   --  0.4   ALKPHOS  --   --   --   --   --   --  69   ALT  --   --   --   --   --   --  <5   AST  --   --   --   --   --   --  17    < > = values in this interval not displayed.
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
## 2024-03-04 ENCOUNTER — APPOINTMENT (OUTPATIENT)
Dept: SPEECH THERAPY | Facility: HOSPITAL | Age: 89
DRG: 698 | End: 2024-03-04
Payer: COMMERCIAL

## 2024-03-04 VITALS
DIASTOLIC BLOOD PRESSURE: 81 MMHG | BODY MASS INDEX: 23.02 KG/M2 | SYSTOLIC BLOOD PRESSURE: 183 MMHG | HEIGHT: 68 IN | TEMPERATURE: 97.4 F | RESPIRATION RATE: 18 BRPM | OXYGEN SATURATION: 95 % | WEIGHT: 151.9 LBS | HEART RATE: 77 BPM

## 2024-03-04 LAB
ATRIAL RATE - MUSE: 63 BPM
DIASTOLIC BLOOD PRESSURE - MUSE: NORMAL MMHG
HOLD SPECIMEN: NORMAL
INTERPRETATION ECG - MUSE: NORMAL
P AXIS - MUSE: 16 DEGREES
POTASSIUM SERPL-SCNC: 3.7 MMOL/L (ref 3.4–5.3)
PR INTERVAL - MUSE: 250 MS
QRS DURATION - MUSE: 84 MS
QT - MUSE: 406 MS
QTC - MUSE: 415 MS
R AXIS - MUSE: -22 DEGREES
SYSTOLIC BLOOD PRESSURE - MUSE: NORMAL MMHG
T AXIS - MUSE: 19 DEGREES
VENTRICULAR RATE- MUSE: 63 BPM

## 2024-03-04 PROCEDURE — 99233 SBSQ HOSP IP/OBS HIGH 50: CPT | Performed by: INTERNAL MEDICINE

## 2024-03-04 PROCEDURE — 250N000011 HC RX IP 250 OP 636: Performed by: INTERNAL MEDICINE

## 2024-03-04 PROCEDURE — 99239 HOSP IP/OBS DSCHRG MGMT >30: CPT | Performed by: INTERNAL MEDICINE

## 2024-03-04 PROCEDURE — 250N000013 HC RX MED GY IP 250 OP 250 PS 637: Performed by: PSYCHIATRY & NEUROLOGY

## 2024-03-04 PROCEDURE — 84132 ASSAY OF SERUM POTASSIUM: CPT | Performed by: INTERNAL MEDICINE

## 2024-03-04 PROCEDURE — 250N000013 HC RX MED GY IP 250 OP 250 PS 637: Performed by: INTERNAL MEDICINE

## 2024-03-04 PROCEDURE — 250N000013 HC RX MED GY IP 250 OP 250 PS 637: Performed by: STUDENT IN AN ORGANIZED HEALTH CARE EDUCATION/TRAINING PROGRAM

## 2024-03-04 PROCEDURE — 93005 ELECTROCARDIOGRAM TRACING: CPT

## 2024-03-04 PROCEDURE — 36415 COLL VENOUS BLD VENIPUNCTURE: CPT | Performed by: INTERNAL MEDICINE

## 2024-03-04 PROCEDURE — 93010 ELECTROCARDIOGRAM REPORT: CPT | Performed by: INTERNAL MEDICINE

## 2024-03-04 PROCEDURE — 92526 ORAL FUNCTION THERAPY: CPT | Mod: GN

## 2024-03-04 RX ORDER — LANOLIN ALCOHOL/MO/W.PET/CERES
100 CREAM (GRAM) TOPICAL DAILY
Qty: 30 TABLET | Refills: 1 | Status: SHIPPED | OUTPATIENT
Start: 2024-03-05

## 2024-03-04 RX ORDER — LEVOTHYROXINE SODIUM 25 UG/1
25 TABLET ORAL ONCE
Status: COMPLETED | OUTPATIENT
Start: 2024-03-04 | End: 2024-03-04

## 2024-03-04 RX ORDER — LEVOTHYROXINE SODIUM 75 UG/1
75 TABLET ORAL SEE ADMIN INSTRUCTIONS
Qty: 16 TABLET | Refills: 1 | Status: SHIPPED | OUTPATIENT
Start: 2024-03-04

## 2024-03-04 RX ORDER — PYRIDOXINE HCL (VITAMIN B6) 25 MG
25 TABLET ORAL DAILY
Status: DISCONTINUED | OUTPATIENT
Start: 2024-03-05 | End: 2024-03-04 | Stop reason: HOSPADM

## 2024-03-04 RX ORDER — PYRIDOXINE HCL (VITAMIN B6) 25 MG
25 TABLET ORAL DAILY
Qty: 30 TABLET | Refills: 1 | Status: SHIPPED | OUTPATIENT
Start: 2024-03-05 | End: 2024-06-29

## 2024-03-04 RX ORDER — TROLAMINE SALICYLATE 10 G/100G
CREAM TOPICAL EVERY 6 HOURS
Qty: 35.4 G | Refills: 0 | Status: SHIPPED | OUTPATIENT
Start: 2024-03-04 | End: 2024-06-29

## 2024-03-04 RX ORDER — LEVOTHYROXINE SODIUM 100 UG/1
100 TABLET ORAL
Status: DISCONTINUED | OUTPATIENT
Start: 2024-03-06 | End: 2024-03-04 | Stop reason: HOSPADM

## 2024-03-04 RX ORDER — METOPROLOL TARTRATE 25 MG/1
25 TABLET, FILM COATED ORAL 2 TIMES DAILY
Qty: 28 TABLET | Refills: 1 | Status: ON HOLD | OUTPATIENT
Start: 2024-03-04 | End: 2024-03-15

## 2024-03-04 RX ORDER — AMOXICILLIN 250 MG/1
500 CAPSULE ORAL EVERY 8 HOURS SCHEDULED
Status: DISCONTINUED | OUTPATIENT
Start: 2024-03-04 | End: 2024-03-04 | Stop reason: HOSPADM

## 2024-03-04 RX ORDER — LEVOTHYROXINE SODIUM 100 UG/1
100 TABLET ORAL
Qty: 12 TABLET | Refills: 1 | Status: SHIPPED | OUTPATIENT
Start: 2024-03-06

## 2024-03-04 RX ORDER — ASPIRIN 81 MG/1
81 TABLET ORAL DAILY
Qty: 30 TABLET | Refills: 1 | Status: SHIPPED | OUTPATIENT
Start: 2024-03-05

## 2024-03-04 RX ORDER — AMOXICILLIN 500 MG/1
500 CAPSULE ORAL 2 TIMES DAILY
Qty: 50 CAPSULE | Refills: 0 | Status: ON HOLD | OUTPATIENT
Start: 2024-03-04 | End: 2024-03-15

## 2024-03-04 RX ORDER — MENTHOL AND METHYL SALICYLATE 7.6; 29 G/100G; G/100G
OINTMENT TOPICAL EVERY 6 HOURS
Qty: 99.2 G | Refills: 0 | Status: SHIPPED | OUTPATIENT
Start: 2024-03-04 | End: 2024-06-29

## 2024-03-04 RX ORDER — LEVOTHYROXINE SODIUM 25 UG/1
75 TABLET ORAL
Status: DISCONTINUED | OUTPATIENT
Start: 2024-03-05 | End: 2024-03-04 | Stop reason: HOSPADM

## 2024-03-04 RX ORDER — ATORVASTATIN CALCIUM 40 MG/1
40 TABLET, FILM COATED ORAL EVERY EVENING
Qty: 30 TABLET | Refills: 1 | Status: SHIPPED | OUTPATIENT
Start: 2024-03-04 | End: 2024-06-29

## 2024-03-04 RX ADMIN — PYRIDOXINE HYDROCHLORIDE 100 MG: 100 INJECTION, SOLUTION INTRAMUSCULAR; INTRAVENOUS at 14:27

## 2024-03-04 RX ADMIN — MENTHOL AND METHYL SALICYLATE: 7.6; 29 OINTMENT TOPICAL at 08:08

## 2024-03-04 RX ADMIN — Medication: at 12:02

## 2024-03-04 RX ADMIN — THIAMINE HCL TAB 100 MG 100 MG: 100 TAB at 08:07

## 2024-03-04 RX ADMIN — Medication 1 TABLET: at 08:06

## 2024-03-04 RX ADMIN — Medication 1000 MCG: at 08:07

## 2024-03-04 RX ADMIN — AMOXICILLIN 500 MG: 250 CAPSULE ORAL at 14:07

## 2024-03-04 RX ADMIN — LEVOTHYROXINE SODIUM 25 MCG: 0.03 TABLET ORAL at 13:32

## 2024-03-04 RX ADMIN — METOPROLOL TARTRATE 25 MG: 25 TABLET, FILM COATED ORAL at 18:56

## 2024-03-04 RX ADMIN — FINASTERIDE 5 MG: 5 TABLET, FILM COATED ORAL at 08:07

## 2024-03-04 RX ADMIN — MENTHOL AND METHYL SALICYLATE: 7.6; 29 OINTMENT TOPICAL at 13:35

## 2024-03-04 RX ADMIN — LEVOTHYROXINE SODIUM 75 MCG: 75 TABLET ORAL at 08:07

## 2024-03-04 RX ADMIN — Medication: at 18:03

## 2024-03-04 RX ADMIN — ATORVASTATIN CALCIUM 40 MG: 40 TABLET, FILM COATED ORAL at 18:55

## 2024-03-04 RX ADMIN — AMOXICILLIN 500 MG: 250 CAPSULE ORAL at 18:53

## 2024-03-04 RX ADMIN — HYDRALAZINE HYDROCHLORIDE 10 MG: 20 INJECTION INTRAMUSCULAR; INTRAVENOUS at 12:02

## 2024-03-04 RX ADMIN — METOPROLOL TARTRATE 25 MG: 25 TABLET, FILM COATED ORAL at 08:07

## 2024-03-04 RX ADMIN — Medication 81 MG: at 08:07

## 2024-03-04 RX ADMIN — PIPERACILLIN AND TAZOBACTAM 3.38 G: 3; .375 INJECTION, POWDER, FOR SOLUTION INTRAVENOUS at 04:54

## 2024-03-04 RX ADMIN — ACETAMINOPHEN 650 MG: 325 TABLET ORAL at 13:32

## 2024-03-04 RX ADMIN — Medication: at 04:55

## 2024-03-04 ASSESSMENT — ACTIVITIES OF DAILY LIVING (ADL)
ADLS_ACUITY_SCORE: 52
ADLS_ACUITY_SCORE: 53
ADLS_ACUITY_SCORE: 47
ADLS_ACUITY_SCORE: 53
ADLS_ACUITY_SCORE: 47
ADLS_ACUITY_SCORE: 46
ADLS_ACUITY_SCORE: 53
ADLS_ACUITY_SCORE: 47
ADLS_ACUITY_SCORE: 46
ADLS_ACUITY_SCORE: 46
ADLS_ACUITY_SCORE: 53
ADLS_ACUITY_SCORE: 47
ADLS_ACUITY_SCORE: 53
ADLS_ACUITY_SCORE: 47
ADLS_ACUITY_SCORE: 47
ADLS_ACUITY_SCORE: 53

## 2024-03-04 NOTE — PROGRESS NOTES
[facility]    Infectious Disease Progress Note    Date of Service : 03/04/2024     Assessment & Plan   Dilip Long is a 88 year old male who was admitted on 2/29/2024.     ASSESSMENT:  Encephalopathy, hallucinations, altered mental status. Active issue.   Acute kidney injury on chronic kidney disease, improving  Abnormal CT chest, seen by vascular, no interventions at this time  Urinary tract infection likely bacteriuria.  Had Enterococcus in urine cultures in the past.  Was treated with amoxicillin with indwelling Lozano catheter  Prostate enlargement risk of prostatitis  Fall at home  Advanced age 88 years old lives at home with family members  Recent hospitalization 2/4 - 2/5/2024 for encephalopathy UTI, acute kidney injury on chronic kidney disease  Previous urine cultures    Susceptibility data from last 90 days.  Collected Specimen Info Organism Ampicillin Nitrofurantoin Vancomycin   02/04/24 Urine, Midstream Enterococcus faecalis  S  S  S           RECOMMENDATIONS:    Antibiotics follow culture results, empiric Zosyn--> switch to PO Amoxil x 4 weeks for possible prostatitis  Follow culture results-ngtd  Monitor CBC, CMP  Lozano catheter management per urology.  Due to history of prostate enlargement, bilateral diverticula, would recommend a 4-week course of oral antibiotics if possible  Neurology following  Updated patient, and patient's son at bedside  1:1 at bedside  ID will sign off. Please call with questions    Joan Sosa MD  [facility]  Answering Service: [phone]  On-Call ID provider: [phone], option: 9        Interval History   In chair. Feels ok. Family visiting at bedside.   Son at bedside  Mentation improved    Physical Exam   Temp: 97.4  F (36.3  C) Temp src: Oral BP: (!) 149/77 Pulse: 65   Resp: 18 SpO2: 97 % O2 Device: None (Room air)    Vitals:    02/29/24 0959 03/04/24 0641   Weight: 68 kg (150 lb) 68.9 kg (151 lb 14.4 oz)  "    Vital Signs with Ranges  Temp:  [97.4  F (36.3  C)-98.3  F (36.8  C)] 97.4  F (36.3  C)  Pulse:  [64-75] 65  Resp:  [16-18] 18  BP: (125-187)/(60-81) 149/77  SpO2:  [96 %-97 %] 97 %    Constitutional: Appears chronically ill. In chair  Lungs: no respiratory distress  Abdomen: non distended, nontender, Lozano  Skin: warm  Neuro: deconditioned  Psych: Oriented x 1    Medications      aspirin  81 mg Oral Daily    atorvastatin  40 mg Oral QPM    cyanocobalamin  1,000 mcg Sublingual Daily    enoxaparin ANTICOAGULANT  40 mg Subcutaneous Q24H    finasteride  5 mg Oral Daily    levothyroxine  75 mcg Oral Daily    methyl salicylate-menthol   Topical Q6H WA    metoprolol tartrate  25 mg Oral BID    multivitamin w/minerals  1 tablet Oral Daily    piperacillin-tazobactam  3.375 g Intravenous Q8H    sodium chloride (PF)  3 mL Intracatheter Q8H    thiamine  100 mg Oral Daily    trolamine salicylate   Topical Q6H       Data   All microbiology laboratory data reviewed.  Recent Labs   Lab Test 03/03/24  0455 03/01/24  0624 02/29/24  1040 02/05/24  0720   WBC  --  8.8 5.9 14.9*   HGB  --  11.5* 11.8* 11.0*   HCT  --  33.1* 34.7* 32.3*   MCV  --  94 95 95    298 292 265     Recent Labs   Lab Test 03/02/24  0534 03/01/24  0624 02/29/24  1040   CR 1.31* 1.21* 1.22*     No lab results found.  No lab results found.    Invalid input(s): \"UC\"    MICROBIOLOGY:    Reviewed    7-Day Micro Results       Collected Updated Procedure Result Status      02/29/2024 1555 03/03/2024 1902 Blood Culture Hand, Left [[id number]]   Blood from Hand, Left    Preliminary result Component Value   Culture No growth after 3 days  [P]                02/29/2024 1555 03/03/2024 1902 Blood Culture Arm, Left [[id number]]   Blood from Arm, Left    Preliminary result Component Value   Culture No growth after 3 days  [P]                02/29/2024 1035 02/29/2024 1136 Symptomatic Influenza A/B, RSV, & SARS-CoV2 PCR (COVID-19) Nasopharyngeal [[id number]]  "   Swab from Nasopharyngeal    Final result Component Value   Influenza A PCR Negative   Influenza B PCR Negative   RSV PCR Negative   SARS CoV2 PCR Negative   NEGATIVE: SARS-CoV-2 (COVID-19) RNA not detected, presumed negative.            2024 1033 2024 0958 Urine Culture [[id number]]   Urine, Lozano Catheter    Final result Component Value   Culture No Growth                        RADIOLOGY:    Reviewed  EEG Awake or Drowsy Routine    Result Date: 3/1/2024  Table formatting from the original result was not included. Images from the original result were not included. ELECTROENCEPHALOGRAM (EEG) REPORT [facility] NEUROLOGY [place] [address] [place] [address] Tel: [phone]  Fax: [phone] [email or web address] [email or web address] Tabor 1935, MRN [id number] PCP: Kolton Junior Date: 3/1/2024 Principal Diagnosis: Altered mental status History : Patient is being evaluated for altered mental status. Medication listed includes no anticonvulsant Description of the Recording:  This is a multichannel digital EEG recording done using the international 10-20 placement system. Background of the recording consists of an irregular 6-7 hz activity with an amplitude of 20-30  V.  In addition, occasionally there is  1-2 Hz activity with similar amplitude.  This diffusely slow background attenuates with alerting procedures.  Photic stimulation performed with eyes open, according to the standard protocol, minimal change.  Hyperventilation was not performed.  Sleep was not obtained. During this recording, no sharp discharges, spikes or electrographic seizure activity was recorded. Impression: This is an abnormal EEG due to diffusely slow background in theta and delta range that suggests a nonspecific generalized cerebral dysfunction.  Such slowing can be seen in metabolic or toxic abnormalities, clinical correlation is recommended.  No sharp discharges or spikes were recorded during this  recording to suggest a seizure disorder. Classification: Dysrhythmia grade II generalized                          Delta grade I generalized Joe Kearns MD [facility] NEUROLOGY, [place] This note was dictated using voice recognition software.  Any grammatical or context distortions are unintentional and inherent to the software.     US Renal Complete Non-Vascular    Result Date: 2/29/2024  EXAM: US RENAL COMPLETE NON-VASCULAR LOCATION: [facility] DATE: 2/29/2024 INDICATION: Recurrent UTI. COMPARISON: CT same-day. TECHNIQUE: Routine Bilateral Renal and Bladder Ultrasound. FINDINGS: RIGHT KIDNEY: 10 x 6.2 x 5 cm. Normal without hydronephrosis or masses. Incidental 1.4 cm cyst. LEFT KIDNEY: 10.1 x 4.2 x 4.3 cm. Normal without hydronephrosis or masses. BLADDER: Decompressed bladder with intrinsic portacatheter.     IMPRESSION: 1.  Unremarkable kidney ultrasound. 2.  Decompressed bladder with intrinsic portacatheter. 3.  Please see same-day CT abdomen - pelvis.     CTA Chest Abdomen Pelvis w Contrast    Result Date: 2/29/2024  EXAM: CTA CHEST ABDOMEN PELVIS W CONTRAST LOCATION: [facility] DATE: 2/29/2024 INDICATION: Sepsis, thoracic aorta outpouching on previous CT  follow up,sepsis COMPARISON: 2/4/2024 TECHNIQUE: CT angiogram chest abdomen pelvis during arterial phase of injection of IV contrast. 2D and 3D MIP reconstructions were performed by the CT technologist. Dose reduction techniques were used. CONTRAST: isovue 370 75ml FINDINGS: ANGIOGRAM CHEST, ABDOMEN, AND PELVIS: No evidence of intramural hematoma, dissection, or aneurysm. Mild-moderate atherosclerotic calcification throughout the aorta and iliofemoral arteries. No significant stenosis. Unchanged 1.4 x 0.5 cm outpouching from  the inferolateral aspect of the proximal descending thoracic aorta (6/48, 10/103). No stranding or hematoma. No central pulmonary embolus. LUNGS AND PLEURA: Unchanged 0.5 cm  nodule along the minor fissure since 07/27/2018 and therefore consistent with benign etiology. A 0.5 cm right lower lobe nodule is also unchanged. No new or enlarging suspicious nodules. CORONARY ARTERY CALCIFICATION: Moderate MEDIASTINUM/AXILLAE: Unremarkable. HEPATOBILIARY: Unremarkable. SPLEEN: Unremarkable. PANCREAS: Unremarkable. ADRENAL GLANDS: Unremarkable. KIDNEYS/BLADDER: A balloon tip catheter is present in the urinary bladder lumen. The bladder is underdistended with a Lozano catheter in place and bilateral diverticula. BOWEL: Left-sided colonic diverticulosis. No acute inflammation or obstruction. LYMPH NODES: Unremarkable. PELVIC ORGANS: Moderate enlargement of the prostate. MUSCULOSKELETAL: Diffuse osteopenia and multilevel degenerative changes in the spine. Healed sternal and rib fractures. Unchanged L1 compression deformity.     IMPRESSION: Unchanged small outpouching from the undersurface of the proximal descending thoracic aorta, which could represent an ulcerated plaque or short segment dissection flap. No evidence of acute aortic syndrome.    Head CT w/o contrast    Result Date: 2/29/2024  EXAM: CT HEAD W/O CONTRAST LOCATION: [facility] DATE: 2/29/2024 INDICATION: Altered mental status. COMPARISON: CT dated 02/04/2024. TECHNIQUE: Routine CT Head without IV contrast. Multiplanar reformats. Dose reduction techniques were used. FINDINGS: Mildly motion degraded exam. INTRACRANIAL CONTENTS: No acute intracranial hemorrhage, extra-axial fluid collection, or mass effect. No evidence of an acute transcortical confluent infarct. Grossly similar foci of hypoattenuation involving the bilateral cerebral white matter, nonspecific although most likely the sequela of chronic microvascular ischemia. Mild generalized cerebral parenchymal volume loss. No hydrocephalus. VISUALIZED ORBITS/SINUSES/MASTOIDS: No acute intraorbital finding. Status post functional endoscopic sinus surgery with  overall similar trace mucosal thickening scattered about the visualized paranasal sinuses without an air-fluid level. No mastoid or middle ear effusion. Cerumen within the bilateral external auditory canals. BONES/SOFT TISSUES: No acute abnormality. An incidental bilateral frontal scalp lipomas. Severe right temporal mandibular joint degenerative change.     IMPRESSION: 1.  No acute intracranial abnormality. 2.  Similar chronic changes, as described.    XR Chest Port 1 View    Result Date: 2/29/2024  EXAM: XR CHEST PORT 1 VIEW LOCATION: [facility] DATE: 2/29/2024 INDICATION: Chest Pain COMPARISON: CTA chest 02/04/2024     IMPRESSION: Patient's rotated to the left with a left head tilt as well. Lungs are clear. Heart and pulmonary vascularity are normal. No signs of pneumonia or failure. Old right posterior rib fractures again noted.    CTA Chest Abdomen Pelvis w Contrast    Result Date: 2/4/2024  EXAM: CT CHEST, ABDOMEN AND PELVIS WITH CONTRAST LOCATION: [facility] DATE/TIME: 2/4/2024 10:14 PM CST INDICATION: Unwitnessed fall. Altered mental status. COMPARISON: 07/27/2018. TECHNIQUE: Note that this study was performed in conjunction with a CT scan of the thoracolumbar spine. Refer to separate reports for findings from those studies. CT angiogram chest abdomen pelvis during arterial phase of injection of IV contrast. 2D and  3D MIP reconstructions were performed by the CT technologist. Dose reduction techniques were used. CONTRAST: 75 mL Isovue-370. FINDINGS: ANGIOGRAM CHEST, ABDOMEN, AND PELVIS: Atherosclerotic calcification in the aorta. The aorta is normal in caliber without dissection. A very small 1.4 cm wide x 0.5 cm deep apparent outpouching of the inferolateral aspect of the proximal descending thoracic aorta (for example, series 6 image 57 and series 10 image 118), not present on 07/27/2018. No stranding or hematoma visualized in the mediastinum adjacent to  this finding. No visualized pulmonary embolus. LUNGS AND PLEURA: 0.5 cm nodule in the anterior mid right lung in the region of the minor fissure (series 7 image 169), unchanged since 07/27/2018 and therefore consistent with benign etiology. A few curvilinear opacities in the periphery of the lungs likely represent scarring or atelectasis. CORONARY ARTERY CALCIFICATION: Present. MEDIASTINUM/AXILLAE: Unremarkable. HEPATOBILIARY: Unremarkable. SPLEEN: Unremarkable. PANCREAS: Unremarkable. ADRENAL GLANDS: Unremarkable. KIDNEYS/BLADDER: A balloon tip catheter is present in the urinary bladder lumen. 2 cm diverticulum from the posterior left aspect of the urinary bladder. BOWEL: Several colonic diverticula are present without evidence of diverticulitis. The appendix is not visualized. LYMPH NODES: Unremarkable. PELVIC ORGANS: Moderate enlargement of the prostate gland. MUSCULOSKELETAL: Nonacute moderate compression deformity of the L1 vertebral body. OTHER: None.     IMPRESSION: 1. A very small apparent slight focal outpouching of the proximal descending thoracic aorta. This is new since 07/27/2018, but otherwise age-indeterminate. It could represent changes due to atherosclerosis, but a very small penetrating ulcer cannot be excluded. It is unlikely to relate to acute trauma. A follow-up CT scan of the thoracic aorta is recommended in 2 weeks in order to evaluate for interval changes in this finding. 2. No other acute abnormality identified in the chest, abdomen or pelvis.    CT Thoracic Spine w/o Contrast    Result Date: 2/4/2024  EXAM: CT THORACIC SPINE W/O CONTRAST, CT LUMBAR SPINE W/O CONTRAST LOCATION: [facility] DATE: 2/4/2024 INDICATION: eval fractures (reconstructions please). Patient was found down. Potential trauma. COMPARISON: Lumbar spine CT 11/29/2022 TECHNIQUE: 1) Routine CT Thoracic Spine without IV contrast. Multiplanar reformats. Dose reduction techniques were used. 2) Routine CT  Lumbar Spine without IV contrast. Multiplanar reformats. Dose reduction techniques were used. FINDINGS: THORACIC SPINE CT: The exam is moderately degraded by motion. VERTEBRA: Mild compression forming disease at the superior endplates of T1 and T2 are age indeterminate. Subtle compression deformity at the superior endplate of T3 is age-indeterminate. Mild anterior wedging compression deformity at the superior endplate of T5 is age-indeterminate. There is an chronic anterior wedging compression deformity of T12 with up to 30-40% loss of height anteriorly. No acute subluxation. CANAL/FORAMINA: No canal or neural foraminal stenosis. PARASPINAL: No extraspinal abnormality. LUMBAR SPINE CT: VERTEBRA: 5 lumbar type vertebra. There is a chronic moderate to severe burst-type fracture of L1 with up to 60-70% loss of height anteriorly. There is slight buckling of the posterior/superior corner into the ventral canal. Vertebral body height is otherwise normal. Mild grade 1 retrolisthesis of L2. There is 7 mm degenerative anterolisthesis of L5 on S1. Multilevel advanced disc degeneration. CANAL/FORAMINA: Moderate central canal stenosis at L3-L4 and L4-L5. Mild to moderate multilevel neural foraminal narrowing. PARASPINAL: No extraspinal abnormality.     IMPRESSION: THORACIC SPINE CT: 1.  The exam is moderately degraded by motion. 2.  Age-indeterminate mild compression deformities at the superior endplates of T1, T2, T3 and T5. 3.  Chronic anterior wedging compression deformity of T12. LUMBAR SPINE CT: 1.  No acute fracture or subluxation. 2.  Chronic moderate to severe burst-type fracture of L1. 3.  Moderate central canal stenosis at L3-L4 and L4-L5.     CT Lumbar Spine w/o Contrast    Result Date: 2/4/2024  EXAM: CT THORACIC SPINE W/O CONTRAST, CT LUMBAR SPINE W/O CONTRAST LOCATION: [facility] DATE: 2/4/2024 INDICATION: eval fractures (reconstructions please). Patient was found down. Potential trauma.  COMPARISON: Lumbar spine CT 11/29/2022 TECHNIQUE: 1) Routine CT Thoracic Spine without IV contrast. Multiplanar reformats. Dose reduction techniques were used. 2) Routine CT Lumbar Spine without IV contrast. Multiplanar reformats. Dose reduction techniques were used. FINDINGS: THORACIC SPINE CT: The exam is moderately degraded by motion. VERTEBRA: Mild compression forming disease at the superior endplates of T1 and T2 are age indeterminate. Subtle compression deformity at the superior endplate of T3 is age-indeterminate. Mild anterior wedging compression deformity at the superior endplate of T5 is age-indeterminate. There is an chronic anterior wedging compression deformity of T12 with up to 30-40% loss of height anteriorly. No acute subluxation. CANAL/FORAMINA: No canal or neural foraminal stenosis. PARASPINAL: No extraspinal abnormality. LUMBAR SPINE CT: VERTEBRA: 5 lumbar type vertebra. There is a chronic moderate to severe burst-type fracture of L1 with up to 60-70% loss of height anteriorly. There is slight buckling of the posterior/superior corner into the ventral canal. Vertebral body height is otherwise normal. Mild grade 1 retrolisthesis of L2. There is 7 mm degenerative anterolisthesis of L5 on S1. Multilevel advanced disc degeneration. CANAL/FORAMINA: Moderate central canal stenosis at L3-L4 and L4-L5. Mild to moderate multilevel neural foraminal narrowing. PARASPINAL: No extraspinal abnormality.     IMPRESSION: THORACIC SPINE CT: 1.  The exam is moderately degraded by motion. 2.  Age-indeterminate mild compression deformities at the superior endplates of T1, T2, T3 and T5. 3.  Chronic anterior wedging compression deformity of T12. LUMBAR SPINE CT: 1.  No acute fracture or subluxation. 2.  Chronic moderate to severe burst-type fracture of L1. 3.  Moderate central canal stenosis at L3-L4 and L4-L5.     Head CT w/o contrast    Result Date: 2/4/2024  EXAM: CT HEAD W/O CONTRAST, CT CERVICAL SPINE W/O CONTRAST  LOCATION: [facility] DATE: 2/4/2024 INDICATION: Confusion, head and neck injury COMPARISON: CT head 11/29/2022 TECHNIQUE: 1) Routine CT Head without IV contrast. Multiplanar reformats. Dose reduction techniques were used. 2) Routine CT Cervical Spine without IV contrast. Multiplanar reformats. Dose reduction techniques were used. FINDINGS: HEAD CT: INTRACRANIAL CONTENTS: No intracranial hemorrhage, extraaxial collection, or mass effect.  No CT evidence of acute infarct. Moderate presumed chronic small vessel ischemic changes. Mild generalized volume loss. No hydrocephalus. VISUALIZED ORBITS/SINUSES/MASTOIDS: No intraorbital abnormality. No paranasal sinus mucosal disease. No middle ear or mastoid effusion. BONES/SOFT TISSUES: No acute abnormality. CERVICAL SPINE CT: VERTEBRA: Mild compression superior aspect of T1 and T2, age indeterminate; appears chronic. It is new since CT chest 07/27/2018. No definite acute fracture. Straightening of cervical lordosis. Nonaggressive appearing lytic lesion within the dens and central aspect of body of C2; probably due to a somewhat atypical hemangioma. Subcentimeter nonaggressive appearing sclerotic lesion left lamina of C2. CANAL/FORAMINA: Moderate degenerative changes without significant canal narrowing at any level. Multilevel mild-to-moderate neural foraminal narrowing. PARASPINAL: No extraspinal abnormality. Visualized lung fields are clear.     IMPRESSION: HEAD CT: 1.  No definite acute intracranial process. CERVICAL SPINE CT: 1.  Mild compression superior aspect of T1 and T2, age indeterminate; appears chronic. It is new since CT chest 07/27/2018. 2.  No definite acute fracture. 3.  Moderate degenerative changes without significant canal narrowing at any level.    CT Cervical Spine w/o Contrast    Result Date: 2/4/2024  EXAM: CT HEAD W/O CONTRAST, CT CERVICAL SPINE W/O CONTRAST LOCATION: [facility] DATE: 2/4/2024  INDICATION: Confusion, head and neck injury COMPARISON: CT head 11/29/2022 TECHNIQUE: 1) Routine CT Head without IV contrast. Multiplanar reformats. Dose reduction techniques were used. 2) Routine CT Cervical Spine without IV contrast. Multiplanar reformats. Dose reduction techniques were used. FINDINGS: HEAD CT: INTRACRANIAL CONTENTS: No intracranial hemorrhage, extraaxial collection, or mass effect.  No CT evidence of acute infarct. Moderate presumed chronic small vessel ischemic changes. Mild generalized volume loss. No hydrocephalus. VISUALIZED ORBITS/SINUSES/MASTOIDS: No intraorbital abnormality. No paranasal sinus mucosal disease. No middle ear or mastoid effusion. BONES/SOFT TISSUES: No acute abnormality. CERVICAL SPINE CT: VERTEBRA: Mild compression superior aspect of T1 and T2, age indeterminate; appears chronic. It is new since CT chest 07/27/2018. No definite acute fracture. Straightening of cervical lordosis. Nonaggressive appearing lytic lesion within the dens and central aspect of body of C2; probably due to a somewhat atypical hemangioma. Subcentimeter nonaggressive appearing sclerotic lesion left lamina of C2. CANAL/FORAMINA: Moderate degenerative changes without significant canal narrowing at any level. Multilevel mild-to-moderate neural foraminal narrowing. PARASPINAL: No extraspinal abnormality. Visualized lung fields are clear.     IMPRESSION: HEAD CT: 1.  No definite acute intracranial process. CERVICAL SPINE CT: 1.  Mild compression superior aspect of T1 and T2, age indeterminate; appears chronic. It is new since CT chest 07/27/2018. 2.  No definite acute fracture. 3.  Moderate degenerative changes without significant canal narrowing at any level.     Medical Decision Making       50 MINUTES SPENT BY ME on the date of service doing chart review, history, exam, documentation & further activities per the note.  MANAGEMENT DISCUSSED with the following over the past 24 hours: patient, son, 1:1,  nurse   NOTE(S)/MEDICAL RECORDS REVIEWED over the past 24 hours: reviewed  Tests ORDERED & REVIEWED in the past 24 hours:  - See lab/imaging results included in the data section of the note  SUPPLEMENTAL HISTORY, in addition to the patient's history, over the past 24 hours obtained from:   - son  Medical complexity over the past 24 hours:  - Decision to DE-ESCALATE CARE based on prognosis  - antibiotic management

## 2024-03-04 NOTE — PROGRESS NOTES
"CLINICAL NUTRITION SERVICES NOTE - REASSESSMENT NOTE        Nutrition Prescription     RECOMMENDATIONS FOR MDs/PROVIDERS TO ORDER:       Malnutrition Status:    Moderate in context of acute illness     Recommendations already ordered by Registered Dietitian (RD):  Encourage po intake a healthy balanced diet at home     Future/Additional Recommendations:          Pt busy when RD attempted to visited today, this morning and this afternoon.   Received consult this afternoon to please follow up to complete assessment; note: he is much more alert/improved now.  Lab work shows multiple vitamin deficiencies .    Diet: Regular diet, Mildly thick liquids  Supplements: Ensure Enlive, chocolate at lunch  Gel Plus at 2 PM  Intake:   Today: Ate a good breakfast and salad for lunch 437 calories, 9 gm protein .  Supplements on tray table - pt says he is full up and can't eat/drink any more.  Sunday, 3/3 ate 100% of 3 meals, 1274 calories, 41 gm protein  Saturday 3/2 75 % of breakfast - did not order any meals, may have been [employer]  Friday, family brought in [employer]    Pt says his son brings him pancakes and chowdhury from [employer] for breakfast at home.  RD encouraged pt to eat a variety of healthy foods.    Pt is receiving a multivitamin with minerals, Vit B 12, thiamine; and today B6 added for deficiency.    ANTHROPOMETRICS  Height: 172.7 cm (5' 8\")  IBW: 70 kg  BMI: Normal BMI  Most Current weight today, 3/4/24 68.9 kg (151 lb 14.4 oz)  Weight History: 8.9% weight loss in 7 months or less      Wt Readings from Last 10 Encounters:   02/29/24 02/09/24 68 kg (150 lb)- likely stated in ED  64.7 kg (142 lb 9.6 oz) -office   02/04/24 07/13/23 68 kg (150 lb)- ED  71.2 kg (156 lb) - office   04/19/23 68.2 kg (150 lb 5.7 oz)   04/10/23 72.6 kg (160 lb)   03/10/23 58.1 kg (128 lb)   02/04/23 58.1 kg (128 lb)   01/30/23 58.1 kg (128 lb)   01/12/23 58.1 kg (128 lb 1.6 oz)   12/22/22 70.3 kg (155 lb)   11/29/22 68 kg (150 lb) "       Dosing Weight: 70 kg     ASSESSED NUTRITION NEEDS  Estimated Energy Needs: 3335-8792 kcals/day (25 - 30 kcals/kg)  Justification: Maintenance  Estimated Protein Needs:  grams protein/day (1.2 - 1.5 grams of pro/kg)  Justification: Repletion  Estimated Fluid Needs: 2019-7690 mL/day (1 mL/kcal)   Justification: Maintenance      MALNUTRITION:  % Weight Loss:  Does not meet criteria  % Intake:  < 75% for > 7 days  Subcutaneous Fat Loss:  Buccal - mild depletion, orbital - moderate depletion, -difficult to determine if all age related  Muscle Loss:  Clavicle - severe, thoracic - moderate, calves -mild depletion, dorsal hand - mild depletion - difficult to determine if all age related  Fluid Retention:  mild edema lower extremities     Malnutrition Diagnosis: Moderate malnutrition  In Context of:  Acute illness or injury    Goals  Meet > 75% of estimated nutrition needs - progressing  Maintain weight - progressing  Electrolytes WNL - met per review of labs     Monitoring/Evaluation  Progress toward goals will be monitored and evaluated per protocol

## 2024-03-04 NOTE — UTILIZATION REVIEW
Admission Status; Secondary Review Determination   INSURANCE DENIAL  Under the authority of the Utilization Management Committee, the utilization review process indicated a secondary review on the above patient. The review outcome is based on review of the medical records, discussions with staff, and applying clinical experience noted on the date of the review.   (x) Inpatient Status Appropriate - This patient's medical care is consistent with medical management for inpatient care and reasonable inpatient medical practice.   RATIONALE FOR DETERMINATION   Mr. Long is a 87 yo male with a PMH of chronic indwelling jorge cath and recurrent UTI who presents to the ED with acute toxic /metabolic encephalopathy and new hallucinations.  Recently discharged on po abx for UTI with improved mentation for several days after hospital discharge.  Required IV zosyn q8h while UC finalized; infectious disease and neuro consults obtained.  EEG negative for seizures but B12 levels low.   At the time of admission with the information available to the attending physician more than 2 nights Hospital complex care was anticipated, based on patient risk of adverse outcome if treated as outpatient and complex care required. Inpatient admission is appropriate based on the Medicare guidelines.   The information on this document is developed by the utilization review team in order for the business office to ensure compliance. This only denotes the appropriateness of proper admission status and does not reflect the quality of care rendered.   The definitions of Inpatient Status and Observation Status used in making the determination above are those provided in the CMS Coverage Manual, Chapter 1 and Chapter 6, section 70.4.     Sincerely,     Twyla Real, DO  Utilization Review  Physician Advisor

## 2024-03-04 NOTE — PLAN OF CARE
Problem: Adult Inpatient Plan of Care  Goal: Plan of Care Review  Description: The Plan of Care Review/Shift note should be completed every shift.  The Outcome Evaluation is a brief statement about your assessment that the patient is improving, declining, or no change.  This information will be displayed automatically on your shift  note.  Outcome: Progressing   Goal Outcome Evaluation:  Pt up to chair with 1 assist. Chronic Lozano.  No c/o pain. No hallucinations. Forgetful. Tele is NSR  Mepelix to sacrum. IV antibiotic's continue.                       Anesthesia Pre Eval Note    Anesthesia ROS/Med Hx        Anesthetic Complication History:    History of postoperative nausea & vomiting    Pulmonary Review:  History of: asthma -     Neuro/Psych Review:    Positive for psychiatric history - Depression    Cardiovascular Review:    Positive for hyperlipidemia    GI/HEPATIC/RENAL Review:    Positive for GERD    End/Other Review:  Patient does not have an endo/other history        Relevant Problems   No relevant active problems       Physical Exam     Airway   Mallampati: II  TM Distance: >3 FB  Neck ROM: Full    Cardiovascular  Cardiovascular exam normal    Dental Exam  Dental exam normal    Pulmonary Exam  Pulmonary exam normal    Abdominal Exam  Abdominal exam normal    Other Findings  Risks of dental damage discussed and understood by patient      Anesthesia Plan:  Anesthesia Plan  No phone call attempted due to:  ASA Status: 2    Anesthesia Type: General    Induction: Intravenous      Post-op Pain Management: Per Surgeon      Checklist  Reviewed: Lab Results, Nursing Notes, Consultations, Patient Summary, Beta Blocker Status, DNR Status, NPO Status, Allergies, Medications, Problem list, Past Med History and Pregnancy Test Results  Consent/Risks Discussed Statement:  The proposed anesthetic plan, including its risks and benefits, have been discussed with the along with the risks and benefits of alternatives. Questions were encouraged and answered and the patient and/or representative understands and agrees to proceed.        I discussed with the patient (and/or patient's legal representative) the risks and benefits of the proposed anesthesia plan, General, which may include services performed by other anesthesia providers.    Alternative anesthesia plans, if available, were reviewed with the patient (and/or patient's legal representative). Discussion has been held with the patient (and/or patient's legal representative) regarding risks of anesthesia, which include  Nausea, Vomiting, Sore Throat and Dental Injury and emergent situations that may require change in anesthesia plan.    The patient (and/or patient's legal representative) has indicated understanding, his/her questions have been answered, and he/she wishes to proceed with the planned anesthetic.  Blood Consent    Blood Products: Not Anticipated

## 2024-03-04 NOTE — PROVIDER NOTIFICATION
Tele monitor tech called regarding new rhythm of possible sinus pause, sinus block, or nonconducted PAC's. Pt received scheduled metoprolol and PRN clonidine thus far. Pt is asymptomatic. Dr. Mullins (house officer) notified of new cardiac rhythm w/ no new orders at this time.

## 2024-03-04 NOTE — PROGRESS NOTES
Care Management Discharge Note    Discharge Date: 03/05/2024       Discharge Disposition:  Home with family and home care    Discharge Services:  RN, HHA, PT, OT, SW     Discharge DME:  per team    Discharge Transportation: family or friend will provide    Private pay costs discussed: Not applicable    Does the patient's insurance plan have a 3 day qualifying hospital stay waiver?  No    PAS Confirmation Code:  [id number]  Patient/family educated on Medicare website which has current facility and service quality ratings:  yes    Education Provided on the Discharge Plan:  yes  Persons Notified of Discharge Plans: yes  Patient/Family in Agreement with the Plan:  yes    Handoff Referral Completed: Yes    Additional Information:  SW met with pt granddaughter Shruthi, she had questions about hospice, SW provided education that pt must meet criteria for hospice. Shruthi stated pt was not getting stronger with PT previously and was back in the hospital ill again. Per Shruthi family helps with pt cares. SW offered to send home health aide to pt home, and will also send SW to assess for other needs at home. SW spoke to hospitalist, he is unsure of a qualifying diagnosis for hospice, and agrees with adding SW to home care referral.   GE spoke to [facility], they do not have a hospice agency.      BEVERLY Willett

## 2024-03-04 NOTE — PLAN OF CARE
Problem: Adult Inpatient Plan of Care  Goal: Absence of Hospital-Acquired Illness or Injury  Outcome: Progressing  Intervention: Identify and Manage Fall Risk  Recent Flowsheet Documentation  Taken 3/3/2024 1720 by Rashaun Baires RN  Safety Promotion/Fall Prevention:   activity supervised   assistive device/personal items within reach   bedside attendant   clutter free environment maintained   lighting adjusted   nonskid shoes/slippers when out of bed   patient and family education   room door open   room near nurse's station   safety round/check completed   supervised activity     Problem: Adult Inpatient Plan of Care  Goal: Optimal Comfort and Wellbeing  Outcome: Progressing     Problem: Risk for Delirium  Goal: Optimal Coping  Outcome: Progressing  Goal: Improved Behavioral Control  Outcome: Progressing  Intervention: Minimize Safety Risk  Recent Flowsheet Documentation  Taken 3/3/2024 1720 by Rashaun Baires RN  Enhanced Safety Measures:   assistive devices when indicated   pain management   review medications for side effects with activity  Goal: Improved Attention and Thought Clarity  Outcome: Progressing  Goal: Improved Sleep  Outcome: Progressing     Problem: UTI (Urinary Tract Infection)  Goal: Improved Infection Symptoms  Outcome: Progressing     Problem: Fall Injury Risk  Goal: Absence of Fall and Fall-Related Injury  Outcome: Progressing  Intervention: Promote Injury-Free Environment  Recent Flowsheet Documentation  Taken 3/3/2024 1720 by Rashaun Baires RN  Safety Promotion/Fall Prevention:   activity supervised   assistive device/personal items within reach   bedside attendant   clutter free environment maintained   lighting adjusted   nonskid shoes/slippers when out of bed   patient and family education   room door open   room near nurse's station   safety round/check completed   supervised activity     Problem: Electrolyte Imbalance  Goal: Electrolyte Balance  Outcome: Progressing     Problem: Oral  Intake Inadequate  Goal: Improved Oral Intake  Outcome: Progressing     Pt remains confused, but is more alert today. Oriented to self and location. PRN acetaminophen given for a headache, which was effective. BP has been fluctuating this shift. Pt has received scheduled metoprolol, PRN clonidine, and PRN IV hydralazine. House officer notified of possible sinus pause or nonconducted PAC per tele monitor tech; no new orders at this time. Meds given whole in applesauce. Mildly thickened liquids. Pt is an assist of 1 w/ walker during transfers to the bathroom, although pt has been refusing walker at times even w/ education.

## 2024-03-04 NOTE — DISCHARGE SUMMARY
[facility]    Discharge Summary  Hospitalist    Date of Admission:  2/29/2024  Date of Discharge:  3/4/2024  Discharging Provider:  MARIA G Ewing MD, FACP     Date of Service (when I saw the patient): 03/04/24    Discharge Diagnoses      Acute toxic and metabolic encephalopathy  Possible underlying cognitive impairment and dementia  Elevated TSH  Euthyroid sick  Hypothyroidism  Hearing impairment   Vitamin B6 and B12 deficiencies  Moderate malnutrition  Recurrent UTI  Chronic indwelling Lozano catheter  Hypertensive emergency  Falls  Physical deconditioning  Left neck dystonia  Oropharyngeal dysphagia  CKD  Hypokalemia  Chronic anemia  HFpEF  A-fib  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      History of Present Illness   Per 2/29 H & P: Dilip Long is a pleasant 88 year old gentleman who presented with increased confusion for the past several days.  History of heart failure with preserved ejection fraction, hypertension, A-fib, recurrent urinary tract infections for which he was recently admitted and discharged on amoxicillin.  Granddaughter reported Dilip seemed to be improving several days post discharge.  His chronic indwelling Lozano was exchanged in the last week.  A day or 2 after the Lozano exchange, granddaughter noted that he was more confused.  At baseline, Dilip is conversational and overall without significant cognitive impairment but does have some very mild short-term memory loss.  No recent fevers, chills, nausea or emesis.  No abdominal pain.  Had been complaining of a headache over the past week.  Has been having hallucinations as well.       Hospital Course   Dilip Long is a pleasant 88 year old gentleman admitted on 2/29/2024 for encephalopathy.  The following problems were addressed during his hospitalization:     Acute toxic and metabolic encephalopathy; much improved.  - etiology remains unclear  Possible underlying cognitive impairment and  dementia  Elevated TSH; free T4 normal; likely euthyroid sick  Hypothyroidism, by history  - synthroid dose was increased slightly, with alternate day dosing  Hearing impairment  - CT head w/o contrast was negative for acute findings   - DDx considered: infection, hypertensive emergency and question other etiology, including ? PRES which would not be seen on CT.  - per Neurology: encephalopathy possibly due to sundowning, low B12, low T3 and UTI  - EEG showed generalized slowing suggestive of encephalopathy.  No seizure activity  - unable to complete MRI   - Neurology consulted; see their notes for details.  - Dilip will need follow up with Neurology in the next 1-2 weeks  - PT/OT were involved; he made good progress and by discharge, was walking with his walker again.  - needs outpatient neuropsychology evaluation  - needs formal hearing evaluation and help with obtaining hearing aids     Vitamin B6 and B12 deficiencies  - began replacements  - follow up B1 level (pending at time of discharge) w/ PCP and/or with Neurology  - B1 supplement also begun prior to discharge    Moderate malnutrition, in context of acute illness or injury  - RD consulted, with these findings:  % Weight Loss:  Does not meet criteria  % Intake:  < 75% for > 7 days  Subcutaneous Fat Loss:  Buccal - mild depletion, orbital - moderate depletion, -difficult to determine if all age related  Muscle Loss:  Clavicle - severe, thoracic - moderate, calves -mild depletion, dorsal hand - mild depletion - difficult to determine if all age related  Fluid Retention:  mild edema lower extremities    Recurrent UTI  Chronic indwelling Lozano catheter - per son Mark, this was placed ~ 1 year ago.  - ID consulted   - kidney ultrasound done: no abscess   - treated with Zosyn per ID; converted to Amoxicillin on day of discharge   -> needs follow up with Urology after discharge     Hypertensive emergency; pressures were very high initially, and with multiple  treatments gradually improving.  - note: as Dilip's BP's lowered and then during periods when they remained stable, his cognition seemed much improved.  - required:  PRN labetalol and hydralazine  - PRN clonidine was added  - added metoprolol to 25 mg BID     Falls  Physical deconditioning  - treatments: falls precautions, PT/OT eval's  - was regaining his strength at time of discharge     Left neck dystonia; unclear duration.  Was unable to keep his head off of his Left shoulder at time of admission.  This was limiting his ability to lie comfortably in bed, and also his mobility.  - much improved at time of discharge, after 2 days of topical treatments  - follow up with Neurology (or PM & R) after discharge: have asked them to consider Botox if Sx persist  - added topical antiinflammatory Q 6 hours while awake  - needs further review of positioning options w/ OT (to facilitate maintaining his head in normal position)     Oropharyngeal dysphagia  - had coughing with thin liquids while here;  - Speech consulted and Dilip was started on thickened liquids  - had VSS 3/4: see report;     CKD  - creatinine baseline 1.1-1.2  - follow up BMP at PCP appt     Hypokalemia; corrected/stable  - began replacement orders; needs recheck in 1-2 days  - 2/29 Mg normal     Chronic anemia, normocytic  - stable, no s/s of active bleed  - needs follow up/further review in PCP clinic     HFpEF  - euvolemic on exam, not in exacerbation   - not on home diuretics  - volume status stable     A-fib; rates remained stable  - stopped PTA atenolol   - not on home AC per med rec, likely due to fall risk  - metoprolol as above     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  - Vascular Surgery was consulted;  - resumed ASA 81 mg daily and added atorvastatin 40 mg daily  - BP control improved; see Vascular Surgery notes for their specific findings/recommendations        MARIA G Ewing MD, [facility]       Significant  Results and Procedures   See below and in EHR    Pending Results   Vitamin B1    Code Status   DNR / DNI       Primary Care Physician   Kolton Junior    Physical Exam     Vitals:    02/29/24 0959 03/04/24 0641   Weight: 68 kg (150 lb) 68.9 kg (151 lb 14.4 oz)     Vital Signs with Ranges  Temp:  [97.9  F (36.6  C)-99.1  F (37.3  C)] 98  F (36.7  C)  Pulse:  [66-85] 72  Resp:  [18-22] 18  BP: (132-181)/(56-87) 148/82  SpO2:  [95 %-98 %] 95 %  No intake/output data recorded.    03/04/24 1245 97.4  F (36.3  C) 66 -- -- 133/72 18 95 % -- None (Room air)     Constitutional: awake, no apparent distress; sitting in chair next to bed; leans (somewhat less) to his Left side today.  HEENT: head tilted to Left, but less so than 3/3; sclerae clear; MM's moist.  No apparent skull injuries  Neck: has dystonia/muscle spasms Left lateral neck; tenderness over this area.  Is now able  to straighten his neck to vertical with minimal effort; needs frequent reminders to keep his neck straight and head upright.  Respiratory: good a/e bilaterally, no wheezing or rhonchi  Cardiovascular: regular rate and rhythm, S1, S2 noted; no m/r/g  GI: abdomen flat, + bowel sounds; soft, non-tender, non-distended  Skin/Integumen: no rashes, no cyanosis, no jaundice  Musculoskeletal: no edema  Neurologic: is hard of hearing; follows most directions well, after repeating; maintains good eye contact today;  bilateral /arm strength 5/5 and symmetric; no focal deficits       Discharge Disposition   Discharged to home  Condition at discharge: Stable, much improved    Consultations This Hospital Stay   INFECTIOUS DISEASES IP CONSULT  CARE MANAGEMENT / SOCIAL WORK IP CONSULT  NEUROLOGY IP CONSULT  VASCULAR SURGERY IP CONSULT  WOUND OSTOMY CONTINENCE NURSE  IP CONSULT  SPEECH LANGUAGE PATH ADULT IP CONSULT  NEUROLOGY IP CONSULT  PHYSICAL THERAPY ADULT IP CONSULT  OCCUPATIONAL THERAPY ADULT IP CONSULT  NEUROLOGY IP CONSULT  INFECTIOUS DISEASES IP  CONSULT  PHARMACY IP CONSULT  NUTRITION SERVICES ADULT IP CONSULT    Time Spent on this Encounter   I, Chip Ewing MD, personally saw the patient today and spent greater than 30 minutes discharging this patient.    Discharge Orders      Home Care Referral      Reason for your hospital stay    You were admitted for evaluation of acute confusion and multiple complex medical issues, and have made good progress.     Follow-up and recommended labs and tests     1. Follow up with primary care provider, Kolton Junior, within 7 days to evaluate medication change, to evaluate treatment change, for hospital follow- up, regarding new diagnosis, and to follow up on results.    2. The following labs/tests are recommended: BMP, Hgb 7-10 days   3. Neurology Dr. Haven Dutta (or partner) 1-2 weeks re. Encephalopathy and Left neck dystonia   4. Urology in 1-2 weeks re. Chronic jorge catheter   5. Audiology eval. Re. difficulty hearing/hearing aid assessment     Activity    Your activity upon discharge: activity as tolerated and no driving for today     Diet    Follow this diet upon discharge: Orders Placed This Encounter      Snacks/Supplements Adult: Ensure Enlive; With Meals      Snacks/Supplements Adult: Gelatein Plus; Between Meals      Regular Diet Adult Mildly Thick (level 2)     Discharge Medications   Discharge Medication List as of 3/4/2024  6:43 PM        START taking these medications    Details   aspirin 81 MG EC tablet Take 1 tablet (81 mg) by mouth daily, Disp-30 tablet, R-1, E-Prescribe      atorvastatin (LIPITOR) 40 MG tablet Take 1 tablet (40 mg) by mouth every evening, Disp-30 tablet, R-1, E-Prescribe      cyanocobalamin (VITAMIN B-12) 1000 MCG sublingual tablet Place 1 tablet (1,000 mcg) under the tongue daily, Disp-14 tablet, R-1, E-Prescribe      methyl salicylate-menthol (ICY HOT) ointment Apply topically every 6 hoursDisp-99.2 g, [id number]      metoprolol tartrate (LOPRESSOR) 25 MG  tablet Take 1 tablet (25 mg) by mouth 2 times daily, Disp-28 tablet, R-1, E-Prescribe      pyridOXINE (VITAMIN B6) 25 MG tablet Take 1 tablet (25 mg) by mouth daily, Disp-30 tablet, R-1, E-Prescribe      thiamine (B-1) 100 MG tablet Take 1 tablet (100 mg) by mouth daily, Disp-30 tablet, R-1, E-Prescribe      trolamine salicylate (ASPERCREME) 10 % external cream Apply topically every 6 hoursDisp-35.4 g, [id number]           CONTINUE these medications which have CHANGED    Details   amoxicillin (AMOXIL) 500 MG capsule Take 1 capsule (500 mg) by mouth 2 times daily for 25 days, Disp-50 capsule, R-0, E-PrescribeUTI, Prostatitis      !! levothyroxine (SYNTHROID/LEVOTHROID) 100 MCG tablet Take 1 tablet (100 mcg) by mouth Every Mon, Wed, Fri Morning, Disp-12 tablet, R-1, E-Prescribe      !! levothyroxine (SYNTHROID/LEVOTHROID) 75 MCG tablet Take 1 tablet (75 mcg) by mouth See Admin Instructions, Disp-16 tablet, R-1, E-PrescribeOnce per day on Sunday, Tuesday, Thursday, Saturday       !! - Potential duplicate medications found. Please discuss with provider.        CONTINUE these medications which have NOT CHANGED    Details   acetaminophen (TYLENOL) 500 MG tablet Take 1,000 mg by mouth every 6 hours as needed for mild pain, Historical      Calcium Carbonate-Vitamin D (OSCAL 500/200 D-3 PO) Take 1 tablet by mouth daily., 1 tablet, Oral, DAILY, Until Discontinued, Historical      finasteride (PROSCAR) 5 MG tablet Take 5 mg by mouth daily, Historical      GLUCOSAMINE-CHONDROITIN-VIT D3 PO Take 1 tablet by mouth daily, Historical      ipratropium - albuterol 0.5 mg/2.5 mg/3 mL (DUONEB) 0.5-2.5 (3) MG/3ML neb solution Take 1 vial (3 mLs) by nebulization every 6 hours as needed for shortness of breath, wheezing or cough, Disp-90 mL, R-0, Local Print      Vitamin D3 (VITAMIN D, CHOLECALCIFEROL,) 25 mcg (1000 units) tablet Take 1 tablet by mouth every evening, Historical           STOP taking these medications       atenolol  (TENORMIN) 25 MG tablet Comments:   Reason for Stopping:         traZODone (DESYREL) 50 MG tablet Comments:   Reason for Stopping:             Allergies   No Known Allergies  Data   Most Recent 3 CBC's:  Recent Labs   Lab Test 03/08/24  0719 03/06/24  0809 03/05/24  0532 03/03/24  0455 03/01/24  0624   WBC  --  6.5 8.9  --  8.8   HGB  --  10.7* 11.0*  --  11.5*   MCV  --  98 99  --  94    293 319   < > 298    < > = values in this interval not displayed.      Most Recent 3 BMP's:  Recent Labs   Lab Test 03/09/24  1252 03/09/24  0622 03/08/24  2157 03/08/24  1736 03/08/24 0719 03/07/24  0817 03/06/24  0809 03/05/24  0532   NA  --   --   --   --  139  --  139 137   POTASSIUM 3.5 3.4 3.6   < > 3.0*  --  3.0* 3.6   CHLORIDE  --   --   --   --  107  --  106 103   CO2  --   --   --   --  24  --  24 27   BUN  --   --   --   --  12.0  --  10.6 15.6   CR  --   --   --   --  1.16  --  1.00 1.23*   ANIONGAP  --   --   --   --  8  --  9 7   KRISTEN  --   --   --   --  8.2*  --  8.2* 8.9   GLC  --   --   --   --  89 96 92 95    < > = values in this interval not displayed.     Most Recent 2 LFT's:  Recent Labs   Lab Test 03/05/24  0532 02/29/24  1040   AST 20 17   ALT 6 <5   ALKPHOS 61 69   BILITOTAL 0.5 0.4     Most Recent INR's and Anticoagulation Dosing History:  Anticoagulation Dose History  More data may exist         Latest Ref Rng & Units 7/18/2019 10/21/2022 11/29/2022 12/22/2022 4/14/2023 2/4/2024 2/29/2024   Recent Dosing and Labs   INR 0.85 - 1.15 1.12  1.08  1.22  1.85  1.92  1.15  1.05  1.02      Most Recent Cholesterol Panel:  Recent Labs   Lab Test 10/23/22  0428   CHOL 153   LDL 84   HDL 57   TRIG 60     Most Recent 6 Bacteria Isolates From Any Culture (See EPIC Reports for Culture Details):No lab results found.  Most Recent TSH, T4 and A1c Labs:  Recent Labs   Lab Test 02/29/24  1257   TSH 14.40*   T4 1.23     Results for orders placed or performed during the hospital encounter of 02/29/24   XR Chest Port 1  View    Narrative    EXAM: XR CHEST PORT 1 VIEW  LOCATION: [facility]  DATE: 2/29/2024    INDICATION: Chest Pain  COMPARISON: CTA chest 02/04/2024      Impression    IMPRESSION: Patient's rotated to the left with a left head tilt as well.    Lungs are clear. Heart and pulmonary vascularity are normal. No signs of pneumonia or failure.  Old right posterior rib fractures again noted.   Head CT w/o contrast    Narrative    EXAM: CT HEAD W/O CONTRAST  LOCATION: [facility]  DATE: 2/29/2024    INDICATION: Altered mental status.  COMPARISON: CT dated 02/04/2024.  TECHNIQUE: Routine CT Head without IV contrast. Multiplanar reformats. Dose reduction techniques were used.    FINDINGS:  Mildly motion degraded exam.    INTRACRANIAL CONTENTS: No acute intracranial hemorrhage, extra-axial fluid collection, or mass effect. No evidence of an acute transcortical confluent infarct. Grossly similar foci of hypoattenuation involving the bilateral cerebral white matter,   nonspecific although most likely the sequela of chronic microvascular ischemia. Mild generalized cerebral parenchymal volume loss. No hydrocephalus.     VISUALIZED ORBITS/SINUSES/MASTOIDS: No acute intraorbital finding. Status post functional endoscopic sinus surgery with overall similar trace mucosal thickening scattered about the visualized paranasal sinuses without an air-fluid level. No mastoid or   middle ear effusion. Cerumen within the bilateral external auditory canals.    BONES/SOFT TISSUES: No acute abnormality. An incidental bilateral frontal scalp lipomas. Severe right temporal mandibular joint degenerative change.      Impression    IMPRESSION:  1.  No acute intracranial abnormality.  2.  Similar chronic changes, as described.   US Renal Complete Non-Vascular    Narrative    EXAM: US RENAL COMPLETE NON-VASCULAR  LOCATION: [facility]  DATE: 2/29/2024    INDICATION: Recurrent  UTI.  COMPARISON: CT same-day.  TECHNIQUE: Routine Bilateral Renal and Bladder Ultrasound.    FINDINGS:    RIGHT KIDNEY: 10 x 6.2 x 5 cm. Normal without hydronephrosis or masses. Incidental 1.4 cm cyst.    LEFT KIDNEY: 10.1 x 4.2 x 4.3 cm. Normal without hydronephrosis or masses.     BLADDER: Decompressed bladder with intrinsic portacatheter.      Impression    IMPRESSION:    1.  Unremarkable kidney ultrasound.    2.  Decompressed bladder with intrinsic portacatheter.    3.  Please see same-day CT abdomen - pelvis.     CTA Chest Abdomen Pelvis w Contrast    Narrative    EXAM: CTA CHEST ABDOMEN PELVIS W CONTRAST  LOCATION: [facility]  DATE: 2/29/2024    INDICATION: Sepsis, thoracic aorta outpouching on previous CT  follow up,sepsis  COMPARISON: 2/4/2024  TECHNIQUE: CT angiogram chest abdomen pelvis during arterial phase of injection of IV contrast. 2D and 3D MIP reconstructions were performed by the CT technologist. Dose reduction techniques were used.   CONTRAST: isovue 370 75ml    FINDINGS:   ANGIOGRAM CHEST, ABDOMEN, AND PELVIS: No evidence of intramural hematoma, dissection, or aneurysm. Mild-moderate atherosclerotic calcification throughout the aorta and iliofemoral arteries. No significant stenosis. Unchanged 1.4 x 0.5 cm outpouching from   the inferolateral aspect of the proximal descending thoracic aorta (6/48, 10/103). No stranding or hematoma. No central pulmonary embolus.    LUNGS AND PLEURA: Unchanged 0.5 cm nodule along the minor fissure since 07/27/2018 and therefore consistent with benign etiology. A 0.5 cm right lower lobe nodule is also unchanged. No new or enlarging suspicious nodules.    CORONARY ARTERY CALCIFICATION: Moderate    MEDIASTINUM/AXILLAE: Unremarkable.    HEPATOBILIARY: Unremarkable.    SPLEEN: Unremarkable.    PANCREAS: Unremarkable.    ADRENAL GLANDS: Unremarkable.    KIDNEYS/BLADDER: A balloon tip catheter is present in the urinary bladder lumen. The bladder  is underdistended with a Lozano catheter in place and bilateral diverticula.     BOWEL: Left-sided colonic diverticulosis. No acute inflammation or obstruction.    LYMPH NODES: Unremarkable.    PELVIC ORGANS: Moderate enlargement of the prostate.    MUSCULOSKELETAL: Diffuse osteopenia and multilevel degenerative changes in the spine. Healed sternal and rib fractures. Unchanged L1 compression deformity.      Impression    IMPRESSION:   Unchanged small outpouching from the undersurface of the proximal descending thoracic aorta, which could represent an ulcerated plaque or short segment dissection flap. No evidence of acute aortic syndrome.   XR Video Swallow with SLP or OT    Narrative    EXAM: XR VIDEO SWALLOW WITH SLP OR OT  LOCATION: [facility]  DATE: 3/3/2024    INDICATION: Difficulty swallowing.  COMPARISON: None.    TECHNIQUE: Routine swallow study with speech pathology using multiple barium thicknesses.    FINDINGS:   FLUOROSCOPIC TIME: 1.5 minutes  NUMBER OF IMAGES: 7    Swallow study with Speech Pathology using multiple barium thicknesses. Episode of aspiration with thin liquid with mildly delayed cough reflex. No other penetration or aspiration identified. Residuals were present, worse with larger quantities.

## 2024-03-04 NOTE — PLAN OF CARE
"  Problem: Adult Inpatient Plan of Care  Goal: Plan of Care Review  Description: The Plan of Care Review/Shift note should be completed every shift.  The Outcome Evaluation is a brief statement about your assessment that the patient is improving, declining, or no change.  This information will be displayed automatically on your shift  note.  Outcome: Met     Problem: Adult Inpatient Plan of Care  Goal: Patient-Specific Goal (Individualized)  Description: You can add care plan individualizations to a care plan. Examples of Individualization might be:  \"Parent requests to be called daily at 9am for status\", \"I have a hard time hearing out of my right ear\", or \"Do not touch me to wake me up as it startles  me\".  Outcome: Met     Problem: Adult Inpatient Plan of Care  Goal: Absence of Hospital-Acquired Illness or Injury  Outcome: Met     Problem: Adult Inpatient Plan of Care  Goal: Absence of Hospital-Acquired Illness or Injury  Intervention: Prevent Skin Injury  Recent Flowsheet Documentation  Taken 3/4/2024 1528 by Rashaun Alexis RN  Body Position: position changed independently     Problem: Adult Inpatient Plan of Care  Goal: Optimal Comfort and Wellbeing  Outcome: Met   Goal Outcome Evaluation:       Patient alert, able to express needs, VS stable. Patient will discharge home at 5:00 pm with belongings, family will transport.                 "

## 2024-03-04 NOTE — CONSULTS
Reconsult request:    Please follow up today re. plan for Abx after discharge; likely ready for discharge today 3/4, or tomorrow.     ID following patient. Please see notes.   Thank you

## 2024-03-05 ENCOUNTER — APPOINTMENT (OUTPATIENT)
Dept: CT IMAGING | Facility: HOSPITAL | Age: 89
DRG: 071 | End: 2024-03-05
Attending: PSYCHIATRY & NEUROLOGY
Payer: COMMERCIAL

## 2024-03-05 ENCOUNTER — HOSPITAL ENCOUNTER (INPATIENT)
Facility: HOSPITAL | Age: 89
LOS: 10 days | Discharge: HOSPICE | DRG: 071 | End: 2024-03-15
Attending: STUDENT IN AN ORGANIZED HEALTH CARE EDUCATION/TRAINING PROGRAM | Admitting: INTERNAL MEDICINE
Payer: COMMERCIAL

## 2024-03-05 DIAGNOSIS — R41.0 CONFUSION: ICD-10-CM

## 2024-03-05 DIAGNOSIS — R41.0 DELIRIUM: ICD-10-CM

## 2024-03-05 DIAGNOSIS — I10 ESSENTIAL HYPERTENSION: ICD-10-CM

## 2024-03-05 DIAGNOSIS — L24.A2 IRRITANT CONTACT DERMATITIS DUE TO INCONTINENCE OF BOTH FECES AND URINE: Primary | ICD-10-CM

## 2024-03-05 LAB
ALBUMIN SERPL BCG-MCNC: 3.5 G/DL (ref 3.5–5.2)
ALP SERPL-CCNC: 61 U/L (ref 40–150)
ALT SERPL W P-5'-P-CCNC: 6 U/L (ref 0–70)
AMPHETAMINES UR QL SCN: NORMAL
ANION GAP SERPL CALCULATED.3IONS-SCNC: 7 MMOL/L (ref 7–15)
AST SERPL W P-5'-P-CCNC: 20 U/L (ref 0–45)
BACTERIA BLD CULT: NO GROWTH
BACTERIA BLD CULT: NO GROWTH
BARBITURATES UR QL SCN: NORMAL
BASOPHILS # BLD AUTO: 0 10E3/UL (ref 0–0.2)
BASOPHILS NFR BLD AUTO: 1 %
BENZODIAZ UR QL SCN: NORMAL
BILIRUB SERPL-MCNC: 0.5 MG/DL
BUN SERPL-MCNC: 15.6 MG/DL (ref 8–23)
BZE UR QL SCN: NORMAL
CALCIUM SERPL-MCNC: 8.9 MG/DL (ref 8.8–10.2)
CANNABINOIDS UR QL SCN: NORMAL
CHLORIDE SERPL-SCNC: 103 MMOL/L (ref 98–107)
CK SERPL-CCNC: 143 U/L (ref 39–308)
CREAT SERPL-MCNC: 1.23 MG/DL (ref 0.67–1.17)
DEPRECATED HCO3 PLAS-SCNC: 27 MMOL/L (ref 22–29)
EGFRCR SERPLBLD CKD-EPI 2021: 57 ML/MIN/1.73M2
EOSINOPHIL # BLD AUTO: 0.1 10E3/UL (ref 0–0.7)
EOSINOPHIL NFR BLD AUTO: 1 %
ERYTHROCYTE [DISTWIDTH] IN BLOOD BY AUTOMATED COUNT: 12.4 % (ref 10–15)
FENTANYL UR QL: NORMAL
GLUCOSE SERPL-MCNC: 95 MG/DL (ref 70–99)
HCT VFR BLD AUTO: 33.3 % (ref 40–53)
HGB BLD-MCNC: 11 G/DL (ref 13.3–17.7)
IMM GRANULOCYTES # BLD: 0.1 10E3/UL
IMM GRANULOCYTES NFR BLD: 1 %
LYMPHOCYTES # BLD AUTO: 0.7 10E3/UL (ref 0.8–5.3)
LYMPHOCYTES NFR BLD AUTO: 8 %
MCH RBC QN AUTO: 32.5 PG (ref 26.5–33)
MCHC RBC AUTO-ENTMCNC: 33 G/DL (ref 31.5–36.5)
MCV RBC AUTO: 99 FL (ref 78–100)
MONOCYTES # BLD AUTO: 0.9 10E3/UL (ref 0–1.3)
MONOCYTES NFR BLD AUTO: 10 %
NEUTROPHILS # BLD AUTO: 7 10E3/UL (ref 1.6–8.3)
NEUTROPHILS NFR BLD AUTO: 79 %
NRBC # BLD AUTO: 0 10E3/UL
NRBC BLD AUTO-RTO: 0 /100
OPIATES UR QL SCN: NORMAL
PCP QUAL URINE (ROCHE): NORMAL
PLATELET # BLD AUTO: 319 10E3/UL (ref 150–450)
POTASSIUM SERPL-SCNC: 3.6 MMOL/L (ref 3.4–5.3)
PROT SERPL-MCNC: 6.3 G/DL (ref 6.4–8.3)
RBC # BLD AUTO: 3.38 10E6/UL (ref 4.4–5.9)
SODIUM SERPL-SCNC: 137 MMOL/L (ref 135–145)
VIT B1 PYROPHOSHATE BLD-SCNC: 111 NMOL/L
WBC # BLD AUTO: 8.9 10E3/UL (ref 4–11)

## 2024-03-05 PROCEDURE — 80053 COMPREHEN METABOLIC PANEL: CPT | Performed by: STUDENT IN AN ORGANIZED HEALTH CARE EDUCATION/TRAINING PROGRAM

## 2024-03-05 PROCEDURE — 120N000001 HC R&B MED SURG/OB

## 2024-03-05 PROCEDURE — 99285 EMERGENCY DEPT VISIT HI MDM: CPT | Mod: 25

## 2024-03-05 PROCEDURE — 36415 COLL VENOUS BLD VENIPUNCTURE: CPT | Performed by: STUDENT IN AN ORGANIZED HEALTH CARE EDUCATION/TRAINING PROGRAM

## 2024-03-05 PROCEDURE — 258N000003 HC RX IP 258 OP 636: Performed by: STUDENT IN AN ORGANIZED HEALTH CARE EDUCATION/TRAINING PROGRAM

## 2024-03-05 PROCEDURE — 70450 CT HEAD/BRAIN W/O DYE: CPT

## 2024-03-05 PROCEDURE — 250N000011 HC RX IP 250 OP 636: Performed by: STUDENT IN AN ORGANIZED HEALTH CARE EDUCATION/TRAINING PROGRAM

## 2024-03-05 PROCEDURE — 82550 ASSAY OF CK (CPK): CPT | Performed by: INTERNAL MEDICINE

## 2024-03-05 PROCEDURE — 258N000003 HC RX IP 258 OP 636: Performed by: INTERNAL MEDICINE

## 2024-03-05 PROCEDURE — 80307 DRUG TEST PRSMV CHEM ANLYZR: CPT | Performed by: STUDENT IN AN ORGANIZED HEALTH CARE EDUCATION/TRAINING PROGRAM

## 2024-03-05 PROCEDURE — 96372 THER/PROPH/DIAG INJ SC/IM: CPT | Performed by: STUDENT IN AN ORGANIZED HEALTH CARE EDUCATION/TRAINING PROGRAM

## 2024-03-05 PROCEDURE — 85025 COMPLETE CBC W/AUTO DIFF WBC: CPT | Performed by: STUDENT IN AN ORGANIZED HEALTH CARE EDUCATION/TRAINING PROGRAM

## 2024-03-05 PROCEDURE — 250N000013 HC RX MED GY IP 250 OP 250 PS 637: Performed by: INTERNAL MEDICINE

## 2024-03-05 PROCEDURE — 250N000013 HC RX MED GY IP 250 OP 250 PS 637: Performed by: STUDENT IN AN ORGANIZED HEALTH CARE EDUCATION/TRAINING PROGRAM

## 2024-03-05 PROCEDURE — 99223 1ST HOSP IP/OBS HIGH 75: CPT | Performed by: INTERNAL MEDICINE

## 2024-03-05 PROCEDURE — 99223 1ST HOSP IP/OBS HIGH 75: CPT | Performed by: PSYCHIATRY & NEUROLOGY

## 2024-03-05 RX ORDER — CALCIUM CARBONATE 500 MG/1
1000 TABLET, CHEWABLE ORAL 4 TIMES DAILY PRN
Status: DISCONTINUED | OUTPATIENT
Start: 2024-03-05 | End: 2024-03-06

## 2024-03-05 RX ORDER — ACETAMINOPHEN 325 MG/1
650 TABLET ORAL EVERY 4 HOURS PRN
Status: DISCONTINUED | OUTPATIENT
Start: 2024-03-05 | End: 2024-03-15 | Stop reason: HOSPADM

## 2024-03-05 RX ORDER — AMOXICILLIN 250 MG
1 CAPSULE ORAL 2 TIMES DAILY
Status: DISCONTINUED | OUTPATIENT
Start: 2024-03-05 | End: 2024-03-15 | Stop reason: HOSPADM

## 2024-03-05 RX ORDER — IPRATROPIUM BROMIDE AND ALBUTEROL SULFATE 2.5; .5 MG/3ML; MG/3ML
1 SOLUTION RESPIRATORY (INHALATION) EVERY 6 HOURS PRN
Status: DISCONTINUED | OUTPATIENT
Start: 2024-03-05 | End: 2024-03-15 | Stop reason: HOSPADM

## 2024-03-05 RX ORDER — ENOXAPARIN SODIUM 100 MG/ML
40 INJECTION SUBCUTANEOUS EVERY 24 HOURS
Status: DISCONTINUED | OUTPATIENT
Start: 2024-03-05 | End: 2024-03-15 | Stop reason: HOSPADM

## 2024-03-05 RX ORDER — CALCIUM CARBONATE 500 MG/1
1000 TABLET, CHEWABLE ORAL 4 TIMES DAILY PRN
Status: DISCONTINUED | OUTPATIENT
Start: 2024-03-05 | End: 2024-03-15 | Stop reason: HOSPADM

## 2024-03-05 RX ORDER — CLONIDINE HYDROCHLORIDE 0.1 MG/1
0.1 TABLET ORAL EVERY 6 HOURS PRN
Status: DISCONTINUED | OUTPATIENT
Start: 2024-03-05 | End: 2024-03-15 | Stop reason: HOSPADM

## 2024-03-05 RX ORDER — AMOXICILLIN 250 MG/1
500 CAPSULE ORAL 2 TIMES DAILY
Status: DISCONTINUED | OUTPATIENT
Start: 2024-03-05 | End: 2024-03-11

## 2024-03-05 RX ORDER — POLYETHYLENE GLYCOL 3350 17 G/17G
17 POWDER, FOR SOLUTION ORAL 2 TIMES DAILY PRN
Status: DISCONTINUED | OUTPATIENT
Start: 2024-03-05 | End: 2024-03-15 | Stop reason: HOSPADM

## 2024-03-05 RX ORDER — SODIUM CHLORIDE 9 MG/ML
INJECTION, SOLUTION INTRAVENOUS CONTINUOUS
Status: ACTIVE | OUTPATIENT
Start: 2024-03-05 | End: 2024-03-06

## 2024-03-05 RX ORDER — AMOXICILLIN 250 MG
2 CAPSULE ORAL 2 TIMES DAILY
Status: DISCONTINUED | OUTPATIENT
Start: 2024-03-05 | End: 2024-03-05

## 2024-03-05 RX ORDER — OLANZAPINE 10 MG/2ML
2.5 INJECTION, POWDER, FOR SOLUTION INTRAMUSCULAR EVERY 6 HOURS PRN
Status: DISCONTINUED | OUTPATIENT
Start: 2024-03-05 | End: 2024-03-15 | Stop reason: HOSPADM

## 2024-03-05 RX ORDER — TROLAMINE SALICYLATE 10 G/100G
CREAM TOPICAL EVERY 6 HOURS
Status: DISCONTINUED | OUTPATIENT
Start: 2024-03-05 | End: 2024-03-15 | Stop reason: HOSPADM

## 2024-03-05 RX ORDER — LIDOCAINE 40 MG/G
CREAM TOPICAL
Status: DISCONTINUED | OUTPATIENT
Start: 2024-03-05 | End: 2024-03-15 | Stop reason: HOSPADM

## 2024-03-05 RX ORDER — AMOXICILLIN 250 MG
1 CAPSULE ORAL 2 TIMES DAILY PRN
Status: DISCONTINUED | OUTPATIENT
Start: 2024-03-05 | End: 2024-03-06

## 2024-03-05 RX ORDER — MENTHOL AND METHYL SALICYLATE 7.6; 29 G/100G; G/100G
OINTMENT TOPICAL EVERY 6 HOURS
Status: DISCONTINUED | OUTPATIENT
Start: 2024-03-05 | End: 2024-03-15 | Stop reason: HOSPADM

## 2024-03-05 RX ORDER — HYDRALAZINE HYDROCHLORIDE 20 MG/ML
10 INJECTION INTRAMUSCULAR; INTRAVENOUS EVERY 6 HOURS PRN
Status: DISCONTINUED | OUTPATIENT
Start: 2024-03-05 | End: 2024-03-08

## 2024-03-05 RX ORDER — PYRIDOXINE HCL (VITAMIN B6) 25 MG
25 TABLET ORAL DAILY
Status: DISCONTINUED | OUTPATIENT
Start: 2024-03-05 | End: 2024-03-15 | Stop reason: HOSPADM

## 2024-03-05 RX ORDER — ATORVASTATIN CALCIUM 40 MG/1
40 TABLET, FILM COATED ORAL EVERY EVENING
Status: DISCONTINUED | OUTPATIENT
Start: 2024-03-05 | End: 2024-03-15 | Stop reason: HOSPADM

## 2024-03-05 RX ORDER — AMOXICILLIN 250 MG
2 CAPSULE ORAL 2 TIMES DAILY
Status: DISCONTINUED | OUTPATIENT
Start: 2024-03-05 | End: 2024-03-15 | Stop reason: HOSPADM

## 2024-03-05 RX ORDER — SODIUM CHLORIDE 9 MG/ML
INJECTION, SOLUTION INTRAVENOUS CONTINUOUS
Status: DISCONTINUED | OUTPATIENT
Start: 2024-03-05 | End: 2024-03-07

## 2024-03-05 RX ORDER — LIDOCAINE 40 MG/G
CREAM TOPICAL
Status: DISCONTINUED | OUTPATIENT
Start: 2024-03-05 | End: 2024-03-06

## 2024-03-05 RX ORDER — METOPROLOL TARTRATE 25 MG/1
25 TABLET, FILM COATED ORAL 2 TIMES DAILY
Status: DISCONTINUED | OUTPATIENT
Start: 2024-03-05 | End: 2024-03-07

## 2024-03-05 RX ORDER — AMOXICILLIN 250 MG
1 CAPSULE ORAL 2 TIMES DAILY
Status: DISCONTINUED | OUTPATIENT
Start: 2024-03-05 | End: 2024-03-05

## 2024-03-05 RX ORDER — LEVOTHYROXINE SODIUM 75 UG/1
75 TABLET ORAL SEE ADMIN INSTRUCTIONS
Status: DISCONTINUED | OUTPATIENT
Start: 2024-03-05 | End: 2024-03-05

## 2024-03-05 RX ORDER — LEVOTHYROXINE SODIUM 100 UG/1
100 TABLET ORAL
Status: DISCONTINUED | OUTPATIENT
Start: 2024-03-06 | End: 2024-03-15 | Stop reason: HOSPADM

## 2024-03-05 RX ORDER — AMOXICILLIN 250 MG
2 CAPSULE ORAL 2 TIMES DAILY PRN
Status: DISCONTINUED | OUTPATIENT
Start: 2024-03-05 | End: 2024-03-06

## 2024-03-05 RX ORDER — LEVOTHYROXINE SODIUM 25 UG/1
75 TABLET ORAL
Status: DISCONTINUED | OUTPATIENT
Start: 2024-03-07 | End: 2024-03-15 | Stop reason: HOSPADM

## 2024-03-05 RX ORDER — AMOXICILLIN 250 MG
2 CAPSULE ORAL 2 TIMES DAILY PRN
Status: DISCONTINUED | OUTPATIENT
Start: 2024-03-05 | End: 2024-03-15 | Stop reason: HOSPADM

## 2024-03-05 RX ORDER — AMOXICILLIN 250 MG
1 CAPSULE ORAL 2 TIMES DAILY PRN
Status: DISCONTINUED | OUTPATIENT
Start: 2024-03-05 | End: 2024-03-15 | Stop reason: HOSPADM

## 2024-03-05 RX ORDER — ASPIRIN 81 MG/1
81 TABLET ORAL DAILY
Status: DISCONTINUED | OUTPATIENT
Start: 2024-03-05 | End: 2024-03-15 | Stop reason: HOSPADM

## 2024-03-05 RX ORDER — FINASTERIDE 5 MG/1
5 TABLET, FILM COATED ORAL DAILY
Status: DISCONTINUED | OUTPATIENT
Start: 2024-03-05 | End: 2024-03-15 | Stop reason: HOSPADM

## 2024-03-05 RX ORDER — ACETAMINOPHEN 650 MG/1
650 SUPPOSITORY RECTAL EVERY 4 HOURS PRN
Status: DISCONTINUED | OUTPATIENT
Start: 2024-03-05 | End: 2024-03-15 | Stop reason: HOSPADM

## 2024-03-05 RX ADMIN — SODIUM CHLORIDE: 9 INJECTION, SOLUTION INTRAVENOUS at 17:42

## 2024-03-05 RX ADMIN — Medication 1000 MCG: at 17:37

## 2024-03-05 RX ADMIN — METOPROLOL TARTRATE 25 MG: 25 TABLET, FILM COATED ORAL at 17:37

## 2024-03-05 RX ADMIN — AMOXICILLIN 500 MG: 250 CAPSULE ORAL at 17:38

## 2024-03-05 RX ADMIN — THIAMINE HCL TAB 100 MG 100 MG: 100 TAB at 17:38

## 2024-03-05 RX ADMIN — ATORVASTATIN CALCIUM 40 MG: 40 TABLET, FILM COATED ORAL at 19:53

## 2024-03-05 RX ADMIN — HYDRALAZINE HYDROCHLORIDE 10 MG: 20 INJECTION INTRAMUSCULAR; INTRAVENOUS at 08:40

## 2024-03-05 RX ADMIN — ASPIRIN 81 MG: 81 TABLET, COATED ORAL at 17:37

## 2024-03-05 RX ADMIN — MENTHOL AND METHYL SALICYLATE: 7.6; 29 OINTMENT TOPICAL at 17:58

## 2024-03-05 RX ADMIN — ENOXAPARIN SODIUM 40 MG: 40 INJECTION SUBCUTANEOUS at 09:49

## 2024-03-05 RX ADMIN — CLONIDINE HYDROCHLORIDE 0.1 MG: 0.1 TABLET ORAL at 19:52

## 2024-03-05 RX ADMIN — HYDRALAZINE HYDROCHLORIDE 10 MG: 20 INJECTION INTRAMUSCULAR; INTRAVENOUS at 18:08

## 2024-03-05 RX ADMIN — ACETAMINOPHEN 650 MG: 325 TABLET ORAL at 20:03

## 2024-03-05 RX ADMIN — Medication 1 MG: at 20:03

## 2024-03-05 RX ADMIN — OLANZAPINE 2.5 MG: 10 INJECTION, POWDER, LYOPHILIZED, FOR SOLUTION INTRAMUSCULAR at 08:38

## 2024-03-05 RX ADMIN — Medication 25 MG: at 17:38

## 2024-03-05 RX ADMIN — DOCUSATE SODIUM 50 MG AND SENNOSIDES 8.6 MG 2 TABLET: 8.6; 5 TABLET, FILM COATED ORAL at 19:54

## 2024-03-05 RX ADMIN — Medication: at 17:39

## 2024-03-05 RX ADMIN — Medication: at 22:26

## 2024-03-05 RX ADMIN — FINASTERIDE 5 MG: 5 TABLET, FILM COATED ORAL at 17:38

## 2024-03-05 RX ADMIN — SODIUM CHLORIDE: 9 INJECTION, SOLUTION INTRAVENOUS at 09:21

## 2024-03-05 ASSESSMENT — ACTIVITIES OF DAILY LIVING (ADL)
ADLS_ACUITY_SCORE: 38
DEPENDENT_IADLS:: CLEANING;COOKING;LAUNDRY;SHOPPING;MEAL PREPARATION;MEDICATION MANAGEMENT;MONEY MANAGEMENT;TRANSPORTATION;INCONTINENCE
ADLS_ACUITY_SCORE: 38
ADLS_ACUITY_SCORE: 46
ADLS_ACUITY_SCORE: 38
NUMBER_OF_TIMES_PATIENT_HAS_FALLEN_WITHIN_LAST_SIX_MONTHS: 3
ADLS_ACUITY_SCORE: 38

## 2024-03-05 ASSESSMENT — COLUMBIA-SUICIDE SEVERITY RATING SCALE - C-SSRS
1. IN THE PAST MONTH, HAVE YOU WISHED YOU WERE DEAD OR WISHED YOU COULD GO TO SLEEP AND NOT WAKE UP?: NO
6. HAVE YOU EVER DONE ANYTHING, STARTED TO DO ANYTHING, OR PREPARED TO DO ANYTHING TO END YOUR LIFE?: NO
2. HAVE YOU ACTUALLY HAD ANY THOUGHTS OF KILLING YOURSELF IN THE PAST MONTH?: NO

## 2024-03-05 NOTE — CONSULTS
NEUROLOGY CONSULTATION NOTE     Dilip Long,  1935, MRN [id number] Date: 3/5/2024     [facility]   Code status:  No CPR- Do NOT Intubate   PCP: Kolton Junior, [phone]      ASSESSMENT & PLAN   Diagnosis code: Multifactorial encephalopathy    88-year-old man returning to the hospital for increased confusion, after recent discharge    Updated head CT  Urine drug screen is negative  Recurrent UTI, management per primary team  Normalize blood pressure since this led to improvement in the past  Hypothyroidism, continue Synthroid  Continue B12 repletion, B6 repletion, thiamine  We will repeat electroencephalogram  Neurology will follow along       Chief Complaint   Patient presents with     Altered Mental Status        HISTORY OF PRESENT ILLNESS     We have been requested by Dr. Ewing to evaluate Dilip Long who is a 88 year old  male for increased confusion. I saw the patient less than a week ago for consultation regarding encephalopathy. He was seen for follow up neurology check as recently as 3/2/24, by my colleague Dr. Gonzales and then discharged home on 3/4/24, doing better at that time, more alert, oriented.  He did have some B vitamin deficiencies, and there was some concern for malnutrition during previous hospitalization.  EEG was negative for seizure activity.  T3 was low when I checked this last week.  Imaging unremarkable then.    I spoke with Dr. Ewing who cared for him then as well and he noted that when they were able to bring Dilip's blood pressure down, he seemed to do better from a cognitive standpoint.  When he came in to the hospital again this morning, again his blood pressure was elevated (SBP>200).  He was home for 12-14 hours and then came back to the hospital.  No known injury.  CK is in normal range today.       PAST MEDICAL & SURGICAL HISTORY     Medical History  Past Medical History:   Diagnosis Date     Acute heart failure with preserved ejection fraction (HFpEF)  (H) 11/10/2022     Acute prostatitis      Asymptomatic bacteriuria 10/23/2013    Noted at 10/2/13 office visit at [facility]. No treatment recommended at that time.      Congestive heart failure (H)      Essential hypertension 08/18/2015     Hypertension      Paroxysmal atrial fib -- on Eliquis 11/06/2022     Syncope      Thyroid disease      Surgical History  Past Surgical History:   Procedure Laterality Date     BLADDER SURGERY      Bladder absces removal     Blepharoplasty Upper Lid W/ Excessive Skin[  1/29/2007     CATARACT EXTRACTION       CYSTOSCOPY, FULGURATE BLEEDERS, EVACUATE CLOT(S), COMBINED N/A 4/19/2023    Procedure: CYSTOSCOPY, BLADDER BIOPSY WITH FULGURATION AND CLOT EVACUATION;  Surgeon: Edgar Laird MD;  Location: [facility]     EYE SURGERY      both eyes 2015     IR LUMBAR EPIDURAL STEROID INJECTION  4/27/2004     IR LUMBAR EPIDURAL STEROID INJECTION  5/11/2004     IR LUMBAR EPIDURAL STEROID INJECTION  11/24/2004     IR LUMBAR EPIDURAL STEROID INJECTION  11/28/2007     [place] CYSTOURETHROSCOPY W/IRRIG & EVAC CLOTS N/A 7/27/2018    Procedure: CYSTOSCOPY, CLOT EVACUATION ,URETHRAL DILATATION, DAUGHERTY CATHETER PLACEMENT;  Surgeon: Lowell Arias MD;  Location: [facility];  Service: Urology        SOCIAL HISTORY     Social History      FAMILY HISTORY     Reviewed, and family history includes No Known Problems in his father and mother.     ALLERGIES     No Known Allergies     REVIEW OF SYSTEMS     Review of systems not obtained due to patient factors.     HOME & HOSPITAL MEDICATIONS     Prior to Admission Medications  (Not in a hospital admission)      Hospital Medications    enoxaparin ANTICOAGULANT  40 mg Subcutaneous Q24H     senna-docusate  1 tablet Oral BID    Or     senna-docusate  2 tablet Oral BID     sodium chloride (PF)  3 mL Intracatheter Q8H        PHYSICAL EXAM     Vital signs  Temp:  [98.1  F (36.7  C)] 98.1  F (36.7  C)  Pulse:  [] 96  Resp:  [14-28]  15  BP: ()/() 92/69  SpO2:  [95 %-100 %] 97 %    Weight:   [unfilled]    General Physical Exam: Patient is alert and oriented x 0. Vital signs were reviewed and are documented in EMR.  Neurological Exam:  Patient is in restraints, one-to-one at bedside.  Moves all extremities.  He is moaning, words are mumbled.  Does not participate in exam.     DIAGNOSTIC STUDIES     Pertinent Radiology   Radiology Results: No new imaging to review.    Pertinent Labs   Lab Results: personally reviewed.   Recent Results (from the past 24 hour(s))   Comprehensive metabolic panel    Collection Time: 03/05/24  5:32 AM   Result Value Ref Range    Sodium 137 135 - 145 mmol/L    Potassium 3.6 3.4 - 5.3 mmol/L    Carbon Dioxide (CO2) 27 22 - 29 mmol/L    Anion Gap 7 7 - 15 mmol/L    Urea Nitrogen 15.6 8.0 - 23.0 mg/dL    Creatinine 1.23 (H) 0.67 - 1.17 mg/dL    GFR Estimate 57 (L) >60 mL/min/1.73m2    Calcium 8.9 8.8 - 10.2 mg/dL    Chloride 103 98 - 107 mmol/L    Glucose 95 70 - 99 mg/dL    Alkaline Phosphatase 61 40 - 150 U/L    AST 20 0 - 45 U/L    ALT 6 0 - 70 U/L    Protein Total 6.3 (L) 6.4 - 8.3 g/dL    Albumin 3.5 3.5 - 5.2 g/dL    Bilirubin Total 0.5 <=1.2 mg/dL   CBC with platelets and differential    Collection Time: 03/05/24  5:32 AM   Result Value Ref Range    WBC Count 8.9 4.0 - 11.0 10e3/uL    RBC Count 3.38 (L) 4.40 - 5.90 10e6/uL    Hemoglobin 11.0 (L) 13.3 - 17.7 g/dL    Hematocrit 33.3 (L) 40.0 - 53.0 %    MCV 99 78 - 100 fL    MCH 32.5 26.5 - 33.0 pg    MCHC 33.0 31.5 - 36.5 g/dL    RDW 12.4 10.0 - 15.0 %    Platelet Count 319 150 - 450 10e3/uL    % Neutrophils 79 %    % Lymphocytes 8 %    % Monocytes 10 %    % Eosinophils 1 %    % Basophils 1 %    % Immature Granulocytes 1 %    NRBCs per 100 WBC 0 <1 /100    Absolute Neutrophils 7.0 1.6 - 8.3 10e3/uL    Absolute Lymphocytes 0.7 (L) 0.8 - 5.3 10e3/uL    Absolute Monocytes 0.9 0.0 - 1.3 10e3/uL    Absolute Eosinophils 0.1 0.0 - 0.7 10e3/uL    Absolute  Basophils 0.0 0.0 - 0.2 10e3/uL    Absolute Immature Granulocytes 0.1 <=0.4 10e3/uL    Absolute NRBCs 0.0 10e3/uL   Urine Drug Screen Panel    Collection Time: 03/05/24 12:04 PM   Result Value Ref Range    Amphetamines Urine Screen Negative Screen Negative    Barbituates Urine Screen Negative Screen Negative    Benzodiazepine Urine Screen Negative Screen Negative    Cannabinoids Urine Screen Negative Screen Negative    Cocaine Urine Screen Negative Screen Negative    Fentanyl Qual Urine Screen Negative Screen Negative    Opiates Urine Screen Negative Screen Negative    PCP Urine Screen Negative Screen Negative       Total time spent for face to face visit, reviewing labs/imaging studies, counseling and coordination of care was: 1 Hour 15 Minutes. More than 50% of this time was spent on counseling and coordination of care.    Dragon software used in the dictation of this note.    Haven Dutta MD  [facility] - [address], [address]  [place] [address]  [phone]

## 2024-03-05 NOTE — ED NOTES
Spoke to grand daughter on the phone.  She states that he has had increased confusion jaskaran he left yesterday.  Pt is supposedly having hallucinations at home and crawling around on the floor.  Pt does seem to be hallucinating here as he thinks he is dropping stuff in the bed.  Granddaughter is not sure about any falls.  Granddaughter thought that there was some off on his CT before they left but she wasnt sure what.  Pt does state that he hasn't sleep much in the last couple days.  Granddaughter is also worried about his elevated blood pressure.

## 2024-03-05 NOTE — ED NOTES
Pt removed pulse ox stating that he has to go to a doctors appointment at another hospital. States that the police are coming to get him. When reapplying the pulse ox pt became violent hitting and kicking writer.

## 2024-03-05 NOTE — PLAN OF CARE
Physical Therapy Discharge Summary    Reason for therapy discharge:    Discharged to home with home therapy.    Progress towards therapy goal(s). See goals on Care Plan in [facility] electronic health record for goal details.  Goals not met.  Barriers to achieving goals:   discharge from facility.    Therapy recommendation(s):    Recommend continued therapies to improve overall strength, balance and mobility.       Myla Rosario, PT, DPT  3/5/2024

## 2024-03-05 NOTE — ED TRIAGE NOTES
Pt was admit here and on the 29th.  EMS thought that the pt had a brain bleed.  Granddaughter who is the POH decided to take the pt home AMA.  Pt is disoriented time and situation.  Pt lives with son who EMS states that he doesn't seem like the son can care for the pt.  Per EMS the pt had been crawling around the house.       Triage Assessment (Adult)       Row Name 03/05/24 0458          Triage Assessment    Airway WDL WDL        Respiratory WDL    Respiratory WDL WDL        Skin Circulation/Temperature WDL    Skin Circulation/Temperature WDL WDL        Cardiac WDL    Cardiac WDL WDL        Peripheral/Neurovascular WDL    Peripheral Neurovascular WDL WDL        Cognitive/Neuro/Behavioral WDL    Cognitive/Neuro/Behavioral WDL orientation     Orientation time;disoriented to;situation

## 2024-03-05 NOTE — ED PROVIDER NOTES
Emergency Department Encounter         FINAL IMPRESSION:  Altered mental status          ED COURSE AND MEDICAL DECISION MAKING       ED Course as of 03/05/24 0617   Tunery Mar 05, 2024   0517 88-year-old with multimedical problems, left the hospital yesterday what appears to be as a discharge patient not AMA although that is what EMS reported.  EMS also reported that he was admitted for a brain bleed however no imaging showed a brain bleed.  Unsure how he got here.  Unsure who called 911.  Apparently he is living with his son however the POA is his granddaughter who picked up from the hospital yesterday.  Arrival here he is alert and oriented x 1.  Has no complaints.  Heart and lungs normal.  Catheter site looks well.  No head neck or thoracic trauma.  Apparently stated that patient was crawling around the house however I see no signs or symptom suggesting prolonged crawling or signs of pressure injury on knees wrists or hands.     -Patient's POA who is his granddaughter now at bedside.  It sounds like family thought they initially could take care of him at home however because of his encephalopathy and dementia, unable to.  Will admit patient for placement.  Basic labs returning which are reasonable.                     Medical Decision Making  Obtained supplemental history:Supplemental history obtained?: No  Reviewed external records: External records reviewed?: No  Care impacted by chronic illness:N/A  Care significantly affected by social determinants of health:N/A  Did you consider but not order tests?: Work up considered but not performed and documented in chart, if applicable  Did you interpret images independently?: Independent interpretation of ECG and images noted in documentation, when applicable.  Consultation discussion with other provider:Did you involve another provider (consultant, , pharmacy, etc.)?: No  Admit.              EKG        Critical Care     Performed by: Timothy Garcia or    Authorized by:  Timothy [facility]  Total critical care time:  minutes  Critical care was necessary to treat or prevent imminent or life-threatening deterioration of the following conditions:   Critical care was time spent personally by me on the following activities: development of treatment plan with patient or surrogate, discussions with consultants, examination of patient, evaluation of patient's response to treatment, obtaining history from patient or surrogate, ordering and performing treatments and interventions, ordering and review of laboratory studies, ordering and review of radiographic studies, re-evaluation of patient's condition and monitoring for potential decompensation.  Critical care time was exclusive of separately billable procedures and treating other patients.'    At the conclusion of the encounter I discussed the results of all the tests and the disposition. The questions were answered. The patient or family acknowledged understanding and was agreeable with the care plan.        MEDICATIONS GIVEN IN THE EMERGENCY DEPARTMENT:  Medications - No data to display    NEW PRESCRIPTIONS STARTED AT TODAY'S ED VISIT:  New Prescriptions    No medications on file       HPI       Patient is an 88-year-old demented male who was discharged yesterday for UTI encephalopathy here with again continued encephalopathy psychosis and hallucinations.  Patient's granddaughter who is his POA stated they thought initially they can take care of him at home however last night he did not sleep was hallucinating and difficult to control.  Brought him here for admission and placement.  No other acute changes        MEDICAL HISTORY     Past Medical History:   Diagnosis Date    Acute heart failure with preserved ejection fraction (HFpEF) (H) 11/10/2022    Acute prostatitis     Asymptomatic bacteriuria 10/23/2013    Congestive heart failure (H)     Essential hypertension 08/18/2015    Hypertension     Paroxysmal atrial fib -- on Eliquis 11/06/2022     Syncope     Thyroid disease        Past Surgical History:   Procedure Laterality Date    BLADDER SURGERY      Bladder absces removal    Blepharoplasty Upper Lid W/ Excessive Skin[  1/29/2007    CATARACT EXTRACTION      CYSTOSCOPY, FULGURATE BLEEDERS, EVACUATE CLOT(S), COMBINED N/A 4/19/2023    Procedure: CYSTOSCOPY, BLADDER BIOPSY WITH FULGURATION AND CLOT EVACUATION;  Surgeon: Edgar Laird MD;  Location: [facility]    EYE SURGERY      both eyes 2015    IR LUMBAR EPIDURAL STEROID INJECTION  4/27/2004    IR LUMBAR EPIDURAL STEROID INJECTION  5/11/2004    IR LUMBAR EPIDURAL STEROID INJECTION  11/24/2004    IR LUMBAR EPIDURAL STEROID INJECTION  11/28/2007    [place] CYSTOURETHROSCOPY W/IRRIG & EVAC CLOTS N/A 7/27/2018    Procedure: CYSTOSCOPY, CLOT EVACUATION ,URETHRAL DILATATION, DAUGHERTY CATHETER PLACEMENT;  Surgeon: Lowell Arias MD;  Location: [facility];  Service: Urology       Social History     Tobacco Use    Smoking status: Former     Types: Cigarettes    Smokeless tobacco: Never    Tobacco comments:     Pt quit 40 years ago   Vaping Use    Vaping Use: Never used   Substance Use Topics    Alcohol use: Yes     Comment: < 1 drink a week    Drug use: No       acetaminophen (TYLENOL) 500 MG tablet  amoxicillin (AMOXIL) 500 MG capsule  aspirin 81 MG EC tablet  atorvastatin (LIPITOR) 40 MG tablet  Calcium Carbonate-Vitamin D (OSCAL 500/200 D-3 PO)  cyanocobalamin (VITAMIN B-12) 1000 MCG sublingual tablet  finasteride (PROSCAR) 5 MG tablet  GLUCOSAMINE-CHONDROITIN-VIT D3 PO  ipratropium - albuterol 0.5 mg/2.5 mg/3 mL (DUONEB) 0.5-2.5 (3) MG/3ML neb solution  [START ON 3/6/2024] levothyroxine (SYNTHROID/LEVOTHROID) 100 MCG tablet  levothyroxine (SYNTHROID/LEVOTHROID) 75 MCG tablet  methyl salicylate-menthol (ICY HOT) ointment  metoprolol tartrate (LOPRESSOR) 25 MG tablet  pyridOXINE (VITAMIN B6) 25 MG tablet  thiamine (B-1) 100 MG tablet  trolamine salicylate (ASPERCREME) 10 % external  cream  Vitamin D3 (VITAMIN D, CHOLECALCIFEROL,) 25 mcg (1000 units) tablet            PHYSICAL EXAM     BP (!) 210/124   Pulse 90   Temp 98.1  F (36.7  C) (Oral)   Resp 16   Wt 68.5 kg (151 lb)   SpO2 98%   BMI 22.96 kg/m        PHYSICAL EXAM:     General: Patient appears well, nontoxic, comfortable  HEENT: Moist mucous membranes,  No head trauma.    Cardiovascular: Normal rate, normal rhythm, no extremity edema.  No appreciable murmur.  Respiratory: No signs of respiratory distress, lungs are clear to auscultation bilaterally with no wheezes rhonchi or rales.  Abdominal: Soft, nontender, nondistended, no palpable masses, no guarding, no rebound  Musculoskeletal: Full range of motion of joints, no deformities appreciated.  Neurological alert, not oriented.  Grossly neurologically intact.  Psychological: Normal affect and mood.  Integument: No rashes appreciated          RESULTS       Labs Ordered and Resulted from Time of ED Arrival to Time of ED Departure   COMPREHENSIVE METABOLIC PANEL - Abnormal       Result Value    Sodium 137      Potassium 3.6      Carbon Dioxide (CO2) 27      Anion Gap 7      Urea Nitrogen 15.6      Creatinine 1.23 (*)     GFR Estimate 57 (*)     Calcium 8.9      Chloride 103      Glucose 95      Alkaline Phosphatase 61      AST 20      ALT 6      Protein Total 6.3 (*)     Albumin 3.5      Bilirubin Total 0.5     CBC WITH PLATELETS AND DIFFERENTIAL - Abnormal    WBC Count 8.9      RBC Count 3.38 (*)     Hemoglobin 11.0 (*)     Hematocrit 33.3 (*)     MCV 99      MCH 32.5      MCHC 33.0      RDW 12.4      Platelet Count 319      % Neutrophils 79      % Lymphocytes 8      % Monocytes 10      % Eosinophils 1      % Basophils 1      % Immature Granulocytes 1      NRBCs per 100 WBC 0      Absolute Neutrophils 7.0      Absolute Lymphocytes 0.7 (*)     Absolute Monocytes 0.9      Absolute Eosinophils 0.1      Absolute Basophils 0.0      Absolute Immature Granulocytes 0.1      Absolute NRBCs  0.0         No orders to display         PROCEDURES:  Procedures:  Procedures         Timothy Garcia DO  Emergency Medicine  [facility] EMERGENCY DEPARTMENT       [place], Timothy Waters DO  03/05/24 0686

## 2024-03-05 NOTE — CONSULTS
Care Management Initial Consult    General Information  Assessment completed with: Family (granddaughter Shruthi), G-dtr Shruthi  Type of CM/SW Visit: Initial Assessment    Primary Care Provider verified and updated as needed: Yes   Readmission within the last 30 days: previous discharge plan unsuccessful   Return Category: Exacerbation of disease  Reason for Consult: discharge planning  Advance Care Planning: Advance Care Planning Reviewed: no concerns identified          Communication Assessment  Patient's communication style: spoken language (English or Bilingual)             Cognitive  Cognitive/Neuro/Behavioral: .WDL except        Orientation: time, disoriented to, situation             Living Environment:   People in home: spouse  Dilip, spouse Laina  Current living Arrangements: house      Able to return to prior arrangements: no  Living Arrangement Comments: Family unable to care for patient in the home setting    Family/Social Support:  Care provided by: child(tami) (Granddaughter)  Provides care for: no one, unable/limited ability to care for self  Marital Status:   Wife, Children, Other (specify) (grandchildren)  Laina       Description of Support System: Supportive, Involved    Support Assessment: Caregiver experiencing high stress, Caregiver difficulty providing physical care/safety    Current Resources:   Patient receiving home care services: Yes (Has not seen patient yet. Set up with discharge yesterday.)  Skilled Home Care Services: Skilled Nursing, Home Health Aid, Physical Therapy, Occupational Therapy (SW)  Community Resources: DME, Home Care  Equipment currently used at home:    Supplies currently used at home: Incontinence Supplies, Nebulizer tubing, Other ([employer] cath supplies)    Employment/Financial:  Employment Status: retired        Financial Concerns:             Does the patient's insurance plan have a 3 day qualifying hospital stay waiver?  No    Lifestyle & Psychosocial  "Needs:  Social Determinants of Health     Food Insecurity: Not on file   Depression: Not at risk (3/13/2019)    PHQ-2     PHQ-2 Score: 0   Housing Stability: Not on file   Tobacco Use: Medium Risk (4/10/2023)    Patient History     Smoking Tobacco Use: Former     Smokeless Tobacco Use: Never     Passive Exposure: Not on file   Financial Resource Strain: Not on file   Alcohol Use: Not on file   Transportation Needs: Not on file   Physical Activity: Not on file   Interpersonal Safety: Not on file   Stress: Not on file   Social Connections: Not on file       Functional Status:  Prior to admission patient needed assistance:   Dependent ADLs:: Bathing, Dressing, Eating, Grooming, Incontinence, Positioning, Transfers, Wheelchair-with assist  Dependent IADLs:: Cleaning, Cooking, Laundry, Shopping, Meal Preparation, Medication Management, Money Management, Transportation, Incontinence  Assesssment of Functional Status: Not at  functional baseline    Mental Health Status:          Chemical Dependency Status:                Values/Beliefs:  Spiritual, Cultural Beliefs, Congregation Practices, Values that affect care:                 Additional Information:  Spoke with granddaughter Shruthi on the phone for initial assessment. Shruthi explains that patient was discharged from [facility] to home yesterday. Family was staying with patient and his spouse but \"it just go too much for all of them\". Per Shruthi, patient was brought back in for placement.   Shruthi does not have any SNF in mind and is overwhelmed with this situation. She would like to talk about the financial aspect as well as the process for admission to a SNF. She is unable to talk in detail at this time but will be in to visit patient later today. She is hoping that \"something changes here and he can come back home on hospice\".   CM to follow hospital progression, treatment team recommendations and assist with discharge planning.   Patient is currently a 1:1 " and in restraints for severe agitation.    Jewels Mckee RN

## 2024-03-05 NOTE — ED NOTES
"[facility] ED Handoff Report    ED Chief Complaint: altered mental status    ED Diagnosis:  (R41.0) Confusion  Comment:   Plan:        PMH:    Past Medical History:   Diagnosis Date    Acute heart failure with preserved ejection fraction (HFpEF) (H) 11/10/2022    Acute prostatitis     Asymptomatic bacteriuria 10/23/2013    Noted at 10/2/13 office visit at [facility]. No treatment recommended at that time.     Congestive heart failure (H)     Essential hypertension 08/18/2015    Hypertension     Paroxysmal atrial fib -- on Eliquis 11/06/2022    Syncope     Thyroid disease         Code Status:  No CPR- Do NOT Intubate     Falls Risk: Yes Band: Applied    Current Living Situation/Residence: lives in an assisted living facility     Elimination Status: Continent: indwelling catheter     Activity Level: Total assist/lift    Patients Preferred Language:  English     Needed: No    Vital Signs:  BP 92/69   Pulse 96   Temp 98.1  F (36.7  C) (Oral)   Resp 15   Wt 68.5 kg (151 lb)   SpO2 97%   BMI 22.96 kg/m       Cardiac Rhythm:     Pain Score: Patient is unable to quantify.    Is the Patient Confused:  Yes    Last Food or Drink: 03/05/24     Focused Assessment:  very confused, keeps calling staff \" \" and says \"don't come near me\". Disoriented x4. Was hitting staff members, now in soft restraints.    Tests Performed: Done: Labs and Imaging    Treatments Provided:  IVF @ 100ml/hr    Family Dynamics/Concerns: No    Family Updated On Visitor Policy: No    Plan of Care Communicated to Family: No    Who Was Updated about Plan of Care: granddaughter POA to come    Belongings Checklist Done and Signed by Patient: No - pt unable and no family present    Medications sent with patient:     Covid: asymptomatic     Additional Information: has 1:1, ania huog    RN: Maulik Thomas RN   3/5/2024 12:42 PM       "

## 2024-03-05 NOTE — PHARMACY-ADMISSION MEDICATION HISTORY
Pharmacist Admission Medication History    Admission medication history is complete. The information provided in this note is only as accurate as the sources available at the time of the update.    Information Source(s): Family member and Hospital records via phone, discharge summary 3/4/23, medication scribe admission medication history    Pertinent Information: patient discharged 3/4/24 afternoon - called granddaughter who reported patient did not take any medications between discharge and re-admission.    Changes made to PTA medication list:  Added: None  Deleted: None  Changed: None    Allergies reviewed with patient and updates made in EHR: yes    Medication History Completed By: Nicollette McMann, [facility] 3/5/2024 8:42 AM    Prior to Admission medications    Medication Sig Last Dose Taking? Auth Provider Long Term End Date   acetaminophen (TYLENOL) 500 MG tablet Take 1,000 mg by mouth every 6 hours as needed for mild pain 3/4/2024 at 1332 Yes Unknown, Entered By History     amoxicillin (AMOXIL) 500 MG capsule Take 1 capsule (500 mg) by mouth 2 times daily for 25 days 3/4/2024 at 1853 Yes Joan Sosa MD  3/29/24   aspirin 81 MG EC tablet Take 1 tablet (81 mg) by mouth daily 3/4/2024 at 0807 Yes Chip Ewing MD No    atorvastatin (LIPITOR) 40 MG tablet Take 1 tablet (40 mg) by mouth every evening 3/4/2024 at 1855 Yes Chip Ewing MD Yes    Calcium Carbonate-Vitamin D (OSCAL 500/200 D-3 PO) Take 1 tablet by mouth daily. Past Week Yes Reported, Patient     cyanocobalamin (VITAMIN B-12) 1000 MCG sublingual tablet Place 1 tablet (1,000 mcg) under the tongue daily 3/4/2024 at 0807 Yes Chip Ewing MD     finasteride (PROSCAR) 5 MG tablet Take 5 mg by mouth daily 3/4/2024 at 0807 Yes Unknown, Entered By History     GLUCOSAMINE-CHONDROITIN-VIT D3 PO Take 1 tablet by mouth daily Past Week Yes Unknown, Entered By History     ipratropium - albuterol 0.5 mg/2.5 mg/3 mL (DUONEB) 0.5-2.5 (3) MG/3ML neb  solution Take 1 vial (3 mLs) by nebulization every 6 hours as needed for shortness of breath, wheezing or cough  at PRN Yes Contrears Tan MD Yes    levothyroxine (SYNTHROID/LEVOTHROID) 100 MCG tablet Take 1 tablet (100 mcg) by mouth Every Mon, Wed, Fri Morning 3/4/2024 at 1332 (100 mg total) Yes Chip Ewing MD Yes    levothyroxine (SYNTHROID/LEVOTHROID) 75 MCG tablet Take 1 tablet (75 mcg) by mouth See Admin Instructions 3/4/2024 at 1332 (100 mg total) Yes Chip Ewing MD Yes    methyl salicylate-menthol (ICY HOT) ointment Apply topically every 6 hours 3/4/2024 at 1335 Yes Chip Ewing MD     metoprolol tartrate (LOPRESSOR) 25 MG tablet Take 1 tablet (25 mg) by mouth 2 times daily 3/4/2024 at 1856 Yes Chip Ewing MD Yes    pyridOXINE (VITAMIN B6) 25 MG tablet Take 1 tablet (25 mg) by mouth daily 3/4/2024 at 1427 Yes Chip Ewing MD     thiamine (B-1) 100 MG tablet Take 1 tablet (100 mg) by mouth daily 3/4/2024 at 0807 Yes Chip Ewing MD     trolamine salicylate (ASPERCREME) 10 % external cream Apply topically every 6 hours 3/4/2024 at 1803 Yes Chip Ewing MD     Vitamin D3 (VITAMIN D, CHOLECALCIFEROL,) 25 mcg (1000 units) tablet Take 1 tablet by mouth every evening Past Week Yes Unknown, Entered By History

## 2024-03-05 NOTE — ED NOTES
After reading report from the hospital, the pt was actually discharged to home and didn't leave AMA.  Pt was a 1:1 for his last visit due to confusion.  Pt did have a UTI and states that he is taking his medications.

## 2024-03-05 NOTE — ED NOTES
Bed: [place] [address]  Expected date: 3/5/24  Expected time: 4:49 AM  Means of arrival: Ambulance  Comments:  [place]. 89 yo male with confusion. Pt signed out AMA on Thursday after head bleed.

## 2024-03-05 NOTE — PLAN OF CARE
Problem: Risk for Delirium  Goal: Optimal Coping  Outcome: Progressing  Goal: Improved Behavioral Control  Outcome: Progressing  Goal: Improved Attention and Thought Clarity  Outcome: Progressing  Goal: Improved Sleep  Outcome: Progressing   Goal Outcome Evaluation:  Patient restless on stretcher, constant moving legs over the side.  Disoriented x4.  Offered sips of fluid.  Bilateral soft wrist restraints in place.  Skin intact.  1:1 sitter at bedside.

## 2024-03-06 ENCOUNTER — APPOINTMENT (OUTPATIENT)
Dept: NEUROLOGY | Facility: HOSPITAL | Age: 89
DRG: 071 | End: 2024-03-06
Attending: PSYCHIATRY & NEUROLOGY
Payer: COMMERCIAL

## 2024-03-06 LAB
ANION GAP SERPL CALCULATED.3IONS-SCNC: 9 MMOL/L (ref 7–15)
BUN SERPL-MCNC: 10.6 MG/DL (ref 8–23)
CALCIUM SERPL-MCNC: 8.2 MG/DL (ref 8.8–10.2)
CHLORIDE SERPL-SCNC: 106 MMOL/L (ref 98–107)
CREAT SERPL-MCNC: 1 MG/DL (ref 0.67–1.17)
DEPRECATED HCO3 PLAS-SCNC: 24 MMOL/L (ref 22–29)
EGFRCR SERPLBLD CKD-EPI 2021: 73 ML/MIN/1.73M2
ERYTHROCYTE [DISTWIDTH] IN BLOOD BY AUTOMATED COUNT: 12.4 % (ref 10–15)
GLUCOSE SERPL-MCNC: 92 MG/DL (ref 70–99)
HCT VFR BLD AUTO: 32.7 % (ref 40–53)
HGB BLD-MCNC: 10.7 G/DL (ref 13.3–17.7)
MCH RBC QN AUTO: 31.9 PG (ref 26.5–33)
MCHC RBC AUTO-ENTMCNC: 32.7 G/DL (ref 31.5–36.5)
MCV RBC AUTO: 98 FL (ref 78–100)
METHYLMALONATE SERPL-SCNC: 0.16 UMOL/L (ref 0–0.4)
PLATELET # BLD AUTO: 293 10E3/UL (ref 150–450)
POTASSIUM SERPL-SCNC: 3 MMOL/L (ref 3.4–5.3)
RBC # BLD AUTO: 3.35 10E6/UL (ref 4.4–5.9)
SODIUM SERPL-SCNC: 139 MMOL/L (ref 135–145)
WBC # BLD AUTO: 6.5 10E3/UL (ref 4–11)

## 2024-03-06 PROCEDURE — 36415 COLL VENOUS BLD VENIPUNCTURE: CPT | Performed by: STUDENT IN AN ORGANIZED HEALTH CARE EDUCATION/TRAINING PROGRAM

## 2024-03-06 PROCEDURE — 99233 SBSQ HOSP IP/OBS HIGH 50: CPT | Performed by: PSYCHIATRY & NEUROLOGY

## 2024-03-06 PROCEDURE — 99232 SBSQ HOSP IP/OBS MODERATE 35: CPT | Performed by: INTERNAL MEDICINE

## 2024-03-06 PROCEDURE — 85027 COMPLETE CBC AUTOMATED: CPT | Performed by: STUDENT IN AN ORGANIZED HEALTH CARE EDUCATION/TRAINING PROGRAM

## 2024-03-06 PROCEDURE — 95819 EEG AWAKE AND ASLEEP: CPT

## 2024-03-06 PROCEDURE — 80048 BASIC METABOLIC PNL TOTAL CA: CPT | Performed by: STUDENT IN AN ORGANIZED HEALTH CARE EDUCATION/TRAINING PROGRAM

## 2024-03-06 PROCEDURE — 120N000001 HC R&B MED SURG/OB

## 2024-03-06 PROCEDURE — 258N000003 HC RX IP 258 OP 636: Performed by: INTERNAL MEDICINE

## 2024-03-06 PROCEDURE — 250N000011 HC RX IP 250 OP 636

## 2024-03-06 PROCEDURE — 250N000011 HC RX IP 250 OP 636: Performed by: STUDENT IN AN ORGANIZED HEALTH CARE EDUCATION/TRAINING PROGRAM

## 2024-03-06 PROCEDURE — 95816 EEG AWAKE AND DROWSY: CPT | Mod: 26 | Performed by: PSYCHIATRY & NEUROLOGY

## 2024-03-06 PROCEDURE — 250N000013 HC RX MED GY IP 250 OP 250 PS 637: Performed by: INTERNAL MEDICINE

## 2024-03-06 PROCEDURE — 250N000013 HC RX MED GY IP 250 OP 250 PS 637: Performed by: STUDENT IN AN ORGANIZED HEALTH CARE EDUCATION/TRAINING PROGRAM

## 2024-03-06 RX ORDER — HYDRALAZINE HYDROCHLORIDE 20 MG/ML
10 INJECTION INTRAMUSCULAR; INTRAVENOUS ONCE
Status: COMPLETED | OUTPATIENT
Start: 2024-03-06 | End: 2024-03-06

## 2024-03-06 RX ORDER — AMLODIPINE BESYLATE 2.5 MG/1
2.5 TABLET ORAL 2 TIMES DAILY
Status: DISCONTINUED | OUTPATIENT
Start: 2024-03-06 | End: 2024-03-06

## 2024-03-06 RX ORDER — AMLODIPINE BESYLATE 5 MG/1
5 TABLET ORAL 2 TIMES DAILY
Status: DISCONTINUED | OUTPATIENT
Start: 2024-03-07 | End: 2024-03-15 | Stop reason: HOSPADM

## 2024-03-06 RX ADMIN — AMOXICILLIN 500 MG: 250 CAPSULE ORAL at 08:46

## 2024-03-06 RX ADMIN — Medication 1 TABLET: at 08:47

## 2024-03-06 RX ADMIN — Medication: at 16:38

## 2024-03-06 RX ADMIN — MENTHOL AND METHYL SALICYLATE: 7.6; 29 OINTMENT TOPICAL at 00:39

## 2024-03-06 RX ADMIN — LEVOTHYROXINE SODIUM 100 MCG: 100 TABLET ORAL at 08:46

## 2024-03-06 RX ADMIN — HYDRALAZINE HYDROCHLORIDE 10 MG: 20 INJECTION INTRAMUSCULAR; INTRAVENOUS at 18:37

## 2024-03-06 RX ADMIN — AMLODIPINE BESYLATE 2.5 MG: 2.5 TABLET ORAL at 16:37

## 2024-03-06 RX ADMIN — HYDRALAZINE HYDROCHLORIDE 10 MG: 20 INJECTION INTRAMUSCULAR; INTRAVENOUS at 05:32

## 2024-03-06 RX ADMIN — Medication 1 MG: at 20:22

## 2024-03-06 RX ADMIN — AMLODIPINE BESYLATE 2.5 MG: 2.5 TABLET ORAL at 20:22

## 2024-03-06 RX ADMIN — Medication 1000 MCG: at 08:47

## 2024-03-06 RX ADMIN — ASPIRIN 81 MG: 81 TABLET, COATED ORAL at 08:46

## 2024-03-06 RX ADMIN — ENOXAPARIN SODIUM 40 MG: 40 INJECTION SUBCUTANEOUS at 08:47

## 2024-03-06 RX ADMIN — THIAMINE HCL TAB 100 MG 100 MG: 100 TAB at 08:46

## 2024-03-06 RX ADMIN — METOPROLOL TARTRATE 25 MG: 25 TABLET, FILM COATED ORAL at 08:46

## 2024-03-06 RX ADMIN — HYDRALAZINE HYDROCHLORIDE 10 MG: 20 INJECTION INTRAMUSCULAR; INTRAVENOUS at 00:40

## 2024-03-06 RX ADMIN — Medication 25 MG: at 08:47

## 2024-03-06 RX ADMIN — ATORVASTATIN CALCIUM 40 MG: 40 TABLET, FILM COATED ORAL at 20:22

## 2024-03-06 RX ADMIN — AMOXICILLIN 500 MG: 250 CAPSULE ORAL at 21:05

## 2024-03-06 RX ADMIN — METOPROLOL TARTRATE 25 MG: 25 TABLET, FILM COATED ORAL at 20:22

## 2024-03-06 RX ADMIN — Medication 25 MG: at 22:06

## 2024-03-06 RX ADMIN — FINASTERIDE 5 MG: 5 TABLET, FILM COATED ORAL at 08:47

## 2024-03-06 RX ADMIN — CLONIDINE HYDROCHLORIDE 0.1 MG: 0.1 TABLET ORAL at 02:36

## 2024-03-06 RX ADMIN — SODIUM CHLORIDE: 9 INJECTION, SOLUTION INTRAVENOUS at 08:48

## 2024-03-06 ASSESSMENT — ACTIVITIES OF DAILY LIVING (ADL)
ADLS_ACUITY_SCORE: 54
ADLS_ACUITY_SCORE: 48
ADLS_ACUITY_SCORE: 48
ADLS_ACUITY_SCORE: 54
ADLS_ACUITY_SCORE: 46
ADLS_ACUITY_SCORE: 54
ADLS_ACUITY_SCORE: 54
ADLS_ACUITY_SCORE: 48
ADLS_ACUITY_SCORE: 54
ADLS_ACUITY_SCORE: 48
ADLS_ACUITY_SCORE: 46
ADLS_ACUITY_SCORE: 54
ADLS_ACUITY_SCORE: 54
ADLS_ACUITY_SCORE: 48
ADLS_ACUITY_SCORE: 48
ADLS_ACUITY_SCORE: 54
ADLS_ACUITY_SCORE: 48
ADLS_ACUITY_SCORE: 54
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
## 2024-03-06 NOTE — PROGRESS NOTES
Care Management Follow Up    Length of Stay (days): 1    Expected Discharge Date: 03/07/2024     Concerns to be Addressed: 1:1, AMS, placement, neuro following  Patient plan of care discussed at interdisciplinary rounds: Yes    Anticipated Discharge Disposition:  TBD     Anticipated Discharge Services:    Anticipated Discharge DME:      Patient/family educated on Medicare website which has current facility and service quality ratings:    Education Provided on the Discharge Plan:    Patient/Family in Agreement with the Plan:      Referrals Placed by CM/SW:  none at this time pt on a 1:1  Private pay costs discussed: Not applicable    Additional Information:  Chart reviewed:  Pt recently here and left AMA with family. Per granddaughter Shruthi, they took pt home 3/4/24 and family was staying with him and his spouse, but it became too much for them and pt was brought back for placement.    CM will speak to family about placement/TCU choices    RNCM to follow for medical progression, recommendations, and final discharge plan.      Niurka Mackenzie RN

## 2024-03-06 NOTE — PROGRESS NOTES
Bedside EEG was performed. Wake, drowsy were obtained.   Activations completed were: (eyes open/eyes closed, mental activations, photic)   Activations omitted & reasoning:Hyperventilations due to patient sate  Patient's mental status was: (confused, sedated,uncooperative)  Was the neurologist consulted regarding significant waveforms or interventions needed? n/a  Skin intact? yes     EEG #   EEG system used: [id number]    Neurologist dictation to follow.

## 2024-03-06 NOTE — PROGRESS NOTES
Prior to patient arriving on P1, this RN contacted Attending Dr Ewing to clarify soft restraint order.  Order placed prior to pt's arrival on P1 and patient arrived on P1 at approximately 1725.  Telemetry and IVF started, 1:1 RN was ready to receive pt.  Attending Dr Ewing visited pt on P1 and discussed patient's current elevated BP with this RN and 1:1 RN. PRN hydralazine 10mg IV given after consulting attending.  Pt's restraints were assessed, all restraint / pt condition documentation and medication administration(s) done by 1:1 RN.  Pt is in bed, has eaten a full meal and hydrated well.  IVF NS running. Pt's substantial confusion remains a constant.  NOC RN will follow up with pharmacy regarding metoprolol as it was given at 1737 and due again at 2000 (it was not given in ED).   Ayana Morales RN

## 2024-03-06 NOTE — PROGRESS NOTES
[facility]    Hospitalist Progress Note    Date of Service (when I saw the patient): 03/06/2024    Assessment & Plan   Dilip Long is a pleasant 88 year old gentleman who was discharged yesterday, 3/4, after being admitted 2/29-3/4 for acute confusional state/encephalopathy and other medical issues.  Per the ED provider earlier today, while at home for a short time, Dilip developed psychosis and hallucinations.  His granddaughter Shruthi (his POA) stated they thought initially they could take care of him at home.  Last night, however, he did not sleep, was hallucinating and became difficult to control.  In the ED, he became combative and apparently tried to strike one of the nursing staff; restraints were started.  No other acute changes.  Current problems include:     Recurrent acute confusion and agitation  Recent acute toxic and metabolic encephalopathy  Recent elevated TSH; free T4 normal; likely euthyroid sick  Hypothyroidism, by history  - admit to Medicine; inpatient given his current need for close monitoring and work-up and anticipating > 2 nights in hospital  - per prior admission, granddaughter (Shruthi) 2/29 reported Dilip is normally conversational and alert and oriented without many issues  - 2/29 CT head w/o contrast negative for acute findings   - Neurology consult; reviewed abrupt changes w/ Dr. Gali Dutta today  - continue current synthroid dose  - PT/OT eval's when able  - restraints renewed  - Care Mgr eval.3/6 if able  - fall precautions  - continue 1:1 sitter until he is more calm  - low dose PRN seroquel     Vitamin B6 and B12 deficiencies  - resume replacements  - follow up B1 level (pending at time of discharge on 3/4)     Moderate malnutrition; recent diagnosis, in context of acute illness or injury; RD assessment:  % Weight Loss:  Does not meet criteria  % Intake:  < 75% for > 7 days  Subcutaneous Fat Loss:  Buccal - mild depletion, orbital -  moderate depletion, -difficult to determine if all age related  Muscle Loss:  Clavicle - severe, thoracic - moderate, calves -mild depletion, dorsal hand - mild depletion - difficult to determine if all age related  Fluid Retention:  mild edema lower extremities  - RD to follow 3/6     Recent recurrent UTI  Chronic indwelling Lozano catheter - per son Mark, this was placed ~ 1 year ago.  - continue  Amoxicillin 500 mg BID      HTN   Hypertensive emergency; pressures variable/still elevated overnight and today.  - continue PRN hydralazine and PRN clonidine  - attempt further BP control today, to keep SBP < 160  - continue PTA metoprolol to 25 mg BID  - 3/5 began amlodipine 2.5 mg BID ->  -> 3/7: increase amlodipine to 5 mg BID     Falls  Physical deconditioning  - falls precautions  - PT/OT eval's when able      Recent Left neck dystonia; unclear duration.  - much improved at time of discharge 3/4, after 2 days of topical treatments  - follow up with Neurology after discharge: may need to consider Botox if Sx persist  - resume topical antiinflammatory Q 6 hours while awake  - review positioning options w/ OT     Recent Dx of oropharyngeal dysphagia  - had coughing with thin liquids during last admission  - continue thickened liquids  - consider Speech follow up eval.;   - had VSS 3/4: see report     CKD  - creatinine baseline 1.1-1.2  - daily BMP     Hypokalemia; corrected/stable  - recheck in 1-2 days  - 2/29 Mg normal     Chronic anemia, normocytic  - stable, no s/s of active bleed  - monitor     HFpEF  - euvolemic on exam, not in exacerbation   - not on home diuretics  - monitor volume status      A-fib; rates stable  - continue PTA atenolol   - not on home AC per med rec      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        MARIA G Ewing MD, [facility]  Text Page (7am - 6pm)    Interval History   Doing better today overall; less combative and possibly more oriented.  Follow directions better.   Appetite improved.    Data reviewed today: I reviewed all new labs and imaging results over the last 24 hours.     Physical Exam   Temp: 97.4  F (36.3  C) Temp src: Axillary BP: (!) 163/94 Pulse: 67   Resp: 20 SpO2: 95 % O2 Device: None (Room air)    Vitals:    03/05/24 0456   Weight: 68.5 kg (151 lb)     Vital Signs with Ranges  Temp:  [97.4  F (36.3  C)-98.7  F (37.1  C)] 97.4  F (36.3  C)  Pulse:  [] 67  Resp:  [15-20] 20  BP: (132-222)/() 163/94  SpO2:  [94 %-97 %] 95 %  I/O last 3 completed shifts:  In: -   Out: 1275 [Urine:1275]    Constitutional: awake, no apparent distress; lying in bed  HEENT: sclerae clear; MM's moist  Respiratory: good a/e bilaterally, no wheezing or rhonchi  Cardiovascular: regular rate and rhythm, S1, S2 noted; no m/r/g  GI: abdomen flat, + bowel sounds; soft, non-tender, non-distended  Skin/Integumen: no rashes, no cyanosis, no jaundice  Musculoskeletal: no edema  Neurologic: follows directions well; no focal deficits      Medications    sodium chloride 75 mL/hr at 03/06/24 0848      amoxicillin  500 mg Oral BID    aspirin  81 mg Oral Daily    atorvastatin  40 mg Oral QPM    cyanocobalamin  1,000 mcg Sublingual Daily    enoxaparin ANTICOAGULANT  40 mg Subcutaneous Q24H    finasteride  5 mg Oral Daily    levothyroxine  100 mcg Oral Q Mon Wed Fri AM    [START ON 3/7/2024] levothyroxine  75 mcg Oral Once per day on Sunday Tuesday Thursday Saturday    methyl salicylate-menthol   Topical Q6H    metoprolol tartrate  25 mg Oral BID    pyridOXINE   25 mg Oral Daily    senna-docusate  1 tablet Oral BID    Or    senna-docusate  2 tablet Oral BID    sodium chloride (PF)  3 mL Intracatheter Q8H    sodium chloride (PF)  3 mL Intracatheter Q8H    thiamine  100 mg Oral Daily    trolamine salicylate   Topical Q6H       Data   Recent Labs   Lab 03/06/24  0809 03/05/24  0532 03/04/24  0534 03/03/24  0455 03/02/24  0534 03/01/24  2055 03/01/24  0624 02/29/24  1638 02/29/24  1040   WBC 6.5 8.9  --   --   --   --  8.8  --  5.9   HGB 10.7* 11.0*  --   --   --   --  11.5*  --  11.8*   MCV 98 99  --   --   --   --  94  --  95    319  --  278  --   --  298  --  292   INR  --   --   --   --   --   --   --   --  1.02    137  --   --  136  --  134*  --  136   POTASSIUM 3.0* 3.6 3.7 3.9 4.0   < > 3.2*  --  3.4   CHLORIDE 106 103  --   --  101  --  97*  --  98   CO2 24 27  --   --  23  --  24  --  28   BUN 10.6 15.6  --   --  15.2  --  14.3  --  16.1   CR 1.00 1.23*  --   --  1.31*  --  1.21*  --  1.22*   ANIONGAP 9 7  --   --  12  --  13  --  10   KRISTEN 8.2* 8.9  --   --  8.1*  --  8.4*  --  9.0   GLC 92 95  --   --  96  --  105*   < > 111*   ALBUMIN  --  3.5  --   --   --   --   --   --  3.5   PROTTOTAL  --  6.3*  --   --   --   --   --   --  6.2*   BILITOTAL  --  0.5  --   --   --   --   --   --  0.4   ALKPHOS  --  61  --   --   --   --   --   --  69   ALT  --  6  --   --   --   --   --   --  <5   AST  --  20  --   --   --   --   --   --  17    < > = values in this interval not displayed.       Recent Results (from the past 24 hour(s))   CT Head w/o Contrast    Narrative    EXAM: CT HEAD W/O CONTRAST  LOCATION: [facility]  DATE: 3/5/2024    INDICATION: increased confusion  COMPARISON: CT head without contrast 02/29/2024.  TECHNIQUE: Routine CT Head without IV contrast. Multiplanar reformats. Dose reduction techniques were used.    FINDINGS:  INTRACRANIAL CONTENTS: No intracranial hemorrhage, extraaxial collection, or mass effect.  No CT  evidence of acute infarct. Moderate presumed chronic small vessel ischemic changes. Mild generalized volume loss. No hydrocephalus.     VISUALIZED ORBITS/SINUSES/MASTOIDS: No intraorbital abnormality. No paranasal sinus mucosal disease. No middle ear or mastoid effusion.    BONES/SOFT TISSUES: No acute abnormality.      Impression    IMPRESSION:  1.  No CT evidence for acute intracranial process.  2.  Brain atrophy and presumed chronic microvascular ischemic changes as above.

## 2024-03-06 NOTE — PLAN OF CARE
Problem: Adult Inpatient Plan of Care  Goal: Absence of Hospital-Acquired Illness or Injury  Intervention: Identify and Manage Fall Risk  Recent Flowsheet Documentation  Taken 3/5/2024 1800 by Nohemy Almaraz RN  Safety Promotion/Fall Prevention:   room door open   room near nurse's station   bedside attendant  Intervention: Prevent Skin Injury  Recent Flowsheet Documentation  Taken 3/5/2024 1800 by Nohemy Almaraz RN  Skin Protection: silicone foam dressing in place  Device Skin Pressure Protection:   absorbent pad utilized/changed   pressure points protected   tubing/devices free from skin contact     Problem: Skin Injury Risk Increased  Goal: Skin Health and Integrity  Intervention: Plan: Nurse Driven Intervention: Positioning  Recent Flowsheet Documentation  Taken 3/5/2024 1800 by Nohemy Almaraz RN  Plan: Positioning Interventions:   HOB 30 degrees or less   OFF-LOAD HEELS with pillows  Intervention: Plan: Nurse Driven Intervention: Moisture Management  Recent Flowsheet Documentation  Taken 3/5/2024 1800 by Nohemy Almaraz RN  Moisture Interventions:   Incontinence pad   Urinary collection device  Intervention: Plan: Nurse Driven Intervention: Friction and Shear  Recent Flowsheet Documentation  Taken 3/5/2024 1800 by Nohemy Almaraz RN  Friction/Shear Interventions:   HOB 30 degrees or less   Silicone foam sacral dressing  Intervention: Optimize Skin Protection  Recent Flowsheet Documentation  Taken 3/5/2024 1800 by Nohemy Almaraz RN  Skin Protection: silicone foam dressing in place  Activity Management: activity adjusted per tolerance     Problem: Risk for Delirium  Goal: Optimal Coping  Intervention: Optimize Psychosocial Adjustment to Delirium  Recent Flowsheet Documentation  Taken 3/5/2024 1800 by Nohemy Almaraz RN  Supportive Measures:   relaxation techniques promoted   positive reinforcement provided  Goal: Improved Behavioral Control  Intervention: Minimize Safety Risk  Recent Flowsheet  Documentation  Taken 3/5/2024 1800 by Nohemy Almaraz RN  Communication Enhancement Strategies:   repetition utilized   extra time allowed for response   one-step directions provided  Enhanced Safety Measures:   room near unit station    at bedside  Goal: Improved Attention and Thought Clarity  Intervention: Maximize Cognitive Function  Recent Flowsheet Documentation  Taken 3/5/2024 1800 by Nohemy Almaraz RN  Sensory Stimulation Regulation:   television on   music on   lighting decreased  Reorientation Measures:   calendar in view   clock in view   reorientation provided     Problem: Comorbidity Management  Goal: Blood Pressure in Desired Range  Intervention: Maintain Blood Pressure Management  Recent Flowsheet Documentation  Taken 3/5/2024 1800 by Nohemy Almaraz RN  Medication Review/Management: medications reviewed   Goal Outcome Evaluation:             Pt admitted to unit this shift. Aox1- person, restless, cooperative. Writer acted as bedside attendant. With bilateral soft wrist restraints, skin tear in right wrist present upon transfer to unit. Blanchable redness in sacrum, foam dressing in place. Pt constantly talking to self, disorganized thoughts and observed to have visual and auditory hallucinations at times but pt either denies or unable to describe hallucinations. High bp, stabilized with scheduled and prn meds. Meds given whole with pudding, one pill at a time, mildly thick liquids. Unable to get out of bed this shift due to current condition. Lozano in place, draining well.

## 2024-03-06 NOTE — SIGNIFICANT EVENT
Significant Event Note    Time of event: 10:16 PM March 5, 2024    Description of event:  Paged to renew restraints  Per chart review, appears patient admitted for AMS requiring higher level of placement though admission note not done at the time of this note  Appears he was fairly violent towards staff earlier today requiring soft restraints  Continues on 1:1 precautions for safety  Otherwise stable and sleeping at this time    Plan:  Restraint order renewed for 24 hours    Order for restraints was placed secondary to patient factors:    Category: nonviolent  Type of restraint: soft restraints x4  Behavior: kicking, pulling at lines  Root cause of the behavior: altered mental status, possible dementia  Less-restrictive measures that failed: 1:1 sitter, medical management  Response to restraint: improved behavior  Criteria for release from restraint: able to stop pulling at lines, able to be redirected verbally and follow directions      Patient has been evaluated at this time. Will continue to re-evaluate clinical need for restraints every 24 hours.       Siomara Winchester MD  [facility] House Officer    3/5/2024, 10:28 PM    Discussed with bedside nurse

## 2024-03-06 NOTE — H&P
[facility]    History and Physical  Hospitalist       Date of Admission:  3/5/2024  Date of Service (when I saw the patient): 03/05/24    Assessment & Plan   Dilip Long is a pleasant 88 year old gentleman who was discharged yesterday, 3/4, after being admitted 2/29-3/4 for acute confusional state/encephalopathy and other medical issues.  Per the ED provider earlier today, while at home for a short time, Dilip developed psychosis and hallucinations.  His granddaughter Shruthi (his POA) stated they thought initially they could take care of him at home.  Last night, however, he did not sleep, was hallucinating and became difficult to control.  In the ED, he became combative and apparently tried to strike one of the nursing staff; restraints were started.  No other acute changes.  Current problems include:    Recurrent acute confusion and agitation  Recent acute toxic and metabolic encephalopathy  Recent elevated TSH; free T4 normal; likely euthyroid sick  Hypothyroidism, by history  - admit to Medicine; inpatient given his current need for close monitoring and work-up and anticipating > 2 nights in hospital  - per prior admission, granddaughter (Shruthi) 2/29 reported Dilip is normally conversational and alert and oriented without many issues  - 2/29 CT head w/o contrast negative for acute findings   - Neurology consult; reviewed abrupt changes w/ Dr. Gali Dutta today  - continue current synthroid dose  - PT/OT eval's when able  - restraints renewed  - Care Mgr eval.3/6 if able  - fall precautions  - continue 1:1 sitter until he is more calm  - low dose PRN seroquel     Vitamin B6 and B12 deficiencies  - resume replacements  - follow up B1 level (pending at time of discharge on 3/4)     Moderate malnutrition; recent diagnosis, in context of acute illness or injury; RD assessment:  % Weight Loss:  Does not meet criteria  % Intake:  < 75% for > 7 days  Subcutaneous Fat Loss:   Buccal - mild depletion, orbital - moderate depletion, -difficult to determine if all age related  Muscle Loss:  Clavicle - severe, thoracic - moderate, calves -mild depletion, dorsal hand - mild depletion - difficult to determine if all age related  Fluid Retention:  mild edema lower extremities  - RD to follow 3/6     Recent recurrent UTI  Chronic indwelling Lozano catheter - per son Mark, this was placed ~ 1 year ago.  - resume Amoxicillin 500 mg BID      HTN emergency; pressures variable/gradually improving today.  - add PRN hydralazine and PRN clonidine  - attempt further BP control today, to keep SBP < 160  - resume PTA metoprolol to 25 mg BID     Falls  Physical deconditioning  - falls precautions  - PT/OT eval's when able      Recent Left neck dystonia; unclear duration.  - much improved at time of discharge 3/4, after 2 days of topical treatments  - follow up with Neurology after discharge: may need to consider Botox if Sx persist  - resume topical antiinflammatory Q 6 hours while awake  - review positioning options w/ OT     Recent Dx of oropharyngeal dysphagia  - had coughing with thin liquids during last admission  - continue thickened liquids  - consider Speech follow up eval.;   - had VSS today 3/4: see report     CKD  - creatinine baseline 1.1-1.2  - daily BMP     Hypokalemia; corrected/stable  - recheck in 1-2 days  - 2/29 Mg normal     Chronic anemia, normocytic  - stable, no s/s of active bleed  - monitor     HFpEF  - euvolemic on exam, not in exacerbation   - not on home diuretics  - monitor volume status      A-fib; rates stable  - continue PTA atenolol   - not on home AC per med rec      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  - appreciate Vascular Surgery follow up during last admission  -> will resume ASA 81 mg daily and atorvastatin 40 mg daily  - BP control as above      Hard of hearing  - needs evaluation for hearing aids as soon as possible after discharge        DVT  Prophylaxis: Pneumatic Compression Devices, Anti-embolisim stockings (TEDs), and Ambulate every shift  Code Status: DNR / DNI    Disposition: Expected discharge in 2-3 days.        MARIA G Ewing MD, [facility]    Primary Care Physician   Kolton Junior    Chief Complaint   Acute confusion after being discharged from hospital yesterday, 3/4.  History is obtained from the EHR and from admitting RN's/physician    History of Present Illness   Dilip Long is a pleasant 88 year old gentleman who was brought back to the ED earlier today by family, after having been at home for less than 24 hours.  He apparently became very confused and combative and it is unclear if there were any precipitating factors.    Past Medical History    I have reviewed this patient's medical history and updated it with pertinent information if needed.   Past Medical History:   Diagnosis Date    Acute heart failure with preserved ejection fraction (HFpEF) (H) 11/10/2022    Acute prostatitis     Asymptomatic bacteriuria 10/23/2013    Noted at 10/2/13 office visit at [facility] ([facility]. No treatment recommended at that time.     Congestive heart failure (H)     Essential hypertension 08/18/2015    Hypertension     Paroxysmal atrial fib -- on Eliquis 11/06/2022    Syncope     Thyroid disease        Past Surgical History   I have reviewed this patient's surgical history and updated it with pertinent information if needed.  Past Surgical History:   Procedure Laterality Date    BLADDER SURGERY      Bladder absces removal    Blepharoplasty Upper Lid W/ Excessive Skin[  1/29/2007    CATARACT EXTRACTION      CYSTOSCOPY, FULGURATE BLEEDERS, EVACUATE CLOT(S), COMBINED N/A 4/19/2023    Procedure: CYSTOSCOPY, BLADDER BIOPSY WITH FULGURATION AND CLOT EVACUATION;  Surgeon: Edgar Laird MD;  Location: [facility] OR    EYE SURGERY      both eyes 2015    IR LUMBAR EPIDURAL STEROID INJECTION  4/27/2004    IR LUMBAR EPIDURAL  STEROID INJECTION  5/11/2004    IR LUMBAR EPIDURAL STEROID INJECTION  11/24/2004    IR LUMBAR EPIDURAL STEROID INJECTION  11/28/2007    AR CYSTOURETHROSCOPY W/IRRIG & EVAC CLOTS N/A 7/27/2018    Procedure: CYSTOSCOPY, CLOT EVACUATION ,URETHRAL DILATATION, DAUGHERTY CATHETER PLACEMENT;  Surgeon: Lowell Arias MD;  [place] [facility] [place]  Service: Urology       Prior to Admission Medications   Prior to Admission Medications   Prescriptions Last Dose Informant Patient Reported? Taking?   Calcium Carbonate-Vitamin D (OSCAL 500/200 D-3 PO) Past Week Care Giver Yes Yes   Sig: Take 1 tablet by mouth daily.   GLUCOSAMINE-CHONDROITIN-VIT D3 PO Past Week Care Giver Yes Yes   Sig: Take 1 tablet by mouth daily   Vitamin D3 (VITAMIN D, CHOLECALCIFEROL,) 25 mcg (1000 units) tablet Past Week Care Giver Yes Yes   Sig: Take 1 tablet by mouth every evening   acetaminophen (TYLENOL) 500 MG tablet 3/4/2024 at 1332 Care Giver Yes Yes   Sig: Take 1,000 mg by mouth every 6 hours as needed for mild pain   amoxicillin (AMOXIL) 500 MG capsule 3/4/2024 at 1853 Care Giver No Yes   Sig: Take 1 capsule (500 mg) by mouth 2 times daily for 25 days   aspirin 81 MG EC tablet 3/4/2024 at 0807 Care Giver No Yes   Sig: Take 1 tablet (81 mg) by mouth daily   atorvastatin (LIPITOR) 40 MG tablet 3/4/2024 at 1855 Care Giver No Yes   Sig: Take 1 tablet (40 mg) by mouth every evening   cyanocobalamin (VITAMIN B-12) 1000 MCG sublingual tablet 3/4/2024 at 0807 Care Giver No Yes   Sig: Place 1 tablet (1,000 mcg) under the tongue daily   finasteride (PROSCAR) 5 MG tablet 3/4/2024 at 0807 Care Giver Yes Yes   Sig: Take 5 mg by mouth daily   ipratropium - albuterol 0.5 mg/2.5 mg/3 mL (DUONEB) 0.5-2.5 (3) MG/3ML neb solution  at PRN Care Giver No Yes   Sig: Take 1 vial (3 mLs) by nebulization every 6 hours as needed for shortness of breath, wheezing or cough   levothyroxine (SYNTHROID/LEVOTHROID) 100 MCG tablet 3/4/2024 at 1332 (100 mg total) Care Giver  No Yes   Sig: Take 1 tablet (100 mcg) by mouth Every Mon, Wed, Fri Morning   levothyroxine (SYNTHROID/LEVOTHROID) 75 MCG tablet 3/4/2024 at 1332 (100 mg total) Care Giver No Yes   Sig: Take 1 tablet (75 mcg) by mouth See Admin Instructions   methyl salicylate-menthol (ICY HOT) ointment 3/4/2024 at 1335 Care Giver No Yes   Sig: Apply topically every 6 hours   metoprolol tartrate (LOPRESSOR) 25 MG tablet 3/4/2024 at 1856 Care Giver No Yes   Sig: Take 1 tablet (25 mg) by mouth 2 times daily   pyridOXINE (VITAMIN B6) 25 MG tablet 3/4/2024 at 1427 Care Giver No Yes   Sig: Take 1 tablet (25 mg) by mouth daily   thiamine (B-1) 100 MG tablet 3/4/2024 at 0807 Care Giver No Yes   Sig: Take 1 tablet (100 mg) by mouth daily   trolamine salicylate (ASPERCREME) 10 % external cream 3/4/2024 at 1803 Care Giver No Yes   Sig: Apply topically every 6 hours      Facility-Administered Medications: None     Allergies   No Known Allergies    Social History   I have reviewed this patient's social history and updated it with pertinent information if needed. Dilip Long  reports that he has quit smoking. His smoking use included cigarettes. He has never used smokeless tobacco. He reports current alcohol use. He reports that he does not use drugs.    Family History   I have reviewed this patient's family history and updated it with pertinent information if needed.   Family History   Problem Relation Age of Onset    Diabetes No family hx of     Hypertension No family hx of     Other Cancer No family hx of     Coronary Artery Disease No family hx of     Hyperlipidemia No family hx of     Cerebrovascular Disease No family hx of     Breast Cancer No family hx of     Colon Cancer No family hx of     Prostate Cancer No family hx of     Depression No family hx of     Anxiety Disorder No family hx of     Mental Illness No family hx of     Substance Abuse No family hx of     Anesthesia Reaction No family hx of     Asthma No family hx of      Osteoporosis No family hx of     Genetic Disorder No family hx of     Thyroid Disease No family hx of     Obesity No family hx of     No Known Problems Mother     No Known Problems Father        Review of Systems   The 10 point Review of Systems is negative other than noted in the HPI or here.    Physical Exam   Temp: 98.7  F (37.1  C) Temp src: Axillary BP: (!) 147/72 Pulse: 85   Resp: 16 SpO2: 96 % O2 Device: None (Room air)    Vital Signs with Ranges  Temp:  [98.1  F (36.7  C)-98.7  F (37.1  C)] 98.7  F (37.1  C)  Pulse:  [] 85  Resp:  [14-28] 16  BP: ()/() 147/72  SpO2:  [95 %-100 %] 96 %  151 lbs 0 oz    Constitutional: awake, lying in bed at an angle.  Eyes: sclerae clear; PERRLA/EOMI  HEENT: AT/NC; moist mucous membranes, normal dentition.  Neck: supple, fair ROM;     Respiratory: good air entry bilaterally; no wheezing or rhonchi.  Back: no focal tenderness or other abnormalities  Cardiovascular: regular rate and rhythm; S1, S2 noted; no murmur, rub or gallop  GI: abdomen flat, + bowel sounds, soft, non-tender, non-distended; no masses or organomegaly  Skin: no rash, no cyanosis  Musculoskeletal: no edema; no obvious joint abnormalities  Neurologic: awake, confused, disoriented; follows some directions well; needs frequent redirection  Psychiatric: no obvious signs of anxiety or depression.     Data   Data reviewed today:  I personally reviewed all recent lab studies and imaging results.    Recent Labs   Lab 03/05/24  0532 03/04/24  0534 03/03/24  0455 03/02/24  0534 03/01/24  2055 03/01/24  0624 02/29/24  1638 02/29/24  1040   WBC 8.9  --   --   --   --  8.8  --  5.9   HGB 11.0*  --   --   --   --  11.5*  --  11.8*   MCV 99  --   --   --   --  94  --  95     --  278  --   --  298  --  292   INR  --   --   --   --   --   --   --  1.02     --   --  136  --  134*  --  136   POTASSIUM 3.6 3.7 3.9 4.0   < > 3.2*  --  3.4   CHLORIDE 103  --   --  101  --  97*  --  98   CO2 27  --    --  23  --  24  --  28   BUN 15.6  --   --  15.2  --  14.3  --  16.1   CR 1.23*  --   --  1.31*  --  1.21*  --  1.22*   ANIONGAP 7  --   --  12  --  13  --  10   KRISTEN 8.9  --   --  8.1*  --  8.4*  --  9.0   GLC 95  --   --  96  --  105*   < > 111*   ALBUMIN 3.5  --   --   --   --   --   --  3.5   PROTTOTAL 6.3*  --   --   --   --   --   --  6.2*   BILITOTAL 0.5  --   --   --   --   --   --  0.4   ALKPHOS 61  --   --   --   --   --   --  69   ALT 6  --   --   --   --   --   --  <5   AST 20  --   --   --   --   --   --  17    < > = values in this interval not displayed.       Recent Results (from the past 24 hour(s))   CT Head w/o Contrast    Narrative    EXAM: CT HEAD W/O CONTRAST  LOCATION: [facility]  DATE: 3/5/2024    INDICATION: increased confusion  COMPARISON: CT head without contrast 02/29/2024.  TECHNIQUE: Routine CT Head without IV contrast. Multiplanar reformats. Dose reduction techniques were used.    FINDINGS:  INTRACRANIAL CONTENTS: No intracranial hemorrhage, extraaxial collection, or mass effect.  No CT evidence of acute infarct. Moderate presumed chronic small vessel ischemic changes. Mild generalized volume loss. No hydrocephalus.     VISUALIZED ORBITS/SINUSES/MASTOIDS: No intraorbital abnormality. No paranasal sinus mucosal disease. No middle ear or mastoid effusion.    BONES/SOFT TISSUES: No acute abnormality.      Impression    IMPRESSION:  1.  No CT evidence for acute intracranial process.  2.  Brain atrophy and presumed chronic microvascular ischemic changes as above.

## 2024-03-06 NOTE — PLAN OF CARE
Problem: Comorbidity Management  Goal: Blood Pressure in Desired Range  Outcome: Not Progressing  Intervention: Maintain Blood Pressure Management  Recent Flowsheet Documentation  Taken 3/6/2024 0415 by Johnny Landaverde, RN  Medication Review/Management: medications reviewed  Taken 3/6/2024 0029 by Johnny Landaverde, RN  Medication Review/Management: medications reviewed     Problem: Risk for Delirium  Goal: Improved Behavioral Control  Outcome: Progressing  Intervention: Minimize Safety Risk  Recent Flowsheet Documentation  Taken 3/6/2024 0415 by Johnny Landaverde, RN  Communication Enhancement Strategies:   repetition utilized   extra time allowed for response   one-step directions provided  Enhanced Safety Measures:   room near unit station    at bedside  Taken 3/6/2024 0029 by Johnny Landaverde, RN  Communication Enhancement Strategies:   repetition utilized   extra time allowed for response   one-step directions provided  Enhanced Safety Measures:   room near unit station    at bedside  Goal: Improved Sleep  Outcome: Progressing   Goal Outcome Evaluation:       Pt alert but continues to be confused. On restraint and 1:1 attendant for safety. Blood pressure overnight was elevated PRN hydralazine and Clonidine given with minimal effect. House resident notified for elevated BP and ordered one dose of hydralazine. Latest /79. Sinus rhythm with 1st degree AV block with occasional PVC's on tele. Takes pill whole with applesauce. No signs of pain noted. Slept majority of the night.

## 2024-03-07 ENCOUNTER — APPOINTMENT (OUTPATIENT)
Dept: PHYSICAL THERAPY | Facility: HOSPITAL | Age: 89
DRG: 071 | End: 2024-03-07
Attending: INTERNAL MEDICINE
Payer: COMMERCIAL

## 2024-03-07 ENCOUNTER — APPOINTMENT (OUTPATIENT)
Dept: OCCUPATIONAL THERAPY | Facility: HOSPITAL | Age: 89
DRG: 071 | End: 2024-03-07
Attending: INTERNAL MEDICINE
Payer: COMMERCIAL

## 2024-03-07 ENCOUNTER — APPOINTMENT (OUTPATIENT)
Dept: RADIOLOGY | Facility: HOSPITAL | Age: 89
DRG: 071 | End: 2024-03-07
Payer: COMMERCIAL

## 2024-03-07 LAB — GLUCOSE BLDC GLUCOMTR-MCNC: 96 MG/DL (ref 70–99)

## 2024-03-07 PROCEDURE — 99232 SBSQ HOSP IP/OBS MODERATE 35: CPT | Performed by: PSYCHIATRY & NEUROLOGY

## 2024-03-07 PROCEDURE — 71045 X-RAY EXAM CHEST 1 VIEW: CPT

## 2024-03-07 PROCEDURE — 250N000013 HC RX MED GY IP 250 OP 250 PS 637

## 2024-03-07 PROCEDURE — 250N000013 HC RX MED GY IP 250 OP 250 PS 637: Performed by: STUDENT IN AN ORGANIZED HEALTH CARE EDUCATION/TRAINING PROGRAM

## 2024-03-07 PROCEDURE — 258N000003 HC RX IP 258 OP 636: Performed by: INTERNAL MEDICINE

## 2024-03-07 PROCEDURE — 120N000001 HC R&B MED SURG/OB

## 2024-03-07 PROCEDURE — 97535 SELF CARE MNGMENT TRAINING: CPT | Mod: GO

## 2024-03-07 PROCEDURE — 250N000011 HC RX IP 250 OP 636: Performed by: STUDENT IN AN ORGANIZED HEALTH CARE EDUCATION/TRAINING PROGRAM

## 2024-03-07 PROCEDURE — 250N000013 HC RX MED GY IP 250 OP 250 PS 637: Performed by: INTERNAL MEDICINE

## 2024-03-07 PROCEDURE — 97162 PT EVAL MOD COMPLEX 30 MIN: CPT | Mod: GP

## 2024-03-07 PROCEDURE — 97166 OT EVAL MOD COMPLEX 45 MIN: CPT | Mod: GO

## 2024-03-07 PROCEDURE — 99232 SBSQ HOSP IP/OBS MODERATE 35: CPT | Performed by: INTERNAL MEDICINE

## 2024-03-07 PROCEDURE — 97530 THERAPEUTIC ACTIVITIES: CPT | Mod: GP

## 2024-03-07 RX ORDER — QUETIAPINE FUMARATE 25 MG/1
25 TABLET, FILM COATED ORAL ONCE
Status: COMPLETED | OUTPATIENT
Start: 2024-03-07 | End: 2024-03-07

## 2024-03-07 RX ADMIN — SODIUM CHLORIDE: 9 INJECTION, SOLUTION INTRAVENOUS at 14:24

## 2024-03-07 RX ADMIN — ENOXAPARIN SODIUM 40 MG: 40 INJECTION SUBCUTANEOUS at 08:25

## 2024-03-07 RX ADMIN — ACETAMINOPHEN 650 MG: 325 TABLET ORAL at 19:49

## 2024-03-07 RX ADMIN — AMOXICILLIN 500 MG: 250 CAPSULE ORAL at 08:24

## 2024-03-07 RX ADMIN — AMLODIPINE BESYLATE 5 MG: 5 TABLET ORAL at 08:24

## 2024-03-07 RX ADMIN — THIAMINE HCL TAB 100 MG 100 MG: 100 TAB at 08:24

## 2024-03-07 RX ADMIN — AMOXICILLIN 500 MG: 250 CAPSULE ORAL at 19:43

## 2024-03-07 RX ADMIN — Medication 25 MG: at 08:25

## 2024-03-07 RX ADMIN — OLANZAPINE 2.5 MG: 5 TABLET, ORALLY DISINTEGRATING ORAL at 11:56

## 2024-03-07 RX ADMIN — Medication 1 TABLET: at 19:29

## 2024-03-07 RX ADMIN — Medication 25 MG: at 18:51

## 2024-03-07 RX ADMIN — METOPROLOL TARTRATE 25 MG: 25 TABLET, FILM COATED ORAL at 08:25

## 2024-03-07 RX ADMIN — LEVOTHYROXINE SODIUM 75 MCG: 0.03 TABLET ORAL at 08:24

## 2024-03-07 RX ADMIN — Medication 1 TABLET: at 08:25

## 2024-03-07 RX ADMIN — FINASTERIDE 5 MG: 5 TABLET, FILM COATED ORAL at 08:25

## 2024-03-07 RX ADMIN — QUETIAPINE FUMARATE 25 MG: 25 TABLET ORAL at 21:37

## 2024-03-07 RX ADMIN — METOPROLOL TARTRATE 25 MG: 25 TABLET, FILM COATED ORAL at 19:29

## 2024-03-07 RX ADMIN — Medication 1000 MCG: at 08:24

## 2024-03-07 RX ADMIN — ATORVASTATIN CALCIUM 40 MG: 40 TABLET, FILM COATED ORAL at 19:29

## 2024-03-07 RX ADMIN — AMLODIPINE BESYLATE 5 MG: 5 TABLET ORAL at 20:08

## 2024-03-07 RX ADMIN — Medication 1 MG: at 18:51

## 2024-03-07 RX ADMIN — OLANZAPINE 2.5 MG: 10 INJECTION, POWDER, LYOPHILIZED, FOR SOLUTION INTRAMUSCULAR at 17:54

## 2024-03-07 RX ADMIN — OLANZAPINE 2.5 MG: 10 INJECTION, POWDER, LYOPHILIZED, FOR SOLUTION INTRAMUSCULAR at 03:20

## 2024-03-07 RX ADMIN — ASPIRIN 81 MG: 81 TABLET, COATED ORAL at 08:25

## 2024-03-07 ASSESSMENT — ACTIVITIES OF DAILY LIVING (ADL)
ADLS_ACUITY_SCORE: 54
ADLS_ACUITY_SCORE: 55
ADLS_ACUITY_SCORE: 55
ADLS_ACUITY_SCORE: 54
ADLS_ACUITY_SCORE: 55
ADLS_ACUITY_SCORE: 54
ADLS_ACUITY_SCORE: 55
ADLS_ACUITY_SCORE: 54
ADLS_ACUITY_SCORE: 55

## 2024-03-07 NOTE — PROGRESS NOTES
03/07/24 0915   Appointment Info   Signing Clinician's Name / Credentials (PT) Jamee Recinos,PT   Living Environment   People in Home spouse;other (see comments)  (grandchild staying with to help care for pt)   Current Living Arrangements house   Home Accessibility stairs to enter home   Number of Stairs, Main Entrance 4   Stair Railings, Main Entrance railings safe and in good condition   Living Environment Comments information per chart, pt unable to answer questions   Self-Care   Equipment Currently Used at Home wheelchair, manual;walker, rolling   Fall history within last six months yes   Activity/Exercise/Self-Care Comment information per chart, per chart family unable to care for pt   General Information   Onset of Illness/Injury or Date of Surgery 03/05/24   Referring Physician Dr. Chip Ewing   Patient/Family Therapy Goals Statement (PT) none stated   Pertinent History of Current Problem (include personal factors and/or comorbidities that impact the POC) Dilip Long is a pleasant 88 year old gentleman who was discharged yesterday, 3/4, after being admitted 2/29-3/4 for acute confusional state/encephalopathy and other medical issues.  Per the ED provider earlier today, while at home for a short time, Dilip developed psychosis and hallucinations.  His granddaughter Shruthi (his POA) stated they thought initially they could take care of him at home.  Last night, however, he did not sleep, was hallucinating and became difficult to control.  In the ED, he became combative and apparently tried to strike one of the nursing staff; restraints were started.  No other acute changes.   General Observations pt up in recliner sleeping, family in rrom   Cognition   Affect/Mental Status (Cognition) WFL   Pain Assessment   Patient Currently in Pain No   Range of Motion (ROM)   ROM Comment WFL for transfers   Strength (Manual Muscle Testing)   Strength Comments deficits noted during mobility   Bed Mobility    Comment, (Bed Mobility) pt up in recliner at start/end of PT session   Transfers   Transfer Safety Concerns Noted decreased weight-shifting ability   Impairments Contributing to Impaired Transfers decreased strength;impaired balance;other (see comments)  (cognition)   Comment, (Transfers) minAx2 w/ FWW   Gait/Stairs (Locomotion)   Gilmer Level (Gait) minimum assist (75% patient effort)   Assistive Device (Gait) walker, front-wheeled   Distance in Feet (Gait) 4   Pattern (Gait) step-through   Deviations/Abnormal Patterns (Gait) weight shifting decreased   Comment, (Gait/Stairs) pt needs cuing for safety   Balance   Balance Comments unsteady   Clinical Impression   Criteria for Skilled Therapeutic Intervention Yes, treatment indicated   PT Diagnosis (PT) decreased functional mobililty   Influenced by the following impairments cognition, fatigue, decreased strength and balance   Functional limitations due to impairments bed mob, transfers, gait   Clinical Presentation (PT Evaluation Complexity) evolving   Clinical Presentation Rationale Patient presents as medically diagnosed.   Clinical Decision Making (Complexity) moderate complexity   Planned Therapy Interventions (PT) bed mobility training;gait training;transfer training;strengthening;progressive activity/exercise   Risk & Benefits of therapy have been explained evaluation/treatment results reviewed   PT Total Evaluation Time   PT Eval, Moderate Complexity Minutes (81791) 12   Physical Therapy Goals   PT Frequency Daily   PT Predicted Duration/Target Date for Goal Attainment 03/14/24   PT: Bed Mobility Supine to/from sit  (CGA)   PT: Transfers Sit to/from stand;Bed to/from chair;Assistive device  (CGA)   PT: Gait 100 feet;Rolling walker  (CGA)   PT: Stairs Completed   Therapeutic Activity   Therapeutic Activities: dynamic activities to improve functional performance Minutes (91071) 10   Symptoms Noted During/After Treatment Fatigue   Treatment  Detail/Skilled Intervention reclined scooting pt unable to complete maxAx2 with draw sheet, sitting scooting CGA with gait belt- pt able to position self back in chair with cues, stodd with FWW x1 minutes minAX2 , pt able to take stesp backwar with FWW and mod Ax1 in room   PT Discharge Planning   PT Plan bed mob, transfer and gait w/ FWW start with close w/c follow , LE ex   PT Discharge Recommendation (DC Rec) Long term care facility;home with home care physical therapy;other (see comments)  (further PT)   PT Rationale for DC Rec pt minAX1-2 for sit<>stand and short gait in room with FW, pt fatigues easily-decreased endurnece. pt need guidance for safety   PT Brief overview of current status pt minAX1-2 for sit<>stand and short gait in room with FW, pt fatigues easily-decreased endurnece. pt need guidance for safety   PT Equipment Needed at Discharge gait belt;walker, rolling;wheelchair  (unknown if pt has at home)   Total Session Time   Timed Code Treatment Minutes 10   Total Session Time (sum of timed and untimed services) 22

## 2024-03-07 NOTE — PROGRESS NOTES
03/07/24 1031   Appointment Info   Signing Clinician's Name / Credentials (OT) Ramona Kessleryes,OTR/L   Living Environment   People in Home spouse;other (see comments)   Current Living Arrangements other (see comments)  (grandchild staying w/ pt to  assist)   Home Accessibility stairs to enter home   Number of Stairs, Main Entrance 4   Stair Railings, Main Entrance railings safe and in good condition   Transportation Anticipated agency   Self-Care   Current Activity Tolerance fair   Equipment Currently Used at Home wheelchair, manual;walker, rolling  (pt stated does not use AD)   Fall history within last six months yes   Number of times patient has fallen within last six months 2   Instrumental Activities of Daily Living (IADL)   IADL Comments pt stated has assist w/ cooking, driving and laundry, unable to state if receives A w/ self cares,pt vague and unable to elaborate   General Information   Onset of Illness/Injury or Date of Surgery 03/05/24   Patient/Family Therapy Goal Statement (OT) none stated   Additional Occupational Profile Info/Pertinent History of Current Problem Dilip Long is a pleasant 88 year old gentleman who was discharged yesterday, 3/4, after being admitted 2/29-3/4 for acute confusional state/encephalopathy and other medical issues.  Per the ED provider earlier today, while at home for a short time, Dilip developed psychosis and hallucinations.   Existing Precautions/Restrictions fall   Limitations/Impairments safety/cognitive   Cognitive Status Examination   Orientation Status person;place  (knew month not year)   Cognitive Status Comments rec. screening as needed   Visual Perception   Visual Impairment/Limitations   (stated needs glasses but does not have them here)   Range of Motion Comprehensive   General Range of Motion   (limited B UE AROM shldrs)   Strength Comprehensive (MMT)   General Manual Muscle Testing (MMT) Assessment   (generalized weakness)   Coordination   Upper Extremity  Coordination No deficits were identified   Transfers   Transfers toilet transfer;sit-stand transfer   Sit-Stand Transfer   Sit-Stand [place] (Transfers) moderate assist (50% patient effort)   Toilet Transfer   Type (Toilet Transfer) sit-stand;stand-sit   [place] Level (Toilet Transfer) moderate assist (50% patient effort)   Balance   Balance Assessment standing dynamic balance   Balance Comments poor balance   Activities of Daily Living   BADL Assessment/Intervention grooming;lower body dressing   Lower Body Dressing Assessment/Training   [place] Level (Lower Body Dressing) minimum assist (75% patient effort)   Grooming Assessment/Training   [place] Level (Grooming) supervision   Clinical Impression   Criteria for Skilled Therapeutic Interventions Met (OT) Yes, treatment indicated   OT Diagnosis decreased ADLs   OT Problem List-Impairments impacting ADL balance;cognition;mobility;strength   Assessment of Occupational Performance 1-3 Performance Deficits   Identified Performance Deficits trsfs, g/h, drsg, cognition for ADLs   Planned Therapy Interventions (OT) ADL retraining;transfer training;cognition   Clinical Decision Making Complexity (OT) detailed assessment/moderate complexity   Risk & Benefits of therapy have been explained evaluation/treatment results reviewed;care plan/treatment goals reviewed;patient   OT Total Evaluation Time   OT Eval, Moderate Complexity Minutes (98391) 8   OT Goals   Therapy Frequency (OT) 4 times/week   OT Predicted Duration/Target Date for Goal Attainment 03/14/24   OT Goals Transfers;Lower Body Dressing;Hygiene/Grooming;Cognition   OT: Hygiene/Grooming supervision/stand-by assist   OT: Lower Body Dressing Minimal assist   OT: Transfer Minimal assist   OT: Cognitive Patient/caregiver will verbalize understanding of cognitive assessment results/recommendations as needed for safe discharge planning   Interventions   Interventions Quick Adds Self-Care/Home Management    Self-Care/Home Management   Self-Care/Home Mgmt/ADL, Compensatory, Meal Prep Minutes (41372) 8   Symptoms Noted During/After Treatment (Meal Preparation/Planning Training) fatigue   Treatment Detail/Skilled Intervention pt in chair, trsf chair<>commode w/ mod Ax2 w/ FWW, cues for hand placement and safety as pt is impulsive and with decreased balance, one step cues helpful, STS trials w/ mod A w/ cueing for technique, better w/ 2nd trial STS w/ more controlled descent, seat alarm on   [place] Level (Grooming Training) stand-by assist  (seated)   Assistance (Grooming Training) set-up required;supervision;verbal cues   Lower Body Dressing Training Assistance minimum assist (75% patient effort)  (don/doff socks)   Lower Body Dressing Training Assistance set-up required;supervision;verbal cues   OT Discharge Planning   OT Plan trial OT, close trsfs, g/h, drsg, SLUMS as needed   OT Discharge Recommendation (DC Rec) Transitional Care Facility;Long term care facility   OT Rationale for DC Rec Ax2 for ADLs and trsfs, family would like to attempt TCU and eventually return home,if pt returns home rec 24 hr assist, hospital bed,commode, zuhair lift, currently safest plan may be LTC w/ added therapy,   OT Brief overview of current status mod Ax2 trsfs, SBA seated g/h, min A basic drsg   Total Session Time   Timed Code Treatment Minutes 8   Total Session Time (sum of timed and untimed services) 16

## 2024-03-07 NOTE — PLAN OF CARE
Goal Outcome Evaluation:                        Problem: Risk for Delirium  Goal: Improved Sleep  Outcome: Progressing       Discontinued restraints at 0800. No aggressive behaviors. Pt is confused but redirectable.   SR with 1 degree AVB and PVC's on tele.  Congested/Productive cough. Some coughing with intake.  BS active. Good appetite. Had XL incontinent BM.  Lozano patent.  Assist of 2 up. Pt was impulsive when he had to have a BM. Gait unsteady.
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
## 2024-03-07 NOTE — PLAN OF CARE
Problem: Risk for Delirium  Goal: Improved Attention and Thought Clarity  Outcome: Not Progressing  Intervention: Maximize Cognitive Function  Recent Flowsheet Documentation  Taken 3/7/2024 0820 by Milly Ramirez RN  Reorientation Measures:   clock in view   reorientation provided     Problem: Risk for Delirium  Goal: Improved Behavioral Control  Outcome: Progressing  Intervention: Minimize Safety Risk  Recent Flowsheet Documentation  Taken 3/7/2024 0820 by Milly Ramirez RN  Communication Enhancement Strategies:   call light answered in person   family involved in communication plan   one-step directions provided   repetition utilized  Enhanced Safety Measures:   room near unit station    at bedside     Problem: Comorbidity Management  Goal: Blood Pressure in Desired Range  Outcome: Progressing  Intervention: Maintain Blood Pressure Management  Recent Flowsheet Documentation  Taken 3/7/2024 0820 by Milly Ramirez RN  Medication Review/Management: medications reviewed   Goal Outcome Evaluation:    Patient oriented to self only, confused, Hard to understand speech at times. Tends to pick at IV line and jorge catheter. Started to get slightly agitated early this afternoon, PRN oral zyprexa administered. Does not exhibit any signs or symptoms of pain. Jorge patent and draining. BP elevated this morning, came down nicely with scheduled medications.

## 2024-03-07 NOTE — PLAN OF CARE
Problem: Risk for Delirium  Goal: Improved Sleep  Outcome: Progressing     Problem: Comorbidity Management  Goal: Blood Pressure in Desired Range  Outcome: Progressing   Goal Outcome Evaluation:        Oriented to self. Is redirectable. No aggressive behaviors.   Discontinued 1:1 at 2100.

## 2024-03-07 NOTE — PROGRESS NOTES
Care Management Follow Up    Length of Stay (days): 2    Expected Discharge Date: 03/07/2024     Concerns to be Addressed: discharge planning     Patient plan of care discussed at interdisciplinary rounds: Yes    Anticipated Discharge Disposition:  TBD     Anticipated Discharge Services:    Anticipated Discharge DME:      Patient/family educated on Medicare website which has current facility and service quality ratings:    Education Provided on the Discharge Plan:    Patient/Family in Agreement with the Plan:      Referrals Placed by CM/SW:    Private pay costs discussed: Not applicable    Additional Information:  Chart reviewed:  Pt recently here and left AMA with family. Per granddaughter Shruthi, they took pt home 3/4/24 and family was staying with him at his spouse, but it became too much for them and pt was brought back for placement.    3/7 CM went to pt room to discuss discharge planning. Pt was busy working with PT and son was there. I introduced myself and role and the son told CM that his niece Shruthi is the main family contact. CM then reached out to Shruthi via phone. She told CM that she is currently staying in the pt's home to help but that he became too weak to care for and she brought him back to the ER. We discussed LTC vs TCU and she would be interested in TCU at this point but not LTC as the goal would be to get him home. We discussed that I am unsure if TCU will be covered by insurance but we can send referrals and see what admissions says. We dicussed ways to look into TCUs and she wanted CM to email her a list of TCUs and she will look over and call CM with her top TCU choices. CM emailed her the [facility] TCU list.    Therapy recommendation is LTC, CM has reached out PT/OT to discuss recommendations waiting for call back.    RNCM to follow for medical progression, recommendations, and final discharge plan.              Niurka Mackenzie RN

## 2024-03-07 NOTE — PROGRESS NOTES
03/07/24 1031   Appointment Info   Signing Clinician's Name / Credentials (OT) Ramona Kessleryes,OTR/L   Living Environment   People in Home spouse;other (see comments)   Current Living Arrangements other (see comments)  (grandchild staying w/ pt to  assist)   Home Accessibility stairs to enter home   Number of Stairs, Main Entrance 4   Stair Railings, Main Entrance railings safe and in good condition   Transportation Anticipated agency   Self-Care   Current Activity Tolerance fair   Equipment Currently Used at Home wheelchair, manual;walker, rolling  (pt stated does not use AD)   Fall history within last six months yes   Number of times patient has fallen within last six months 2   Instrumental Activities of Daily Living (IADL)   IADL Comments pt stated has assist w/ cooking, driving and laundry, unable to state if receives A w/ self cares,pt vague and unable to elaborate   General Information   Onset of Illness/Injury or Date of Surgery 03/05/24   Patient/Family Therapy Goal Statement (OT) none stated   Additional Occupational Profile Info/Pertinent History of Current Problem Dilip Long is a pleasant 88 year old gentleman who was discharged yesterday, 3/4, after being admitted 2/29-3/4 for acute confusional state/encephalopathy and other medical issues.  Per the ED provider earlier today, while at home for a short time, Dilip developed psychosis and hallucinations.   Existing Precautions/Restrictions fall   Limitations/Impairments safety/cognitive   Cognitive Status Examination   Orientation Status person;place  (knew month not year)   Cognitive Status Comments rec. screening as needed   Visual Perception   Visual Impairment/Limitations   (stated needs glasses but does not have them here)   Range of Motion Comprehensive   General Range of Motion   (limited B UE AROM shldrs)   Strength Comprehensive (MMT)   General Manual Muscle Testing (MMT) Assessment   (generalized weakness)   Coordination   Upper Extremity  Coordination No deficits were identified   Transfers   Transfers toilet transfer;sit-stand transfer   Sit-Stand Transfer   Sit-Stand [place] (Transfers) moderate assist (50% patient effort)   Toilet Transfer   Type (Toilet Transfer) sit-stand;stand-sit   [place] Level (Toilet Transfer) moderate assist (50% patient effort)   Balance   Balance Assessment standing dynamic balance   Balance Comments poor balance   Activities of Daily Living   BADL Assessment/Intervention grooming;lower body dressing   Lower Body Dressing Assessment/Training   [place] Level (Lower Body Dressing) minimum assist (75% patient effort)   Grooming Assessment/Training   [place] Level (Grooming) supervision   Clinical Impression   Criteria for Skilled Therapeutic Interventions Met (OT) Yes, treatment indicated   OT Diagnosis decreased ADLs   OT Problem List-Impairments impacting ADL balance;cognition;mobility;strength   Assessment of Occupational Performance 1-3 Performance Deficits   Identified Performance Deficits trsfs, g/h, drsg, cognition for ADLs   Planned Therapy Interventions (OT) ADL retraining;transfer training;cognition   Clinical Decision Making Complexity (OT) detailed assessment/moderate complexity   Risk & Benefits of therapy have been explained evaluation/treatment results reviewed;care plan/treatment goals reviewed;patient   OT Total Evaluation Time   OT Eval, Moderate Complexity Minutes (99779) 8   OT Goals   Therapy Frequency (OT) 4 times/week   OT Predicted Duration/Target Date for Goal Attainment 03/14/24   OT Goals Transfers;Lower Body Dressing;Hygiene/Grooming;Cognition   OT: Hygiene/Grooming supervision/stand-by assist   OT: Lower Body Dressing Minimal assist   OT: Transfer Minimal assist   OT: Cognitive Patient/caregiver will verbalize understanding of cognitive assessment results/recommendations as needed for safe discharge planning   Interventions   Interventions Quick Adds Self-Care/Home Management    Self-Care/Home Management   Self-Care/Home Mgmt/ADL, Compensatory, Meal Prep Minutes (79919) 8   Symptoms Noted During/After Treatment (Meal Preparation/Planning Training) fatigue   Treatment Detail/Skilled Intervention pt in chair, trsf chair<>commode w/ mod Ax2 w/ FWW, cues for hand placement and safety as pt is impulsive and with decreased balance, one step cues helpful, STS trials w/ mod A w/ cueing for technique, better w/ 2nd trial STS w/ more controlled descent, seat alarm on   [place] Level (Grooming Training) stand-by assist  (seated)   Assistance (Grooming Training) set-up required;supervision;verbal cues   Lower Body Dressing Training Assistance minimum assist (75% patient effort)  (don/doff socks)   Lower Body Dressing Training Assistance set-up required;supervision;verbal cues   OT Discharge Planning   OT Plan trial OT, close trsfs, g/h, drsg, SLUMS as needed   OT Discharge Recommendation (DC Rec) Long term care facility  (w/ added therapy services)   OT Rationale for DC Rec Ax2 for ADLs and trsfs, rec. supportive setting such as LTC w/ added therapy to increase basic ADLs and mobility,   OT Brief overview of current status mod Ax2 trsfs, SBA seated g/h, min A basic drsg   Total Session Time   Timed Code Treatment Minutes 8   Total Session Time (sum of timed and untimed services) 16

## 2024-03-07 NOTE — PLAN OF CARE
Goal Outcome Evaluation:       Patient attempts to get-out of bed multiple times. Became verbal and physical when staff redirect. Offered prn Zyprexa 2.5 mg IM which was helpful for sometimes. Patient sleeps on and off. Noted hallucinating multiple times. Staff assisted to bed-side commode and had Lg BM. Lozano cath in place and draining fine. VSS. Patient pulled out PIV. Will attempted to restart.

## 2024-03-08 LAB
ANION GAP SERPL CALCULATED.3IONS-SCNC: 8 MMOL/L (ref 7–15)
BUN SERPL-MCNC: 12 MG/DL (ref 8–23)
CALCIUM SERPL-MCNC: 8.2 MG/DL (ref 8.8–10.2)
CHLORIDE SERPL-SCNC: 107 MMOL/L (ref 98–107)
CREAT SERPL-MCNC: 1.16 MG/DL (ref 0.67–1.17)
DEPRECATED HCO3 PLAS-SCNC: 24 MMOL/L (ref 22–29)
EGFRCR SERPLBLD CKD-EPI 2021: 61 ML/MIN/1.73M2
GLUCOSE SERPL-MCNC: 89 MG/DL (ref 70–99)
MAGNESIUM SERPL-MCNC: 1.8 MG/DL (ref 1.7–2.3)
PLATELET # BLD AUTO: 271 10E3/UL (ref 150–450)
POTASSIUM SERPL-SCNC: 3 MMOL/L (ref 3.4–5.3)
POTASSIUM SERPL-SCNC: 3.6 MMOL/L (ref 3.4–5.3)
POTASSIUM SERPL-SCNC: 3.6 MMOL/L (ref 3.4–5.3)
SODIUM SERPL-SCNC: 139 MMOL/L (ref 135–145)

## 2024-03-08 PROCEDURE — 250N000011 HC RX IP 250 OP 636: Performed by: STUDENT IN AN ORGANIZED HEALTH CARE EDUCATION/TRAINING PROGRAM

## 2024-03-08 PROCEDURE — 258N000003 HC RX IP 258 OP 636: Performed by: INTERNAL MEDICINE

## 2024-03-08 PROCEDURE — 120N000001 HC R&B MED SURG/OB

## 2024-03-08 PROCEDURE — 83735 ASSAY OF MAGNESIUM: CPT | Performed by: INTERNAL MEDICINE

## 2024-03-08 PROCEDURE — G0463 HOSPITAL OUTPT CLINIC VISIT: HCPCS

## 2024-03-08 PROCEDURE — 250N000013 HC RX MED GY IP 250 OP 250 PS 637: Performed by: STUDENT IN AN ORGANIZED HEALTH CARE EDUCATION/TRAINING PROGRAM

## 2024-03-08 PROCEDURE — 82374 ASSAY BLOOD CARBON DIOXIDE: CPT | Performed by: INTERNAL MEDICINE

## 2024-03-08 PROCEDURE — 99232 SBSQ HOSP IP/OBS MODERATE 35: CPT | Performed by: INTERNAL MEDICINE

## 2024-03-08 PROCEDURE — 250N000013 HC RX MED GY IP 250 OP 250 PS 637: Performed by: REGISTERED NURSE

## 2024-03-08 PROCEDURE — 250N000013 HC RX MED GY IP 250 OP 250 PS 637: Performed by: INTERNAL MEDICINE

## 2024-03-08 PROCEDURE — 99222 1ST HOSP IP/OBS MODERATE 55: CPT | Performed by: REGISTERED NURSE

## 2024-03-08 PROCEDURE — 84132 ASSAY OF SERUM POTASSIUM: CPT | Performed by: INTERNAL MEDICINE

## 2024-03-08 PROCEDURE — 80048 BASIC METABOLIC PNL TOTAL CA: CPT | Performed by: INTERNAL MEDICINE

## 2024-03-08 PROCEDURE — 85049 AUTOMATED PLATELET COUNT: CPT | Performed by: STUDENT IN AN ORGANIZED HEALTH CARE EDUCATION/TRAINING PROGRAM

## 2024-03-08 PROCEDURE — 36415 COLL VENOUS BLD VENIPUNCTURE: CPT | Performed by: INTERNAL MEDICINE

## 2024-03-08 PROCEDURE — 250N000011 HC RX IP 250 OP 636: Performed by: INTERNAL MEDICINE

## 2024-03-08 RX ORDER — SODIUM CHLORIDE 9 MG/ML
INJECTION, SOLUTION INTRAVENOUS CONTINUOUS
Status: DISCONTINUED | OUTPATIENT
Start: 2024-03-08 | End: 2024-03-09

## 2024-03-08 RX ORDER — POTASSIUM CHLORIDE 1500 MG/1
40 TABLET, EXTENDED RELEASE ORAL ONCE
Status: COMPLETED | OUTPATIENT
Start: 2024-03-08 | End: 2024-03-08

## 2024-03-08 RX ORDER — HYDRALAZINE HYDROCHLORIDE 20 MG/ML
10 INJECTION INTRAMUSCULAR; INTRAVENOUS EVERY 6 HOURS PRN
Status: DISCONTINUED | OUTPATIENT
Start: 2024-03-08 | End: 2024-03-15 | Stop reason: HOSPADM

## 2024-03-08 RX ORDER — POTASSIUM CHLORIDE 7.45 MG/ML
10 INJECTION INTRAVENOUS
Qty: 600 ML | Refills: 0 | Status: COMPLETED | OUTPATIENT
Start: 2024-03-08 | End: 2024-03-08

## 2024-03-08 RX ORDER — POTASSIUM CHLORIDE 1500 MG/1
20 TABLET, EXTENDED RELEASE ORAL ONCE
Status: DISCONTINUED | OUTPATIENT
Start: 2024-03-08 | End: 2024-03-08

## 2024-03-08 RX ADMIN — Medication 1 TABLET: at 21:17

## 2024-03-08 RX ADMIN — AMLODIPINE BESYLATE 5 MG: 5 TABLET ORAL at 21:16

## 2024-03-08 RX ADMIN — Medication: at 21:27

## 2024-03-08 RX ADMIN — Medication: at 04:13

## 2024-03-08 RX ADMIN — POTASSIUM CHLORIDE 10 MEQ: 7.46 INJECTION, SOLUTION INTRAVENOUS at 17:22

## 2024-03-08 RX ADMIN — Medication 1000 MCG: at 09:12

## 2024-03-08 RX ADMIN — POTASSIUM CHLORIDE 10 MEQ: 7.46 INJECTION, SOLUTION INTRAVENOUS at 13:50

## 2024-03-08 RX ADMIN — MENTHOL AND METHYL SALICYLATE: 7.6; 29 OINTMENT TOPICAL at 13:50

## 2024-03-08 RX ADMIN — THIAMINE HCL TAB 100 MG 100 MG: 100 TAB at 09:12

## 2024-03-08 RX ADMIN — HYDRALAZINE HYDROCHLORIDE 10 MG: 20 INJECTION INTRAMUSCULAR; INTRAVENOUS at 16:19

## 2024-03-08 RX ADMIN — POTASSIUM CHLORIDE 10 MEQ: 7.46 INJECTION, SOLUTION INTRAVENOUS at 18:27

## 2024-03-08 RX ADMIN — POTASSIUM CHLORIDE 10 MEQ: 7.46 INJECTION, SOLUTION INTRAVENOUS at 16:09

## 2024-03-08 RX ADMIN — Medication 25 MG: at 09:12

## 2024-03-08 RX ADMIN — POTASSIUM CHLORIDE 10 MEQ: 7.46 INJECTION, SOLUTION INTRAVENOUS at 19:20

## 2024-03-08 RX ADMIN — AMOXICILLIN 500 MG: 250 CAPSULE ORAL at 21:20

## 2024-03-08 RX ADMIN — LEVOTHYROXINE SODIUM 100 MCG: 100 TABLET ORAL at 09:11

## 2024-03-08 RX ADMIN — FINASTERIDE 5 MG: 5 TABLET, FILM COATED ORAL at 09:12

## 2024-03-08 RX ADMIN — ATORVASTATIN CALCIUM 40 MG: 40 TABLET, FILM COATED ORAL at 21:18

## 2024-03-08 RX ADMIN — Medication 25 MG: at 21:32

## 2024-03-08 RX ADMIN — SODIUM CHLORIDE: 9 INJECTION, SOLUTION INTRAVENOUS at 21:06

## 2024-03-08 RX ADMIN — ENOXAPARIN SODIUM 40 MG: 40 INJECTION SUBCUTANEOUS at 09:10

## 2024-03-08 RX ADMIN — METOPROLOL TARTRATE 37.5 MG: 25 TABLET, FILM COATED ORAL at 09:12

## 2024-03-08 RX ADMIN — AMLODIPINE BESYLATE 5 MG: 5 TABLET ORAL at 09:12

## 2024-03-08 RX ADMIN — MENTHOL AND METHYL SALICYLATE: 7.6; 29 OINTMENT TOPICAL at 06:40

## 2024-03-08 RX ADMIN — AMOXICILLIN 500 MG: 250 CAPSULE ORAL at 09:10

## 2024-03-08 RX ADMIN — METOPROLOL TARTRATE 37.5 MG: 25 TABLET, FILM COATED ORAL at 21:16

## 2024-03-08 RX ADMIN — MENTHOL AND METHYL SALICYLATE: 7.6; 29 OINTMENT TOPICAL at 00:10

## 2024-03-08 RX ADMIN — Medication: at 09:13

## 2024-03-08 RX ADMIN — POTASSIUM CHLORIDE 10 MEQ: 7.46 INJECTION, SOLUTION INTRAVENOUS at 14:56

## 2024-03-08 RX ADMIN — QUETIAPINE FUMARATE 12.5 MG: 25 TABLET ORAL at 21:20

## 2024-03-08 RX ADMIN — Medication: at 16:19

## 2024-03-08 RX ADMIN — ASPIRIN 81 MG: 81 TABLET, COATED ORAL at 09:12

## 2024-03-08 ASSESSMENT — ACTIVITIES OF DAILY LIVING (ADL)
ADLS_ACUITY_SCORE: 54
ADLS_ACUITY_SCORE: 60
ADLS_ACUITY_SCORE: 56
ADLS_ACUITY_SCORE: 55
ADLS_ACUITY_SCORE: 54
ADLS_ACUITY_SCORE: 60
ADLS_ACUITY_SCORE: 54
ADLS_ACUITY_SCORE: 60
ADLS_ACUITY_SCORE: 60
ADLS_ACUITY_SCORE: 54
ADLS_ACUITY_SCORE: 60
ADLS_ACUITY_SCORE: 56
ADLS_ACUITY_SCORE: 60
ADLS_ACUITY_SCORE: 54
ADLS_ACUITY_SCORE: 55

## 2024-03-08 NOTE — PLAN OF CARE
Goal Outcome Evaluation:     1:1 sitter for safety. Disoriented x3. VSS. Rating pain 1/10 for headache. PRN Tylenol given, effective. Pt restless/agitated- requested x1 dose of PRN Seroquel, given and effective. Pt resting with some intermittent  incomprehensible talking while sleeping. Speech garbled. Lung sounds are coarse, requiring suction at times. Pt has a congestive cough, remains on room air with 96% 02 sats. Granddaughter was present at bedside for majority of shift. Pt has mepliex to buttox and right forearm.    Keeping HOB at 30 degrees tonight. Suction initiated, due to congestion and frequent congested coughing. RT saw patient. O2 sats were WDL. Chest x-ray requested, updated house officer of all results and status of patient. Will continue to closely monitor.

## 2024-03-08 NOTE — PROGRESS NOTES
[facility]    Hospitalist Progress Note    Date of Service (when I saw the patient): 03/07/2024    Assessment & Plan   Dilip Long is a pleasant 88 year old gentleman who was discharged yesterday, 3/4, after being admitted 2/29-3/4 for acute confusional state/encephalopathy and other medical issues.  Per the ED provider earlier today, while at home for a short time, Dilip developed psychosis and hallucinations.  His granddaughter Shruthi (his POA) stated they thought initially they could take care of him at home.  Last night, however, he did not sleep, was hallucinating and became difficult to control.  In the ED, he became combative and apparently tried to strike one of the nursing staff; restraints were started.  No other acute changes.  Current problems include:     Recurrent acute confusion and agitation; improved intermittently today  Recent acute toxic and metabolic encephalopathy  Recent elevated TSH; free T4 normal; likely euthyroid sick  Hypothyroidism, by history  - admit to Medicine; inpatient given his current need for close monitoring and work-up and anticipating > 2 nights in hospital  - per prior admission, granddaughter (Shruthi) 2/29 reported Dilip is normally conversational and alert and oriented without many issues  - 2/29 CT head w/o contrast negative for acute findings   - appreciate Neurology consult today  - continue current synthroid dose  - PT/OT eval's underway  - restraints renewed  - Care Mgr eval. w/ family today; therapies recommending TCU  - fall precautions  - continue 1:1 sitter until he is more calm  - low dose PRN seroquel at HS  - PRN olanzapine    Possible cognitive impairment  Possible underlying dementia  Possible sundowning  - treatments as above  - Psychiatry consult 3/8     Vitamin B6 and B12 deficiencies  - resume replacements  - follow up B1 level (pending at time of discharge on 3/4)     Moderate malnutrition; recent diagnosis, in context  of acute illness or injury; RD assessment:  % Weight Loss:  Does not meet criteria  % Intake:  < 75% for > 7 days  Subcutaneous Fat Loss:  Buccal - mild depletion, orbital - moderate depletion, -difficult to determine if all age related  Muscle Loss:  Clavicle - severe, thoracic - moderate, calves -mild depletion, dorsal hand - mild depletion - difficult to determine if all age related  Fluid Retention:  mild edema lower extremities  - RD to follow 3/6     Recent recurrent UTI  Chronic indwelling Lozano catheter - per son Mark, this was placed ~ 1 year ago.  - continue  Amoxicillin 500 mg BID      HTN   Hypertensive emergency; pressures variable/still elevated overnight and today.  - continue PRN hydralazine and PRN clonidine  - attempt further BP control today, to keep SBP < 160  - increase metoprolol from 25 mg BID to 37.5 mg BID  - 3/5 began amlodipine 2.5 mg BID ->  -> 3/7: increase amlodipine to 5 mg BID: continue     Falls  Physical deconditioning  - falls precautions  - PT/OT eval's when able      Recent Left neck dystonia; unclear duration.  - much improved at time of discharge 3/4, after 2 days of topical treatments  - follow up with Neurology after discharge: may need to consider Botox if Sx persist  - resume topical antiinflammatory Q 6 hours while awake  - review positioning options w/ OT     Recent Dx of oropharyngeal dysphagia  - had coughing with thin liquids during last admission  - continue thickened liquids  - consider Speech follow up eval.;   - had VSS 3/4: see report     CKD  - creatinine baseline 1.1-1.2  - daily BMP     Hypokalemia; low again  - add replacement orders  - 2/29 Mg normal; recheck 3/8     Chronic anemia, normocytic  - stable, no s/s of active bleed  - monitor     HFpEF  - euvolemic on exam, not in exacerbation   - not on home diuretics  - monitor volume status      A-fib; rates stable  - continue PTA atenolol   - not on home AC per med rec      Ulcerated plaque vs short segment  dissection flap  - appreciate Vascular Surgery follow up during last admission  - continue ASA 81 mg daily and atorvastatin 40 mg daily  - BP control as above      Hard of hearing  - needs evaluation for hearing aids as soon as possible after discharge       DVT Prophylaxis: Pneumatic Compression Devices, Anti-embolisim stockings (TEDs), and Ambulate every shift  Code Status: DNR / DNI    Disposition: Expected discharge in 1-2 days.        MARIA G Ewing MD, [facility]  Text Page (7am - 6pm)    Interval History   Doing better earlier today, then aggressive/combative again late afternoon; at times more oriented.  Follow directions better.   Appetite improved.  Reviewed w/ RN; now has erythema/maceration in gluteal area.    Data reviewed today: I reviewed all new labs and imaging results over the last 24 hours.     Physical Exam   Temp: 98.4  F (36.9  C) Temp src: Axillary BP: 126/65 Pulse: 94   Resp: 18 SpO2: 96 % O2 Device: None (Room air)    Vitals:    03/05/24 0456   Weight: 68.5 kg (151 lb)     Vital Signs with Ranges  Temp:  [97.5  F (36.4  C)-98.4  F (36.9  C)] 98.4  F (36.9  C)  Pulse:  [91-94] 94  Resp:  [18] 18  BP: (126-191)/() 126/65  SpO2:  [90 %-97 %] 96 %  I/O last 3 completed shifts:  In: 340 [P.O.:340]  Out: 250 [Urine:250]    Constitutional: awake, no apparent distress; lying in bed  HEENT: sclerae clear; MM's moist  Respiratory: good a/e bilaterally, no wheezing or rhonchi  Cardiovascular: regular rate and rhythm, S1, S2 noted; no m/r/g  GI: abdomen flat, + bowel sounds; soft, non-tender, non-distended  Skin/Integumen: per RN: gluteal area w/ erythema/maceration; no rashes, no cyanosis, no jaundice  Musculoskeletal: no edema  Neurologic: follows directions well; no focal deficits      Medications        amLODIPine  5 mg Oral BID    amoxicillin  500 mg Oral BID    aspirin  81 mg Oral Daily    atorvastatin  40 mg Oral QPM    cyanocobalamin  1,000 mcg Sublingual Daily     enoxaparin ANTICOAGULANT  40 mg Subcutaneous Q24H    finasteride  5 mg Oral Daily    levothyroxine  100 mcg Oral Q Mon Wed Fri AM    levothyroxine  75 mcg Oral Once per day on Sunday Tuesday Thursday Saturday    methyl salicylate-menthol   Topical Q6H    metoprolol tartrate  25 mg Oral BID    pyridOXINE  25 mg Oral Daily    senna-docusate  1 tablet Oral BID    Or    senna-docusate  2 tablet Oral BID    sodium chloride (PF)  3 mL Intracatheter Q8H    sodium chloride (PF)  3 mL Intracatheter Q8H    thiamine  100 mg Oral Daily    trolamine salicylate   Topical Q6H       Data   Recent Labs   Lab 03/07/24  0817 03/06/24  0809 03/05/24  0532 03/04/24  0534 03/03/24 0455 03/02/24 0534 03/01/24 2055 03/01/24 0624   WBC  --  6.5 8.9  --   --   --   --  8.8   HGB  --  10.7* 11.0*  --   --   --   --  11.5*   MCV  --  98 99  --   --   --   --  94   PLT  --  293 319  --  278  --   --  298   NA  --  139 137  --   --  136  --  134*   POTASSIUM  --  3.0* 3.6 3.7 3.9 4.0   < > 3.2*   CHLORIDE  --  106 103  --   --  101  --  97*   CO2  --  24 27  --   --  23  --  24   BUN  --  10.6 15.6  --   --  15.2  --  14.3   CR  --  1.00 1.23*  --   --  1.31*  --  1.21*   ANIONGAP  --  9 7  --   --  12  --  13   KRISTEN  --  8.2* 8.9  --   --  8.1*  --  8.4*   GLC 96 92 95  --   --  96  --  105*   ALBUMIN  --   --  3.5  --   --   --   --   --    PROTTOTAL  --   --  6.3*  --   --   --   --   --    BILITOTAL  --   --  0.5  --   --   --   --   --    ALKPHOS  --   --  61  --   --   --   --   --    ALT  --   --  6  --   --   --   --   --    AST  --   --  20  --   --   --   --   --     < > = values in this interval not displayed.       No results found for this or any previous visit (from the past 24 hour(s)).

## 2024-03-08 NOTE — PROGRESS NOTES
"Care Management Follow Up    Length of Stay (days): 3    Expected Discharge Date: 03/12/2024     Concerns to be Addressed: discharge planning, 1:1    Patient plan of care discussed at interdisciplinary rounds: Yes    Anticipated Discharge Disposition:  TCU     Anticipated Discharge Services:    Anticipated Discharge DME:      Patient/family educated on Medicare website which has current facility and service quality ratings:    Education Provided on the Discharge Plan:    Patient/Family in Agreement with the Plan:      Referrals Placed by CM/SW:  none at this time  Private pay costs discussed: Not applicable    Additional Information:  Chart reviewed:  Pt recently here and left AMA with family. Per granddaughter Shruthi, they took pt home 3/4/24 and family was staying with him at his spouse, but it became too much for them and pt was brought back for placement.    3/7 Therapy recommendation is TCU and LTC. \"At this point if pt was to return home after TCU stay the recommendation would be 24hr assist, hospital bed, commode, zuhair lift, and safest plan may be LTC with added therapy.\"     3/7 CM went to pt room to discuss discharge planning. Pt was busy working with PT and son was there. I introduced myself and role and the son told CM that his niece Shruthi is the main family contact. CM then reached out to Shruthi via phone. She told CM that she is currently staying in the pt's home to help but that he became too weak to care for and she brought him back to the ER. We discussed LTC vs TCU and she would be interested in TCU at this point but not LTC as the goal would be to get him home. We discussed that I am unsure if TCU will be covered by insurance but we can send referrals and see what admissions says. We dicussed ways to look into TCUs and she wanted CM to email her a list of TCUs and she will look over and call CM with her top TCU choices. CM emailed her the [facility] TCU list.    3/8 pt back on a 1:1. CM called " and LVM with pt's granddaughter to see if we could discuss discharge plans and TCU list.    RNCM to follow for medical progression, recommendations, and final discharge plan.         Niurka Mackenzie RN

## 2024-03-08 NOTE — PLAN OF CARE
Problem: Confusion Acute  Goal: Optimal Cognitive Function  Outcome: Progressing   Goal Outcome Evaluation:                      Pt has been restless and aggressive [id number]. IM Zyprexa given X1.   Unable to redirect. Melatonin and Trazodone also given.

## 2024-03-08 NOTE — PROGRESS NOTES
Pt became congested and coarse to lungs with a congested cough. Suction was performed with small amount of clear, frothy output. RT saw patient, 02 sats 98% on RA. Respiratory rate is 20. Chest X-ray was requested and completed. Granddaughter Shruthi updated and aware of plan.     Pt coughed up large sputum/mucous plug and is less congested. Will await chest xray. Pt is on a thickened liquid diet. He has been taking pills whole in applesauce/pudding but will plan to crush medications moving forward.

## 2024-03-08 NOTE — PROVIDER NOTIFICATION
Text page Dr. Gildardo Saunders to update patient had decreased urine output overnight and no oral intake since he slept all night.

## 2024-03-08 NOTE — CONSULTS
[facility] Nurse Inpatient Assessment     Consulted for: Sacrum/Scrotum    Patient History (according to provider note(s):    Assessment & Plan  Dilip Long is a pleasant 88 year old gentleman who was discharged yesterday, 3/4, after being admitted 2/29-3/4 for acute confusional state/encephalopathy and other medical issues.  Per the ED provider earlier today, while at home for a short time, Dilip developed psychosis and hallucinations.  His granddaughter Shruthi (his POA) stated they thought initially they could take care of him at home.  Last night, however, he did not sleep, was hallucinating and became difficult to control.  In the ED, he became combative and apparently tried to strike one of the nursing staff; restraints were started.  No other acute changes.  Current problems include:     Recurrent acute confusion and agitation  Recent acute toxic and metabolic encephalopathy  Recent elevated TSH; free T4 normal; likely euthyroid sick  Hypothyroidism, by history  - admit to Medicine; inpatient given his current need for close monitoring and work-up and anticipating > 2 nights in hospital  - per prior admission, granddaughter (Shruthi) 2/29 reported Dilip is normally conversational and alert and oriented without many issues  - 2/29 CT head w/o contrast negative for acute findings   - Neurology consult; reviewed abrupt changes w/ Dr. Gali Dutta today  - continue current synthroid dose  - PT/OT eval's when able  - restraints renewed  - Care Mgr eval.3/6 if able  - fall precautions  - continue 1:1 sitter until he is more calm  - low dose PRN seroquel     Vitamin B6 and B12 deficiencies  - resume replacements  - follow up B1 level (pending at time of discharge on 3/4)     Moderate malnutrition; recent diagnosis, in context of acute illness or injury; RD assessment:  % Weight Loss:  Does not meet criteria  % Intake:  < 75% for > 7 days  Subcutaneous Fat Loss:  Buccal -  mild depletion, orbital - moderate depletion, -difficult to determine if all age related  Muscle Loss:  Clavicle - severe, thoracic - moderate, calves -mild depletion, dorsal hand - mild depletion - difficult to determine if all age related  Fluid Retention:  mild edema lower extremities  - RD to follow 3/6     Recent recurrent UTI  Chronic indwelling Lozano catheter - per son Mark, this was placed ~ 1 year ago.  - resume Amoxicillin 500 mg BID      HTN emergency; pressures variable/gradually improving today.  - add PRN hydralazine and PRN clonidine  - attempt further BP control today, to keep SBP < 160  - resume PTA metoprolol to 25 mg BID     Falls  Physical deconditioning  - falls precautions  - PT/OT eval's when able      Recent Left neck dystonia; unclear duration.  - much improved at time of discharge 3/4, after 2 days of topical treatments  - follow up with Neurology after discharge: may need to consider Botox if Sx persist  - resume topical antiinflammatory Q 6 hours while awake  - review positioning options w/ OT     Recent Dx of oropharyngeal dysphagia  - had coughing with thin liquids during last admission  - continue thickened liquids  - consider Speech follow up eval.;   - had VSS today 3/4: see report     CKD  - creatinine baseline 1.1-1.2  - daily BMP     Hypokalemia; corrected/stable  - recheck in 1-2 days  - 2/29 Mg normal     Chronic anemia, normocytic  - stable, no s/s of active bleed  - monitor     HFpEF  - euvolemic on exam, not in exacerbation   - not on home diuretics  - monitor volume status      A-fib; rates stable  - continue PTA atenolol   - not on home AC per med rec      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  - appreciate Vascular Surgery follow up during last admission  -> will resume ASA 81 mg daily and atorvastatin 40 mg daily  - BP control as above      Hard of hearing  - needs evaluation for hearing aids as soon as possible after discharge       Assessment:    Skin  Injury Location: Sacrum    Skin injury due to: Incontinence associated dermatitis (IAD) and Irritant contact dermatitis due to fecal, urinary or dual incontinence   Skin history and plan of care: New injury this admission  Affected area:      Skin assessment: Intact, Dry/flaky, and Erythema     Measurements (length x width x depth, in cm) light pink to red discoloration noted to perianal skin and scrotum     Color: normal and consistent with surrounding tissue     Temperature  normal      Drainage: none .      Color: none      Odor: none  Pain: no grimacing or signs of discomfort  Pain interventions prior to dressing change: patient tolerated well and slow and gentle cares   Treatment goal: Maintain (prevention of deterioration) and Protection  STATUS: initial assessment  Supplies ordered: at bedside      Treatment Plan:     Continue with Calmoseptine as ordered    Orders: Reviewed    RECOMMEND PRIMARY TEAM ORDER: None, at this time  Education provided: plan of care  Discussed plan of care with: Patient and Nurse  WOC nurse follow-up plan: signing off  Notify WOC if wound(s) deteriorate.  Nursing to notify the Provider(s) and re-consult the WOC Nurse if new skin concern.    DATA:     Current support surface: Standard  Standard gel/foam mattress (IsoFlex, Atmos air, etc)  Containment of urine/stool: Continent of bowel, Incontinent pad in bed, and Indwelling catheter  BMI: Body mass index is 22.96 kg/m .   Active diet order: Orders Placed This Encounter      Combination Diet Regular Diet Adult     Output: I/O last 3 completed shifts:  In: 320 [P.O.:320]  Out: 350 [Urine:350]     Labs:   Recent Labs   Lab 03/06/24  0809 03/05/24  0532   ALBUMIN  --  3.5   HGB 10.7* 11.0*   WBC 6.5 8.9     Pressure injury risk assessment:   Sensory Perception: 2-->very limited  Moisture: 4-->rarely moist  Activity: 1-->bedfast  Mobility: 2-->very limited  Nutrition: 3-->adequate  Friction and Shear: 1-->problem  Esdras Score: 13    Collette  Mili MSN RN CWOCN  Pager no longer is use, please contact through [employer] group: [facility]

## 2024-03-08 NOTE — PLAN OF CARE
Problem: Adult Inpatient Plan of Care  Goal: Optimal Comfort and Wellbeing  Outcome: Progressing     Problem: Skin Injury Risk Increased  Goal: Skin Health and Integrity  Intervention: Plan: Nurse Driven Intervention: Positioning  Recent Flowsheet Documentation  Taken 3/8/2024 0420 by Myla Schneider RN  Plan: Positioning Interventions: HOB 30 degrees or less  Taken 3/8/2024 0000 by Myla Schneider RN  Plan: Positioning Interventions: HOB 30 degrees or less     Problem: Risk for Delirium  Goal: Improved Behavioral Control  Intervention: Prevent and Manage Agitation  Recent Flowsheet Documentation  Taken 3/8/2024 0420 by Myla Schneider, RN  Complementary Therapy: music therapy provided  Taken 3/8/2024 0000 by Myla Schneider RN  Complementary Therapy: music therapy provided   Goal Outcome Evaluation:    Patient alert with confusion. Noted resting comfortably during the night. 1:1 sitter in the room for safety. Noted calm and sleepy overnight. Continued to have a cough overnight on and off. Lozano patent, output decreased, did not ingest fluids overnight since he was sound a sleep. Will continue to monitor the patient.

## 2024-03-08 NOTE — PROGRESS NOTES
[facility]    Hospitalist Progress Note    Date of Service (when I saw the patient): 03/08/2024    Assessment & Plan   Dilip Long is a pleasant 88 year old gentleman who was discharged yesterday, 3/4, after being admitted 2/29-3/4 for acute confusional state/encephalopathy and other medical issues.  Per the ED provider earlier today, while at home for a short time, Dilip developed psychosis and hallucinations.  His granddaughter Shruthi (his POA) stated they thought initially they could take care of him at home.  Last night, however, he did not sleep, was hallucinating and became difficult to control.  In the ED, he became combative and apparently tried to strike one of the nursing staff; restraints were started.  No other acute changes.  Current problems include:     Recurrent acute confusion and agitation; improved intermittently today  Recent acute toxic and metabolic encephalopathy  Recent elevated TSH; free T4 normal; likely euthyroid sick  Hypothyroidism, by history  - re-admitted to Medicine 3/5; inpatient given his current need for close monitoring and work-up and anticipating > 2 nights in hospital  - per prior admission, granddaughter (Shruthi) 2/29 reported Dilip is normally conversational and alert and oriented without many issues  - 2/29 CT head w/o contrast negative for acute findings   - appreciate Neurology consult   - continue current synthroid dose  - PT/OT eval's underway  - restraints renewed  - Care Mgr eval. w/ family today; therapies recommending TCU  - fall precautions  - continue 1:1 sitter until he is more calm  - low dose PRN seroquel at HS  - PRN olanzapine    Possible cognitive impairment  Possible underlying dementia  - intermittent agitation/combative behavior  Possible sundowning  - treatments as above  - appreciate Psychiatry consult today, 3/8 -> adjustments to seroquel; continue delirium precautions     Vitamin B6 and B12 deficiencies  - resume  replacements  - follow up B1 level (pending at time of discharge on 3/4)     Moderate malnutrition; recent diagnosis, in context of acute illness or injury; RD assessment:  % Weight Loss:  Does not meet criteria  % Intake:  < 75% for > 7 days  Subcutaneous Fat Loss:  Buccal - mild depletion, orbital - moderate depletion, -difficult to determine if all age related  Muscle Loss:  Clavicle - severe, thoracic - moderate, calves -mild depletion, dorsal hand - mild depletion - difficult to determine if all age related  Fluid Retention:  mild edema lower extremities  - RD to follow 3/6     Recent recurrent UTI  Chronic indwelling Lozano catheter - per son Mark, this was placed ~ 1 year ago.  - continue  Amoxicillin 500 mg BID      HTN   Hypertensive emergency; pressures variable/still elevated overnight and today.  - continue PRN hydralazine and PRN clonidine  - attempt further BP control today, to keep SBP < 160  - increase metoprolol from 25 mg BID to 37.5 mg BID  - 3/5 began amlodipine 2.5 mg BID ->  -> 3/7: increase amlodipine to 5 mg BID: continue     Falls  Physical deconditioning  - falls precautions  - PT/OT eval's when able      Recent Left neck dystonia; unclear duration.  - much improved at time of discharge 3/4, after 2 days of topical treatments  - follow up with Neurology after discharge: may need to consider Botox if Sx persist  - resume topical antiinflammatory Q 6 hours while awake  - review positioning options w/ OT     Recent Dx of oropharyngeal dysphagia  - had coughing with thin liquids during last admission  - continue thickened liquids  - consider Speech follow up eval.;   - had VSS 3/4: see report     CKD  - creatinine baseline 1.1-1.2  - daily BMP     Hypokalemia; low again  - add replacement orders  - 2/29 Mg normal; recheck 3/8     Chronic anemia, normocytic  - stable, no s/s of active bleed  - monitor     HFpEF  - euvolemic on exam, not in exacerbation   - not on home diuretics  - monitor volume  status      A-fib; rates stable  - continue PTA atenolol   - not on home AC per med rec      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  - appreciate Vascular Surgery follow up during last admission  - continue ASA 81 mg daily and atorvastatin 40 mg daily  - BP control as above      Hard of hearing  - needs evaluation for hearing aids as soon as possible after discharge       DVT Prophylaxis: Pneumatic Compression Devices, Anti-embolisim stockings (TEDs), and Ambulate every shift  Code Status: DNR / DNI    Disposition: Expected discharge in 1-2 days.        MARIA G Ewing MD, [facility]     [facility]  Text Page (7am - 6pm)    Interval History   Reviewed care w/ RN, w/ Dilip and his son Lito.  Appetite fair; decreased urine output today; 150 ml in last shift.  More sedated, sleepy this afternoon.  No aggressive/combative behavior last night or today, so far; at times more oriented.  RN reported now has erythema/maceration in gluteal area.    Data reviewed today: I reviewed all new labs and imaging results over the last 24 hours.     Physical Exam   Temp: 97.9  F (36.6  C) Temp src: Oral BP: (!) 170/71 Pulse: 69   Resp: 21 SpO2: 97 % O2 Device: None (Room air)    Vitals:    03/05/24 0456   Weight: 68.5 kg (151 lb)     Vital Signs with Ranges  Temp:  [97.9  F (36.6  C)-98.4  F (36.9  C)] 97.9  F (36.6  C)  Pulse:  [66-92] 69  Resp:  [18-21] 21  BP: (118-173)/(59-84) 170/71  SpO2:  [94 %-98 %] 97 %  I/O last 3 completed shifts:  In: 340 [P.O.:340]  Out: 500 [Urine:500]    Constitutional: awake, no apparent distress; lying in bed  HEENT: sclerae clear; MM's moist  Respiratory: good a/e bilaterally, no wheezing or rhonchi  Cardiovascular: regular rate and rhythm, S1, S2 noted; no m/r/g  GI: abdomen flat, + bowel sounds; soft, non-tender, non-distended  Skin/Integumen: per RN: gluteal area w/ erythema/maceration; no rashes, no cyanosis, no jaundice  Musculoskeletal: trace edema at ankles  Neurologic:  follows directions well; no focal deficits      Medications        amLODIPine  5 mg Oral BID    amoxicillin  500 mg Oral BID    aspirin  81 mg Oral Daily    atorvastatin  40 mg Oral QPM    cyanocobalamin  1,000 mcg Sublingual Daily    enoxaparin ANTICOAGULANT  40 mg Subcutaneous Q24H    finasteride  5 mg Oral Daily    levothyroxine  100 mcg Oral Q Mon Wed Fri AM    levothyroxine  75 mcg Oral Once per day on Sunday Tuesday Thursday Saturday    methyl salicylate-menthol   Topical Q6H    metoprolol tartrate  37.5 mg Oral BID    potassium chloride  10 mEq Intravenous Q1H    pyridOXINE  25 mg Oral Daily    senna-docusate  1 tablet Oral BID    Or    senna-docusate  2 tablet Oral BID    sodium chloride (PF)  3 mL Intracatheter Q8H    sodium chloride (PF)  3 mL Intracatheter Q8H    thiamine  100 mg Oral Daily    trolamine salicylate   Topical Q6H       Data   Recent Labs   Lab 03/08/24  0719 03/07/24  0817 03/06/24  0809 03/05/24  0532   WBC  --   --  6.5 8.9   HGB  --   --  10.7* 11.0*   MCV  --   --  98 99     --  293 319     --  139 137   POTASSIUM 3.0*  --  3.0* 3.6   CHLORIDE 107  --  106 103   CO2 24  --  24 27   BUN 12.0  --  10.6 15.6   CR 1.16  --  1.00 1.23*   ANIONGAP 8  --  9 7   KRISTEN 8.2*  --  8.2* 8.9   GLC 89 96 92 95   ALBUMIN  --   --   --  3.5   PROTTOTAL  --   --   --  6.3*   BILITOTAL  --   --   --  0.5   ALKPHOS  --   --   --  61   ALT  --   --   --  6   AST  --   --   --  20       Recent Results (from the past 24 hour(s))   XR Chest Port 1 View    Narrative    EXAM: XR CHEST PORT 1 VIEW  LOCATION: [facility]  DATE: 3/7/2024    INDICATION: Crackles after witnessed aspiration.  COMPARISON: Portable chest single view (two films) 02/29/2024 at 1048 hours.      Impression    IMPRESSION: Minor strands of linear atelectasis left base. Both lungs are otherwise clear. No adenopathy or effusion. Normal cardiac size and pulmonary vascularity. Atherosclerotic thoracic aorta.  Remote healed right rib fracture deformities. Healing   left 7th rib fracture deformity. Healed left 6th and 8th rib fracture deformities. Degenerative changes both shoulders and the spine.

## 2024-03-08 NOTE — CONSULTS
Initial Psychiatric Consult   Consult date: March 8, 2024         Reason for Consult, requesting source:    Please evaluate regarding ongoing intermittent confusion and possible sundowning with aggressive behaviors and afternoon and evenings    Requesting source: Chip wEing    Labs and imaging reviewed.  Provider contacted with recommendations.          HPI:   Psychiatry seeing patient today to evaluate ongoing intermittent confusion as well as aggressive behaviors in the afternoon and evenings.    Dilip Long is a pleasant 88 year old gentleman who was discharged yesterday, 3/4, after being admitted 2/29-3/4 for acute confusional state/encephalopathy and other medical issues.  Per the ED provider earlier today, while at home for a short time, Dilip developed psychosis and hallucinations.  His granddaughter Shruthi (his POA) stated they thought initially they could take care of him at home.  Last night, however, he did not sleep, was hallucinating and became difficult to control.  In the ED, he became combative and apparently tried to strike one of the nursing staff; restraints were started.  No other acute changes.       Today, nursing reports that patient has also been agitated at home, and physically swinging out at family.  During his hospitalization, patient has been intermittently confused primarily in the late afternoon and early evening.  Patient will appear restless and agitated.  During my visit today, patient was sedate as he had received medication overnight.             Past Psychiatric History:   History limited by patient's status.  But no indication of psychiatric hospitalizations or treatments.        Substance Use and History:     Tobacco Use    Smoking status: Former     Types: Cigarettes    Smokeless tobacco: Never    Tobacco comments:     Pt quit 40 years ago   Substance Use Topics    Alcohol use: Yes     Comment: < 1 drink a week           Past Medical History:   PAST MEDICAL  HISTORY:   Past Medical History:   Diagnosis Date    Acute heart failure with preserved ejection fraction (HFpEF) (H) 11/10/2022    Acute prostatitis     Asymptomatic bacteriuria 10/23/2013    Noted at 10/2/13 office visit at [facility]. No treatment recommended at that time.     Congestive heart failure (H)     Essential hypertension 08/18/2015    Hypertension     Paroxysmal atrial fib -- on Eliquis 11/06/2022    Syncope     Thyroid disease        PAST SURGICAL HISTORY:   Past Surgical History:   Procedure Laterality Date    BLADDER SURGERY      Bladder absces removal    Blepharoplasty Upper Lid W/ Excessive Skin[  1/29/2007    CATARACT EXTRACTION      CYSTOSCOPY, FULGURATE BLEEDERS, EVACUATE CLOT(S), COMBINED N/A 4/19/2023    Procedure: CYSTOSCOPY, BLADDER BIOPSY WITH FULGURATION AND CLOT EVACUATION;  Surgeon: Edgar Laird MD;  Location: [facility]    EYE SURGERY      both eyes 2015    IR LUMBAR EPIDURAL STEROID INJECTION  4/27/2004    IR LUMBAR EPIDURAL STEROID INJECTION  5/11/2004    IR LUMBAR EPIDURAL STEROID INJECTION  11/24/2004    IR LUMBAR EPIDURAL STEROID INJECTION  11/28/2007    CA CYSTOURETHROSCOPY W/IRRIG & EVAC CLOTS N/A 7/27/2018    Procedure: CYSTOSCOPY, CLOT EVACUATION ,URETHRAL DILATATION, DAUGHERTY CATHETER PLACEMENT;  Surgeon: Lowell Arias MD;  Location: [facility];  Service: Urology             Family History:   FAMILY HISTORY:   Family History   Problem Relation Age of Onset    Diabetes No family hx of     Hypertension No family hx of     Other Cancer No family hx of     Coronary Artery Disease No family hx of     Hyperlipidemia No family hx of     Cerebrovascular Disease No family hx of     Breast Cancer No family hx of     Colon Cancer No family hx of     Prostate Cancer No family hx of     Depression No family hx of     Anxiety Disorder No family hx of     Mental Illness No family hx of     Substance Abuse No family hx of     Anesthesia Reaction No family hx  of     Asthma No family hx of     Osteoporosis No family hx of     Genetic Disorder No family hx of     Thyroid Disease No family hx of     Obesity No family hx of     No Known Problems Mother     No Known Problems Father            Social History:   History limited by patient status.         Physical ROS:   The 10 point Review of Systems is negative other than noted in the HPI or here.           Medications:      amLODIPine  5 mg Oral BID    amoxicillin  500 mg Oral BID    aspirin  81 mg Oral Daily    atorvastatin  40 mg Oral QPM    cyanocobalamin  1,000 mcg Sublingual Daily    enoxaparin ANTICOAGULANT  40 mg Subcutaneous Q24H    finasteride  5 mg Oral Daily    levothyroxine  100 mcg Oral Q Mon Wed Fri AM    levothyroxine  75 mcg Oral Once per day on Sunday Tuesday Thursday Saturday    methyl salicylate-menthol   Topical Q6H    metoprolol tartrate  37.5 mg Oral BID    potassium chloride  10 mEq Intravenous Q1H    pyridOXINE  25 mg Oral Daily    senna-docusate  1 tablet Oral BID    Or    senna-docusate  2 tablet Oral BID    sodium chloride (PF)  3 mL Intracatheter Q8H    sodium chloride (PF)  3 mL Intracatheter Q8H    thiamine  100 mg Oral Daily    trolamine salicylate   Topical Q6H              Allergies:   No Known Allergies       Labs:     Recent Results (from the past 48 hour(s))   Glucose by meter    Collection Time: 03/07/24  8:17 AM   Result Value Ref Range    GLUCOSE BY METER POCT 96 70 - 99 mg/dL   Platelet count    Collection Time: 03/08/24  7:19 AM   Result Value Ref Range    Platelet Count 271 150 - 450 10e3/uL   Magnesium    Collection Time: 03/08/24  7:19 AM   Result Value Ref Range    Magnesium 1.8 1.7 - 2.3 mg/dL   Basic metabolic panel    Collection Time: 03/08/24  7:19 AM   Result Value Ref Range    Sodium 139 135 - 145 mmol/L    Potassium 3.0 (L) 3.4 - 5.3 mmol/L    Chloride 107 98 - 107 mmol/L    Carbon Dioxide (CO2) 24 22 - 29 mmol/L    Anion Gap 8 7 - 15 mmol/L    Urea Nitrogen 12.0 8.0 - 23.0  mg/dL    Creatinine 1.16 0.67 - 1.17 mg/dL    GFR Estimate 61 >60 mL/min/1.73m2    Calcium 8.2 (L) 8.8 - 10.2 mg/dL    Glucose 89 70 - 99 mg/dL          Physical and Psychiatric Examination:     BP (!) 158/67 (BP Location: Right arm)   Pulse 92   Temp 98.4  F (36.9  C) (Axillary)   Resp 20   Wt 68.5 kg (151 lb)   SpO2 94%   BMI 22.96 kg/m    Weight is 151 lbs 0 oz  Body mass index is 22.96 kg/m .    Physical Exam:  I have reviewed the physical exam as documented by by the medical team and agree with findings and assessment and have no additional findings to add at this time.         MSE:   Patient was resting comfortably and in no acute distress.             DSM-5 Diagnosis:   Neurocognitive Disorder, delirium          Assessment:   Psychiatry seeing patient today regarding ongoing intermittent confusion and aggressive behaviors in afternoon and evening.  It does not appear that patient has historical mental health diagnoses or history of dementia.  However, report indicates that patient behavior had become more physically restless at home, as well as during his current hospitalization.           Summary of Recommendations:   1) Scheduled Seroquel 12.5 mg at HS to help target sleep and hallucinations    2) Added Seroquel 12.5 mg BID PRN to target intermittent agitation associated with delirium and hallucinations    3) Avoid benzodiazepines, anticholinergics, and opioids as these can contribute to confusion    4) Delirium precautions:  Up during the day with lights on  Lights off at night, avoid interruptions during the night as much as possible  Family visits  Encourage wearing glasses  Reorientation  Continue to ensure proper nutrition, fluid and electrolyte balance. Monitor for infections, hypoxia, metabolic derangements, or other causes of delirium.               Page me or re-consult psychiatry as needed.       Johana Alvarez, DENITAP, APRN  Consult/Liaison Psychiatry  [facility]  [facility]   Contact information available via [facility] Paging/Directory.  If I am not available, please call [facility] intake ([phone])

## 2024-03-09 ENCOUNTER — APPOINTMENT (OUTPATIENT)
Dept: PHYSICAL THERAPY | Facility: HOSPITAL | Age: 89
DRG: 071 | End: 2024-03-09
Payer: COMMERCIAL

## 2024-03-09 LAB
HOLD SPECIMEN: NORMAL
POTASSIUM SERPL-SCNC: 3.4 MMOL/L (ref 3.4–5.3)
POTASSIUM SERPL-SCNC: 3.5 MMOL/L (ref 3.4–5.3)

## 2024-03-09 PROCEDURE — 36415 COLL VENOUS BLD VENIPUNCTURE: CPT | Performed by: INTERNAL MEDICINE

## 2024-03-09 PROCEDURE — 250N000013 HC RX MED GY IP 250 OP 250 PS 637: Performed by: INTERNAL MEDICINE

## 2024-03-09 PROCEDURE — 120N000001 HC R&B MED SURG/OB

## 2024-03-09 PROCEDURE — 97116 GAIT TRAINING THERAPY: CPT | Mod: GP

## 2024-03-09 PROCEDURE — 84132 ASSAY OF SERUM POTASSIUM: CPT | Performed by: INTERNAL MEDICINE

## 2024-03-09 PROCEDURE — 250N000013 HC RX MED GY IP 250 OP 250 PS 637: Performed by: REGISTERED NURSE

## 2024-03-09 PROCEDURE — 99232 SBSQ HOSP IP/OBS MODERATE 35: CPT | Performed by: INTERNAL MEDICINE

## 2024-03-09 PROCEDURE — 250N000011 HC RX IP 250 OP 636: Performed by: STUDENT IN AN ORGANIZED HEALTH CARE EDUCATION/TRAINING PROGRAM

## 2024-03-09 PROCEDURE — 250N000013 HC RX MED GY IP 250 OP 250 PS 637: Performed by: STUDENT IN AN ORGANIZED HEALTH CARE EDUCATION/TRAINING PROGRAM

## 2024-03-09 PROCEDURE — 97530 THERAPEUTIC ACTIVITIES: CPT | Mod: GP

## 2024-03-09 RX ORDER — POTASSIUM CHLORIDE 1500 MG/1
40 TABLET, EXTENDED RELEASE ORAL ONCE
Status: COMPLETED | OUTPATIENT
Start: 2024-03-09 | End: 2024-03-09

## 2024-03-09 RX ADMIN — AMOXICILLIN 500 MG: 250 CAPSULE ORAL at 20:48

## 2024-03-09 RX ADMIN — METOPROLOL TARTRATE 37.5 MG: 25 TABLET, FILM COATED ORAL at 20:43

## 2024-03-09 RX ADMIN — Medication: at 09:05

## 2024-03-09 RX ADMIN — ATORVASTATIN CALCIUM 40 MG: 40 TABLET, FILM COATED ORAL at 20:44

## 2024-03-09 RX ADMIN — Medication 25 MG: at 09:04

## 2024-03-09 RX ADMIN — THIAMINE HCL TAB 100 MG 100 MG: 100 TAB at 09:04

## 2024-03-09 RX ADMIN — FINASTERIDE 5 MG: 5 TABLET, FILM COATED ORAL at 09:04

## 2024-03-09 RX ADMIN — AMLODIPINE BESYLATE 5 MG: 5 TABLET ORAL at 09:04

## 2024-03-09 RX ADMIN — Medication 1 TABLET: at 20:44

## 2024-03-09 RX ADMIN — ENOXAPARIN SODIUM 40 MG: 40 INJECTION SUBCUTANEOUS at 09:04

## 2024-03-09 RX ADMIN — Medication 1000 MCG: at 09:04

## 2024-03-09 RX ADMIN — AMLODIPINE BESYLATE 5 MG: 5 TABLET ORAL at 20:44

## 2024-03-09 RX ADMIN — METOPROLOL TARTRATE 37.5 MG: 25 TABLET, FILM COATED ORAL at 09:04

## 2024-03-09 RX ADMIN — POTASSIUM CHLORIDE 40 MEQ: 1500 TABLET, EXTENDED RELEASE ORAL at 09:04

## 2024-03-09 RX ADMIN — CLONIDINE HYDROCHLORIDE 0.1 MG: 0.1 TABLET ORAL at 20:48

## 2024-03-09 RX ADMIN — QUETIAPINE FUMARATE 12.5 MG: 25 TABLET ORAL at 20:44

## 2024-03-09 RX ADMIN — LEVOTHYROXINE SODIUM 75 MCG: 0.03 TABLET ORAL at 09:04

## 2024-03-09 RX ADMIN — ASPIRIN 81 MG: 81 TABLET, COATED ORAL at 09:04

## 2024-03-09 RX ADMIN — Medication 25 MG: at 20:43

## 2024-03-09 RX ADMIN — AMOXICILLIN 500 MG: 250 CAPSULE ORAL at 09:04

## 2024-03-09 ASSESSMENT — ACTIVITIES OF DAILY LIVING (ADL)
ADLS_ACUITY_SCORE: 54
ADLS_ACUITY_SCORE: 55
ADLS_ACUITY_SCORE: 54
ADLS_ACUITY_SCORE: 56
ADLS_ACUITY_SCORE: 54
ADLS_ACUITY_SCORE: 55
ADLS_ACUITY_SCORE: 54
ADLS_ACUITY_SCORE: 55
ADLS_ACUITY_SCORE: 54
ADLS_ACUITY_SCORE: 56
ADLS_ACUITY_SCORE: 55
ADLS_ACUITY_SCORE: 54
ADLS_ACUITY_SCORE: 55
ADLS_ACUITY_SCORE: 55
ADLS_ACUITY_SCORE: 54
ADLS_ACUITY_SCORE: 56
ADLS_ACUITY_SCORE: 55

## 2024-03-09 NOTE — PLAN OF CARE
Goal Outcome Evaluation:    Alert today. Orientation fluctuates.  Confused to situation. Tolerating diet. Worked with therapy today. Sitting in the chair.  No complaints of pain. Lozano is patent and draining.  Family visiting at the bedside.

## 2024-03-09 NOTE — PLAN OF CARE
Problem: Adult Inpatient Plan of Care  Goal: Absence of Hospital-Acquired Illness or Injury  Outcome: Progressing  Intervention: Identify and Manage Fall Risk  Recent Flowsheet Documentation  Taken 3/8/2024 1615 by Gemini Mendez, RN  Safety Promotion/Fall Prevention: nonskid shoes/slippers when out of bed  Intervention: Prevent Skin Injury  Recent Flowsheet Documentation  Taken 3/8/2024 1615 by Gemini Mendez, RN  Body Position: position maintained  Intervention: Prevent and Manage VTE (Venous Thromboembolism) Risk  Recent Flowsheet Documentation  Taken 3/8/2024 1615 by Gemini Mendez, RN  VTE Prevention/Management: patient refused intervention  Intervention: Prevent Infection  Recent Flowsheet Documentation  Taken 3/8/2024 1615 by Gemini Mendez, RN  Infection Prevention: cohorting utilized   Goal Outcome Evaluation:      Plan of Care Reviewed With: patient, parent    Overall Patient Progress: improvingOverall Patient Progress: improving     Patient alert to self, aware of the year, knows he is in the hospital.  Denies pain or nausea.  States he feels hungry.  Turn and reposition every two hours.  Chronic Lozano in place.  Potassium protocol, potassium recheck at 2142.

## 2024-03-09 NOTE — PROVIDER NOTIFICATION
Notified house officer pupils unequal but reactive. Right pupil smaller than left. No other neuro changes. See neuro flowsheets for assessments.

## 2024-03-09 NOTE — PROGRESS NOTES
[facility]    Medicine Progress Note - Hospitalist Service    Date of Admission:  3/5/2024    Assessment & Plan   Dilip Long is a pleasant 88 year old gentleman who was discharged yesterday, 3/4, after being admitted 2/29-3/4 for acute confusional state/encephalopathy and other medical issues.  Per the ED provider earlier today, while at home for a short time, Dilip developed psychosis and hallucinations.  His granddaughter Shruthi (his POA) stated they thought initially they could take care of him at home.  Last night, however, he did not sleep, was hallucinating and became difficult to control.  In the ED, he became combative and apparently tried to strike one of the nursing staff; restraints were started.  No other acute changes.  Current problems include:     Recurrent acute confusion and agitation; improved intermittently today  Recent acute toxic and metabolic encephalopathy  Recent elevated TSH; free T4 normal; likely euthyroid sick  Hypothyroidism, by history  - re-admitted to Medicine 3/5; inpatient given his current need for close monitoring and work-up and anticipating > 2 nights in hospital  - per prior admission, granddaughter (Shruthi) 2/29 reported Dilip is normally conversational and alert and oriented without many issues  - 2/29 CT head w/o contrast negative for acute findings   - appreciate Neurology consult   - continue current synthroid dose  - PT/OT eval's underway  - restraints renewed  - Care Mgr eval. w/ family today; therapies recommending TCU  - fall precautions  - continue 1:1 sitter until he is more calm  - low dose PRN seroquel at HS  - PRN olanzapine    Possible cognitive impairment  Possible underlying dementia  - intermittent agitation/combative behavior  Possible sundowning  - treatments as above  - appreciate Psychiatry consult today, 3/8 -> adjustments to seroquel; continue delirium precautions     Vitamin B6 and B12 deficiencies  - resume  replacements  - follow up B1 level (pending at time of discharge on 3/4)     Moderate malnutrition; recent diagnosis, in context of acute illness or injury; RD assessment:  % Weight Loss:  Does not meet criteria  % Intake:  < 75% for > 7 days  Subcutaneous Fat Loss:  Buccal - mild depletion, orbital - moderate depletion, -difficult to determine if all age related  Muscle Loss:  Clavicle - severe, thoracic - moderate, calves -mild depletion, dorsal hand - mild depletion - difficult to determine if all age related  Fluid Retention:  mild edema lower extremities  - RD to follow 3/6     Recent recurrent UTI  Chronic indwelling Lozano catheter - per son Mark, this was placed ~ 1 year ago.  - continue  Amoxicillin 500 mg BID      HTN   Hypertensive emergency; pressures variable/still elevated overnight and today.  - continue PRN hydralazine and PRN clonidine  - attempt further BP control today, to keep SBP < 160  - increase metoprolol from 25 mg BID to 37.5 mg BID  -> 3/7: increase amlodipine to 5 mg BID: continue     Falls  Physical deconditioning  - falls precautions  - PT/OT eval's when able      Recent Left neck dystonia; unclear duration.  - much improved at time of discharge 3/4, after 2 days of topical treatments  - follow up with Neurology after discharge: may need to consider Botox if Sx persist  - resume topical antiinflammatory Q 6 hours while awake  - review positioning options w/ OT     Recent Dx of oropharyngeal dysphagia  - had coughing with thin liquids during last admission  - continue thickened liquids  - consider Speech follow up eval.;   - had VSS 3/4: see report     CKD  - creatinine baseline 1.1-1.2  - daily BMP     Hypokalemia; low again  - add replacement orders  - 2/29 Mg normal; recheck 3/8     Chronic anemia, normocytic  - stable, no s/s of active bleed  - monitor     HFpEF  - euvolemic on exam, not in exacerbation   - not on home diuretics  - monitor volume status      A-fib; rates stable  -  continue PTA atenolol   - not on home AC per med rec      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  - appreciate Vascular Surgery follow up during last admission  - continue ASA 81 mg daily and atorvastatin 40 mg daily  - BP control as above      Hard of hearing  - needs evaluation for hearing aids as soon as possible after discharge       DVT Prophylaxis: Pneumatic Compression Devices, Anti-embolisim stockings (TEDs), and Ambulate every shift  Code Status: DNR / DNI    Disposition: Expected discharge in 1 days        Diet: Combination Diet Regular Diet Adult    DVT Prophylaxis: Pneumatic Compression Devices  Lozano Catheter: PRESENT, indication: Acute retention or obstruction  Lines: None     Cardiac Monitoring: None  Code Status: No CPR- Do NOT Intubate        # Hypokalemia: Lowest K = 3 mmol/L in last 2 days, will replace as needed           # Hypertension: Noted on problem list            # Financial/Environmental Concerns:           Disposition Plan     Expected Discharge Date: 03/12/2024    Discharge Delays: 1:1 Sitter still ordered - MD to assess  Placement - TCU  Destination: long-term care facility  Discharge Comments: TCU            Theodore Rdz DO  Hospitalist Service  [facility]  Securely message with [employer] (more info)  Text page via [employer] Paging/Directory   ______________________________________________________________________    Interval History   The patient was oriented to month but thought the year was 2040.  He knows he is in [facility] and has no complaints.    Physical Exam   Vital Signs: Temp: 98  F (36.7  C) Temp src: Axillary BP: (!) 148/82 Pulse: 72   Resp: 18 SpO2: 95 % O2 Device: None (Room air)    Weight: 151 lbs 0 oz  GEN: NAD,   HEENT: NCAT, PERRLA, EOMI  CV: Regular rate and rhythm, S1 & S2 positive.  LUNGS: Bilateral breathsounds heard.   ABDOMEN: Positive bowel sounds in all quadrants, soft, no rebound or guarding  MUSCULOSKELETAL:  Bilateral 1+ leg swelling  Neurological: Grossly nonfocal but still not completely oriented to time.      35 MINUTES SPENT BY ME on the date of service doing chart review, history, exam, documentation & further activities per the note.      Data   Imaging results reviewed over the past 24 hrs:   No results found for this or any previous visit (from the past 24 hour(s)).  Recent Labs   Lab 03/09/24  0622 03/08/24  2157 03/08/24  1736 03/08/24  0719 03/07/24  0817 03/06/24  0809 03/05/24  0532   WBC  --   --   --   --   --  6.5 8.9   HGB  --   --   --   --   --  10.7* 11.0*   MCV  --   --   --   --   --  98 99   PLT  --   --   --  271  --  293 319   NA  --   --   --  139  --  139 137   POTASSIUM 3.4 3.6 3.6 3.0*  --  3.0* 3.6   CHLORIDE  --   --   --  107  --  106 103   CO2  --   --   --  24  --  24 27   BUN  --   --   --  12.0  --  10.6 15.6   CR  --   --   --  1.16  --  1.00 1.23*   ANIONGAP  --   --   --  8  --  9 7   KRISTEN  --   --   --  8.2*  --  8.2* 8.9   GLC  --   --   --  89 96 92 95   ALBUMIN  --   --   --   --   --   --  3.5   PROTTOTAL  --   --   --   --   --   --  6.3*   BILITOTAL  --   --   --   --   --   --  0.5   ALKPHOS  --   --   --   --   --   --  61   ALT  --   --   --   --   --   --  6   AST  --   --   --   --   --   --  20

## 2024-03-09 NOTE — PLAN OF CARE
Problem: Risk for Delirium  Goal: Improved Attention and Thought Clarity  Intervention: Maximize Cognitive Function  Recent Flowsheet Documentation  Taken 3/9/2024 0000 by Denisse Umanzor RN  Sensory Stimulation Regulation:   lighting decreased   quiet environment promoted   television on  Reorientation Measures: reorientation provided  Taken 3/8/2024 2000 by Denisse Umanzor RN  Sensory Stimulation Regulation:   lighting decreased   quiet environment promoted   television on  Reorientation Measures: reorientation provided   Goal Outcome Evaluation:  Pt A&Ox2, orientation fluctuates, pleasant, no hallucinations overnight. Room air. Q2 hour repositioning. No telemetry. Mentation seemed to improve overnight. No neuro changes. R pupil is smaller than left, both reactive. 1:1 sitter.

## 2024-03-10 ENCOUNTER — APPOINTMENT (OUTPATIENT)
Dept: PHYSICAL THERAPY | Facility: HOSPITAL | Age: 89
DRG: 071 | End: 2024-03-10
Payer: COMMERCIAL

## 2024-03-10 LAB — POTASSIUM SERPL-SCNC: 3.5 MMOL/L (ref 3.4–5.3)

## 2024-03-10 PROCEDURE — 250N000013 HC RX MED GY IP 250 OP 250 PS 637: Performed by: INTERNAL MEDICINE

## 2024-03-10 PROCEDURE — 84132 ASSAY OF SERUM POTASSIUM: CPT | Performed by: INTERNAL MEDICINE

## 2024-03-10 PROCEDURE — 36415 COLL VENOUS BLD VENIPUNCTURE: CPT | Performed by: INTERNAL MEDICINE

## 2024-03-10 PROCEDURE — 99232 SBSQ HOSP IP/OBS MODERATE 35: CPT | Performed by: INTERNAL MEDICINE

## 2024-03-10 PROCEDURE — 250N000011 HC RX IP 250 OP 636: Performed by: STUDENT IN AN ORGANIZED HEALTH CARE EDUCATION/TRAINING PROGRAM

## 2024-03-10 PROCEDURE — 250N000013 HC RX MED GY IP 250 OP 250 PS 637: Performed by: STUDENT IN AN ORGANIZED HEALTH CARE EDUCATION/TRAINING PROGRAM

## 2024-03-10 PROCEDURE — 97530 THERAPEUTIC ACTIVITIES: CPT | Mod: GP

## 2024-03-10 PROCEDURE — 250N000013 HC RX MED GY IP 250 OP 250 PS 637: Performed by: REGISTERED NURSE

## 2024-03-10 PROCEDURE — 97116 GAIT TRAINING THERAPY: CPT | Mod: GP

## 2024-03-10 PROCEDURE — 120N000001 HC R&B MED SURG/OB

## 2024-03-10 RX ORDER — CEFDINIR 300 MG/1
300 CAPSULE ORAL EVERY 12 HOURS SCHEDULED
Status: DISCONTINUED | OUTPATIENT
Start: 2024-03-10 | End: 2024-03-10

## 2024-03-10 RX ADMIN — Medication 1 MG: at 23:21

## 2024-03-10 RX ADMIN — Medication 1 TABLET: at 20:09

## 2024-03-10 RX ADMIN — DOCUSATE SODIUM 50 MG AND SENNOSIDES 8.6 MG 2 TABLET: 8.6; 5 TABLET, FILM COATED ORAL at 08:41

## 2024-03-10 RX ADMIN — THIAMINE HCL TAB 100 MG 100 MG: 100 TAB at 08:42

## 2024-03-10 RX ADMIN — FINASTERIDE 5 MG: 5 TABLET, FILM COATED ORAL at 08:42

## 2024-03-10 RX ADMIN — METOPROLOL TARTRATE 37.5 MG: 25 TABLET, FILM COATED ORAL at 20:08

## 2024-03-10 RX ADMIN — ATORVASTATIN CALCIUM 40 MG: 40 TABLET, FILM COATED ORAL at 20:08

## 2024-03-10 RX ADMIN — Medication 25 MG: at 08:42

## 2024-03-10 RX ADMIN — Medication 1000 MCG: at 08:42

## 2024-03-10 RX ADMIN — Medication: at 16:08

## 2024-03-10 RX ADMIN — ASPIRIN 81 MG: 81 TABLET, COATED ORAL at 08:42

## 2024-03-10 RX ADMIN — AMLODIPINE BESYLATE 5 MG: 5 TABLET ORAL at 20:08

## 2024-03-10 RX ADMIN — METOPROLOL TARTRATE 37.5 MG: 25 TABLET, FILM COATED ORAL at 08:42

## 2024-03-10 RX ADMIN — MENTHOL AND METHYL SALICYLATE: 7.6; 29 OINTMENT TOPICAL at 17:50

## 2024-03-10 RX ADMIN — AMLODIPINE BESYLATE 5 MG: 5 TABLET ORAL at 08:42

## 2024-03-10 RX ADMIN — Medication 25 MG: at 23:21

## 2024-03-10 RX ADMIN — Medication: at 22:16

## 2024-03-10 RX ADMIN — LEVOTHYROXINE SODIUM 75 MCG: 0.03 TABLET ORAL at 08:42

## 2024-03-10 RX ADMIN — AMOXICILLIN 500 MG: 250 CAPSULE ORAL at 08:42

## 2024-03-10 RX ADMIN — QUETIAPINE FUMARATE 12.5 MG: 25 TABLET ORAL at 20:08

## 2024-03-10 RX ADMIN — ENOXAPARIN SODIUM 40 MG: 40 INJECTION SUBCUTANEOUS at 08:42

## 2024-03-10 ASSESSMENT — ACTIVITIES OF DAILY LIVING (ADL)
ADLS_ACUITY_SCORE: 55
ADLS_ACUITY_SCORE: 54
ADLS_ACUITY_SCORE: 56
ADLS_ACUITY_SCORE: 54
ADLS_ACUITY_SCORE: 54
ADLS_ACUITY_SCORE: 55
ADLS_ACUITY_SCORE: 54
ADLS_ACUITY_SCORE: 54
ADLS_ACUITY_SCORE: 55
ADLS_ACUITY_SCORE: 54
ADLS_ACUITY_SCORE: 54

## 2024-03-10 NOTE — PROGRESS NOTES
[facility]    Medicine Progress Note - Hospitalist Service    Date of Admission:  3/5/2024    Assessment & Plan   Dilip Long is a pleasant 88 year old gentleman who was discharged yesterday, 3/4, after being admitted 2/29-3/4 for acute confusional state/encephalopathy and other medical issues.  Per the ED provider earlier today, while at home for a short time, Dilip developed psychosis and hallucinations.  His granddaughter Shruthi (his POA) stated they thought initially they could take care of him at home.  Last night, however, he did not sleep, was hallucinating and became difficult to control.  In the ED, he became combative and apparently tried to strike one of the nursing staff; restraints were started.  No other acute changes.  Current problems include:    Recent acute toxic and metabolic encephalopathy on 3/4  Readmitted on 3/5 with acute confusion and agitation;   Brain CT with no acute ischemia, EEG no seizure activity  - re-admitted to Medicine 3/5; inpatient given his current need for close monitoring and work-up   - appreciate Neurology consult, impression is encephalopathy suspect underlying dementia, recommend outpatient neuropsych testing    Possible sundowning/aggressive behavior  On loose one-to-one monitoring, symptoms improved  Patient seen by psychiatry and started on scheduled and as needed Seroquel  Delirium precautions activated  Psychiatry will follow up if needed  - PT/OT eval's underway  - restraints renewed  - Care Mgr eval. w/ family ; therapies recommending TCU  - fall precautions  - continue 1:1 sitter until he is more calm  - low dose PRN seroquel at HS    Recent elevated TSH; Hypothyroidism, by history  free T4 normal; likely euthyroid sick  - continue current synthroid dose  No clinical criteria for thyroid storm     Vitamin B6 and B12 deficiencies  -Continue p.o. replacements  - follow up B1 level (pending at time of discharge on 3/4)     Moderate  malnutrition; recent diagnosis, in context of acute illness or injury; RD assessment:  % Weight Loss:  Does not meet criteria  % Intake:  < 75% for > 7 days  Subcutaneous Fat Loss:  Buccal - mild depletion, orbital - moderate depletion, -difficult to determine if all age related  Muscle Loss:  Clavicle - severe, thoracic - moderate, calves -mild depletion, dorsal hand - mild depletion - difficult to determine if all age related  Fluid Retention:  mild edema lower extremities  - RD to follow 3/6     Recent recurrent UTI  Chronic indwelling Lozano catheter - per son Mark, this was placed ~ 1 year ago.  - continue  Amoxicillin 500 mg BID however POA/granddaughter request this to be discontinued, feels this is causing confusion, consult ID to evaluate     Hypertensive urgency BP improved,  - continue PRN hydralazine and PRN clonidine  - attempt further BP control today, to keep SBP < 160  - increased metoprolol from 25 mg BID to 37.5 mg BID  Continue increased dose of amlodipine      Falls  Physical deconditioning  - falls precautions  - PT/OT eval's when able      Recent Left neck dystonia; unclear duration.  - much improved at time of discharge 3/4, after 2 days of topical treatments  - follow up with Neurology after discharge: may need to consider Botox if Sx persist  - resume topical antiinflammatory Q 6 hours while awake  - review positioning options w/ OT     Recent Dx of oropharyngeal dysphagia  - had coughing with thin liquids during last admission  - continue thickened liquids  - consider Speech follow up eval.;   - had VSS 3/4: see report     CKD  - creatinine baseline 1.1-1.2  - daily BMP     Hypokalemia; low again  - add replacement orders  - 2/29 Mg normal; recheck 3/8     Chronic anemia, normocytic  - stable, no s/s of active bleed  - monitor     HFpEF  - euvolemic on exam, not in exacerbation   - not on home diuretics  - monitor volume status      A-fib; rates stable  - continue PTA atenolol   - not on  home AC per med rec      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  - appreciate Vascular Surgery follow up during last admission  - continue ASA 81 mg daily and atorvastatin 40 mg daily  - BP control as above      Hard of hearing  - needs evaluation for hearing aids as soon as possible after discharge       DVT Prophylaxis: Pneumatic Compression Devices, Anti-embolisim stockings (TEDs), and Ambulate every shift  Code Status: DNR / DNI    Disposition: Expected discharge in 1 days        Diet: Combination Diet Regular Diet Adult    DVT Prophylaxis: Pneumatic Compression Devices  Lozano Catheter: PRESENT, indication: Acute retention or obstruction  Lines: None     Cardiac Monitoring: None  Code Status: No CPR- Do NOT Intubate                  # Hypertension: Noted on problem list              # Financial/Environmental Concerns:           Disposition Plan     Expected Discharge Date: 03/12/2024    Discharge Delays: 1:1 Sitter still ordered - MD to assess  Placement - TCU  Destination: long-term care facility  Discharge Comments: TCU            Bryan Chance MD  Hospitalist Service  [facility]  Securely message with [employer] (more info)  Text page via [employer] Paging/Directory   ______________________________________________________________________    Interval History patient seen at bedside eating breakfast, quite pleasant and oriented to place, no other systemic complaints, one-to-one monitor found outside room     Physical Exam   Vital Signs: Temp: 98.3  F (36.8  C) Temp src: Oral BP: (!) 134/98 Pulse: 78   Resp: 18 SpO2: 94 % O2 Device: None (Room air)    Weight: 156 lbs 15.48 oz  GEN: NAD,   HEENT: NCAT, PERRLA, EOMI  CV: Regular rate and rhythm, S1 & S2 positive.  LUNGS: Bilateral breathsounds heard.   ABDOMEN: Positive bowel sounds in all quadrants, soft, no rebound or guarding  MUSCULOSKELETAL: Bilateral 1+ leg swelling  Neurological: Grossly nonfocal, oriented x 1      35 MINUTES  SPENT BY ME on the date of service doing chart review, history, exam, documentation & further activities per the note.      Data   Imaging results reviewed over the past 24 hrs:   No results found for this or any previous visit (from the past 24 hour(s)).  Recent Labs   Lab 03/10/24  1029 03/09/24  1252 03/09/24  0622 03/08/24  1736 03/08/24  0719 03/07/24  0817 03/06/24  0809 03/05/24  0532   WBC  --   --   --   --   --   --  6.5 8.9   HGB  --   --   --   --   --   --  10.7* 11.0*   MCV  --   --   --   --   --   --  98 99   PLT  --   --   --   --  271  --  293 319   NA  --   --   --   --  139  --  139 137   POTASSIUM 3.5 3.5 3.4   < > 3.0*  --  3.0* 3.6   CHLORIDE  --   --   --   --  107  --  106 103   CO2  --   --   --   --  24  --  24 27   BUN  --   --   --   --  12.0  --  10.6 15.6   CR  --   --   --   --  1.16  --  1.00 1.23*   ANIONGAP  --   --   --   --  8  --  9 7   KRISTEN  --   --   --   --  8.2*  --  8.2* 8.9   GLC  --   --   --   --  89 96 92 95   ALBUMIN  --   --   --   --   --   --   --  3.5   PROTTOTAL  --   --   --   --   --   --   --  6.3*   BILITOTAL  --   --   --   --   --   --   --  0.5   ALKPHOS  --   --   --   --   --   --   --  61   ALT  --   --   --   --   --   --   --  6   AST  --   --   --   --   --   --   --  20    < > = values in this interval not displayed.

## 2024-03-10 NOTE — PLAN OF CARE
Problem: Adult Inpatient Plan of Care  Goal: Absence of Hospital-Acquired Illness or Injury  Outcome: Progressing  Safety Promotion/Fall Prevention:   nonskid shoes/slippers when out of bed   activity supervised   clutter free environment maintained   safety round/check completed   lighting adjusted  Problem: Risk for Delirium  Goal: Improved Behavioral Control  Outcome: Progressing  Problem: Violence Risk or Actual  Goal: Anger and Impulse Control  Outcome: Progressing   Goal Outcome Evaluation:       Pt slept on and off overnight.Kept getting out of bed but is redirectable.  Orientation keeps fluctuating but mostly A/Ox2, VSS on RA. Lozano cath in place, good U/O. No acute events reported this shift.

## 2024-03-10 NOTE — PROGRESS NOTES
"Care Management Follow Up    Length of Stay (days): 5    Expected Discharge Date: 03/12/2024     Concerns to be Addressed: Care progression - discharge planning     Patient plan of care discussed at interdisciplinary rounds: Yes    Anticipated Discharge Disposition:  Transitional Care     Anticipated Discharge Services:  Transitional Care  Anticipated Discharge DME:  NA    Patient/family educated on Medicare website which has current facility and service quality ratings:  Yes  Education Provided on the Discharge Plan:  Yes per team  Patient/Family in Agreement with the Plan:  Yes    Referrals Placed by CM/SW:  NA  Private pay costs discussed: Not applicable    Additional Information:  Met with patient and his granddaughter, Shruthi, at bedside to discuss PT/OT discharge rec for Transitional Care.  Shruthi is in agreement and help to select TCU choices  [facility]    Referrals sent.    Social Hx: \"On 1:1. PT/OT rec TCU, referrals sent. María Hawkins is the main family contact. Transport TBD.\"    RNCM to follow for medical progression, recommendations, and final discharge plan.     Pooja Shell RN     "

## 2024-03-11 ENCOUNTER — APPOINTMENT (OUTPATIENT)
Dept: OCCUPATIONAL THERAPY | Facility: HOSPITAL | Age: 89
DRG: 071 | End: 2024-03-11
Payer: COMMERCIAL

## 2024-03-11 LAB
CREAT SERPL-MCNC: 0.99 MG/DL (ref 0.67–1.17)
EGFRCR SERPLBLD CKD-EPI 2021: 74 ML/MIN/1.73M2
PLATELET # BLD AUTO: 269 10E3/UL (ref 150–450)
POTASSIUM SERPL-SCNC: 3.6 MMOL/L (ref 3.4–5.3)

## 2024-03-11 PROCEDURE — 250N000013 HC RX MED GY IP 250 OP 250 PS 637: Performed by: STUDENT IN AN ORGANIZED HEALTH CARE EDUCATION/TRAINING PROGRAM

## 2024-03-11 PROCEDURE — 120N000001 HC R&B MED SURG/OB

## 2024-03-11 PROCEDURE — 250N000011 HC RX IP 250 OP 636: Performed by: STUDENT IN AN ORGANIZED HEALTH CARE EDUCATION/TRAINING PROGRAM

## 2024-03-11 PROCEDURE — 250N000011 HC RX IP 250 OP 636: Performed by: INTERNAL MEDICINE

## 2024-03-11 PROCEDURE — 82565 ASSAY OF CREATININE: CPT | Performed by: STUDENT IN AN ORGANIZED HEALTH CARE EDUCATION/TRAINING PROGRAM

## 2024-03-11 PROCEDURE — 84132 ASSAY OF SERUM POTASSIUM: CPT | Performed by: INTERNAL MEDICINE

## 2024-03-11 PROCEDURE — 99232 SBSQ HOSP IP/OBS MODERATE 35: CPT | Performed by: INTERNAL MEDICINE

## 2024-03-11 PROCEDURE — 250N000013 HC RX MED GY IP 250 OP 250 PS 637: Performed by: REGISTERED NURSE

## 2024-03-11 PROCEDURE — 250N000013 HC RX MED GY IP 250 OP 250 PS 637: Performed by: INTERNAL MEDICINE

## 2024-03-11 PROCEDURE — 85049 AUTOMATED PLATELET COUNT: CPT | Performed by: STUDENT IN AN ORGANIZED HEALTH CARE EDUCATION/TRAINING PROGRAM

## 2024-03-11 PROCEDURE — 97530 THERAPEUTIC ACTIVITIES: CPT | Mod: GO

## 2024-03-11 PROCEDURE — 36415 COLL VENOUS BLD VENIPUNCTURE: CPT | Performed by: STUDENT IN AN ORGANIZED HEALTH CARE EDUCATION/TRAINING PROGRAM

## 2024-03-11 RX ADMIN — AMLODIPINE BESYLATE 5 MG: 5 TABLET ORAL at 10:19

## 2024-03-11 RX ADMIN — HYDRALAZINE HYDROCHLORIDE 10 MG: 20 INJECTION INTRAMUSCULAR; INTRAVENOUS at 16:18

## 2024-03-11 RX ADMIN — FINASTERIDE 5 MG: 5 TABLET, FILM COATED ORAL at 10:19

## 2024-03-11 RX ADMIN — METOPROLOL TARTRATE 37.5 MG: 25 TABLET, FILM COATED ORAL at 19:31

## 2024-03-11 RX ADMIN — Medication 25 MG: at 10:19

## 2024-03-11 RX ADMIN — ENOXAPARIN SODIUM 40 MG: 40 INJECTION SUBCUTANEOUS at 10:18

## 2024-03-11 RX ADMIN — MENTHOL AND METHYL SALICYLATE: 7.6; 29 OINTMENT TOPICAL at 18:27

## 2024-03-11 RX ADMIN — THIAMINE HCL TAB 100 MG 100 MG: 100 TAB at 10:19

## 2024-03-11 RX ADMIN — Medication 1 TABLET: at 10:19

## 2024-03-11 RX ADMIN — QUETIAPINE FUMARATE 12.5 MG: 25 TABLET ORAL at 19:32

## 2024-03-11 RX ADMIN — Medication 1 TABLET: at 19:31

## 2024-03-11 RX ADMIN — OLANZAPINE 2.5 MG: 5 TABLET, ORALLY DISINTEGRATING ORAL at 19:31

## 2024-03-11 RX ADMIN — ACETAMINOPHEN 650 MG: 325 TABLET ORAL at 19:31

## 2024-03-11 RX ADMIN — METOPROLOL TARTRATE 37.5 MG: 25 TABLET, FILM COATED ORAL at 10:19

## 2024-03-11 RX ADMIN — Medication: at 16:18

## 2024-03-11 RX ADMIN — AMLODIPINE BESYLATE 5 MG: 5 TABLET ORAL at 19:32

## 2024-03-11 RX ADMIN — ASPIRIN 81 MG: 81 TABLET, COATED ORAL at 10:19

## 2024-03-11 RX ADMIN — LEVOTHYROXINE SODIUM 100 MCG: 100 TABLET ORAL at 10:19

## 2024-03-11 RX ADMIN — Medication 1000 MCG: at 10:19

## 2024-03-11 RX ADMIN — ATORVASTATIN CALCIUM 40 MG: 40 TABLET, FILM COATED ORAL at 19:32

## 2024-03-11 ASSESSMENT — ACTIVITIES OF DAILY LIVING (ADL)
ADLS_ACUITY_SCORE: 56

## 2024-03-11 NOTE — PLAN OF CARE
Problem: Risk for Delirium  Goal: Improved Sleep  Outcome: Progressing  Problem: Comorbidity Management  Goal: Blood Pressure in Desired Range  Outcome: Progressing  Problem: Confusion Acute  Goal: Optimal Cognitive Function  Outcome: Progressing   Goal Outcome Evaluation:       Pt slept well overnight.VSS on RA. A/O x2, Lozano cath intact, good U/O. Safety precautions in place. No acute events reported this shift.

## 2024-03-11 NOTE — PROGRESS NOTES
"Care Management Follow Up    Length of Stay (days): 6    Expected Discharge Date: 03/12/2024     Concerns to be Addressed: discharge planning and disposition   Patient plan of care discussed at interdisciplinary rounds: Yes    Anticipated Discharge Disposition:  transitional care      Anticipated Discharge Services:  skilled nursing, therapy  Anticipated Discharge DME:  per treatment team     Patient/family educated on Medicare website which has current facility and service quality ratings:  yes   Education Provided on the Discharge Plan:  yes  Patient/Family in Agreement with the Plan:  yes    Referrals Placed by CM/SW:  post acute referrals selected by granddaughter placed by CM on 3/10  Private pay costs discussed: Not applicable    Additional Information:  GE reviewed chart.  Patient off 1:1 on 3/9 at 1000, seeking TCU placement. GE spoke with Alicia in admissions at [facility].  Alicia reports additional documentation regarding status of 1:1 and behavior.  Alicia reports plan to re-review patient.     Additional TCU referrals pending.     3:23 PM  SW received call from pt's grand daughter, Shruthi who inquires if patient qualifies for hospice.  GE discussed that a referral could be sent to a hospice agency for their team to review for a hospice qualifying diagnosis.  Shruthi requests referral to [facility].  Referral sent.  Updated Md.    Social History:  Patient recently hospitalized 2/29-3/4 and discharged home with [facility] for PT, OT, RN, SLP, HHA.  Spoke with elie Shruthi on the phone for initial assessment. Shruthi explains that patient was discharged from [facility] to home yesterday. Family was staying with patient and his spouse but \"it was just too much for all of them\". Per Shruthi, patient was brought back in for placement. Per Shruthi, if patient's clinical progression changed and he was eligible for hospice, family would be open to bringing him " home on hospice.    Kristina Al, ANNAW

## 2024-03-11 NOTE — PROGRESS NOTES
[facility]    Medicine Progress Note - Hospitalist Service    Date of Admission:  3/5/2024    Assessment & Plan   Dilip Long is a pleasant 88 year old gentleman who was discharged yesterday, 3/4, after being admitted 2/29-3/4 for acute confusional state/encephalopathy and other medical issues.  Per the ED provider earlier today, while at home for a short time, Dilip developed psychosis and hallucinations.  His granddaughter Shruthi (his POA) stated they thought initially they could take care of him at home.  Last night, however, he did not sleep, was hallucinating and became difficult to control.  In the ED, he became combative and apparently tried to strike one of the nursing staff; restraints were started.  No other acute changes.  Current problems include:    Recent acute toxic and metabolic encephalopathy on 3/4  Readmitted on 3/5 with acute confusion and agitation;   Brain CT with no acute ischemia, EEG no seizure activity  - re-admitted to Medicine inpatient given his current need for close monitoring and work-up   - appreciate Neurology consult, impression is encephalopathy suspect underlying dementia, with sundowning with low B12/T3 and possible UTI  Neurology recommend outpatient neuropsych testing    Possible sundowning/aggressive behavior  Nursing removed one-to-one monitoring, symptoms improved  Patient seen by psychiatry and started on scheduled and as needed Seroquel  Delirium precautions activated  Psychiatry will follow up if needed  - PT/OT eval's underway, recommend long-term care facility  - restraints removed  - Care Mgr eval. w/ family ; therapies recommending TCU  - fall precautions  - low dose PRN seroquel at HS    Recent elevated TSH; Hypothyroidism, by history  free T4 normal; likely euthyroid sick  - continue current synthroid dose  No clinical criteria for thyroid storm     Vitamin B6 and B12 deficiencies  -Continue p.o. replacements  B6 was 5.7 range 20  -125  B12 was 275 which is low normal  B1 level normal at 111     Moderate malnutrition; recent diagnosis, in context of acute illness or injury; RD assessment:  % Weight Loss:  Does not meet criteria  % Intake:  < 75% for > 7 days  Subcutaneous Fat Loss:  Buccal - mild depletion, orbital - moderate depletion, -difficult to determine if all age related  Muscle Loss:  Clavicle - severe, thoracic - moderate, calves -mild depletion, dorsal hand - mild depletion - difficult to determine if all age related  Fluid Retention:  mild edema lower extremities  - RD to follow 3/6     Recent recurrent UTI  Chronic indwelling Lozano catheter - per son Mark, this was placed ~ 1 year ago.  -On amoxicillin 500 mg BID recently started for UTI and possible prostatitis, however POA/granddaughter request this to be discontinued, feels this is causing confusion,   Consulted ID to evaluate who recommend to stop antibiotics and monitor     Hypertensive urgency BP improved,  - continue PRN hydralazine and PRN clonidine  - increased metoprolol from 25 mg BID to 37.5 mg BID and 3/10  Continue increased dose of amlodipine      Falls  Physical deconditioning  - falls precautions  - PT/OT eval's when able      Recent Left neck dystonia; unclear duration.  - much improved at time of discharge 3/4, after 2 days of topical treatments  - follow up with Neurology after discharge: may need to consider Botox if Sx persist  - resume topical antiinflammatory Q 6 hours while awake  - review positioning options w/ OT     Recent Dx of oropharyngeal dysphagia  - had coughing with thin liquids during last admission  - continue thickened liquids  - consider Speech follow up eval.;   - had VSS 3/4: see report     CKD  - creatinine baseline 1.1-1.2  - daily BMP     Hypokalemia;   Aggressively supplemented and now improved  - add replacement orders  - 2/29 Mg normal; recheck 3/8     Chronic anemia, normocytic  - stable, no s/s of active bleed  - monitor      HFpEF  - euvolemic on exam, not in exacerbation   - not on home diuretics  - monitor volume status      A-fib; rates stable  - continue PTA atenolol   - not on home AC per med rec      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  - appreciate Vascular Surgery follow up during last admission  - continue ASA 81 mg daily and atorvastatin 40 mg daily  - BP control as above      Hard of hearing  - needs evaluation for hearing aids as soon as possible after discharge       DVT Prophylaxis: Pneumatic Compression Devices, Anti-embolisim stockings (TEDs), and Ambulate every shift  Code Status: DNR / DNI    Disposition: Expected discharge in 1 to 2 days once LTC facility identified        Diet: Combination Diet Regular Diet Adult    DVT Prophylaxis: Pneumatic Compression Devices  Lozano Catheter: PRESENT, indication: Acute retention or obstruction  Lines: None     Cardiac Monitoring: None  Code Status: No CPR- Do NOT Intubate                  # Hypertension: Noted on problem list              # Financial/Environmental Concerns:           Disposition Plan      Expected Discharge Date: 03/12/2024    Discharge Delays: Placement - TCU  Destination: long-term care facility  Discharge Comments: TCU since Friday (3/8) evening            Bryan Chance MD  Hospitalist Service  [facility]  Securely message with [employer] (more info)  Text page via [employer] Paging/Directory   ______________________________________________________________________    Interval History   Patient seen with son at bedside  Found sleeping in room, nursing removed one-to-one monitoring due to, nonaggressive nature  No events reported overnight      Physical Exam   Vital Signs: Temp: 98.2  F (36.8  C) Temp src: Oral BP: 131/77 Pulse: 77   Resp: 18 SpO2: 99 % O2 Device: None (Room air)    Weight: 156 lbs 15.48 oz  GEN: Patient is sleepy but arousable  Physical exam deferred as patient is sleeping comfortably      35 MINUTES SPENT BY  ME on the date of service doing chart review, history, exam, documentation & further activities per the note.      Data   Imaging results reviewed over the past 24 hrs:   No results found for this or any previous visit (from the past 24 hour(s)).  Recent Labs   Lab 03/11/24  0802 03/10/24  1029 03/09/24  1252 03/08/24  1736 03/08/24  0719 03/07/24  0817 03/06/24  0809 03/05/24  0532   WBC  --   --   --   --   --   --  6.5 8.9   HGB  --   --   --   --   --   --  10.7* 11.0*   MCV  --   --   --   --   --   --  98 99     --   --   --  271  --  293 319   NA  --   --   --   --  139  --  139 137   POTASSIUM 3.6 3.5 3.5   < > 3.0*  --  3.0* 3.6   CHLORIDE  --   --   --   --  107  --  106 103   CO2  --   --   --   --  24  --  24 27   BUN  --   --   --   --  12.0  --  10.6 15.6   CR 0.99  --   --   --  1.16  --  1.00 1.23*   ANIONGAP  --   --   --   --  8  --  9 7   KRISTEN  --   --   --   --  8.2*  --  8.2* 8.9   GLC  --   --   --   --  89 96 92 95   ALBUMIN  --   --   --   --   --   --   --  3.5   PROTTOTAL  --   --   --   --   --   --   --  6.3*   BILITOTAL  --   --   --   --   --   --   --  0.5   ALKPHOS  --   --   --   --   --   --   --  61   ALT  --   --   --   --   --   --   --  6   AST  --   --   --   --   --   --   --  20    < > = values in this interval not displayed.

## 2024-03-11 NOTE — PLAN OF CARE
Problem: Fall Injury Risk  Goal: Absence of Fall and Fall-Related Injury  Outcome: Progressing  Intervention: Promote Injury-Free Environment  Recent Flowsheet Documentation  Taken 3/10/2024 1609 by Yue Franco RN  Safety Promotion/Fall Prevention:   activity supervised   lighting adjusted   mobility aid in reach   nonskid shoes/slippers when out of bed   patient and family education     Problem: Comorbidity Management  Goal: Blood Pressure in Desired Range  Outcome: Progressing   Goal Outcome Evaluation:         Updated granddaughter as she had called this writer for update. Pt was up in chair at start of shift and laid down for a nap. Pt was again up in chair for dinner with assist of 1 staff. Pt ate 50%. Staff is encouraging liquids.  Pt then set off his chair alarm and transferred self into bed before staff could get there. Pt was given redirection and instruction to use call light. Pt stayed in bed for the rest of the evening. Pt was pleasant and watching TV. Pt did try to get up just now looking for his room but was redirectable. Pt is wanting to leave to see his wife. Pt is hoping to discharge tomorrow. BP was >160 at HS . Pt then received his BP meds. BP is now 143/79.

## 2024-03-11 NOTE — PLAN OF CARE
Goal Outcome Evaluation:    Pt is alert and oriented to place time and person. Confused on how he got here.  Fluctuates through the day.  Drowsy this am and did sleep until 1015. Worked with therapy. Sitting in the chair. Tolerating his diet.  Mildly thickened liquids. Lozano is patent and draining.

## 2024-03-12 ENCOUNTER — APPOINTMENT (OUTPATIENT)
Dept: OCCUPATIONAL THERAPY | Facility: HOSPITAL | Age: 89
DRG: 071 | End: 2024-03-12
Payer: COMMERCIAL

## 2024-03-12 LAB — POTASSIUM SERPL-SCNC: 3.5 MMOL/L (ref 3.4–5.3)

## 2024-03-12 PROCEDURE — 99233 SBSQ HOSP IP/OBS HIGH 50: CPT | Performed by: STUDENT IN AN ORGANIZED HEALTH CARE EDUCATION/TRAINING PROGRAM

## 2024-03-12 PROCEDURE — 250N000013 HC RX MED GY IP 250 OP 250 PS 637: Performed by: REGISTERED NURSE

## 2024-03-12 PROCEDURE — 99232 SBSQ HOSP IP/OBS MODERATE 35: CPT | Performed by: INTERNAL MEDICINE

## 2024-03-12 PROCEDURE — 250N000013 HC RX MED GY IP 250 OP 250 PS 637: Performed by: STUDENT IN AN ORGANIZED HEALTH CARE EDUCATION/TRAINING PROGRAM

## 2024-03-12 PROCEDURE — 120N000001 HC R&B MED SURG/OB

## 2024-03-12 PROCEDURE — 97530 THERAPEUTIC ACTIVITIES: CPT | Mod: GO

## 2024-03-12 PROCEDURE — 250N000011 HC RX IP 250 OP 636: Performed by: STUDENT IN AN ORGANIZED HEALTH CARE EDUCATION/TRAINING PROGRAM

## 2024-03-12 PROCEDURE — 36415 COLL VENOUS BLD VENIPUNCTURE: CPT | Performed by: INTERNAL MEDICINE

## 2024-03-12 PROCEDURE — 250N000013 HC RX MED GY IP 250 OP 250 PS 637: Performed by: INTERNAL MEDICINE

## 2024-03-12 PROCEDURE — 84132 ASSAY OF SERUM POTASSIUM: CPT | Performed by: INTERNAL MEDICINE

## 2024-03-12 RX ADMIN — Medication 1 TABLET: at 20:31

## 2024-03-12 RX ADMIN — MENTHOL AND METHYL SALICYLATE: 7.6; 29 OINTMENT TOPICAL at 00:42

## 2024-03-12 RX ADMIN — Medication 1 TABLET: at 08:58

## 2024-03-12 RX ADMIN — Medication: at 09:12

## 2024-03-12 RX ADMIN — THIAMINE HCL TAB 100 MG 100 MG: 100 TAB at 08:58

## 2024-03-12 RX ADMIN — LEVOTHYROXINE SODIUM 75 MCG: 0.03 TABLET ORAL at 08:57

## 2024-03-12 RX ADMIN — Medication 1000 MCG: at 08:57

## 2024-03-12 RX ADMIN — Medication 25 MG: at 08:58

## 2024-03-12 RX ADMIN — ATORVASTATIN CALCIUM 40 MG: 40 TABLET, FILM COATED ORAL at 20:31

## 2024-03-12 RX ADMIN — ASPIRIN 81 MG: 81 TABLET, COATED ORAL at 08:58

## 2024-03-12 RX ADMIN — FINASTERIDE 5 MG: 5 TABLET, FILM COATED ORAL at 08:58

## 2024-03-12 RX ADMIN — METOPROLOL TARTRATE 37.5 MG: 25 TABLET, FILM COATED ORAL at 20:31

## 2024-03-12 RX ADMIN — Medication: at 05:16

## 2024-03-12 RX ADMIN — MENTHOL AND METHYL SALICYLATE: 7.6; 29 OINTMENT TOPICAL at 12:42

## 2024-03-12 RX ADMIN — AMLODIPINE BESYLATE 5 MG: 5 TABLET ORAL at 08:57

## 2024-03-12 RX ADMIN — METOPROLOL TARTRATE 37.5 MG: 25 TABLET, FILM COATED ORAL at 08:57

## 2024-03-12 RX ADMIN — AMLODIPINE BESYLATE 5 MG: 5 TABLET ORAL at 20:31

## 2024-03-12 RX ADMIN — ENOXAPARIN SODIUM 40 MG: 40 INJECTION SUBCUTANEOUS at 08:59

## 2024-03-12 RX ADMIN — QUETIAPINE FUMARATE 12.5 MG: 25 TABLET ORAL at 20:31

## 2024-03-12 RX ADMIN — MENTHOL AND METHYL SALICYLATE: 7.6; 29 OINTMENT TOPICAL at 05:15

## 2024-03-12 RX ADMIN — Medication 60 ML: at 18:46

## 2024-03-12 ASSESSMENT — ACTIVITIES OF DAILY LIVING (ADL)
ADLS_ACUITY_SCORE: 56
ADLS_ACUITY_SCORE: 55
ADLS_ACUITY_SCORE: 55
ADLS_ACUITY_SCORE: 56
ADLS_ACUITY_SCORE: 54
ADLS_ACUITY_SCORE: 54
ADLS_ACUITY_SCORE: 55
ADLS_ACUITY_SCORE: 56
ADLS_ACUITY_SCORE: 55
ADLS_ACUITY_SCORE: 54
ADLS_ACUITY_SCORE: 55
ADLS_ACUITY_SCORE: 54
ADLS_ACUITY_SCORE: 56
ADLS_ACUITY_SCORE: 54
ADLS_ACUITY_SCORE: 56
ADLS_ACUITY_SCORE: 55
ADLS_ACUITY_SCORE: 56
ADLS_ACUITY_SCORE: 54
ADLS_ACUITY_SCORE: 54
ADLS_ACUITY_SCORE: 55
ADLS_ACUITY_SCORE: 56
ADLS_ACUITY_SCORE: 55
ADLS_ACUITY_SCORE: 56

## 2024-03-12 NOTE — PROGRESS NOTES
CLINICAL NUTRITION SERVICES - ASSESSMENT NOTE     Nutrition Prescription    RECOMMENDATIONS FOR MDs/PROVIDERS TO ORDER:  Recommend increase bowel meds with no BM since 3/9 (x3 days)     Malnutrition Status:    Patient does not meet two of the criteria necessary for diagnosing malnutrition    Recommendations already ordered by Registered Dietitian (RD):  Expedite daily   Gelatein plus cherry BID at Lunch, Dinner     Future/Additional Recommendations:  Will monitor sup aniceto./po intake, wt, labs, wound healing.      REASON FOR ASSESSMENT  Dilip Long is a/an 88 year old male assessed by the dietitian for LOS    HPI: Pt re-admit following discharge 3/4, after being admitted 2/29-3/4 for acute confusional state/encephalopathy and other medical issues.  Per the ED provider earlier today, while at home for a short time, Dilip developed psychosis and hallucinations.  His granddaughter Shruthi (his POA) stated they thought initially they could take care of him at home.  Last night, however, he did not sleep, was hallucinating and became difficult to control.  In the ED, he became combative and apparently tried to strike one of the nursing staff; restraints were started.  No other acute changes.     NUTRITION HISTORY  Met with pt son at bedside this afternoon. Pt asleep during visit. Pt son report pt with no changes in appetite or oral intakes, states eating 2 meals/day- baseline po. Pt son notes pt liking a variety of foods at facility- pancakes, chx tenders, mac n cheese. Pt eats [employer] pancakes daily at baseline. No known wt loss this admit. Pt son reports pt does not like Ensure, pt likes Jello, ok to try Gelatein and Expedite for wound healing.   NKFA    Pt seen by hospital RD (8 days prior) during previous admit.     CURRENT NUTRITION ORDERS  Diet: Regular  Intake/Tolerance: Pt with variable po intakes, consuming % x2-3 meals/day     LABS  Reviewed    MEDICATIONS  Reviewed   Scheduled norvasc, lipitor, Vit  B12, lovenox, proscar, levothyroxine, lopressor, Vit B6, seroquel, senna, thiamin     ANTHROPOMETRICS  Height: 0 cm (Data Unavailable)  Most Recent Weight: 71.2 kg (156 lb 15.5 oz)    IBW: 70 kg  BMI: Normal BMI  Weight History:   Wt Readings from Last 10 Encounters:   03/09/24 71.2 kg (156 lb 15.5 oz)   03/04/24 68.9 kg (151 lb 14.4 oz)   02/04/24 68 kg (150 lb)   04/19/23 68.2 kg (150 lb 5.7 oz)   04/10/23 72.6 kg (160 lb)   03/10/23 58.1 kg (128 lb)   02/04/23 58.1 kg (128 lb)   01/30/23 58.1 kg (128 lb)   01/12/23 58.1 kg (128 lb 1.6 oz)   12/22/22 70.3 kg (155 lb)      Dosing Weight: 71.2 kg    ASSESSED NUTRITION NEEDS  Estimated Energy Needs: 0307-8375+ kcals/day (25 - 30+ kcals/kg)  Justification: Maintenance  Estimated Protein Needs: 71-85 grams protein/day (1.2+ grams of pro/kg)  Justification: Increased needs, wound healing   Estimated Fluid Needs: 1780 mL/day (25 mL/kg)   Justification: Maintenance    PHYSICAL FINDINGS/GI CONCERNS  See malnutrition section below.  Per Flowsheets:   BM: last noted x1 3/9   Skin: Wound- buttocks, coccyx stg 1 PI  Edema: Trace hands, BLE    MALNUTRITION:  % Weight Loss:  None noted  % Intake:  No decreased intake noted, baseline   Subcutaneous Fat Loss:  Orbital region mild depletion and Upper arm region mild depletion  Muscle Loss:  Temporal region mild depletion, Clavicle bone region moderate depletion, and Acromion bone region mild depletion  Fluid Retention:  Trace hands, BLE    Muscle and fat losses likely r/t to age   Malnutrition Diagnosis: Patient does not meet two of the above criteria necessary for diagnosing malnutrition    NUTRITION DIAGNOSIS  Increased nutrient needs related to wound healing as evidenced by Coccyx stage 1 pressure injury.       INTERVENTIONS  Implementation  ONS- Expedite daily, Gelatein BID   Education- Emphasized adequate protein, kcals for wound healing.      Goals  Pt to meet >75% nutritional needs   Wound healing       Monitoring/Evaluation  Progress toward goals will be monitored and evaluated per protocol.

## 2024-03-12 NOTE — PLAN OF CARE
Problem: Skin Injury Risk Increased  Goal: Skin Health and Integrity  Outcome: Progressing  Intervention: Plan: Nurse Driven Intervention: Moisture Management  Recent Flowsheet Documentation  Taken 3/11/2024 1535 by Tanya Turk RN  Moisture Interventions: Encourage regular toileting  Intervention: Plan: Nurse Driven Intervention: Friction and Shear  Recent Flowsheet Documentation  Taken 3/11/2024 1535 by Tanya Turk, RN  Friction/Shear Interventions: HOB 30 degrees or less     Problem: Comorbidity Management  Goal: Blood Pressure in Desired Range  Outcome: Progressing   Goal Outcome Evaluation:       A&O to person, place and time. No behavioral issues or sundowning this shift. Pleasant, calm, cooperative. VSS.

## 2024-03-12 NOTE — PROGRESS NOTES
[facility]    Infectious Disease Progress Note    Date of Service : 03/12/2024     Assessment & Plan   Dilip Long is a 88 year old male who was admitted on 2/29/2024.     ASSESSMENT:  Encephalopathy, hallucinations, altered mental status. Active issue.   Acute kidney injury on chronic kidney disease, improving  Abnormal CT chest, seen by vascular, no interventions at this time  Urinary tract infection likely bacteriuria.  Completely treated most like  ? Prostate enlargement risk of prostatitis  Fall at home  Advanced age 88 years old lives at home with family members  Recent hospitalization 2/4 - 2/5/2024 for encephalopathy UTI, acute kidney injury on chronic kidney disease  Previous urine cultures    Susceptibility data from last 90 days.  Collected Specimen Info Organism Ampicillin Nitrofurantoin Vancomycin   02/04/24 Urine, Midstream Enterococcus faecalis  S  S  S           RECOMMENDATIONS:    Agree with no more abx  See if things improve, doubt MS is due to abx though  Call us back if need to see again.  I don't think we have real solid proof of deep seated prostatic infection from what I can see.     AUDRA Cabrera MD  [facility]  Answering Service: [phone]  On-Call ID provider: [phone], option: 9        Interval History   Knows his location.  With coaching able to come up with the holiday where people wear green clothing.  Seems maybe a bit better.     Physical Exam   Temp: 97.7  F (36.5  C) Temp src: Oral BP: (!) 165/77 Pulse: 78   Resp: 19 SpO2: 97 % O2 Device: None (Room air)    Vitals:    03/05/24 0456 03/09/24 1245   Weight: 68.5 kg (151 lb) 71.2 kg (156 lb 15.5 oz)     Vital Signs with Ranges  Temp:  [97.4  F (36.3  C)-98  F (36.7  C)] 97.7  F (36.5  C)  Pulse:  [67-78] 78  Resp:  [18-19] 19  BP: (108-171)/(55-77) 165/77  SpO2:  [95 %-98 %] 97 %    Constitutional: leaning on his Left neck and side while sound asleep in chair  son in his  "bed    Lungs: no respiratory distress  Abdomen: non distended, nontender, Lozano  Skin: warm  Neuro: deconditioned  Psych: not awake    Medications      amLODIPine  5 mg Oral BID    aspirin  81 mg Oral Daily    atorvastatin  40 mg Oral QPM    cyanocobalamin  1,000 mcg Sublingual Daily    enoxaparin ANTICOAGULANT  40 mg Subcutaneous Q24H    finasteride  5 mg Oral Daily    levothyroxine  100 mcg Oral Q Mon Wed Fri AM    levothyroxine  75 mcg Oral Once per day on Sunday Tuesday Thursday Saturday    methyl salicylate-menthol   Topical Q6H    metoprolol tartrate  37.5 mg Oral BID    pyridOXINE  25 mg Oral Daily    QUEtiapine  12.5 mg Oral At Bedtime    senna-docusate  1 tablet Oral BID    Or    senna-docusate  2 tablet Oral BID    sodium chloride (PF)  3 mL Intracatheter Q8H    sodium chloride (PF)  3 mL Intracatheter Q8H    thiamine  100 mg Oral Daily    trolamine salicylate   Topical Q6H       Data   All microbiology laboratory data reviewed.  Recent Labs   Lab Test 03/11/24  0802 03/08/24  0719 03/06/24  0809 03/05/24  0532 03/03/24  0455 03/01/24  0624   WBC  --   --  6.5 8.9  --  8.8   HGB  --   --  10.7* 11.0*  --  11.5*   HCT  --   --  32.7* 33.3*  --  33.1*   MCV  --   --  98 99  --  94    271 293 319   < > 298    < > = values in this interval not displayed.     Recent Labs   Lab Test 03/11/24  0802 03/08/24  0719 03/06/24  0809   CR 0.99 1.16 1.00     No lab results found.  No lab results found.    Invalid input(s): \"UC\"    MICROBIOLOGY:    Reviewed    7-Day Micro Results       No results found for the last 168 hours.             RADIOLOGY:    Reviewed  EEG Awake or Drowsy Routine    Result Date: 3/1/2024  Table formatting from the original result was not included. Images from the original result were not included. ELECTROENCEPHALOGRAM (EEG) REPORT [place] [address] [place] [address] Tel: [phone]  Fax: [phone] [email or web address] Dilip Long, "  1935, MRN [id number] PCP: Kolton Junior Date: 3/1/2024 Principal Diagnosis: Altered mental status History : Patient is being evaluated for altered mental status. Medication listed includes no anticonvulsant Description of the Recording:  This is a multichannel digital EEG recording done using the international 10-20 placement system. Background of the recording consists of an irregular 6-7 hz activity with an amplitude of 20-30  V.  In addition, occasionally there is  1-2 Hz activity with similar amplitude.  This diffusely slow background attenuates with alerting procedures.  Photic stimulation performed with eyes open, according to the standard protocol, minimal change.  Hyperventilation was not performed.  Sleep was not obtained. During this recording, no sharp discharges, spikes or electrographic seizure activity was recorded. Impression: This is an abnormal EEG due to diffusely slow background in theta and delta range that suggests a nonspecific generalized cerebral dysfunction.  Such slowing can be seen in metabolic or toxic abnormalities, clinical correlation is recommended.  No sharp discharges or spikes were recorded during this recording to suggest a seizure disorder. Classification: Dysrhythmia grade II generalized                          Delta grade I generalized Joe Kearns MD [facility] This note was dictated using voice recognition software.  Any grammatical or context distortions are unintentional and inherent to the software.     US Renal Complete Non-Vascular    Result Date: 2024  EXAM: US RENAL COMPLETE NON-VASCULAR LOCATION: [facility] DATE: 2024 INDICATION: Recurrent UTI. COMPARISON: CT same-day. TECHNIQUE: Routine Bilateral Renal and Bladder Ultrasound. FINDINGS: RIGHT KIDNEY: 10 x 6.2 x 5 cm. Normal without hydronephrosis or masses. Incidental 1.4 cm cyst. LEFT KIDNEY: 10.1 x 4.2 x 4.3 cm. Normal without  hydronephrosis or masses. BLADDER: Decompressed bladder with intrinsic portacatheter.     IMPRESSION: 1.  Unremarkable kidney ultrasound. 2.  Decompressed bladder with intrinsic portacatheter. 3.  Please see same-day CT abdomen - pelvis.     CTA Chest Abdomen Pelvis w Contrast    Result Date: 2/29/2024  EXAM: CTA CHEST ABDOMEN PELVIS W CONTRAST LOCATION: [facility] DATE: 2/29/2024 INDICATION: Sepsis, thoracic aorta outpouching on previous CT  follow up,sepsis COMPARISON: 2/4/2024 TECHNIQUE: CT angiogram chest abdomen pelvis during arterial phase of injection of IV contrast. 2D and 3D MIP reconstructions were performed by the CT technologist. Dose reduction techniques were used. CONTRAST: isovue 370 75ml FINDINGS: ANGIOGRAM CHEST, ABDOMEN, AND PELVIS: No evidence of intramural hematoma, dissection, or aneurysm. Mild-moderate atherosclerotic calcification throughout the aorta and iliofemoral arteries. No significant stenosis. Unchanged 1.4 x 0.5 cm outpouching from  the inferolateral aspect of the proximal descending thoracic aorta (6/48, 10/103). No stranding or hematoma. No central pulmonary embolus. LUNGS AND PLEURA: Unchanged 0.5 cm nodule along the minor fissure since 07/27/2018 and therefore consistent with benign etiology. A 0.5 cm right lower lobe nodule is also unchanged. No new or enlarging suspicious nodules. CORONARY ARTERY CALCIFICATION: Moderate MEDIASTINUM/AXILLAE: Unremarkable. HEPATOBILIARY: Unremarkable. SPLEEN: Unremarkable. PANCREAS: Unremarkable. ADRENAL GLANDS: Unremarkable. KIDNEYS/BLADDER: A balloon tip catheter is present in the urinary bladder lumen. The bladder is underdistended with a Lozano catheter in place and bilateral diverticula. BOWEL: Left-sided colonic diverticulosis. No acute inflammation or obstruction. LYMPH NODES: Unremarkable. PELVIC ORGANS: Moderate enlargement of the prostate. MUSCULOSKELETAL: Diffuse osteopenia and multilevel degenerative changes in  the spine. Healed sternal and rib fractures. Unchanged L1 compression deformity.     IMPRESSION: Unchanged small outpouching from the undersurface of the proximal descending thoracic aorta, which could represent an ulcerated plaque or short segment dissection flap. No evidence of acute aortic syndrome.    Head CT w/o contrast    Result Date: 2/29/2024  EXAM: CT HEAD W/O CONTRAST LOCATION: [facility] DATE: 2/29/2024 INDICATION: Altered mental status. COMPARISON: CT dated 02/04/2024. TECHNIQUE: Routine CT Head without IV contrast. Multiplanar reformats. Dose reduction techniques were used. FINDINGS: Mildly motion degraded exam. INTRACRANIAL CONTENTS: No acute intracranial hemorrhage, extra-axial fluid collection, or mass effect. No evidence of an acute transcortical confluent infarct. Grossly similar foci of hypoattenuation involving the bilateral cerebral white matter, nonspecific although most likely the sequela of chronic microvascular ischemia. Mild generalized cerebral parenchymal volume loss. No hydrocephalus. VISUALIZED ORBITS/SINUSES/MASTOIDS: No acute intraorbital finding. Status post functional endoscopic sinus surgery with overall similar trace mucosal thickening scattered about the visualized paranasal sinuses without an air-fluid level. No mastoid or middle ear effusion. Cerumen within the bilateral external auditory canals. BONES/SOFT TISSUES: No acute abnormality. An incidental bilateral frontal scalp lipomas. Severe right temporal mandibular joint degenerative change.     IMPRESSION: 1.  No acute intracranial abnormality. 2.  Similar chronic changes, as described.    XR Chest Port 1 View    Result Date: 2/29/2024  EXAM: XR CHEST PORT 1 VIEW LOCATION: [facility] DATE: 2/29/2024 INDICATION: Chest Pain COMPARISON: CTA chest 02/04/2024     IMPRESSION: Patient's rotated to the left with a left head tilt as well. Lungs are clear. Heart and pulmonary  vascularity are normal. No signs of pneumonia or failure. Old right posterior rib fractures again noted.    CTA Chest Abdomen Pelvis w Contrast    Result Date: 2/4/2024  EXAM: CT CHEST, ABDOMEN AND PELVIS WITH CONTRAST LOCATION: [facility] DATE/TIME: 2/4/2024 10:14 PM CST INDICATION: Unwitnessed fall. Altered mental status. COMPARISON: 07/27/2018. TECHNIQUE: Note that this study was performed in conjunction with a CT scan of the thoracolumbar spine. Refer to separate reports for findings from those studies. CT angiogram chest abdomen pelvis during arterial phase of injection of IV contrast. 2D and  3D MIP reconstructions were performed by the CT technologist. Dose reduction techniques were used. CONTRAST: 75 mL Isovue-370. FINDINGS: ANGIOGRAM CHEST, ABDOMEN, AND PELVIS: Atherosclerotic calcification in the aorta. The aorta is normal in caliber without dissection. A very small 1.4 cm wide x 0.5 cm deep apparent outpouching of the inferolateral aspect of the proximal descending thoracic aorta (for example, series 6 image 57 and series 10 image 118), not present on 07/27/2018. No stranding or hematoma visualized in the mediastinum adjacent to this finding. No visualized pulmonary embolus. LUNGS AND PLEURA: 0.5 cm nodule in the anterior mid right lung in the region of the minor fissure (series 7 image 169), unchanged since 07/27/2018 and therefore consistent with benign etiology. A few curvilinear opacities in the periphery of the lungs likely represent scarring or atelectasis. CORONARY ARTERY CALCIFICATION: Present. MEDIASTINUM/AXILLAE: Unremarkable. HEPATOBILIARY: Unremarkable. SPLEEN: Unremarkable. PANCREAS: Unremarkable. ADRENAL GLANDS: Unremarkable. KIDNEYS/BLADDER: A balloon tip catheter is present in the urinary bladder lumen. 2 cm diverticulum from the posterior left aspect of the urinary bladder. BOWEL: Several colonic diverticula are present without evidence of diverticulitis. The  appendix is not visualized. LYMPH NODES: Unremarkable. PELVIC ORGANS: Moderate enlargement of the prostate gland. MUSCULOSKELETAL: Nonacute moderate compression deformity of the L1 vertebral body. OTHER: None.     IMPRESSION: 1. A very small apparent slight focal outpouching of the proximal descending thoracic aorta. This is new since 07/27/2018, but otherwise age-indeterminate. It could represent changes due to atherosclerosis, but a very small penetrating ulcer cannot be excluded. It is unlikely to relate to acute trauma. A follow-up CT scan of the thoracic aorta is recommended in 2 weeks in order to evaluate for interval changes in this finding. 2. No other acute abnormality identified in the chest, abdomen or pelvis.    CT Thoracic Spine w/o Contrast    Result Date: 2/4/2024  EXAM: CT THORACIC SPINE W/O CONTRAST, CT LUMBAR SPINE W/O CONTRAST LOCATION: [facility] DATE: 2/4/2024 INDICATION: eval fractures (reconstructions please). Patient was found down. Potential trauma. COMPARISON: Lumbar spine CT 11/29/2022 TECHNIQUE: 1) Routine CT Thoracic Spine without IV contrast. Multiplanar reformats. Dose reduction techniques were used. 2) Routine CT Lumbar Spine without IV contrast. Multiplanar reformats. Dose reduction techniques were used. FINDINGS: THORACIC SPINE CT: The exam is moderately degraded by motion. VERTEBRA: Mild compression forming disease at the superior endplates of T1 and T2 are age indeterminate. Subtle compression deformity at the superior endplate of T3 is age-indeterminate. Mild anterior wedging compression deformity at the superior endplate of T5 is age-indeterminate. There is an chronic anterior wedging compression deformity of T12 with up to 30-40% loss of height anteriorly. No acute subluxation. CANAL/FORAMINA: No canal or neural foraminal stenosis. PARASPINAL: No extraspinal abnormality. LUMBAR SPINE CT: VERTEBRA: 5 lumbar type vertebra. There is a chronic moderate to  severe burst-type fracture of L1 with up to 60-70% loss of height anteriorly. There is slight buckling of the posterior/superior corner into the ventral canal. Vertebral body height is otherwise normal. Mild grade 1 retrolisthesis of L2. There is 7 mm degenerative anterolisthesis of L5 on S1. Multilevel advanced disc degeneration. CANAL/FORAMINA: Moderate central canal stenosis at L3-L4 and L4-L5. Mild to moderate multilevel neural foraminal narrowing. PARASPINAL: No extraspinal abnormality.     IMPRESSION: THORACIC SPINE CT: 1.  The exam is moderately degraded by motion. 2.  Age-indeterminate mild compression deformities at the superior endplates of T1, T2, T3 and T5. 3.  Chronic anterior wedging compression deformity of T12. LUMBAR SPINE CT: 1.  No acute fracture or subluxation. 2.  Chronic moderate to severe burst-type fracture of L1. 3.  Moderate central canal stenosis at L3-L4 and L4-L5.     CT Lumbar Spine w/o Contrast    Result Date: 2/4/2024  EXAM: CT THORACIC SPINE W/O CONTRAST, CT LUMBAR SPINE W/O CONTRAST LOCATION: [facility] DATE: 2/4/2024 INDICATION: eval fractures (reconstructions please). Patient was found down. Potential trauma. COMPARISON: Lumbar spine CT 11/29/2022 TECHNIQUE: 1) Routine CT Thoracic Spine without IV contrast. Multiplanar reformats. Dose reduction techniques were used. 2) Routine CT Lumbar Spine without IV contrast. Multiplanar reformats. Dose reduction techniques were used. FINDINGS: THORACIC SPINE CT: The exam is moderately degraded by motion. VERTEBRA: Mild compression forming disease at the superior endplates of T1 and T2 are age indeterminate. Subtle compression deformity at the superior endplate of T3 is age-indeterminate. Mild anterior wedging compression deformity at the superior endplate of T5 is age-indeterminate. There is an chronic anterior wedging compression deformity of T12 with up to 30-40% loss of height anteriorly. No acute subluxation.  CANAL/FORAMINA: No canal or neural foraminal stenosis. PARASPINAL: No extraspinal abnormality. LUMBAR SPINE CT: VERTEBRA: 5 lumbar type vertebra. There is a chronic moderate to severe burst-type fracture of L1 with up to 60-70% loss of height anteriorly. There is slight buckling of the posterior/superior corner into the ventral canal. Vertebral body height is otherwise normal. Mild grade 1 retrolisthesis of L2. There is 7 mm degenerative anterolisthesis of L5 on S1. Multilevel advanced disc degeneration. CANAL/FORAMINA: Moderate central canal stenosis at L3-L4 and L4-L5. Mild to moderate multilevel neural foraminal narrowing. PARASPINAL: No extraspinal abnormality.     IMPRESSION: THORACIC SPINE CT: 1.  The exam is moderately degraded by motion. 2.  Age-indeterminate mild compression deformities at the superior endplates of T1, T2, T3 and T5. 3.  Chronic anterior wedging compression deformity of T12. LUMBAR SPINE CT: 1.  No acute fracture or subluxation. 2.  Chronic moderate to severe burst-type fracture of L1. 3.  Moderate central canal stenosis at L3-L4 and L4-L5.     Head CT w/o contrast    Result Date: 2/4/2024  EXAM: CT HEAD W/O CONTRAST, CT CERVICAL SPINE W/O CONTRAST LOCATION: [facility] DATE: 2/4/2024 INDICATION: Confusion, head and neck injury COMPARISON: CT head 11/29/2022 TECHNIQUE: 1) Routine CT Head without IV contrast. Multiplanar reformats. Dose reduction techniques were used. 2) Routine CT Cervical Spine without IV contrast. Multiplanar reformats. Dose reduction techniques were used. FINDINGS: HEAD CT: INTRACRANIAL CONTENTS: No intracranial hemorrhage, extraaxial collection, or mass effect.  No CT evidence of acute infarct. Moderate presumed chronic small vessel ischemic changes. Mild generalized volume loss. No hydrocephalus. VISUALIZED ORBITS/SINUSES/MASTOIDS: No intraorbital abnormality. No paranasal sinus mucosal disease. No middle ear or mastoid effusion. BONES/SOFT  TISSUES: No acute abnormality. CERVICAL SPINE CT: VERTEBRA: Mild compression superior aspect of T1 and T2, age indeterminate; appears chronic. It is new since CT chest 07/27/2018. No definite acute fracture. Straightening of cervical lordosis. Nonaggressive appearing lytic lesion within the dens and central aspect of body of C2; probably due to a somewhat atypical hemangioma. Subcentimeter nonaggressive appearing sclerotic lesion left lamina of C2. CANAL/FORAMINA: Moderate degenerative changes without significant canal narrowing at any level. Multilevel mild-to-moderate neural foraminal narrowing. PARASPINAL: No extraspinal abnormality. Visualized lung fields are clear.     IMPRESSION: HEAD CT: 1.  No definite acute intracranial process. CERVICAL SPINE CT: 1.  Mild compression superior aspect of T1 and T2, age indeterminate; appears chronic. It is new since CT chest 07/27/2018. 2.  No definite acute fracture. 3.  Moderate degenerative changes without significant canal narrowing at any level.    CT Cervical Spine w/o Contrast    Result Date: 2/4/2024  EXAM: CT HEAD W/O CONTRAST, CT CERVICAL SPINE W/O CONTRAST LOCATION: [facility] DATE: 2/4/2024 INDICATION: Confusion, head and neck injury COMPARISON: CT head 11/29/2022 TECHNIQUE: 1) Routine CT Head without IV contrast. Multiplanar reformats. Dose reduction techniques were used. 2) Routine CT Cervical Spine without IV contrast. Multiplanar reformats. Dose reduction techniques were used. FINDINGS: HEAD CT: INTRACRANIAL CONTENTS: No intracranial hemorrhage, extraaxial collection, or mass effect.  No CT evidence of acute infarct. Moderate presumed chronic small vessel ischemic changes. Mild generalized volume loss. No hydrocephalus. VISUALIZED ORBITS/SINUSES/MASTOIDS: No intraorbital abnormality. No paranasal sinus mucosal disease. No middle ear or mastoid effusion. BONES/SOFT TISSUES: No acute abnormality. CERVICAL SPINE CT: VERTEBRA: Mild  compression superior aspect of T1 and T2, age indeterminate; appears chronic. It is new since CT chest 07/27/2018. No definite acute fracture. Straightening of cervical lordosis. Nonaggressive appearing lytic lesion within the dens and central aspect of body of C2; probably due to a somewhat atypical hemangioma. Subcentimeter nonaggressive appearing sclerotic lesion left lamina of C2. CANAL/FORAMINA: Moderate degenerative changes without significant canal narrowing at any level. Multilevel mild-to-moderate neural foraminal narrowing. PARASPINAL: No extraspinal abnormality. Visualized lung fields are clear.     IMPRESSION: HEAD CT: 1.  No definite acute intracranial process. CERVICAL SPINE CT: 1.  Mild compression superior aspect of T1 and T2, age indeterminate; appears chronic. It is new since CT chest 07/27/2018. 2.  No definite acute fracture. 3.  Moderate degenerative changes without significant canal narrowing at any level.     Medical Decision Making       50 MINUTES SPENT BY ME on the date of service doing chart review, history, exam, documentation & further activities per the note.  MANAGEMENT DISCUSSED with the following over the past 24 hours: patient, son, 1:1, nurse   NOTE(S)/MEDICAL RECORDS REVIEWED over the past 24 hours: reviewed  Tests ORDERED & REVIEWED in the past 24 hours:  - See lab/imaging results included in the data section of the note  SUPPLEMENTAL HISTORY, in addition to the patient's history, over the past 24 hours obtained from:   - son  Medical complexity over the past 24 hours:  - Decision to DE-ESCALATE CARE based on prognosis  - antibiotic management

## 2024-03-12 NOTE — PLAN OF CARE
Problem: Confusion Acute  Goal: Optimal Cognitive Function  Outcome: Progressing   Goal Outcome Evaluation:       Disoriented to situation, denies pain. ania MARCUS cath intact. Slept all shift, no acute changes.

## 2024-03-12 NOTE — PLAN OF CARE
----- Message from Lorri Main sent at 8/2/2022  2:05 PM EDT -----  Subject: Message to Provider    QUESTIONS  Information for Provider? 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 Send to [facility] pharmacy. [facility]. . Pt has   prescription new for acid reflex takes three a day doesn't work. Need to   discuss another medication take twice a day Omeprazole GR 20 mgs capsules   180days. Emergency asap refills. ---------------------------------------------------------------------------  --------------  Winter REINOSO  [phone]; OK to leave message on voicemail  ---------------------------------------------------------------------------  --------------  SCRIPT ANSWERS  Relationship to Patient?  Self Goal Outcome Evaluation:  Patient denies pain , SOB or nausea. UP to chair for meals. Placement pending

## 2024-03-12 NOTE — PROGRESS NOTES
"Care Management Follow Up    Length of Stay (days): 7    Expected Discharge Date: 03/12/2024     Concerns to be Addressed: discharge planning     Patient plan of care discussed at interdisciplinary rounds: Yes    Anticipated Discharge Disposition:  home      Anticipated Discharge Services:  hospice   Anticipated Discharge DME:  per treatment team     Patient/family educated on Medicare website which has current facility and service quality ratings:  NA  Education Provided on the Discharge Plan:  yes  Patient/Family in Agreement with the Plan:  yes    Referrals Placed by CM/SW:  post acute care facilities and hospice agencies   Private pay costs discussed: Not applicable    Additional Information:  GE reviewed chart.     GE spoke with Patient's grand daughter, Shruthi who reports they would like to stop search for TCU and pursue hospice at home. Chelsi at [facility] reports patient would qualify, however they would not sign patient on unless patient was in SNF.  Shruthi reports they want to honor patient's wishes to be home with spouse and preserve his quality of life.  Shruthi in agreement with referral to an alternate hospice agency.  Referral sent to [facility].      Updated MD.     3:33 PM  Mcgovern from [facility] reviewing and will contact patient's grand daughter to discuss safe care giving plan and will follow up with CM on 3/13.    Social History:  Patient recently hospitalized 2/29-3/4 and discharged home with [facility] for PT, OT, RN, SLP, HHA.  Spoke with granddajenn Hawkins on the phone for initial assessment. Shruthi explains that patient was discharged from [facility] to home yesterday. Family was staying with patient and his spouse but \"it was just too much for all of them\". Per Shruthi, patient was brought back in for placement. Per Shruthi, if patient's clinical progression changed and he was eligible for hospice, family would be open to bringing him home on " hospice.    Kristina Al, ANNAW

## 2024-03-12 NOTE — PROGRESS NOTES
[facility]    Medicine Progress Note - Hospitalist Service    Date of Admission:  3/5/2024    Assessment & Plan   Dilip Long is a pleasant 88 year old gentleman who was discharged 3/4, after being admitted 2/29-3/4 for acute confusional state/encephalopathy and other medical issues.  Per the ED provider earlier today, while at home for a short time, Dilip developed psychosis and hallucinations.  His granddaughter Shruthi (his POA) stated they thought initially they could take care of him at home.  Last night, however, he did not sleep, was hallucinating and became difficult to control.  In the ED, he became combative and apparently tried to strike one of the nursing staff; restraints were started. No other acute changes.    #Acute encephalopathy  Recently admitted 2/29 - 3/4 for acute encephalopathy of unclear origin. Metabolic work-ups were negative for an acute problem. Unfortunately, likely declining cognition due to age, a sign of dementia. Readmitted 3/5 after patient continued having hallucinations while at home and family could not care for him without assistance. Brain CT with no acute ischemia, EEG no seizure activity.  - Appreciate Neurology consult, impression is encephalopathy suspect underlying dementia, with sundowning  - Neurology recommend outpatient neuropsych testing  - Patient and family wanting to pursue home hospice vs LTC with hospice; SW assisting with this  - PT/OT recommending LTC    #Sundowning  Nursing removed one-to-one monitoring, symptoms improved.  - Patient seen by psychiatry and started on QHS and as needed Seroquel  - Delirium precautions    #Hypothyroidism  - continue current synthroid dose     #Vitamin B6 and B12 deficiencies  -Continue p.o. replacements  B6 was 5.7 range 20 -125  B12 was 275 which is low normal  B1 level normal at 111     #Moderate malnutrition; recent diagnosis, in context of acute illness or injury; RD assessment:  - RD to  follow     #Recent recurrent UTI  #Chronic indwelling Lozano catheter - per son Mark, this was placed ~ 1 year ago.  On amoxicillin 500 mg BID recently started for UTI and possible prostatitis, however POA/granddaughter request this to be discontinued, feels this is causing confusion,   - ID consulted and felt okay to stop amoxicillin, no clear proof supporting prostatitis diagnosis     #PrimaryHTN  - increased metoprolol from 25 mg BID to 37.5 mg BID and 3/10  - Continue increased dose of amlodipine      #Falls  #Physical deconditioning  - PT/OT     #Recent Left neck dystonia; unclear duration.  - much improved at time of discharge 3/4, after 2 days of topical treatments  - follow up with Neurology after discharge: may need to consider Botox if Sx persist  - resume topical antiinflammatory Q 6 hours while awake  - review positioning options w/ OT     #Recent Dx of oropharyngeal dysphagia  - had coughing with thin liquids during last admission  - continue thickened liquids  - had VSS 3/4: see report     #CKD  - creatinine baseline 1.1-1.2  - daily BMP     #Hypokalemia  - Replace     #Chronic anemia, normocytic  - stable, no s/s of active bleed  - monitor     #HFpEF  - euvolemic on exam, not in exacerbation   - not on home diuretics  - monitor volume status      #A-fib; rates stable  - continue PTA metoprolol  - not on home AC per med rec      #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  - appreciate Vascular Surgery follow up during last admission  - continue ASA 81 mg daily and atorvastatin 40 mg daily  - BP control as above      #Hard of hearing  - needs evaluation for hearing aids as soon as possible after discharge       DVT Prophylaxis: Pneumatic Compression Devices, Anti-embolisim stockings (TEDs), and Ambulate every shift  Code Status: DNR / DNI    Disposition: Expected discharge in 1 to 2 days once hospice +/- LTC identified        Diet: Combination Diet Regular Diet Adult  Snacks/Supplements Adult:  Expedite Bottle; With Meals  Snacks/Supplements Adult: Gelatein Plus; With Meals    DVT Prophylaxis: Pneumatic Compression Devices  Lozano Catheter: PRESENT, indication: Acute retention or obstruction  Lines: None     Cardiac Monitoring: None  Code Status: No CPR- Do NOT Intubate                  # Hypertension: Noted on problem list              # Financial/Environmental Concerns:           Disposition Plan      Expected Discharge Date: 03/13/2024    Discharge Delays: Placement - TCU  Destination: long-term care facility  Discharge Comments: TCU since Friday (3/8) evening            JOELLEN TUCKER MD  Hospitalist Service  [facility]  Securely message with [employer] (more info)  Text page via [employer] Paging/Directory   ______________________________________________________________________    Interval History   Patient sleeping in room. Appropriate and pleasant with conversation.    Physical Exam   Vital Signs: Temp: 97.6  F (36.4  C) Temp src: Oral BP: 136/67 Pulse: 67   Resp: 20 SpO2: 98 % O2 Device: None (Room air)    Weight: 156 lbs 15.48 oz  GEN: Patient is sleepy but arousable  Physical exam deferred as patient is sleeping comfortably      50 MINUTES SPENT BY ME on the date of service doing chart review, history, exam, documentation & further activities per the note.      Data   Imaging results reviewed over the past 24 hrs:   No results found for this or any previous visit (from the past 24 hour(s)).  Recent Labs   Lab 03/12/24  0724 03/11/24  0802 03/10/24  1029 03/08/24  1736 03/08/24  0719 03/07/24  0817 03/06/24  0809   WBC  --   --   --   --   --   --  6.5   HGB  --   --   --   --   --   --  10.7*   MCV  --   --   --   --   --   --  98   PLT  --  269  --   --  271  --  293   NA  --   --   --   --  139  --  139   POTASSIUM 3.5 3.6 3.5   < > 3.0*  --  3.0*   CHLORIDE  --   --   --   --  107  --  106   CO2  --   --   --   --  24  --  24   BUN  --   --   --   --  12.0  --  10.6   CR  --   0.99  --   --  1.16  --  1.00   ANIONGAP  --   --   --   --  8  --  9   KRISTEN  --   --   --   --  8.2*  --  8.2*   GLC  --   --   --   --  89 96 92    < > = values in this interval not displayed.

## 2024-03-13 PROCEDURE — 250N000013 HC RX MED GY IP 250 OP 250 PS 637: Performed by: STUDENT IN AN ORGANIZED HEALTH CARE EDUCATION/TRAINING PROGRAM

## 2024-03-13 PROCEDURE — 120N000001 HC R&B MED SURG/OB

## 2024-03-13 PROCEDURE — 250N000011 HC RX IP 250 OP 636: Performed by: STUDENT IN AN ORGANIZED HEALTH CARE EDUCATION/TRAINING PROGRAM

## 2024-03-13 PROCEDURE — 250N000011 HC RX IP 250 OP 636: Performed by: INTERNAL MEDICINE

## 2024-03-13 PROCEDURE — 250N000013 HC RX MED GY IP 250 OP 250 PS 637: Performed by: REGISTERED NURSE

## 2024-03-13 PROCEDURE — 250N000013 HC RX MED GY IP 250 OP 250 PS 637: Performed by: INTERNAL MEDICINE

## 2024-03-13 PROCEDURE — 99232 SBSQ HOSP IP/OBS MODERATE 35: CPT | Performed by: STUDENT IN AN ORGANIZED HEALTH CARE EDUCATION/TRAINING PROGRAM

## 2024-03-13 RX ORDER — POLYETHYLENE GLYCOL 3350 17 G/17G
17 POWDER, FOR SOLUTION ORAL 2 TIMES DAILY
Status: DISCONTINUED | OUTPATIENT
Start: 2024-03-13 | End: 2024-03-15 | Stop reason: HOSPADM

## 2024-03-13 RX ADMIN — Medication: at 04:40

## 2024-03-13 RX ADMIN — POLYETHYLENE GLYCOL 3350 17 G: 17 POWDER, FOR SOLUTION ORAL at 09:04

## 2024-03-13 RX ADMIN — HYDRALAZINE HYDROCHLORIDE 10 MG: 20 INJECTION INTRAMUSCULAR; INTRAVENOUS at 15:13

## 2024-03-13 RX ADMIN — Medication 1 TABLET: at 09:13

## 2024-03-13 RX ADMIN — Medication 1 TABLET: at 19:39

## 2024-03-13 RX ADMIN — Medication 60 ML: at 09:16

## 2024-03-13 RX ADMIN — LEVOTHYROXINE SODIUM 100 MCG: 100 TABLET ORAL at 09:11

## 2024-03-13 RX ADMIN — MENTHOL AND METHYL SALICYLATE: 7.6; 29 OINTMENT TOPICAL at 17:07

## 2024-03-13 RX ADMIN — MENTHOL AND METHYL SALICYLATE: 7.6; 29 OINTMENT TOPICAL at 22:24

## 2024-03-13 RX ADMIN — Medication 25 MG: at 22:23

## 2024-03-13 RX ADMIN — MENTHOL AND METHYL SALICYLATE: 7.6; 29 OINTMENT TOPICAL at 06:37

## 2024-03-13 RX ADMIN — QUETIAPINE FUMARATE 12.5 MG: 25 TABLET ORAL at 19:40

## 2024-03-13 RX ADMIN — ENOXAPARIN SODIUM 40 MG: 40 INJECTION SUBCUTANEOUS at 09:04

## 2024-03-13 RX ADMIN — FINASTERIDE 5 MG: 5 TABLET, FILM COATED ORAL at 09:04

## 2024-03-13 RX ADMIN — Medication: at 22:24

## 2024-03-13 RX ADMIN — POLYETHYLENE GLYCOL 3350 17 G: 17 POWDER, FOR SOLUTION ORAL at 19:40

## 2024-03-13 RX ADMIN — Medication: at 09:12

## 2024-03-13 RX ADMIN — THIAMINE HCL TAB 100 MG 100 MG: 100 TAB at 09:13

## 2024-03-13 RX ADMIN — ASPIRIN 81 MG: 81 TABLET, COATED ORAL at 09:12

## 2024-03-13 RX ADMIN — METOPROLOL TARTRATE 37.5 MG: 25 TABLET, FILM COATED ORAL at 09:12

## 2024-03-13 RX ADMIN — Medication 1 MG: at 22:22

## 2024-03-13 RX ADMIN — Medication 25 MG: at 09:04

## 2024-03-13 RX ADMIN — AMLODIPINE BESYLATE 5 MG: 5 TABLET ORAL at 09:13

## 2024-03-13 RX ADMIN — MENTHOL AND METHYL SALICYLATE: 7.6; 29 OINTMENT TOPICAL at 00:47

## 2024-03-13 RX ADMIN — Medication 1000 MCG: at 09:13

## 2024-03-13 RX ADMIN — METOPROLOL TARTRATE 37.5 MG: 25 TABLET, FILM COATED ORAL at 19:39

## 2024-03-13 RX ADMIN — ACETAMINOPHEN 650 MG: 325 TABLET ORAL at 19:39

## 2024-03-13 RX ADMIN — Medication: at 15:05

## 2024-03-13 RX ADMIN — ATORVASTATIN CALCIUM 40 MG: 40 TABLET, FILM COATED ORAL at 19:39

## 2024-03-13 RX ADMIN — AMLODIPINE BESYLATE 5 MG: 5 TABLET ORAL at 19:39

## 2024-03-13 RX ADMIN — ANORECTAL OINTMENT: 15.7; .44; 24; 20.6 OINTMENT TOPICAL at 22:24

## 2024-03-13 ASSESSMENT — ACTIVITIES OF DAILY LIVING (ADL)
ADLS_ACUITY_SCORE: 54
ADLS_ACUITY_SCORE: 54
ADLS_ACUITY_SCORE: 55
ADLS_ACUITY_SCORE: 55
ADLS_ACUITY_SCORE: 54
ADLS_ACUITY_SCORE: 55
ADLS_ACUITY_SCORE: 55
ADLS_ACUITY_SCORE: 54
ADLS_ACUITY_SCORE: 55
ADLS_ACUITY_SCORE: 55
ADLS_ACUITY_SCORE: 54
ADLS_ACUITY_SCORE: 55
ADLS_ACUITY_SCORE: 56
ADLS_ACUITY_SCORE: 54
ADLS_ACUITY_SCORE: 55
ADLS_ACUITY_SCORE: 56
ADLS_ACUITY_SCORE: 55

## 2024-03-13 NOTE — PLAN OF CARE
Problem: Skin Injury Risk Increased  Goal: Skin Health and Integrity  Outcome: Progressing  Intervention: Plan: Nurse Driven Intervention: Moisture Management  Recent Flowsheet Documentation  Taken 3/12/2024 1545 by Tanya Turk, RN  Moisture Interventions: Encourage regular toileting  Intervention: Plan: Nurse Driven Intervention: Friction and Shear  Recent Flowsheet Documentation  Taken 3/12/2024 1545 by Tanya Turk, RN  Friction/Shear Interventions: HOB 30 degrees or less     Problem: Comorbidity Management  Goal: Blood Pressure in Desired Range  Outcome: Progressing   Goal Outcome Evaluation:       No acute events this shift. Mepilex in place, CDI. VSS. Lozano patent. Denies pain. Awaiting LTC placement.

## 2024-03-13 NOTE — PROGRESS NOTES
Care Management Follow Up    Length of Stay (days): 8    Expected Discharge Date: 03/13/2024     Concerns to be Addressed: discharge planning     Patient plan of care discussed at interdisciplinary rounds: Yes    Anticipated Discharge Disposition:  home      Anticipated Discharge Services:  hospice  Anticipated Discharge DME:  per treatment team     Patient/family educated on Medicare website which has current facility and service quality ratings:  NA  Education Provided on the Discharge Plan:  yes  Patient/Family in Agreement with the Plan:  yes    Referrals Placed by CM/GE:  post acute care facilities and hospice   Private pay costs discussed: Not applicable    Additional Information:    8:57 AM  GE spoke with Shaniqua at [facility]; they can admit patient to hospice with diagnosis of heart disease.  Shaniqua is working to get in contact with patient's grand daughterShruthi to further discuss hospice and safe care giving plan.  Shaniqua will update CM once she has gotten in contact with Shruthi.     2:17 PM  Received call from Shaniqua with [facility] (ph: [phone]); Mcgovern has not been able to reach [place].  GE placed call to Shruthi and requested that she calls Mcgovern back to discuss hospice discharge.     3:12 PM  Mcgovern spoke with Shruthi, family has safe care giving plan in place to bring patient home on hospice.  Anticipate discharge Friday Morning with [facility] sign on.  Discussed with MD. Will need to arrange [facility] stretcher transport and PCS.     Grand daughterShruthi is main family contact.       JYOTI Farley

## 2024-03-13 NOTE — PLAN OF CARE
Goal Outcome Evaluation:        Problem: Adult Inpatient Plan of Care  Goal: Optimal Comfort and Wellbeing  Outcome: Progressing     Problem: Skin Injury Risk Increased  Goal: Skin Health and Integrity  Outcome: Progressing   Major Shift Events:  Uneventful shift.    Plan: Possible discharge to LTC 3/15.    For vital signs and complete assessments, please see documentation flowsheets.

## 2024-03-13 NOTE — PROGRESS NOTES
[facility]    Medicine Progress Note - Hospitalist Service    Date of Admission:  3/5/2024    Assessment & Plan   Dilip Long is a pleasant 88 year old gentleman who was discharged 3/4, after being admitted 2/29-3/4 for acute confusional state/encephalopathy and other medical issues.  Per the ED provider earlier today, while at home for a short time, Dilip developed psychosis and hallucinations.  His granddaughter Shruthi (his POA) stated they thought initially they could take care of him at home.  Last night, however, he did not sleep, was hallucinating and became difficult to control.  In the ED, he became combative and apparently tried to strike one of the nursing staff; restraints were started. No other acute changes.    #Acute encephalopathy  Recently admitted 2/29 - 3/4 for acute encephalopathy of unclear origin. Metabolic work-ups were negative for an acute problem. Unfortunately, likely declining cognition due to age, a sign of dementia. Readmitted 3/5 after patient continued having hallucinations while at home and family could not care for him without assistance. Brain CT with no acute ischemia, EEG no seizure activity.  - Appreciate Neurology consult, impression is encephalopathy suspect underlying dementia, with sundowning  - Neurology recommend outpatient neuropsych testing  - Patient and family wanting to pursue home hospice, agency accepted, will discharge 3/15  - PT/OT recommending LTC    #Sundowning  Nursing removed one-to-one monitoring, symptoms improved.  - Patient seen by psychiatry and started on QHS and as needed Seroquel  - Delirium precautions    #Hypothyroidism  - continue current synthroid dose     #Vitamin B6 and B12 deficiencies  -Continue p.o. replacements  B6 was 5.7 range 20 -125  B12 was 275 which is low normal  B1 level normal at 111     #Moderate malnutrition; recent diagnosis, in context of acute illness or injury; RD assessment:  - RD to follow      #Recent recurrent UTI  #Chronic indwelling Lozano catheter - per son Mark, this was placed ~ 1 year ago.  On amoxicillin 500 mg BID recently started for UTI and possible prostatitis, however POA/granddaughter request this to be discontinued, feels this is causing confusion,   - ID consulted and felt okay to stop amoxicillin, no clear proof supporting prostatitis diagnosis     #PrimaryHTN  - increased metoprolol from 25 mg BID to 37.5 mg BID and 3/10  - Continue increased dose of amlodipine      #Falls  #Physical deconditioning  - PT/OT     #Recent Left neck dystonia; unclear duration.  - much improved at time of discharge 3/4, after 2 days of topical treatments  - follow up with Neurology after discharge: may need to consider Botox if Sx persist  - resume topical antiinflammatory Q 6 hours while awake  - review positioning options w/ OT     #Recent Dx of oropharyngeal dysphagia  - had coughing with thin liquids during last admission  - continue thickened liquids  - had VSS 3/4: see report     #CKD  - creatinine baseline 1.1-1.2  - daily BMP     #Hypokalemia  - Replace     #Chronic anemia, normocytic  - stable, no s/s of active bleed  - monitor     #HFpEF  - euvolemic on exam, not in exacerbation   - not on home diuretics  - monitor volume status      #A-fib; rates stable  - continue PTA metoprolol  - not on home AC per med rec      #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  - appreciate Vascular Surgery follow up during last admission  - continue ASA 81 mg daily and atorvastatin 40 mg daily  - BP control as above      #Hard of hearing  - needs evaluation for hearing aids as soon as possible after discharge       DVT Prophylaxis: Pneumatic Compression Devices, Anti-embolisim stockings (TEDs), and Ambulate every shift  Code Status: DNR / DNI    Disposition: Expected discharge in 1 to 2 days once hospice +/- LTC identified        Diet: Combination Diet Regular Diet Adult  Snacks/Supplements Adult: Expedite  Bottle; With Meals  Snacks/Supplements Adult: Gelatein Plus; With Meals    DVT Prophylaxis: Pneumatic Compression Devices  Lozano Catheter: PRESENT, indication: Acute retention or obstruction  Lines: None     Cardiac Monitoring: None  Code Status: No CPR- Do NOT Intubate                  # Hypertension: Noted on problem list              # Financial/Environmental Concerns:           Disposition Plan      Expected Discharge Date: 03/15/2024      Destination: long-term care facility  Discharge Comments: Anticipate home on hospice Friday Morning            JOELLEN TUCKER MD  Hospitalist Service  [facility]  Securely message with [employer] (more info)  Text page via [employer] Paging/Directory   ______________________________________________________________________    Interval History   Appropriate and pleasant with conversation. No concerns    Physical Exam   Vital Signs: Temp: 97.8  F (36.6  C) Temp src: Oral BP: 122/60 Pulse: 79   Resp: 18 SpO2: 97 % O2 Device: None (Room air)    Weight: 156 lbs 15.48 oz  GEN: Patient is sleepy but arousable  Pulm: CTAB  Cardiac: RRR  Abd: NT/ND. Soft.      50 MINUTES SPENT BY ME on the date of service doing chart review, history, exam, documentation & further activities per the note.      Data   Imaging results reviewed over the past 24 hrs:   No results found for this or any previous visit (from the past 24 hour(s)).  Recent Labs   Lab 03/12/24  0724 03/11/24  0802 03/10/24  1029 03/08/24  1736 03/08/24  0719 03/07/24  0817   PLT  --  269  --   --  271  --    NA  --   --   --   --  139  --    POTASSIUM 3.5 3.6 3.5   < > 3.0*  --    CHLORIDE  --   --   --   --  107  --    CO2  --   --   --   --  24  --    BUN  --   --   --   --  12.0  --    CR  --  0.99  --   --  1.16  --    ANIONGAP  --   --   --   --  8  --    KRISTEN  --   --   --   --  8.2*  --    GLC  --   --   --   --  89 96    < > = values in this interval not displayed.

## 2024-03-13 NOTE — PLAN OF CARE
Problem: Adult Inpatient Plan of Care  Goal: Optimal Comfort and Wellbeing  Outcome: Progressing     Problem: Risk for Delirium  Goal: Optimal Coping  Outcome: Progressing     Problem: Comorbidity Management  Goal: Maintenance of Behavioral Health Symptom Control  Outcome: Progressing  Intervention: Maintain Behavioral Health Symptom Control  Recent Flowsheet Documentation  Taken 3/13/2024 0040 by Yesenia Lundy RN  Medication Review/Management: medications reviewed     Problem: Confusion Acute  Goal: Optimal Cognitive Function  Outcome: Progressing  Intervention: Minimize Contributing Factors  Recent Flowsheet Documentation  Taken 3/13/2024 0040 by Yesenia Lundy RN  Reorientation Measures:   calendar in view   clock in view   reorientation provided     Problem: Violence Risk or Actual  Goal: Anger and Impulse Control  Outcome: Progressing  Intervention: Minimize Safety Risk  Recent Flowsheet Documentation  Taken 3/13/2024 0040 by Yesenia Lundy RN  Enhanced Safety Measures: pain management     Problem: Fall Injury Risk  Goal: Absence of Fall and Fall-Related Injury  Outcome: Progressing  Intervention: Identify and Manage Contributors  Recent Flowsheet Documentation  Taken 3/13/2024 0040 by Yesenia Lundy RN  Medication Review/Management: medications reviewed  Intervention: Promote Injury-Free Environment  Recent Flowsheet Documentation  Taken 3/13/2024 0040 by Yesenia Lundy RN  Safety Promotion/Fall Prevention:   activity supervised   assistive device/personal items within reach   clutter free environment maintained   nonskid shoes/slippers when out of bed   safety round/check completed   room organization consistent   room door open   Goal Outcome Evaluation:       Pt is A/O x 3 with confusion. Denies pain or any discomfort. Lozano patent with 300 ml. No behavior noted on this shift. VSS continue to monitor. Yesenia Lundy RN

## 2024-03-14 LAB
CREAT SERPL-MCNC: 1.19 MG/DL (ref 0.67–1.17)
EGFRCR SERPLBLD CKD-EPI 2021: 59 ML/MIN/1.73M2
PLATELET # BLD AUTO: 376 10E3/UL (ref 150–450)
POTASSIUM SERPL-SCNC: 3.5 MMOL/L (ref 3.4–5.3)

## 2024-03-14 PROCEDURE — 250N000013 HC RX MED GY IP 250 OP 250 PS 637: Performed by: STUDENT IN AN ORGANIZED HEALTH CARE EDUCATION/TRAINING PROGRAM

## 2024-03-14 PROCEDURE — 250N000013 HC RX MED GY IP 250 OP 250 PS 637: Performed by: REGISTERED NURSE

## 2024-03-14 PROCEDURE — 250N000013 HC RX MED GY IP 250 OP 250 PS 637: Performed by: INTERNAL MEDICINE

## 2024-03-14 PROCEDURE — 85049 AUTOMATED PLATELET COUNT: CPT | Performed by: STUDENT IN AN ORGANIZED HEALTH CARE EDUCATION/TRAINING PROGRAM

## 2024-03-14 PROCEDURE — 36415 COLL VENOUS BLD VENIPUNCTURE: CPT | Performed by: STUDENT IN AN ORGANIZED HEALTH CARE EDUCATION/TRAINING PROGRAM

## 2024-03-14 PROCEDURE — 99232 SBSQ HOSP IP/OBS MODERATE 35: CPT | Performed by: STUDENT IN AN ORGANIZED HEALTH CARE EDUCATION/TRAINING PROGRAM

## 2024-03-14 PROCEDURE — 82565 ASSAY OF CREATININE: CPT | Performed by: STUDENT IN AN ORGANIZED HEALTH CARE EDUCATION/TRAINING PROGRAM

## 2024-03-14 PROCEDURE — 84132 ASSAY OF SERUM POTASSIUM: CPT | Performed by: STUDENT IN AN ORGANIZED HEALTH CARE EDUCATION/TRAINING PROGRAM

## 2024-03-14 PROCEDURE — 250N000011 HC RX IP 250 OP 636: Performed by: STUDENT IN AN ORGANIZED HEALTH CARE EDUCATION/TRAINING PROGRAM

## 2024-03-14 PROCEDURE — 120N000001 HC R&B MED SURG/OB

## 2024-03-14 PROCEDURE — 250N000011 HC RX IP 250 OP 636: Performed by: INTERNAL MEDICINE

## 2024-03-14 RX ADMIN — LEVOTHYROXINE SODIUM 75 MCG: 0.03 TABLET ORAL at 08:04

## 2024-03-14 RX ADMIN — ENOXAPARIN SODIUM 40 MG: 40 INJECTION SUBCUTANEOUS at 08:05

## 2024-03-14 RX ADMIN — ANORECTAL OINTMENT: 15.7; .44; 24; 20.6 OINTMENT TOPICAL at 12:56

## 2024-03-14 RX ADMIN — QUETIAPINE FUMARATE 12.5 MG: 25 TABLET ORAL at 19:53

## 2024-03-14 RX ADMIN — MENTHOL AND METHYL SALICYLATE: 7.6; 29 OINTMENT TOPICAL at 06:41

## 2024-03-14 RX ADMIN — FINASTERIDE 5 MG: 5 TABLET, FILM COATED ORAL at 08:04

## 2024-03-14 RX ADMIN — ASPIRIN 81 MG: 81 TABLET, COATED ORAL at 08:08

## 2024-03-14 RX ADMIN — ACETAMINOPHEN 650 MG: 325 TABLET ORAL at 20:02

## 2024-03-14 RX ADMIN — MENTHOL AND METHYL SALICYLATE: 7.6; 29 OINTMENT TOPICAL at 17:41

## 2024-03-14 RX ADMIN — HYDRALAZINE HYDROCHLORIDE 10 MG: 20 INJECTION INTRAMUSCULAR; INTRAVENOUS at 08:24

## 2024-03-14 RX ADMIN — Medication 25 MG: at 08:04

## 2024-03-14 RX ADMIN — Medication 1 TABLET: at 08:14

## 2024-03-14 RX ADMIN — Medication: at 15:31

## 2024-03-14 RX ADMIN — Medication 25 MG: at 20:02

## 2024-03-14 RX ADMIN — ANORECTAL OINTMENT: 15.7; .44; 24; 20.6 OINTMENT TOPICAL at 08:06

## 2024-03-14 RX ADMIN — ATORVASTATIN CALCIUM 40 MG: 40 TABLET, FILM COATED ORAL at 19:53

## 2024-03-14 RX ADMIN — Medication: at 08:15

## 2024-03-14 RX ADMIN — POLYETHYLENE GLYCOL 3350 17 G: 17 POWDER, FOR SOLUTION ORAL at 20:03

## 2024-03-14 RX ADMIN — MENTHOL AND METHYL SALICYLATE: 7.6; 29 OINTMENT TOPICAL at 12:56

## 2024-03-14 RX ADMIN — POLYETHYLENE GLYCOL 3350 17 G: 17 POWDER, FOR SOLUTION ORAL at 08:05

## 2024-03-14 RX ADMIN — THIAMINE HCL TAB 100 MG 100 MG: 100 TAB at 08:04

## 2024-03-14 RX ADMIN — DOCUSATE SODIUM 50 MG AND SENNOSIDES 8.6 MG 2 TABLET: 8.6; 5 TABLET, FILM COATED ORAL at 19:53

## 2024-03-14 RX ADMIN — AMLODIPINE BESYLATE 5 MG: 5 TABLET ORAL at 20:02

## 2024-03-14 RX ADMIN — METOPROLOL TARTRATE 37.5 MG: 25 TABLET, FILM COATED ORAL at 19:53

## 2024-03-14 RX ADMIN — METOPROLOL TARTRATE 37.5 MG: 25 TABLET, FILM COATED ORAL at 08:04

## 2024-03-14 RX ADMIN — AMLODIPINE BESYLATE 5 MG: 5 TABLET ORAL at 08:04

## 2024-03-14 RX ADMIN — Medication 1000 MCG: at 08:04

## 2024-03-14 ASSESSMENT — ACTIVITIES OF DAILY LIVING (ADL)
ADLS_ACUITY_SCORE: 53
ADLS_ACUITY_SCORE: 54
ADLS_ACUITY_SCORE: 54
ADLS_ACUITY_SCORE: 53
ADLS_ACUITY_SCORE: 54
ADLS_ACUITY_SCORE: 53
ADLS_ACUITY_SCORE: 54
ADLS_ACUITY_SCORE: 53
ADLS_ACUITY_SCORE: 54
ADLS_ACUITY_SCORE: 53
ADLS_ACUITY_SCORE: 53
ADLS_ACUITY_SCORE: 54
ADLS_ACUITY_SCORE: 53
ADLS_ACUITY_SCORE: 54
ADLS_ACUITY_SCORE: 53
ADLS_ACUITY_SCORE: 54

## 2024-03-14 NOTE — PROGRESS NOTES
[facility]    Medicine Progress Note - Hospitalist Service    Date of Admission:  3/5/2024    Assessment & Plan   Dilip Long is a pleasant 88 year old gentleman who was discharged 3/4, after being admitted 2/29-3/4 for acute confusional state/encephalopathy and other medical issues.  Per the ED provider earlier today, while at home for a short time, Dilip developed psychosis and hallucinations.  His granddaughter Shruthi (his POA) stated they thought initially they could take care of him at home.  Last night, however, he did not sleep, was hallucinating and became difficult to control.  In the ED, he became combative and apparently tried to strike one of the nursing staff; restraints were started. No other acute changes.    #Acute encephalopathy  Recently admitted 2/29 - 3/4 for acute encephalopathy of unclear origin. Metabolic work-ups were negative for an acute problem. Unfortunately, likely declining cognition due to age, a sign of dementia. Readmitted 3/5 after patient continued having hallucinations while at home and family could not care for him without assistance. Brain CT with no acute ischemia, EEG no seizure activity.  - Appreciate Neurology consult, impression is encephalopathy suspect underlying dementia, with sundowning  - Neurology recommend outpatient neuropsych testing  - Patient and family wanting to pursue home hospice, agency accepted, will discharge 3/15    #Sundowning  Nursing removed one-to-one monitoring, symptoms improved.  - Patient seen by psychiatry and started on QHS and as needed Seroquel  - Delirium precautions    #Hypothyroidism  - continue current synthroid dose     #Vitamin B6 and B12 deficiencies  -Continue p.o. replacements  B6 was 5.7 range 20 -125  B12 was 275 which is low normal  B1 level normal at 111     #Moderate malnutrition; recent diagnosis, in context of acute illness or injury; RD assessment:  - RD to follow     #Recent recurrent  UTI  #Chronic indwelling Lozano catheter - per son Mark, this was placed ~ 1 year ago.  On amoxicillin 500 mg BID recently started for UTI and possible prostatitis, however POA/granddaughter request this to be discontinued, feels this is causing confusion,   - ID consulted and felt okay to stop amoxicillin, no clear proof supporting prostatitis diagnosis     #PrimaryHTN  - increased metoprolol from 25 mg BID to 37.5 mg BID and 3/10  - Continue increased dose of amlodipine      #Falls  #Physical deconditioning  - PT/OT     #Recent Left neck dystonia; unclear duration.  - much improved at time of discharge 3/4, after 2 days of topical treatments  - follow up with Neurology after discharge: may need to consider Botox if Sx persist  - resume topical antiinflammatory Q 6 hours while awake  - review positioning options w/ OT     #Recent Dx of oropharyngeal dysphagia  - had coughing with thin liquids during last admission  - continue thickened liquids  - had VSS 3/4: see report     #CKD  - creatinine baseline 1.1-1.2  - daily BMP     #Hypokalemia  - Replace     #Chronic anemia, normocytic  - stable, no s/s of active bleed  - monitor     #HFpEF  - euvolemic on exam, not in exacerbation   - not on home diuretics  - monitor volume status      #A-fib; rates stable  - continue PTA metoprolol  - not on home AC per med rec      #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  - appreciate Vascular Surgery follow up during last admission  - continue ASA 81 mg daily and atorvastatin 40 mg daily  - BP control as above      #Hard of hearing  - needs evaluation for hearing aids as soon as possible after discharge       DVT Prophylaxis: Pneumatic Compression Devices, Anti-embolisim stockings (TEDs), and Ambulate every shift  Code Status: DNR / DNI    Disposition: Expected discharge in 1 to 2 days once hospice +/- LTC identified        Diet: Combination Diet Regular Diet Adult  Snacks/Supplements Adult: Expedite Bottle; With  Meals  Snacks/Supplements Adult: Gelatein Plus; With Meals    DVT Prophylaxis: Pneumatic Compression Devices  Lozano Catheter: PRESENT, indication: Acute retention or obstruction  Lines: None     Cardiac Monitoring: None  Code Status: No CPR- Do NOT Intubate                  # Hypertension: Noted on problem list              # Financial/Environmental Concerns:           Disposition Plan      Expected Discharge Date: 03/15/2024, 11:00 AM  Discharge Delays: *Early Discharge Anticipated  Destination: long-term care facility  Discharge Comments: Anticipate home on hospice Friday Morning via family transport            JOELLEN TUCKER MD  Hospitalist Service  [facility]  Securely message with [employer] (more info)  Text page via [employer] Paging/Directory   ______________________________________________________________________    Interval History   Appropriate and pleasant with conversation. No concerns. Ready to go home.    Physical Exam   Vital Signs: Temp: 97.8  F (36.6  C) Temp src: Oral BP: 104/50 Pulse: 56   Resp: 18 SpO2: 96 % O2 Device: None (Room air)    Weight: 156 lbs 15.48 oz  GEN: Patient is awake and alert. Conversational.  Pulm: CTAB  Cardiac: RRR  Abd: NT/ND. Soft.      40 MINUTES SPENT BY ME on the date of service doing chart review, history, exam, documentation & further activities per the note.      Data   Imaging results reviewed over the past 24 hrs:   No results found for this or any previous visit (from the past 24 hour(s)).  Recent Labs   Lab 03/14/24  1047 03/12/24  0724 03/11/24  0802 03/08/24  1736 03/08/24  0719     --  269  --  271   NA  --   --   --   --  139   POTASSIUM 3.5 3.5 3.6   < > 3.0*   CHLORIDE  --   --   --   --  107   CO2  --   --   --   --  24   BUN  --   --   --   --  12.0   CR 1.19*  --  0.99  --  1.16   ANIONGAP  --   --   --   --  8   KRISTEN  --   --   --   --  8.2*   GLC  --   --   --   --  89    < > = values in this interval not displayed.

## 2024-03-14 NOTE — PROGRESS NOTES
CLINICAL NUTRITION SERVICES - BRIEF NOTE    EVALUATION OF THE PROGRESS TOWARD GOALS   Diet: Regular  Nutrition Supplement: Expedite daily, Gelatein BID   Intake: % po intake at meals, ordering x1 large meal and x2 smaller meals.      NEW FINDINGS   Met with pt and pt family at bedside this AM. Pt and family report okay po intakes, tolerating nutrition supplements. Pt denies nausea vomiting or abd pain. Pt had BM yesterday 3/13. Pt okay to continue with ONS for additional protein and kcals for wound healing   NKFA     INTERVENTIONS  Implementation  Continue ONS- Expedite and Gelatein plus     Monitoring/Evaluation  Progress toward goals will be monitored and evaluated per protocol.

## 2024-03-14 NOTE — PLAN OF CARE
Problem: Adult Inpatient Plan of Care  Goal: Plan of Care Review  Description: The Plan of Care Review/Shift note should be completed every shift.  The Outcome Evaluation is a brief statement about your assessment that the patient is improving, declining, or no change.  This information will be displayed automatically on your shift  note.  Outcome: Progressing  Flowsheets (Taken 3/14/2024 1318)  Plan of Care Reviewed With: patient     Problem: Skin Injury Risk Increased  Goal: Skin Health and Integrity  Outcome: Progressing  Intervention: Plan: Nurse Driven Intervention: Positioning  Recent Flowsheet Documentation  Taken 3/14/2024 0800 by Bo Klein RN  Plan: Positioning Interventions: REPOSITION Left/Right (No supine) q2h  Intervention: Plan: Nurse Driven Intervention: Moisture Management  Recent Flowsheet Documentation  Taken 3/14/2024 0800 by Bo Klein RN  Moisture Interventions: Encourage regular toileting  Bathing/Skin Care: incontinence care  Plan: Moisture Management: moisturize skin BID  Intervention: Plan: Nurse Driven Intervention: Friction and Shear  Recent Flowsheet Documentation  Taken 3/14/2024 0800 by Bo Kleni RN  Friction/Shear Interventions: HOB 30 degrees or less  Intervention: Optimize Skin Protection  Recent Flowsheet Documentation  Taken 3/14/2024 1200 by Bo Klein RN  Head of Bed (HOB) Positioning: HOB at 20 degrees  Taken 3/14/2024 1034 by Bo Klein RN  Activity Management: up in chair  Taken 3/14/2024 0800 by Bo Klein RN  Head of Bed (HOB) Positioning: HOB at 30-45 degrees  Taken 3/14/2024 0751 by Bo Klein RN  Activity Management: activity adjusted per tolerance  Head of Bed (HOB) Positioning: HOB at 20-30 degrees   Goal Outcome Evaluation:      Plan of Care Reviewed With: patient  Pt alert to self and place, up with assist of x 2 a chair. Turn and reposition every two hours while in bed. Denies pain. VSS on room air. PRN Hydralazine was given  once to keep SPB< 160. Bed and tab alarm for safety. Plan to discharge to Long term facility on 3/15.

## 2024-03-14 NOTE — PLAN OF CARE
Problem: Adult Inpatient Plan of Care  Goal: Optimal Comfort and Wellbeing  Outcome: Progressing  Problem: Violence Risk or Actual  Goal: Anger and Impulse Control  Outcome: Progressing  Intervention: Minimize Safety Risk  Sensory Stimulation Regulation:   lighting decreased   quiet environment promoted  Problem: Fall Injury Risk  Goal: Absence of Fall and Fall-Related Injury  Outcome: Progressing  Safety Promotion/Fall Prevention:   activity supervised   assistive device/personal items within reach   clutter free environment maintained   nonskid shoes/slippers when out of bed   safety round/check completed   room door open   Goal Outcome Evaluation:       Pt slept well overnight. VSS on RA. Lozano cath in place, good U/O.No acute events  reported ,

## 2024-03-14 NOTE — PROGRESS NOTES
Care Management Follow Up    Length of Stay (days): 9    Expected Discharge Date: 03/15/2024     Concerns to be Addressed: discharge planning     Patient plan of care discussed at interdisciplinary rounds: Yes    Anticipated Discharge Disposition:  home      Anticipated Discharge Services:  hospice   Anticipated Discharge DME:  per treatment team     Patient/family educated on Medicare website which has current facility and service quality ratings:  yes  Education Provided on the Discharge Plan:  yes  Patient/Family in Agreement with the Plan:  yes    Referrals Placed by CM/SW:  post acute care facilities, hospice   Private pay costs discussed: Not applicable    Additional Information:    9:18 AM  Patient discharging home with [facility] on 3/15.  [facility] stretcher transport arranged for  on 3/15 between [id number].  PCS completed and on chart.  Attempted to call patient's grand daughter to confirm plan / verify address; unable to reach Shruthi and no option to leave a voice mail.  Will try back again.     Spoke with Shaniqua at [facility] (ph: [phone]), hospice RN will meet patient at home at 1400 for hospice sign on.    11:09 AM  [facility] spoke with Shruthi; she plans to transport at discharge.  [facility] called to cancel medical transportation.  Shruthi will transport between [phone].        JYOTI Farley on 03/14/24

## 2024-03-14 NOTE — PLAN OF CARE
Occupational Therapy Discharge Summary    Reason for therapy discharge:    Discharged to discharge OT    Progress towards therapy goal(s). See goals on Care Plan in [facility] electronic health record for goal details.  Planning on discharge home w/ hospice. Will discharge OT orders.    Therapy recommendation(s):    No further therapy is recommended.    Goal Outcome Evaluation:                    Mary Reyes, OTR/L    3/14/2024

## 2024-03-14 NOTE — PLAN OF CARE
Physical Therapy Discharge Summary    Reason for therapy discharge:    No further expectations of functional progress.  Change in medical status.    Progress towards therapy goal(s). See goals on Care Plan in Epic electronic health record for goal details.  Goals not met.  Barriers to achieving goals:   patient will be discharging home vs LTC on hospice.    Therapy recommendation(s):    none

## 2024-03-15 VITALS
WEIGHT: 156.97 LBS | OXYGEN SATURATION: 97 % | BODY MASS INDEX: 23.87 KG/M2 | TEMPERATURE: 98.3 F | SYSTOLIC BLOOD PRESSURE: 130 MMHG | RESPIRATION RATE: 22 BRPM | DIASTOLIC BLOOD PRESSURE: 58 MMHG | HEART RATE: 58 BPM

## 2024-03-15 LAB
HOLD SPECIMEN: NORMAL
POTASSIUM SERPL-SCNC: 3.7 MMOL/L (ref 3.4–5.3)

## 2024-03-15 PROCEDURE — 250N000011 HC RX IP 250 OP 636: Performed by: STUDENT IN AN ORGANIZED HEALTH CARE EDUCATION/TRAINING PROGRAM

## 2024-03-15 PROCEDURE — 250N000013 HC RX MED GY IP 250 OP 250 PS 637: Performed by: INTERNAL MEDICINE

## 2024-03-15 PROCEDURE — 84132 ASSAY OF SERUM POTASSIUM: CPT | Performed by: STUDENT IN AN ORGANIZED HEALTH CARE EDUCATION/TRAINING PROGRAM

## 2024-03-15 PROCEDURE — 99239 HOSP IP/OBS DSCHRG MGMT >30: CPT | Mod: GW | Performed by: STUDENT IN AN ORGANIZED HEALTH CARE EDUCATION/TRAINING PROGRAM

## 2024-03-15 PROCEDURE — 36415 COLL VENOUS BLD VENIPUNCTURE: CPT | Performed by: STUDENT IN AN ORGANIZED HEALTH CARE EDUCATION/TRAINING PROGRAM

## 2024-03-15 PROCEDURE — 250N000013 HC RX MED GY IP 250 OP 250 PS 637: Performed by: STUDENT IN AN ORGANIZED HEALTH CARE EDUCATION/TRAINING PROGRAM

## 2024-03-15 RX ORDER — METOPROLOL TARTRATE 25 MG/1
37.5 TABLET, FILM COATED ORAL 2 TIMES DAILY
Qty: 270 TABLET | Refills: 1 | Status: SHIPPED | OUTPATIENT
Start: 2024-03-15

## 2024-03-15 RX ORDER — AMLODIPINE BESYLATE 5 MG/1
5 TABLET ORAL 2 TIMES DAILY
Qty: 180 TABLET | Refills: 3 | Status: SHIPPED | OUTPATIENT
Start: 2024-03-15

## 2024-03-15 RX ORDER — QUETIAPINE FUMARATE 25 MG/1
12.5 TABLET, FILM COATED ORAL AT BEDTIME
Qty: 45 TABLET | Refills: 3 | Status: SHIPPED | OUTPATIENT
Start: 2024-03-15

## 2024-03-15 RX ORDER — QUETIAPINE FUMARATE 25 MG/1
12.5 TABLET, FILM COATED ORAL 2 TIMES DAILY PRN
Qty: 14 TABLET | Refills: 2 | Status: SHIPPED | OUTPATIENT
Start: 2024-03-15

## 2024-03-15 RX ADMIN — LEVOTHYROXINE SODIUM 100 MCG: 100 TABLET ORAL at 09:11

## 2024-03-15 RX ADMIN — METOPROLOL TARTRATE 37.5 MG: 25 TABLET, FILM COATED ORAL at 09:11

## 2024-03-15 RX ADMIN — Medication: at 09:13

## 2024-03-15 RX ADMIN — ENOXAPARIN SODIUM 40 MG: 40 INJECTION SUBCUTANEOUS at 09:12

## 2024-03-15 RX ADMIN — FINASTERIDE 5 MG: 5 TABLET, FILM COATED ORAL at 09:09

## 2024-03-15 RX ADMIN — THIAMINE HCL TAB 100 MG 100 MG: 100 TAB at 09:12

## 2024-03-15 RX ADMIN — Medication 25 MG: at 09:12

## 2024-03-15 RX ADMIN — AMLODIPINE BESYLATE 5 MG: 5 TABLET ORAL at 09:12

## 2024-03-15 RX ADMIN — Medication 60 ML: at 09:06

## 2024-03-15 RX ADMIN — Medication 1000 MCG: at 09:12

## 2024-03-15 RX ADMIN — ASPIRIN 81 MG: 81 TABLET, COATED ORAL at 09:13

## 2024-03-15 RX ADMIN — ANORECTAL OINTMENT: 15.7; .44; 24; 20.6 OINTMENT TOPICAL at 09:13

## 2024-03-15 ASSESSMENT — ACTIVITIES OF DAILY LIVING (ADL)
ADLS_ACUITY_SCORE: 53

## 2024-03-15 NOTE — PROGRESS NOTES
Care Management Discharge Note    Discharge Date: 03/15/2024       Discharge Disposition: Home, Hospice    Discharge Services:  (home hospice)    Discharge DME:  (per treatment team)    Discharge Transportation: agency    Private pay costs discussed: Not applicable    Does the patient's insurance plan have a 3 day qualifying hospital stay waiver?  No    PAS Confirmation Code: NA  Patient/family educated on Medicare website which has current facility and service quality ratings: yes    Education Provided on the Discharge Plan: Yes  Persons Notified of Discharge Plans: Shruthi keller   Patient/Family in Agreement with the Plan: yes    Handoff Referral Completed: Yes    Additional Information:  Patient discharging home with family on service with [facility].  Grand Shruthi delgadillo transporting around 1100.   faxed orders to [facility].     JYOTI Farley

## 2024-03-15 NOTE — DISCHARGE SUMMARY
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            Discharge Disposition   Discharged to home  Condition at discharge: Stable    Hospital Course   Dilip Long is a pleasant 88 year old gentleman who was discharged 3/4, after being admitted 2/29-3/4 for acute confusional state/encephalopathy and other medical issues. Per the ED provider earlier today, while at home for a short time, Dilip developed psychosis and hallucinations.  His granddaughter Shruthi (his POA) stated they thought initially they could take care of him at home. However, he did not sleep, was hallucinating and became difficult to control. In the ED, he became combative and apparently tried to strike one of the nursing staff; restraints were started. No acute problems were identified here. His cognition slowly improved and he became pleasant and oriented. Suspect residual delirium at home. He was discharged home again with his granddaughter with  hospice support after family considered LTC.    #Acute encephalopathy  #Delirium complicated by intermittent hallucinations  Recently admitted 2/29 - 3/4 for acute encephalopathy of unclear origin. Metabolic work-ups were negative for an acute problem. Unfortunately, likely declining cognition due to age, a sign of dementia. Readmitted 3/5 after patient continued having hallucinations while at home and family could not care for him without assistance. Brain CT with no acute ischemia, EEG no seizure activity. Appreciate Neurology consult, impression is encephalopathy suspect underlying dementia, with sundowning. Orientation and behavior significantly improved here. Delirium likely the initial cause of his agitation and hallucinations.  - Neurology recommend outpatient neuropsych testing  - Patient and family considered LTC but decided to pursue hospice at home    #Sundowning  Nursing removed one-to-one monitoring, symptoms improved.  - Patient seen by psychiatry and started on QHS and as needed Seroquel  - Delirium precautions    #Hypothyroidism  - continue current synthroid dose     #Vitamin B6 and B12 deficiencies  -Continue p.o. replacements  B6 was 5.7 range 20 -125  B12 was 275 which is low normal  B1 level normal at 111     #Moderate malnutrition; recent diagnosis, in context of acute illness or injury; RD assessment:  - RD to follow     #Recent recurrent UTI  #Chronic indwelling Lozano catheter - per son Mark, this was placed ~ 1 year ago.  On amoxicillin 500 mg BID recently started for UTI and possible prostatitis, however POA/granddaughter request this to be discontinued, feels this is causing confusion,   - ID consulted and felt okay to stop amoxicillin, no clear proof supporting prostatitis diagnosis     #PrimaryHTN  - increased metoprolol from 25 mg BID to 37.5 mg BID and 3/10  - Continue increased dose of amlodipine 5mg BID     #Falls  #Physical deconditioning  - PT/OT     #Recent Left neck dystonia;  unclear duration.  - much improved at time of discharge 3/4, after 2 days of topical treatments  - follow up with Neurology after discharge: may need to consider Botox if Sx persist  - resume topical antiinflammatory Q 6 hours while awake  - review positioning options w/ OT     #Recent Dx of oropharyngeal dysphagia  - had coughing with thin liquids during last admission  - continue thickened liquids  - had VSS 3/4: see report     #CKD  - creatinine baseline 1.1-1.2  - daily BMP     #Hypokalemia  - Replace     #Chronic anemia, normocytic  - stable, no s/s of active bleed  - monitor     #HFpEF  - euvolemic on exam, not in exacerbation   - not on home diuretics  - monitor volume status      #A-fib; rates stable  - continue PTA metoprolol  - not on home AC per med rec      #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  - appreciate Vascular Surgery follow up during last admission  - continue ASA 81 mg daily and atorvastatin 40 mg daily  - BP control as above      #Hard of hearing  - needs evaluation for hearing aids as soon as possible after discharge       DVT Prophylaxis: Pneumatic Compression Devices, Anti-embolisim stockings (TEDs), and Ambulate every shift  Code Status: DNR / DNI    Disposition: Expected discharge in 1 to 2 days once hospice +/- LTC identified    Consultations This Hospital Stay   NEUROLOGY IP CONSULT  NEUROLOGY IP CONSULT  CARE MANAGEMENT / SOCIAL WORK IP CONSULT  PHYSICAL THERAPY ADULT IP CONSULT  OCCUPATIONAL THERAPY ADULT IP CONSULT  PHYSICAL THERAPY ADULT IP CONSULT  WOUND OSTOMY CONTINENCE NURSE  IP CONSULT  PSYCHIATRY IP CONSULT  INFECTIOUS DISEASES IP CONSULT    Code Status   Prior    Time Spent on this Encounter   I, JOELLEN TUCKER MD, personally saw the patient today and spent greater than 30 minutes discharging this patient.       JOELLEN TUCKER MD  [address] [address]  Phone:  [phone]  Fax: [phone]  ______________________________________________________________________    Physical Exam   Vital Signs: Temp: 98.3  F (36.8  C) Temp src: Oral BP: 130/58 Pulse: 58   Resp: 22 SpO2: 97 % O2 Device: None (Room air)    Weight: 156 lbs 15.48 oz    GEN: Patient is awake and alert. Conversational.  Pulm: CTAB  Cardiac: RRR  Abd: NT/ND. Soft.       Primary Care Physician   Kolton Junior    Discharge Orders      Reason for your hospital stay    Dilip was admitted for hallucinations and delirium likely spontaneous due to his recent hospitalization and underlying cognitive impairment. We stopped his amoxicillin as well in case that was contributing. You were accepted to home hospice. Your hospice agency will be in touch about getting set up with their services and they will be your main medical support after leaving the hospital.     Follow-up and recommended labs and tests     Follow up with hospice agency     Activity    Your activity upon discharge: activity as tolerated     Diet    Follow this diet upon discharge: Orders Placed This Encounter      Snacks/Supplements Adult: Gelatein Plus; With Meals      Snacks/Supplements Adult: Expedite Bottle; With Meals      Combination Diet Regular Diet Adult       Significant Results and Procedures   Most Recent 3 CBC's:  Recent Labs   Lab Test 03/14/24  1047 03/11/24  0802 03/08/24  0719 03/06/24  0809 03/05/24  0532 03/03/24  0455 03/01/24  0624   WBC  --   --   --  6.5 8.9  --  8.8   HGB  --   --   --  10.7* 11.0*  --  11.5*   MCV  --   --   --  98 99  --  94    269 271 293 319   < > 298    < > = values in this interval not displayed.     Most Recent 3 BMP's:  Recent Labs   Lab Test 03/15/24  0751 03/14/24  1047 03/12/24  0724 03/11/24  0802 03/08/24  1736 03/08/24  0719 03/07/24  0817 03/06/24  0809 03/05/24  0532   NA  --   --   --   --   --  139  --  139 137   POTASSIUM 3.7 3.5 3.5 3.6   < > 3.0*  --  3.0* 3.6   CHLORIDE  --   --   --    --   --  107  --  106 103   CO2  --   --   --   --   --  24  --  24 27   BUN  --   --   --   --   --  12.0  --  10.6 15.6   CR  --  1.19*  --  0.99  --  1.16  --  1.00 1.23*   ANIONGAP  --   --   --   --   --  8  --  9 7   KRISTEN  --   --   --   --   --  8.2*  --  8.2* 8.9   GLC  --   --   --   --   --  89 96 92 95    < > = values in this interval not displayed.   ,   Results for orders placed or performed during the hospital encounter of 03/05/24   CT Head w/o Contrast    Narrative    EXAM: CT HEAD W/O CONTRAST  LOCATION: [facility]  DATE: 3/5/2024    INDICATION: increased confusion  COMPARISON: CT head without contrast 02/29/2024.  TECHNIQUE: Routine CT Head without IV contrast. Multiplanar reformats. Dose reduction techniques were used.    FINDINGS:  INTRACRANIAL CONTENTS: No intracranial hemorrhage, extraaxial collection, or mass effect.  No CT evidence of acute infarct. Moderate presumed chronic small vessel ischemic changes. Mild generalized volume loss. No hydrocephalus.     VISUALIZED ORBITS/SINUSES/MASTOIDS: No intraorbital abnormality. No paranasal sinus mucosal disease. No middle ear or mastoid effusion.    BONES/SOFT TISSUES: No acute abnormality.      Impression    IMPRESSION:  1.  No CT evidence for acute intracranial process.  2.  Brain atrophy and presumed chronic microvascular ischemic changes as above.   XR Chest Port 1 View    Narrative    EXAM: XR CHEST PORT 1 VIEW  LOCATION: [facility]  DATE: 3/7/2024    INDICATION: Crackles after witnessed aspiration.  COMPARISON: Portable chest single view (two films) 02/29/2024 at 1048 hours.      Impression    IMPRESSION: Minor strands of linear atelectasis left base. Both lungs are otherwise clear. No adenopathy or effusion. Normal cardiac size and pulmonary vascularity. Atherosclerotic thoracic aorta. Remote healed right rib fracture deformities. Healing   left 7th rib fracture deformity. Healed left 6th and  8th rib fracture deformities. Degenerative changes both shoulders and the spine.       Discharge Medications   Discharge Medication List as of 3/15/2024 10:32 AM        START taking these medications    Details   amLODIPine (NORVASC) 5 MG tablet Take 1 tablet (5 mg) by mouth 2 times daily, Disp-180 tablet, R-3, E-Prescribe      !! QUEtiapine (SEROQUEL) 25 MG tablet Take 0.5 tablets (12.5 mg) by mouth at bedtime, Disp-45 tablet, R-3, E-Prescribe      !! QUEtiapine (SEROQUEL) 25 MG tablet Take 0.5 tablets (12.5 mg) by mouth 2 times daily as needed (Agitation associated with delirium and hallucinations), Disp-14 tablet, R-2, E-Prescribe       !! - Potential duplicate medications found. Please discuss with provider.        CONTINUE these medications which have CHANGED    Details   metoprolol tartrate (LOPRESSOR) 25 MG tablet Take 1.5 tablets (37.5 mg) by mouth 2 times daily, Disp-270 tablet, R-1, E-Prescribe           CONTINUE these medications which have NOT CHANGED    Details   acetaminophen (TYLENOL) 500 MG tablet Take 1,000 mg by mouth every 6 hours as needed for mild pain, Historical      aspirin 81 MG EC tablet Take 1 tablet (81 mg) by mouth daily, Disp-30 tablet, R-1, E-Prescribe      atorvastatin (LIPITOR) 40 MG tablet Take 1 tablet (40 mg) by mouth every evening, Disp-30 tablet, R-1, E-Prescribe      Calcium Carbonate-Vitamin D (OSCAL 500/200 D-3 PO) Take 1 tablet by mouth daily., Historical      cyanocobalamin (VITAMIN B-12) 1000 MCG sublingual tablet Place 1 tablet (1,000 mcg) under the tongue daily, Disp-14 tablet, R-1, E-Prescribe      finasteride (PROSCAR) 5 MG tablet Take 5 mg by mouth daily, Historical      GLUCOSAMINE-CHONDROITIN-VIT D3 PO Take 1 tablet by mouth daily, Historical      ipratropium - albuterol 0.5 mg/2.5 mg/3 mL (DUONEB) 0.5-2.5 (3) MG/3ML neb solution Take 1 vial (3 mLs) by nebulization every 6 hours as needed for shortness of breath, wheezing or cough, Disp-90 mL, R-0, Local Print       !! levothyroxine (SYNTHROID/LEVOTHROID) 100 MCG tablet Take 1 tablet (100 mcg) by mouth Every Mon, Wed, Fri Morning, Disp-12 tablet, R-1, E-Prescribe      !! levothyroxine (SYNTHROID/LEVOTHROID) 75 MCG tablet Take 1 tablet (75 mcg) by mouth See Admin Instructions, Disp-16 tablet, R-1, E-PrescribeOnce per day on Sunday, Tuesday, Thursday, Saturday      methyl salicylate-menthol (ICY HOT) ointment Apply topically every 6 hoursDisp-99.2 g, [id number]      pyridOXINE (VITAMIN B6) 25 MG tablet Take 1 tablet (25 mg) by mouth daily, Disp-30 tablet, R-1, E-Prescribe      thiamine (B-1) 100 MG tablet Take 1 tablet (100 mg) by mouth daily, Disp-30 tablet, R-1, E-Prescribe      trolamine salicylate (ASPERCREME) 10 % external cream Apply topically every 6 hoursDisp-35.4 g, [id number]      Vitamin D3 (VITAMIN D, CHOLECALCIFEROL,) 25 mcg (1000 units) tablet Take 1 tablet by mouth every evening, Historical       !! - Potential duplicate medications found. Please discuss with provider.        STOP taking these medications       amoxicillin (AMOXIL) 500 MG capsule Comments:   Reason for Stopping:             Allergies   No Known Allergies

## 2024-03-15 NOTE — PLAN OF CARE
Problem: UTI (Urinary Tract Infection)  Goal: Improved Infection Symptoms  Outcome: Progressing     Problem: Confusion Acute  Goal: Optimal Cognitive Function  Outcome: Progressing  Intervention: Minimize Contributing Factors  Recent Flowsheet Documentation  Taken 3/15/2024 0032 by Christelle Davis RN  Sensory Stimulation Regulation:   quiet environment promoted   television on  Reorientation Measures:   calendar in view   clock in view   reorientation provided  Communication Support Strategies: active listening utilized     Problem: Comorbidity Management  Goal: Blood Pressure in Desired Range  Intervention: Maintain Blood Pressure Management  Recent Flowsheet Documentation  Taken 3/15/2024 0032 by Christelle Davis RN  Medication Review/Management: medications reviewed   Goal Outcome Evaluation:       Patient is A/O x4. Has some intermittent confusion at times. Denied pain this shift. Q4 neuro checks WNL. Chronic Lozano in place and draining urine. Patient refused creams overnight. No acute events this shift. Patient slept well. Discharged planned today 3/15/24 home with [facility] via stretcher. Call light within reach and bed alarm activated.

## 2024-04-24 ENCOUNTER — LAB (OUTPATIENT)
Dept: LAB | Facility: HOSPITAL | Age: 89
End: 2024-04-24
Payer: OTHER MISCELLANEOUS

## 2024-04-24 DIAGNOSIS — R68.89 OTHER GENERAL SYMPTOMS AND SIGNS: ICD-10-CM

## 2024-04-24 DIAGNOSIS — Z87.440 PERSONAL HISTORY OF URINARY TRACT INFECTION: Primary | ICD-10-CM

## 2024-04-24 LAB
ALBUMIN UR-MCNC: 300 MG/DL
APPEARANCE UR: ABNORMAL
BACTERIA #/AREA URNS HPF: ABNORMAL /HPF
BILIRUB UR QL STRIP: NEGATIVE
COLOR UR AUTO: YELLOW
GLUCOSE UR STRIP-MCNC: NEGATIVE MG/DL
HGB UR QL STRIP: ABNORMAL
KETONES UR STRIP-MCNC: NEGATIVE MG/DL
LEUKOCYTE ESTERASE UR QL STRIP: ABNORMAL
MUCOUS THREADS #/AREA URNS LPF: PRESENT /LPF
NITRATE UR QL: NEGATIVE
PH UR STRIP: 8 [PH] (ref 5–7)
RBC URINE: 80 /HPF
SP GR UR STRIP: 1.02 (ref 1–1.03)
SQUAMOUS EPITHELIAL: 1 /HPF
UROBILINOGEN UR STRIP-MCNC: <2 MG/DL
WBC CLUMPS #/AREA URNS HPF: PRESENT /HPF
WBC URINE: >182 /HPF

## 2024-04-24 PROCEDURE — 81001 URINALYSIS AUTO W/SCOPE: CPT

## 2024-04-24 PROCEDURE — 87086 URINE CULTURE/COLONY COUNT: CPT

## 2024-04-26 LAB — BACTERIA UR CULT: NO GROWTH

## 2024-06-12 ENCOUNTER — LAB REQUISITION (OUTPATIENT)
Dept: LAB | Facility: HOSPITAL | Age: 89
End: 2024-06-12
Payer: OTHER MISCELLANEOUS

## 2024-06-12 DIAGNOSIS — N39.0 URINARY TRACT INFECTION, SITE NOT SPECIFIED: ICD-10-CM

## 2024-06-12 LAB
ALBUMIN UR-MCNC: 200 MG/DL
APPEARANCE UR: ABNORMAL
BACTERIA #/AREA URNS HPF: ABNORMAL /HPF
BILIRUB UR QL STRIP: NEGATIVE
COLOR UR AUTO: YELLOW
GLUCOSE UR STRIP-MCNC: NEGATIVE MG/DL
HGB UR QL STRIP: ABNORMAL
KETONES UR STRIP-MCNC: NEGATIVE MG/DL
LEUKOCYTE ESTERASE UR QL STRIP: ABNORMAL
MUCOUS THREADS #/AREA URNS LPF: PRESENT /LPF
NITRATE UR QL: NEGATIVE
PH UR STRIP: 8 [PH] (ref 5–7)
RBC URINE: >182 /HPF
SP GR UR STRIP: 1.02 (ref 1–1.03)
SQUAMOUS EPITHELIAL: 20 /HPF
TRANSITIONAL EPI: 9 /HPF
UROBILINOGEN UR STRIP-MCNC: <2 MG/DL
WBC CLUMPS #/AREA URNS HPF: PRESENT /HPF
WBC URINE: >182 /HPF

## 2024-06-12 PROCEDURE — 81001 URINALYSIS AUTO W/SCOPE: CPT | Mod: ORL | Performed by: FAMILY MEDICINE

## 2024-06-12 PROCEDURE — 87086 URINE CULTURE/COLONY COUNT: CPT | Mod: ORL | Performed by: FAMILY MEDICINE

## 2024-06-14 LAB — BACTERIA UR CULT: NORMAL

## 2024-06-29 ENCOUNTER — HOSPITAL ENCOUNTER (INPATIENT)
Facility: HOSPITAL | Age: 89
LOS: 1 days | Discharge: HOME | DRG: 177 | End: 2024-06-30
Attending: STUDENT IN AN ORGANIZED HEALTH CARE EDUCATION/TRAINING PROGRAM | Admitting: STUDENT IN AN ORGANIZED HEALTH CARE EDUCATION/TRAINING PROGRAM
Payer: OTHER MISCELLANEOUS

## 2024-06-29 ENCOUNTER — APPOINTMENT (OUTPATIENT)
Dept: RADIOLOGY | Facility: HOSPITAL | Age: 89
DRG: 177 | End: 2024-06-29
Attending: STUDENT IN AN ORGANIZED HEALTH CARE EDUCATION/TRAINING PROGRAM
Payer: OTHER MISCELLANEOUS

## 2024-06-29 DIAGNOSIS — J18.9 PNEUMONIA DUE TO INFECTIOUS ORGANISM, UNSPECIFIED LATERALITY, UNSPECIFIED PART OF LUNG: ICD-10-CM

## 2024-06-29 DIAGNOSIS — J69.0 ASPIRATION PNEUMONIA, UNSPECIFIED ASPIRATION PNEUMONIA TYPE, UNSPECIFIED LATERALITY, UNSPECIFIED PART OF LUNG (H): ICD-10-CM

## 2024-06-29 DIAGNOSIS — H61.23 BILATERAL IMPACTED CERUMEN: Primary | ICD-10-CM

## 2024-06-29 DIAGNOSIS — J96.01 ACUTE HYPOXEMIC RESPIRATORY FAILURE (H): ICD-10-CM

## 2024-06-29 DIAGNOSIS — I10 ESSENTIAL HYPERTENSION: ICD-10-CM

## 2024-06-29 PROBLEM — R09.02 HYPOXIA: Status: ACTIVE | Noted: 2024-06-29

## 2024-06-29 LAB
ALBUMIN SERPL BCG-MCNC: 3.6 G/DL (ref 3.5–5.2)
ALBUMIN UR-MCNC: 200 MG/DL
ALP SERPL-CCNC: 104 U/L (ref 40–150)
ALT SERPL W P-5'-P-CCNC: 10 U/L (ref 0–70)
ANION GAP SERPL CALCULATED.3IONS-SCNC: 17 MMOL/L (ref 7–15)
APPEARANCE UR: ABNORMAL
AST SERPL W P-5'-P-CCNC: 16 U/L (ref 0–45)
BACTERIA #/AREA URNS HPF: ABNORMAL /HPF
BASE EXCESS BLDV CALC-SCNC: -3.2 MMOL/L (ref -3–3)
BASOPHILS # BLD MANUAL: 0.2 10E3/UL (ref 0–0.2)
BASOPHILS NFR BLD MANUAL: 1 %
BILIRUB SERPL-MCNC: 0.9 MG/DL
BILIRUB UR QL STRIP: NEGATIVE
BUN SERPL-MCNC: 22.9 MG/DL (ref 8–23)
CALCIUM SERPL-MCNC: 9.1 MG/DL (ref 8.8–10.2)
CHLORIDE SERPL-SCNC: 100 MMOL/L (ref 98–107)
COLOR UR AUTO: YELLOW
CREAT SERPL-MCNC: 1.46 MG/DL (ref 0.67–1.17)
DEPRECATED HCO3 PLAS-SCNC: 22 MMOL/L (ref 22–29)
EGFRCR SERPLBLD CKD-EPI 2021: 46 ML/MIN/1.73M2
EOSINOPHIL # BLD MANUAL: 0.2 10E3/UL (ref 0–0.7)
EOSINOPHIL NFR BLD MANUAL: 1 %
ERYTHROCYTE [DISTWIDTH] IN BLOOD BY AUTOMATED COUNT: 12.2 % (ref 10–15)
FLUAV RNA SPEC QL NAA+PROBE: NEGATIVE
FLUBV RNA RESP QL NAA+PROBE: NEGATIVE
GLUCOSE SERPL-MCNC: 157 MG/DL (ref 70–99)
GLUCOSE UR STRIP-MCNC: NEGATIVE MG/DL
HCO3 BLDV-SCNC: 24 MMOL/L (ref 21–28)
HCT VFR BLD AUTO: 35 % (ref 40–53)
HGB BLD-MCNC: 11.2 G/DL (ref 13.3–17.7)
HGB UR QL STRIP: ABNORMAL
HOLD SPECIMEN: NORMAL
HOLD SPECIMEN: NORMAL
KETONES UR STRIP-MCNC: ABNORMAL MG/DL
LACTATE SERPL-SCNC: 3.3 MMOL/L (ref 0.7–2)
LACTATE SERPL-SCNC: 6.1 MMOL/L (ref 0.7–2)
LEUKOCYTE ESTERASE UR QL STRIP: ABNORMAL
LYMPHOCYTES # BLD MANUAL: 1.4 10E3/UL (ref 0.8–5.3)
LYMPHOCYTES NFR BLD MANUAL: 9 %
MCH RBC QN AUTO: 32.2 PG (ref 26.5–33)
MCHC RBC AUTO-ENTMCNC: 32 G/DL (ref 31.5–36.5)
MCV RBC AUTO: 101 FL (ref 78–100)
MONOCYTES # BLD MANUAL: 0.6 10E3/UL (ref 0–1.3)
MONOCYTES NFR BLD MANUAL: 4 %
MUCOUS THREADS #/AREA URNS LPF: PRESENT /LPF
NEUTROPHILS # BLD MANUAL: 13.5 10E3/UL (ref 1.6–8.3)
NEUTROPHILS NFR BLD MANUAL: 85 %
NITRATE UR QL: NEGATIVE
NRBC # BLD AUTO: 0 10E3/UL
NRBC BLD AUTO-RTO: 0 /100
NT-PROBNP SERPL-MCNC: 1203 PG/ML (ref 0–1800)
O2/TOTAL GAS SETTING VFR VENT: 50 %
OXYHGB MFR BLDV: 18 % (ref 70–75)
PCO2 BLDV: 60 MM HG (ref 40–50)
PH BLDV: 7.22 [PH] (ref 7.32–7.43)
PH UR STRIP: 7 [PH] (ref 5–7)
PLAT MORPH BLD: ABNORMAL
PLATELET # BLD AUTO: 406 10E3/UL (ref 150–450)
PO2 BLDV: 19 MM HG (ref 25–47)
POLYCHROMASIA BLD QL SMEAR: SLIGHT
POTASSIUM SERPL-SCNC: 4 MMOL/L (ref 3.4–5.3)
PROCALCITONIN SERPL IA-MCNC: 0.1 NG/ML
PROT SERPL-MCNC: 6.7 G/DL (ref 6.4–8.3)
RBC # BLD AUTO: 3.48 10E6/UL (ref 4.4–5.9)
RBC MORPH BLD: ABNORMAL
RBC URINE: 7 /HPF
RSV RNA SPEC NAA+PROBE: NEGATIVE
SAO2 % BLDV: 18.3 % (ref 70–75)
SARS-COV-2 RNA RESP QL NAA+PROBE: NEGATIVE
SODIUM SERPL-SCNC: 139 MMOL/L (ref 135–145)
SP GR UR STRIP: 1.03 (ref 1–1.03)
SQUAMOUS EPITHELIAL: >182 /HPF
UROBILINOGEN UR STRIP-MCNC: 4 MG/DL
WBC # BLD AUTO: 15.9 10E3/UL (ref 4–11)
WBC CLUMPS #/AREA URNS HPF: PRESENT /HPF
WBC URINE: >182 /HPF

## 2024-06-29 PROCEDURE — 82040 ASSAY OF SERUM ALBUMIN: CPT | Performed by: STUDENT IN AN ORGANIZED HEALTH CARE EDUCATION/TRAINING PROGRAM

## 2024-06-29 PROCEDURE — 258N000003 HC RX IP 258 OP 636: Performed by: STUDENT IN AN ORGANIZED HEALTH CARE EDUCATION/TRAINING PROGRAM

## 2024-06-29 PROCEDURE — 5A09357 ASSISTANCE WITH RESPIRATORY VENTILATION, LESS THAN 24 CONSECUTIVE HOURS, CONTINUOUS POSITIVE AIRWAY PRESSURE: ICD-10-PCS | Performed by: STUDENT IN AN ORGANIZED HEALTH CARE EDUCATION/TRAINING PROGRAM

## 2024-06-29 PROCEDURE — 999N000157 HC STATISTIC RCP TIME EA 10 MIN

## 2024-06-29 PROCEDURE — 87637 SARSCOV2&INF A&B&RSV AMP PRB: CPT | Performed by: STUDENT IN AN ORGANIZED HEALTH CARE EDUCATION/TRAINING PROGRAM

## 2024-06-29 PROCEDURE — 84155 ASSAY OF PROTEIN SERUM: CPT | Performed by: STUDENT IN AN ORGANIZED HEALTH CARE EDUCATION/TRAINING PROGRAM

## 2024-06-29 PROCEDURE — 94640 AIRWAY INHALATION TREATMENT: CPT

## 2024-06-29 PROCEDURE — 250N000013 HC RX MED GY IP 250 OP 250 PS 637: Performed by: STUDENT IN AN ORGANIZED HEALTH CARE EDUCATION/TRAINING PROGRAM

## 2024-06-29 PROCEDURE — 83880 ASSAY OF NATRIURETIC PEPTIDE: CPT | Performed by: STUDENT IN AN ORGANIZED HEALTH CARE EDUCATION/TRAINING PROGRAM

## 2024-06-29 PROCEDURE — 94660 CPAP INITIATION&MGMT: CPT

## 2024-06-29 PROCEDURE — 71045 X-RAY EXAM CHEST 1 VIEW: CPT

## 2024-06-29 PROCEDURE — 250N000011 HC RX IP 250 OP 636: Performed by: STUDENT IN AN ORGANIZED HEALTH CARE EDUCATION/TRAINING PROGRAM

## 2024-06-29 PROCEDURE — 83605 ASSAY OF LACTIC ACID: CPT | Performed by: STUDENT IN AN ORGANIZED HEALTH CARE EDUCATION/TRAINING PROGRAM

## 2024-06-29 PROCEDURE — 99223 1ST HOSP IP/OBS HIGH 75: CPT | Mod: GW | Performed by: STUDENT IN AN ORGANIZED HEALTH CARE EDUCATION/TRAINING PROGRAM

## 2024-06-29 PROCEDURE — 36415 COLL VENOUS BLD VENIPUNCTURE: CPT | Performed by: STUDENT IN AN ORGANIZED HEALTH CARE EDUCATION/TRAINING PROGRAM

## 2024-06-29 PROCEDURE — 96365 THER/PROPH/DIAG IV INF INIT: CPT

## 2024-06-29 PROCEDURE — 99291 CRITICAL CARE FIRST HOUR: CPT | Mod: 25

## 2024-06-29 PROCEDURE — 250N000009 HC RX 250: Performed by: STUDENT IN AN ORGANIZED HEALTH CARE EDUCATION/TRAINING PROGRAM

## 2024-06-29 PROCEDURE — 87086 URINE CULTURE/COLONY COUNT: CPT | Performed by: STUDENT IN AN ORGANIZED HEALTH CARE EDUCATION/TRAINING PROGRAM

## 2024-06-29 PROCEDURE — 82805 BLOOD GASES W/O2 SATURATION: CPT | Performed by: STUDENT IN AN ORGANIZED HEALTH CARE EDUCATION/TRAINING PROGRAM

## 2024-06-29 PROCEDURE — 96361 HYDRATE IV INFUSION ADD-ON: CPT

## 2024-06-29 PROCEDURE — 84145 PROCALCITONIN (PCT): CPT | Performed by: STUDENT IN AN ORGANIZED HEALTH CARE EDUCATION/TRAINING PROGRAM

## 2024-06-29 PROCEDURE — 87040 BLOOD CULTURE FOR BACTERIA: CPT | Performed by: STUDENT IN AN ORGANIZED HEALTH CARE EDUCATION/TRAINING PROGRAM

## 2024-06-29 PROCEDURE — 96375 TX/PRO/DX INJ NEW DRUG ADDON: CPT

## 2024-06-29 PROCEDURE — 85007 BL SMEAR W/DIFF WBC COUNT: CPT | Performed by: STUDENT IN AN ORGANIZED HEALTH CARE EDUCATION/TRAINING PROGRAM

## 2024-06-29 PROCEDURE — 120N000001 HC R&B MED SURG/OB

## 2024-06-29 PROCEDURE — 85025 COMPLETE CBC W/AUTO DIFF WBC: CPT | Performed by: STUDENT IN AN ORGANIZED HEALTH CARE EDUCATION/TRAINING PROGRAM

## 2024-06-29 PROCEDURE — 81001 URINALYSIS AUTO W/SCOPE: CPT | Performed by: STUDENT IN AN ORGANIZED HEALTH CARE EDUCATION/TRAINING PROGRAM

## 2024-06-29 RX ORDER — AMOXICILLIN 250 MG
1 CAPSULE ORAL 2 TIMES DAILY PRN
Status: DISCONTINUED | OUTPATIENT
Start: 2024-06-29 | End: 2024-06-30 | Stop reason: HOSPADM

## 2024-06-29 RX ORDER — ASPIRIN 81 MG/1
81 TABLET ORAL DAILY
Status: DISCONTINUED | OUTPATIENT
Start: 2024-06-30 | End: 2024-06-30 | Stop reason: HOSPADM

## 2024-06-29 RX ORDER — PIPERACILLIN SODIUM, TAZOBACTAM SODIUM 3; .375 G/15ML; G/15ML
3.38 INJECTION, POWDER, LYOPHILIZED, FOR SOLUTION INTRAVENOUS ONCE
Status: COMPLETED | OUTPATIENT
Start: 2024-06-29 | End: 2024-06-29

## 2024-06-29 RX ORDER — IPRATROPIUM BROMIDE AND ALBUTEROL SULFATE 2.5; .5 MG/3ML; MG/3ML
3 SOLUTION RESPIRATORY (INHALATION) ONCE
Status: COMPLETED | OUTPATIENT
Start: 2024-06-29 | End: 2024-06-29

## 2024-06-29 RX ORDER — CALCIUM CARBONATE 500 MG/1
1000 TABLET, CHEWABLE ORAL 4 TIMES DAILY PRN
Status: DISCONTINUED | OUTPATIENT
Start: 2024-06-29 | End: 2024-06-30 | Stop reason: HOSPADM

## 2024-06-29 RX ORDER — IPRATROPIUM BROMIDE AND ALBUTEROL SULFATE 2.5; .5 MG/3ML; MG/3ML
1 SOLUTION RESPIRATORY (INHALATION) EVERY 6 HOURS PRN
Status: DISCONTINUED | OUTPATIENT
Start: 2024-06-29 | End: 2024-06-29

## 2024-06-29 RX ORDER — PIPERACILLIN SODIUM, TAZOBACTAM SODIUM 3; .375 G/15ML; G/15ML
3.38 INJECTION, POWDER, LYOPHILIZED, FOR SOLUTION INTRAVENOUS ONCE
Status: COMPLETED | OUTPATIENT
Start: 2024-06-29 | End: 2024-06-30

## 2024-06-29 RX ORDER — AMOXICILLIN 250 MG
2 CAPSULE ORAL 2 TIMES DAILY
Status: DISCONTINUED | OUTPATIENT
Start: 2024-06-29 | End: 2024-06-30 | Stop reason: HOSPADM

## 2024-06-29 RX ORDER — PIPERACILLIN SODIUM, TAZOBACTAM SODIUM 3; .375 G/15ML; G/15ML
3.38 INJECTION, POWDER, LYOPHILIZED, FOR SOLUTION INTRAVENOUS EVERY 8 HOURS
Status: DISCONTINUED | OUTPATIENT
Start: 2024-06-30 | End: 2024-06-30 | Stop reason: HOSPADM

## 2024-06-29 RX ORDER — POLYETHYLENE GLYCOL 3350 17 G/17G
17 POWDER, FOR SOLUTION ORAL 2 TIMES DAILY PRN
Status: DISCONTINUED | OUTPATIENT
Start: 2024-06-29 | End: 2024-06-30 | Stop reason: HOSPADM

## 2024-06-29 RX ORDER — AMOXICILLIN 250 MG
1 CAPSULE ORAL 2 TIMES DAILY
Status: DISCONTINUED | OUTPATIENT
Start: 2024-06-29 | End: 2024-06-30 | Stop reason: HOSPADM

## 2024-06-29 RX ORDER — IPRATROPIUM BROMIDE AND ALBUTEROL SULFATE 2.5; .5 MG/3ML; MG/3ML
3 SOLUTION RESPIRATORY (INHALATION) EVERY 4 HOURS PRN
Status: DISCONTINUED | OUTPATIENT
Start: 2024-06-29 | End: 2024-06-30 | Stop reason: HOSPADM

## 2024-06-29 RX ORDER — ENOXAPARIN SODIUM 100 MG/ML
40 INJECTION SUBCUTANEOUS EVERY 24 HOURS
Status: DISCONTINUED | OUTPATIENT
Start: 2024-06-29 | End: 2024-06-30 | Stop reason: HOSPADM

## 2024-06-29 RX ORDER — AMOXICILLIN 250 MG
2 CAPSULE ORAL 2 TIMES DAILY PRN
Status: DISCONTINUED | OUTPATIENT
Start: 2024-06-29 | End: 2024-06-30 | Stop reason: HOSPADM

## 2024-06-29 RX ORDER — TRAZODONE HYDROCHLORIDE 50 MG/1
50 TABLET, FILM COATED ORAL AT BEDTIME
COMMUNITY
Start: 2024-05-28 | End: 2024-12-28

## 2024-06-29 RX ORDER — LEVOTHYROXINE SODIUM 75 UG/1
75 TABLET ORAL
Status: DISCONTINUED | OUTPATIENT
Start: 2024-06-30 | End: 2024-06-30 | Stop reason: HOSPADM

## 2024-06-29 RX ORDER — FUROSEMIDE 10 MG/ML
40 INJECTION INTRAMUSCULAR; INTRAVENOUS ONCE
Status: COMPLETED | OUTPATIENT
Start: 2024-06-29 | End: 2024-06-29

## 2024-06-29 RX ORDER — LEVOTHYROXINE SODIUM 100 UG/1
100 TABLET ORAL
Status: DISCONTINUED | OUTPATIENT
Start: 2024-07-01 | End: 2024-06-30 | Stop reason: HOSPADM

## 2024-06-29 RX ORDER — PIPERACILLIN SODIUM, TAZOBACTAM SODIUM 3; .375 G/15ML; G/15ML
3.38 INJECTION, POWDER, LYOPHILIZED, FOR SOLUTION INTRAVENOUS EVERY 8 HOURS
Status: DISCONTINUED | OUTPATIENT
Start: 2024-06-29 | End: 2024-06-29

## 2024-06-29 RX ORDER — ACETAMINOPHEN 325 MG/1
650 TABLET ORAL EVERY 4 HOURS PRN
Status: DISCONTINUED | OUTPATIENT
Start: 2024-06-29 | End: 2024-06-30 | Stop reason: HOSPADM

## 2024-06-29 RX ORDER — LIDOCAINE 40 MG/G
CREAM TOPICAL
Status: DISCONTINUED | OUTPATIENT
Start: 2024-06-29 | End: 2024-06-30 | Stop reason: HOSPADM

## 2024-06-29 RX ORDER — TRAZODONE HYDROCHLORIDE 50 MG/1
50 TABLET, FILM COATED ORAL AT BEDTIME
Status: DISCONTINUED | OUTPATIENT
Start: 2024-06-29 | End: 2024-06-30 | Stop reason: HOSPADM

## 2024-06-29 RX ORDER — ACETAMINOPHEN 650 MG/1
650 SUPPOSITORY RECTAL EVERY 4 HOURS PRN
Status: DISCONTINUED | OUTPATIENT
Start: 2024-06-29 | End: 2024-06-30 | Stop reason: HOSPADM

## 2024-06-29 RX ORDER — AMLODIPINE BESYLATE 5 MG/1
5 TABLET ORAL 2 TIMES DAILY
Status: DISCONTINUED | OUTPATIENT
Start: 2024-06-29 | End: 2024-06-30 | Stop reason: HOSPADM

## 2024-06-29 RX ORDER — FINASTERIDE 5 MG/1
5 TABLET, FILM COATED ORAL DAILY
Status: DISCONTINUED | OUTPATIENT
Start: 2024-06-30 | End: 2024-06-30 | Stop reason: HOSPADM

## 2024-06-29 RX ADMIN — IPRATROPIUM BROMIDE AND ALBUTEROL SULFATE 3 ML: .5; 3 SOLUTION RESPIRATORY (INHALATION) at 11:39

## 2024-06-29 RX ADMIN — FUROSEMIDE 40 MG: 10 INJECTION, SOLUTION INTRAMUSCULAR; INTRAVENOUS at 11:50

## 2024-06-29 RX ADMIN — PIPERACILLIN AND TAZOBACTAM 3.38 G: 3; .375 INJECTION, POWDER, FOR SOLUTION INTRAVENOUS at 13:13

## 2024-06-29 RX ADMIN — ENOXAPARIN SODIUM 40 MG: 40 INJECTION SUBCUTANEOUS at 20:08

## 2024-06-29 RX ADMIN — QUETIAPINE FUMARATE 12.5 MG: 25 TABLET ORAL at 23:58

## 2024-06-29 RX ADMIN — TRAZODONE HYDROCHLORIDE 50 MG: 50 TABLET ORAL at 23:58

## 2024-06-29 RX ADMIN — AZITHROMYCIN MONOHYDRATE 500 MG: 500 INJECTION, POWDER, LYOPHILIZED, FOR SOLUTION INTRAVENOUS at 20:12

## 2024-06-29 RX ADMIN — SODIUM CHLORIDE 500 ML: 9 INJECTION, SOLUTION INTRAVENOUS at 11:44

## 2024-06-29 ASSESSMENT — ACTIVITIES OF DAILY LIVING (ADL)
DEPENDENT_IADLS:: CLEANING;COOKING;LAUNDRY;SHOPPING;MEAL PREPARATION;MEDICATION MANAGEMENT;MONEY MANAGEMENT;TRANSPORTATION;INCONTINENCE
ADLS_ACUITY_SCORE: 47
ADLS_ACUITY_SCORE: 47
ADLS_ACUITY_SCORE: 38
ADLS_ACUITY_SCORE: 47
ADLS_ACUITY_SCORE: 45
ADLS_ACUITY_SCORE: 47
ADLS_ACUITY_SCORE: 38
ADLS_ACUITY_SCORE: 38

## 2024-06-29 NOTE — ED NOTES
Respiratory Care    Pt arrived on CPAP with EMS. Placed on hospital V60 10/6 30% SpO2 100%. Pt parameters: RR 35, , MV 41. Duoneb given inline. Will continue with NIV for now.       Remi Neely, RT

## 2024-06-29 NOTE — ED NOTES
Bed: JNED-19  Expected date:   Expected time:   Means of arrival:   Comments:  Delevan-on bipap 100% O2 sp02 83%.

## 2024-06-29 NOTE — H&P
[facility]    History and Physical - Hospitalist Service       Date of Admission:  6/29/2024    Assessment & Plan    Dilip Long is a 89 year old male admitted on 6/29/2024. He presented with respiratory distress requiring BIPAP upon arrival. Pmhx is significant for HTN, HFpEF , PAF, possible underlying dementia, CKD3, EUCEAD with indwelling jorge catheter, hypothyroidism.     Acute hypoxic respiratory failure 2/2 CAP  Lactic acidosis  -Presented with SOB, fever and cough productive of greenish sputum for the past few days  -Was on BIPAP initially and later on put on NC on 2 L  -Labs are significant for elevated lactate initially 6.1-> 3.3, also has leukocytosis  -Chest x-ray reported with: Left basilar pulmonary opacities, could be atelectatic or infectious.   -s/p zosyn in ER  -Will continue zosn and add azithromycin   -Follow CB, UC, Resp culture   -Patient was on home hospice for the past few months.  Now hospice is revoked to get treatment for pneumonia per his granddaughter    HTN  Hold home amlodipine for now    Acute encephalopathy most likely due to infection  Possible underlying dementia  -delirium order set  -had acute encephalopathy during previous admissions  -PTA Seroquel 12.5 mg at bedtime  -PTA trazodone 50 mg oral at bedtime  -As needed oral Zyprexa for agitation    CKD stage III  -Creatinine mildly increased from its baseline  -Monitor kidney function    Bladder outlet obstruction with indwelling Jorge catheter  -Catheter gets exchanged every 3 to 4 weeks at home    Hypothyroidism  -PTA levothyroxine    Diet:  Regular diet  DVT Prophylaxis: Enoxaparin (Lovenox) SQ  Jorge Catheter: PRESENT, indication:    Lines: None     Cardiac Monitoring: None  Code Status:  DNR/DNI    Clinically Significant Risk Factors Present on Admission                # Drug Induced Platelet Defect: home medication list includes an antiplatelet medication   # Hypertension: Noted on problem list   #  Acute Respiratory Failure: based on blood gas results.  Continue supplemental oxygen as needed                # Financial/Environmental Concerns:           Disposition Plan     Medically Ready for Discharge: Anticipated in 2-4 Days           Kade Bennett MD  Hospitalist Service  [facility]  Securely message with [employer] (more info)  Text page via [employer] Paging/Directory     ______________________________________________________________________    Chief Complaint   SOB and fever/few days    History is obtained from the ER divider and his granddaughter    History of Present Illness   Dilip Long is a 89 year old male who presented with the above complaint. Pmhx is significant for HTN, HFpEF , PAF, possible underlying dementia, CKD3, EUCEDA with indwelling jorge catheter, hypothyroidism.  Patient was recently discharged from [facility] on home hospice.  Been having shortness of breath, fever and cough active of greenish sputum.  Patient is very weak at baseline only able to ambulate from his bed to the bathroom using a walker.  Upon arrival patient was in severe respiratory distress needing BiPAP.  Later on was put on 2 L of oxygen via nasal cannula.  Labs were significant for leukocytosis, lactic acidosis.  Chest x-ray was reported with left bacillary pulmonary opacities likely atelectatic or infectious.  Patient will be admitted for management of community-acquired pneumonia.      Past Medical History    Past Medical History:   Diagnosis Date    Acute heart failure with preserved ejection fraction (HFpEF) (H) 11/10/2022    Acute prostatitis     Asymptomatic bacteriuria 10/23/2013    Noted at 10/2/13 office visit at [facility]. No treatment recommended at that time.     Congestive heart failure (H)     Essential hypertension 08/18/2015    Hypertension     Paroxysmal atrial fib -- on Eliquis 11/06/2022    Syncope     Thyroid disease        Past Surgical History   Past  Surgical History:   Procedure Laterality Date    BLADDER SURGERY      Bladder absces removal    Blepharoplasty Upper Lid W/ Excessive Skin[  1/29/2007    CATARACT EXTRACTION      CYSTOSCOPY, FULGURATE BLEEDERS, EVACUATE CLOT(S), COMBINED N/A 4/19/2023    Procedure: CYSTOSCOPY, BLADDER BIOPSY WITH FULGURATION AND CLOT EVACUATION;  Surgeon: Edgar Laird MD;  Location: [facility]    EYE SURGERY      both eyes 2015    IR LUMBAR EPIDURAL STEROID INJECTION  4/27/2004    IR LUMBAR EPIDURAL STEROID INJECTION  5/11/2004    IR LUMBAR EPIDURAL STEROID INJECTION  11/24/2004    IR LUMBAR EPIDURAL STEROID INJECTION  11/28/2007    NV CYSTOURETHROSCOPY W/IRRIG & EVAC CLOTS N/A 7/27/2018    Procedure: CYSTOSCOPY, CLOT EVACUATION ,URETHRAL DILATATION, DAUGHERTY CATHETER PLACEMENT;  Surgeon: Lowell Arias MD;  Location: [facility];  Service: Urology       Prior to Admission Medications   Prior to Admission Medications   Prescriptions Last Dose Informant Patient Reported? Taking?   GLUCOSAMINE-CHONDROITIN-VIT D3 PO 6/29/2024 at am Care Giver Yes Yes   Sig: Take 1 tablet by mouth daily   QUEtiapine (SEROQUEL) 25 MG tablet 6/28/2024 at pm  No Yes   Sig: Take 0.5 tablets (12.5 mg) by mouth at bedtime   QUEtiapine (SEROQUEL) 25 MG tablet More than a month at prn  No Yes   Sig: Take 0.5 tablets (12.5 mg) by mouth 2 times daily as needed (Agitation associated with delirium and hallucinations)   acetaminophen (TYLENOL) 500 MG tablet Past Month at prn Care Giver Yes Yes   Sig: Take 1,000 mg by mouth every 6 hours as needed for mild pain   amLODIPine (NORVASC) 5 MG tablet 6/29/2024 at am  No Yes   Sig: Take 1 tablet (5 mg) by mouth 2 times daily   aspirin 81 MG EC tablet 6/29/2024 at am Care Giver No Yes   Sig: Take 1 tablet (81 mg) by mouth daily   finasteride (PROSCAR) 5 MG tablet 6/29/2024 at am Care Giver Yes Yes   Sig: Take 5 mg by mouth daily   ipratropium - albuterol 0.5 mg/2.5 mg/3 mL (DUONEB) 0.5-2.5 (3)  MG/3ML neb solution More than a month at prn Care Giver No Yes   Sig: Take 1 vial (3 mLs) by nebulization every 6 hours as needed for shortness of breath, wheezing or cough   levothyroxine (SYNTHROID/LEVOTHROID) 100 MCG tablet 6/28/2024 at am Care Giver No Yes   Sig: Take 1 tablet (100 mcg) by mouth Every Mon, Wed, Fri Morning   levothyroxine (SYNTHROID/LEVOTHROID) 75 MCG tablet 6/29/2024 at am Care Giver No Yes   Sig: Take 1 tablet (75 mcg) by mouth See Admin Instructions   metoprolol tartrate (LOPRESSOR) 25 MG tablet 6/29/2024 at am  No Yes   Sig: Take 1.5 tablets (37.5 mg) by mouth 2 times daily   thiamine (B-1) 100 MG tablet 6/29/2024 at am Care Giver No Yes   Sig: Take 1 tablet (100 mg) by mouth daily   traZODone (DESYREL) 50 MG tablet 6/28/2024 at pm  Yes Yes   Sig: Take 50 mg by mouth at bedtime      Facility-Administered Medications: None           Physical Exam   Vital Signs: Temp: (!) 100.9  F (38.3  C) Temp src: Oral BP: 117/58 Pulse: 95   Resp: 27 SpO2: 100 % O2 Device: None (Room air)    Weight: 150 lbs 0 oz    General Appearance:  On 2 L oxygen via nasal cannula.  Acutely sick looking.  Appears to be significantly confused  Respiratory: Diminished air entry bilaterally  Cardiovascular: S1 and S2 are heard, no murmur or gallop  GI: Soft abdomen, no tenderness, normoactive bowel sounds  Skin: Intact and warm  Other: No pretibial edema    Medical Decision Making       75 MINUTES SPENT BY ME on the date of service doing chart review, history, exam, documentation & further activities per the note.      Data

## 2024-06-29 NOTE — MEDICATION SCRIBE - ADMISSION MEDICATION HISTORY
Medication Scribe Admission Medication History    Admission medication history is complete. The information provided in this note is only as accurate as the sources available at the time of the update.    Information Source(s): Family member via in-person    Pertinent Information: patient's medications are being managed by granddaughterShruthi. Shruthi, [phone], was present at bedside for interview.     Patient reported to be no longer taking: Lipitor, B12, Oscal, Icy Hot, B6, Aspercreme, D3     Changes made to PTA medication list:  Added: Trazodone  Deleted:  Lipitor, B12, Oscal, Icy Hot, B6, Aspercreme, D3   Changed: None    Allergies reviewed with patient and updates made in EHR: yes    Medication History Completed By: Eduardo Metz 6/29/2024 3:18 PM    PTA Med List   Medication Sig Last Dose    acetaminophen (TYLENOL) 500 MG tablet Take 1,000 mg by mouth every 6 hours as needed for mild pain Past Month at prn    amLODIPine (NORVASC) 5 MG tablet Take 1 tablet (5 mg) by mouth 2 times daily 6/29/2024 at am    aspirin 81 MG EC tablet Take 1 tablet (81 mg) by mouth daily 6/29/2024 at am    finasteride (PROSCAR) 5 MG tablet Take 5 mg by mouth daily 6/29/2024 at am    GLUCOSAMINE-CHONDROITIN-VIT D3 PO Take 1 tablet by mouth daily 6/29/2024 at am    ipratropium - albuterol 0.5 mg/2.5 mg/3 mL (DUONEB) 0.5-2.5 (3) MG/3ML neb solution Take 1 vial (3 mLs) by nebulization every 6 hours as needed for shortness of breath, wheezing or cough More than a month at prn    levothyroxine (SYNTHROID/LEVOTHROID) 100 MCG tablet Take 1 tablet (100 mcg) by mouth Every Mon, Wed, Fri Morning 6/28/2024 at am    levothyroxine (SYNTHROID/LEVOTHROID) 75 MCG tablet Take 1 tablet (75 mcg) by mouth See Admin Instructions 6/29/2024 at am    metoprolol tartrate (LOPRESSOR) 25 MG tablet Take 1.5 tablets (37.5 mg) by mouth 2 times daily 6/29/2024 at am    QUEtiapine (SEROQUEL) 25 MG tablet Take 0.5 tablets (12.5 mg) by mouth at bedtime  6/28/2024 at pm    QUEtiapine (SEROQUEL) 25 MG tablet Take 0.5 tablets (12.5 mg) by mouth 2 times daily as needed (Agitation associated with delirium and hallucinations) More than a month at prn    thiamine (B-1) 100 MG tablet Take 1 tablet (100 mg) by mouth daily 6/29/2024 at am    traZODone (DESYREL) 50 MG tablet Take 50 mg by mouth at bedtime 6/28/2024 at pm

## 2024-06-29 NOTE — ED PROVIDER NOTES
EMERGENCY DEPARTMENT ENCOUNTER       ED Course & Medical Decision Making     11:15 AM Introduced myself to the patient, obtained history of present illness, and performed initial physical exam at this time.    11:36 AM Patient's family at bedside. Discussed history of present illness with them.   11:54 AM Lactic acid 6.1. Current national shortage of culture bottles, thus will obtain 1 set of cultures  1:18 PM Spoke with nursing staff regarding the patient. He is unresponsive on BiPAP.   1:20 PM Checked on the patient.    4:25 PM Checked on the patient.   4:28 PM Spoke with the patient's family regarding possible admission.   4:50 PM Discussed the case with Dr. Bennett, Hospitalist, who agrees to accept the patient at this time.   4:54 PM Checked on the patient and updated him on the current plan for admission and treatment.     Final Impression  89 year old male brought in by EMS for evaluation of respiratory distress.  EMS reports that family called for respiratory distress, wife reportedly dealing with a upper respiratory infection, patient reportedly on hospice for an unspecified reason, has history of heart failure and possibly COPD per initial EMS report.  Per EMS reportedly had sats in the 80s upon arrival, thus was placed on CPAP en route, given DuoNebs x 1, IV Solu-Medrol and IV magnesium.  Patient still in significant respiratory distress upon arrival, audible wet cough on initial exam, though no severe wheezing appreciated.  Patient given another DuoNeb, kept on BiPAP for several hours then eventually we did begin to take breaks on nasal cannula, currently on 2 L nasal cannula.  ED workup showing signs of severe sepsis with a lactic of 6.1 initially, WBC 15.9, patient febrile to 100.9 F.  Given Zosyn and Tylenol as well as a small fluid bolus.  Lactic improved to 3.3 on repeat.  COVID/flu/RSV negative.  Urinalysis from catheter also shows significant bacteria, though per review of urology notes it  seems like this may be largely chronic and they have instructed not to treat in the past.    Patient also reportedly on hospice for unspecified reason through [facility].  Did confirm with patient's granddaughter who is his medical decision-maker, she was agreeable to revoking his hospice for hospital admission.    Given concern of hypoxia, respiratory distress and history of heart failure, was also given initial dose of Lasix while workup pending in the ED, though at the end of encounter appears to be more likely pneumonia.    Prior to making a final disposition on this patient the results of patient's tests and other diagnostic studies were discussed with the patient. All questions were answered. Patient expressed understanding of the plan and was amenable to it.    Medical Decision Making    History:  Supplemental history from: EMS and the patient's family.       Work Up:  Chart documentation includes differential considered and any EKGs or imaging independently interpreted by provider, where specified.    External consultation:  Discussion of management with another provider: Hospitalist - Dr. Bennett    Complicating factors:  Care impacted by chronic illness: Chronic Kidney Disease, Chronic Pain, Heart Disease, Hyperlipidemia, and Hypertension  Care affected by social determinants of health: N/A    Disposition considerations: Admit.      Medications   sodium chloride (PF) 0.9% PF flush 3 mL (has no administration in time range)   sodium chloride (PF) 0.9% PF flush 3 mL (3 mLs Intracatheter $Given 6/29/24 1348)   sodium chloride 0.9% BOLUS 500 mL (0 mLs Intravenous Stopped 6/29/24 1230)   ipratropium - albuterol 0.5 mg/2.5 mg/3 mL (DUONEB) neb solution 3 mL (3 mLs Nebulization $Given 6/29/24 1139)   furosemide (LASIX) injection 40 mg (40 mg Intravenous $Given 6/29/24 1150)   piperacillin-tazobactam (ZOSYN) 3.375 g vial to attach to  mL bag (0 g Intravenous Stopped 6/29/24 1349)       New  Prescriptions    No medications on file     Modified Medications    No medications on file       Final Impression     1. Hypoxia    2. Pneumonia due to infectious organism, unspecified laterality, unspecified part of lung        Critical Care     Performed by: Angel Mcmahon MD  Authorized by: Angel Mcmahon MD                   Total critical care time: 30 minutes  Critical care was necessary to treat or prevent imminent or life-threatening deterioration of the following conditions: Acute hypoxic respiratory failure requiring BiPAP  Critical care was time spent personally by me on the following activities: development of treatment plan with patient or surrogate, discussions with consultants, examination of patient, evaluation of patient's response to treatment, obtaining history from patient or surrogate, ordering and performing treatments and interventions, ordering and review of laboratory studies, ordering and review of radiographic studies, re-evaluation of patient's condition and monitoring for potential decompensation.  Critical care time was exclusive of separately billable procedures and treating other patients.    Chief Complaint     Chief Complaint   Patient presents with    Respiratory Distress     HPI     HPI LIMITED SECONDARY TO ACUITY OF CONDITION    Dilip Long is a 89 year old male who presents for evaluation of respiratory distress.    Per EMS, the patient began experiencing difficulty breathing this morning. His wife is ill with a respiratory infection. Patient's family was unable to provide a clear history, but it was relayed that the patient is on hospice. He has a history of heart disease and COPD. The patient was placed on CPAP and given a duoneb, 1 gram magnesium, solumedrol, and a nitro en route to the ED. With CPAP in place, the patient's SpO2 was in the mid-80s. . Blood glucose 135. /82. Patient's family administered morphine prior to EMS arrival.    Isha ROSE  "Sandra am serving as a scribe to document services personally performed by Dr. Angel Mcmahon MD, based on my observation and the provider's statements to me. I, Dr. Angel Mcmahon MD attest that Isha Flores is acting in a scribe capacity, has observed my performance of the services and has documented them in accordance with my direction.    Physical Exam     /58   Pulse 93   Temp (!) 100.9  F (38.3  C) (Oral)   Resp 27   Ht 1.727 m (5' 8\")   Wt 68 kg (150 lb)   SpO2 100%   BMI 22.81 kg/m    Constitutional: Awake, alert, somewhat altered, on CPAP via EMS  Head: Normocephalic, atraumatic.  ENT: Mucous membranes dry  Eyes: Conjunctiva normal.  Respiratory: Wet cough with coarse lung sounds bilaterally, on CPAP by EMS.  Cardiovascular: Regular rate and rhythm. Good peripheral perfusion.  GI: Abdomen soft, non-tender.  Nondistended  Musculoskeletal: Moves all 4 extremities equally.  Integument: Warm, dry.  Neurologic: Alert, though extremely hard of hearing, history limited by CPAP/BiPAP and patient being extremely hard of hearing.  Grossly normal speech. Grossly normal motor and sensory function. No focal deficits noted, though may have some underlying cognitive impairment/dementia    Labs & Imaging     Imaging reviewed and independently interpreted as below;   CXR images reviewed, has focal consolidation in bilateral basal areas, worse on the left.    Results for orders placed or performed during the hospital encounter of 06/29/24   XR Chest Port 1 View    Impression    IMPRESSION: Single AP view of the chest was obtained. Cardiomediastinal silhouette is within normal limits. Left basilar pulmonary opacities, could be atelectatic or infectious. No significant pleural effusion or pneumothorax. Lucency in the right   hemidiaphragm, likely represent colonic interposition.   Comprehensive metabolic panel   Result Value Ref Range    Sodium 139 135 - 145 mmol/L    Potassium 4.0 3.4 - 5.3 mmol/L    Carbon " Dioxide (CO2) 22 22 - 29 mmol/L    Anion Gap 17 (H) 7 - 15 mmol/L    Urea Nitrogen 22.9 8.0 - 23.0 mg/dL    Creatinine 1.46 (H) 0.67 - 1.17 mg/dL    GFR Estimate 46 (L) >60 mL/min/1.73m2    Calcium 9.1 8.8 - 10.2 mg/dL    Chloride 100 98 - 107 mmol/L    Glucose 157 (H) 70 - 99 mg/dL    Alkaline Phosphatase 104 40 - 150 U/L    AST 16 0 - 45 U/L    ALT 10 0 - 70 U/L    Protein Total 6.7 6.4 - 8.3 g/dL    Albumin 3.6 3.5 - 5.2 g/dL    Bilirubin Total 0.9 <=1.2 mg/dL   Lactic Acid Whole Blood with 1X Repeat in 2 HR when >2   Result Value Ref Range    Lactic Acid, Initial 6.1 (HH) 0.7 - 2.0 mmol/L   Blood gas venous   Result Value Ref Range    pH Venous 7.22 (L) 7.32 - 7.43    pCO2 Venous 60 (H) 40 - 50 mm Hg    pO2 Venous 19 (L) 25 - 47 mm Hg    Bicarbonate Venous 24 21 - 28 mmol/L    Base Excess/Deficit Venous -3.2 (L) -3.0 - 3.0 mmol/L    FIO2 50     Oxyhemoglobin Venous 18 (L) 70 - 75 %    O2 Sat, Venous 18.3 (L) 70.0 - 75.0 %   Result Value Ref Range    Procalcitonin 0.10 <0.50 ng/mL   UA with Microscopic reflex to Culture    Specimen: Urine, Lozano Catheter   Result Value Ref Range    Color Urine Yellow Colorless, Straw, Light Yellow, Yellow    Appearance Urine Cloudy (A) Clear    Glucose Urine Negative Negative mg/dL    Bilirubin Urine Negative Negative    Ketones Urine Trace (A) Negative mg/dL    Specific Gravity Urine 1.028 1.001 - 1.030    Blood Urine 0.06 mg/dL (A) Negative    pH Urine 7.0 5.0 - 7.0    Protein Albumin Urine 200 (A) Negative mg/dL    Urobilinogen Urine 4.0 (A) <2.0 mg/dL    Nitrite Urine Negative Negative    Leukocyte Esterase Urine 500 Deion/uL (A) Negative    Bacteria Urine Many (A) None Seen /HPF    WBC Clumps Urine Present (A) None Seen /HPF    Mucus Urine Present (A) None Seen /LPF    RBC Urine 7 (H) <=2 /HPF    WBC Urine >182 (H) <=5 /HPF    Squamous Epithelials Urine >182 (H) <=1 /HPF   CBC with platelets and differential   Result Value Ref Range    WBC Count 15.9 (H) 4.0 - 11.0 10e3/uL     RBC Count 3.48 (L) 4.40 - 5.90 10e6/uL    Hemoglobin 11.2 (L) 13.3 - 17.7 g/dL    Hematocrit 35.0 (L) 40.0 - 53.0 %     (H) 78 - 100 fL    MCH 32.2 26.5 - 33.0 pg    MCHC 32.0 31.5 - 36.5 g/dL    RDW 12.2 10.0 - 15.0 %    Platelet Count 406 150 - 450 10e3/uL    NRBCs per 100 WBC 0 <1 /100    Absolute NRBCs 0.0 10e3/uL   Extra Blue Top Tube   Result Value Ref Range    Hold Specimen JIC    Extra Red Top Tube   Result Value Ref Range    Hold Specimen JIC    Lactic acid whole blood   Result Value Ref Range    Lactic Acid 3.3 (H) 0.7 - 2.0 mmol/L   Manual Differential   Result Value Ref Range    % Neutrophils 85 %    % Lymphocytes 9 %    % Monocytes 4 %    % Eosinophils 1 %    % Basophils 1 %    Absolute Neutrophils 13.5 (H) 1.6 - 8.3 10e3/uL    Absolute Lymphocytes 1.4 0.8 - 5.3 10e3/uL    Absolute Monocytes 0.6 0.0 - 1.3 10e3/uL    Absolute Eosinophils 0.2 0.0 - 0.7 10e3/uL    Absolute Basophils 0.2 0.0 - 0.2 10e3/uL    RBC Morphology Confirmed RBC Indices     Platelet Assessment  Automated Count Confirmed. Platelet morphology is normal.     Automated Count Confirmed. Platelet morphology is normal.    Polychromasia Slight (A) None Seen   Symptomatic Influenza A/B, RSV, & SARS-CoV2 PCR (COVID-19) Nasopharyngeal    Specimen: Nasopharyngeal; Swab   Result Value Ref Range    Influenza A PCR Negative Negative    Influenza B PCR Negative Negative    RSV PCR Negative Negative    SARS CoV2 PCR Negative Negative   Nt probnp inpatient   Result Value Ref Range    N terminal Pro BNP Inpatient 1,203 0 - 1,800 pg/mL        Angel Mcmahon MD  06/29/24 1734       Angel Mcmahon MD  06/29/24 1737

## 2024-06-29 NOTE — ED TRIAGE NOTES
Patient presents via EMS with increased shortness of breath, audible wheezes, hypoxia.  Pt is on CPAP, Sats 83% on RA.  Wife has known Resp Infection.  Enroute was given NTG, Solumedrol,Mag and Duoneb.  Pt is alert, is currently receiving Hospice.

## 2024-06-30 ENCOUNTER — APPOINTMENT (OUTPATIENT)
Dept: SPEECH THERAPY | Facility: HOSPITAL | Age: 89
DRG: 177 | End: 2024-06-30
Attending: STUDENT IN AN ORGANIZED HEALTH CARE EDUCATION/TRAINING PROGRAM
Payer: OTHER MISCELLANEOUS

## 2024-06-30 VITALS
SYSTOLIC BLOOD PRESSURE: 123 MMHG | RESPIRATION RATE: 15 BRPM | WEIGHT: 150 LBS | OXYGEN SATURATION: 98 % | BODY MASS INDEX: 22.73 KG/M2 | DIASTOLIC BLOOD PRESSURE: 63 MMHG | TEMPERATURE: 97.6 F | HEIGHT: 68 IN | HEART RATE: 69 BPM

## 2024-06-30 LAB
ANION GAP SERPL CALCULATED.3IONS-SCNC: 11 MMOL/L (ref 7–15)
BACTERIA UR CULT: NORMAL
BASE EXCESS BLDV CALC-SCNC: 2 MMOL/L (ref -3–3)
BUN SERPL-MCNC: 28.5 MG/DL (ref 8–23)
CALCIUM SERPL-MCNC: 8.8 MG/DL (ref 8.8–10.2)
CHLORIDE SERPL-SCNC: 102 MMOL/L (ref 98–107)
CREAT SERPL-MCNC: 1.51 MG/DL (ref 0.67–1.17)
DEPRECATED HCO3 PLAS-SCNC: 25 MMOL/L (ref 22–29)
EGFRCR SERPLBLD CKD-EPI 2021: 44 ML/MIN/1.73M2
ERYTHROCYTE [DISTWIDTH] IN BLOOD BY AUTOMATED COUNT: 12.1 % (ref 10–15)
GLUCOSE SERPL-MCNC: 156 MG/DL (ref 70–99)
HCO3 BLDV-SCNC: 27 MMOL/L (ref 21–28)
HCT VFR BLD AUTO: 28.2 % (ref 40–53)
HGB BLD-MCNC: 9.5 G/DL (ref 13.3–17.7)
MCH RBC QN AUTO: 32.9 PG (ref 26.5–33)
MCHC RBC AUTO-ENTMCNC: 33.7 G/DL (ref 31.5–36.5)
MCV RBC AUTO: 98 FL (ref 78–100)
O2/TOTAL GAS SETTING VFR VENT: 28 %
OXYHGB MFR BLDV: 65 % (ref 70–75)
PCO2 BLDV: 45 MM HG (ref 40–50)
PH BLDV: 7.39 [PH] (ref 7.32–7.43)
PLATELET # BLD AUTO: 251 10E3/UL (ref 150–450)
PO2 BLDV: 37 MM HG (ref 25–47)
POTASSIUM SERPL-SCNC: 3.8 MMOL/L (ref 3.4–5.3)
RBC # BLD AUTO: 2.89 10E6/UL (ref 4.4–5.9)
SAO2 % BLDV: 65.9 % (ref 70–75)
SODIUM SERPL-SCNC: 138 MMOL/L (ref 135–145)
WBC # BLD AUTO: 10.7 10E3/UL (ref 4–11)

## 2024-06-30 PROCEDURE — 80048 BASIC METABOLIC PNL TOTAL CA: CPT | Performed by: STUDENT IN AN ORGANIZED HEALTH CARE EDUCATION/TRAINING PROGRAM

## 2024-06-30 PROCEDURE — 92526 ORAL FUNCTION THERAPY: CPT | Mod: GN

## 2024-06-30 PROCEDURE — 250N000013 HC RX MED GY IP 250 OP 250 PS 637: Performed by: STUDENT IN AN ORGANIZED HEALTH CARE EDUCATION/TRAINING PROGRAM

## 2024-06-30 PROCEDURE — 250N000011 HC RX IP 250 OP 636: Performed by: STUDENT IN AN ORGANIZED HEALTH CARE EDUCATION/TRAINING PROGRAM

## 2024-06-30 PROCEDURE — 82805 BLOOD GASES W/O2 SATURATION: CPT | Performed by: STUDENT IN AN ORGANIZED HEALTH CARE EDUCATION/TRAINING PROGRAM

## 2024-06-30 PROCEDURE — 999N000157 HC STATISTIC RCP TIME EA 10 MIN

## 2024-06-30 PROCEDURE — 85027 COMPLETE CBC AUTOMATED: CPT | Performed by: STUDENT IN AN ORGANIZED HEALTH CARE EDUCATION/TRAINING PROGRAM

## 2024-06-30 PROCEDURE — 92610 EVALUATE SWALLOWING FUNCTION: CPT | Mod: GN

## 2024-06-30 PROCEDURE — 250N000013 HC RX MED GY IP 250 OP 250 PS 637: Performed by: INTERNAL MEDICINE

## 2024-06-30 PROCEDURE — 99239 HOSP IP/OBS DSCHRG MGMT >30: CPT | Mod: GW | Performed by: INTERNAL MEDICINE

## 2024-06-30 PROCEDURE — 36415 COLL VENOUS BLD VENIPUNCTURE: CPT | Performed by: STUDENT IN AN ORGANIZED HEALTH CARE EDUCATION/TRAINING PROGRAM

## 2024-06-30 RX ORDER — AMOXICILLIN AND CLAVULANATE POTASSIUM 500; 125 MG/1; MG/1
1 TABLET, FILM COATED ORAL 2 TIMES DAILY
Qty: 10 TABLET | Refills: 0 | Status: SHIPPED | OUTPATIENT
Start: 2024-06-30 | End: 2024-07-05

## 2024-06-30 RX ADMIN — LEVOTHYROXINE SODIUM 75 MCG: 75 TABLET ORAL at 11:54

## 2024-06-30 RX ADMIN — METOPROLOL TARTRATE 12.5 MG: 25 TABLET, FILM COATED ORAL at 11:53

## 2024-06-30 RX ADMIN — FINASTERIDE 5 MG: 5 TABLET, FILM COATED ORAL at 11:53

## 2024-06-30 RX ADMIN — PIPERACILLIN AND TAZOBACTAM 3.38 G: 3; .375 INJECTION, POWDER, FOR SOLUTION INTRAVENOUS at 09:06

## 2024-06-30 RX ADMIN — PIPERACILLIN AND TAZOBACTAM 3.38 G: 3; .375 INJECTION, POWDER, FOR SOLUTION INTRAVENOUS at 00:08

## 2024-06-30 ASSESSMENT — ACTIVITIES OF DAILY LIVING (ADL)
ADLS_ACUITY_SCORE: 47

## 2024-06-30 NOTE — CONSULTS
Care Management Initial Consult    General Information  Assessment completed with: Patient, Family (granddaughter Shruthi), g-dtr Shruthi  Type of CM/SW Visit: Initial Assessment    Primary Care Provider verified and updated as needed: Yes   Readmission within the last 30 days: no previous admission in last 30 days      Reason for Consult: discharge planning  Advance Care Planning: Advance Care Planning Reviewed: no concerns identified          Communication Assessment  Patient's communication style: spoken language (English or Bilingual)             Cognitive  Cognitive/Neuro/Behavioral: .WDL except, all  Level of Consciousness: intermittent confusion  Arousal Level: opens eyes spontaneously  Orientation: oriented x 4  Mood/Behavior: cooperative  Best Language: 0 - No aphasia  Speech: garbled    Living Environment:   People in home: spouse     Current living Arrangements: house      Able to return to prior arrangements: yes       Family/Social Support:  Care provided by: self, spouse/significant other, child(tami), other (see comments) (hospice)  Provides care for: no one, unable/limited ability to care for self  Marital Status:   Wife, Children, Other (specify) (lisa Marina       Description of Support System: Supportive, Involved    Support Assessment: Adequate family and caregiver support    Current Resources:   Patient receiving home care services: Yes ([facility])  Skilled Home Care Services: Skilled Nursing, Home Health Aid  Community Resources: Hospice  Equipment currently used at home:    Supplies currently used at home: Incontinence Supplies, Nebulizer tubing, Other (jorge catheter supplies)    Employment/Financial:  Employment Status: retired        Financial Concerns:             Does the patient's insurance plan have a 3 day qualifying hospital stay waiver?  No    Lifestyle & Psychosocial Needs:  Social Determinants of Health     Food Insecurity: Not on file   Depression:  Not at risk (2/9/2024)    Received from [facility]    PHQ-2     PHQ-2 Score: 1   Housing Stability: Not on file   Tobacco Use: Medium Risk (2/9/2024)    Received from [facility]    Patient History     Smoking Tobacco Use: Former     Smokeless Tobacco Use: Never     Passive Exposure: Not on file   Financial Resource Strain: Not on file   Alcohol Use: Not At Risk (10/20/2020)    Received from [facility]    AUDIT-C     Frequency of Alcohol Consumption: Never     Average Number of Drinks: Not on file     Frequency of Binge Drinking: Not on file   Transportation Needs: Not on file   Physical Activity: Not on file   Interpersonal Safety: Not on file   Stress: Not on file   Social Connections: Not on file   Health Literacy: Not on file       Functional Status:  Prior to admission patient needed assistance:   Dependent ADLs:: Bathing, Dressing, Eating, Grooming, Incontinence, Positioning, Transfers, Wheelchair-with assist  Dependent IADLs:: Cleaning, Cooking, Laundry, Shopping, Meal Preparation, Medication Management, Money Management, Transportation, Incontinence       Mental Health Status:          Chemical Dependency Status:                Values/Beliefs:  Spiritual, Cultural Beliefs, Yazdanism Practices, Values that affect care:                 Additional Information:  Met with patient and granddaughter Shruthi at the bedside for initial assessment. Introduced self and care management role. Patient lives with spouse Laina in their home. Family is helping with cares. Patient was on [facility] hospice prior to coming to the hospital. Hospice revoked service with this hospital admission. Per Shruthi, plan is to sign back onto [facility] once patient is discharged. Shruthi reports that she spoke with hospice today and they will set patient up with home O2 and a bipap if needed.     Shruthi to transport home at discharge.     Jewels Mckee RN

## 2024-06-30 NOTE — PROGRESS NOTES
Patient came to ED-31 around 1830. Oriented to room, Vitally stable on 2L nasal cannula. Granddaughter in room. Resting comfortably. Wants to go home

## 2024-06-30 NOTE — PLAN OF CARE
Problem: Adult Inpatient Plan of Care  Goal: Readiness for Transition of Care  Outcome: Progressing   Goal Outcome Evaluation:  Denies pain.  Turned and repositioned every two hours.  Sacral mepelix applied.  Weaned off oxygen.  Plan to discharge to home back on hospice.

## 2024-06-30 NOTE — PROGRESS NOTES
Care Management Discharge Note    Discharge Date: 06/30/2024       Discharge Disposition:  home, resume hospice services     Discharge Services:  hospice     Discharge DME:  O2 concentrator provided by [facility]    Discharge Transportation: family or friend will provide    Private pay costs discussed: Not applicable    Does the patient's insurance plan have a 3 day qualifying hospital stay waiver?  No    PAS Confirmation Code:    Patient/family educated on Medicare website which has current facility and service quality ratings:      Education Provided on the Discharge Plan:    Persons Notified of Discharge Plans: granddaughter Shruthi, son, patient, nurse Sophie  Patient/Family in Agreement with the Plan:  yes    Handoff Referral Completed:     Additional Information:  Patient will discharge home on hospice. Patient is currently on [facility] (per hospice triage nurse, patient did not revoke upon hospital admission) and current hospice services will continue. Hospice triage nurse Xiomara ordered a O2 concentrator for delivery today (by 5pm per Xiomara). Family will transport home. Hospice to visit patient tomorrow.     Jewels Mckee RN

## 2024-06-30 NOTE — PLAN OF CARE
Problem: Adult Inpatient Plan of Care  Goal: Plan of Care Review  Description: The Plan of Care Review/Shift note should be completed every shift.  The Outcome Evaluation is a brief statement about your assessment that the patient is improving, declining, or no change.  This information will be displayed automatically on your shift  note.  Outcome: Progressing   Goal Outcome Evaluation:               Pt alert, NPO at midnight, granddaughter at bedside, brought food, pt has midly thick liquid at home, no order for thicken liquid, speech consult for AM,  Hard of hearing, garbled speech, productive cough, slept between cares

## 2024-06-30 NOTE — PROGRESS NOTES
"Speech-Language Pathology: Clinical Swallow Evaluation     06/30/24 0800   Appointment Info   Signing Clinician's Name / Credentials (SLP) Francisca Taylor, MS, CCC-SLP   General Information   Onset of Illness/Injury or Date of Surgery 06/29/24   Referring Physician Yaya Little MD   Pertinent History of Current Problem Per H&P, \"89 year old male who presented with the above complaint. Pmhx is significant for HTN, HFpEF , PAF, possible underlying dementia, CKD3, EUCEDA with indwelling jorge catheter, hypothyroidism.  Patient was recently discharged from  on home hospice.  Been having shortness of breath, fever and cough active of greenish sputum.  Patient is very weak at baseline only able to ambulate from his bed to the bathroom using a walker.  Upon arrival patient was in severe respiratory distress needing BiPAP.  Later on was put on 2 L of oxygen via nasal cannula.  Labs were significant for leukocytosis, lactic acidosis.  Chest x-ray was reported with left bacillary pulmonary opacities likely atelectatic or infectious.  Patient will be admitted for management of community-acquired pneumonia.\" Clinical swallow evaluation completed per MD orders.   General Observations Pt sleeping upon arrival, but woke to voice. Pt very [place] with no hearing aids. Able to appear to understand when talking very loud. Pt may benefit from pocket talker. Pt's granddaughter present at bedside.   Type of Evaluation   Type of Evaluation Swallow Evaluation   General Swallowing Observations   Past History of Dysphagia Per EMR, VFSS 3/3/2024 revealed overt aspiration with thin via spoon with reflexive cough (pt with tucked head position at baseline), no penetration/aspiration with mildly thickened liquid via spoon, cup, straw, puree, and regular solid. Per pt's granddaughter report, no coughing, throat clearing, or endorsing globus sensation with regular diet and mildly thickened liquids usually, but the last few days, he has " been coughing a little, but with and without PO intake. Pt reports he doesn't mind the thickened liquids, as per chart review, pt on home hospice at baseline.   Respiratory Support nasal cannula  (2L)   Current Diet/Method of Nutritional Intake (General Swallowing Observations, NIS) NPO   Swallowing Evaluation Clinical swallow evaluation   Clinical Swallow Evaluation   Feeding Assistance set up only required   Additional evaluation(s) completed today No   Clinical Swallow Evaluation Textures Trialed mildly thick liquids;pureed;solid foods   Clinical Swallow Eval: Mildly Thick Liquids   Mode of Presentation cup;self-fed   Volume Presented 3 oz   Oral Phase WFL   Pharyngeal Phase intact   Diagnostic Statement No overt s/s of aspiration or dysphagia.   Clinical Swallow Evaluation: Puree Solid Texture Trial   Mode of Presentation, Puree spoon;fed by clinician   Volume of Puree Presented x3 bites   Oral Phase, Puree WFL   Pharyngeal Phase, Puree intact   Diagnostic Statement No overt s/s of aspiration or dysphagia.   Clinical Swallow Evaluation: Solid Food Texture Trial   Mode of Presentation self-fed   Volume Presented 2 crackers   Oral Phase WFL;residue in oral cavity   Oral Residue other (see comments);right anterior lateral sulci  (mild diffuse oral residue throughout)   Pharyngeal Phase intact   Successful Strategies Trialed During Procedure other (see comments)  (liquid wash)   Diagnostic Statement No overt s/s of aspiration. Mild oral residue throughout oral cavity and on R anterior lateral sulci, which pt was able to clear with liquid wash and cued lingual sweep   Esophageal Phase of Swallow   Patient reports or presents with symptoms of esophageal dysphagia No   Swallowing Recommendations   Diet Consistency Recommendations mildly thick liquids (level 2);regular diet   Supervision Level for Intake distant supervision needed   Mode of Delivery Recommendations bolus size, small;no straws;slow rate of intake    Postural Recommendations none   Swallowing Maneuver Recommendations alternate food and liquid intake   Monitoring/Assistance Required (Eating/Swallowing) stop eating activities when fatigue is present;monitor for cough or change in vocal quality with intake;other (see comments)  (cue for liquid was if oral residue)   Recommended Feeding/Eating Techniques (Swallow Eval) maintain upright sitting position for eating;minimize distractions during oral intake;set-up and prepare tray   Medication Administration Recommendations, Swallowing (SLP) in puree   Instrumental Assessment Recommendations instrumental evaluation not recommended at this time   Clinical Impression   Criteria for Skilled Therapeutic Interventions Met (SLP Eval) Evaluation only;Current level of function same as previous level of function   SLP Diagnosis Chronic oropharyngeal dysphagia   Risks & Benefits of therapy have been explained evaluation/treatment results reviewed;care plan/treatment goals reviewed;risks/benefits reviewed;current/potential barriers reviewed;participants included;patient;participants voiced agreement with care plan;durable/healthcare power of   (granddaughter)   Clinical Impression Comments Clinical Swallow Evaluation completed per MD orders. Oral motor function was WNL. Patient's voice was mildly rough and clear. Patient had no overt s/s aspiration and no signs of dysphagia across any texture trialed. Mastication was prolonged, but functional, and complete. Mild oral residue noted, which fully cleared with a liquid wash. Recommend pt resumes baseline diet of regular textures and mildly thickened liquids (IDDSI 2) with distance supervision/feeding assistance. Patient should be sitting fully upright and alert, take single small bite/sips, no straws, alternate solids/liquids, and eat at a slow rate. Per discussion with granddaughter & chart review, pt on home hospice prior to admit and wishes to d/c to home hospice.  Therefore, pt is able to liberalize diet as he wishes for quality of life, but at this time pt and family agreeable to continue mildly thick liquids at this time. No further speech therapy services warranted at this time as pt is at baseline swallowing fx. SLP will complete orders. No further ST is warranted at this time. Contact SLP if any questions or concerns.   SLP Total Evaluation Time   Eval: oral/pharyngeal swallow function, clinical swallow Minutes (18071) 13   SLP Discharge Planning   SLP Plan s/o   SLP Discharge Recommendation other (see comments)  (defer to medical team)   SLP Rationale for DC Rec Pt at baseline swallowing fx. Pt continues to choose to continue regular diet and mildly thickened liquids, per his wishes, but note, if returning back to home hospice, pt's diet may be advanced per his wishes.   SLP Brief overview of current status  Recommend pt resumes baseline diet of regular textures and mildly thickened liquids (IDDSI 2) with distance supervision/feeding assistance. Patient should be sitting fully upright and alert, take single small bite/sips, alternate solids/liquids, NO STRAWS, and eat at a slow rate. Per discussion with granddaughter & chart review, pt on home hospice prior to admit and wishes to d/c to home hospice. Therefore, pt is able to liberalize diet as he wishes for quality of life, but at this time pt and family agreeable to continue mildly thick liquids at this time. No further speech therapy services warranted at this time as pt is at baseline swallowing fx. SLP will complete orders.

## 2024-06-30 NOTE — DISCHARGE SUMMARY
[facility] MEDICINE  DISCHARGE SUMMARY     Primary Care Physician: Kolton Junior  Admission Date: 6/29/2024   Discharge Provider: Yaya Little MD Discharge Date: 6/30/2024   Diet:   Active Diet and Nourishment Order   Procedures    Combination Diet Regular Diet; Mildly Thick (level 2) (No straws)    Diet       Code Status: No CPR- Do NOT Intubate   Activity: DCACTIVITY: Activity as tolerated        Condition at Discharge: Stable     REASON FOR PRESENTATION(See Admission Note for Details)   Shortness of breath.  Please refer to H&P for details    PRINCIPAL & ACTIVE DISCHARGE DIAGNOSES     Active Problems:    Hypoxia    Pneumonia due to infectious organism, unspecified laterality, unspecified part of lung  Metabolic encephalopathy in the setting of likely baseline dementia  Patient on hospice prior to coming to hospital      PENDING LABS     Unresulted Labs Ordered in the Past 30 Days of this Admission       Date and Time Order Name Status Description    6/29/2024 11:23 AM Blood Culture Peripheral Blood Preliminary     6/29/2024 11:23 AM Blood Culture Peripheral Blood Preliminary               PROCEDURES ( this hospitalization only)          RECOMMENDATIONS TO OUTPATIENT PROVIDER FOR F/U VISIT     Follow-up Appointments     Follow-up and recommended labs and tests       Follow up with primary care provider, Kolton Junior as needed.                DISPOSITION     Home with home hospice    SUMMARY OF HOSPITAL COURSE:      Dilip Long is a 89 year old male past medical history of HTN, HFpEF, PAF, CKD stage III, p.o. with indwelling Lozano catheter, hypothyroidism, dysphagia on modified thickened liquid diet, dementia, on home hospice who was brought to ED for evaluation of shortness of breath and brought to ED on a BiPAP.  Patient quickly improved and off BiPAP and on 2 L nasal cannula.  Patient admitted for further management.    Given history of dysphagia, concern for  aspiration pneumonia.  CXR reported left basilar opacities could be atelectasis or infectious, procalcitonin negative, had leukocytosis on presentation.  Speech therapy evaluated patient and recommended regular diet with thickened liquid.  Patient's granddaughter reported patient has been doing thickened liquid at home also.  Patient family reported they are okay with antibiotics, prescribed Augmentin for possible aspiration pneumonia.  Patient with history of remote smoker, on examination did not appreciate wheezing.  Patient does have nebulizer at home and advised to use couple of times for few days and as needed.    Patient has a history of dementia, at this time seems at his baseline.    Patient wants to go back on hospice and like to be get discharged today, per patient's granddaughter who is medical decision-maker.  Their only concern is patient complaining of fullness and decreased hearing on left ear but no reported ear discharge.  Found to have bilateral wax, Debrox ordered.    Patient does have acute kidney injury on CKD.     Chronic medical condition fairly stable and resumed PTA medications.     communicated with hospice team and reported they will able to arrange home oxygen tonight  and able to take patient back on hospice.    Discharge Medications with Med changes:     Current Discharge Medication List        START taking these medications    Details   amoxicillin-clavulanate (AUGMENTIN) 500-125 MG tablet Take 1 tablet by mouth 2 times daily for 5 days  Qty: 10 tablet, Refills: 0    Associated Diagnoses: Aspiration pneumonia, unspecified aspiration pneumonia type, unspecified laterality, unspecified part of lung (H)      carbamide peroxide (DEBROX) 6.5 % otic solution Place 5 drops into both ears 2 times daily for 5 days  Qty: 15 mL, Refills: 0    Associated Diagnoses: Bilateral impacted cerumen           CONTINUE these medications which have NOT CHANGED    Details   acetaminophen  (TYLENOL) 500 MG tablet Take 1,000 mg by mouth every 6 hours as needed for mild pain      amLODIPine (NORVASC) 5 MG tablet Take 1 tablet (5 mg) by mouth 2 times daily  Qty: 180 tablet, Refills: 3    Associated Diagnoses: Essential hypertension      aspirin 81 MG EC tablet Take 1 tablet (81 mg) by mouth daily  Qty: 30 tablet, Refills: 1    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 levothyroxine (SYNTHROID/LEVOTHROID) 100 MCG tablet Take 1 tablet (100 mcg) by mouth Every Mon, Wed, Fri Morning  Qty: 12 tablet, Refills: 1    Associated Diagnoses: Hypothyroidism due to acquired atrophy of thyroid; Other specified hypothyroidism      !! levothyroxine (SYNTHROID/LEVOTHROID) 75 MCG tablet Take 1 tablet (75 mcg) by mouth See Admin Instructions  Qty: 16 tablet, Refills: 1    Comments: Once per day on Sunday, Tuesday, Thursday, Saturday  Associated Diagnoses: Other specified hypothyroidism      metoprolol tartrate (LOPRESSOR) 25 MG tablet Take 1.5 tablets (37.5 mg) by mouth 2 times daily  Qty: 270 tablet, Refills: 1    Associated Diagnoses: Essential hypertension      !! QUEtiapine (SEROQUEL) 25 MG tablet Take 0.5 tablets (12.5 mg) by mouth at bedtime  Qty: 45 tablet, Refills: 3    Associated Diagnoses: Delirium      !! QUEtiapine (SEROQUEL) 25 MG tablet Take 0.5 tablets (12.5 mg) by mouth 2 times daily as needed (Agitation associated with delirium and hallucinations)  Qty: 14 tablet, Refills: 2    Associated Diagnoses: Delirium      thiamine (B-1) 100 MG tablet Take 1 tablet (100 mg) by mouth daily  Qty: 30 tablet, Refills: 1    Associated Diagnoses: Vitamin  deficiency      traZODone (DESYREL) 50 MG tablet Take 50 mg by mouth at bedtime       !! - Potential duplicate medications found. Please discuss with provider.                Rationale for medication changes:      Refer to hospital course        Consults       SPEECH LANGUAGE PATH ADULT IP CONSULT  CARE MANAGEMENT / SOCIAL WORK IP CONSULT  SPEECH LANGUAGE PATH ADULT IP CONSULT    Immunizations given this encounter     Most Recent Immunizations   Administered Date(s) Administered    COVID-19 MONOVALENT 12+ (Pfizer) 12/27/2021    COVID-19 Vaccine (Ania) 03/31/2021    Influenza (IIV3) PF 10/28/2012    Influenza Vaccine 65+ (Fluzone HD) 11/08/2022    Pneumococcal 23 valent 05/01/2021    TD,PF 7+ (Tenivac) 05/21/2010    TDAP (Adacel,Boostrix) 05/01/2021    Td (Adult), Adsorbed 05/21/2010    Tdap (Adult) Unspecified Formulation 05/21/2010           Anticoagulation Information          SIGNIFICANT IMAGING FINDINGS     Results for orders placed or performed during the hospital encounter of 06/29/24   XR Chest Port 1 View    Impression    IMPRESSION: Single AP view of the chest was obtained. Cardiomediastinal silhouette is within normal limits. Left basilar pulmonary opacities, could be atelectatic or infectious. No significant pleural effusion or pneumothorax. Lucency in the right   hemidiaphragm, likely represent colonic interposition.       SIGNIFICANT LABORATORY FINDINGS     Most Recent 3 CBC's:  Recent Labs   Lab Test 06/30/24 0513 06/29/24  1137 03/14/24  1047 03/08/24  0719 03/06/24  0809   WBC 10.7 15.9*  --   --  6.5   HGB 9.5* 11.2*  --   --  10.7*   MCV 98 101*  --   --  98    406 376   < > 293    < > = values in this interval not displayed.     Most Recent 3 BMP's:  Recent Labs   Lab Test 06/30/24  0513 06/29/24  1137 03/15/24  0751 03/14/24  1047 03/08/24  1736 03/08/24  0719    139  --   --   --  139   POTASSIUM 3.8 4.0 3.7 3.5   < > 3.0*   CHLORIDE 102 100  --   --   --  107   CO2 25 22  --    --   --  24   BUN 28.5* 22.9  --   --   --  12.0   CR 1.51* 1.46*  --  1.19*   < > 1.16   ANIONGAP 11 17*  --   --   --  8   KRISTEN 8.8 9.1  --   --   --  8.2*   * 157*  --   --   --  89    < > = values in this interval not displayed.     Most Recent 2 LFT's:  Recent Labs   Lab Test 06/29/24  1137 03/05/24  0532   AST 16 20   ALT 10 6   ALKPHOS 104 61   BILITOTAL 0.9 0.5       Discharge Orders        Reason for your hospital stay    Patient admitted for possible aspiration pneumonia.     Follow-up and recommended labs and tests     Follow up with primary care provider, Kolton Junior as needed.     Activity    Your activity upon discharge: activity as tolerated     Resume Home Care Services    Resume home hospice care     Diet    Follow this diet upon discharge: Orders Placed This Encounter      Combination Diet Regular Diet; Mildly Thick (level 2) (No straws)       Examination   Physical Exam   Temp:  [97.4  F (36.3  C)-100.9  F (38.3  C)] 97.6  F (36.4  C)  Pulse:  [61-97] 69  Resp:  [15-40] 15  BP: ()/(55-79) 123/63  FiO2 (%):  [21 %] 21 %  SpO2:  [92 %-100 %] 97 %  Wt Readings from Last 1 Encounters:   06/29/24 68 kg (150 lb)       Patient seen and examined.  Notes, labs, imaging report personally reviewed.  Please refer to my progress note from earlier for details.  Detailed plan of care after discharge discussed with patient's granddaughter at bedside who is patient's medical decision maker.      General: Not in obvious distress.  HEENT: Normocephalic, supple neck, bilateral wax, no local tenderness appreciated or discharge noted  Chest: Clear to auscultation bilateral anteriorly, no wheezing appreciated  Heart: S1S2 normal, regular  Abdomen: Soft. NT, ND. Bowel sounds- active.  Lozano catheter in place  Neuro: Awake, very hard of hearing, moves all extremities        Please see EMR for more detailed significant labs, imaging, consultant notes etc.    Yaya ROSE MD, personally saw the patient  today and spent greater than 30 minutes discharging this patient.    Yaya Little MD  [facility]    CC:Kolton Junior

## 2024-06-30 NOTE — PROGRESS NOTES
Patient discharged at 1635. Belongings sent with patient granddaughter. Medications to be filled at outside pharmacy. Discharge instructions provided to granddaughter. Questions answered. Patient tolerated discharge.

## 2024-06-30 NOTE — PROGRESS NOTES
Patient seen and examined.    Patient very hard of hearing and cannot provide reliable ROS.     Patient looks very comfortable.    Family members at bedside reported patient wants to go back on hospice and would like to be discharged home.  Family members also reported patient complaining of fullness on his left ear and further decrease in hearing.  No reported discharge or febrile illness.  On examination noted bilateral wax, Debrox ordered    Discussed with care manager, requested to communicate with hospice to make sure if they can able to arrange home oxygen and able to take patient back on hospice.    Procalcitonin negative, patient has Lozano catheter with UC urogenital susana.  Given history of dysphagia, concern for aspiration pneumonia.  Family would like to get oral antibiotics on discharge.    Anticipated discharge home with home hospice.    Addendum;  -- Discussed with care manager, reported hospice able to provide oxygen unable to accept patient today, will place discharge orders.      Note created using dragon voice recognition software.  Errors in spelling or words which seems out of context are unintentional.  Sounds alike errors may have escaped editing.    6/30/2024  SABIHA RUFFIN MD  [facility]

## 2024-06-30 NOTE — PROVIDER NOTIFICATION
Patient's daughter requesting that patient would like to discharge back to home on hospice.  Dr. RUFFIN notified.

## 2024-07-01 ENCOUNTER — PATIENT OUTREACH (OUTPATIENT)
Dept: CARE COORDINATION | Facility: CLINIC | Age: 89
End: 2024-07-01
Payer: COMMERCIAL

## 2024-07-01 NOTE — PROGRESS NOTES
[facility]    Background: Transitional Care Management program identified per system criteria and reviewed by [facility] [place] team for possible outreach.    Assessment: Upon chart review, CCR Team member will not proceed with patient outreach related to this episode of Transitional Care Management program due to reason below:    Patient has been discharged with Hospice Care    Plan: Transitional Care Management episode addressed appropriately per reason noted above.      Roseann Romero  Community Health Worker  [facility] [place]  [phone]      *Connected Care Resource Team does NOT follow patient ongoing. Referrals are identified based on internal discharge reports and the outreach is to ensure patient has an understanding of their discharge instructions.

## 2024-07-04 LAB
BACTERIA BLD CULT: NO GROWTH
BACTERIA BLD CULT: NO GROWTH

## 2024-08-01 NOTE — ED TRIAGE NOTES
[facility] PROGRESS NOTE      Chief complaint: Follow-up of Gram-positive cocci in clusters on blood culture    The patient is a 87 y.o. 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  On admission, she was afebrile and hemodynamically stable with leukocytosis up to 18,000. Urinalysis showed insignificant pyuria of 0-5 WBC with urine culture showing mixed abhijit including mixed gram-positive organisms.  Blood cultures showed gram-positive cocci in clusters (methicillin-resistant Staphylococcus epidermidis by PCR).     Subjective: Patient was seen and examined. No chills, no abdominal pain, no rash, no itching. Afebrile. She reports pain on her right jaw radiating to her head.      Objective:    Vitals:    08/01/24 0722   BP: 125/83   Pulse: (!) 115   Resp: 17   Temp: 96.8 °F (36 °C)   SpO2: 99%     Constitutional: Alert, not in distress  Respiratory: Clear breath sounds, no crackles, no wheezes  Cardiovascular: Regular rate and rhythm, no murmurs  Gastrointestinal: Bowel sounds present, soft, nontender. Colostomy in place  Skin: Warm and dry, no active dermatoses  Musculoskeletal: No joint swelling, no joint erythema    Labs, imaging, and medical records/notes were personally reviewed.    Assessment:  Gram-positive cocci in clusters (methicillin-resistant Staphylococcus epidermidis by PCR) on blood culture, suspect contaminant  Small bowel obstruction  Leukocytosis, resolved  Asymptomatic bacteriuria    Recommendations:  Monitor clinically off antibiotics for now.  Follow-up blood cultures.  Continue  Pt had jorge catheter removed on Thursday by urologist to see if patient could urinate on his own. Pt is urinating but not very much. Wants catheter placed again. Mild abdominal discomfort.

## 2024-12-28 ENCOUNTER — APPOINTMENT (OUTPATIENT)
Dept: SPEECH THERAPY | Facility: HOSPITAL | Age: 89
DRG: 884 | End: 2024-12-28
Attending: HOSPITALIST
Payer: MEDICARE

## 2024-12-28 ENCOUNTER — HOSPITAL ENCOUNTER (INPATIENT)
Facility: HOSPITAL | Age: 89
LOS: 3 days | Discharge: HOSPICE | DRG: 884 | End: 2024-12-31
Attending: EMERGENCY MEDICINE | Admitting: HOSPITALIST
Payer: MEDICARE

## 2024-12-28 ENCOUNTER — APPOINTMENT (OUTPATIENT)
Dept: CT IMAGING | Facility: HOSPITAL | Age: 89
DRG: 884 | End: 2024-12-28
Attending: EMERGENCY MEDICINE
Payer: MEDICARE

## 2024-12-28 PROBLEM — E87.20 LACTIC ACIDOSIS: Status: ACTIVE | Noted: 2024-12-28

## 2024-12-28 PROBLEM — A41.9 SEVERE SEPSIS (H): Status: ACTIVE | Noted: 2024-12-28

## 2024-12-28 PROBLEM — R65.20 SEVERE SEPSIS (H): Status: ACTIVE | Noted: 2024-12-28

## 2024-12-28 ASSESSMENT — ACTIVITIES OF DAILY LIVING (ADL)
DEPENDENT_IADLS:: CLEANING;COOKING;LAUNDRY;SHOPPING;MEAL PREPARATION;MEDICATION MANAGEMENT;MONEY MANAGEMENT;TRANSPORTATION;INCONTINENCE
ADLS_ACUITY_SCORE: 63
ADLS_ACUITY_SCORE: 59
ADLS_ACUITY_SCORE: 59
ADLS_ACUITY_SCORE: 63
ADLS_ACUITY_SCORE: 59
ADLS_ACUITY_SCORE: 63
ADLS_ACUITY_SCORE: 59

## 2024-12-28 NOTE — PHARMACY-ADMISSION MEDICATION HISTORY
Pharmacy Intern Admission Medication History    Admission medication history is complete. The information provided in this note is only as accurate as the sources available at the time of the update.    Information Source(s): Family member, Facility (TCU/NH/) medication list/MAR, and CareEverywhere/SureScripts via in-person and phone    Pertinent Information: Patient is on hospice through [facility]. Granddaughter reported last time patient took chronic medications was 2 days ago. Last had morphine and lorazepam last night. Called [facility] who faxed a medication list     Changes made to PTA medication list:  Added: Icy Hot, Ear drops, Compazine, hyoscyamine, lidocaine viscous, lorazepam, morphine, nystatin, senna-s, trolamine   Deleted: aspirin, finasteride, thiamine, trazodone   Changed: None    Allergies reviewed with patient and updates made in EHR: unable to assess    Medication History Completed By: Mark VICK 12/28/2024 12:02 PM    PTA Med List   Medication Sig Last Dose/Taking    acetaminophen (TYLENOL) 500 MG tablet Take 1,000 mg by mouth every 6 hours as needed for mild pain Taking As Needed    amLODIPine (NORVASC) 5 MG tablet Take 1 tablet (5 mg) by mouth 2 times daily Past Week Evening    carbamide peroxide (DEBROX) 6.5 % otic solution Place 5 drops into both ears daily as needed for other (ear wax). Taking As Needed    hyoscyamine 0.125 MG TBDP Take 0.125 mg by mouth every 4 hours as needed for other (secretions). Taking As Needed    ipratropium - albuterol 0.5 mg/2.5 mg/3 mL (DUONEB) 0.5-2.5 (3) MG/3ML neb solution Take 1 vial (3 mLs) by nebulization every 6 hours as needed for shortness of breath, wheezing or cough Taking As Needed    levothyroxine (SYNTHROID/LEVOTHROID) 100 MCG tablet Take 1 tablet (100 mcg) by mouth Every Mon, Wed, Fri Morning Past Week    levothyroxine (SYNTHROID/LEVOTHROID) 75 MCG tablet Take 75 mcg by mouth. Every Sunday, Tuesday, Thursday, Saturday Past  Week    lidocaine (XYLOCAINE) external gel Place into the urethra as needed for moderate pain. Lozano insertion Taking As Needed    LORazepam (ATIVAN) 2 MG/ML (HIGH CONC) oral solution Take 1 mg by mouth every 4 hours as needed for anxiety or agitation. 12/27/2024 Evening    methyl salicylate-menthol (ICY HOT) ointment Apply topically every 6 hours. Taking    metoprolol tartrate (LOPRESSOR) 25 MG tablet Take 1.5 tablets (37.5 mg) by mouth 2 times daily Past Week    morphine sulfate (ROXANOL) 20 mg/mL (HIGH CONC) soln Take 2.5 mg by mouth every 2 hours as needed for shortness of breath or moderate to severe pain. 12/27/2024 Evening    nystatin (MYCOSTATIN) 805462 UNIT/GM external powder Apply topically 2 times daily as needed for other (fungal infection). To groin Taking As Needed    prochlorperazine (COMPAZINE) 10 MG tablet Take 10 mg by mouth every 6 hours as needed for nausea or vomiting. Taking As Needed    QUEtiapine (SEROQUEL) 25 MG tablet Take 0.5 tablets (12.5 mg) by mouth 2 times daily as needed (Agitation associated with delirium and hallucinations) Taking As Needed    QUEtiapine (SEROQUEL) 50 MG tablet Take 50 mg by mouth at bedtime. Past Week    senna-docusate (SENOKOT-S/PERICOLACE) 8.6-50 MG tablet Take 1 tablet by mouth daily as needed for constipation. Taking As Needed    trolamine salicylate (ASPERCREME) 10 % external cream Apply topically every 6 hours as needed for moderate pain. Taking As Needed

## 2024-12-28 NOTE — ED TRIAGE NOTES
Pt presents via [facility]. Pt appears weak, confused, and with odor to urine. Urine appears light brown, with sediment and pus. Pt alert to self. Recent UTI diagnosis, antibiotics were reportedly to be sent to the house,  but never arrived.       Reported by EMS: Pt was in a home with about 5 elderly patients who looked unable to care for themselves, all sitting in recliners. Family reports to come to the house multiple times throughout the day to help care.

## 2024-12-28 NOTE — CONSULTS
"Care Management Initial Consult    General Information  Assessment completed with: Patient, Family, Dilip and granddajenn Hawkins  Type of CM/SW Visit: Initial Assessment    Primary Care Provider verified and updated as needed: Yes   Readmission within the last 30 days: no previous admission in last 30 days      Reason for Consult: discharge planning  Advance Care Planning: Advance Care Planning Reviewed: no concerns identified, questions answered, other (see comments) (chandana Hawkins states, \"I am his POA for financial. I am not his legal guardian\".)          Communication Assessment  Patient's communication style: spoken language (English or Bilingual)             Cognitive  Cognitive/Neuro/Behavioral:                        Living Environment:   People in home: spouse  Dilip and wife Laina  Current living Arrangements: house      Able to return to prior arrangements: yes       Family/Social Support:  Care provided by: self, child(tami), other (see comments) (Granddaughter and Hospice staff)  Provides care for: no one, unable/limited ability to care for self  Marital Status:   Support system: Wife, Children, Other (specify) (granddaughter, hospice staff)  Laina       Description of Support System: Supportive, Involved    Support Assessment: Adequate family and caregiver support, Adequate social supports, Patient communicates needs well met    Current Resources:   Patient receiving home care services: No        Community Resources: Hospice ([facility], bath aide and RN help.)  Equipment currently used at home: walker, rolling, other (see comments) (4WW, lift recliner chair for sleeping\")  Supplies currently used at home: Incontinence Supplies, Oxygen Tubing/Supplies, Nebulizer tubing, Other (\"Home Oxygen that he only uses PRN from [facility]. Partial upper dentures. Glasses at home.)    Employment/Financial:  Employment Status: retired, , previous service   " "  Employment/ Comments: \"He was only in the [employer] about 6 months. He has no benefits from it\".  Financial Concerns: none   Referral to Financial Worker: No       Does the patient's insurance plan have a 3 day qualifying hospital stay waiver?  No    Lifestyle & Psychosocial Needs:  Social Drivers of Health     Food Insecurity: Not on file   Depression: Not at risk (2/9/2024)    Received from [facility]    PHQ-2     PHQ-2 Score: 1   Housing Stability: Not on file   Tobacco Use: Medium Risk (2/9/2024)    Received from [facility]    Patient History     Smoking Tobacco Use: Former     Smokeless Tobacco Use: Never     Passive Exposure: Not on file   Financial Resource Strain: Not on file   Alcohol Use: Not At Risk (10/20/2020)    Received from [facility]    AUDIT-C     Frequency of Alcohol Consumption: Never     Average Number of Drinks: Not on file     Frequency of Binge Drinking: Not on file   Transportation Needs: Not on file   Physical Activity: Not on file   Interpersonal Safety: Not on file   Stress: Not on file   Social Connections: Not on file   Health Literacy: Not on file       Functional Status:  Prior to admission patient needed assistance:   Dependent ADLs:: Ambulation-walker, Bathing, Dressing, Grooming, Positioning, Transfers, Incontinence, Toileting  Dependent IADLs:: Cleaning, Cooking, Laundry, Shopping, Meal Preparation, Medication Management, Money Management, Transportation, Incontinence  Assesssment of Functional Status:  (unknown at this time)    Mental Health Status:  Mental Health Status: Past Concern  Mental Health Management: Medication    Chemical Dependency Status:  Chemical Dependency Status: No Current Concerns             Values/Beliefs:  Spiritual, Cultural Beliefs, Latter day Practices, Values that affect care: no               Discussed  Partnership in Safe Discharge Planning  document with patient/family: " "No    Additional Information:  Dilip lives in a house with his wife Laina. Granddaughter Shruthi states, \"It is just Dilip and his wife Laina technically living in their house, but there is at least 1 family member there 24/7 to help both of them. They have 3 sons: Tay, Ad, Mark and often one of them is there at the house and Shruthi granddaughter helps with most physical cares\".    He gets help from family and [facility] with all ADLs and IADLs. Granddaughter Shruthi states, \"I help him with dressing on the days the hospice bath aide is not there helping in with bathing and dressing. The aide comes twice a week. The RN from hospice also comes twice a week. The RN helps with all his medications. I help with physical cares and the rest of the family also helps with all the household help. We do have several recliner chairs lined up because both Dilip and Laina sleep in them now. Normally one of their sons spends the night in a recliner also to help them with any overnight needs.\" No concern noted now that I am talking to the granddaughter as noted by ER staff that the medics said \"Pt was in a home with about 5 elderly patients who looked unable to care for themselves, all sitting in recliners\".    He uses a 4WW for mobility.    Granddaughter to transport at discharge.    CM to follow for medical progression of care, discharge recommendations, and final discharge plan. Writer verified patient demographics and updated any changes needed in the patient chart.    Contact:  Shruthi ellington [phone].    Next Steps:   Plan: Home with [facility] to sign back on.  Needs: Unknown other new needs at this time.    Pauline Singh, RN    "

## 2024-12-28 NOTE — PLAN OF CARE
Goal Outcome Evaluation:      Plan of Care Reviewed With: patient, grandchild(tami)          Outcome Evaluation: Home with [facility] to sign back on.

## 2024-12-28 NOTE — ED NOTES
[facility]  Expected date:   Expected time:   Means of arrival:   Comments:  Wili- Monty male- UTI- dark brown urine in catheter

## 2024-12-28 NOTE — PHARMACY-VANCOMYCIN DOSING SERVICE
Pharmacy Vancomycin Initial Note  Date of Service 2024  Patient's  1935  89 year old, male    Indication: Sepsis and Urinary Tract Infection    Current estimated CrCl = Estimated Creatinine Clearance: 32.5 mL/min (A) (based on SCr of 1.51 mg/dL (H)).    Creatinine for last 3 days  No results found for requested labs within last 3 days.    Recent Vancomycin Level(s) for last 3 days  No results found for requested labs within last 3 days.      Vancomycin IV Administrations (past 72 hours)        No vancomycin orders with administrations in past 72 hours.                    Nephrotoxins and other renal medications (From now, onward)      Start     Dose/Rate Route Frequency Ordered Stop    24 1000  vancomycin (VANCOCIN) 1,250 mg in 0.9% NaCl 262.5 mL intermittent infusion         1,250 mg  over 90 Minutes Intravenous ONCE 24 0943              Contrast Orders - past 72 hours (72h ago, onward)      None                  Plan:  Start vancomycin  1250 mg IV once  If continuing inpatient, please re-order pharmacy to dose vancomycin consult  Thuan Maldonado, [facility]

## 2024-12-28 NOTE — ED PROVIDER NOTES
EMERGENCY DEPARTMENT ENCOUNTER      NAME: Dilip Long  AGE: 89 year old male  YOB: 1935  MRN: [id number]  EVALUATION DATE & TIME: 12/28/2024  9:25 AM    PCP: Kolton Junior    ED PROVIDER: Valarie Brown MD    Chief Complaint   Patient presents with    Altered Mental Status    Dysuria    Generalized Weakness         FINAL IMPRESSION:  1. Severe sepsis (H)    2. Urinary tract infection associated with indwelling urethral catheter, initial encounter (H)    3. Lactic acidosis          ED COURSE & MEDICAL DECISION MAKING:    Pertinent Labs & Imaging studies reviewed. (See chart for details)  89 year old male with history of HTN, HLD, paroxysmal A-fib, previous CVA who is DNR/DNI and home hospice with chronic indwelling Lozano catheter who presents to the Emergency Department for evaluation of delirium since yesterday, coughing overnight and more agitation today.  On examination patient is alert to self only.  He has an indwelling Lozano catheter with frankly purulent urine and suprapubic tenderness.  Sounds like he has been coughing overnight more as well 2.  Highly suspicious for catheter associated UTI and sepsis.  Differential clues dehydration, electrolyte abnormality, pneumonia, viral syndrome, influenza COVID.    Patient met by myself on EMS arrival, placed on monitor, IV established and blood obtained.  Given 1 L bolus, vancomycin and cefepime.  COVID/influenza/RSV swab negative.  CBC, CMP notable for WBC of 12.6, chronic renal insufficiency with creatinine of 1.45.  Lactate elevated at 3.2 but normalized on recheck to 2.1.  Urinalysis is infected.  CT of the chest abdomen pelvis shows evidence of cystitis.  Will be admitted to medicine for further management.      ED Course as of 12/28/24 1321   Sat Dec 28, 2024   0934  I met with the patient, obtained history, performed an initial exam, and discussed options and plan for diagnostics and treatment here in the ED.    1016 WBC(!): 12.6   1029  Spoke w [facility] nurse. At 10p last noc family called for coughing. When nurse got their family had given morphine/lorazepam and was sleeping. Nurse states no new ordered, abx, etc.    1041 Lactic Acid(!): 3.2   1100 Leukocyte Esterase Urine(!): 500 Deion/uL   1100 Creatinine(!): 1.45  chronic   1247 Admitted to Dr. Nguyen       Medical Decision Making  Obtained supplemental history:Supplemental history obtained?: Documented in chart, Family Member/Significant Other, and Other: Nurse giving EMS report  Reviewed external records: External records reviewed?: Outpatient Record: 6/30/2024 discharge summary  Care impacted by chronic illness:Chronic Kidney Disease, Heart Disease, Hyperlipidemia, Hypertension, and Peripheral Vascular Disease  Did you consider but not order tests?: Work up considered but not performed and documented in chart, if applicable  Did you interpret images independently?: Independent interpretation of ECG and images noted in documentation, when applicable.  Consultation discussion with other provider:Did you involve another provider (consultant, , pharmacy, etc.)?: I discussed the care with another health care provider, see documentation for details.  Admit.    MIPS: Not Applicable      At the conclusion of the encounter I discussed the results of all of the tests and the disposition. The questions were answered. The patient or family acknowledged understanding and was agreeable with the care plan.    The patient has signs of sepsis   Sepsis ED evaluation   The patient has signs of sepsis as evidenced by:  1. Presence of 2 SIRS criteria, suspected infection, AND  2. Organ dysfunction: Lactic Acidosis with value >2.0 due to sepsis    Time zero:  1005  on 12/28/24 as this was the time when Lactate was resulted and the level was greater than 2.    Lactic Acid Results:  Recent Labs   Lab Test 12/28/24  1214 12/28/24  1005 06/29/24  1347   LACT 2.1* 3.2* 3.3*       3 Hour Bundle 6 Hour Bundle  (Reassessment)   Blood Cultures before IV Antibiotics: Yes  Antibiotics given: see below  Prehospital fluid volume (mL):                     Total fluids given (ED +Pre-hospital):  The patient responded to a lesser volume of IV fluids. The initial volume ordered was 1000 mL.    Repeat Lactic Acid Level: Ordered by reflex for 2 hours after initial lactic acid collection.  Vasopressors: MAP>65 after initial IVF bolus, will continue to monitor fluid status and vital signs.  Repeat perfusion exam: I attest to having performed a repeat sepsis exam and assessment of perfusion at  1300 .   BMI Readings from Last 1 Encounters:   12/28/24 22.89 kg/m        Anti-infectives (From admission through now)      Start     Dose/Rate Route Frequency Ordered Stop    12/28/24 1000  ceFEPIme (MAXIPIME) 2 g vial to attach to  mL bag for ADULTS or NS 50 mL bag for PEDS         2 g  over 30 Minutes Intravenous ONCE 12/28/24 0939 12/28/24 1051    12/28/24 1000  vancomycin (VANCOCIN) 1,250 mg in 0.9% NaCl 262.5 mL intermittent infusion         1,250 mg  over 90 Minutes Intravenous ONCE 12/28/24 0943 12/28/24 1246                  CRITICAL CARE:  Critical Care  Performed by: Valarie Brown MD  Authorized by: Valarie Brown MD     Total critical care time: 35 minutes  Criticalcare time was exclusive of separately billable procedures and treating other patients.  Critical care was necessary to treat or prevent imminent or life-threatening deterioration of the following conditions: Severe sepsis  Critical care was time spent personally by me on the following activities: development of treatment plan with patient or surrogate, discussions with consultants, examination of patient, evaluation of patient's response totreatment, obtaining history from patient or surrogate, ordering and performing treatments and interventions, ordering and review of laboratory studies, ordering and review of radiographic studies and re-evaluation ofpatient's  condition, this excludes any separately billable procedures.      MEDICATIONS GIVEN IN THE EMERGENCY:  Medications   sodium chloride (PF) 0.9% PF flush 3 mL (has no administration in time range)   sodium chloride (PF) 0.9% PF flush 3 mL (3 mLs Intracatheter $Given 12/28/24 1044)   ceFEPIme (MAXIPIME) 2 g vial to attach to  mL bag for ADULTS or NS 50 mL bag for PEDS (0 g Intravenous Stopped 12/28/24 1051)   vancomycin (VANCOCIN) 1,250 mg in 0.9% NaCl 262.5 mL intermittent infusion (0 mg Intravenous Stopped 12/28/24 1246)   sodium chloride 0.9% BOLUS 1,000 mL (0 mLs Intravenous Stopped 12/28/24 1245)   iopamidol (ISOVUE-370) solution 75 mL (75 mLs Intravenous $Given 12/28/24 1137)       NEW PRESCRIPTIONS STARTED AT TODAY'S ER VISIT  New Prescriptions    No medications on file          =================================================================    HPI    Patient information was obtained from: patient, patient's nurse, and patient's family member    Use of Intrepreter: N/A       Dilip Long is a 89 year old male with pertinent medical history of hyperlipidemia, hypertension, peripheral artery disease, stage 3 CKD, and heart failure who presents with altered mental status and urinary issues.    Per patient's nurse, he was brought in via EMS. EMS said he was supposed to get his UTI medications a week ago and never did. Upon EMS arrival he was in a house with six other elderly individuals that are apparently checked on a few times a day.    Per patient's family member, he lives at home with his wife and is on hospice. There is always a family member at the house with him because both he and his wife need assistance. He gets frequent UTI's. Overnight (12/27/2024-12/28/2024) he became delirious and agitated. Thus EMS was called.    Information from patient very limited due to patient's mental state    Per patient, his kids said he had to go in. He has to urinate and his abdomen is tender.    PAST MEDICAL  HISTORY:  Past Medical History:   Diagnosis Date    Acute heart failure with preserved ejection fraction (HFpEF) (H) 11/10/2022    Acute prostatitis     Asymptomatic bacteriuria 10/23/2013    Noted at 10/2/13 office visit at [facility]. No treatment recommended at that time.     Congestive heart failure (H)     Essential hypertension 08/18/2015    Hypertension     Paroxysmal atrial fib -- on Eliquis 11/06/2022    Syncope     Thyroid disease        PAST SURGICAL HISTORY:  Past Surgical History:   Procedure Laterality Date    BLADDER SURGERY      Bladder absces removal    Blepharoplasty Upper Lid W/ Excessive Skin[  1/29/2007    CATARACT EXTRACTION      CYSTOSCOPY, FULGURATE BLEEDERS, EVACUATE CLOT(S), COMBINED N/A 4/19/2023    Procedure: CYSTOSCOPY, BLADDER BIOPSY WITH FULGURATION AND CLOT EVACUATION;  Surgeon: Edgar Laird MD;  Location: [facility]    EYE SURGERY      both eyes 2015    IR LUMBAR EPIDURAL STEROID INJECTION  4/27/2004    IR LUMBAR EPIDURAL STEROID INJECTION  5/11/2004    IR LUMBAR EPIDURAL STEROID INJECTION  11/24/2004    IR LUMBAR EPIDURAL STEROID INJECTION  11/28/2007    OH CYSTOURETHROSCOPY W/IRRIG & EVAC CLOTS N/A 7/27/2018    Procedure: CYSTOSCOPY, CLOT EVACUATION ,URETHRAL DILATATION, DAUGHERTY CATHETER PLACEMENT;  Surgeon: Lowell Arias MD;  Location: [facility];  Service: Urology       CURRENT MEDICATIONS:    Prior to Admission Medications   Prescriptions Last Dose Informant Patient Reported? Taking?   LORazepam (ATIVAN) 2 MG/ML (HIGH CONC) oral solution 12/27/2024 Evening  Yes Yes   Sig: Take 1 mg by mouth every 4 hours as needed for anxiety or agitation.   QUEtiapine (SEROQUEL) 25 MG tablet   No Yes   Sig: Take 0.5 tablets (12.5 mg) by mouth 2 times daily as needed (Agitation associated with delirium and hallucinations)   QUEtiapine (SEROQUEL) 50 MG tablet Past Week  Yes Yes   Sig: Take 50 mg by mouth at bedtime.   acetaminophen (TYLENOL) 500 MG tablet  Care  Giver Yes Yes   Sig: Take 1,000 mg by mouth every 6 hours as needed for mild pain   amLODIPine (NORVASC) 5 MG tablet Past Week Evening  No Yes   Sig: Take 1 tablet (5 mg) by mouth 2 times daily   carbamide peroxide (DEBROX) 6.5 % otic solution   Yes Yes   Sig: Place 5 drops into both ears daily as needed for other (ear wax).   hyoscyamine 0.125 MG TBDP   Yes Yes   Sig: Take 0.125 mg by mouth every 4 hours as needed for other (secretions).   ipratropium - albuterol 0.5 mg/2.5 mg/3 mL (DUONEB) 0.5-2.5 (3) MG/3ML neb solution  Care Giver No Yes   Sig: Take 1 vial (3 mLs) by nebulization every 6 hours as needed for shortness of breath, wheezing or cough   levothyroxine (SYNTHROID/LEVOTHROID) 100 MCG tablet Past Week Care Giver No Yes   Sig: Take 1 tablet (100 mcg) by mouth Every Mon, Wed, Fri Morning   levothyroxine (SYNTHROID/LEVOTHROID) 75 MCG tablet Past Week  Yes Yes   Sig: Take 75 mcg by mouth. Every Sunday, Tuesday, Thursday, Saturday   lidocaine (XYLOCAINE) external gel   Yes Yes   Sig: Place into the urethra as needed for moderate pain. Lozano insertion   methyl salicylate-menthol (ICY HOT) ointment   Yes Yes   Sig: Apply topically every 6 hours.   metoprolol tartrate (LOPRESSOR) 25 MG tablet Past Week  No Yes   Sig: Take 1.5 tablets (37.5 mg) by mouth 2 times daily   morphine sulfate (ROXANOL) 20 mg/mL (HIGH CONC) soln 12/27/2024 Evening  Yes Yes   Sig: Take 2.5 mg by mouth every 2 hours as needed for shortness of breath or moderate to severe pain.   nystatin (MYCOSTATIN) 149338 UNIT/GM external powder   Yes Yes   Sig: Apply topically 2 times daily as needed for other (fungal infection). To groin   prochlorperazine (COMPAZINE) 10 MG tablet   Yes Yes   Sig: Take 10 mg by mouth every 6 hours as needed for nausea or vomiting.   senna-docusate (SENOKOT-S/PERICOLACE) 8.6-50 MG tablet   Yes Yes   Sig: Take 1 tablet by mouth daily as needed for constipation.   trolamine salicylate (ASPERCREME) 10 % external cream   Yes  "Yes   Sig: Apply topically every 6 hours as needed for moderate pain.      Facility-Administered Medications: None       ALLERGIES:  No Known Allergies    FAMILY HISTORY:  Family History   Problem Relation Age of Onset    Diabetes No family hx of     Hypertension No family hx of     Other Cancer No family hx of     Coronary Artery Disease No family hx of     Hyperlipidemia No family hx of     Cerebrovascular Disease No family hx of     Breast Cancer No family hx of     Colon Cancer No family hx of     Prostate Cancer No family hx of     Depression No family hx of     Anxiety Disorder No family hx of     Mental Illness No family hx of     Substance Abuse No family hx of     Anesthesia Reaction No family hx of     Asthma No family hx of     Osteoporosis No family hx of     Genetic Disorder No family hx of     Thyroid Disease No family hx of     Obesity No family hx of     No Known Problems Mother     No Known Problems Father        SOCIAL HISTORY:  Social History     Tobacco Use    Smoking status: Former     Types: Cigarettes    Smokeless tobacco: Never    Tobacco comments:     Pt quit 40 years ago   Vaping Use    Vaping status: Never Used   Substance Use Topics    Alcohol use: Yes     Comment: < 1 drink a week    Drug use: No        VITALS:  Patient Vitals for the past 24 hrs:   BP Temp Pulse Resp SpO2 Height Weight   12/28/24 1139 (!) 167/75 -- 82 25 96 % -- --   12/28/24 1044 -- -- -- -- -- 1.753 m (5' 9\") 70.3 kg (155 lb)   12/28/24 1031 -- -- 82 19 97 % -- --   12/28/24 1030 (!) 140/68 -- -- -- -- -- --   12/28/24 0944 112/79 98.2  F (36.8  C) -- 20 -- -- --   12/28/24 0943 -- -- 81 -- 94 % -- --       PHYSICAL EXAM    General Appearance: alert to self but unsure why he is in emergency department, disheveled, smell of strong malodorous urine versus stool   Head:  Normocephalic  Eyes:  PERRL, conjunctiva/corneas clear  ENT:  membranes are moist without pallor  Neck:  Supple  Cardio:  Regular rate and rhythm, no " murmur/gallop/rub  Pulm:  No respiratory distress, clear to auscultation bilaterally  Back:   No CVA tenderness, normal ROM  : Lozano catheter draining puss  Abdomen:  Soft, non distended,no rebound or guarding. Suprapubic tenderness.  Extremities: Extremities atraumatic  Skin:  Skin warm, dry, no rashes  Neuro:  Alert to self only, moving all extremities, grossly nonfocal neuroexam.  More confused than baseline per family at bedside.     RADIOLOGY/LABS:  Reviewed all pertinent imaging. Please see official radiology report. All pertinent labs reviewed and interpreted.    Results for orders placed or performed during the hospital encounter of 12/28/24   CT Chest/Abdomen/Pelvis w Contrast    Impression    IMPRESSION:  1.  Findings consistent with cystitis. No convincing genitourinary calculi or hydronephrosis.  2.  Diffuse circumferential wall thickening of the bladder likely related to chronic bladder obstruction/urinary retention from prostatomegaly.  3.  Diffuse circumferential wall thickening of the esophagus can be seen with esophagitis. Correlate clinically.  4.  Mild mosaic attenuation of the lungs likely related to the exam being obtained in the expiratory phase of respiration.  5.  Minimal emphysema.   Comprehensive metabolic panel   Result Value Ref Range    Sodium 138 135 - 145 mmol/L    Potassium 3.9 3.4 - 5.3 mmol/L    Carbon Dioxide (CO2) 23 22 - 29 mmol/L    Anion Gap 15 7 - 15 mmol/L    Urea Nitrogen 26.3 (H) 8.0 - 23.0 mg/dL    Creatinine 1.45 (H) 0.67 - 1.17 mg/dL    GFR Estimate 46 (L) >60 mL/min/1.73m2    Calcium 9.2 8.8 - 10.4 mg/dL    Chloride 100 98 - 107 mmol/L    Glucose 153 (H) 70 - 99 mg/dL    Alkaline Phosphatase 89 40 - 150 U/L    AST 15 0 - 45 U/L    ALT 6 0 - 70 U/L    Protein Total 6.4 6.4 - 8.3 g/dL    Albumin 3.6 3.5 - 5.2 g/dL    Bilirubin Total 0.8 <=1.2 mg/dL   Lactic Acid Whole Blood with 1X Repeat in 2 HR when >2   Result Value Ref Range    Lactic Acid, Initial 3.2 (H) 0.7 - 2.0  mmol/L   Blood gas venous   Result Value Ref Range    pH Venous 7.36 7.32 - 7.43    pCO2 Venous 47 40 - 50 mm Hg    pO2 Venous 35 25 - 47 mm Hg    Bicarbonate Venous 26 21 - 28 mmol/L    Base Excess/Deficit Venous 0.3 -3.0 - 3.0 mmol/L    FIO2 21     Oxyhemoglobin Venous 57 (L) 70 - 75 %    O2 Sat, Venous 58.7 (L) 70.0 - 75.0 %   UA with Microscopic   Result Value Ref Range    Color Urine Yellow Colorless, Straw, Light Yellow, Yellow    Appearance Urine Turbid (A) Clear    Glucose Urine Negative Negative mg/dL    Bilirubin Urine Negative Negative    Ketones Urine 40 (A) Negative mg/dL    Specific Gravity Urine 1.023 1.001 - 1.030    Blood Urine 1.0 mg/dL (A) Negative    pH Urine 6.5 5.0 - 7.0    Protein Albumin Urine 600 (A) Negative mg/dL    Urobilinogen Urine <2.0 <2.0 mg/dL    Nitrite Urine Negative Negative    Leukocyte Esterase Urine 500 Deion/uL (A) Negative    Mucus Urine Present (A) None Seen /LPF    RBC Urine 176 (H) <=2 /HPF    WBC Urine >182 (H) <=5 /HPF    Squamous Epithelials Urine 2 (H) <=1 /HPF   Result Value Ref Range    Procalcitonin 0.15 <0.50 ng/mL   CBC with platelets and differential   Result Value Ref Range    WBC Count 12.6 (H) 4.0 - 11.0 10e3/uL    RBC Count 3.69 (L) 4.40 - 5.90 10e6/uL    Hemoglobin 12.0 (L) 13.3 - 17.7 g/dL    Hematocrit 35.7 (L) 40.0 - 53.0 %    MCV 97 78 - 100 fL    MCH 32.5 26.5 - 33.0 pg    MCHC 33.6 31.5 - 36.5 g/dL    RDW 12.1 10.0 - 15.0 %    Platelet Count 267 150 - 450 10e3/uL    % Neutrophils 86 %    % Lymphocytes 5 %    % Monocytes 8 %    % Eosinophils 0 %    % Basophils 0 %    % Immature Granulocytes 1 %    NRBCs per 100 WBC 0 <1 /100    Absolute Neutrophils 10.8 (H) 1.6 - 8.3 10e3/uL    Absolute Lymphocytes 0.6 (L) 0.8 - 5.3 10e3/uL    Absolute Monocytes 1.0 0.0 - 1.3 10e3/uL    Absolute Eosinophils 0.0 0.0 - 0.7 10e3/uL    Absolute Basophils 0.1 0.0 - 0.2 10e3/uL    Absolute Immature Granulocytes 0.1 <=0.4 10e3/uL    Absolute NRBCs 0.0 10e3/uL   Extra Blue Top  Tube   Result Value Ref Range    Hold Specimen JIC    Extra Red Top Tube   Result Value Ref Range    Hold Specimen JIC    Influenza A/B, RSV and SARS-CoV2 PCR (COVID-19) Nasopharyngeal    Specimen: Nasopharyngeal; Swab   Result Value Ref Range    Influenza A PCR Negative Negative    Influenza B PCR Negative Negative    RSV PCR Negative Negative    SARS CoV2 PCR Negative Negative   Lactic acid whole blood   Result Value Ref Range    Lactic Acid 2.1 (H) 0.7 - 2.0 mmol/L     The creation of this record is based on the scribe s observations of the work being performed by Valarie Brown MD and the provider s statements to them. It was created on her behalf by James Garcia, a trained medical scribe. This document has been checked and approved by the attending provider.    Valarie Brown MD  Emergency Medicine  [facility] EMERGENCY DEPARTMENT  [address] [place] [id number] [phone]  Dept: [phone]      Valarie Brown MD  12/28/24 0216

## 2024-12-28 NOTE — H&P
"Admission History and Physical   Dilip NICANOR Long,  1935, MRN [id number]    [facility]    PCP: Kolton Junior, [phone]          Extended Emergency Contact Information  Primary Emergency Contact: Shruthi Holley  Mobile Phone: [phone]  Relation: Grandchild  Secondary Emergency Contact: JACOB LONG  Address: [address] [place] [id number] [facility]  Home Phone: [phone]  Relation: Spouse       Assessment and Plan     89M with CVA, dementia CKD3b, PAF, chronic Jorge, dysphagia with thickened liquids, hypothyroid, dementia who apparently is on hospice is admitted with severe sepsis due to UTI.    #Complicated UTI  #Elevated lactate likely from dehydration vs. sepsis  #Chronic jorge, exchanged in ER  -ceftriaxone, IVF    #Acute metabolic encephalopathy superimposed on dementia  -seroquel 50 at bedtime and PRN  -supportive care    #chronic dysphagia - NPO, SLP    #Hypothyroid - synthroid    #HTN - hold norvasc for now.    #Esophagitis, suspected on CT - protonix    #Goals of care are confusing given medical admission while on hospice.  Family member in room not able to really clarify.  Pall care consult  -would not continue q2h PRN morphine and ativan q4h PRN given goals of care are not clearly comfort only.    Checklist:  Code Status:  DNR/DNI based upon prior.  Patient unable to answer  Diet:  NPO    Jorge Catheter: PRESENT, indication:    Lines: None     DVT px:  Enoxaparin (Lovenox) SQ   Page 1 of 1        Auto-populated based on system request - if needs to be addressed in treatment plan they will be addressed above:  \"  Clinically Significant Risk Factors Present on Admission                     # Hypertension: Noted on problem list                   # Financial/Environmental Concerns:               \"  Advanced Care Planning  I anticipate the patient will be admitted to the hospital for at least 2 midnights for the evaluation and treatment of " "the conditions discussed above.     Chief Complaint: agitation     HPI:    89M with CVA, dementia CKD3b, PAF, chronic Lozano, dysphagia with thickened liquids, hypothyroid, dementia who apparently is on hospice is brought in by family to treat UTI.  Increasing agitation at home and family was unable to manage symptoms.  They report 1 day delay in getting abx from hospice.      In the ER: given cefepime and 1L NS bolus with improving lactate.  Lozano exchanged.    History is provided by family member in room       Physical Exam:  Temp:  [98.2  F (36.8  C)] 98.2  F (36.8  C)  Pulse:  [81-82] 82  Resp:  [19-25] 25  BP: (112-167)/(68-79) 167/75  SpO2:  [94 %-97 %] 96 %  BP (!) 167/75   Pulse 82   Temp 98.2  F (36.8  C)   Resp 25   Ht 1.753 m (5' 9\")   Wt 70.3 kg (155 lb)   SpO2 96%   BMI 22.89 kg/m       General:  chronically ill appearing, no distress,   Neurologic: alert but confused   Psych: mild agitation when I turned lights on  Lungs: CTAB.  Easyrespirations  Abd: soft, suprapubic discomfort, NT, normoactive BS  Skin: no rashes noted on exposed skin.   Central Lines and Tubes: Lozano with dark yellow urine      Pertinent Test Findings  Radiology Results (results reviewed):   Results for orders placed or performed during the hospital encounter of 12/28/24   CT Chest/Abdomen/Pelvis w Contrast    Impression    IMPRESSION:  1.  Findings consistent with cystitis. No convincing genitourinary calculi or hydronephrosis.  2.  Diffuse circumferential wall thickening of the bladder likely related to chronic bladder obstruction/urinary retention from prostatomegaly.  3.  Diffuse circumferential wall thickening of the esophagus can be seen with esophagitis. Correlate clinically.  4.  Mild mosaic attenuation of the lungs likely related to the exam being obtained in the expiratory phase of respiration.  5.  Minimal emphysema.           Medical History  Past Medical History:   Diagnosis Date    Acute heart failure with " preserved ejection fraction (HFpEF) (H) 11/10/2022    Acute prostatitis     Asymptomatic bacteriuria 10/23/2013    Noted at 10/2/13 office visit at [facility]. No treatment recommended at that time.     Congestive heart failure (H)     Essential hypertension 08/18/2015    Hypertension     Paroxysmal atrial fib -- on Eliquis 11/06/2022    Syncope     Thyroid disease         Surgical History  Past Surgical History:   Procedure Laterality Date    BLADDER SURGERY      Bladder absces removal    Blepharoplasty Upper Lid W/ Excessive Skin[  1/29/2007    CATARACT EXTRACTION      CYSTOSCOPY, FULGURATE BLEEDERS, EVACUATE CLOT(S), COMBINED N/A 4/19/2023    Procedure: CYSTOSCOPY, BLADDER BIOPSY WITH FULGURATION AND CLOT EVACUATION;  Surgeon: Edgar Laird MD;  Location: [facility]    EYE SURGERY      both eyes 2015    IR LUMBAR EPIDURAL STEROID INJECTION  4/27/2004    IR LUMBAR EPIDURAL STEROID INJECTION  5/11/2004    IR LUMBAR EPIDURAL STEROID INJECTION  11/24/2004    IR LUMBAR EPIDURAL STEROID INJECTION  11/28/2007    HI CYSTOURETHROSCOPY W/IRRIG & EVAC CLOTS N/A 7/27/2018    Procedure: CYSTOSCOPY, CLOT EVACUATION ,URETHRAL DILATATION, DAUGHERTY CATHETER PLACEMENT;  Surgeon: Lowell Arias MD;  Location: [facility];  Service: Urology          Social History  Social History     Tobacco Use    Smoking status: Former     Types: Cigarettes    Smokeless tobacco: Never    Tobacco comments:     Pt quit 40 years ago   Vaping Use    Vaping status: Never Used   Substance Use Topics    Alcohol use: Yes     Comment: < 1 drink a week    Drug use: No          Allergies  No Known Allergies Family History  I have reviewed this patient's family history and updated it with pertinent information if needed.  Family History   Problem Relation Age of Onset    Diabetes No family hx of     Hypertension No family hx of     Other Cancer No family hx of     Coronary Artery Disease No family hx of     Hyperlipidemia No  family hx of     Cerebrovascular Disease No family hx of     Breast Cancer No family hx of     Colon Cancer No family hx of     Prostate Cancer No family hx of     Depression No family hx of     Anxiety Disorder No family hx of     Mental Illness No family hx of     Substance Abuse No family hx of     Anesthesia Reaction No family hx of     Asthma No family hx of     Osteoporosis No family hx of     Genetic Disorder No family hx of     Thyroid Disease No family hx of     Obesity No family hx of     No Known Problems Mother     No Known Problems Father                  Prior to Admission Medications   Prior to Admission Medications   Prescriptions Last Dose Informant Patient Reported? Taking?   LORazepam (ATIVAN) 2 MG/ML (HIGH CONC) oral solution 12/27/2024 Evening  Yes Yes   Sig: Take 1 mg by mouth every 4 hours as needed for anxiety or agitation.   QUEtiapine (SEROQUEL) 25 MG tablet   No Yes   Sig: Take 0.5 tablets (12.5 mg) by mouth 2 times daily as needed (Agitation associated with delirium and hallucinations)   QUEtiapine (SEROQUEL) 50 MG tablet Past Week  Yes Yes   Sig: Take 50 mg by mouth at bedtime.   acetaminophen (TYLENOL) 500 MG tablet  Care Giver Yes Yes   Sig: Take 1,000 mg by mouth every 6 hours as needed for mild pain   amLODIPine (NORVASC) 5 MG tablet Past Week Evening  No Yes   Sig: Take 1 tablet (5 mg) by mouth 2 times daily   carbamide peroxide (DEBROX) 6.5 % otic solution   Yes Yes   Sig: Place 5 drops into both ears daily as needed for other (ear wax).   hyoscyamine 0.125 MG TBDP   Yes Yes   Sig: Take 0.125 mg by mouth every 4 hours as needed for other (secretions).   ipratropium - albuterol 0.5 mg/2.5 mg/3 mL (DUONEB) 0.5-2.5 (3) MG/3ML neb solution  Care Giver No Yes   Sig: Take 1 vial (3 mLs) by nebulization every 6 hours as needed for shortness of breath, wheezing or cough   levothyroxine (SYNTHROID/LEVOTHROID) 100 MCG tablet Past Week Care Giver No Yes   Sig: Take 1 tablet (100 mcg) by mouth  Every Mon, Wed, Fri Morning   levothyroxine (SYNTHROID/LEVOTHROID) 75 MCG tablet Past Week  Yes Yes   Sig: Take 75 mcg by mouth. Every Sunday, Tuesday, Thursday, Saturday   lidocaine (XYLOCAINE) external gel   Yes Yes   Sig: Place into the urethra as needed for moderate pain. Lozano insertion   methyl salicylate-menthol (ICY HOT) ointment   Yes Yes   Sig: Apply topically every 6 hours.   metoprolol tartrate (LOPRESSOR) 25 MG tablet Past Week  No Yes   Sig: Take 1.5 tablets (37.5 mg) by mouth 2 times daily   morphine sulfate (ROXANOL) 20 mg/mL (HIGH CONC) soln 12/27/2024 Evening  Yes Yes   Sig: Take 2.5 mg by mouth every 2 hours as needed for shortness of breath or moderate to severe pain.   nystatin (MYCOSTATIN) 827832 UNIT/GM external powder   Yes Yes   Sig: Apply topically 2 times daily as needed for other (fungal infection). To groin   prochlorperazine (COMPAZINE) 10 MG tablet   Yes Yes   Sig: Take 10 mg by mouth every 6 hours as needed for nausea or vomiting.   senna-docusate (SENOKOT-S/PERICOLACE) 8.6-50 MG tablet   Yes Yes   Sig: Take 1 tablet by mouth daily as needed for constipation.   trolamine salicylate (ASPERCREME) 10 % external cream   Yes Yes   Sig: Apply topically every 6 hours as needed for moderate pain.      Facility-Administered Medications: None              Pertinent Labs    Most Recent 3 CBC's:  Recent Labs   Lab Test 12/28/24  1005 06/30/24  0513 06/29/24  1137   WBC 12.6* 10.7 15.9*   HGB 12.0* 9.5* 11.2*   MCV 97 98 101*    251 406     Most Recent 3 BMP's:  Recent Labs   Lab Test 12/28/24  1005 06/30/24  0513 06/29/24  1137    138 139   POTASSIUM 3.9 3.8 4.0   CHLORIDE 100 102 100   CO2 23 25 22   BUN 26.3* 28.5* 22.9   CR 1.45* 1.51* 1.46*   ANIONGAP 15 11 17*   KRISTEN 9.2 8.8 9.1   * 156* 157*     Most Recent 2 LFT's:  Recent Labs   Lab Test 12/28/24  1005 06/29/24  1137   AST 15 16   ALT 6 10   ALKPHOS 89 104   BILITOTAL 0.8 0.9     Most Recent 3 INR's:  Recent Labs   Lab  Test 02/29/24  1040 02/04/24  1926 04/14/23  0137   INR 1.02 1.05 1.15     Most Recent 3 Troponin's:No lab results found.    Medical Decision Making        Medical Decision Making               Philip Nguyen MD, [facility]  Internal Medicine Hospitalist  12/28/2024

## 2024-12-28 NOTE — PROGRESS NOTES
Speech-Language Pathology: Clinical Swallow Evaluation     12/28/24 1400   Appointment Info   Signing Clinician's Name / Credentials (SLP) Zoila Gomez MA CCC-SLP   General Information   Onset of Illness/Injury or Date of Surgery 12/28/24   Referring Physician Dr. Nguyen   Pertinent History of Current Problem Per EMR: 89M with CVA, dementia CKD3b, PAF, chronic Jorge, dysphagia with thickened liquids, hypothyroid, dementia who apparently is on hospice is admitted with severe sepsis due to UTI.     #Complicated UTI  #Elevated lactate likely from dehydration vs. sepsis  #Chronic jorge, exchanged in ER  -ceftriaxone, IVF     #Acute metabolic encephalopathy superimposed on dementia  -seroquel 50 at bedtime and PRN  -supportive care     #chronic dysphagia - NPO, SLP     #Hypothyroid - synthroid     #HTN - hold norvasc for now.     #Esophagitis, suspected on CT - protonix     #Goals of care are confusing given medical admission while on hospice.  Family member in room not able to really clarify.  Pall care consult   General Observations Alert, limited participation; son present   Type of Evaluation   Type of Evaluation Swallow Evaluation   Oral Motor   Oral Musculature generally intact   Facial Symmetry (Oral Motor)   Comment, Facial Symmetry (Oral Motor) WFL-no overt asymmetry   Lip Function (Oral Motor)   Comment, Lip Function (Oral Motor) WFL   Tongue Function (Oral Motor)   Comment, Tongue Function (Oral Motor) WFL   General Swallowing Observations   Past History of Dysphagia See records; Most recent dysphagia evaluation 6/2024: patient discharged with Regular diet and Mildly thick liquids. Per family, patient continues with Regular/Mildly thick at home and does well   Current Diet/Method of Nutritional Intake (General Swallowing Observations, NIS)   (No diet orders in place)   Swallowing Evaluation Clinical swallow evaluation   Clinical Swallow Evaluation   Clinical Swallow Evaluation Textures Trialed solid  foods;mildly thick liquids   Clinical Swallow Eval: Mildly Thick Liquids   Mode of Presentation spoon;cup;fed by clinician   Volume Presented 5 sips   Oral Phase WFL   Pharyngeal Phase intact   Clinical Swallow Evaluation: Solid Food Texture Trial   Mode of Presentation fed by clinician   Volume Presented 1 bite, declined further   Oral Phase WFL  (slow mastication)   Pharyngeal Phase intact   Swallowing Recommendations   Diet Consistency Recommendations mildly thick liquids (level 2);regular diet   Supervision Level for Intake distant supervision needed   Mode of Delivery Recommendations bolus size, small;slow rate of intake   Medication Administration Recommendations, Swallowing (SLP) per patient preference   Comment, Swallowing Recommendations Limited intake but no overt s/s aspiration with baseline diet of regular and mildly thick liquids.   Clinical Impression   Criteria for Skilled Therapeutic Interventions Met (SLP Eval) Evaluation only   SLP Diagnosis Dysphagia   Risks & Benefits of therapy have been explained evaluation/treatment results reviewed;care plan/treatment goals reviewed;participants included;patient   Clinical Impression Comments Clinical Swallow Evaluation completed. Patient had no s/s aspiration and no signs of dysphagia. Oral motor function was WFL. Mastication was slow but complete. Hyolaryngeal elevation appears intact. No further ST is warranted at this time. Contact SLP if any questions or concerns.   SLP Total Evaluation Time   Eval: oral/pharyngeal swallow function, clinical swallow Minutes (38564) 15   SLP Discharge Planning   SLP Brief overview of current status  Regular and Mildly thick; Evaluation only   SLP Time and Intention   Total Session Time (sum of timed and untimed services) 15

## 2024-12-29 ASSESSMENT — ACTIVITIES OF DAILY LIVING (ADL)
ADLS_ACUITY_SCORE: 71
ADLS_ACUITY_SCORE: 71
CONCENTRATING,_REMEMBERING_OR_MAKING_DECISIONS_DIFFICULTY: YES
DRESSING/BATHING_DIFFICULTY: YES
ADLS_ACUITY_SCORE: 63
ADLS_ACUITY_SCORE: 71
FALL_HISTORY_WITHIN_LAST_SIX_MONTHS: YES
THE_FOLLOWING_AIDS_WERE_PROVIDED;: PATIENT DECLINED OFFER OF AUXILIARY AIDS
ADLS_ACUITY_SCORE: 73
CHANGE_IN_FUNCTIONAL_STATUS_SINCE_ONSET_OF_CURRENT_ILLNESS/INJURY: YES
NUMBER_OF_TIMES_PATIENT_HAS_FALLEN_WITHIN_LAST_SIX_MONTHS: 2
DOING_ERRANDS_INDEPENDENTLY_DIFFICULTY: YES
ADLS_ACUITY_SCORE: 71
ADLS_ACUITY_SCORE: 63
WALKING_OR_CLIMBING_STAIRS_DIFFICULTY: YES
ADLS_ACUITY_SCORE: 63
WERE_AUXILIARY_AIDS_OFFERED?: YES
ADLS_ACUITY_SCORE: 71
ADLS_ACUITY_SCORE: 71
DIFFICULTY_COMMUNICATING: NO
ADLS_ACUITY_SCORE: 63
DIFFICULTY_EATING/SWALLOWING: NO
HEARING_DIFFICULTY_OR_DEAF: YES
ADLS_ACUITY_SCORE: 73
WALKING_OR_CLIMBING_STAIRS: AMBULATION DIFFICULTY, REQUIRES EQUIPMENT
ADLS_ACUITY_SCORE: 63
DRESSING/BATHING: BATHING DIFFICULTY, ASSISTANCE 1 PERSON
ADLS_ACUITY_SCORE: 63
DESCRIBE_HEARING_LOSS: BILATERAL HEARING LOSS
ADLS_ACUITY_SCORE: 61
TOILETING: 1-->ASSISTANCE (EQUIPMENT/PERSON) NEEDED
TOILETING: 1-->ASSISTANCE (EQUIPMENT/PERSON) NEEDED (NOT DEVELOPMENTALLY APPROPRIATE)
ADLS_ACUITY_SCORE: 63
PATIENT'S_PREFERRED_MEANS_OF_COMMUNICATION: ENGLISH SPEAKER WITH HEARING LOSS, NO SPEECH PROBLEMS.
TOILETING_ISSUES: YES
ADLS_ACUITY_SCORE: 61
VISION_MANAGEMENT: GLASSES
WEAR_GLASSES_OR_BLIND: YES
ADLS_ACUITY_SCORE: 63
ADLS_ACUITY_SCORE: 61
TOILETING_ASSISTANCE: TOILETING DIFFICULTY, DEPENDENT
ADLS_ACUITY_SCORE: 61
ADLS_ACUITY_SCORE: 61
EQUIPMENT_CURRENTLY_USED_AT_HOME: WALKER, ROLLING

## 2024-12-29 NOTE — ED NOTES
"[facility] ED Handoff Report    ED Chief Complaint: weak, confused, odor of urine, dark urine    ED Diagnosis:  (A41.9,  R65.20) Severe sepsis (H)  Comment:n/a  Plan: n/a    (T83.511A,  N39.0) Urinary tract infection associated with indwelling urethral catheter, initial encounter (H)  Comment: n/a  Plan: n/a    (E87.20) Lactic acidosis  Comment: n/a  Plan: n/a       PMH:    Past Medical History:   Diagnosis Date    Acute heart failure with preserved ejection fraction (HFpEF) (H) 11/10/2022    Acute prostatitis     Asymptomatic bacteriuria 10/23/2013    Noted at 10/2/13 office visit at [facility]. No treatment recommended at that time.     Congestive heart failure (H)     Essential hypertension 08/18/2015    Hypertension     Paroxysmal atrial fib -- on Eliquis 11/06/2022    Syncope     Thyroid disease         Code Status:  No CPR- Do NOT Intubate     Falls Risk: Yes Band: Applied    Current Living Situation/Residence: lives with a significant other     Elimination Status: Continent: No     Activity Level: Total assist/lift    Patients Preferred Language:  English     Needed: No    Vital Signs:  /64   Pulse 81   Temp 99.6  F (37.6  C) (Axillary)   Resp 19   Ht 1.753 m (5' 9\")   Wt 70.3 kg (155 lb)   SpO2 98%   BMI 22.89 kg/m       Cardiac Rhythm: regular pulse    Pain Score: 0/10    Is the Patient Confused:  No    Last Food or Drink: 12/29/24 at 0845    Focused Assessment:  Pt was very drowsy and didn't want to wake up around 0930, had been several narcotics to help him sleep. [facility] said he didn't sleep well last night but slept this am until about 1100. Opens eyes to voice and oriented x 3. Cleaned face (crusty eye drainage to both eyes cleaned) . Rolled to left side and looked at back. Back is good. Red area to left buttocks, right buttocks with blister. Rolled pt off right buttock onto side and placed some cream on buttocks. Lung clear, decreased to bases. Abdomen round, " soft. No stool this am. Emptied jorge of 350 ml of darker, yellow urine at 1030. Emptied 125 at 1345, Rest in back is your to count. Aferile . VSS. Perrtl    Tests Performed: Done: Labs    Treatments Provided:  meds    Family Dynamics/Concerns: No    Family Updated On Visitor Policy: Yes    Plan of Care Communicated to Family: Yes    granddaughter    Belongings Checklist Done and Signed by Patient: Yes    Medications sent with patient: n/a    Covid: symptomatic, negative    Additional Information: ate breakfast today. Did not want lunch. Next labs are due tomorrow I believe    Shruthi Harding RN 12/29/2024 1:35 PM

## 2024-12-29 NOTE — PLAN OF CARE
Problem: Adult Inpatient Plan of Care  Goal: Absence of Hospital-Acquired Illness or Injury  Intervention: Identify and Manage Fall Risk  Recent Flowsheet Documentation  Taken 12/29/2024 1500 by Luci Campbell RN  Safety Promotion/Fall Prevention:   assistive device/personal items within reach   increased rounding and observation   nonskid shoes/slippers when out of bed   patient and family education   room door open   room near nurse's station   safety round/check completed   Goal Outcome Evaluation:Pt transferred to the unit at 1445, grand daughter at the bedside, LR infusing at 75ml/hr, pt has a skin break down on buttocks, maplex applied. VSS. Pt has drainage on right eye. Pt can make his needs known.

## 2024-12-29 NOTE — ED NOTES
Patient woke up moaning and requested morphine for his pain. RN contacted an MD about pain meds via IV. RN received a one time order for IV pain meds.

## 2024-12-29 NOTE — ED NOTES
"Pt was given oxy at 0850 this am by previous shift and grandaughter at bedside states he is \"finally sleeping\". Skin warm, dry and pink with strong pulses to all extremities.  HR is 77 with last bp 119/59. Respirations even and non-labored. Will let pt sleep more before listening to lungs  "

## 2024-12-29 NOTE — PROGRESS NOTES
[facility]    Medicine Progress Note - Hospitalist Service    Date of Admission:  12/28/2024    Assessment & Plan   89M with CVA, dementia CKD3b, PAF, chronic Jorge, dysphagia with thickened liquids, hypothyroid, dementia who apparently is on hospice is admitted with severe sepsis due to UTI.     #Complicated UTI  #Elevated lactate likely from dehydration vs. sepsis  #Chronic jorge, exchanged in ER  -ceftriaxone, IVF     #Acute metabolic encephalopathy superimposed on dementia  -seroquel 50 at bedtime and PRN  -supportive care     #chronic dysphagia - NPO, SLP     #Hypothyroid - synthroid     #HTN - hold norvasc for now.     #Esophagitis, suspected on CT - protonix     #Goals of care are confusing given medical admission while on hospice.  Whenever he gets acute conditions they will bring him to inpatient.  They declined PT OT.  Palliative care consulted to further assist with goals of care.    -would not continue q2h PRN morphine and ativan q4h PRN given goals of care are not clearly comfort only.          Diet: Regular Diet Adult Mildly Thick (level 2)    DVT Prophylaxis: Enoxaparin (Lovenox) SQ  Jorge Catheter: PRESENT, indication: Other (Comment) (chronic)  Lines: None     Cardiac Monitoring: None  Code Status: No CPR- Do NOT Intubate      Clinically Significant Risk Factors Present on Admission          Social Drivers of Health    Tobacco Use: Medium Risk (2/9/2024)    Received from [employer] [employer]    Patient History     Smoking Tobacco Use: Former     Smokeless Tobacco Use: Never          Disposition Plan     Medically Ready for Discharge: Anticipated in 2-4 Days             Ankita Nieves MD  Hospitalist Service  [facility]  Securely message with [employer] (more info)  Text page via [employer] Paging/Directory   ______________________________________________________________________    Interval History   No acute events overnight.  Care team note  reviewed    Physical Exam   Vital Signs: Temp: 98.5  F (36.9  C) Temp src: Oral BP: (!) 146/74 Pulse: 89   Resp: 19 SpO2: 97 % O2 Device: None (Room air)    Weight: 155 lbs 0 oz    General Appearance: Not in distress  Respiratory: Clear to auscultation bilaterally, no added breath sounds  Cardiovascular: S1 and S2 well heard, no murmur or gallop  GI: Nontender, nondistended, positive bowel sounds  Skin: No rash  CNS: Alert and oriented x 3, nonfocal      Medical Decision Making       55 MINUTES SPENT BY ME on the date of service doing chart review, history, exam, documentation & further activities per the note.      Data     I have personally reviewed the following data over the past 24 hrs:    10.1  \   10.3 (L)   / 230     139 104 22.5 /  109 (H)   3.7 25 1.25 (H) \     ALT: 6 AST: 15 AP: 89 TBILI: 0.8   ALB: 3.6 TOT PROTEIN: 6.4 LIPASE: N/A     Procal: 0.15 CRP: N/A Lactic Acid: 2.1 (H)         Imaging results reviewed over the past 24 hrs:   Recent Results (from the past 24 hours)   CT Chest/Abdomen/Pelvis w Contrast    Narrative    EXAM: CT CHEST/ABDOMEN/PELVIS W CONTRAST  LOCATION: [facility]  DATE: 12/28/2024    INDICATION: uti sepsis eval obstruction stone, cough  COMPARISON: 2/29/2024  TECHNIQUE: CT scan of the chest, abdomen, and pelvis was performed following injection of IV contrast. Multiplanar reformats were obtained. Dose reduction techniques were used.   CONTRAST: isovue 370 75ml    FINDINGS: Image quality is moderately to severely degraded by motion artifact limiting evaluation.  LUNGS AND PLEURA: Mild biapical pleural thickening/scarring. Minimal emphysema. Mild mosaic attenuation of the lungs likely related to the exam being obtained in the expiratory phase of respiration. Similar scattered sub-6 mm pulmonary micronodules which   are not significant changed since at least 2018 and benign. No follow up required. No convincing new or enlarging pulmonary nodules in this  motion degraded exam. Patchy bibasilar atelectasis and/or scarring, left greater than right.    MEDIASTINUM/AXILLAE: Heart size is normal. No adenopathy. Normal caliber aorta. Diffuse circumferential wall thickening of the esophagus.    CORONARY ARTERY CALCIFICATION: Moderate.    HEPATOBILIARY: Normal.    PANCREAS: Normal.    SPLEEN: Normal.    ADRENAL GLANDS: Normal.    KIDNEYS/BLADDER: No hydronephrosis or convincing genitourinary calculi. There is early excretion of contrast into the collecting systems and bladder limiting evaluation. Diffuse trabeculated circumferential wall thickening bladder with mucosal   hyperenhancement and multiple bladder wall diverticuli. Mild surrounding fat stranding. The bladder is decompressed with Lozano catheter.    BOWEL: Diverticulosis of the colon. No acute inflammatory change. No obstruction. Mild to moderate colonic stool burden.    LYMPH NODES: Normal.    VASCULATURE: No abdominal aortic aneurysm. Extensive atherosclerosis.    PELVIC ORGANS: Prostatomegaly.    MUSCULOSKELETAL: Heterogeneous osteopenia. Degenerative changes of the spine. Similar chronic L1 compression deformity. Old, healed rib fractures. Degenerative change the bilateral shoulders and hips.      Impression    IMPRESSION:  1.  Findings consistent with cystitis. No convincing genitourinary calculi or hydronephrosis.  2.  Diffuse circumferential wall thickening of the bladder likely related to chronic bladder obstruction/urinary retention from prostatomegaly.  3.  Diffuse circumferential wall thickening of the esophagus can be seen with esophagitis. Correlate clinically.  4.  Mild mosaic attenuation of the lungs likely related to the exam being obtained in the expiratory phase of respiration.  5.  Minimal emphysema.

## 2024-12-30 PROBLEM — E86.0 DEHYDRATION: Status: RESOLVED | Noted: 2024-12-30 | Resolved: 2024-12-30

## 2024-12-30 PROBLEM — E86.0 DEHYDRATION: Status: ACTIVE | Noted: 2024-12-30

## 2024-12-30 PROBLEM — E87.20 LACTIC ACIDOSIS: Status: RESOLVED | Noted: 2024-12-28 | Resolved: 2024-12-30

## 2024-12-30 ASSESSMENT — ACTIVITIES OF DAILY LIVING (ADL)
ADLS_ACUITY_SCORE: 73
ADLS_ACUITY_SCORE: 74
ADLS_ACUITY_SCORE: 77
ADLS_ACUITY_SCORE: 73
ADLS_ACUITY_SCORE: 77
ADLS_ACUITY_SCORE: 73
ADLS_ACUITY_SCORE: 73
ADLS_ACUITY_SCORE: 71
ADLS_ACUITY_SCORE: 73
ADLS_ACUITY_SCORE: 71
ADLS_ACUITY_SCORE: 71
ADLS_ACUITY_SCORE: 73

## 2024-12-30 NOTE — PLAN OF CARE
"  Problem: Adult Inpatient Plan of Care  Goal: Plan of Care Review  Description: The Plan of Care Review/Shift note should be completed every shift.  The Outcome Evaluation is a brief statement about your assessment that the patient is improving, declining, or no change.  This information will be displayed automatically on your shift  note.  Outcome: Progressing  Goal: Patient-Specific Goal (Individualized)  Description: You can add care plan individualizations to a care plan. Examples of Individualization might be:  \"Parent requests to be called daily at 9am for status\", \"I have a hard time hearing out of my right ear\", or \"Do not touch me to wake me up as it startles  me\".  Outcome: Progressing  Goal: Absence of Hospital-Acquired Illness or Injury  Outcome: Progressing  Intervention: Identify and Manage Fall Risk  Recent Flowsheet Documentation  Taken 12/30/2024 0100 by Lacey Harris, RN  Safety Promotion/Fall Prevention:   assistive device/personal items within reach   increased rounding and observation   nonskid shoes/slippers when out of bed   patient and family education   room door open   room near nurse's station   safety round/check completed  Intervention: Prevent Skin Injury  Recent Flowsheet Documentation  Taken 12/30/2024 0100 by Lacey Harris, RN  Body Position:   position changed independently   turned   right   refuses positioning  Goal: Optimal Comfort and Wellbeing  Outcome: Progressing  Goal: Readiness for Transition of Care  Outcome: Progressing     Problem: Infection  Goal: Absence of Infection Signs and Symptoms  Outcome: Progressing     Problem: Risk for Delirium  Goal: Optimal Coping  Outcome: Progressing  Goal: Improved Behavioral Control  Outcome: Progressing  Goal: Improved Attention and Thought Clarity  Outcome: Progressing  Goal: Improved Sleep  Outcome: Progressing     Problem: Wound  Goal: Optimal Coping  Outcome: Progressing  Goal: Optimal Functional Ability  Outcome: " Progressing  Intervention: Optimize Functional Ability  Recent Flowsheet Documentation  Taken 12/30/2024 0100 by Lacey Harris RN  Activity Management:   up to bedside commode   back to bed  Activity Assistance Provided: assistance, 2 people  Goal: Absence of Infection Signs and Symptoms  Outcome: Progressing  Goal: Improved Oral Intake  Outcome: Progressing  Goal: Optimal Pain Control and Function  Outcome: Progressing  Goal: Skin Health and Integrity  Outcome: Progressing  Intervention: Optimize Skin Protection  Recent Flowsheet Documentation  Taken 12/30/2024 0100 by Lacey Harris RN  Activity Management:   up to bedside commode   back to bed  Head of Bed (HOB) Positioning: HOB at 20-30 degrees  Goal: Optimal Wound Healing  Outcome: Progressing   Goal Outcome Evaluation:  NURSING PROGRESS NOTE  Shift Summary      Date: December 30, 2024     Neuro/Musculoskeletal:  A&O to self   Cardiac:  SR.  VSS.     Respiratory:  Sating in the 90s on RA.  GI/:  Adequate urine output.  LBM: 12/26  Diet/Appetite:  Tolerating Reg diet.  Activity:  Assist of 2, zuhair   Pain:  Denies.  Skin:  No new deficits noted.   LDAs + Drips/IVF:  LR running at 75cc/hr  Protocols/Labs:  MG and K+ protocols and also a cbc to be drawn this am    Pertinent Shift Updates:        Plan:  treat uti/sepsis, pt to discharge home with [facility] hosp[facility] when stable    Lacey Harris RN  ....................................................

## 2024-12-30 NOTE — PLAN OF CARE
"Patient alert and oriented to self only.  Vitals stable. Antibiotics discontinued. Will be discharging to home with home hospice services. Was previously using [facility].  Will have a stretcher ride this evening around 4pm.  Gave PRN oxycodone 2.5 mg x 2 for general discomfort. Last given at 1400. Lozano patent. Fluids stopped and PIV removed for discharge.  Mckayla Croft RN     Problem: Adult Inpatient Plan of Care  Goal: Plan of Care Review  Description: The Plan of Care Review/Shift note should be completed every shift.  The Outcome Evaluation is a brief statement about your assessment that the patient is improving, declining, or no change.  This information will be displayed automatically on your shift  note.  12/30/2024 1510 by Mckayla Croft RN  Outcome: Adequate for Care Transition  12/30/2024 1343 by Mckayla Croft RN  Outcome: Progressing  Goal: Patient-Specific Goal (Individualized)  Description: You can add care plan individualizations to a care plan. Examples of Individualization might be:  \"Parent requests to be called daily at 9am for status\", \"I have a hard time hearing out of my right ear\", or \"Do not touch me to wake me up as it startles  me\".  12/30/2024 1510 by Mckayla Croft RN  Outcome: Adequate for Care Transition  12/30/2024 1343 by Mckayla Croft RN  Outcome: Progressing  Goal: Absence of Hospital-Acquired Illness or Injury  12/30/2024 1510 by Mckayla Croft RN  Outcome: Adequate for Care Transition  12/30/2024 1343 by Mckayla Croft RN  Outcome: Progressing  Intervention: Prevent Skin Injury  Recent Flowsheet Documentation  Taken 12/30/2024 0845 by Mckayla Croft RN  Skin Protection: silicone foam dressing in place  Goal: Optimal Comfort and Wellbeing  12/30/2024 1510 by Mckayla Croft RN  Outcome: Adequate for Care Transition  12/30/2024 1343 by Mckayla Croft RN  Outcome: Progressing  Goal: Readiness for Transition of " Care  12/30/2024 1510 by Mckayla Croft RN  Outcome: Adequate for Care Transition  12/30/2024 1343 by Mckayla Croft RN  Outcome: Progressing     Problem: Infection  Goal: Absence of Infection Signs and Symptoms  12/30/2024 1510 by Mckayla Croft RN  Outcome: Adequate for Care Transition  12/30/2024 1343 by Mckayla Croft RN  Outcome: Progressing     Problem: Risk for Delirium  Goal: Optimal Coping  12/30/2024 1510 by Mckayla Croft RN  Outcome: Adequate for Care Transition  12/30/2024 1343 by Mckayla Croft RN  Outcome: Progressing  Goal: Improved Behavioral Control  12/30/2024 1510 by Mckayla Croft RN  Outcome: Adequate for Care Transition  12/30/2024 1343 by Mckayla Croft RN  Outcome: Progressing  Goal: Improved Attention and Thought Clarity  12/30/2024 1510 by Mckayla Croft RN  Outcome: Adequate for Care Transition  12/30/2024 1343 by Mckayla Croft RN  Outcome: Progressing  Goal: Improved Sleep  12/30/2024 1510 by Mckayla Croft RN  Outcome: Adequate for Care Transition  12/30/2024 1343 by Mckayla Croft, RN  Outcome: Progressing     Problem: Cognitive Impairment  Goal: Optimal Cognitive Function  12/30/2024 1510 by Mckayla Croft RN  Outcome: Adequate for Care Transition  12/30/2024 1343 by Mckayla Croft RN  Outcome: Progressing   Goal Outcome Evaluation:

## 2024-12-30 NOTE — PROGRESS NOTES
Care Management Discharge Note    Discharge Date: 12/30/2024       Discharge Disposition: Hospice    Discharge Services: None    Discharge DME: None    Discharge Transportation: family or friend will provide    Private pay costs discussed: transportation costs    Does the patient's insurance plan have a 3 day qualifying hospital stay waiver?  No    PAS Confirmation Code: NA  Patient/family educated on Medicare website which has current facility and service quality ratings: yes    Education Provided on the Discharge Plan: Yes  Persons Notified of Discharge Plans: Shruthi keller   Patient/Family in Agreement with the Plan: yes    Handoff Referral Completed: No, handoff not indicated or clinically appropriate    Additional Information:    10:44 AM  GE discussed updates with MD. Patient is medically appropriate to return home with [facility]. GE called Mcgovern with [facility] ([phone]). Mcgovern is checking what time an RN will be available to see patient for sign on.     2:24 PM  Received voice mail from Mcgovern indicating RN will see patient after 1700. GE called and spoke with patient's grand daughterShruthi. She verbalizes understanding of plan for discharge today and requests medical transport. Shruthi states understanding of potential out of pocket cost for transport. PCS completed electronically via [employer].     [facility] stretcher transport arranged for  between [id number]. Notified bedside RN.     JYOTI Farley on 12/30/24

## 2024-12-30 NOTE — PROGRESS NOTES
[facility]    Medicine Progress Note - Hospitalist Service    Date of Admission:  12/28/2024    Assessment & Plan   Dilip Palma is an 89 yo M with CVA, dementia CKD3b, PAF, chronic Jorge, dysphagia with thickened liquids, hypothyroid, dementia who apparently is on hospice is admitted with severe sepsis due to UTI.    Elevated lactate likely from dehydration  -In the setting of hospice care  -lactic acid 3.2 > 2.1 > recheck pending  -on IVF with LR at 75 mL/hr    Chronic jorge, exchanged in ER  Ruled out Complicated UTI  -UA 12/28: contaminated with 2 squamous cells, positive for blood, leukocyte esterase, ketones, protein  -Urine culture 12/28: With no growth  -Blood culture 12/28: With no growth to date  -Received 1 day of cefepime and 2 days of ceftriaxone for 3-day course and has been stopped    Bladder spasms  -B&O suppositories as needed  -oxycodone as needed     Acute metabolic encephalopathy superimposed on dementia  -Acute portion was likely secondary to exacerbation of dementia, dehydration, or terminal delirium  -seroquel 50 at bedtime and PRN  -supportive care    Right eye ptosis with possible conjunctivitis  -Started erythromycin ointment 12/30 for 7 days     chronic dysphagia -continue mildly thickened liquids with regular diet     Hypothyroid - synthroid     HTN - hold norvasc for now.     Esophagitis, suspected on CT - protonix     #Goals of care are confusing given medical admission while on hospice.  Whenever he gets acute conditions they will bring him to inpatient.  They declined PT/OT.  Palliative care consulted to further assist with goals of care.    -stopped q2h PRN morphine and ativan q4h PRN given goals of care are not clearly comfort only.  -family does plan to go back on hospice once discharged        Diet: Regular Diet Adult Mildly Thick (level 2)    DVT Prophylaxis: Enoxaparin (Lovenox) SQ  Jorge Catheter: PRESENT, indication: Other (Comment) (chronic)  Lines:  None     Cardiac Monitoring: None  Code Status: No CPR- Do NOT Intubate      Clinically Significant Risk Factors        # Hypokalemia: Lowest K = 3.3 mmol/L in last 2 days, will replace as needed    # Hypocalcemia: Lowest Ca = 8.4 mg/dL in last 2 days, will monitor and replace as appropriate         # Hypertension: Noted on problem list                # Financial/Environmental Concerns: none         Social Drivers of Health    Tobacco Use: Medium Risk (2/9/2024)    Received from [facility]    Patient History     Smoking Tobacco Use: Former     Smokeless Tobacco Use: Never          Disposition Plan     Medically Ready for Discharge: Anticipated Tomorrow             Nicole Driver MD  Hospitalist Service  [facility]  Securely message with [employer] (more info)  Text page via [employer] Paging/Directory   ______________________________________________________________________    Interval History   Had planned to discharge Mr. Long today however he developed bladder spasms so discharge was canceled.  His right eye has ptosis and is likely chronically dry due to this however we will start erythromycin ointment as it does appear a little more red than the other eye and has a little more discharge.  Granddaughter at bedside this evening.  Stated this been going on for a while.    Physical Exam   Vital Signs: Temp: 98  F (36.7  C) Temp src: Oral BP: (!) 144/74 Pulse: 83   Resp: 18 SpO2: 98 % O2 Device: None (Room air)    Weight: 155 lbs 0 oz    General Appearance: Awake, alert, in no acute distress  Respiratory: CTAB, no wheeze  Cardiovascular: RRR, no murmur noted  GI: soft, nontender, non distended, normal bowel sounds  Skin: no jaundice, no rash      Medical Decision Making       50 MINUTES SPENT BY ME on the date of service doing chart review, history, exam, documentation & further activities per the note.      Data     I have personally reviewed the following data over the  past 24 hrs:    7.3  \   9.9 (L)   / 221     137 102 17.0 /  109 (H)   3.6 27 1.20 (H) \     Procal: N/A CRP: N/A Lactic Acid: 1.2         Imaging results reviewed over the past 24 hrs:   No results found for this or any previous visit (from the past 24 hours).

## 2024-12-30 NOTE — PLAN OF CARE
Problem: Infection  Goal: Absence of Infection Signs and Symptoms  Outcome: Progressing     Problem: Risk for Delirium  Goal: Improved Attention and Thought Clarity  Outcome: Progressing     Problem: Risk for Delirium  Goal: Improved Sleep  Outcome: Progressing     Problem: Wound  Goal: Skin Health and Integrity  Outcome: Progressing   Goal Outcome Evaluation:                    Pt cooperative this shift. Able to state name and birthday. Requesting to go home. Catheter in place and draining. IV fluids running as per order. Skin tear on right forearm noted. Skin breakdown on coccyx noted, mepilex in place. Pt turned and repositioned for offloading pressure.     Pt c/o pain, requesting morphine. PRN oxycodone administered. Noted effective.

## 2024-12-31 ASSESSMENT — ACTIVITIES OF DAILY LIVING (ADL)
ADLS_ACUITY_SCORE: 74
ADLS_ACUITY_SCORE: 73
ADLS_ACUITY_SCORE: 74
ADLS_ACUITY_SCORE: 73
ADLS_ACUITY_SCORE: 73
ADLS_ACUITY_SCORE: 74
ADLS_ACUITY_SCORE: 74

## 2024-12-31 NOTE — PROGRESS NOTES
Care Management Discharge Note     Discharge Date: 12/31/2024        Discharge Disposition: home      Discharge Services: [facility]      Discharge DME: per hospice team      Discharge Transportation: family or friend will provide     Private pay costs discussed: transportation costs     Does the patient's insurance plan have a 3 day qualifying hospital stay waiver?  No     PAS Confirmation Code: NA  Patient/family educated on Medicare website which has current facility and service quality ratings: yes     Education Provided on the Discharge Plan: Yes  Persons Notified of Discharge Plans: Shruthi keller   Patient/Family in Agreement with the Plan: yes     Handoff Referral Completed: No, handoff not indicated or clinically appropriate     Additional Information:  8:51 AM  Patient's discharge cancelled on 12/30. Rescheduled for today. GE called and scheduled [facility] stretcher transport for today between [id number] (PCS completed electronically on 12/20). SW called and notified patient's grand daughterShruthi, of transport time. Informed Mcgovern with [facility] (ph: [phone]) of transport time.     JYOTI Farley on 12/31/24

## 2024-12-31 NOTE — PLAN OF CARE
Patient is oriented to self only. He was going to be discharged but he has been c/o pain in lower abdomen. Given prn Oxycodone with little relief. Given prn B&O supp. With relief. Patient was bladder scanned and showed no urine in bladder. Lozano was flushed with some return of urine. Continued to c/o pain. Lozano was replaced with little urine output. Patient will be discharged in the morning.  Drinking thickened liquids. House was called and came and saw patient. Total output this shift was 250 ml clear, yellow urine.  Problem: Pain Acute  Goal: Optimal Pain Control and Function  Outcome: Progressing  Intervention: Develop Pain Management Plan  Recent Flowsheet Documentation  Taken 12/30/2024 1719 by Carmencita Cortez RN  Pain Management Interventions: declines     Problem: UTI (Urinary Tract Infection)  Goal: Improved Infection Symptoms  Outcome: Progressing     Problem: Adult Inpatient Plan of Care  Goal: Absence of Hospital-Acquired Illness or Injury  Intervention: Prevent Skin Injury  Recent Flowsheet Documentation  Taken 12/30/2024 1719 by Carmencita Cortez RN  Body Position:   position changed independently   turned   right   refuses positioning  Goal: Optimal Comfort and Wellbeing  Intervention: Monitor Pain and Promote Comfort  Recent Flowsheet Documentation  Taken 12/30/2024 1719 by Carmencita Cortez RN  Pain Management Interventions: declines     Problem: Wound  Goal: Optimal Functional Ability  Intervention: Optimize Functional Ability  Recent Flowsheet Documentation  Taken 12/30/2024 1719 by Carmencita Cortez RN  Assistive Device Utilized: lift device  Activity Management:   up to bedside commode   back to bed  Activity Assistance Provided: assistance, 2 people  Goal: Optimal Pain Control and Function  Intervention: Prevent or Manage Pain  Recent Flowsheet Documentation  Taken 12/30/2024 1719 by Carmencita Cortez, RN  Pain Management Interventions: declines  Goal: Skin Health and Integrity  Intervention: Optimize  Skin Protection  Recent Flowsheet Documentation  Taken 12/30/2024 1719 by Carmencita Cortez, RN  Activity Management:   up to bedside commode   back to bed  Head of Bed (HOB) Positioning: HOB at 20-30 degrees     Problem: Chronic Kidney Disease  Goal: Optimal Functional Ability  Intervention: Optimize Functional Ability  Recent Flowsheet Documentation  Taken 12/30/2024 1719 by Carmencita Cortez, RN  Activity Management:   up to bedside commode   back to bed  Goal: Acceptable Pain Control  Intervention: Prevent or Manage Pain  Recent Flowsheet Documentation  Taken 12/30/2024 1719 by Carmencita Cortez, RN  Pain Management Interventions: declines   Goal Outcome Evaluation:

## 2024-12-31 NOTE — SIGNIFICANT EVENT
Significant Event Note    Time of event: 9:19 PM December 30, 2024    Dilip Palma is an 88 yo M with CVA, dementia CKD3b, PAF, chronic Lozano, dysphagia with thickened liquids, hypothyroid, dementia who apparently is on hospice is admitted with severe sepsis due to UTI.     Description of event:  Paged by nurse about patient complaining of feeling that bladder may burst. Due to concerns for urinary retention, nursing staff changed Lozano to smaller size. However had difficulty with catheter change and patient was screaming out in pain.     Nurse gave opium-belladonna suppository for bladder spasms and oxycodone 2.5 mg x2 for pain.     Upon my arrival, patient was sleeping in bed. When woken, he noted severe pain with catheter change. Denied any pain now. On exam had some mild suprapubic tenderness. Lozano in place with very minimal urine in tubing. No urine in Lozano bag.      Plan:  - try bladder scanning again with different machine and user  - if still no luck, reach out to Urology     Discussed with: bedside nurse and on-call team, Dr. Hutton.     Sadaf Granda MD

## 2024-12-31 NOTE — DISCHARGE SUMMARY
[facility]    Discharge Summary  Hospitalist    Date of Admission:  12/28/2024  Date of Discharge:  12/31/2024  Discharging Provider: Brendan Keenan MD  Date of Service (when I saw the patient): 12/31/24    Discharge Diagnoses   Metabolic encephalopathy  Hospice  Urinary retention with chronic jorge  UTI ruled out  Dementia    History of Present Illness   Dilip Long is an 89 year old male who presented with agitation    Hospital Course   Dilip Palma is an 88 yo M with CVA, dementia CKD3b, PAF, chronic Jorge, dysphagia with thickened liquids, hypothyroid, dementia who apparently is on hospice is admitted with agitation, due to possible UTI.     Elevated lactate likely from dehydration  -In the setting of hospice care  -lactic acid 3.2 > 2.1 > 1.2  -IVF     Chronic jorge, exchanged  Ruled out Complicated UTI  -UA 12/28: contaminated with 2 squamous cells, positive for blood, leukocyte esterase, ketones, protein  -Urine culture 12/28: With no growth  -Blood culture 12/28: With no growth to date  -Received 1 day of cefepime and 2 days of ceftriaxone for 3-day course and has been stopped     Bladder spasms  -B&O suppositories as needed  -oxycodone as needed     Acute metabolic encephalopathy superimposed on dementia  -Acute portion was likely secondary to exacerbation of dementia, dehydration, or terminal delirium  -seroquel 50 at bedtime and PRN  -supportive care     Right eye ptosis with possible conjunctivitis  -Started erythromycin ointment 12/30 for 7 days     chronic dysphagia -continue mildly thickened liquids with regular diet     Hypothyroid - synthroid     HTN - hold norvasc for now.     Esophagitis, suspected on CT - protonix     #Goals of care are confusing given medical admission while on hospice.  Whenever he gets acute conditions they will bring him to inpatient.  They declined PT/OT.  Palliative care consulted to further assist with goals of care.    -family does plan  to go back on hospice at discharge.          Brendan Keenan MD    Significant Results and Procedures   See below    Pending Results   These results will be followed up by PCP  Unresulted Labs Ordered in the Past 30 Days of this Admission       Date and Time Order Name Status Description    12/28/2024  9:39 AM Blood Culture Peripheral Blood Preliminary     12/28/2024  9:39 AM Blood Culture Peripheral Blood Preliminary             Code Status   DNR / DNI       Primary Care Physician   Kolton Junior    General.   not in acute distress.  HEENT.  anicteric, EOM intact.  Neck supple no JVD.  CVS regular rhythm no murmur gallops.  Lungs.  Clear to auscultation bilateral no wheezing or rales.  Abdomen.  Suprapubic tenderness.  Extremities.  No edema no calf tenderness.  Neurological.  Grossly intact  Psych. Impaired memory and cognition    Discharge Disposition   Discharged to home on hospice  Condition at discharge: Poor    Consultations This Hospital Stay   PHARMACY TO DOSE VANCO  CARE MANAGEMENT / SOCIAL WORK IP CONSULT  CARE MANAGEMENT / SOCIAL WORK IP CONSULT  CARE MANAGEMENT / SOCIAL WORK IP CONSULT  PALLIATIVE CARE ADULT IP CONSULT  SPEECH LANGUAGE PATH ADULT IP CONSULT  CARE MANAGEMENT / SOCIAL WORK IP CONSULT    Time Spent on this Encounter   I, Brendan Keenan MD, personally saw the patient today and spent greater than 30 minutes discharging this patient.    Discharge Orders      Reason for your hospital stay    Dehydration with lactic acidosis     Activity    Your activity upon discharge: activity as tolerated     Activity    Your activity upon discharge: activity as tolerated     Follow Up    Follow up with Hospice care     When to contact your care team    Call hospice if you have any questions     Discharge Instructions    Continue hospice care at home     Diet    Follow this diet upon discharge: Regular Diet Adult Mildly Thick (level 2)     Hospital Follow-up with Existing Primary Care Provider (PCP)     Please see details below          Hospital Follow-up with Existing Primary Care Provider (PCP)    Please see details below          Discharge Medications   Current Discharge Medication List        START taking these medications    Details   erythromycin (ROMYCIN) 5 MG/GM ophthalmic ointment Place into the right eye every 6 hours.  Qty: 1 g, Refills: 0    Associated Diagnoses: Eye discharge      opium-belladonna (B&O SUPPRETTES) 30-16.2 MG per suppository Place 0.5 suppositories rectally every 6 hours as needed for bladder spasms.  Qty: 12 suppository, Refills: 0    Associated Diagnoses: Bladder spasm      oxyCODONE (ROXICODONE) 5 MG tablet Take 0.5 tablets (2.5 mg) by mouth every 6 hours as needed for pain.  Qty: 6 tablet, Refills: 0    Associated Diagnoses: Pain           CONTINUE these medications which have NOT CHANGED    Details   acetaminophen (TYLENOL) 500 MG tablet Take 1,000 mg by mouth every 6 hours as needed for mild pain      amLODIPine (NORVASC) 5 MG tablet Take 1 tablet (5 mg) by mouth 2 times daily  Qty: 180 tablet, Refills: 3    Associated Diagnoses: Essential hypertension      carbamide peroxide (DEBROX) 6.5 % otic solution Place 5 drops into both ears daily as needed for other (ear wax).      hyoscyamine 0.125 MG TBDP Take 0.125 mg by mouth every 4 hours as needed for other (secretions).      ipratropium - albuterol 0.5 mg/2.5 mg/3 mL (DUONEB) 0.5-2.5 (3) MG/3ML neb solution Take 1 vial (3 mLs) by nebulization every 6 hours as needed for shortness of breath, wheezing or cough  Qty: 90 mL, Refills: 0      !! levothyroxine (SYNTHROID/LEVOTHROID) 100 MCG tablet Take 1 tablet (100 mcg) by mouth Every Mon, Wed, Fri Morning  Qty: 12 tablet, Refills: 1    Associated Diagnoses: Hypothyroidism due to acquired atrophy of thyroid; Other specified hypothyroidism      !! levothyroxine (SYNTHROID/LEVOTHROID) 75 MCG tablet Take 75 mcg by mouth. Every Sunday, Tuesday, Thursday, Saturday      lidocaine (XYLOCAINE)  external gel Place into the urethra as needed for moderate pain. Lozano insertion      LORazepam (ATIVAN) 2 MG/ML (HIGH CONC) oral solution Take 1 mg by mouth every 4 hours as needed for anxiety or agitation.      methyl salicylate-menthol (ICY HOT) ointment Apply topically every 6 hours.      metoprolol tartrate (LOPRESSOR) 25 MG tablet Take 1.5 tablets (37.5 mg) by mouth 2 times daily  Qty: 270 tablet, Refills: 1    Associated Diagnoses: Essential hypertension      morphine sulfate (ROXANOL) 20 mg/mL (HIGH CONC) soln Take 2.5 mg by mouth every 2 hours as needed for shortness of breath or moderate to severe pain.      nystatin (MYCOSTATIN) 432850 UNIT/GM external powder Apply topically 2 times daily as needed for other (fungal infection). To groin      prochlorperazine (COMPAZINE) 10 MG tablet Take 10 mg by mouth every 6 hours as needed for nausea or vomiting.      !! QUEtiapine (SEROQUEL) 25 MG tablet Take 0.5 tablets (12.5 mg) by mouth 2 times daily as needed (Agitation associated with delirium and hallucinations)  Qty: 14 tablet, Refills: 2    Associated Diagnoses: Delirium      !! QUEtiapine (SEROQUEL) 50 MG tablet Take 50 mg by mouth at bedtime.      senna-docusate (SENOKOT-S/PERICOLACE) 8.6-50 MG tablet Take 1 tablet by mouth daily as needed for constipation.      trolamine salicylate (ASPERCREME) 10 % external cream Apply topically every 6 hours as needed for moderate pain.       !! - Potential duplicate medications found. Please discuss with provider.        Allergies   No Known Allergies  Data   Most Recent 3 CBC's:  Recent Labs   Lab Test 12/31/24  0516 12/30/24  0630 12/29/24  0427   WBC 4.9 7.3 10.1   HGB 9.2* 9.9* 10.3*   MCV 97 97 97    221 230      Most Recent 3 BMP's:  Recent Labs   Lab Test 12/31/24  0516 12/30/24  1315 12/30/24  0630 12/29/24  0427     --  137 139   POTASSIUM 3.5 3.6 3.3* 3.7   CHLORIDE 105  --  102 104   CO2 26  --  27 25   BUN 17.8  --  17.0 22.5   CR 1.30* 1.17  1.20* 1.25*   ANIONGAP 8  --  8 10   KRISTEN 8.2*  --  8.4* 8.7*   GLC 84  --  109* 109*     Most Recent 2 LFT's:  Recent Labs   Lab Test 12/28/24  1005 06/29/24  1137   AST 15 16   ALT 6 10   ALKPHOS 89 104   BILITOTAL 0.8 0.9     Most Recent INR's and Anticoagulation Dosing History:  Anticoagulation Dose History  More data may exist         Latest Ref Rng & Units 7/18/2019 10/21/2022 11/29/2022 12/22/2022 4/14/2023 2/4/2024 2/29/2024   Recent Dosing and Labs   INR 0.85 - 1.15 1.12  1.08  1.22  1.85  1.92  1.15  1.05  1.02       Details          Multiple values from one day are sorted in reverse-chronological order             Most Recent 3 Troponin's:No lab results found.  Most Recent Cholesterol Panel:  Recent Labs   Lab Test 10/23/22  0428   CHOL 153   LDL 84   HDL 57   TRIG 60     Most Recent 6 Bacteria Isolates From Any Culture (See EPIC Reports for Culture Details):No lab results found.  Most Recent TSH, T4 and A1c Labs:  Recent Labs   Lab Test 02/29/24  1257   TSH 14.40*   T4 1.23     Results for orders placed or performed during the hospital encounter of 12/28/24   CT Chest/Abdomen/Pelvis w Contrast    Narrative    EXAM: CT CHEST/ABDOMEN/PELVIS W CONTRAST  LOCATION: [facility]  DATE: 12/28/2024    INDICATION: uti sepsis eval obstruction stone, cough  COMPARISON: 2/29/2024  TECHNIQUE: CT scan of the chest, abdomen, and pelvis was performed following injection of IV contrast. Multiplanar reformats were obtained. Dose reduction techniques were used.   CONTRAST: isovue 370 75ml    FINDINGS: Image quality is moderately to severely degraded by motion artifact limiting evaluation.  LUNGS AND PLEURA: Mild biapical pleural thickening/scarring. Minimal emphysema. Mild mosaic attenuation of the lungs likely related to the exam being obtained in the expiratory phase of respiration. Similar scattered sub-6 mm pulmonary micronodules which   are not significant changed since at least 2018 and benign.  No follow up required. No convincing new or enlarging pulmonary nodules in this motion degraded exam. Patchy bibasilar atelectasis and/or scarring, left greater than right.    MEDIASTINUM/AXILLAE: Heart size is normal. No adenopathy. Normal caliber aorta. Diffuse circumferential wall thickening of the esophagus.    CORONARY ARTERY CALCIFICATION: Moderate.    HEPATOBILIARY: Normal.    PANCREAS: Normal.    SPLEEN: Normal.    ADRENAL GLANDS: Normal.    KIDNEYS/BLADDER: No hydronephrosis or convincing genitourinary calculi. There is early excretion of contrast into the collecting systems and bladder limiting evaluation. Diffuse trabeculated circumferential wall thickening bladder with mucosal   hyperenhancement and multiple bladder wall diverticuli. Mild surrounding fat stranding. The bladder is decompressed with Lozano catheter.    BOWEL: Diverticulosis of the colon. No acute inflammatory change. No obstruction. Mild to moderate colonic stool burden.    LYMPH NODES: Normal.    VASCULATURE: No abdominal aortic aneurysm. Extensive atherosclerosis.    PELVIC ORGANS: Prostatomegaly.    MUSCULOSKELETAL: Heterogeneous osteopenia. Degenerative changes of the spine. Similar chronic L1 compression deformity. Old, healed rib fractures. Degenerative change the bilateral shoulders and hips.      Impression    IMPRESSION:  1.  Findings consistent with cystitis. No convincing genitourinary calculi or hydronephrosis.  2.  Diffuse circumferential wall thickening of the bladder likely related to chronic bladder obstruction/urinary retention from prostatomegaly.  3.  Diffuse circumferential wall thickening of the esophagus can be seen with esophagitis. Correlate clinically.  4.  Mild mosaic attenuation of the lungs likely related to the exam being obtained in the expiratory phase of respiration.  5.  Minimal emphysema.

## 2024-12-31 NOTE — PLAN OF CARE
Problem: Pain Acute  Goal: Optimal Pain Control and Function  Intervention: Prevent or Manage Pain  Recent Flowsheet Documentation  Taken 12/31/2024 0122 by Samina Allen RN  Bowel Elimination Promotion: adequate fluid intake promoted  Medication Review/Management: medications reviewed     Problem: Adult Inpatient Plan of Care  Goal: Absence of Hospital-Acquired Illness or Injury  Intervention: Identify and Manage Fall Risk  Recent Flowsheet Documentation  Taken 12/31/2024 0122 by Samina Allen RN  Safety Promotion/Fall Prevention:   assistive device/personal items within reach   increased rounding and observation   nonskid shoes/slippers when out of bed   patient and family education   room door open   room near nurse's station   safety round/check completed     Problem: Adult Inpatient Plan of Care  Goal: Absence of Hospital-Acquired Illness or Injury  Intervention: Prevent and Manage VTE (Venous Thromboembolism) Risk  Recent Flowsheet Documentation  Taken 12/31/2024 0442 by Samina Allen RN  VTE Prevention/Management: SCDs on (sequential compression devices)     Problem: Comorbidity Management  Goal: Blood Pressure in Desired Range  Intervention: Maintain Blood Pressure Management  Recent Flowsheet Documentation  Taken 12/31/2024 0122 by Samina Allen RN  Medication Review/Management: medications reviewed   Goal Outcome Evaluation:    Pt oriented to self, VSS, c/o abdominal pain  Pt stated that abdominal pain was coming from nausea, zofran given  Scheduled ophthalmic ointment applied  Pt said symptoms resolved from zofran

## 2024-12-31 NOTE — PLAN OF CARE
Goal Outcome Evaluation:                    Pt. Discharge at 3.45 pm with hospital ride. Family at bedside during discharge.

## 2025-01-02 ENCOUNTER — PATIENT OUTREACH (OUTPATIENT)
Dept: CARE COORDINATION | Facility: CLINIC | Age: 89
End: 2025-01-02
Payer: COMMERCIAL

## 2025-01-02 NOTE — PROGRESS NOTES
[facility]: [facility]    Background: Transitional Care Management program identified per system criteria and reviewed by [facility] [place] team for possible outreach.    Assessment: Upon chart review, [facility] Team member will not proceed with patient outreach related to this episode of Transitional Care Management program due to reason below:    Patient has been discharged with Hospice Care    Plan: Transitional Care Management episode addressed appropriately per reason noted above.      YOU Aguir[place]  [facility] [place]    *[facility] [place] Resource Team does NOT follow patient ongoing. Referrals are identified based on internal discharge reports and the outreach is to ensure patient has an understanding of their discharge instructions.

## 2025-03-13 NOTE — PLAN OF CARE
Problem: Adult Inpatient Plan of Care  Goal: Absence of Hospital-Acquired Illness or Injury  Intervention: Identify and Manage Fall Risk  Recent Flowsheet Documentation  Taken 2/5/2024 0800 by Lacey Stauffer RN  Safety Promotion/Fall Prevention: activity supervised     Problem: Adult Inpatient Plan of Care  Goal: Absence of Hospital-Acquired Illness or Injury  Intervention: Prevent Skin Injury  Recent Flowsheet Documentation  Taken 2/5/2024 0800 by Lacey Stauffer RN  Body Position:   log-rolled   position changed independently     Problem: Adult Inpatient Plan of Care  Goal: Absence of Hospital-Acquired Illness or Injury  Intervention: Prevent Infection  Recent Flowsheet Documentation  Taken 2/5/2024 0800 by Lacey Stauffer RN  Infection Prevention: hand hygiene promoted   Goal Outcome Evaluation:       Pt is progressing with these goals.  Pt denies pain.  Pt is calm, cooperative with cares and med compliant.  Meds per order, continue to monitor.                    No

## (undated) DEVICE — SPONGE RAY-TEC 4X8" 7318

## (undated) DEVICE — SUCTION MANIFOLD NEPTUNE 2 SYS 1 PORT 702-025-000

## (undated) DEVICE — TUBING SET THERMEDX UROLOGY SGL USE LL0006

## (undated) DEVICE — CATH FOLEY 3WAY 22FR 30ML LUBRICATH LATEX 0167L22

## (undated) DEVICE — SOL WATER IRRIG 3000ML BAG 2B7117

## (undated) DEVICE — STRAP CATH LEG ADJUSTABLE 0814-8200

## (undated) DEVICE — SYR 30ML LL W/O NDL 302832

## (undated) DEVICE — TUBING SUCTION MEDI-VAC 1/4"X20' N620A - HE

## (undated) DEVICE — MAT FLOOR SURGICAL 40X38 0702140238

## (undated) DEVICE — CUSTOM PACK CYSTO PREFERRED SOT5BCYHEA

## (undated) DEVICE — SOL WATER IRRIG 1000ML BOTTLE 2F7114

## (undated) DEVICE — PREP DYNA-HEX 4% CHG SCRUB 4OZ BOTTLE MDS098710

## (undated) DEVICE — GLOVE SURG PI ULTRA TOUCH M SZ 7 LF 42670

## (undated) RX ORDER — LIDOCAINE HYDROCHLORIDE 10 MG/ML
INJECTION, SOLUTION EPIDURAL; INFILTRATION; INTRACAUDAL; PERINEURAL
Status: DISPENSED
Start: 2023-04-19

## (undated) RX ORDER — PROPOFOL 10 MG/ML
INJECTION, EMULSION INTRAVENOUS
Status: DISPENSED
Start: 2023-04-19

## (undated) RX ORDER — EPHEDRINE SULFATE 50 MG/ML
INJECTION, SOLUTION INTRAMUSCULAR; INTRAVENOUS; SUBCUTANEOUS
Status: DISPENSED
Start: 2023-04-19

## (undated) RX ORDER — FENTANYL CITRATE 50 UG/ML
INJECTION, SOLUTION INTRAMUSCULAR; INTRAVENOUS
Status: DISPENSED
Start: 2023-04-19

## (undated) RX ORDER — DEXAMETHASONE SODIUM PHOSPHATE 10 MG/ML
INJECTION, EMULSION INTRAMUSCULAR; INTRAVENOUS
Status: DISPENSED
Start: 2023-04-19

## (undated) RX ORDER — ONDANSETRON 2 MG/ML
INJECTION INTRAMUSCULAR; INTRAVENOUS
Status: DISPENSED
Start: 2023-04-19